# Patient Record
Sex: FEMALE | Race: WHITE | Employment: UNEMPLOYED | URBAN - METROPOLITAN AREA
[De-identification: names, ages, dates, MRNs, and addresses within clinical notes are randomized per-mention and may not be internally consistent; named-entity substitution may affect disease eponyms.]

---

## 2017-02-13 ENCOUNTER — APPOINTMENT (OUTPATIENT)
Dept: GENERAL RADIOLOGY | Age: 52
End: 2017-02-13
Attending: EMERGENCY MEDICINE
Payer: SELF-PAY

## 2017-02-13 ENCOUNTER — HOSPITAL ENCOUNTER (EMERGENCY)
Age: 52
Discharge: HOME | End: 2017-02-13
Attending: EMERGENCY MEDICINE | Admitting: EMERGENCY MEDICINE
Payer: SELF-PAY

## 2017-02-13 VITALS
TEMPERATURE: 97.6 F | WEIGHT: 270 LBS | DIASTOLIC BLOOD PRESSURE: 72 MMHG | BODY MASS INDEX: 42.38 KG/M2 | OXYGEN SATURATION: 92 % | SYSTOLIC BLOOD PRESSURE: 160 MMHG | HEIGHT: 67 IN | HEART RATE: 78 BPM | RESPIRATION RATE: 25 BRPM

## 2017-02-13 DIAGNOSIS — I50.33 CHF (CONGESTIVE HEART FAILURE), NYHA CLASS II, ACUTE ON CHRONIC, DIASTOLIC (HCC): Primary | ICD-10-CM

## 2017-02-13 LAB
ALBUMIN SERPL BCP-MCNC: 2.8 G/DL (ref 3.4–5)
ALBUMIN/GLOB SERPL: 0.7 {RATIO} (ref 0.8–1.7)
ALP SERPL-CCNC: 99 U/L (ref 45–117)
ALT SERPL-CCNC: 22 U/L (ref 13–56)
ANION GAP BLD CALC-SCNC: 6 MMOL/L (ref 3–18)
AST SERPL W P-5'-P-CCNC: 16 U/L (ref 15–37)
BASOPHILS # BLD AUTO: 0 K/UL (ref 0–0.06)
BASOPHILS # BLD: 0 % (ref 0–2)
BILIRUB SERPL-MCNC: 0.4 MG/DL (ref 0.2–1)
BNP SERPL-MCNC: 154 PG/ML (ref 0–900)
BUN SERPL-MCNC: 21 MG/DL (ref 7–18)
BUN/CREAT SERPL: 32 (ref 12–20)
CALCIUM SERPL-MCNC: 9 MG/DL (ref 8.5–10.1)
CHLORIDE SERPL-SCNC: 107 MMOL/L (ref 100–108)
CK MB CFR SERPL CALC: ABNORMAL % (ref 0–4)
CK MB SERPL-MCNC: <0.5 NG/ML (ref 0.5–3.6)
CK SERPL-CCNC: 38 U/L (ref 26–192)
CO2 SERPL-SCNC: 28 MMOL/L (ref 21–32)
CREAT SERPL-MCNC: 0.66 MG/DL (ref 0.6–1.3)
DIFFERENTIAL METHOD BLD: NORMAL
EOSINOPHIL # BLD: 0.2 K/UL (ref 0–0.4)
EOSINOPHIL NFR BLD: 2 % (ref 0–5)
ERYTHROCYTE [DISTWIDTH] IN BLOOD BY AUTOMATED COUNT: 14.4 % (ref 11.6–14.5)
GLOBULIN SER CALC-MCNC: 3.8 G/DL (ref 2–4)
GLUCOSE SERPL-MCNC: 263 MG/DL (ref 74–99)
HCT VFR BLD AUTO: 37.3 % (ref 35–45)
HGB BLD-MCNC: 12 G/DL (ref 12–16)
LYMPHOCYTES # BLD AUTO: 25 % (ref 21–52)
LYMPHOCYTES # BLD: 2 K/UL (ref 0.9–3.6)
MAGNESIUM SERPL-MCNC: 1.7 MG/DL (ref 1.8–2.4)
MCH RBC QN AUTO: 26.4 PG (ref 24–34)
MCHC RBC AUTO-ENTMCNC: 32.2 G/DL (ref 31–37)
MCV RBC AUTO: 82 FL (ref 74–97)
MONOCYTES # BLD: 0.5 K/UL (ref 0.05–1.2)
MONOCYTES NFR BLD AUTO: 6 % (ref 3–10)
NEUTS SEG # BLD: 5.3 K/UL (ref 1.8–8)
NEUTS SEG NFR BLD AUTO: 67 % (ref 40–73)
PLATELET # BLD AUTO: 213 K/UL (ref 135–420)
PMV BLD AUTO: 9.3 FL (ref 9.2–11.8)
POTASSIUM SERPL-SCNC: 4.3 MMOL/L (ref 3.5–5.5)
PROT SERPL-MCNC: 6.6 G/DL (ref 6.4–8.2)
RBC # BLD AUTO: 4.55 M/UL (ref 4.2–5.3)
SODIUM SERPL-SCNC: 141 MMOL/L (ref 136–145)
TROPONIN I SERPL-MCNC: <0.02 NG/ML (ref 0–0.06)
WBC # BLD AUTO: 7.9 K/UL (ref 4.6–13.2)

## 2017-02-13 PROCEDURE — 77030013140 HC MSK NEB VYRM -A

## 2017-02-13 PROCEDURE — 94640 AIRWAY INHALATION TREATMENT: CPT

## 2017-02-13 PROCEDURE — 82550 ASSAY OF CK (CPK): CPT | Performed by: EMERGENCY MEDICINE

## 2017-02-13 PROCEDURE — 74011000250 HC RX REV CODE- 250: Performed by: EMERGENCY MEDICINE

## 2017-02-13 PROCEDURE — 83735 ASSAY OF MAGNESIUM: CPT | Performed by: EMERGENCY MEDICINE

## 2017-02-13 PROCEDURE — 93005 ELECTROCARDIOGRAM TRACING: CPT

## 2017-02-13 PROCEDURE — 71020 XR CHEST PA LAT: CPT

## 2017-02-13 PROCEDURE — 99285 EMERGENCY DEPT VISIT HI MDM: CPT

## 2017-02-13 PROCEDURE — 83880 ASSAY OF NATRIURETIC PEPTIDE: CPT | Performed by: EMERGENCY MEDICINE

## 2017-02-13 PROCEDURE — 85025 COMPLETE CBC W/AUTO DIFF WBC: CPT | Performed by: EMERGENCY MEDICINE

## 2017-02-13 PROCEDURE — 74011250636 HC RX REV CODE- 250/636: Performed by: EMERGENCY MEDICINE

## 2017-02-13 PROCEDURE — 80053 COMPREHEN METABOLIC PANEL: CPT | Performed by: EMERGENCY MEDICINE

## 2017-02-13 PROCEDURE — 96374 THER/PROPH/DIAG INJ IV PUSH: CPT

## 2017-02-13 RX ORDER — FUROSEMIDE 40 MG/1
40 TABLET ORAL DAILY
Qty: 45 TAB | Refills: 1 | Status: ON HOLD | OUTPATIENT
Start: 2017-02-13 | End: 2020-04-10 | Stop reason: DRUGHIGH

## 2017-02-13 RX ORDER — FUROSEMIDE 10 MG/ML
80 INJECTION INTRAMUSCULAR; INTRAVENOUS
Status: COMPLETED | OUTPATIENT
Start: 2017-02-13 | End: 2017-02-13

## 2017-02-13 RX ORDER — SODIUM CHLORIDE 0.9 % (FLUSH) 0.9 %
5-10 SYRINGE (ML) INJECTION AS NEEDED
Status: DISCONTINUED | OUTPATIENT
Start: 2017-02-13 | End: 2017-02-13 | Stop reason: HOSPADM

## 2017-02-13 RX ORDER — IPRATROPIUM BROMIDE AND ALBUTEROL SULFATE 2.5; .5 MG/3ML; MG/3ML
3 SOLUTION RESPIRATORY (INHALATION)
Status: COMPLETED | OUTPATIENT
Start: 2017-02-13 | End: 2017-02-13

## 2017-02-13 RX ORDER — LISINOPRIL 10 MG/1
10 TABLET ORAL DAILY
Qty: 30 TAB | Refills: 0 | Status: SHIPPED | OUTPATIENT
Start: 2017-02-13 | End: 2017-02-23

## 2017-02-13 RX ORDER — SODIUM CHLORIDE 0.9 % (FLUSH) 0.9 %
5-10 SYRINGE (ML) INJECTION EVERY 8 HOURS
Status: DISCONTINUED | OUTPATIENT
Start: 2017-02-13 | End: 2017-02-13 | Stop reason: HOSPADM

## 2017-02-13 RX ADMIN — IPRATROPIUM BROMIDE AND ALBUTEROL SULFATE 3 ML: .5; 3 SOLUTION RESPIRATORY (INHALATION) at 08:20

## 2017-02-13 RX ADMIN — FUROSEMIDE 80 MG: 10 INJECTION, SOLUTION INTRAMUSCULAR; INTRAVENOUS at 09:26

## 2017-02-13 RX ADMIN — Medication 10 ML: at 09:28

## 2017-02-13 NOTE — DISCHARGE INSTRUCTIONS
Heart Failure: Care Instructions  Your Care Instructions    Heart failure occurs when your heart does not pump as much blood as the body needs. Failure does not mean that the heart has stopped pumping but rather that it is not pumping as well as it should. Over time, this causes fluid buildup in your lungs and other parts of your body. Fluid buildup can cause shortness of breath, fatigue, swollen ankles, and other problems. By taking medicines regularly, reducing sodium (salt) in your diet, checking your weight every day, and making lifestyle changes, you can feel better and live longer. Follow-up care is a key part of your treatment and safety. Be sure to make and go to all appointments, and call your doctor if you are having problems. It's also a good idea to know your test results and keep a list of the medicines you take. How can you care for yourself at home? Medicines  · Be safe with medicines. Take your medicines exactly as prescribed. Call your doctor if you think you are having a problem with your medicine. · Do not take any vitamins, over-the-counter medicine, or herbal products without talking to your doctor first. Brianna Cramerer not take ibuprofen (Advil or Motrin) and naproxen (Aleve) without talking to your doctor first. They could make your heart failure worse. · You may be taking some of the following medicine. ¨ Beta-blockers can slow heart rate, decrease blood pressure, and improve your condition. Taking a beta-blocker may lower your chance of needing to be hospitalized. ¨ Angiotensin-converting enzyme inhibitors (ACEIs) reduce the heart's workload, lower blood pressure, and reduce swelling. Taking an ACEI may lower your chance of needing to be hospitalized again. ¨ Angiotensin II receptor blockers (ARBs) work like ACEIs. Your doctor may prescribe them instead of ACEIs. ¨ Diuretics, also called water pills, reduce swelling.   ¨ Potassium supplements replace this important mineral, which is sometimes lost with diuretics. ¨ Aspirin and other blood thinners prevent blood clots, which can cause a stroke or heart attack. You will get more details on the specific medicines your doctor prescribes. Diet  · Your doctor may suggest that you limit sodium to 2,000 milligrams (mg) a day or less. That is less than 1 teaspoon of salt a day, including all the salt you eat in cooking or in packaged foods. People get most of their sodium from processed foods. Fast food and restaurant meals also tend to be very high in sodium. · Ask your doctor how much liquid you can drink each day. You may have to limit liquids. Weight  · Weigh yourself without clothing at the same time each day. Record your weight. Call your doctor if you gain more than 3 pounds in 2 to 3 days. A sudden weight gain may mean that your heart failure is getting worse. Activity level  · Start light exercise (if your doctor says it is okay). Even if you can only do a small amount, exercise will help you get stronger, have more energy, and manage your weight and your stress. Walking is an easy way to get exercise. Start out by walking a little more than you did before. Bit by bit, increase the amount you walk. · When you exercise, watch for signs that your heart is working too hard. You are pushing yourself too hard if you cannot talk while you are exercising. If you become short of breath or dizzy or have chest pain, stop, sit down, and rest.  · If you feel \"wiped out\" the day after you exercise, walk slower or for a shorter distance until you can work up to a better pace. · Get enough rest at night. Sleeping with 1 or 2 pillows under your upper body and head may help you breathe easier. Lifestyle changes  · Do not smoke. Smoking can make a heart condition worse. If you need help quitting, talk to your doctor about stop-smoking programs and medicines. These can increase your chances of quitting for good.  Quitting smoking may be the most important step you can take to protect your heart. · Limit alcohol to 2 drinks a day for men and 1 drink a day for women. Too much alcohol can cause health problems. · Avoid getting sick from colds and the flu. Get a pneumococcal vaccine shot. If you have had one before, ask your doctor whether you need another dose. Get a flu shot each year. If you must be around people with colds or the flu, wash your hands often. When should you call for help? Call 911 if you have symptoms of sudden heart failure such as:  · You have severe trouble breathing. · You cough up pink, foamy mucus. · You have a new irregular or rapid heartbeat. Call your doctor now or seek immediate medical care if:  · You have new or increased shortness of breath. · You are dizzy or lightheaded, or you feel like you may faint. · You have sudden weight gain, such as 3 pounds or more in 2 to 3 days. · You have increased swelling in your legs, ankles, or feet. · You are suddenly so tired or weak that you cannot do your usual activities. Watch closely for changes in your health, and be sure to contact your doctor if:  · You develop new symptoms. Where can you learn more? Go to http://tere-ann marie.info/. Enter L693 in the search box to learn more about \"Heart Failure: Care Instructions. \"  Current as of: January 27, 2016  Content Version: 11.1  © 7964-5450 [employer]. Care instructions adapted under license by [employer] (which disclaims liability or warranty for this information). If you have questions about a medical condition or this instruction, always ask your healthcare professional. [address] any warranty or liability for your use of this information. Learning About Heart Failure  What is heart failure? Heart failure means that your heart muscle does not pump as much blood as your body needs. Failure does not mean that your heart has stopped.  It means that your heart is not pumping as well as it should. Your body has an amazing ability to make up for heart failure. It may do such a good job that you don't know you have a disease. But at some point, your heart and body will no longer be able to keep up. Then fluid starts to build up in your lungs and other parts of your body. What can you expect when you have heart failure? Heart failure is a lifelong (chronic) disease. Treatment may be able to slow the disease and help you feel better. But heart failure tends to get worse over time. Despite this, there are many steps you can take to feel better and stay healthy longer. Early on, your symptoms may not be too bad. As heart failure gets worse, symptoms typically get worse, and you may need to limit your activities. Heart failure can also get worse suddenly. If this happens, you need emergency care. Then, after treatment, your symptoms may go back to being stable (which means they stay the same) for a long time. Heart failure can lead to other health problems, such as heart rhythm problems. Over time, your treatment options may change, especially as your symptoms get worse. As heart failure gets worse, palliative care can help improve the quality of your life. You can do advance care planning to decide what kind of care you want at the end of your life. What are the symptoms? Symptoms of heart failure start to happen when your heart can't pump enough blood to the rest of your body. In the early stages of heart failure, you may:  · Feel tired easily. · Be short of breath when you exert yourself. · Feel like your heart is pounding or racing (palpitations). · Feel weak or dizzy. As heart failure gets worse, fluid starts to build up in your lungs and other parts of your body. This may cause you to:  · Feel short of breath even at rest.  · Have swelling (edema), especially in your legs, ankles, and feet. · Gain weight.  This may happen over just a day or two, or more slowly. · Cough or wheeze, especially when you lie down. How is heart failure treated? · You'll probably take several medicines. · You might attend cardiac rehabilitation (rehab) to get education and support that help you make lifestyle changes and stay as healthy as possible. · You may get a heart device. A pacemaker helps your heart pump blood. An ICD can stop abnormal heart rhythms. How can you care for yourself? There are many steps you can take to feel better and stay healthy longer. These steps are an important part of treatment. They can help you stay active and enjoy life. · Take your medicine the right way. Avoid medicines that can make your symptoms worse. · Check your weight and symptoms every day. Know what to do if your symptoms get worse. · Limit sodium to help your heart pump blood better. · Be active. Exercise regularly, but don't exercise too hard. · Be heart-healthy. Eat healthy foods, stay at a healthy weight, limit alcohol, and don't smoke. · Stay as healthy as possible. Avoid colds and flu, get help for depression and anxiety, and manage stress. Follow-up care is a key part of your treatment and safety. Be sure to make and go to all appointments, and call your doctor if you are having problems. It's also a good idea to know your test results and keep a list of the medicines you take. Where can you learn more? Go to http://tere-ann marie.info/. Enter G134 in the search box to learn more about \"Learning About Heart Failure. \"  Current as of: January 27, 2016  Content Version: 11.1  © 0556-6910 [employer]. Care instructions adapted under license by [employer] (which disclaims liability or warranty for this information). If you have questions about a medical condition or this instruction, always ask your healthcare professional. [address] any warranty or liability for your use of this information.

## 2017-02-13 NOTE — ED NOTES
Care assumed for discharge only. Pt rates headache pain as 4/10. Pt given discharge instructions and verbalizes understanding. Pt discharged home via w/c to lobby to wait for her ride, no resp distress noted on discharge.

## 2017-02-13 NOTE — ED TRIAGE NOTES
Patient with complaints of shortness of breath that started yesterday. Patient also reports ankle swelling. Patient appear acutely short of breath in triage. Sepsis Screening completed    (  )Patient meets SIRS criteria. (x  )Patient does not meet SIRS criteria.       SIRS Criteria is achieved when two or more of the following are present   Temperature < 96.8°F (36°C) or > 100.9°F (38.3°C)   Heart Rate > 90 beats per minute   Respiratory Rate > 20 beats per minute   WBC count > 12,000 or <4,000 or > 10% bands

## 2017-02-13 NOTE — ED NOTES
Bedside report received from [place], [facility]. Patient introduced to oncoming RN and advised of plan of care. Patient is oriented to room. Call Deborra Bobby in reach and bed in low position. Patient resting supine in bed with continuous monitoring in place. Side rails up with call bell in reach. Patient advised of plan of care. Vitals reassessed. Patient states feeling much better.

## 2017-02-13 NOTE — Clinical Note
SPECIAL DISCHARGE INSTRUCTIONS AND COMMENTS: 
 
General comments: Thank you for allowing us to provide you with excellent care today. We hope we addressed all of your concerns and needs. We strive to provide excellent quality care in the Emergency Depart ment. If you feel that you have not received excellent quality care or timely care, please ask to speak to the nurse manager. Please choose us in the future for your continued health care needs. Follow-up comments: The exam and treatment you rece ived in the Emergency Department were for an urgent problem that may be new for you and / or one which may be related to a worsening of a chronic or ongoing medical problem that you already had prior to this visit. In any case, today's treatment is not i ntended to be considered as complete care in all cases and thus, it is important that you follow-up with a doctor, nurse practitioner, or physicians assistant to: (1) confirm your diagnosis, (2) re-evaluation of changes in your illness and treatment, an d (3) for ongoing care. In some cases we may have contacted your doctor or a specific specialist who may be involved in your future evaluation and care but in any case, take this sheet with you when you go to your follow-up visit or refer to the infor elvia on these sheets when you are calling to arrange an appointment - it may prove helpful in making the appointment. Prescribed Medications: Unless you have been directed by the provider to change your current medicines, you should continue to veronika e them as before. If you have been prescribed medicines, please take them as directed. In some cases, these new medicines are intended to replace a medicine that you are currently taking and if so, this will be noted below.  
 
 Our expectation is that y our condition will improve by following the doctor's recommendations, however, if in the event your condition worsens or does not improve as expected, please follow-up with your PCP or if unable to reach your usual health care provider, you should return  to the Emergency Department. We are available 24 hours a day. SPECIFIC INSTRUCTIONS PROVIDED BY YOUR DOCTOR, Montana Lamar MD:  
YOU SHOULD TAKE AN INCREASED AMOUNT OF LASIX (40 MG TWICE A DAY) FOR 3 DAYS AND THEN RESULME YOUR REGULAR DOSE.

## 2017-02-13 NOTE — ED PROVIDER NOTES
HPI Comments: 7:24 AM   Scarlet Gar is a 45 y.o. female with hx of CHF, DM, HTN, and asthma, presenting to the ED c/o cough productive of white, milky sputum and SOB x 2 days, worsening. Associated sxs include dyspnea on exertion, CP (across the shoulders, and through chest to the back), chest tightening, leg swelling (onset 2 weeks ago), chills, and nausea. Reports CP is similar to pain she experiences with bronchitis, and is worse with exertion. Reports she is supposed to take Lasix, but she has run out about a week ago. Reports not having a PCP, and has last seen an IM physician approximately 1-1.5 months ago. Pt also reports constipation. Denies fever, abd pain, vomiting, diarrhea, change in appetite, and any other sxs or complaints. The history is provided by the patient. Past Medical History:   Diagnosis Date    Asthma      Note: As child had asthma    Diabetes mellitus ([facility]      Type 2    Heart palpitations     Hypertension     Morbid obesity ([facility]     MRSA infection 8/2014       Past Surgical History:   Procedure Laterality Date    Hx cholecystectomy      Hx tubal ligation      Pr breast surgery procedure unlisted           Family History:   Problem Relation Age of Onset    Heart Attack Mother     Heart Attack Maternal Grandmother        Social History     Social History    Marital status: SINGLE     Spouse name: N/A    Number of children: N/A    Years of education: N/A     Occupational History    Not on file. Social History Main Topics    Smoking status: Never Smoker    Smokeless tobacco: Never Used    Alcohol use No    Drug use: No    Sexual activity: No     Other Topics Concern    Not on file     Social History Narrative         ALLERGIES: Bees [sting, bee]; Penicillins; and Humphrey    Review of Systems   Constitutional: Positive for chills. Negative for appetite change and fever. HENT: Negative for congestion, rhinorrhea and sore throat.     Eyes: Negative for pain, discharge and visual disturbance. Respiratory: Positive for cough, chest tightness and shortness of breath. Negative for wheezing. Cardiovascular: Positive for chest pain and leg swelling. Gastrointestinal: Positive for constipation and nausea. Negative for abdominal pain, diarrhea and vomiting. Endocrine: Negative for polydipsia and polyuria. Genitourinary: Negative for difficulty urinating, dysuria, frequency, hematuria, menstrual problem, pelvic pain, urgency, vaginal bleeding and vaginal discharge. Musculoskeletal: Negative for arthralgias, back pain and myalgias. Skin: Negative for color change. Neurological: Negative for weakness, numbness and headaches. Hematological: Does not bruise/bleed easily. Psychiatric/Behavioral: Negative for decreased concentration. All other systems reviewed and are negative. Vitals:    02/13/17 0930 02/13/17 1115 02/13/17 1130 02/13/17 1200   BP: 165/72 160/77 155/80 160/72   Pulse: 80 79 77 78   Resp: 26 (!) 33 19 25   Temp:       SpO2: 97% 96% 95% 92%   Weight:       Height:                Physical Exam   Nursing note and vitals reviewed. GEN:  Mild respiratory distress. Somewhat emotional. Alert and Oriented. SKIN:  Warm and dry, no rashes. No petechia. Good skin turgor. HEAD:  Normocephalic and Atraumatic;    ENT: Oral mucosa moist, pharynx clear;   NECK:  Supple. Trachea is Midline; No adenopathy. HEART:  Regular rate and rhythm No murmur. No rubs. LUNGS:  Mild respiratory distress. Prolonged expiratory phase. ABD:  Obese non-tender. No organomegaly. No hernias. BACK: No obvious bony spinal defects or changes; Non tender paraspinous muscles  : Normal inspection; no rash; NO CVAT  EXT:  Lower Extremities with bilateral 2+ pitting edema. No obvious soft tissue tenderness or sites of bony tenderness  NEURO: CN- intact;  No focal motor deficits; Cerebellar function- intact    MDM  Number of Diagnoses or Management Options  Diagnosis management comments: INITIAL CLINICAL IMPRESSION and PLANS:  The patient presents with the primary complaint(s) of: dyspnea. The presentation, to include historical aspects and clinical findings appear to be consistent with the DX of CHF. However, other possible DX's to consider as primary, associated with, or exacerbated by include:    1. PNA  2. COPD exacerbation  3. ACS    Considering the above, my initial management plan to evaluate and therapeutic interventions include: Obtain Lab Studies, Obtain Radiologic studies, and Initiate Medications and or IV Fluids, As well as those noted in the orders:    Vahid Anand MD         Amount and/or Complexity of Data Reviewed  Clinical lab tests: ordered and reviewed  Tests in the radiology section of CPT®: ordered and reviewed (CXR)  Tests in the medicine section of CPT®: ordered and reviewed (EKG)  Independent visualization of images, tracings, or specimens: yes (EKG, CXR)      ED Course       Procedures      ED CLINICAL SUMMARY - DISCHARGE     11:48 AM  CLINICAL COURSE while in the ED:      Intervention)s) while in ED:  ACTIONS / APPROACH: Based on the presenting ACUTE history of cough and SOB My initial focus was to Determine the cause and extent of the problem and Initiate Treatment as Appropriate . Details of actions taken are noted below. SPECIFICS REGARDING APPROACH:     1. DIAGNOSTIC RESULTS:     EKG interpretation: (Preliminary)  Rate 79 bpm. NSR. Low voltage QRS. Post Cardizem  EKG read by Nandini Rocha MD at 8:18 AM    XR CHEST PA LAT   Final Result   Impression:        1. The chest appears worse with increasing initial changes which could represent  interstitial edema, possibly from fluid overload.   2. Subsegmental atelectasis both lower lobes appears worse.     As read by the radiologist.                Labs Reviewed   METABOLIC PANEL, COMPREHENSIVE - Abnormal; Notable for the following:        Result Value    Glucose 263 (*)     BUN 21 (*) BUN/Creatinine ratio 32 (*)     Albumin 2.8 (*)     A-G Ratio 0.7 (*)     All other components within normal limits   MAGNESIUM - Abnormal; Notable for the following:     Magnesium 1.7 (*)     All other components within normal limits   CARDIAC PANEL,(CK, CKMB & TROPONIN) - Abnormal; Notable for the following:     CK - MB <0.5 (*)     All other components within normal limits   CBC WITH AUTOMATED DIFF   PRO-BNP           No results found for this or any previous visit (from the past 12 hour(s)). 2. MEDICATIONS GIVEN:   Medications   furosemide (LASIX) injection 80 mg (80 mg IntraVENous Given 2/13/17 0926)   albuterol-ipratropium (DUO-NEB) 2.5 MG-0.5 MG/3 ML (3 mL Nebulization Given 2/13/17 0820)        Response to Intervention(s):   IMPROVED after diuresis and is breathing better. Feels comfortable going home      Unanticipated Developments: NONE     ED COURSE - General Comment:  During the ED course I had re-evaluated the patient, answered their and /or their family's questions regarding my clinical impression, the patient's condition and plans for therapeutic interventions. The patient's ED course was uneventful and remained stable throughout. CLINICAL IMPRESSION AND DISCUSSION:   I reviewed our electronic medical record system for any past medical records that were available that may contribute to the patients current condition, the nursing notes and vital signs from today's visit. Based on the clinical presentation, findings and results of diagnostic studies, as well as developments while in the ED,  I suspect the following: For the presentation noted above    The most likely cause or diagnosis is: CHF  Induced by medication non compliance and is compensated at this time. DISCUSSION REGARDING DIAGNOSIS: The specifics regarding my clinical impression / diagnosis are as follows:      At this time there is no clinical evidence to support other pertinent diagnostic considerations such as: N/A    Finally, other diagnostic considerations during this visit are noted below in Clinical Impression. Specific Conversations:  NONE      DISPOSITION DECISION:     DISCHARGE: I feel that we have optimized outpatient assessment and management such that Lety Mcgarry is stable to be discharged and to continue with her care or complete any additional evaluation as appropriate at home or as an outpatient. Preparations will be made to discharge the patient. Present condition at the time of disposition: STABLE    DISCUSSION REGARDING THE DISPOSITION:         DISCHARGE NOTE:    Ezella Mortimer  results have been reviewed with her. She has been counseled regarding her diagnosis, treatment, and plan. She verbally conveys understanding and agreement of the signs, symptoms, diagnosis, treatment and prognosis and additionally agrees to follow up as discussed. She also agrees with the care-plan and conveys that all of her questions have been answered. I have also provided discharge instructions for her that include: educational information regarding their diagnosis and treatment, and list of reasons why they would want to return to the ED prior to their follow-up appointment, should her condition change. The patient and/or family has been provided with education for proper Emergency Department utilization. CLINICAL IMPRESSION  1. CHF (congestive heart failure), NYHA class II, acute on chronic, diastolic (HCC)        PLAN:  1. D/C home  2. Discharge Medication List as of 2/13/2017 11:52 AM      START taking these medications    Details   furosemide (LASIX) 40 mg tablet Take 1 Tab by mouth daily. , Normal, Disp-45 Tab, R-1      !! lisinopril (PRINIVIL) 10 mg tablet Take 1 Tab by mouth daily for 10 days. , Normal, Disp-30 Tab, R-0       !! - Potential duplicate medications found. Please discuss with provider.       CONTINUE these medications which have NOT CHANGED    Details   acetaminophen-codeine (TYLENOL-CODEINE #3) 300-30 mg per tablet Take 1 Tab by mouth every six (6) hours as needed for Pain. Max Daily Amount: 4 Tabs., Print, Disp-12 Tab, R-0      tiZANidine (ZANAFLEX) 4 mg tablet Take 4 mg by mouth every six (6) hours as needed., Historical Med      celecoxib (CELEBREX) 200 mg capsule Take 200 mg by mouth two (2) times a day., Historical Med      !! lisinopril (PRINIVIL, ZESTRIL) 10 mg tablet Take 1 Tab by mouth daily. , Print, Disp-30 Tab, R-0      insulin NPH/insulin regular (NOVOLIN 70/30) 100 unit/mL (70-30) injection 20 Units by SubCUTAneous route every twelve (12) hours. , Historical Med      insulin regular (NOVOLIN R) 100 unit/mL injection by SubCUTAneous route Before breakfast, lunch, and dinner., Historical Med      aspirin 81 mg chewable tablet Take 1 tablet by mouth daily. , Print, Disp-30 tablet, R-1       !! - Potential duplicate medications found. Please discuss with provider. 3.   Follow-up Information     Follow up With Details Comments Contact Info    [facility] Schedule an appointment as soon as possible for a visit in 2 days for Primary care follow up [id number] [facility], [address] [facility] [address]    [facility] Go to As needed, If symptoms worsen [address] [place] [address]  [phone]        Return if sxs worsen    ATTESTATIONS:  This note is prepared by Lupe Brooks, acting as Scribe for Immanuel Marcelino MD.    Immanuel Marcelino MD: The scribe's documentation has been prepared under my direction and personally reviewed by me in its entirety. I confirm that the note above accurately reflects all work, treatment, procedures, and medical decision making performed by me.

## 2017-02-13 NOTE — ED NOTES
Bedside report complete. Patient was introduced to oncoming Marilyn Williamson RN. Patient updated on plan of care. Safety check performed: Bed locked in low posiiton. Side rails up. Call bell and personal items within reach.

## 2017-02-13 NOTE — ED NOTES
RN in room for purpose of hourly rounding. Room checked for trash and safety hazards. Patient is. ..    [  ] seated at bedside. [  ] lying in bed with side rails up and call bell in reach. [ x ] lying in with call bell in reach and continuous monitoring in place. Pain reduction interventions. .. [ x ] not indicated at this time      include the following   [  ] administration of analgesic medications   [  ] lights turned down   [  ] patient offered a cool washcloth   [  ] heat applied   [  ] ice applied   [  ] patient repositioned    Restroom needs addressed. Patient is. ... [  x] Not in need restroom assistance at this time  [  ] Ambulatory as needed to the restroom  [  ] Assisted to bedside commode  [  ] Assisted with use of bed pan  [  ] Provided with a urinal    Position. .. [ x ] Patient does not require assistance with repositioning  [  ] Patient repositions with assistance of RN  [  ] Patient repositioned with assistance of RN and other ED staff.

## 2017-02-16 LAB
ATRIAL RATE: 79 BPM
CALCULATED P AXIS, ECG09: 61 DEGREES
CALCULATED R AXIS, ECG10: 60 DEGREES
CALCULATED T AXIS, ECG11: 44 DEGREES
DIAGNOSIS, 93000: NORMAL
P-R INTERVAL, ECG05: 130 MS
Q-T INTERVAL, ECG07: 378 MS
QRS DURATION, ECG06: 82 MS
QTC CALCULATION (BEZET), ECG08: 433 MS
VENTRICULAR RATE, ECG03: 79 BPM

## 2017-09-03 ENCOUNTER — APPOINTMENT (OUTPATIENT)
Dept: GENERAL RADIOLOGY | Age: 52
DRG: 918 | End: 2017-09-03
Attending: FAMILY MEDICINE
Payer: COMMERCIAL

## 2017-09-03 ENCOUNTER — HOSPITAL ENCOUNTER (INPATIENT)
Age: 52
LOS: 1 days | Discharge: HOME | DRG: 918 | End: 2017-09-04
Attending: FAMILY MEDICINE | Admitting: INTERNAL MEDICINE
Payer: COMMERCIAL

## 2017-09-03 DIAGNOSIS — T38.3X1A INSULIN OVERDOSE, ACCIDENTAL OR UNINTENTIONAL, INITIAL ENCOUNTER: ICD-10-CM

## 2017-09-03 DIAGNOSIS — E16.2 HYPOGLYCEMIA: Primary | ICD-10-CM

## 2017-09-03 LAB
ANION GAP SERPL CALC-SCNC: 7 MMOL/L (ref 3–18)
BASOPHILS # BLD: 0 K/UL (ref 0–0.06)
BASOPHILS NFR BLD: 1 % (ref 0–2)
BUN SERPL-MCNC: 28 MG/DL (ref 7–18)
BUN/CREAT SERPL: 30 (ref 12–20)
CALCIUM SERPL-MCNC: 9.3 MG/DL (ref 8.5–10.1)
CHLORIDE SERPL-SCNC: 108 MMOL/L (ref 100–108)
CO2 SERPL-SCNC: 28 MMOL/L (ref 21–32)
CREAT SERPL-MCNC: 0.94 MG/DL (ref 0.6–1.3)
DIFFERENTIAL METHOD BLD: ABNORMAL
EOSINOPHIL # BLD: 0.4 K/UL (ref 0–0.4)
EOSINOPHIL NFR BLD: 5 % (ref 0–5)
ERYTHROCYTE [DISTWIDTH] IN BLOOD BY AUTOMATED COUNT: 14.9 % (ref 11.6–14.5)
GLUCOSE BLD STRIP.AUTO-MCNC: 104 MG/DL (ref 70–110)
GLUCOSE BLD STRIP.AUTO-MCNC: 132 MG/DL (ref 70–110)
GLUCOSE BLD STRIP.AUTO-MCNC: 188 MG/DL (ref 70–110)
GLUCOSE BLD STRIP.AUTO-MCNC: 60 MG/DL (ref 70–110)
GLUCOSE BLD STRIP.AUTO-MCNC: 84 MG/DL (ref 70–110)
GLUCOSE SERPL-MCNC: 110 MG/DL (ref 74–99)
HCT VFR BLD AUTO: 38.8 % (ref 35–45)
HGB BLD-MCNC: 12.7 G/DL (ref 12–16)
LYMPHOCYTES # BLD: 2.4 K/UL (ref 0.9–3.6)
LYMPHOCYTES NFR BLD: 30 % (ref 21–52)
MCH RBC QN AUTO: 26 PG (ref 24–34)
MCHC RBC AUTO-ENTMCNC: 32.7 G/DL (ref 31–37)
MCV RBC AUTO: 79.3 FL (ref 74–97)
MONOCYTES # BLD: 0.6 K/UL (ref 0.05–1.2)
MONOCYTES NFR BLD: 7 % (ref 3–10)
NEUTS SEG # BLD: 4.6 K/UL (ref 1.8–8)
NEUTS SEG NFR BLD: 57 % (ref 40–73)
PLATELET # BLD AUTO: 203 K/UL (ref 135–420)
PMV BLD AUTO: 9.4 FL (ref 9.2–11.8)
POTASSIUM SERPL-SCNC: 4 MMOL/L (ref 3.5–5.5)
RBC # BLD AUTO: 4.89 M/UL (ref 4.2–5.3)
SODIUM SERPL-SCNC: 143 MMOL/L (ref 136–145)
WBC # BLD AUTO: 8 K/UL (ref 4.6–13.2)

## 2017-09-03 PROCEDURE — 80048 BASIC METABOLIC PNL TOTAL CA: CPT | Performed by: FAMILY MEDICINE

## 2017-09-03 PROCEDURE — 96361 HYDRATE IV INFUSION ADD-ON: CPT

## 2017-09-03 PROCEDURE — 99285 EMERGENCY DEPT VISIT HI MDM: CPT

## 2017-09-03 PROCEDURE — 96376 TX/PRO/DX INJ SAME DRUG ADON: CPT

## 2017-09-03 PROCEDURE — 82962 GLUCOSE BLOOD TEST: CPT

## 2017-09-03 PROCEDURE — 85025 COMPLETE CBC W/AUTO DIFF WBC: CPT | Performed by: FAMILY MEDICINE

## 2017-09-03 PROCEDURE — 74011000258 HC RX REV CODE- 258: Performed by: FAMILY MEDICINE

## 2017-09-03 PROCEDURE — 96374 THER/PROPH/DIAG INJ IV PUSH: CPT

## 2017-09-03 PROCEDURE — 71010 XR CHEST PORT: CPT

## 2017-09-03 PROCEDURE — 74011000250 HC RX REV CODE- 250: Performed by: FAMILY MEDICINE

## 2017-09-03 RX ORDER — DEXTROSE MONOHYDRATE AND SODIUM CHLORIDE 5; .9 G/100ML; G/100ML
125 INJECTION, SOLUTION INTRAVENOUS CONTINUOUS
Status: DISCONTINUED | OUTPATIENT
Start: 2017-09-03 | End: 2017-09-04

## 2017-09-03 RX ORDER — DEXTROSE 50 % IN WATER (D50W) INTRAVENOUS SYRINGE
25
Status: COMPLETED | OUTPATIENT
Start: 2017-09-03 | End: 2017-09-03

## 2017-09-03 RX ORDER — GABAPENTIN 300 MG/1
300 CAPSULE ORAL 3 TIMES DAILY
Status: ON HOLD | COMMUNITY
End: 2020-04-10

## 2017-09-03 RX ADMIN — DEXTROSE MONOHYDRATE AND SODIUM CHLORIDE 125 ML/HR: 5; .9 INJECTION, SOLUTION INTRAVENOUS at 21:25

## 2017-09-03 RX ADMIN — DEXTROSE MONOHYDRATE 25 G: 25 INJECTION, SOLUTION INTRAVENOUS at 23:32

## 2017-09-03 NOTE — IP AVS SNAPSHOT
Carl Elders 
 
 
 [address] [facility] [address] 
[phone] Patient: Chuck Del Rosario MRN: [id number] :1965 You are allergic to the following Allergen Reactions Bees (Sting, Bee) Swelling Penicillins Hives Strawberry Hives Recent Documentation Height Weight Breastfeeding? BMI OB Status Smoking Status 1.575 m 132 kg No 53.23 kg/m2 Postmenopausal Never Smoker Emergency Contacts Name Discharge Info Relation Home Work Mobile [facility] SPECIALTY [facility] DISCHARGE CAREGIVER [3] Daughter [21]   [phone] About your hospitalization You were admitted on:  2017 You last received care in the:  [place] [address] INTENSIVE CARE You were discharged on:  2017 Unit phone number:  [phone] Why you were hospitalized Your primary diagnosis was:  Hypoglycemia Providers Seen During Your Hospitalizations Provider Role Specialty Primary office phone Ezella Kanner, MD Attending Provider Emergency Medicine [phone] Mona Ortega MD Attending Provider Internal Medicine [phone] Your Primary Care Physician (PCP) Primary Care Physician Office Phone Office Fax [place], [place] [phone] Follow-up Information Follow up With Details Comments Contact Info Monica Uriostegui MD Schedule an appointment as soon as possible for a visit in 1 week  [place] [address] 
[phone] Current Discharge Medication List  
  
START taking these medications Dose & Instructions Dispensing Information Comments Morning Noon Evening Bedtime Blood-Glucose Meter monitoring kit Your last dose was: Your next dose is:    
   
   
 Test QAC and QHS Quantity:  1 Kit Refills:  0 CONTINUE these medications which have NOT CHANGED Dose & Instructions Dispensing Information Comments Morning Noon Evening Bedtime  
 aspirin 81 mg chewable tablet Your last dose was: Your next dose is:    
   
   
 Dose:  81 mg Take 1 tablet by mouth daily. Quantity:  30 tablet Refills:  1  
     
   
   
   
  
 furosemide 40 mg tablet Commonly known as:  LASIX Your last dose was: Your next dose is:    
   
   
 Dose:  40 mg Take 1 Tab by mouth daily. Quantity:  45 Tab Refills:  1  
     
   
   
   
  
 gabapentin 300 mg capsule Commonly known as:  NEURONTIN Your last dose was: Your next dose is:    
   
   
 Dose:  300 mg Take 300 mg by mouth two (2) times a day. Refills:  0  
     
   
   
   
  
 lisinopril 10 mg tablet Commonly known as:  Radha Polk Your last dose was: Your next dose is:    
   
   
 Dose:  10 mg Take 1 Tab by mouth daily. Quantity:  30 Tab Refills:  0 NovoLIN 70/30 100 unit/mL (70-30) injection Generic drug:  insulin NPH/insulin regular Your last dose was: Your next dose is:    
   
   
 Dose:  32 Units 32 Units by SubCUTAneous route every twelve (12) hours. Refills:  0 NovoLIN R 100 unit/mL injection Generic drug:  insulin regular Your last dose was: Your next dose is:    
   
   
 by SubCUTAneous route Daily (before lunch). Refills:  0  
     
   
   
   
  
 ZANAFLEX 4 mg tablet Generic drug:  tiZANidine Your last dose was: Your next dose is:    
   
   
 Dose:  4 mg Take 4 mg by mouth every six (6) hours as needed. Refills:  0 Where to Get Your Medications Information on where to get these meds will be given to you by the nurse or doctor. ! Ask your nurse or doctor about these medications Blood-Glucose Meter monitoring kit Discharge Instructions Hypoglycemia: Care Instructions Your Care Instructions Hypoglycemia means that your blood sugar is low and your body is not getting enough fuel. Some people get low blood sugar from not eating often enough. Some medicines to treat diabetes can cause low blood sugar. People who have had surgery on their stomachs or intestines may get hypoglycemia. Problems with the pancreas, kidneys, or liver also can cause low blood sugar. A snack or drink with sugar in it will raise your blood sugar and should ease your symptoms right away. Your doctor may recommend that you change or stop your medicines until you can get your blood sugar levels under control. In the long run, you may need to change your diet and eating habits so that you get enough fuel for your body throughout the day. Follow-up care is a key part of your treatment and safety. Be sure to make and go to all appointments, and call your doctor if you are having problems. It's also a good idea to know your test results and keep a list of the medicines you take. How can you care for yourself at home? · Learn to recognize the early signs of low blood sugar. Signs include: 
¨ Nausea. ¨ Hunger. ¨ Feeling nervous, irritable, or shaky. ¨ Cold, clammy, wet skin. ¨ Sweating (when you are not exercising). ¨ A fast heartbeat. ¨ Numbness or tingling of the fingertips or lips. · If you feel an episode of low blood sugar coming on, drink fruit juice or sugared (not diet) soda, or eat sugar in the form of candy, cubes, or tablets. 1[employer] are another [employer]. · Eat small, frequent meals so that you do not get too hungry between meals. · Balance extra exercise with eating more. · Keep a written record of your low blood sugar episodes, including when you last ate and what you ate, so that you can learn what causes your blood sugar to drop.  
· Make sure your family, friends, and coworkers know the symptoms of low blood sugar and know what to do to get your sugar level up. · Wear medical alert jewelry that lists your condition. You can buy this at most drugstores. When should you call for help? Call 911 anytime you think you may need emergency care. For example, call if: 
· You passed out (lost consciousness). · You are confused or cannot think clearly. · Your blood sugar is very high or very low. Watch closely for changes in your health, and be sure to contact your doctor if: 
· Your blood sugar stays outside the level your doctor set for you. · You have any problems. Where can you learn more? Go to http://tere-ann marie.info/. Enter H581 in the search box to learn more about \"Hypoglycemia: Care Instructions. \" Current as of: March 13, 2017 Content Version: 11.3 © 1420-4720 [employer]. Care instructions adapted under license by [employer] (which disclaims liability or warranty for this information). If you have questions about a medical condition or this instruction, always ask your healthcare professional. [address] any warranty or liability for your use of this information. Accidental Overdose of Medicine: Care Instructions Your Care Instructions Almost any medicine can cause harm if you take too much of it. You have had treatment to help your body get rid of an overdose of a medicine. This may have been an over-the-counter medicine. Or it might be one that a doctor prescribed. It may even have been a vitamin or a supplement. During treatment, the doctor may have given you fluids and medicine. You also may have had lab tests. Then the doctor made sure that you were well enough to go home. The doctor has checked you carefully, but problems can develop later. If you notice any problems or new symptoms, get medical treatment right away. Follow-up care is a key part of your treatment and safety.  Be sure to make and go to all appointments, and call your doctor if you are having problems. It's also a good idea to know your test results and keep a list of the medicines you take. How can you care for yourself at home? Home care · Drink plenty of fluids. If you have kidney, heart, or liver disease and have to limit fluids, talk with your doctor before you increase the amount of fluids you drink. · If you normally take medicines, ask your doctor when you can start taking them again. · Read the information that comes with any medicine. If you have questions, ask your doctor or pharmacist. 
Prevention · Be safe with medicines. Take your medicines exactly as prescribed or as the label directs. Call your doctor if you think you are having a problem with your medicine. · Keep your medicines in the containers they came in. Keep them with the original labels. · Find out what to do if you miss a dose of your medicine. When should you call for help? Call 911 anytime you think you may need emergency care. For example, call if: 
· You passed out (lost consciousness). · You have trouble breathing. · You are sleepy or hard to wake up. Call your doctor now or seek immediate medical care if: 
· You are vomiting. · You have a new or worse headache. · You are dizzy or lightheaded or feel like you may faint. Watch closely for changes in your health, and be sure to contact your doctor if: 
· You do not get better as expected. Where can you learn more? Go to http://tere-ann marie.info/. Enter C165 in the search box to learn more about \"Accidental Overdose of Medicine: Care Instructions. \" Current as of: March 24, 2017 Content Version: 11.3 © 8315-7927 [employer]. Care instructions adapted under license by [employer] (which disclaims liability or warranty for this information).  If you have questions about a medical condition or this instruction, always ask your healthcare professional. [address] any warranty or liability for your use of this information. Based on your report of your last A1C >14% in June 2017, this test reflects that your blood sugar averaged 360 mg/dl over the past 3 months. It is important to follow up with your provider on a routine basis to continue to evaluate your blood sugar and discuss any necessary changes in treatment. Discharge Orders None Introducing [facility]! Tatianna Mcdermott introduces Cinario patient portal. Now you can access parts of your medical record, email your doctor's office, and request medication refills online. 1. In your internet browser, go to [email or web address] 2. Click on the First Time User? Click Here link in the Sign In box. You will see the New Member Sign Up page. 3. Enter your Cinario Access Code exactly as it appears below. You will not need to use this code after youve completed the sign-up process. If you do not sign up before the expiration date, you must request a new code. · Cinario Access Code: [email or web address] Expires: 12/3/2017  6:10 PM 
 
4. Enter the last four digits of your Social Security Number (xxxx) and Date of Birth (mm/dd/yyyy) as indicated and click Submit. You will be taken to the next sign-up page. 5. Create a Cinario ID. This will be your Cinario login ID and cannot be changed, so think of one that is secure and easy to remember. 6. Create a Cinario password. You can change your password at any time. 7. Enter your Password Reset Question and Answer. This can be used at a later time if you forget your password. 8. Enter your e-mail address. You will receive e-mail notification when new information is available in [address] 9. Click Sign Up. You can now view and download portions of your medical record.  
10. Click the Download Summary menu link to download a portable copy of your medical information. If you have questions, please visit the Frequently Asked Questions section of the [employer] website. Remember, MyChart is NOT to be used for urgent needs. For medical emergencies, dial 911. Now available from your iPhone and Android! General Information Please provide this summary of care documentation to your next provider. Patient Signature:  ____________________________________________________________ Date:  ____________________________________________________________  
  
Harley Ala Provider Signature:  ____________________________________________________________ Date:  ____________________________________________________________

## 2017-09-03 NOTE — IP AVS SNAPSHOT
Emir Hall 
 
 
 [address] [facility] [address] 
[phone] Patient: Merrill Escobar MRN: [id number] :1965 Current Discharge Medication List  
  
START taking these medications Dose & Instructions Dispensing Information Comments Morning Noon Evening Bedtime Blood-Glucose Meter monitoring kit Your last dose was: Your next dose is:    
   
   
 Test QAC and QHS Quantity:  1 Kit Refills:  0 CONTINUE these medications which have NOT CHANGED Dose & Instructions Dispensing Information Comments Morning Noon Evening Bedtime  
 aspirin 81 mg chewable tablet Your last dose was: Your next dose is:    
   
   
 Dose:  81 mg Take 1 tablet by mouth daily. Quantity:  30 tablet Refills:  1  
     
   
   
   
  
 furosemide 40 mg tablet Commonly known as:  LASIX Your last dose was: Your next dose is:    
   
   
 Dose:  40 mg Take 1 Tab by mouth daily. Quantity:  45 Tab Refills:  1  
     
   
   
   
  
 gabapentin 300 mg capsule Commonly known as:  NEURONTIN Your last dose was: Your next dose is:    
   
   
 Dose:  300 mg Take 300 mg by mouth two (2) times a day. Refills:  0  
     
   
   
   
  
 lisinopril 10 mg tablet Commonly known as:  Velora Javier Your last dose was: Your next dose is:    
   
   
 Dose:  10 mg Take 1 Tab by mouth daily. Quantity:  30 Tab Refills:  0 NovoLIN 70/30 100 unit/mL (70-30) injection Generic drug:  insulin NPH/insulin regular Your last dose was: Your next dose is:    
   
   
 Dose:  32 Units 32 Units by SubCUTAneous route every twelve (12) hours. Refills:  0 NovoLIN R 100 unit/mL injection Generic drug:  insulin regular Your last dose was: Your next dose is: by SubCUTAneous route Daily (before lunch). Refills:  0  
     
   
   
   
  
 ZANAFLEX 4 mg tablet Generic drug:  tiZANidine Your last dose was: Your next dose is:    
   
   
 Dose:  4 mg Take 4 mg by mouth every six (6) hours as needed. Refills:  0 Where to Get Your Medications Information on where to get these meds will be given to you by the nurse or doctor. ! Ask your nurse or doctor about these medications Blood-Glucose Meter monitoring kit

## 2017-09-04 VITALS
WEIGHT: 291.01 LBS | HEART RATE: 62 BPM | SYSTOLIC BLOOD PRESSURE: 143 MMHG | HEIGHT: 62 IN | OXYGEN SATURATION: 93 % | RESPIRATION RATE: 24 BRPM | BODY MASS INDEX: 53.55 KG/M2 | TEMPERATURE: 98.1 F | DIASTOLIC BLOOD PRESSURE: 72 MMHG

## 2017-09-04 PROBLEM — E16.2 HYPOGLYCEMIA: Status: ACTIVE | Noted: 2017-09-04

## 2017-09-04 LAB
APPEARANCE UR: CLEAR
BACTERIA URNS QL MICRO: ABNORMAL /HPF
BILIRUB UR QL: NEGATIVE
COLOR UR: YELLOW
EPITH CASTS URNS QL MICRO: ABNORMAL /LPF (ref 0–5)
GLUCOSE BLD STRIP.AUTO-MCNC: 104 MG/DL (ref 70–110)
GLUCOSE BLD STRIP.AUTO-MCNC: 107 MG/DL (ref 70–110)
GLUCOSE BLD STRIP.AUTO-MCNC: 130 MG/DL (ref 70–110)
GLUCOSE BLD STRIP.AUTO-MCNC: 134 MG/DL (ref 70–110)
GLUCOSE BLD STRIP.AUTO-MCNC: 145 MG/DL (ref 70–110)
GLUCOSE BLD STRIP.AUTO-MCNC: 146 MG/DL (ref 70–110)
GLUCOSE BLD STRIP.AUTO-MCNC: 160 MG/DL (ref 70–110)
GLUCOSE BLD STRIP.AUTO-MCNC: 181 MG/DL (ref 70–110)
GLUCOSE BLD STRIP.AUTO-MCNC: 181 MG/DL (ref 70–110)
GLUCOSE BLD STRIP.AUTO-MCNC: 187 MG/DL (ref 70–110)
GLUCOSE BLD STRIP.AUTO-MCNC: 189 MG/DL (ref 70–110)
GLUCOSE BLD STRIP.AUTO-MCNC: 189 MG/DL (ref 70–110)
GLUCOSE BLD STRIP.AUTO-MCNC: 201 MG/DL (ref 70–110)
GLUCOSE BLD STRIP.AUTO-MCNC: 203 MG/DL (ref 70–110)
GLUCOSE BLD STRIP.AUTO-MCNC: 218 MG/DL (ref 70–110)
GLUCOSE BLD STRIP.AUTO-MCNC: 219 MG/DL (ref 70–110)
GLUCOSE BLD STRIP.AUTO-MCNC: 221 MG/DL (ref 70–110)
GLUCOSE BLD STRIP.AUTO-MCNC: 258 MG/DL (ref 70–110)
GLUCOSE BLD STRIP.AUTO-MCNC: 61 MG/DL (ref 70–110)
GLUCOSE BLD STRIP.AUTO-MCNC: 72 MG/DL (ref 70–110)
GLUCOSE BLD STRIP.AUTO-MCNC: 73 MG/DL (ref 70–110)
GLUCOSE BLD STRIP.AUTO-MCNC: 76 MG/DL (ref 70–110)
GLUCOSE BLD STRIP.AUTO-MCNC: 82 MG/DL (ref 70–110)
GLUCOSE BLD STRIP.AUTO-MCNC: 82 MG/DL (ref 70–110)
GLUCOSE BLD STRIP.AUTO-MCNC: 94 MG/DL (ref 70–110)
GLUCOSE UR STRIP.AUTO-MCNC: 500 MG/DL
HGB UR QL STRIP: ABNORMAL
KETONES UR QL STRIP.AUTO: NEGATIVE MG/DL
LEUKOCYTE ESTERASE UR QL STRIP.AUTO: NEGATIVE
MUCOUS THREADS URNS QL MICRO: ABNORMAL /LPF
NITRITE UR QL STRIP.AUTO: NEGATIVE
PH UR STRIP: 5 [PH] (ref 5–8)
PROT UR STRIP-MCNC: 300 MG/DL
RBC #/AREA URNS HPF: ABNORMAL /HPF (ref 0–5)
SP GR UR REFRACTOMETRY: 1.03 (ref 1–1.03)
UROBILINOGEN UR QL STRIP.AUTO: 1 EU/DL (ref 0.2–1)
WBC URNS QL MICRO: ABNORMAL /HPF (ref 0–5)
YEAST URNS QL MICRO: ABNORMAL

## 2017-09-04 PROCEDURE — 65610000006 HC RM INTENSIVE CARE

## 2017-09-04 PROCEDURE — 74011000258 HC RX REV CODE- 258: Performed by: INTERNAL MEDICINE

## 2017-09-04 PROCEDURE — 74011636637 HC RX REV CODE- 636/637: Performed by: HOSPITALIST

## 2017-09-04 PROCEDURE — 82962 GLUCOSE BLOOD TEST: CPT

## 2017-09-04 PROCEDURE — 81001 URINALYSIS AUTO W/SCOPE: CPT | Performed by: FAMILY MEDICINE

## 2017-09-04 PROCEDURE — 74011250637 HC RX REV CODE- 250/637

## 2017-09-04 PROCEDURE — 74011000250 HC RX REV CODE- 250: Performed by: FAMILY MEDICINE

## 2017-09-04 PROCEDURE — 74011250637 HC RX REV CODE- 250/637: Performed by: INTERNAL MEDICINE

## 2017-09-04 PROCEDURE — 74011250636 HC RX REV CODE- 250/636: Performed by: INTERNAL MEDICINE

## 2017-09-04 PROCEDURE — 74011000258 HC RX REV CODE- 258: Performed by: FAMILY MEDICINE

## 2017-09-04 RX ORDER — DEXTROSE 50 % IN WATER (D50W) INTRAVENOUS SYRINGE
25
Status: COMPLETED | OUTPATIENT
Start: 2017-09-04 | End: 2017-09-04

## 2017-09-04 RX ORDER — MAGNESIUM SULFATE 100 %
4 CRYSTALS MISCELLANEOUS AS NEEDED
Status: DISCONTINUED | OUTPATIENT
Start: 2017-09-04 | End: 2017-09-05 | Stop reason: HOSPADM

## 2017-09-04 RX ORDER — DEXTROSE 50 % IN WATER (D50W) INTRAVENOUS SYRINGE
Status: DISPENSED
Start: 2017-09-04 | End: 2017-09-04

## 2017-09-04 RX ORDER — GABAPENTIN 300 MG/1
300 CAPSULE ORAL 2 TIMES DAILY
Status: DISCONTINUED | OUTPATIENT
Start: 2017-09-04 | End: 2017-09-05 | Stop reason: HOSPADM

## 2017-09-04 RX ORDER — ACETAMINOPHEN AND CODEINE PHOSPHATE 300; 30 MG/1; MG/1
1 TABLET ORAL
Status: DISCONTINUED | OUTPATIENT
Start: 2017-09-04 | End: 2017-09-05 | Stop reason: HOSPADM

## 2017-09-04 RX ORDER — ACETAMINOPHEN 325 MG/1
650 TABLET ORAL
Status: DISCONTINUED | OUTPATIENT
Start: 2017-09-04 | End: 2017-09-05 | Stop reason: HOSPADM

## 2017-09-04 RX ORDER — CELECOXIB 100 MG/1
200 CAPSULE ORAL 2 TIMES DAILY
Status: DISCONTINUED | OUTPATIENT
Start: 2017-09-04 | End: 2017-09-05 | Stop reason: HOSPADM

## 2017-09-04 RX ORDER — DEXTROSE MONOHYDRATE AND SODIUM CHLORIDE 5; .45 G/100ML; G/100ML
125 INJECTION, SOLUTION INTRAVENOUS CONTINUOUS
Status: DISCONTINUED | OUTPATIENT
Start: 2017-09-04 | End: 2017-09-04

## 2017-09-04 RX ORDER — GUAIFENESIN 100 MG/5ML
81 LIQUID (ML) ORAL DAILY
Status: DISCONTINUED | OUTPATIENT
Start: 2017-09-04 | End: 2017-09-05 | Stop reason: HOSPADM

## 2017-09-04 RX ORDER — DEXTROSE MONOHYDRATE 100 MG/ML
70 INJECTION, SOLUTION INTRAVENOUS CONTINUOUS
Status: DISCONTINUED | OUTPATIENT
Start: 2017-09-04 | End: 2017-09-04

## 2017-09-04 RX ORDER — ACETAMINOPHEN 325 MG/1
975 TABLET ORAL
Status: COMPLETED | OUTPATIENT
Start: 2017-09-04 | End: 2017-09-04

## 2017-09-04 RX ORDER — INSULIN PUMP SYRINGE, 3 ML
EACH MISCELLANEOUS
Qty: 1 KIT | Refills: 0 | Status: SHIPPED | OUTPATIENT
Start: 2017-09-04 | End: 2017-12-28

## 2017-09-04 RX ORDER — INSULIN LISPRO 100 [IU]/ML
INJECTION, SOLUTION INTRAVENOUS; SUBCUTANEOUS
Status: DISCONTINUED | OUTPATIENT
Start: 2017-09-04 | End: 2017-09-05 | Stop reason: HOSPADM

## 2017-09-04 RX ORDER — DEXTROSE 50 % IN WATER (D50W) INTRAVENOUS SYRINGE
25-50 AS NEEDED
Status: DISCONTINUED | OUTPATIENT
Start: 2017-09-04 | End: 2017-09-05 | Stop reason: HOSPADM

## 2017-09-04 RX ORDER — INSULIN LISPRO 100 [IU]/ML
INJECTION, SOLUTION INTRAVENOUS; SUBCUTANEOUS
Status: DISCONTINUED | OUTPATIENT
Start: 2017-09-04 | End: 2017-09-04

## 2017-09-04 RX ORDER — ONDANSETRON 2 MG/ML
4 INJECTION INTRAMUSCULAR; INTRAVENOUS
Status: DISCONTINUED | OUTPATIENT
Start: 2017-09-04 | End: 2017-09-05 | Stop reason: HOSPADM

## 2017-09-04 RX ORDER — LISINOPRIL 5 MG/1
10 TABLET ORAL DAILY
Status: DISCONTINUED | OUTPATIENT
Start: 2017-09-04 | End: 2017-09-05 | Stop reason: HOSPADM

## 2017-09-04 RX ORDER — ENOXAPARIN SODIUM 100 MG/ML
40 INJECTION SUBCUTANEOUS EVERY 24 HOURS
Status: DISCONTINUED | OUTPATIENT
Start: 2017-09-04 | End: 2017-09-05 | Stop reason: HOSPADM

## 2017-09-04 RX ORDER — FUROSEMIDE 40 MG/1
40 TABLET ORAL DAILY
Status: DISCONTINUED | OUTPATIENT
Start: 2017-09-04 | End: 2017-09-05 | Stop reason: HOSPADM

## 2017-09-04 RX ORDER — TIZANIDINE 4 MG/1
4 TABLET ORAL
Status: DISCONTINUED | OUTPATIENT
Start: 2017-09-04 | End: 2017-09-05 | Stop reason: HOSPADM

## 2017-09-04 RX ORDER — ACETAMINOPHEN 325 MG/1
TABLET ORAL
Status: COMPLETED
Start: 2017-09-04 | End: 2017-09-04

## 2017-09-04 RX ADMIN — ASPIRIN 81 MG 81 MG: 81 TABLET ORAL at 10:13

## 2017-09-04 RX ADMIN — ACETAMINOPHEN 975 MG: 325 TABLET ORAL at 01:41

## 2017-09-04 RX ADMIN — FUROSEMIDE 40 MG: 40 TABLET ORAL at 10:13

## 2017-09-04 RX ADMIN — ENOXAPARIN SODIUM 40 MG: 40 INJECTION SUBCUTANEOUS at 10:14

## 2017-09-04 RX ADMIN — DEXTROSE MONOHYDRATE 125 ML/HR: 10 INJECTION, SOLUTION INTRAVENOUS at 05:00

## 2017-09-04 RX ADMIN — LISINOPRIL 10 MG: 5 TABLET ORAL at 10:13

## 2017-09-04 RX ADMIN — INSULIN LISPRO 2 UNITS: 100 INJECTION, SOLUTION INTRAVENOUS; SUBCUTANEOUS at 12:01

## 2017-09-04 RX ADMIN — DEXTROSE MONOHYDRATE AND SODIUM CHLORIDE 125 ML/HR: 5; .45 INJECTION, SOLUTION INTRAVENOUS at 02:08

## 2017-09-04 RX ADMIN — CELECOXIB 200 MG: 100 CAPSULE ORAL at 10:14

## 2017-09-04 RX ADMIN — DEXTROSE MONOHYDRATE 25 G: 25 INJECTION, SOLUTION INTRAVENOUS at 02:03

## 2017-09-04 RX ADMIN — INSULIN LISPRO 4 UNITS: 100 INJECTION, SOLUTION INTRAVENOUS; SUBCUTANEOUS at 16:43

## 2017-09-04 RX ADMIN — GABAPENTIN 300 MG: 300 CAPSULE ORAL at 10:13

## 2017-09-04 RX ADMIN — ACETAMINOPHEN 650 MG: 325 TABLET ORAL at 10:13

## 2017-09-04 RX ADMIN — ONDANSETRON 4 MG: 2 INJECTION INTRAMUSCULAR; INTRAVENOUS at 05:18

## 2017-09-04 NOTE — ED PROVIDER NOTES
[address]  EMERGENCY DEPARTMENT HISTORY AND PHYSICAL EXAM       Date: 9/3/2017   Patient Name: Kenji Huston   YOB: 1965  Medical Record Number: [id number]    HISTORY OF THE PRESENT ILLNESS    Chief Complaint   Patient presents with    Low Blood Sugar        History Provided By:  patient    Additional History:   9:19 PM    Kenji Huston is a 45 y.o. female with pertinent PMHx of DM, HTN, CHF and fibromyalgia presenting ambulatory to the ED c/o low blood sugar x just PTA in the ED. Pt states that she accidentally took 32 units of Novolin sliding scale instead of taking the 70/30 insulin, which she normally takes before bed. Pt notes associated symptoms of slurred speech, nausea and chills. Pt notes recent illness, including cough, chills and chest tightness. Pt specifically denies any change in leg swelling or vomiting. PCP: Akilah Larkin MD  Social Hx: - tobacco use, - alcohol use, - illicit drug use    There are no other complaints, changes, or physical findings at this time. PAST HISTORY    Past Medical History:   Past Medical History:   Diagnosis Date    Asthma     Note: As child had asthma    Diabetes mellitus ([address]     Type 2    Heart palpitations     Hypertension     Ill-defined condition     fibromyalgia     Morbid obesity ([address]     MRSA infection 8/2014        Past Surgical History:   Past Surgical History:   Procedure Laterality Date    BREAST SURGERY PROCEDURE UNLISTED      HX CHOLECYSTECTOMY      HX TUBAL LIGATION          Family History:   Family History   Problem Relation Age of Onset    Heart Attack Mother     Heart Attack Maternal Grandmother         Social History:   Social History   Substance Use Topics    Smoking status: Never Smoker    Smokeless tobacco: Never Used    Alcohol use No        Allergies:    Allergies   Allergen Reactions    Bees [Sting, Bee] Swelling    Penicillins Hives    Henderson Hives        Review of Systems Review of Systems   Constitutional: Positive for chills. Respiratory: Positive for cough and chest tightness. Gastrointestinal: Positive for nausea. Negative for vomiting. Endocrine:        + low blood sugar   Neurological: Positive for speech difficulty. All other systems reviewed and are negative. PHYSICAL EXAM  Vitals:    09/03/17 2107 09/04/17 0219   BP: 177/79 146/58   Pulse: 64 60   Resp: 18 16   Temp: 97.8 °F (36.6 °C) 97.9 °F (36.6 °C)   SpO2: 98% 97%   Weight: 132.5 kg (292 lb)    Height: 5' 2\" (1.575 m)        Physical Exam   Nursing note and vitals reviewed. Vital signs and nursing notes reviewed    CONSTITUTIONAL: Alert, Morbidly obese; middle aged; doesn't appear to feel well  HEAD:  Normocephalic, atraumatic  EYES: PERRL; EOM's intact. ENTM: Nose: no rhinorrhea; Throat: no erythema or exudate, mucous membranes moist  Neck:  No JVD, supple without lymphadenopathy  RESP: Rhonchi  CV: S1 and S2 WNL; No murmurs, gallops or rubs. GI: Normal bowel sounds, abdomen soft and non-tender. No masses or organomegaly. : No costo-vertebral angle tenderness. UPPER EXT:  Normal inspection  LOWER EXT: No calf tenderness. Distal pulses intact. 2+ edema to her lower extremities  NEURO: CN intact, reflexes 2/4 and sym, strength 5/5 and sym, sensation intact. SKIN: No rashes; clammy and pale. PSYCH:  Alert and oriented, normal affect.       DIAGNOSTIC RESULTS    Labs -      Recent Results (from the past 12 hour(s))   GLUCOSE, POC    Collection Time: 09/03/17  9:11 PM   Result Value Ref Range    Glucose (POC) 188 (H) 70 - 110 mg/dL   CBC WITH AUTOMATED DIFF    Collection Time: 09/03/17  9:45 PM   Result Value Ref Range    WBC 8.0 4.6 - 13.2 K/uL    RBC 4.89 4.20 - 5.30 M/uL    HGB 12.7 12.0 - 16.0 g/dL    HCT 38.8 35.0 - 45.0 %    MCV 79.3 74.0 - 97.0 FL    MCH 26.0 24.0 - 34.0 PG    MCHC 32.7 31.0 - 37.0 g/dL    RDW 14.9 (H) 11.6 - 14.5 %    PLATELET 544 215 - 559 K/uL    MPV 9.4 9.2 - 11.8 FL NEUTROPHILS 57 40 - 73 %    LYMPHOCYTES 30 21 - 52 %    MONOCYTES 7 3 - 10 %    EOSINOPHILS 5 0 - 5 %    BASOPHILS 1 0 - 2 %    ABS. NEUTROPHILS 4.6 1.8 - 8.0 K/UL    ABS. LYMPHOCYTES 2.4 0.9 - 3.6 K/UL    ABS. MONOCYTES 0.6 0.05 - 1.2 K/UL    ABS. EOSINOPHILS 0.4 0.0 - 0.4 K/UL    ABS.  BASOPHILS 0.0 0.0 - 0.06 K/UL    DF AUTOMATED     METABOLIC PANEL, BASIC    Collection Time: 09/03/17  9:45 PM   Result Value Ref Range    Sodium 143 136 - 145 mmol/L    Potassium 4.0 3.5 - 5.5 mmol/L    Chloride 108 100 - 108 mmol/L    CO2 28 21 - 32 mmol/L    Anion gap 7 3.0 - 18 mmol/L    Glucose 110 (H) 74 - 99 mg/dL    BUN 28 (H) 7.0 - 18 MG/DL    Creatinine 0.94 0.6 - 1.3 MG/DL    BUN/Creatinine ratio 30 (H) 12 - 20      GFR est AA >60 >60 ml/min/1.73m2    GFR est non-AA >60 >60 ml/min/1.73m2    Calcium 9.3 8.5 - 10.1 MG/DL   GLUCOSE, POC    Collection Time: 09/03/17  9:57 PM   Result Value Ref Range    Glucose (POC) 104 70 - 110 mg/dL   GLUCOSE, POC    Collection Time: 09/03/17 10:44 PM   Result Value Ref Range    Glucose (POC) 84 70 - 110 mg/dL   GLUCOSE, POC    Collection Time: 09/03/17 11:25 PM   Result Value Ref Range    Glucose (POC) 61 (L) 70 - 110 mg/dL   GLUCOSE, POC    Collection Time: 09/03/17 11:27 PM   Result Value Ref Range    Glucose (POC) 60 (L) 70 - 110 mg/dL   GLUCOSE, POC    Collection Time: 09/03/17 11:53 PM   Result Value Ref Range    Glucose (POC) 132 (H) 70 - 110 mg/dL   GLUCOSE, POC    Collection Time: 09/04/17 12:30 AM   Result Value Ref Range    Glucose (POC) 130 (H) 70 - 110 mg/dL   GLUCOSE, POC    Collection Time: 09/04/17  1:24 AM   Result Value Ref Range    Glucose (POC) 107 70 - 110 mg/dL   GLUCOSE, POC    Collection Time: 09/04/17  1:57 AM   Result Value Ref Range    Glucose (POC) 82 70 - 110 mg/dL       Radiologic Studies -  The following have been ordered and reviewed:  XR CHEST PORT    (Results Pending)     11:45 PM  RADIOLOGY FINDINGS  Chest X-ray shows no acute process  Pending review by Radiologist  Recorded by SUZANNA Blake, as dictated by Nyla Ramos MD       MEDICAL DECISION MAKING  I am the first provider for this patient. I reviewed the vital signs, available nursing notes, past medical history, past surgical history, family history and social history. Vital Signs-Reviewed the patient's vital signs. Patient Vitals for the past 12 hrs:   Temp Pulse Resp BP SpO2   09/04/17 0219 97.9 °F (36.6 °C) 60 16 146/58 97 %   09/03/17 2107 97.8 °F (36.6 °C) 64 18 177/79 98 %       Pulse Oximetry Analysis - Normal 98% on RA    Old Medical Records: Old medical records. Nursing notes. PROVIDERS NOTES: INITIAL CLINICAL IMPRESSION and PLANS:  The patient presents with the primary complaint(s) of: low blood sugar and cough. The presentation, to include historical aspects and clinical findings are consistent with the DX of hypoglycemia. However, other possible DX's to consider as primary, associated with, or exacerbated by include:    1. Accidental overdose of insulin  2. PNA  3.  URI    Considering the above, my initial management plan to evaluate and therapeutic interventions include the following and as noted in the orders:    1. Labs: CBC, CMP, UA, POC Glucose  2. Imaging: CXR    Procedures:   Procedures    ED Course:  9:19 PM  Initial assessment performed. The patients presenting problems have been discussed, and they are in agreement with the care plan formulated and outlined with them. I have encouraged them to ask questions as they arise throughout their visit. 11:33 PM - Glucose is 60 will give an amp of D-50. Will continue to monitor and give fluids. 2:06 AM - Pt's glucose level continues to trend down. She has already been given an amp of D-50 as well as a meal. Will talk to the hospitalist about admission.     CONSULT NOTE:   2:29 AM  Nyla Ramos MD spoke with Bonnie Perera MD,   Specialty: Hospitalist  Discussed pt's hx, disposition, and available diagnostic and imaging results. Reviewed care plans. Consultant will admit the pt to ICU. Written by SUZANNA Downs, as dictated by Rose Mary Lara MD.      Medications Given in the ED:  Medications   dextrose 5% and 0.9% NaCl infusion (0 mL/hr IntraVENous IV Completed 9/4/17 0208)   dextrose 5 % - 0.45% NaCl infusion (125 mL/hr IntraVENous New Bag 9/4/17 0208)   dextrose (D50W) 50% injection syrg (not administered)   dextrose (D50W) injection syrg 25 g (25 g IntraVENous Given 9/3/17 2332)   acetaminophen (TYLENOL) tablet 975 mg (975 mg Oral Given 9/4/17 0141)   dextrose (D50W) injection syrg 25 g (25 g IntraVENous Given 9/4/17 0203)     2:29 AM -  Patient is being admitted to the hospital by Clark Sparks MD. The results of their tests and reasons for their admission have been discussed with them and/or available family. They convey agreement and understanding for the need to be admitted and for their admission diagnosis. CONDITIONS ON ADMISSION:  Deep Vein Thrombosis is not present at the time of admission. Thrombosis is not present at the time of admission. Urinary Tract Infection is not present at the time of admission. Pneumonia is not present at the time of admission. MRSA is not present at the time of admission. Wound infection is not present at the time of admission. Pressure Ulcer is not present at the time of admission. DIAGNOSIS  Clinical Impression:   1. Hypoglycemia    2. Insulin overdose, accidental or unintentional, initial encounter         Discussion:  Pt is a middle aged female who accidentally took 32 units of her regular insulin, instead of taking 32 units of her 70/30 insulin. She has been on a D-5 infusion of the last 5 hours, two amps of D-50, and has had something to eat with continued trend down of her blood sugar. Will admit for continued monitoring and treatment. PLAN: ADMIT TO ICU  _______________________________   Attestation:   This note is prepared by Brena Or. Hazel Brittle, acting as Scribe for Karen Rainey MD; at 9:19 PM on 9/3/2017. Karen Rainey MD: The scribe's documentation has been prepared under my direction and personally reviewed by me in its entirety.  I confirm that the note above accurately reflects all work, treatment, procedures, and medical decision making performed by me.     _______________________________

## 2017-09-04 NOTE — PROGRESS NOTES
Physical Therapy Screening:  Services are not indicated at this time. An InBasket screening referral was triggered for physical therapy based on results obtained during the nursing admission assessment. The patients chart was reviewed and the patient is not appropriate for a skilled therapy evaluation at this time. Please consult physical therapy if any therapy needs arise. Thank you.     Mir Martinez PT, DPT

## 2017-09-04 NOTE — DIABETES MGMT
NUTRITION ASSESSMENT / GLYCEMIC CONTROL PLAN OF CARE     [facility]           45 y.o.              Patient Active Problem List   Diagnosis Code    HTN (hypertension) I10    Morbid obesity with BMI of 40.0-44.9, adult ([facility] E66.01, Z68.41    DM (diabetes mellitus) ([facility] E11.9    Asthma J45.909    Morbid obesity ([facility] L07.40    Diastolic CHF, acute ([facility] I50.31    Hypoglycemia E16.2   -DM2     INTERVENTIONS/PLAN:   - pt admitted with hypoglycemia r/t accidental overdose, states grand-daughter was playing in the fridge and mixed up where her insulins are kept (see in depth subjective below)  - known h/o of uncontrolled DM2, reports last A1C >14% in June with her PCP  - DM education provided, pt was somewhat resistant, said \"I'm here because I took too much insulin on accident & now everybody is acting like I'm off in left field\"  - hypoglycemia has resolved at this point, corrective coverage Humalog has been initiated, anticipate BG to increase greatly once she eats with no basal on board  - discussed possible addition of Metformin to her regimen, may help her greatly, recommend follow-up with her OP PCM  - encouraged OP DM Self Management Class (schedule given)  - recommend upon d/c   * provide script for glucometer   * consider adding Metformin 500 mg BID to OP regimen & have her follow-up with OP PCM    ASSESSMENT:   Nutritional Status: Grade III Morbid obesity (BMI 52%) r/t excessive energy intake and decreased energy expenditure, hypoglycemic upon admission r/t accidental insulin overdose, uncontrolled DM2, self-monitoring deficit, self-care deficit, very large gap between self-care habits and impact on health       Diabetes Management:     - TDD: n/a  - BG range:  mg/dl  - hypo: upon admission, admitting diagnosis, hypoglycemia r/t insulin overdose      Lab Results   Component Value Date/Time     (H) 09/03/2017 09:45 PM    GLUCPOC 203 (H) 09/04/2017 10:42 AM    GLUCPOC 201 (H) 09/04/2017 10:13 AM    GLUCPOC 189 (H) 09/04/2017 09:50 AM             Within target range (non-ICU: <140; ICU<180): [] Yes   [x]  No    Current Insulin regimen:   - Humalog Normal Insulin Sensitivity Corrective Coverage    Home medication/insulin regimen: (pt confirmed)  - NPH/Reg 70/30 insulin, 32 units BID with meals (reports never misses doses)  - Regular SSI with lunch (reports misses doses often)     HbA1c: equivalent  to ave BGlucose of mg/dl for 2-3 months prior to admission    * current A1C for [facility] still pending, per pt report was >14% in June 2017      Adequate glycemic control PTA:  [] Yes  [x] No       SUBJECTIVE/OBJECTIVE:   Information obtained from: pt, chart, RN; pt was able to confirm her home regimen (see above), states her grand-daughter was playing in the fridge yesterday and changed her insulin around, she normally keeps them on different sides of the door, she grabbed the wrong one yesterday evening inadvertently; overall pt was resistant to education, said \"I'm here because I took too much insulin on accident & now everybody is acting like I'm off in left field\", I encouraged her that we are just trying to take good care of her while she is with us, pt reports has been taking 70/30 and Regular r/t insurance issues with Lantus (too expensive), was not able to report how long she has been on current dose of 32 units BID, states she never misses a dose of the mixed insulin, however does miss the SS often, and does not always eat consistently, reports her current glucometer is broken r/t dropping it on the floor a few weeks ago, discussed risks of chronic high blood sugar, encouraged to see her PCP for insulin adjustments and possibly addition of Metformin back to her regimen (see ed note)        Medications: [x]                Reviewed        Labs:   Lab Results   Component Value Date/Time    Sodium 143 09/03/2017 09:45 PM    Potassium 4.0 09/03/2017 09:45 PM    Chloride 108 09/03/2017 09:45 PM    CO2 28 09/03/2017 09:45 PM    Anion gap 7 09/03/2017 09:45 PM    Glucose 110 09/03/2017 09:45 PM    BUN 28 09/03/2017 09:45 PM    Creatinine 0.94 09/03/2017 09:45 PM    Calcium 9.3 09/03/2017 09:45 PM    Magnesium 1.7 02/13/2017 08:45 AM    Phosphorus 1.8 08/13/2014 07:45 AM    Albumin 2.8 02/13/2017 08:45 AM       Anthropometrics: IBW : 57.6 kg (127 lb), % IBW (Calculated): 229.14 %, BMI (calculated): 53.2  Wt Readings from Last 1 Encounters:   09/04/17 132 kg (291 lb 0.1 oz)    Ht Readings from Last 1 Encounters:   09/04/17 5' 2\" (1.575 m)       Estimated Nutrition Needs: 1800 Kcals/day (14 kcal/kg of actual wt), Protein (g): 106 g (0.8 g/kg) Fluid (ml): 1800 ml (1 ml/kcal)  Based on:   [x]          Actual BW    []          IBW   []            Adjusted BW        Diet:   Active Orders   Diet    DIET DIABETIC CONSISTENT CARB Regular       Intake:   Patient Vitals for the past 100 hrs:   % Diet Eaten   09/04/17 0817 100 %         Nutrition Diagnoses:   1. Nutrition Diagnosis 1: altered nutrition-related lab values Related to: DM Nutrition Diagnosis 1 Evidenced By: h/o uncontrolled DM, pt reports of last A1C >14% in June 2017 and hypoglycemic event upon admission r/t accidental insulin overdose    2. Nutrition Diagnosis 2: Self-monitoring deficit Nutrition Diagnosis 2 Related to: DM and insulin administration Nutrition Diagnosis 2 Evidenced By: pt report of no SMBG at home for at least 2 weeks    3. Nutrition Diagnosis 3: Overweight/obesity Nutrition Diagnosis 3 Related to: excessive erergy intake Nutrition Diagnosis 3 Evidenced By: BMI of 53%    Nutrition Interventions:   Intervention :Food/Nutrient Delivery: Yes  Meals/Snacks: General/healthful diet (Consistent CHO)  Intervention: Nutrition Education: Yes  Initial/Brief Nutrition Education: Purpose of nutrition education (consistent intake with mixed insulins)  Intervention: Nutrition Counseling: Yes  Intervention: Coordination of Nutrition Care:  Yes  Recommended Diet/Supplements: Continue current diet    Goal:   - BG will be within target range of  mg/dl (non-ICU) or 140-180 mg/dl (ICU)  - pt will follow-up with OP PCM re: Dm regimen and adding oral to regimen (recommend Metformin) by 10/4  - pt will demonstrate ability to identify CHO foods and order from Consistent CHO menu  - pt will obtain a glucometer for SMBG at home by 9/8  - pt will prevent further wt gain by 10/4    Nutrition Monitoring and Evaluation      [x]     Monitor po intake on meal rounds  [x]     Continue inpatient monitoring and intervention  []     Other:      Nutrition Risk:  []   High     []  Moderate    [x]  Minimal/Uncompromised    BERT Burton, MPH, RD, CDE

## 2017-09-04 NOTE — ED TRIAGE NOTES
Pt states while preparing to go to bed tonight, she inadvertently injected the wrong insulin, 32 units of regular instead of her 70/30. Pt complaining of lightheadedness, dizziness, and a cool sensation to her entire body. Pt also complaining of a cough and nasal congestion x 1 day w/ chills.

## 2017-09-04 NOTE — PROGRESS NOTES
conducted an initial consultation and Spiritual Assessment for Anthony Alvarado, who is a 45 y.o.,female. Patients Primary Language is: [place]. According to the patients EMR Yarsani Affiliation is: Non Lutheran.     The reason the Patient came to the hospital is:   Patient Active Problem List    Diagnosis Date Noted    Hypoglycemia 92/59/8924    Diastolic CHF, acute ([facility] 05/27/2015    Morbid obesity ([facility]     Asthma     DM (diabetes mellitus) ([facility] 09/04/2014    HTN (hypertension) 08/16/2014    Morbid obesity with BMI of 40.0-44.9, adult ([facility] 08/16/2014      Very chatty patient.  sat with her and let her talk almost nonstop for nearly 30 minutes. Lots of family dynamics in her history and also lots of very pertinent goals she accomplished on her own, goals she can be very proud of. Three children she is very proud of, raising all three on her own as a single mother and a grand son with challenges that she watches five days a week. A very proud woman who finds her strength, purpose, and meaning in life from her family relationships. The  provided the following Interventions:  Initiated a relationship of care and support. Explored issues of trev, belief, spirituality and Anabaptist/ritual needs while hospitalized. Listened empathically. Provided chaplaincy education. Provided information about Spiritual Care Services. Offered assurance of prayer on patient's behalf. Chart reviewed. The following outcomes where achieved:  Patient shared limited information about both their medical narrative and spiritual journey/beliefs.  confirmed Patient's Yarsani Affiliation. Patient processed feeling about current hospitalization. Patient expressed gratitude for 's visit. Assessment:  Patient does not have any Anabaptist/cultural needs that will affect patients preferences in health care.       Plan:  Chaplains will continue to follow and will provide pastoral care on an as needed/requested basis.  recommends bedside caregivers page  on duty if patient shows signs of acute spiritual or emotional distress.     The [address]  [phone]

## 2017-09-04 NOTE — H&P
History & Physical    Patient: Evelyn Dalton MRN: [id number]  CSN: [id number]    YOB: 1965  Age: 45 y.o. Sex: female      DOA: 9/3/2017  Primary Care Provider:  Joellen Lee MD      Assessment/Plan     Patient Active Problem List   Diagnosis Code    DKA (diabetic ketoacidoses) ([facility] E13.10    MSSA (methicillin susceptible Staphylococcus aureus) septicemia ([facility] A41.01    HTN (hypertension) I10    Right ankle swelling M25.471    Morbid obesity with BMI of 40.0-44.9, adult ([facility] E66.01, Z68.41    Constipation K59.00    Dyspnea on exertion R06.09    DM (diabetes mellitus) ([facility] E11.9    Volume overload E87.70    Asthma J45.909    Morbid obesity ([facility] E66.01    CHF NYHA class II ([facility] N57.9    Diastolic CHF, acute ([facility] I50.31    Depression F32.9    Community acquired pneumonia J18.9    Hypoglycemia E16.2       Hospital problems:  1. Diabetes with hypoglycemia due insulin overdose  2. Hypertension  3. CHF    -Admit patient to ICU for blood sugar checks every 30 minutes. Continue D5 normal saline@ 125 ml/hr with D50 as needed to keep blood sugars in the normal range.  -Continue Neurontin 300 mg twice daily.  -Continue lisinopril.  -Continue Lasix 40 mg daily  -DVT prophylaxis: Lovenox  -Code status: full code        CC: Insulin overdose        HPI:     Evelyn Dalton is a 45 y.o. female who has a past medical history significant for morbid obesity, hypertension, asthma, fibromyalgia, CHF and diabetes presenting complaining of lightheadedness, dizziness and a cool sensation over entire body after inadvertently injecting 32 units of regular insulin instead of her usual four units mistaking it for her Novolin N 70/30. On arrival she was afebrile, pulse 64, blood pressure 177/79, respirations 18 with oxygen saturations of 98% on room air. Laboratory evaluation was significant for glucose 188 on arrival and ranging anywhere from 60 to 188.  In the ER she was started on a D5 infusion and given periodic injections of D50 as needed. Despite this intervention the downward trend in her blood sugars continue and subsequently hospital medicine was contacted for admission to the hospital and further management. Past Medical History:   Diagnosis Date    Asthma     Note: As child had asthma    Diabetes mellitus ([facility]     Type 2    Heart palpitations     Hypertension     Ill-defined condition     fibromyalgia     Morbid obesity ([facility]     MRSA infection 8/2014       Past Surgical History:   Procedure Laterality Date    BREAST SURGERY PROCEDURE UNLISTED      HX CHOLECYSTECTOMY      HX TUBAL LIGATION         Family History   Problem Relation Age of Onset    Heart Attack Mother     Heart Attack Maternal Grandmother        Social History     Social History    Marital status: SINGLE     Spouse name: N/A    Number of children: N/A    Years of education: N/A     Social History Main Topics    Smoking status: Never Smoker    Smokeless tobacco: Never Used    Alcohol use No    Drug use: No    Sexual activity: No     Other Topics Concern    None     Social History Narrative       Prior to Admission medications    Medication Sig Start Date End Date Taking? Authorizing Provider   gabapentin (NEURONTIN) 300 mg capsule Take 300 mg by mouth two (2) times a day. Yes Yoana Crocker MD   furosemide (LASIX) 40 mg tablet Take 1 Tab by mouth daily. 2/13/17  Yes Maureen Fowler MD   tiZANidine (ZANAFLEX) 4 mg tablet Take 4 mg by mouth every six (6) hours as needed. Yes Yoana Crocker MD   lisinopril (PRINIVIL, ZESTRIL) 10 mg tablet Take 1 Tab by mouth daily. 4/5/15  Yes Adriana Chaudhari MD   insulin NPH/insulin regular (NOVOLIN 70/30) 100 unit/mL (70-30) injection 20 Units by SubCUTAneous route every twelve (12) hours. Yes Historical Provider   insulin regular (NOVOLIN R) 100 unit/mL injection by SubCUTAneous route Before breakfast, lunch, and dinner.    Yes Historical Provider   aspirin 81 mg chewable tablet Take 1 tablet by mouth daily. 14  Yes Justina Daniel MD   acetaminophen-codeine (TYLENOL-CODEINE #3) 300-30 mg per tablet Take 1 Tab by mouth every six (6) hours as needed for Pain. Max Daily Amount: 4 Tabs. 16   SAMARA Silva   celecoxib (CELEBREX) 200 mg capsule Take 200 mg by mouth two (2) times a day. Historical Provider       Allergies   Allergen Reactions    Bees [Sting, Bee] Swelling    Penicillins Hives    [place] Hives       Review of Systems  Gen: No fever, chills, malaise, weight loss/gain. Heent: No headache, rhinorrhea, epistaxis, ear pain, hearing loss, sinus pain, neck pain/stiffness, sore throat. Heart: No chest pain, palpitations, HORAN, pnd, or orthopnea. Resp: No cough, hemoptysis, wheezing and shortness of breath. GI: No nausea, vomiting, diarrhea, constipation, melena or hematochezia. : No urinary obstruction, dysuria or hematuria. Derm: No rash, new skin lesion or pruritis. Musc/skeletal: no bone or joint complains. Vasc: No edema, cyanosis or claudication. Endo: No heat/cold intolerance, no polyuria,polydipsia or polyphagia. Neuro: No unilateral weakness, numbness, tingling. No seizures. Heme: No easy bruising or bleeding. Physical Exam:     Physical Exam:  Visit Vitals    /58 (BP 1 Location: Left arm, BP Patient Position: Sitting)    Pulse 60    Temp 97.9 °F (36.6 °C)    Resp 16    Ht 5' 2\" (1.575 m)    Wt 132.5 kg (292 lb)    SpO2 97%    BMI 53.41 kg/m2      O2 Device: Room air    Temp (24hrs), Av.9 °F (36.6 °C), Min:97.8 °F (36.6 °C), Max:97.9 °F (36.6 °C)             General:  Awake, cooperative, no distress. Head:  Normocephalic, without obvious abnormality, atraumatic. Eyes:  Conjunctivae/corneas clear, sclera anicteric, PERRL, EOMs intact. Nose: Nares normal. No drainage or sinus tenderness. Throat: Lips, mucosa, and tongue normal.    Neck: Supple, symmetrical, trachea midline, no adenopathy. Lungs:   Clear to auscultation bilaterally. Heart:  Regular rate and rhythm, S1, S2 normal, no murmur, click, rub or gallop. Abdomen: Soft, non-tender. Bowel sounds normal. No masses,  No organomegaly. Extremities: Extremities normal, atraumatic, no cyanosis or edema. Capillary refill normal.   Pulses: 2+ and symmetric all extremities. Skin: Skin color pink, turgor normal. No rashes or lesions   Neurologic: CNII-XII intact. No focal motor or sensory deficit. Labs Reviewed: All lab results for the last 24 hours reviewed. Recent Results (from the past 24 hour(s))   GLUCOSE, POC    Collection Time: 09/03/17  9:11 PM   Result Value Ref Range    Glucose (POC) 188 (H) 70 - 110 mg/dL   CBC WITH AUTOMATED DIFF    Collection Time: 09/03/17  9:45 PM   Result Value Ref Range    WBC 8.0 4.6 - 13.2 K/uL    RBC 4.89 4.20 - 5.30 M/uL    HGB 12.7 12.0 - 16.0 g/dL    HCT 38.8 35.0 - 45.0 %    MCV 79.3 74.0 - 97.0 FL    MCH 26.0 24.0 - 34.0 PG    MCHC 32.7 31.0 - 37.0 g/dL    RDW 14.9 (H) 11.6 - 14.5 %    PLATELET 337 737 - 927 K/uL    MPV 9.4 9.2 - 11.8 FL    NEUTROPHILS 57 40 - 73 %    LYMPHOCYTES 30 21 - 52 %    MONOCYTES 7 3 - 10 %    EOSINOPHILS 5 0 - 5 %    BASOPHILS 1 0 - 2 %    ABS. NEUTROPHILS 4.6 1.8 - 8.0 K/UL    ABS. LYMPHOCYTES 2.4 0.9 - 3.6 K/UL    ABS. MONOCYTES 0.6 0.05 - 1.2 K/UL    ABS. EOSINOPHILS 0.4 0.0 - 0.4 K/UL    ABS.  BASOPHILS 0.0 0.0 - 0.06 K/UL    DF AUTOMATED     METABOLIC PANEL, BASIC    Collection Time: 09/03/17  9:45 PM   Result Value Ref Range    Sodium 143 136 - 145 mmol/L    Potassium 4.0 3.5 - 5.5 mmol/L    Chloride 108 100 - 108 mmol/L    CO2 28 21 - 32 mmol/L    Anion gap 7 3.0 - 18 mmol/L    Glucose 110 (H) 74 - 99 mg/dL    BUN 28 (H) 7.0 - 18 MG/DL    Creatinine 0.94 0.6 - 1.3 MG/DL    BUN/Creatinine ratio 30 (H) 12 - 20      GFR est AA >60 >60 ml/min/1.73m2    GFR est non-AA >60 >60 ml/min/1.73m2    Calcium 9.3 8.5 - 10.1 MG/DL   GLUCOSE, POC    Collection Time: 09/03/17  9:57 PM   Result Value Ref Range    Glucose (POC) 104 70 - 110 mg/dL   GLUCOSE, POC    Collection Time: 09/03/17 10:44 PM   Result Value Ref Range    Glucose (POC) 84 70 - 110 mg/dL   GLUCOSE, POC    Collection Time: 09/03/17 11:25 PM   Result Value Ref Range    Glucose (POC) 61 (L) 70 - 110 mg/dL   GLUCOSE, POC    Collection Time: 09/03/17 11:27 PM   Result Value Ref Range    Glucose (POC) 60 (L) 70 - 110 mg/dL   GLUCOSE, POC    Collection Time: 09/03/17 11:53 PM   Result Value Ref Range    Glucose (POC) 132 (H) 70 - 110 mg/dL   GLUCOSE, POC    Collection Time: 09/04/17 12:30 AM   Result Value Ref Range    Glucose (POC) 130 (H) 70 - 110 mg/dL   GLUCOSE, POC    Collection Time: 09/04/17  1:24 AM   Result Value Ref Range    Glucose (POC) 107 70 - 110 mg/dL   GLUCOSE, POC    Collection Time: 09/04/17  1:57 AM   Result Value Ref Range    Glucose (POC) 82 70 - 110 mg/dL   GLUCOSE, POC    Collection Time: 09/04/17  2:41 AM   Result Value Ref Range    Glucose (POC) 146 (H) 70 - 110 mg/dL       Procedures/imaging: see electronic medical records for all procedures/Xrays and details which were not copied into this note but were reviewed prior to creation of Plan        CC: Clifford Fletcher MD

## 2017-09-04 NOTE — DIABETES MGMT
Diabetes Patient/Family Education Record    Factors That  May Influence Patients Ability  to Learn or  Comply With  Recommendations:    []   Language barrier    []   Cultural needs   [x]   Motivation    []   Cognitive limitation    []   Physical   [x]   Education    []   Physiological factors   []   Hearing/vision/speaking impairment   []   Protestant beliefs    []   Financial factors   []  Other:   []  No factors identified at this time.      Person Instructed:   [x]   Patient   []   Family   []  Other     Preference for Learning:   [x]   Verbal   [x]   Written   []  Demonstration     Level of Comprehension & Competence:    []  Good                                      [x] Fair                                     []  Poor                             [x]  Needs Reinforcement   []  Teachback completed    Education Component:   [x]  Medication management, was able to confirm home regimen, often misses her SS, reports never misses 70/30 dose, buys from [address] [place] [address]    [x]  Nutritional management including the role of carbohydrate intake   []  Exercise   [x]  Signs, symptoms, and treatment of hyperglycemia and hypoglycemia   [x] Treatment of hyperglycemia and hypoglycemia   [x]  Importance of blood glucose monitoring and how to obtain a blood glucose meter, pt needs new script, states she dropped her current one 2 weeks ago and it is broken    []  Instruction on use of blood glucose meter   [x]  Discuss the importance of HbA1C monitoring, current [facility] results still pending, pt reports was checked in June >14%   []  Sick day guidelines   []  Proper use and disposal of lancets, needles, syringes or insulin pens (if appropriate)   [x]  Potential long-term complications (retinopathy, kidney disease, neuropathy, heart disease, stroke, vascular disease, foot care)   [] Provide emergency contact number and contact number for more information    [x]  Goal:  Patient/family will demonstrate understanding of Diabetes Self Management Skills by: (date) __10/4/17__  Plan for post-discharge education or self-management support:    [x] Outpatient class schedule provided            [] Patient Declined    [] Scheduled for outpatient classes (date) _______       Jessy Thomson.  Evette Unger, MPH, RD, CDE

## 2017-09-04 NOTE — PROGRESS NOTES
0330 - Pt arrived in ICU, able to transfer herself independtly onto ICU bed.     0400 - Full assessment completed per flowsheet. Pt A&Ox4, denies CP or SOB, states \"I'm always in pain\" d/t hx of fibromyalgia. Lungs clear/diminished on RA. NAD.

## 2017-09-04 NOTE — DISCHARGE INSTRUCTIONS
Hypoglycemia: Care Instructions  Your Care Instructions  Hypoglycemia means that your blood sugar is low and your body is not getting enough fuel. Some people get low blood sugar from not eating often enough. Some medicines to treat diabetes can cause low blood sugar. People who have had surgery on their stomachs or intestines may get hypoglycemia. Problems with the pancreas, kidneys, or liver also can cause low blood sugar. A snack or drink with sugar in it will raise your blood sugar and should ease your symptoms right away. Your doctor may recommend that you change or stop your medicines until you can get your blood sugar levels under control. In the long run, you may need to change your diet and eating habits so that you get enough fuel for your body throughout the day. Follow-up care is a key part of your treatment and safety. Be sure to make and go to all appointments, and call your doctor if you are having problems. It's also a good idea to know your test results and keep a list of the medicines you take. How can you care for yourself at home? · Learn to recognize the early signs of low blood sugar. Signs include:  ¨ Nausea. ¨ Hunger. ¨ Feeling nervous, irritable, or shaky. ¨ Cold, clammy, wet skin. ¨ Sweating (when you are not exercising). ¨ A fast heartbeat. ¨ Numbness or tingling of the fingertips or lips. · If you feel an episode of low blood sugar coming on, drink fruit juice or sugared (not diet) soda, or eat sugar in the form of candy, cubes, or tablets. [employer] are another [employer]. · Eat small, frequent meals so that you do not get too hungry between meals. · Balance extra exercise with eating more. · Keep a written record of your low blood sugar episodes, including when you last ate and what you ate, so that you can learn what causes your blood sugar to drop.   · Make sure your family, friends, and coworkers know the symptoms of low blood sugar and know what to do to get your sugar level up. · Wear medical alert jewelry that lists your condition. You can buy this at most drugstores. When should you call for help? Call 911 anytime you think you may need emergency care. For example, call if:  · You passed out (lost consciousness). · You are confused or cannot think clearly. · Your blood sugar is very high or very low. Watch closely for changes in your health, and be sure to contact your doctor if:  · Your blood sugar stays outside the level your doctor set for you. · You have any problems. Where can you learn more? Go to http://tere-ann marie.info/. Enter T303 in the search box to learn more about \"Hypoglycemia: Care Instructions. \"  Current as of: March 13, 2017  Content Version: 11.3  © 6133-4584 [employer]. Care instructions adapted under license by [employer] (which disclaims liability or warranty for this information). If you have questions about a medical condition or this instruction, always ask your healthcare professional. [place] [address] any warranty or liability for your use of this information. Accidental Overdose of Medicine: Care Instructions  Your Care Instructions  Almost any medicine can cause harm if you take too much of it. You have had treatment to help your body get rid of an overdose of a medicine. This may have been an over-the-counter medicine. Or it might be one that a doctor prescribed. It may even have been a vitamin or a supplement. During treatment, the doctor may have given you fluids and medicine. You also may have had lab tests. Then the doctor made sure that you were well enough to go home. The doctor has checked you carefully, but problems can develop later. If you notice any problems or new symptoms, get medical treatment right away. Follow-up care is a key part of your treatment and safety.  Be sure to make and go to all appointments, and call your doctor if you are having problems. It's also a good idea to know your test results and keep a list of the medicines you take. How can you care for yourself at home? Home care  · Drink plenty of fluids. If you have kidney, heart, or liver disease and have to limit fluids, talk with your doctor before you increase the amount of fluids you drink. · If you normally take medicines, ask your doctor when you can start taking them again. · Read the information that comes with any medicine. If you have questions, ask your doctor or pharmacist.  Prevention  · Be safe with medicines. Take your medicines exactly as prescribed or as the label directs. Call your doctor if you think you are having a problem with your medicine. · Keep your medicines in the containers they came in. Keep them with the original labels. · Find out what to do if you miss a dose of your medicine. When should you call for help? Call 911 anytime you think you may need emergency care. For example, call if:  · You passed out (lost consciousness). · You have trouble breathing. · You are sleepy or hard to wake up. Call your doctor now or seek immediate medical care if:  · You are vomiting. · You have a new or worse headache. · You are dizzy or lightheaded or feel like you may faint. Watch closely for changes in your health, and be sure to contact your doctor if:  · You do not get better as expected. Where can you learn more? Go to http://tere-ann marie.info/. Enter C165 in the search box to learn more about \"Accidental Overdose of Medicine: Care Instructions. \"  Current as of: March 24, 2017  Content Version: 11.3  © 8343-8425 [employer]. Care instructions adapted under license by [employer] (which disclaims liability or warranty for this information).  If you have questions about a medical condition or this instruction, always ask your healthcare professional. [facility] disclaims any warranty or liability for your use of this information. Based on your report of your last A1C >14% in June 2017, this test reflects that your blood sugar averaged 360 mg/dl over the past 3 months. It is important to follow up with your provider on a routine basis to continue to evaluate your blood sugar and discuss any necessary changes in treatment.

## 2017-09-04 NOTE — ROUTINE PROCESS
TRANSFER - OUT REPORT:    Verbal and bedside report given to PEGGY Fermin RN on Grace Nelson  being transferred to ICU for routine progression of care       Report consisted of patients Situation, Background, Assessment and   Recommendations(SBAR). Information from the following report(s) SBAR, ED Summary and MAR was reviewed with the receiving nurse. Lines:   Peripheral IV 09/03/17 Left Hand (Active)   Site Assessment Clean, dry, & intact 9/3/2017 10:00 PM   Phlebitis Assessment 0 9/3/2017 10:00 PM   Infiltration Assessment 0 9/3/2017 10:00 PM   Dressing Status Clean, dry, & intact 9/3/2017 10:00 PM   Dressing Type Transparent 9/3/2017 10:00 PM   Hub Color/Line Status Pink;Patent; Flushed 9/3/2017 10:00 PM   Action Taken Blood drawn 9/3/2017 10:00 PM        Opportunity for questions and clarification was provided.       Patient transported with:   Registered Nurse  Tech

## 2017-09-14 NOTE — ADT AUTH CERT NOTES
Hello,       Please let me know if any additional info is needed for this case. At your earliest convenience, could someone please call me regarding the case status?      Thank you    Main [phone]

## 2017-09-20 NOTE — DISCHARGE SUMMARY
Discharge Summary    Patient: Arcelia Mitchell MRN: [id number]  CSN: [id number]    YOB: 1965  Age: 45 y.o.   Sex: female    DOA: 9/3/2017 LOS:  LOS: 1 day   Discharge Date: 9/4/2017     Primary Care Provider:  Shell Huggins MD    Admission Diagnoses: Hypoglycemia    Discharge Diagnoses:    Problem List as of 9/4/2017  Date Reviewed: 9/4/2017          Codes Class Noted - Resolved    * (Principal)Hypoglycemia ICD-10-CM: E16.2  ICD-9-CM: 251.2  9/4/2017 - Present        Diastolic CHF, acute ([place] [address].) ICD-10-CM: I50.31  ICD-9-CM: 428.31, 428.0  5/27/2015 - Present        Morbid obesity ([facility] ICD-10-CM: E66.01  ICD-9-CM: 278.01  Unknown - Present        Asthma ICD-10-CM: J45.909  ICD-9-CM: 493.90  Unknown - Present    Overview Signed 4/5/2015  1:56 PM by Olman Dias     Note: As child had asthma             DM (diabetes mellitus) ([facility] ICD-10-CM: E11.9  ICD-9-CM: 250.00  9/4/2014 - Present        HTN (hypertension) ICD-10-CM: I10  ICD-9-CM: 401.9  8/16/2014 - Present        Morbid obesity with BMI of 40.0-44.9, adult ([facility] ICD-10-CM: E66.01, Z68.41  ICD-9-CM: 278.01, V85.41  8/16/2014 - Present        RESOLVED: CHF NYHA class II ([facility] ICD-10-CM: I50.9  ICD-9-CM: 428.0  5/27/2015 - 9/4/2017        RESOLVED: Depression ICD-10-CM: F32.9  ICD-9-CM: 993  5/27/2015 - 9/4/2017        RESOLVED: Community acquired pneumonia ICD-10-CM: J18.9  ICD-9-CM: 928  5/27/2015 - 9/4/2017        RESOLVED: Dyspnea on exertion ICD-10-CM: R06.09  ICD-9-CM: 786.09  9/4/2014 - 9/4/2017        RESOLVED: Volume overload ICD-10-CM: E87.70  ICD-9-CM: 276.69  9/4/2014 - 9/4/2017        RESOLVED: Constipation ICD-10-CM: K59.00  ICD-9-CM: 564.00  8/17/2014 - 9/4/2017        RESOLVED: MSSA (methicillin susceptible Staphylococcus aureus) septicemia ([facility] ICD-10-CM: A41.01  ICD-9-CM: 038.11  8/16/2014 - 9/4/2017        RESOLVED: Right ankle swelling ICD-10-CM: M25.471  ICD-9-CM: 719.07  8/16/2014 - 9/4/2017        RESOLVED: DKA (diabetic ketoacidoses) [facility]) ICD-10-CM: E13.10  ICD-9-CM: 250.10  8/11/2014 - 9/4/2017              Discharge Medications:     Discharge Medication List as of 9/4/2017  6:51 PM      START taking these medications    Details   Blood-Glucose Meter monitoring kit Test QA and QHS, Print, Disp-1 Kit, R-0         CONTINUE these medications which have NOT CHANGED    Details   gabapentin (NEURONTIN) 300 mg capsule Take 300 mg by mouth two (2) times a day., Historical Med      furosemide (LASIX) 40 mg tablet Take 1 Tab by mouth daily. , Normal, Disp-45 Tab, R-1      tiZANidine (ZANAFLEX) 4 mg tablet Take 4 mg by mouth every six (6) hours as needed., Historical Med      lisinopril (PRINIVIL, ZESTRIL) 10 mg tablet Take 1 Tab by mouth daily. , Print, Disp-30 Tab, R-0      insulin NPH/insulin regular (NOVOLIN 70/30) 100 unit/mL (70-30) injection 32 Units by SubCUTAneous route every twelve (12) hours. , Historical Med      insulin regular (NOVOLIN R) 100 unit/mL injection by SubCUTAneous route Daily (before lunch). , Historical Med      aspirin 81 mg chewable tablet Take 1 tablet by mouth daily. , Print, Disp-30 tablet, R-1         STOP taking these medications       acetaminophen-codeine (TYLENOL-CODEINE #3) 300-30 mg per tablet Comments:   Reason for Stopping:         celecoxib (CELEBREX) 200 mg capsule Comments:   Reason for Stopping:               Discharge Condition: Stable    Procedures : none    Consults: None      PHYSICAL EXAM   Visit Vitals    /72    Pulse 62    Temp 98.1 °F (36.7 °C)    Resp 24    Ht 5' 2\" (1.575 m)    Wt 132 kg (291 lb 0.1 oz)    SpO2 93%    Breastfeeding No    BMI 53.23 kg/m2     General: Awake, cooperative, no acute distress    HEENT: NC, Atraumatic. PERRLA, EOMI. Anicteric sclerae. Lungs:  CTA Bilaterally. No Wheezing/Rhonchi/Rales. Heart:  Regular  rhythm,  No murmur, No Rubs, No Gallops  Abdomen: Soft, Non distended, Non tender.  +Bowel sounds,   Extremities: No c/c/e  Psych:   Not anxious or agitated. Neurologic:  No acute neurological deficits. Admission HPI : Phu Bell is a 45 y.o. female who has a past medical history significant for morbid obesity, hypertension, asthma, fibromyalgia, CHF and diabetes presenting complaining of lightheadedness, dizziness and a cool sensation over entire body after inadvertently injecting 32 units of regular insulin instead of her usual four units mistaking it for her Novolin N 70/30.     On arrival she was afebrile, pulse 64, blood pressure 177/79, respirations 18 with oxygen saturations of 98% on room air. Laboratory evaluation was significant for glucose 188 on arrival and ranging anywhere from 60 to 188. In the ER she was started on a D5 infusion and given periodic injections of D50 as needed. Despite this intervention the downward trend in her blood sugars continue and subsequently hospital medicine was contacted for admission to the hospital and further management. Hospital Course :     Hypoglycemia  Due to unintentional insulin overdose: The patient was admitted to the hospital for close monitoring and D50 as needed. Her blood sugars ultimately stabilized. She was given diabetic education and was subsequently discharged to home by Alicia Garcia in stable condition. Activity: Activity as tolerated    Diet: Diabetic Diet    Follow-up: with primary care < 7 days     Disposition: home    Minutes spent on discharge: 38       Labs: Results:       Chemistry No results for input(s): GLU, NA, K, CL, CO2, BUN, CREA, CA, AGAP, BUCR, TBIL, GPT, AP, TP, ALB, GLOB, AGRAT in the last 72 hours. CBC w/Diff No results for input(s): WBC, RBC, HGB, HCT, PLT, GRANS, LYMPH, EOS, HGBEXT, HCTEXT, PLTEXT in the last 72 hours. Cardiac Enzymes No results for input(s): CPK, CKND1, MARIA ISABEL in the last 72 hours. No lab exists for component: CKRMB, TROIP   Coagulation No results for input(s): PTP, INR, APTT in the last 72 hours.     No lab exists for component: INREXT    Lipid Panel Lab Results   Component Value Date/Time    Cholesterol, total 146 05/28/2015 02:58 AM    HDL Cholesterol 64 05/28/2015 02:58 AM    LDL, calculated 66 05/28/2015 02:58 AM    VLDL, calculated 16 05/28/2015 02:58 AM    Triglyceride 80 05/28/2015 02:58 AM    CHOL/HDL Ratio 2.3 05/28/2015 02:58 AM      BNP No results for input(s): BNPP in the last 72 hours. Liver Enzymes No results for input(s): TP, ALB, TBIL, AP, SGOT, GPT in the last 72 hours. No lab exists for component: DBIL   Thyroid Studies No results found for: T4, T3U, TSH, TSHEXT         Significant Diagnostic Studies: Xr Chest Port    Result Date: 9/4/2017  EXAM: AP radiograph of the chest INDICATION: Cough COMPARISON: February 13, 2017, December 23, 2016 _______________ FINDINGS: Normal heart size and mediastinal contour. No consolidation, pleural effusion, or pneumothorax. No acute osseous abnormalities. _______________     IMPRESSION: No acute pulmonary findings         No results found for this or any previous visit.         CC: Tristin Umaña MD

## 2017-12-28 ENCOUNTER — APPOINTMENT (OUTPATIENT)
Dept: GENERAL RADIOLOGY | Age: 52
End: 2017-12-28
Attending: PHYSICIAN ASSISTANT
Payer: COMMERCIAL

## 2017-12-28 ENCOUNTER — HOSPITAL ENCOUNTER (EMERGENCY)
Age: 52
Discharge: HOME | End: 2017-12-29
Attending: EMERGENCY MEDICINE | Admitting: EMERGENCY MEDICINE
Payer: COMMERCIAL

## 2017-12-28 VITALS
OXYGEN SATURATION: 99 % | BODY MASS INDEX: 49.32 KG/M2 | WEIGHT: 268 LBS | HEART RATE: 82 BPM | SYSTOLIC BLOOD PRESSURE: 181 MMHG | HEIGHT: 62 IN | RESPIRATION RATE: 16 BRPM | TEMPERATURE: 97.8 F | DIASTOLIC BLOOD PRESSURE: 77 MMHG

## 2017-12-28 DIAGNOSIS — N39.0 URINARY TRACT INFECTION WITH HEMATURIA, SITE UNSPECIFIED: ICD-10-CM

## 2017-12-28 DIAGNOSIS — R31.9 URINARY TRACT INFECTION WITH HEMATURIA, SITE UNSPECIFIED: ICD-10-CM

## 2017-12-28 DIAGNOSIS — S46.912A STRAIN OF LEFT UPPER ARM, INITIAL ENCOUNTER: Primary | ICD-10-CM

## 2017-12-28 DIAGNOSIS — R73.9 HYPERGLYCEMIA: ICD-10-CM

## 2017-12-28 LAB
ALBUMIN SERPL-MCNC: 3 G/DL (ref 3.4–5)
ALBUMIN/GLOB SERPL: 0.7 {RATIO} (ref 0.8–1.7)
ALP SERPL-CCNC: 100 U/L (ref 45–117)
ALT SERPL-CCNC: 11 U/L (ref 13–56)
ANION GAP SERPL CALC-SCNC: 6 MMOL/L (ref 3–18)
APPEARANCE UR: ABNORMAL
AST SERPL-CCNC: 5 U/L (ref 15–37)
BACTERIA URNS QL MICRO: ABNORMAL /HPF
BASOPHILS # BLD: 0 K/UL (ref 0–0.06)
BASOPHILS NFR BLD: 0 % (ref 0–2)
BILIRUB SERPL-MCNC: 0.2 MG/DL (ref 0.2–1)
BILIRUB UR QL: NEGATIVE
BUN SERPL-MCNC: 38 MG/DL (ref 7–18)
BUN/CREAT SERPL: 36 (ref 12–20)
CALCIUM SERPL-MCNC: 9.4 MG/DL (ref 8.5–10.1)
CHLORIDE SERPL-SCNC: 103 MMOL/L (ref 100–108)
CK MB CFR SERPL CALC: NORMAL % (ref 0–4)
CK MB SERPL-MCNC: <1 NG/ML (ref 5–25)
CK SERPL-CCNC: 75 U/L (ref 26–192)
CO2 SERPL-SCNC: 29 MMOL/L (ref 21–32)
COLOR UR: YELLOW
CREAT SERPL-MCNC: 1.05 MG/DL (ref 0.6–1.3)
DIFFERENTIAL METHOD BLD: NORMAL
EOSINOPHIL # BLD: 0.2 K/UL (ref 0–0.4)
EOSINOPHIL NFR BLD: 3 % (ref 0–5)
EPITH CASTS URNS QL MICRO: ABNORMAL /LPF (ref 0–5)
ERYTHROCYTE [DISTWIDTH] IN BLOOD BY AUTOMATED COUNT: 14.4 % (ref 11.6–14.5)
GLOBULIN SER CALC-MCNC: 4.3 G/DL (ref 2–4)
GLUCOSE SERPL-MCNC: 296 MG/DL (ref 74–99)
GLUCOSE UR STRIP.AUTO-MCNC: >1000 MG/DL
HCT VFR BLD AUTO: 39.6 % (ref 35–45)
HGB BLD-MCNC: 13.4 G/DL (ref 12–16)
HGB UR QL STRIP: ABNORMAL
KETONES UR QL STRIP.AUTO: NEGATIVE MG/DL
LEUKOCYTE ESTERASE UR QL STRIP.AUTO: ABNORMAL
LYMPHOCYTES # BLD: 3 K/UL (ref 0.9–3.6)
LYMPHOCYTES NFR BLD: 37 % (ref 21–52)
MCH RBC QN AUTO: 27 PG (ref 24–34)
MCHC RBC AUTO-ENTMCNC: 33.8 G/DL (ref 31–37)
MCV RBC AUTO: 79.8 FL (ref 74–97)
MONOCYTES # BLD: 0.4 K/UL (ref 0.05–1.2)
MONOCYTES NFR BLD: 5 % (ref 3–10)
NEUTS SEG # BLD: 4.4 K/UL (ref 1.8–8)
NEUTS SEG NFR BLD: 55 % (ref 40–73)
NITRITE UR QL STRIP.AUTO: NEGATIVE
PH UR STRIP: 5 [PH] (ref 5–8)
PLATELET # BLD AUTO: 223 K/UL (ref 135–420)
PMV BLD AUTO: 9.5 FL (ref 9.2–11.8)
POTASSIUM SERPL-SCNC: 4.4 MMOL/L (ref 3.5–5.5)
PROT SERPL-MCNC: 7.3 G/DL (ref 6.4–8.2)
PROT UR STRIP-MCNC: 300 MG/DL
RBC # BLD AUTO: 4.96 M/UL (ref 4.2–5.3)
RBC #/AREA URNS HPF: ABNORMAL /HPF (ref 0–5)
SODIUM SERPL-SCNC: 138 MMOL/L (ref 136–145)
SP GR UR REFRACTOMETRY: >1.03 (ref 1–1.03)
TROPONIN I SERPL-MCNC: <0.02 NG/ML (ref 0–0.06)
UROBILINOGEN UR QL STRIP.AUTO: 0.2 EU/DL (ref 0.2–1)
WBC # BLD AUTO: 8 K/UL (ref 4.6–13.2)
WBC URNS QL MICRO: ABNORMAL /HPF (ref 0–5)
YEAST URNS QL MICRO: ABNORMAL

## 2017-12-28 PROCEDURE — 85025 COMPLETE CBC W/AUTO DIFF WBC: CPT | Performed by: EMERGENCY MEDICINE

## 2017-12-28 PROCEDURE — 73030 X-RAY EXAM OF SHOULDER: CPT

## 2017-12-28 PROCEDURE — 93005 ELECTROCARDIOGRAM TRACING: CPT

## 2017-12-28 PROCEDURE — 71020 XR CHEST PA LAT: CPT

## 2017-12-28 PROCEDURE — 80053 COMPREHEN METABOLIC PANEL: CPT | Performed by: PHYSICIAN ASSISTANT

## 2017-12-28 PROCEDURE — 74011250637 HC RX REV CODE- 250/637: Performed by: PHYSICIAN ASSISTANT

## 2017-12-28 PROCEDURE — 99285 EMERGENCY DEPT VISIT HI MDM: CPT

## 2017-12-28 PROCEDURE — 82550 ASSAY OF CK (CPK): CPT | Performed by: PHYSICIAN ASSISTANT

## 2017-12-28 PROCEDURE — 81001 URINALYSIS AUTO W/SCOPE: CPT | Performed by: PHYSICIAN ASSISTANT

## 2017-12-28 RX ORDER — ACETAMINOPHEN 325 MG/1
650 TABLET ORAL
Status: COMPLETED | OUTPATIENT
Start: 2017-12-28 | End: 2017-12-28

## 2017-12-28 RX ADMIN — ACETAMINOPHEN 650 MG: 325 TABLET ORAL at 23:48

## 2017-12-28 NOTE — LETTER
[facility] [place] 
[facility] 
[address] [address] 
[phone] Work/School Note Date: 12/28/2017 To Whom It May concern: Colby Richard was seen and treated today in the emergency room by the following provider(s): 
Attending Provider: Yulia Harrell MD 
Physician Assistant: SAMARA Martinez. Colby Richard may return to work on 12/31/17. Sincerely, Van Brunson PA-C

## 2017-12-29 LAB
CK MB CFR SERPL CALC: NORMAL % (ref 0–4)
CK MB SERPL-MCNC: <1 NG/ML (ref 5–25)
CK SERPL-CCNC: 71 U/L (ref 26–192)
TROPONIN I SERPL-MCNC: <0.02 NG/ML (ref 0–0.06)

## 2017-12-29 PROCEDURE — 93005 ELECTROCARDIOGRAM TRACING: CPT

## 2017-12-29 PROCEDURE — 82550 ASSAY OF CK (CPK): CPT | Performed by: PHYSICIAN ASSISTANT

## 2017-12-29 PROCEDURE — 36415 COLL VENOUS BLD VENIPUNCTURE: CPT | Performed by: PHYSICIAN ASSISTANT

## 2017-12-29 RX ORDER — NITROFURANTOIN 25; 75 MG/1; MG/1
100 CAPSULE ORAL 2 TIMES DAILY
Qty: 6 CAP | Refills: 0 | Status: SHIPPED | OUTPATIENT
Start: 2017-12-29 | End: 2018-01-01

## 2017-12-29 RX ORDER — TRAMADOL HYDROCHLORIDE 50 MG/1
50 TABLET ORAL
Qty: 10 TAB | Refills: 0 | Status: SHIPPED | OUTPATIENT
Start: 2017-12-29 | End: 2019-01-17

## 2017-12-29 NOTE — ED PROVIDER NOTES
EMERGENCY DEPARTMENT HISTORY AND PHYSICAL EXAM    Date: 12/28/2017  Patient Name: Yelitza Moffett    History of Presenting Illness     Chief Complaint   Patient presents with    Arm Pain         History Provided By: Patient    Chief Complaint: arm pain  Duration: 2.5 Hours  Timing:  Acute  Location: left arm  Associated Symptoms: CP and SOB    Additional History (Context):   9:46 PM   Yelitza Moffett is a 47 y.o. female with PMHX of DM, CHF, fibromyalgia, neuropathy, left arm/hand pain (one year ago after fall) who presents to the emergency department C/O left arm pain onset 2.5 hours ago. Pt reports she slipped on her house steps while she was walking down and holding railing with left hand. Pt's kept holding the rail during the fall and states \"my arm pulled and is now causing a lot of pain. \"  Associated sxs include CP x several weeks and SOB x several days. Chest pain non exertional, suspects due to stress. Pt denies any other sxs or complaints. PCP: Jane Membreno MD    Current Outpatient Prescriptions   Medication Sig Dispense Refill    traMADol (ULTRAM) 50 mg tablet Take 1 Tab by mouth every six (6) hours as needed for Pain. Max Daily Amount: 200 mg. 10 Tab 0    nitrofurantoin, macrocrystal-monohydrate, (MACROBID) 100 mg capsule Take 1 Cap by mouth two (2) times a day for 3 days. 6 Cap 0    OTHER,NON-FORMULARY, Medication for irregular heart beat      gabapentin (NEURONTIN) 300 mg capsule Take 300 mg by mouth two (2) times a day.  furosemide (LASIX) 40 mg tablet Take 1 Tab by mouth daily. 45 Tab 1    tiZANidine (ZANAFLEX) 4 mg tablet Take 4 mg by mouth every six (6) hours as needed.  lisinopril (PRINIVIL, ZESTRIL) 10 mg tablet Take 1 Tab by mouth daily. 30 Tab 0    insulin NPH/insulin regular (NOVOLIN 70/30) 100 unit/mL (70-30) injection 32 Units by SubCUTAneous route every twelve (12) hours.  insulin regular (NOVOLIN R) 100 unit/mL injection by SubCUTAneous route Daily (before lunch).  aspirin 81 mg chewable tablet Take 1 tablet by mouth daily. 30 tablet 1       Past History     Past Medical History:  Past Medical History:   Diagnosis Date    Asthma     Note: As child had asthma    Diabetes mellitus ([facility]     Type 2    Heart palpitations     Hypertension     Ill-defined condition     fibromyalgia     Morbid obesity ([facility]     MRSA infection 8/2014       Past Surgical History:  Past Surgical History:   Procedure Laterality Date    BREAST SURGERY PROCEDURE UNLISTED      HX CHOLECYSTECTOMY      HX TUBAL LIGATION         Family History:  Family History   Problem Relation Age of Onset    Heart Attack Mother     Heart Attack Maternal Grandmother        Social History:  Social History   Substance Use Topics    Smoking status: Never Smoker    Smokeless tobacco: Never Used    Alcohol use No       Allergies: Allergies   Allergen Reactions    Bees [Sting, Bee] Swelling    Penicillins Hives    Edmond Hives         Review of Systems   Review of Systems   HENT: Positive for dental problem. Respiratory: Positive for shortness of breath. Cardiovascular: Positive for chest pain. Musculoskeletal: Positive for arthralgias and myalgias. All other systems reviewed and are negative. Physical Exam     Vitals:    12/28/17 2116   BP: 181/77   Pulse: 82   Resp: 16   Temp: 97.8 °F (36.6 °C)   SpO2: 99%   Weight: 121.6 kg (268 lb)   Height: 5' 2\" (1.575 m)     Physical Exam   Constitutional: She is oriented to person, place, and time. She appears well-developed and well-nourished. No distress. Obese, alert, sitting up on stretcher, NAD, non toxic    HENT:   Head: Normocephalic and atraumatic. Neck: Normal range of motion. Neck supple. C spine non tender, FROM    Cardiovascular: Normal rate, regular rhythm, normal heart sounds and intact distal pulses. No murmur heard. Pulmonary/Chest: Effort normal and breath sounds normal. No respiratory distress. She has no wheezes.  She has no rales. Abdominal: Soft. Bowel sounds are normal. She exhibits no distension. There is no tenderness. There is no rebound and no guarding. Musculoskeletal:   Left arm: entire arm TTP, no point tenderness, FROM of wrist, elbow, shoulder  Radial pulse 2+, no skin changes or bruising    Neurological: She is alert and oriented to person, place, and time. Skin: Skin is warm and dry. No rash noted. No erythema. No pallor. Psychiatric: She has a normal mood and affect. Judgment normal.   Nursing note and vitals reviewed. Diagnostic Study Results     Labs -     No results found for this or any previous visit (from the past 12 hour(s)). Radiologic Studies -   12:05 AM  RADIOLOGY FINDINGS  Left shoulder X-ray shows NAP  Pending review by Radiologist  Recorded by Rosemarie Gil ED Scribe, as dictated by Louise Mahajan PA-C     CT Results  (Last 48 hours)    None        CXR Results  (Last 48 hours)               12/28/17 2233  XR CHEST PA LAT Final result    Impression:  IMPRESSION:   --------------       No active cardiopulmonary disease. Narrative:  CLINICAL HISTORY:  Chest pain. Shortness of breath. COMPARISON EXAMINATIONS:  September 3, 2017.           ---  CHEST PA AND LATERAL  ---       There is some limitation related to body habitus and AP technique. The   cardiomediastinal silhouette is stable. The lungs are clear. There are no   significant pleural effusions. The bony structures and soft tissues are unremarkable.       --------------             Medications given in the ED-  Medications   acetaminophen (TYLENOL) tablet 650 mg (650 mg Oral Given 12/28/17 1020)         Medical Decision Making   I am the first provider for this patient. I reviewed the vital signs, available nursing notes, past medical history, past surgical history, family history and social history. Vital Signs-Reviewed the patient's vital signs.     Pulse Oximetry Analysis - 99% on RA     Cardiac Monitor:  Rate: 79 bpm  Rhythm: NSR    EKG interpretation: (Preliminary)  9:43 PM   Rate 79 bpm. NSR. No ST elevation  EKG read by Louise Mahajan PA-C at 22:18     EKG interpretation: (Secondary)  1:11 AM   Rate 80 bpm. NSR. No ST changes. EKG read by Louise Mahajan PA-C at 1:08 AM     Records Reviewed: Nursing Notes    Provider Notes (Medical Decision Making):   Strain, tendon injury, doubt fx, dislocation, chronic pain, radiculopathy, neuropathy, ACS, hyperglycemia, doubt DKA     Procedures:  Procedures    ED Course:   9:46 PM Initial assessment performed. The patients presenting problems have been discussed, and they are in agreement with the care plan formulated and outlined with them. I have encouraged them to ask questions as they arise throughout their visit. 12:10 AM Discussed patient's history, exam, and available diagnostics results with Dayo Brumfield MD, ED attending, who agree with plan. Agrees with repeat cardiac panel and EKG. If normal pt can be discharged and f/u as outpt with cardiology     Diagnosis and Disposition       DISCHARGE NOTE:  1:52 AM  Leigh Osborne  results have been reviewed with her. She has been counseled regarding her diagnosis, treatment, and plan. She verbally conveys understanding and agreement of the signs, symptoms, diagnosis, treatment and prognosis and additionally agrees to follow up as discussed. She also agrees with the care-plan and conveys that all of her questions have been answered. I have also provided discharge instructions for her that include: educational information regarding their diagnosis and treatment, and list of reasons why they would want to return to the ED prior to their follow-up appointment, should her condition change. She has been provided with education for proper emergency department utilization. CLINICAL IMPRESSION:    1. Strain of left upper arm, initial encounter    2.  Urinary tract infection with hematuria, site unspecified 3. Hyperglycemia        PLAN:  1. D/C Home  2. Discharge Medication List as of 12/29/2017  2:02 AM        3. Follow-up Information     Follow up With Details Comments Luis Alfredo Christy 85, MD Schedule an appointment as soon as possible for a visit in 2 days  [address]  [address] [facility]      Jane Gutierrez MD Schedule an appointment as soon as possible for a visit in 2 days  [address] [facility]      THE [facility] EMERGENCY DEPT  As needed, If symptoms worsen [address] [id number]  [phone]        _______________________________    Attestations: This note is prepared by Nely Vasques , acting as Scribe for April Thapa PA-C . April Thapa PA-C:  The scribe's documentation has been prepared under my direction and personally reviewed by me in its entirety.   I confirm that the note above accurately reflects all work, treatment, procedures, and medical decision making performed by me.  _______________________________

## 2017-12-29 NOTE — ED TRIAGE NOTES
Pt reports injury to left arm in 2016. Bent fourth finger back and finger locks us. Seen by ortho and had cortisone shots. States that it was helping but insurance stopped covering MD. Now sees a new MD. States that for a few months pain is worse and she cannot lay on left arm. States that tonight she slipped on steps and caught self with arm and pain worsened.

## 2017-12-29 NOTE — ED NOTES
Presented to ED to be evaluated for reported left arm pain with onset 2016. Patient also reports slipped this p.m with continued complaint at this time. Patient reports pain as documented. At time of assessment patient reports multiple complaints as she states, \"been going on for a while\". Patient is noted to be up to position of comfort at time of assessment with no s/s distress noted.

## 2017-12-29 NOTE — DISCHARGE INSTRUCTIONS
Learning About High Blood Sugar  What is high blood sugar? Your body turns the food you eat into glucose (sugar), which it uses for energy. But if your body isn't able to use the sugar right away, it can build up in your blood and lead to high blood sugar. When the amount of sugar in your blood stays too high for too much of the time, you may have diabetes. Diabetes is a disease that can cause serious health problems. The good news is that lifestyle changes may help you get your blood sugar back to normal and avoid or delay diabetes. What causes high blood sugar? Sugar (glucose) can build up in your blood if you:  · Are overweight. · Have a family history of diabetes. · Take certain medicines, such as steroids. What are the symptoms? Having high blood sugar may not cause any symptoms at all. Or it may make you feel very thirsty or very hungry. You may also urinate more often than usual, have blurry vision, or lose weight without trying. How is high blood sugar treated? You can take steps to lower your blood sugar level if you understand what makes it get higher. Your doctor may want you to learn how to test your blood sugar level at home. Then you can see how illness, stress, or different kinds of food or medicine raise or lower your blood sugar level. Other tests may be needed to see if you have diabetes. How can you prevent high blood sugar? · Watch your weight. If you're overweight, losing just a small amount of weight may help. Reducing fat around your waist is most important. · Limit the amount of calories, sweets, and unhealthy fat you eat. Ask your doctor if a dietitian can help you. A registered dietitian can help you create meal plans that fit your lifestyle. · Get at least 30 minutes of exercise on most days of the week. Exercise helps control your blood sugar. It also helps you maintain a healthy weight. Walking is a good choice.  You also may want to do other activities, such as running, swimming, cycling, or playing tennis or team sports. · If your doctor prescribed medicines, take them exactly as prescribed. Call your doctor if you think you are having a problem with your medicine. You will get more details on the specific medicines your doctor prescribes. Follow-up care is a key part of your treatment and safety. Be sure to make and go to all appointments, and call your doctor if you are having problems. It's also a good idea to know your test results and keep a list of the medicines you take. Where can you learn more? Go to http://tereCatalyzeann marie.info/. Enter O108 in the search box to learn more about \"Learning About High Blood Sugar. \"  Current as of: March 13, 2017  Content Version: 11.4  © 6502-5471 [employer]. Care instructions adapted under license by [employer] (which disclaims liability or warranty for this information). If you have questions about a medical condition or this instruction, always ask your healthcare professional. [place] [address] any warranty or liability for your use of this information. Urinary Tract Infection in Women: Care Instructions  Your Care Instructions    A urinary tract infection, or UTI, is a general term for an infection anywhere between the kidneys and the urethra (where urine comes out). Most UTIs are bladder infections. They often cause pain or burning when you urinate. UTIs are caused by bacteria and can be cured with antibiotics. Be sure to complete your treatment so that the infection goes away. Follow-up care is a key part of your treatment and safety. Be sure to make and go to all appointments, and call your doctor if you are having problems. It's also a good idea to know your test results and keep a list of the medicines you take. How can you care for yourself at home? · Take your antibiotics as directed. Do not stop taking them just because you feel better.  You need to take the full course of antibiotics. · Drink extra water and other fluids for the next day or two. This may help wash out the bacteria that are causing the infection. (If you have kidney, heart, or liver disease and have to limit fluids, talk with your doctor before you increase your fluid intake.)  · Avoid drinks that are carbonated or have caffeine. They can irritate the bladder. · Urinate often. Try to empty your bladder each time. · To relieve pain, take a hot bath or lay a heating pad set on low over your lower belly or genital area. Never go to sleep with a heating pad in place. To prevent UTIs  · Drink plenty of water each day. This helps you urinate often, which clears bacteria from your system. (If you have kidney, heart, or liver disease and have to limit fluids, talk with your doctor before you increase your fluid intake.)  · Urinate when you need to. · Urinate right after you have sex. · Change sanitary pads often. · Avoid douches, bubble baths, feminine hygiene sprays, and other feminine hygiene products that have deodorants. · After going to the bathroom, wipe from front to back. When should you call for help? Call your doctor now or seek immediate medical care if:  ? · Symptoms such as fever, chills, nausea, or vomiting get worse or appear for the first time. ? · You have new pain in your back just below your rib cage. This is called flank pain. ? · There is new blood or pus in your urine. ? · You have any problems with your antibiotic medicine. ? Watch closely for changes in your health, and be sure to contact your doctor if:  ? · You are not getting better after taking an antibiotic for 2 days. ? · Your symptoms go away but then come back. Where can you learn more? Go to http://tere-ann marie.info/. Enter U608 in the search box to learn more about \"Urinary Tract Infection in Women: Care Instructions. \"  Current as of:  May 12, 2017  Content Version: 11.4  © 5888-8313 [employer]. Care instructions adapted under license by [employer] (which disclaims liability or warranty for this information). If you have questions about a medical condition or this instruction, always ask your healthcare professional. [place] [address] any warranty or liability for your use of this information.

## 2017-12-29 NOTE — ED NOTES
Pt hourly rounding competed. Safety   Pt (x) resting on stretcher with side rails up and call bell in reach. () in chair    () in parents arms. Toileting   Pt offered ()Bedpan     ()Assistance to Restroom     ()Urinal  Ongoing Updates  Updated on plan of care and status of test results. Pain Management  Inquired as to comfort and offered comfort measures:    (x) warm blankets   (x) dimmed lights  Patient reports continued pain as documented. Patient is noted to be able to move extremity of complaints without difficulty noted.

## 2017-12-31 LAB
ATRIAL RATE: 79 BPM
ATRIAL RATE: 80 BPM
CALCULATED P AXIS, ECG09: 35 DEGREES
CALCULATED P AXIS, ECG09: 57 DEGREES
CALCULATED R AXIS, ECG10: 32 DEGREES
CALCULATED R AXIS, ECG10: 35 DEGREES
CALCULATED T AXIS, ECG11: 57 DEGREES
CALCULATED T AXIS, ECG11: 60 DEGREES
DIAGNOSIS, 93000: NORMAL
DIAGNOSIS, 93000: NORMAL
P-R INTERVAL, ECG05: 130 MS
P-R INTERVAL, ECG05: 134 MS
Q-T INTERVAL, ECG07: 376 MS
Q-T INTERVAL, ECG07: 398 MS
QRS DURATION, ECG06: 82 MS
QRS DURATION, ECG06: 84 MS
QTC CALCULATION (BEZET), ECG08: 431 MS
QTC CALCULATION (BEZET), ECG08: 459 MS
VENTRICULAR RATE, ECG03: 79 BPM
VENTRICULAR RATE, ECG03: 80 BPM

## 2019-01-17 ENCOUNTER — HOSPITAL ENCOUNTER (INPATIENT)
Age: 54
LOS: 7 days | Discharge: HOME | DRG: 139 | End: 2019-01-24
Attending: EMERGENCY MEDICINE | Admitting: INTERNAL MEDICINE
Payer: MEDICAID

## 2019-01-17 ENCOUNTER — APPOINTMENT (OUTPATIENT)
Dept: NON INVASIVE DIAGNOSTICS | Age: 54
DRG: 139 | End: 2019-01-17
Attending: INTERNAL MEDICINE
Payer: MEDICAID

## 2019-01-17 ENCOUNTER — APPOINTMENT (OUTPATIENT)
Dept: GENERAL RADIOLOGY | Age: 54
DRG: 139 | End: 2019-01-17
Attending: EMERGENCY MEDICINE
Payer: MEDICAID

## 2019-01-17 ENCOUNTER — APPOINTMENT (OUTPATIENT)
Dept: NUCLEAR MEDICINE | Age: 54
DRG: 139 | End: 2019-01-17
Attending: EMERGENCY MEDICINE
Payer: MEDICAID

## 2019-01-17 DIAGNOSIS — I50.33 DIASTOLIC CHF, ACUTE ON CHRONIC (HCC): ICD-10-CM

## 2019-01-17 DIAGNOSIS — J18.9 PNEUMONIA OF LEFT LOWER LOBE DUE TO INFECTIOUS ORGANISM: ICD-10-CM

## 2019-01-17 DIAGNOSIS — R06.03 RESPIRATORY DISTRESS: Primary | ICD-10-CM

## 2019-01-17 DIAGNOSIS — J45.21 MILD INTERMITTENT ASTHMA WITH EXACERBATION: ICD-10-CM

## 2019-01-17 PROBLEM — I50.30 DIASTOLIC CHF (HCC): Status: ACTIVE | Noted: 2019-01-17

## 2019-01-17 PROBLEM — J45.901 ASTHMA EXACERBATION: Status: ACTIVE | Noted: 2019-01-17

## 2019-01-17 LAB
ALBUMIN SERPL-MCNC: 2.9 G/DL (ref 3.4–5)
ALBUMIN/GLOB SERPL: 0.7 {RATIO} (ref 0.8–1.7)
ALP SERPL-CCNC: 117 U/L (ref 45–117)
ALT SERPL-CCNC: 22 U/L (ref 13–56)
ANION GAP SERPL CALC-SCNC: 7 MMOL/L (ref 3–18)
APPEARANCE UR: CLEAR
ARTERIAL PATENCY WRIST A: YES
AST SERPL-CCNC: 21 U/L (ref 15–37)
BACTERIA URNS QL MICRO: ABNORMAL /HPF
BASE DEFICIT BLD-SCNC: 1 MMOL/L
BASOPHILS # BLD: 0 K/UL (ref 0–0.1)
BASOPHILS NFR BLD: 0 % (ref 0–2)
BDY SITE: ABNORMAL
BILIRUB SERPL-MCNC: 0.3 MG/DL (ref 0.2–1)
BILIRUB UR QL: NEGATIVE
BNP SERPL-MCNC: 504 PG/ML (ref 0–900)
BODY TEMPERATURE: 98.6
BUN SERPL-MCNC: 55 MG/DL (ref 7–18)
BUN/CREAT SERPL: 52 (ref 12–20)
CALCIUM SERPL-MCNC: 8.8 MG/DL (ref 8.5–10.1)
CHLORIDE SERPL-SCNC: 111 MMOL/L (ref 100–108)
CK MB CFR SERPL CALC: NORMAL % (ref 0–4)
CK MB SERPL-MCNC: <1 NG/ML (ref 5–25)
CK SERPL-CCNC: 65 U/L (ref 26–192)
CK SERPL-CCNC: 74 U/L (ref 26–192)
CK SERPL-CCNC: 76 U/L (ref 26–192)
CO2 SERPL-SCNC: 28 MMOL/L (ref 21–32)
COLOR UR: YELLOW
CREAT SERPL-MCNC: 1.06 MG/DL (ref 0.6–1.3)
D DIMER PPP FEU-MCNC: 0.84 UG/ML(FEU)
DIFFERENTIAL METHOD BLD: ABNORMAL
ECHO AO ARCH DIAM: 2.71 CM
ECHO AO ASC DIAM: 3.02 CM
ECHO AO ROOT DIAM: 3.31 CM
ECHO AV AREA PEAK VELOCITY: 1.8 CM2
ECHO AV AREA VTI: 2.2 CM2
ECHO AV AREA/BSA PEAK VELOCITY: 0.7 CM2/M2
ECHO AV AREA/BSA VTI: 0.9 CM2/M2
ECHO AV MEAN GRADIENT: 7.1 MMHG
ECHO AV PEAK GRADIENT: 13.1 MMHG
ECHO AV PEAK VELOCITY: 180.63 CM/S
ECHO AV VTI: 33.82 CM
ECHO IVC PROX: 1.8 CM
ECHO LA MAJOR AXIS: 3.54 CM
ECHO LA VOL 2C: 49.18 ML (ref 22–52)
ECHO LA VOL 4C: 37.78 ML (ref 22–52)
ECHO LA VOL BP: 46.63 ML (ref 22–52)
ECHO LA VOL/BSA BIPLANE: 20.27 ML/M2 (ref 16–28)
ECHO LA VOLUME INDEX A2C: 21.38 ML/M2 (ref 16–28)
ECHO LA VOLUME INDEX A4C: 16.42 ML/M2 (ref 16–28)
ECHO LV E' LATERAL VELOCITY: 0.1 CM/S
ECHO LV E' SEPTAL VELOCITY: 0.1 CM/S
ECHO LV EDV A2C: 60.6 ML
ECHO LV EDV A4C: 86.7 ML
ECHO LV EDV BP: 72.9 ML (ref 56–104)
ECHO LV EDV INDEX A4C: 37.7 ML/M2
ECHO LV EDV INDEX BP: 31.7 ML/M2
ECHO LV EDV NDEX A2C: 26.3 ML/M2
ECHO LV EJECTION FRACTION A2C: 60 %
ECHO LV EJECTION FRACTION A4C: 78 %
ECHO LV EJECTION FRACTION BIPLANE: 69.7 % (ref 55–100)
ECHO LV ESV A2C: 24.4 ML
ECHO LV ESV A4C: 19.1 ML
ECHO LV ESV BP: 22.1 ML (ref 19–49)
ECHO LV ESV INDEX A2C: 10.6 ML/M2
ECHO LV ESV INDEX A4C: 8.3 ML/M2
ECHO LV ESV INDEX BP: 9.6 ML/M2
ECHO LV INTERNAL DIMENSION DIASTOLIC: 4.25 CM (ref 3.9–5.3)
ECHO LV INTERNAL DIMENSION SYSTOLIC: 3.13 CM
ECHO LV IVSD: 1.17 CM (ref 0.6–0.9)
ECHO LV MASS 2D: 206.3 G (ref 67–162)
ECHO LV MASS INDEX 2D: 89.7 G/M2 (ref 43–95)
ECHO LV POSTERIOR WALL DIASTOLIC: 1.18 CM (ref 0.6–0.9)
ECHO LVOT DIAM: 2.09 CM
ECHO LVOT PEAK GRADIENT: 3.6 MMHG
ECHO LVOT PEAK VELOCITY: 95.51 CM/S
ECHO LVOT VTI: 22.01 CM
ECHO MV A VELOCITY: 107.48 CM/S
ECHO MV AREA PHT: 4.7 CM2
ECHO MV E DECELERATION TIME (DT): 162.7 MS
ECHO MV E VELOCITY: 1.22 CM/S
ECHO MV E/A RATIO: 0.01
ECHO MV E/E' LATERAL: 12.2
ECHO MV E/E' RATIO (AVERAGED): 12.2
ECHO MV E/E' SEPTAL: 12.2
ECHO MV PRESSURE HALF TIME (PHT): 47.2 MS
ECHO PULMONARY ARTERY SYSTOLIC PRESSURE (PASP): 22 MMHG
ECHO PV MAX VELOCITY: 119.64 CM/S
ECHO PV PEAK GRADIENT: 5.7 MMHG
ECHO TRICUSPID ANNULAR PEAK SYSTOLIC VELOCITY: 1.9 CM/S
ECHO TV REGURGITANT MAX VELOCITY: 219.16 CM/S
ECHO TV REGURGITANT PEAK GRADIENT: 19.2 MMHG
EOSINOPHIL # BLD: 0.2 K/UL (ref 0–0.4)
EOSINOPHIL NFR BLD: 2 % (ref 0–5)
EPITH CASTS URNS QL MICRO: ABNORMAL /LPF (ref 0–5)
ERYTHROCYTE [DISTWIDTH] IN BLOOD BY AUTOMATED COUNT: 14.4 % (ref 11.6–14.5)
EST. AVERAGE GLUCOSE BLD GHB EST-MCNC: 263 MG/DL
GAS FLOW.O2 O2 DELIVERY SYS: ABNORMAL L/MIN
GAS FLOW.O2 SETTING OXYMISER: 6 L/M
GLOBULIN SER CALC-MCNC: 3.9 G/DL (ref 2–4)
GLUCOSE BLD STRIP.AUTO-MCNC: 375 MG/DL (ref 70–110)
GLUCOSE BLD STRIP.AUTO-MCNC: 378 MG/DL (ref 70–110)
GLUCOSE BLD STRIP.AUTO-MCNC: 428 MG/DL (ref 70–110)
GLUCOSE SERPL-MCNC: 73 MG/DL (ref 74–99)
GLUCOSE UR STRIP.AUTO-MCNC: NEGATIVE MG/DL
HBA1C MFR BLD: 10.8 % (ref 4.2–5.6)
HCO3 BLD-SCNC: 25.6 MMOL/L (ref 22–26)
HCT VFR BLD AUTO: 34.6 % (ref 35–45)
HGB BLD-MCNC: 10.7 G/DL (ref 12–16)
HGB UR QL STRIP: ABNORMAL
INR PPP: 0.9 (ref 0.8–1.2)
KETONES UR QL STRIP.AUTO: NEGATIVE MG/DL
LEUKOCYTE ESTERASE UR QL STRIP.AUTO: NEGATIVE
LYMPHOCYTES # BLD: 1.1 K/UL (ref 0.9–3.6)
LYMPHOCYTES NFR BLD: 11 % (ref 21–52)
MAGNESIUM SERPL-MCNC: 2 MG/DL (ref 1.6–2.6)
MCH RBC QN AUTO: 26.4 PG (ref 24–34)
MCHC RBC AUTO-ENTMCNC: 30.9 G/DL (ref 31–37)
MCV RBC AUTO: 85.2 FL (ref 74–97)
MONOCYTES # BLD: 0.5 K/UL (ref 0.05–1.2)
MONOCYTES NFR BLD: 5 % (ref 3–10)
NEUTS SEG # BLD: 8.8 K/UL (ref 1.8–8)
NEUTS SEG NFR BLD: 82 % (ref 40–73)
NITRITE UR QL STRIP.AUTO: NEGATIVE
PCO2 BLD: 50 MMHG (ref 35–45)
PH BLD: 7.32 [PH] (ref 7.35–7.45)
PH UR STRIP: 5 [PH] (ref 5–8)
PLATELET # BLD AUTO: 225 K/UL (ref 135–420)
PMV BLD AUTO: 9.5 FL (ref 9.2–11.8)
PO2 BLD: 73 MMHG (ref 80–100)
POTASSIUM SERPL-SCNC: 4.2 MMOL/L (ref 3.5–5.5)
PROT SERPL-MCNC: 6.8 G/DL (ref 6.4–8.2)
PROT UR STRIP-MCNC: 100 MG/DL
PROTHROMBIN TIME: 12.1 SEC (ref 11.5–15.2)
RBC # BLD AUTO: 4.06 M/UL (ref 4.2–5.3)
RBC #/AREA URNS HPF: ABNORMAL /HPF (ref 0–5)
SAO2 % BLD: 93 % (ref 92–97)
SERVICE CMNT-IMP: ABNORMAL
SODIUM SERPL-SCNC: 146 MMOL/L (ref 136–145)
SP GR UR REFRACTOMETRY: 1.01 (ref 1–1.03)
SPECIMEN TYPE: ABNORMAL
TOTAL RESP. RATE, ITRR: 30
TROPONIN I SERPL-MCNC: <0.02 NG/ML (ref 0–0.04)
UROBILINOGEN UR QL STRIP.AUTO: 0.2 EU/DL (ref 0.2–1)
WBC # BLD AUTO: 10.7 K/UL (ref 4.6–13.2)
WBC URNS QL MICRO: ABNORMAL /HPF (ref 0–5)

## 2019-01-17 PROCEDURE — 94760 N-INVAS EAR/PLS OXIMETRY 1: CPT

## 2019-01-17 PROCEDURE — 80053 COMPREHEN METABOLIC PANEL: CPT

## 2019-01-17 PROCEDURE — 77030029684 HC NEB SM VOL KT MONA -A

## 2019-01-17 PROCEDURE — 74011000250 HC RX REV CODE- 250: Performed by: HOSPITALIST

## 2019-01-17 PROCEDURE — 36600 WITHDRAWAL OF ARTERIAL BLOOD: CPT

## 2019-01-17 PROCEDURE — 36415 COLL VENOUS BLD VENIPUNCTURE: CPT

## 2019-01-17 PROCEDURE — 74011636637 HC RX REV CODE- 636/637: Performed by: INTERNAL MEDICINE

## 2019-01-17 PROCEDURE — A9567 TECHNETIUM TC-99M AEROSOL: HCPCS

## 2019-01-17 PROCEDURE — 83880 ASSAY OF NATRIURETIC PEPTIDE: CPT

## 2019-01-17 PROCEDURE — 82803 BLOOD GASES ANY COMBINATION: CPT

## 2019-01-17 PROCEDURE — 74011250637 HC RX REV CODE- 250/637: Performed by: HOSPITALIST

## 2019-01-17 PROCEDURE — 94640 AIRWAY INHALATION TREATMENT: CPT

## 2019-01-17 PROCEDURE — C8923 2D TTE W OR W/O FOL W/CON,CO: HCPCS

## 2019-01-17 PROCEDURE — 74011636637 HC RX REV CODE- 636/637: Performed by: HOSPITALIST

## 2019-01-17 PROCEDURE — 83036 HEMOGLOBIN GLYCOSYLATED A1C: CPT

## 2019-01-17 PROCEDURE — 74011000250 HC RX REV CODE- 250: Performed by: INTERNAL MEDICINE

## 2019-01-17 PROCEDURE — 93005 ELECTROCARDIOGRAM TRACING: CPT

## 2019-01-17 PROCEDURE — 83735 ASSAY OF MAGNESIUM: CPT

## 2019-01-17 PROCEDURE — 65660000000 HC RM CCU STEPDOWN

## 2019-01-17 PROCEDURE — 85379 FIBRIN DEGRADATION QUANT: CPT

## 2019-01-17 PROCEDURE — 74011250636 HC RX REV CODE- 250/636: Performed by: INTERNAL MEDICINE

## 2019-01-17 PROCEDURE — 87040 BLOOD CULTURE FOR BACTERIA: CPT

## 2019-01-17 PROCEDURE — 77010033678 HC OXYGEN DAILY

## 2019-01-17 PROCEDURE — 82962 GLUCOSE BLOOD TEST: CPT

## 2019-01-17 PROCEDURE — 74011000250 HC RX REV CODE- 250: Performed by: EMERGENCY MEDICINE

## 2019-01-17 PROCEDURE — 96374 THER/PROPH/DIAG INJ IV PUSH: CPT

## 2019-01-17 PROCEDURE — 99285 EMERGENCY DEPT VISIT HI MDM: CPT

## 2019-01-17 PROCEDURE — 81001 URINALYSIS AUTO W/SCOPE: CPT

## 2019-01-17 PROCEDURE — 82550 ASSAY OF CK (CPK): CPT

## 2019-01-17 PROCEDURE — 71045 X-RAY EXAM CHEST 1 VIEW: CPT

## 2019-01-17 PROCEDURE — 85610 PROTHROMBIN TIME: CPT

## 2019-01-17 PROCEDURE — 74011250636 HC RX REV CODE- 250/636: Performed by: EMERGENCY MEDICINE

## 2019-01-17 PROCEDURE — 74011250637 HC RX REV CODE- 250/637: Performed by: INTERNAL MEDICINE

## 2019-01-17 PROCEDURE — 96375 TX/PRO/DX INJ NEW DRUG ADDON: CPT

## 2019-01-17 PROCEDURE — 85025 COMPLETE CBC W/AUTO DIFF WBC: CPT

## 2019-01-17 RX ORDER — DULOXETIN HYDROCHLORIDE 30 MG/1
60 CAPSULE, DELAYED RELEASE ORAL DAILY
COMMUNITY
End: 2021-07-01 | Stop reason: SINTOL

## 2019-01-17 RX ORDER — LOSARTAN POTASSIUM 100 MG/1
100 TABLET ORAL DAILY
COMMUNITY
End: 2020-02-24

## 2019-01-17 RX ORDER — IPRATROPIUM BROMIDE AND ALBUTEROL SULFATE 2.5; .5 MG/3ML; MG/3ML
3 SOLUTION RESPIRATORY (INHALATION)
Status: DISCONTINUED | OUTPATIENT
Start: 2019-01-17 | End: 2019-01-24 | Stop reason: HOSPADM

## 2019-01-17 RX ORDER — GUAIFENESIN 100 MG/5ML
81 LIQUID (ML) ORAL DAILY
Status: DISCONTINUED | OUTPATIENT
Start: 2019-01-17 | End: 2019-01-24 | Stop reason: HOSPADM

## 2019-01-17 RX ORDER — FUROSEMIDE 10 MG/ML
40 INJECTION INTRAMUSCULAR; INTRAVENOUS EVERY 12 HOURS
Status: DISCONTINUED | OUTPATIENT
Start: 2019-01-17 | End: 2019-01-18

## 2019-01-17 RX ORDER — INSULIN LISPRO 100 [IU]/ML
20 INJECTION, SOLUTION INTRAVENOUS; SUBCUTANEOUS
Status: DISCONTINUED | OUTPATIENT
Start: 2019-01-18 | End: 2019-01-18

## 2019-01-17 RX ORDER — LOSARTAN POTASSIUM 50 MG/1
100 TABLET ORAL DAILY
Status: DISCONTINUED | OUTPATIENT
Start: 2019-01-17 | End: 2019-01-24 | Stop reason: HOSPADM

## 2019-01-17 RX ORDER — ATORVASTATIN CALCIUM 40 MG/1
40 TABLET, FILM COATED ORAL DAILY
COMMUNITY

## 2019-01-17 RX ORDER — INSULIN GLARGINE 100 [IU]/ML
50 INJECTION, SOLUTION SUBCUTANEOUS DAILY
Status: DISCONTINUED | OUTPATIENT
Start: 2019-01-17 | End: 2019-01-17

## 2019-01-17 RX ORDER — DEXTROSE 50 % IN WATER (D50W) INTRAVENOUS SYRINGE
25-50 AS NEEDED
Status: DISCONTINUED | OUTPATIENT
Start: 2019-01-17 | End: 2019-01-24 | Stop reason: HOSPADM

## 2019-01-17 RX ORDER — DULOXETIN HYDROCHLORIDE 30 MG/1
30 CAPSULE, DELAYED RELEASE ORAL DAILY
Status: DISCONTINUED | OUTPATIENT
Start: 2019-01-17 | End: 2019-01-24 | Stop reason: HOSPADM

## 2019-01-17 RX ORDER — INSULIN LISPRO 100 [IU]/ML
20 INJECTION, SOLUTION INTRAVENOUS; SUBCUTANEOUS ONCE
Status: COMPLETED | OUTPATIENT
Start: 2019-01-17 | End: 2019-01-17

## 2019-01-17 RX ORDER — INSULIN LISPRO 100 [IU]/ML
15 INJECTION, SOLUTION INTRAVENOUS; SUBCUTANEOUS
Status: DISCONTINUED | OUTPATIENT
Start: 2019-01-17 | End: 2019-01-17

## 2019-01-17 RX ORDER — INSULIN LISPRO 100 [IU]/ML
INJECTION, SOLUTION INTRAVENOUS; SUBCUTANEOUS
Status: DISCONTINUED | OUTPATIENT
Start: 2019-01-17 | End: 2019-01-23

## 2019-01-17 RX ORDER — GUAIFENESIN 600 MG/1
600 TABLET, EXTENDED RELEASE ORAL EVERY 12 HOURS
Status: DISCONTINUED | OUTPATIENT
Start: 2019-01-17 | End: 2019-01-24 | Stop reason: HOSPADM

## 2019-01-17 RX ORDER — FUROSEMIDE 10 MG/ML
40 INJECTION INTRAMUSCULAR; INTRAVENOUS EVERY 12 HOURS
Status: DISCONTINUED | OUTPATIENT
Start: 2019-01-17 | End: 2019-01-17

## 2019-01-17 RX ORDER — HEPARIN SODIUM 5000 [USP'U]/ML
5000 INJECTION, SOLUTION INTRAVENOUS; SUBCUTANEOUS EVERY 8 HOURS
Status: DISCONTINUED | OUTPATIENT
Start: 2019-01-17 | End: 2019-01-24 | Stop reason: HOSPADM

## 2019-01-17 RX ORDER — INSULIN GLARGINE 100 [IU]/ML
20 INJECTION, SOLUTION SUBCUTANEOUS
Status: DISCONTINUED | OUTPATIENT
Start: 2019-01-17 | End: 2019-01-19

## 2019-01-17 RX ORDER — METOPROLOL SUCCINATE 50 MG/1
50 TABLET, EXTENDED RELEASE ORAL DAILY
Status: DISCONTINUED | OUTPATIENT
Start: 2019-01-17 | End: 2019-01-24 | Stop reason: HOSPADM

## 2019-01-17 RX ORDER — HYDROCODONE POLISTIREX AND CHLORPHENIRAMINE POLISTIREX 10; 8 MG/5ML; MG/5ML
5 SUSPENSION, EXTENDED RELEASE ORAL
Status: DISCONTINUED | OUTPATIENT
Start: 2019-01-17 | End: 2019-01-24 | Stop reason: HOSPADM

## 2019-01-17 RX ORDER — GABAPENTIN 300 MG/1
300 CAPSULE ORAL 2 TIMES DAILY
Status: DISCONTINUED | OUTPATIENT
Start: 2019-01-17 | End: 2019-01-24 | Stop reason: HOSPADM

## 2019-01-17 RX ORDER — FUROSEMIDE 10 MG/ML
60 INJECTION INTRAMUSCULAR; INTRAVENOUS
Status: COMPLETED | OUTPATIENT
Start: 2019-01-17 | End: 2019-01-17

## 2019-01-17 RX ORDER — IPRATROPIUM BROMIDE AND ALBUTEROL SULFATE 2.5; .5 MG/3ML; MG/3ML
3 SOLUTION RESPIRATORY (INHALATION) ONCE
Status: COMPLETED | OUTPATIENT
Start: 2019-01-17 | End: 2019-01-17

## 2019-01-17 RX ORDER — SODIUM CHLORIDE 0.9 % (FLUSH) 0.9 %
5-40 SYRINGE (ML) INJECTION AS NEEDED
Status: DISCONTINUED | OUTPATIENT
Start: 2019-01-17 | End: 2019-01-24 | Stop reason: HOSPADM

## 2019-01-17 RX ORDER — MAGNESIUM SULFATE 100 %
4 CRYSTALS MISCELLANEOUS AS NEEDED
Status: DISCONTINUED | OUTPATIENT
Start: 2019-01-17 | End: 2019-01-24 | Stop reason: HOSPADM

## 2019-01-17 RX ORDER — METFORMIN HYDROCHLORIDE 1000 MG/1
1000 TABLET ORAL 2 TIMES DAILY WITH MEALS
COMMUNITY
End: 2020-02-24

## 2019-01-17 RX ORDER — BUDESONIDE 0.5 MG/2ML
500 INHALANT ORAL
Status: DISCONTINUED | OUTPATIENT
Start: 2019-01-17 | End: 2019-01-24 | Stop reason: HOSPADM

## 2019-01-17 RX ORDER — SODIUM CHLORIDE 0.9 % (FLUSH) 0.9 %
5-40 SYRINGE (ML) INJECTION EVERY 8 HOURS
Status: DISCONTINUED | OUTPATIENT
Start: 2019-01-17 | End: 2019-01-24 | Stop reason: HOSPADM

## 2019-01-17 RX ORDER — ATORVASTATIN CALCIUM 20 MG/1
40 TABLET, FILM COATED ORAL DAILY
Status: DISCONTINUED | OUTPATIENT
Start: 2019-01-17 | End: 2019-01-24 | Stop reason: HOSPADM

## 2019-01-17 RX ORDER — ARFORMOTEROL TARTRATE 15 UG/2ML
15 SOLUTION RESPIRATORY (INHALATION)
Status: DISCONTINUED | OUTPATIENT
Start: 2019-01-17 | End: 2019-01-24 | Stop reason: HOSPADM

## 2019-01-17 RX ORDER — INSULIN GLARGINE 100 [IU]/ML
60 INJECTION, SOLUTION SUBCUTANEOUS DAILY
Status: DISCONTINUED | OUTPATIENT
Start: 2019-01-18 | End: 2019-01-22

## 2019-01-17 RX ORDER — METOPROLOL SUCCINATE 50 MG/1
100 TABLET, EXTENDED RELEASE ORAL DAILY
COMMUNITY
End: 2020-02-24

## 2019-01-17 RX ADMIN — ARFORMOTEROL TARTRATE 15 MCG: 15 SOLUTION RESPIRATORY (INHALATION) at 20:08

## 2019-01-17 RX ADMIN — Medication 10 ML: at 22:25

## 2019-01-17 RX ADMIN — BUDESONIDE 500 MCG: 0.5 INHALANT RESPIRATORY (INHALATION) at 20:08

## 2019-01-17 RX ADMIN — BUDESONIDE 500 MCG: 0.5 INHALANT RESPIRATORY (INHALATION) at 12:56

## 2019-01-17 RX ADMIN — FUROSEMIDE 40 MG: 10 INJECTION, SOLUTION INTRAMUSCULAR; INTRAVENOUS at 16:45

## 2019-01-17 RX ADMIN — GUAIFENESIN 600 MG: 600 TABLET, EXTENDED RELEASE ORAL at 22:24

## 2019-01-17 RX ADMIN — METHYLPREDNISOLONE SODIUM SUCCINATE 40 MG: 40 INJECTION, POWDER, FOR SOLUTION INTRAMUSCULAR; INTRAVENOUS at 13:05

## 2019-01-17 RX ADMIN — ARFORMOTEROL TARTRATE 15 MCG: 15 SOLUTION RESPIRATORY (INHALATION) at 12:56

## 2019-01-17 RX ADMIN — Medication 10 ML: at 13:06

## 2019-01-17 RX ADMIN — IPRATROPIUM BROMIDE AND ALBUTEROL SULFATE 3 ML: .5; 3 SOLUTION RESPIRATORY (INHALATION) at 07:09

## 2019-01-17 RX ADMIN — IPRATROPIUM BROMIDE AND ALBUTEROL SULFATE 3 ML: .5; 3 SOLUTION RESPIRATORY (INHALATION) at 05:13

## 2019-01-17 RX ADMIN — HEPARIN SODIUM 5000 UNITS: 5000 INJECTION INTRAVENOUS; SUBCUTANEOUS at 22:24

## 2019-01-17 RX ADMIN — IPRATROPIUM BROMIDE AND ALBUTEROL SULFATE 3 ML: .5; 3 SOLUTION RESPIRATORY (INHALATION) at 20:08

## 2019-01-17 RX ADMIN — Medication 81 MG: at 11:42

## 2019-01-17 RX ADMIN — DULOXETINE HYDROCHLORIDE 30 MG: 30 CAPSULE, DELAYED RELEASE ORAL at 11:42

## 2019-01-17 RX ADMIN — ATORVASTATIN CALCIUM 40 MG: 20 TABLET, FILM COATED ORAL at 11:42

## 2019-01-17 RX ADMIN — INSULIN LISPRO 10 UNITS: 100 INJECTION, SOLUTION INTRAVENOUS; SUBCUTANEOUS at 22:25

## 2019-01-17 RX ADMIN — INSULIN LISPRO 15 UNITS: 100 INJECTION, SOLUTION INTRAVENOUS; SUBCUTANEOUS at 13:05

## 2019-01-17 RX ADMIN — FUROSEMIDE 60 MG: 10 INJECTION, SOLUTION INTRAMUSCULAR; INTRAVENOUS at 05:16

## 2019-01-17 RX ADMIN — METOPROLOL SUCCINATE 50 MG: 50 TABLET, EXTENDED RELEASE ORAL at 11:42

## 2019-01-17 RX ADMIN — SODIUM CHLORIDE 500 MG: 900 INJECTION, SOLUTION INTRAVENOUS at 06:42

## 2019-01-17 RX ADMIN — METHYLPREDNISOLONE SODIUM SUCCINATE 40 MG: 40 INJECTION, POWDER, FOR SOLUTION INTRAMUSCULAR; INTRAVENOUS at 22:24

## 2019-01-17 RX ADMIN — INSULIN GLARGINE 20 UNITS: 100 INJECTION, SOLUTION SUBCUTANEOUS at 20:00

## 2019-01-17 RX ADMIN — CEFTRIAXONE 1 G: 1 INJECTION, POWDER, FOR SOLUTION INTRAMUSCULAR; INTRAVENOUS at 06:23

## 2019-01-17 RX ADMIN — PERFLUTREN 1 ML: 6.52 INJECTION, SUSPENSION INTRAVENOUS at 11:06

## 2019-01-17 RX ADMIN — INSULIN GLARGINE 50 UNITS: 100 INJECTION, SOLUTION SUBCUTANEOUS at 12:08

## 2019-01-17 RX ADMIN — METHYLPREDNISOLONE SODIUM SUCCINATE 125 MG: 125 INJECTION, POWDER, FOR SOLUTION INTRAMUSCULAR; INTRAVENOUS at 05:16

## 2019-01-17 RX ADMIN — INSULIN LISPRO 20 UNITS: 100 INJECTION, SOLUTION INTRAVENOUS; SUBCUTANEOUS at 17:36

## 2019-01-17 RX ADMIN — IPRATROPIUM BROMIDE AND ALBUTEROL SULFATE 3 ML: .5; 3 SOLUTION RESPIRATORY (INHALATION) at 04:48

## 2019-01-17 RX ADMIN — IPRATROPIUM BROMIDE AND ALBUTEROL SULFATE 3 ML: .5; 3 SOLUTION RESPIRATORY (INHALATION) at 15:37

## 2019-01-17 RX ADMIN — LOSARTAN POTASSIUM 100 MG: 50 TABLET ORAL at 11:42

## 2019-01-17 RX ADMIN — INSULIN LISPRO 10 UNITS: 100 INJECTION, SOLUTION INTRAVENOUS; SUBCUTANEOUS at 16:45

## 2019-01-17 RX ADMIN — GUAIFENESIN 600 MG: 600 TABLET, EXTENDED RELEASE ORAL at 12:08

## 2019-01-17 RX ADMIN — IPRATROPIUM BROMIDE AND ALBUTEROL SULFATE 3 ML: .5; 3 SOLUTION RESPIRATORY (INHALATION) at 11:24

## 2019-01-17 RX ADMIN — GABAPENTIN 300 MG: 300 CAPSULE ORAL at 11:42

## 2019-01-17 RX ADMIN — GABAPENTIN 300 MG: 300 CAPSULE ORAL at 22:24

## 2019-01-17 RX ADMIN — HEPARIN SODIUM 5000 UNITS: 5000 INJECTION INTRAVENOUS; SUBCUTANEOUS at 11:43

## 2019-01-17 RX ADMIN — INSULIN LISPRO 10 UNITS: 100 INJECTION, SOLUTION INTRAVENOUS; SUBCUTANEOUS at 11:44

## 2019-01-17 NOTE — PROGRESS NOTES
DC Plan: TBD Chart reviewed. Pt admitted for respiratory distress. Provider, please consider ordering PT/OT eval to assist in identifying discharge planning needs. CM will follow for transition of care needs. [address] [facility] Met with pt and pts daughter at bedside. Pt lives alone, but daughter nearby. Home address confirmed per face sheet. States daughter drives her to appts. Pt states she has cane, RW. Pt noted to be wearing oxygen on assessment, but denies having home oxygen. Nursing, please conduct walk test within 24 hrs of discharge if pt will need home oxygen at discharge. Pts states her PCP is MD Sandra Cowan. Pt also sees MD Nima Ross. This CM spoke to MD Sarah Hatch regarding PT/OT orders and no orders at this time. CM will cont to follow. Reason for Admission:  Per H&P, pt \"is a 46 y.o.  female who has DM , Asthma, HTN,CHFobesity presents with cold like symptoms for 3-4 days including cough and congestion. Yesterday progressed to dyspnea with exertion And episodes of weakness In ER  Found to be hypoxic with sats in 70's Given 100 percent NRB . Steroids , duo neb, and lasix\" RRAT Score:     7 low Plan for utilizing home health:   [facility] SNF? Pending PT/OT eval and recommendations Likelihood of Readmission:  mod Transition of Care Plan:  TBD Care Management Interventions PCP Verified by CM: Yes Mode of Transport at Discharge: Other (see comment)(daughter) Transition of Care Consult (CM Consult): Discharge Planning Current Support Network: Lives Alone, Family Lives [place] Confirm Follow Up Transport: Family Plan discussed with Pt/Family/Caregiver:  Yes

## 2019-01-17 NOTE — DIABETES MGMT
Diabetes Patient/Family Education RecordFactors That  May Influence Patients Ability  to Learn or  Comply with Recommendations []   Language barrier    []   Cultural needs   [x]   Motivation  
 []   Cognitive limitation    []   Physical   []   Education  
 []   Physiological factors   []   Hearing/vision/speaking impairment   []   Alevism beliefs []   Financial factors   []  Other:   []  No factors identified at this time. Person Instructed: [x]   Patient   [x]   Family (daughter)   []  Other Preference for Learning: 
 [x]   Verbal   [x]   Written   []  Demonstration Level of Comprehension & Competence:   
[]  Good                                      [x] Fair                                     []  Poor                             []  Needs Reinforcement  
[x]  Teachback completed Education Component: pt followed by [facility] Endocrinology for the past year with adjustments to insulin regimen; provided information on FreeStyle Lori CGM & V-GO disposable insulin delivery system as alternatives to MDI [x]  Medication management, including confirmation of home regimen; mixed insulin tid  NPH 70/30 50 units NPH 70/30 40 units, & NPH 70/30 30 units []  Nutritional management -obtain usual meal pattern  
[]  Exercise  
[]  Signs, symptoms, and treatment of hyperglycemia and hypoglycemia [x] Prevention, recognition and treatment of hypoglycemia; pt denies hypos @ home  
[x]  Importance of blood glucose monitoring; SMBG 3-4x/day   
[]  Instruction on use of the blood glucose meter [x]  Discuss the importance of HbA1C monitoring; pt reports last A1c > 13% []  Sick day guidelines  
[]  Proper use and disposal of lancets, needles, syringes or insulin pens (if appropriate) []  Potential long-term complications (retinopathy, kidney disease, neuropathy, foot care) [x] Information about whom to contact in case of emergency or for more information [x]  Goal:  Patient/family will demonstrate understanding of Diabetes Self Management Skills by: 3/30 Plan for post-discharge education or self-management support: 
  [] Outpatient class schedule provided            [] Patient Declined 
  [] Scheduled for outpatient classes (date) _______ Verify: 
Does patient understand how diabetes medications work? yes____________________________ Does patient know what their most recent A1c is? yes ___________________________________ Does patient monitor glucose at home? yes___________________________________________ Does patient have a glucometer, testing supplies or difficulty obtaining diabetes medications? Pt does not have difficulty obtaining supplies_________________ Charleen Jimenez MS, RN, CDE Glycemic Control Team 
[phone] Pager [phone] (M-TH 8:00-4:30P) *After Hours pager [phone]

## 2019-01-17 NOTE — DIABETES MGMT
NUTRITIONAL ASSESSMENT GLYCEMIC CONTROL/ PLAN OF CARE Dianna Fox           46 y.o.           1/17/2019 1. Respiratory distress 2. Mild intermittent asthma with exacerbation 3. Diastolic CHF, acute on chronic (HCC) 4. Pneumonia of left lower lobe due to infectious organism [facility]) PMHx: Type 2 Diabetes, Asthma, Heart palpitations, HTN, fibromyalgia, morbid obesity, MRSA infection Brief update: Pt arrived to ED dyspnea w/ exertion and cold like symptoms per H&P. Pt found to have PNA. INTERVENTIONS/PLAN:  
-Recommend continuing Diabetic Consistent Carbohydrate Diet 
-Provide Diabetes-Self Mgt Education ASSESSMENT:  
Nutritional Status: 
Obese class III per BMI 50.3 kg/m2 (>40.0 kg/m2) Diabetes Management:  
Recent Glucose Results:  
Lab Results Component Value Date/Time GLU 73 (L) 01/17/2019 04:25 AM  
 GLUCPOC 378 (H) 01/17/2019 11:26 AM  
 
Within target range (non-ICU: <140; ICU<180): [] Yes   [x]  No 
 
Current Insulin regimen:   
Humalog, corrective, normal insulin sensitivity Home medication/insulin regimen:  
Novolin 70/30 50 units at breakfast, 40 units lunch, 30 unit dinner. HbA1c:(1/17/19) 10.8% equivalent to average blood glucose level 263 mg/dL. Adequate glycemic control PTA:  [] Yes  [x] No 
  
 
SUBJECTIVE/OBJECTIVE: Information obtained from: Pt confirmed PTA diabetes medications. Pt no longer taking novolin R before lunch. Pt takes Novolin 70/30 50 units at breakfast, 40 units at lunch, and 30 units at dinner. Pt consume carbohydrate heavy diet w/ some sources of protein. Limited non-starchy vegetable intake. Pt was told by her nephrologist that she should limit potassium intake d/t CKD Stage 3. Pt states that she consume very large amounts of 2% milk. Provided diet education for patient on Consistent Carbohydrate intake with a focus on the Healthy Plate model.  Topics covered include sugar-free beverage consumption, identifying food groups, pairing food groups,portion control, and healthy fats. Daughter also present at time of education. Diet:DIET DIABETIC CONSISTENT CARB Patient Vitals for the past 100 hrs: 
 % Diet Eaten 01/17/19 1305 100 % 01/17/19 0816 75 % Medications: [x]                Reviewed Most Recent POC Glucose:  
Recent Labs  
  01/17/19 
0425 GLU 73* Labs:  
Lab Results Component Value Date/Time Hemoglobin A1c 10.8 (H) 01/17/2019 04:25 AM  
 
Lab Results Component Value Date/Time Sodium 146 (H) 01/17/2019 04:25 AM  
 Potassium 4.2 01/17/2019 04:25 AM  
 Chloride 111 (H) 01/17/2019 04:25 AM  
 CO2 28 01/17/2019 04:25 AM  
 Anion gap 7 01/17/2019 04:25 AM  
 Glucose 73 (L) 01/17/2019 04:25 AM  
 BUN 55 (H) 01/17/2019 04:25 AM  
 Creatinine 1.06 01/17/2019 04:25 AM  
 Calcium 8.8 01/17/2019 04:25 AM  
 Magnesium 2.0 01/17/2019 04:25 AM  
 Phosphorus 1.8 (L) 08/13/2014 07:45 AM  
 Albumin 2.9 (L) 01/17/2019 04:25 AM  
 
 
Anthropometrics:  BMI (calculated): 50.3 kg/m2 Wt Readings from Last 1 Encounters:  
01/17/19 132.9 kg (293 lb) Ht Readings from Last 1 Encounters:  
01/17/19 5' 4\" (1.626 m) Estimated Nutrition Needs: 1716 kcal/day (26 kcal/kg), 55 g PRO/day ( 0.8 g PRO/kg), 1716 mL fluid/day (1 mL fluid/kcal) Based on:  [x]            Adjusted BW: 68.63 kg Nutrition Diagnoses: Altered nutrition-related lab value related to excess carbohydrate intake and low physical activity levels as evidenced by A1C 10.8%. Nutrition Interventions: 
-Recommend continuing Diabetic Consistent Carbohydrate diet Goal:  
Blood glucose will be within target range of  mg/dL by 01/20/19. Nutrition Monitoring and Evaluation []     Monitor po intake on meal rounds 
[x]     Continue inpatient monitoring and intervention 
[]     Other: Nutrition Risk:  []   High     []  Moderate    [x]  Minimal/Uncompromised Nicole Davila 
[facility] [phone]

## 2019-01-17 NOTE — PROGRESS NOTES
TRANSFER - IN REPORT: 
 
Verbal report received from [address] RN(name) on Grace Nelson  being received from ED(unit) for routine progression of care Report consisted of patients Situation, Background, Assessment and  
Recommendations(SBAR). Information from the following report(s) SBAR, Kardex, ED Summary, Intake/Output, MAR, Accordion, Med Rec Status and Alarm Parameters  was reviewed with the receiving nurse. Opportunity for questions and clarification was provided. Assessment completed upon patients arrival to unit and care assumed. 0900 Pt off unit for NM test.  
 
 
1130 Dr. Lucrecia Ramirez aware of patient's BS. 
 
[address] noticed an area on patient's abdomen that was dry and weeping small amounts of blood. Dr. Lucrecia Ramirez notified. Okay to put in a wound care consult. 36 Dr. Lucrecia Ramirez aware of BS. Dr. Lucrecia Ramirez increased her mealtime coverage and added an additional dose of lantus for at night. Shift Summary: Shift uneventful. No complaints of chest pain or shortness of breath. Call light is within reach.

## 2019-01-17 NOTE — ACP (ADVANCE CARE PLANNING)
AMD Reviewed    provided education on Advance Medical Directives and left a copy with the patient. Patient was encouraged to call the  if it is completed while here or if more clarification was needed.  completed visit with patient and offered Pastoral care. Chaplains will continue to follow and will provide pastoral care as needed or requested. Rev.  [address] [place] [address]  [phone]

## 2019-01-17 NOTE — ED TRIAGE NOTES
Presented to ED to be evaluated for reported cough, dizziness, chest pain with onset x 2 days. Patient reports pain as documented. Patient denies any known injury. Sepsis Screening completed (  )Patient meets SIRS criteria. ( x )Patient does not meet SIRS criteria. SIRS Criteria is achieved when two or more of the following are present ? Temperature < 96.8°F (36°C) or > 100.9°F (38.3°C) ? Heart Rate > 90 beats per minute ? Respiratory Rate > 20 breaths per minute ? WBC count > 12,000 or <4,000 or > 10% bands

## 2019-01-17 NOTE — DIABETES MGMT
NUTRITION / GLYCEMIC CONTROL PLAN OF CARE 
-known h/o poorly managed T2DM, ~ 30 years, HbA1c not within recommended range for age + comorbids on TID mixed insulin + oral home regimen 
-Morbid obesity, h/o excessive energy intake, excessive CHO intake, BG out of target r/t uncontrolled DM2 and exacerbated by current infection, insulin inconsistency, large gap in understanding between self-care & impact on health, decreased motivation 
-medication consistency and diet/lifestyle are biggest opportunities for improvement in DM control 
-recommend pt follow up with Endocrinologist to explore alternatives to MDI & SMBG Diabetes Management:  
 - recommend: see above note 
- education: (see GC RN & RD note) 
- goals: *BG will be in target range of  mg/dl (non-ICU) by 1/26 *PO intake will be 75% of meals offered by 1/26 *Pt will follow up with OP PCP for Diabetes Management by 3/30 
- TDD: 75 (50 Lantus + 15 mealtime + 10 - Humalog Normal Insulin Sensitivity Corrective Coverage) - BG range:   mg/dlL - Hypo: no 
- BG in target range (non-ICU: <140; ICU<180): [] Yes  [x] No 
- Steroids: Solumedrol 40 mg Q 8 hours - Intake:  
 Patient Vitals for the past 100 hrs: 
 % Diet Eaten 01/17/19 1305 100 % 01/17/19 0816 75 % Current Insulin regimen:  
Lantus 60 units daily + Lantus 20 units @ hs Humalog 15 units qac - Humalog Very Insulin Resistant Corrective Coverage Home medication/insulin regimen: 
NPH 70/30 50 units before breakfast + 40 units at lunch + 20 units at dinner Metformin 1000 mg BID Recent Glucose Results:  
Lab Results Component Value Date/Time GLU 73 (L) 01/17/2019 04:25 AM  
 GLUCPOC 428 (HH) 01/17/2019 04:25 PM  
 GLUCPOC 378 (H) 01/17/2019 11:26 AM  
 
 
Adequate glycemic control PTA:  [] Yes  [x] No 
 
HbA1c: equivalent  to ave BGlucose of 263  mg/dl for 2-3 months prior to admission Lab Results Component Value Date/Time  Hemoglobin A1c 10.8 (H) 01/17/2019 04:25 AM  
 Diet:  
Active Orders Diet DIET DIABETIC CONSISTENT CARB Regular Wing Gerard MS, RN, CDE Glycemic Control Team 
[phone] Pager [phone] (M-TH 8:00-4:30P) *After Hours pager [phone]

## 2019-01-17 NOTE — PROGRESS NOTES
Problem: Pressure Injury - Risk of 
Goal: *Prevention of pressure injury Document Elian Scale and appropriate interventions in the flowsheet. Outcome: Progressing Towards Goal 
Pressure Injury Interventions: Activity Interventions: Increase time out of bed, PT/OT evaluation Mobility Interventions: Assess need for specialty bed, HOB 30 degrees or less, PT/OT evaluation Nutrition Interventions: Document food/fluid/supplement intake, Discuss nutritional consult with provider Friction and Shear Interventions: Apply protective barrier, creams and emollients, HOB 30 degrees or less Problem: Falls - Risk of 
Goal: *Absence of Falls Document Cy [place] Fall Risk and appropriate interventions in the flowsheet. Outcome: Progressing Towards Goal 
Fall Risk Interventions: 
Mobility Interventions: Assess mobility with egress test, Bed/chair exit alarm, OT consult for ADLs Medication Interventions: Assess postural VS orthostatic hypotension, Evaluate medications/consider consulting pharmacy, Patient to call before getting OOB

## 2019-01-17 NOTE — ROUTINE PROCESS
TRANSFER - OUT REPORT: 
 
Verbal report given to [place] RN(name) on Wilner Artis  being transferred to [address] [place] [address](unit) for routine progression of care Report consisted of patients Situation, Background, Assessment and  
Recommendations(SBAR). Information from the following report(s) SBAR, ED Summary, Procedure Summary, MAR, Recent Results and Cardiac Rhythm normal sinus  was reviewed with the receiving nurse. Lines:  
Peripheral IV 01/17/19 Right Wrist (Active) Site Assessment Clean, dry, & intact 1/17/2019  5:01 AM  
Phlebitis Assessment 0 1/17/2019  5:01 AM  
Infiltration Assessment 0 1/17/2019  5:01 AM  
Dressing Status Clean, dry, & intact 1/17/2019  5:01 AM  
Dressing Type Transparent 1/17/2019  5:01 AM  
Hub Color/Line Status Pink;Flushed;Patent 1/17/2019  5:01 AM  
  
 
Opportunity for questions and clarification was provided. Patient transported with: 
 Monitor O2 @ 6 liters Registered Nurse

## 2019-01-17 NOTE — DIABETES MGMT
Diabetes Patient/Family Education RecordFactors That  May Influence Patients Ability  to Learn or  Comply with Recommendations []   Language barrier    []   Cultural needs   []   Motivation  
 []   Cognitive limitation    []   Physical   []   Education  
 []   Physiological factors   []   Hearing/vision/speaking impairment   []   Advent beliefs []   Financial factors   []  Other:   [x]  No factors identified at this time. Person Instructed: [x]   Patient   [x]   Family: Pt's daughter present and engaged. []  Other Preference for Learning: 
 [x]   Verbal   []   Written   []  Demonstration Level of Comprehension & Competence:   
[]  Good                                      [x] Fair                                     []  Poor                             [x]  Needs Reinforcement  
[x]  Teachback completed Education Component:  
[]  Medication management, including how to administer insulin (if appropriate) and potential medication interactions [x]  Nutritional management -obtain usual meal pattern:Pt consume carbohydrate heavy diet w/ some sources of protein. Limited non-starchy vegetable intake. Pt was told by her nephrologist that she should limit potassium intake d/t CKD Stage 3. Pt states that she consume very large amounts of 2% milk. Provided diet education for patient on Consistent Carbohydrate intake with a focus on the Healthy Plate model. Topics covered include sugar-free beverage consumption, identifying food groups, pairing food groups,portion control, and healthy fats. Daughter also present at time of education. []  Exercise  
[]  Signs, symptoms, and treatment of hyperglycemia and hypoglycemia  
[] Prevention, recognition and treatment of hyperglycemia and hypoglycemia  
[]  Importance of blood glucose monitoring and how to obtain a blood glucose meter   
[]  Instruction on use of the blood glucose meter []  Discuss the importance of HbA1C monitoring   
[]  Sick day guidelines  
[]  Proper use and disposal of lancets, needles, syringes or insulin pens (if appropriate) []  Potential long-term complications (retinopathy, kidney disease, neuropathy, foot care)  
[] Information about whom to contact in case of emergency or for more information   
[x]  Goal:  Patient/family will demonstrate understanding of Diabetes Self Management Skills by: 1/28/18 Plan for post-discharge education or self-management support: 
  [x] Outpatient class schedule provided            [] Patient Declined 
  [] Scheduled for outpatient classes (date) _______ Verify: 
Does patient understand how diabetes medications work? Yes Does patient know what their most recent A1c is? Yes Does patient monitor glucose at home? Yes Does patient have a glucometer, testing supplies or difficulty obtaining diabetes medications? Yes, has glucometer. Needs strips. Serge  
pgr [phone]

## 2019-01-17 NOTE — PROGRESS NOTES
Pt was seen and examed. Pulmicort, brovana, mucinex and  Tussionex. , continue other regimen. Use 70/30 , 50/40/30 at home. Start lantus 50, premeal 15, ssi, might need lantus twice a day.

## 2019-01-17 NOTE — H&P
History & Physical 
Patient: Chuck Del Rosario MRN: [id number]  CSN: [id number] YOB: 1965  Age: 46 y.o. Sex: female DOA: 1/17/2019 Chief Complaint:  
Chief Complaint Patient presents with  Respiratory Distress  Dizziness  Cough  Chest Pain HPI:  
 
Chuck Del Rosario is a 46 y.o.  female who has DM , Asthma, HTN,CHFobesity presents with cold like symptoms for 3-4 days including cough and congestion. Yesterday progressed to dyspnea with exertion And episodes of weakness In ER  Found to be hypoxic with sats in 70's Given 100 percent NRB . Steroids , duo neb, and lasix With some improvement asked to admit for Pneumonia a Past Medical History:  
Diagnosis Date  Asthma Note: As child had asthma  Diabetes mellitus ([facility] [address] Type 2  
 Heart palpitations  Hypertension  Ill-defined condition   
 fibromyalgia  Morbid obesity ([facility] [address]  MRSA infection 8/2014 Past Surgical History:  
Procedure Laterality Date  BREAST SURGERY PROCEDURE UNLISTED  HX CHOLECYSTECTOMY  HX TUBAL LIGATION Family History Problem Relation Age of Onset  Heart Attack Mother  Heart Attack Maternal Grandmother Social History Socioeconomic History  Marital status: SINGLE Spouse name: Not on file  Number of children: Not on file  Years of education: Not on file  Highest education level: Not on file Tobacco Use  Smoking status: Never Smoker  Smokeless tobacco: Never Used Substance and Sexual Activity  Alcohol use: No  
 Drug use: No  
 Sexual activity: No  
  Partners: Male Prior to Admission medications Medication Sig Start Date End Date Taking? Authorizing Provider  
metFORMIN (GLUCOPHAGE) 1,000 mg tablet Take 1,000 mg by mouth two (2) times daily (with meals). Yes Lizzette, MD Yoana  
metoprolol succinate (TOPROL-XL) 50 mg XL tablet Take 50 mg by mouth daily.    Yes Yoana Crocker MD  
 DULoxetine (CYMBALTA) 30 mg capsule Take 30 mg by mouth daily. Yes Yoana Crocker MD  
atorvastatin (LIPITOR) 40 mg tablet Take 40 mg by mouth daily. Yes Yoana Crocker MD  
losartan (COZAAR) 100 mg tablet Take 100 mg by mouth daily. Yes Yoana Crocker MD  
OTHER,NON-FORMULARY, Medication for irregular heart beat   Yes Yoana Crocker MD  
gabapentin (NEURONTIN) 300 mg capsule Take 300 mg by mouth two (2) times a day. Yes Yoana Crocker MD  
furosemide (LASIX) 40 mg tablet Take 1 Tab by mouth daily. 2/13/17  Yes Napoleusha Moses MD  
lisinopril (PRINIVIL, ZESTRIL) 10 mg tablet Take 1 Tab by mouth daily. 2/0/16  Yes Mónica Young MD  
insulin NPH/insulin regular (NOVOLIN 70/30) 100 unit/mL (70-30) injection 50 Units by SubCUTAneous route every twelve (12) hours. Yes Provider, Historical  
insulin regular (NOVOLIN R) 100 unit/mL injection by SubCUTAneous route Daily (before lunch). Yes Provider, Historical  
aspirin 81 mg chewable tablet Take 1 tablet by mouth daily. 8/20/14  Yes Eddie Carl MD  
 
 
Allergies Allergen Reactions  Bees [Sting, Bee] Swelling  Penicillins Hives  Strawberry Hives Review of Systems GENERAL: Patient alert, awake and oriented times 3, able to communicate full sentences and not in distress. HEENT: No change in vision, no earache, tinnitus, sore throat or sinus congestion. NECK: No pain or stiffness. PULMONARY: +shortness of breath, +cough + wheeze. Cardiovascular: no pnd or orthopnea, no CP GASTROINTESTINAL: No abdominal pain, nausea, vomiting or diarrhea, melena or bright red blood per rectum. GENITOURINARY: No urinary frequency, urgency, hesitancy or dysuria. MUSCULOSKELETAL: No joint or muscle pain, no back pain, no recent trauma. DERMATOLOGIC: No rash, no itching, no lesions. ENDOCRINE: No polyuria, polydipsia, no heat or cold intolerance. No recent change in weight. HEMATOLOGICAL: No anemia or easy bruising or bleeding. NEUROLOGIC: No headache, seizures, numbness, tingling or weakness. Physical Exam:  
 
Physical Exam: 
Visit Vitals /67 Pulse 94 Temp 96.2 °F (35.7 °C) Resp (!) 38 Ht 5' 4\" (1.626 m) Wt 133 kg (293 lb 3.4 oz) SpO2 95% BMI 50.33 kg/m² O2 Flow Rate (L/min): 6 l/min O2 Device: Nasal cannula Temp (24hrs), Av.2 °F (35.7 °C), Min:96.2 °F (35.7 °C), Max:96.2 °F (35.7 °C) No intake/output data recorded. No intake/output data recorded. General:  Alert, cooperative, no distress, appears stated age. Head: Normocephalic, without obvious abnormality, atraumatic. Eyes:  Conjunctivae/corneas clear. PERRL, EOMs intact. Nose: Nares normal. No drainage or sinus tenderness. Neck: Supple, symmetrical, trachea midline, no adenopathy, thyroid: no enlargement, no carotid bruit and no JVD. Lungs:   Clear to auscultation bilaterally. b diffuse exp wheeze Heart:  Rtachycardiarate and rhythm, S1, S2 normal.  
  Abdomen: Soft, non-tender. Bowel sounds normal.   
Extremities: Extremities normal, atraumatic, no cyanosis or edema. Pulses: 2+ and symmetric all extremities. Skin:  No rashes or lesions Neurologic: AAOx3, No focal motor or sensory deficit. Labs Reviewed: All lab results for the last 24 hours reviewed. and EKG Procedures/imaging: see electronic medical records for all procedures/Xrays and details which were not copied into this note but were reviewed prior to creation of Plan Assessment/Plan Active Problems: 
  Pneumonia (2019) Rocephin Royann Rear Asthma RT consult Respiratory distress (2019) RT consult V/q scan Asthma exacerbation (2019) Solumedrol  
duoneb Diastolic CHF ([facility] [address] () IV lasix Check echo Check cardiac enzymes Losartan for now Hold lisinopril DM Cardiac Arrhythmia Obesity DVT/GI Prophylaxis: Hep SQ 
 
 Discussed with patient at bedside about hospital admission and my plan care, who understood and agree with my plan care.  
 
Cordell Mclain MD 
1/17/2019 6:16 AM

## 2019-01-17 NOTE — ED PROVIDER NOTES
EMERGENCY DEPARTMENT HISTORY AND PHYSICAL EXAM 
 
Date: 1/17/2019 Patient Name: Arcelia Mitchell History of Presenting Illness Chief Complaint Patient presents with  Respiratory Distress  Dizziness  Cough  Chest Pain History Provided By: Patient Chief Complaint: SOB Duration: 1 day Timing:  Worsening Location: Lungs Associated Symptoms: cough, chest pain, and dizziness Additional History (Context):  
4:23 AM 
Arcelia Mitchell is a 46 y.o. female with PMHx of DM, HTN, diastolic CHF, and asthma in childhood who presents to the emergency department C/O progressively worsening SOB, onset yesterday. Associated sxs include cough, chest pain, and dizziness. Pt does not use O2 at home. Pt complains that she is \"retaining a lot of fluid. \" States she is compliant with all her daily medications including Lasix. Pt was diagnosed with CHF by Dr. Jay Jay Kessler but has not followed up with him in years. Does not endorse tobacco, EtOH, or illicit drug use. Pt denies any fever, chills, other sxs or complaints. PCP: Dru Mayes MD 
 
Current Facility-Administered Medications Medication Dose Route Frequency Provider Last Rate Last Dose  cefTRIAXone (ROCEPHIN) 1 g in sterile water (preservative free) 10 mL IV syringe  1 g IntraVENous NOW [facility], Ela Martinez MD      
 
Current Outpatient Medications Medication Sig Dispense Refill  metFORMIN (GLUCOPHAGE) 1,000 mg tablet Take 1,000 mg by mouth two (2) times daily (with meals).  metoprolol succinate (TOPROL-XL) 50 mg XL tablet Take 50 mg by mouth daily.  DULoxetine (CYMBALTA) 30 mg capsule Take 30 mg by mouth daily.  atorvastatin (LIPITOR) 40 mg tablet Take 40 mg by mouth daily.  losartan (COZAAR) 100 mg tablet Take 100 mg by mouth daily.  OTHER,NON-FORMULARY, Medication for irregular heart beat    
 gabapentin (NEURONTIN) 300 mg capsule Take 300 mg by mouth two (2) times a day.  furosemide (LASIX) 40 mg tablet Take 1 Tab by mouth daily. 45 Tab 1  
 lisinopril (PRINIVIL, ZESTRIL) 10 mg tablet Take 1 Tab by mouth daily. 30 Tab 0  
 insulin NPH/insulin regular (NOVOLIN 70/30) 100 unit/mL (70-30) injection 50 Units by SubCUTAneous route every twelve (12) hours.  insulin regular (NOVOLIN R) 100 unit/mL injection by SubCUTAneous route Daily (before lunch).  aspirin 81 mg chewable tablet Take 1 tablet by mouth daily. 30 tablet 1 Past History Past Medical History: 
Past Medical History:  
Diagnosis Date  Asthma Note: As child had asthma  Diabetes mellitus ([facility] [address] Type 2  
 Heart palpitations  Hypertension  Ill-defined condition   
 fibromyalgia  Morbid obesity ([facility] [address]  MRSA infection 8/2014 Past Surgical History: 
Past Surgical History:  
Procedure Laterality Date  BREAST SURGERY PROCEDURE UNLISTED  HX CHOLECYSTECTOMY  HX TUBAL LIGATION Family History: 
Family History Problem Relation Age of Onset  Heart Attack Mother  Heart Attack Maternal Grandmother Social History: 
Social History Tobacco Use  Smoking status: Never Smoker  Smokeless tobacco: Never Used Substance Use Topics  Alcohol use: No  
 Drug use: No  
 
 
Allergies: Allergies Allergen Reactions  Bees [Sting, Bee] Swelling  Penicillins Hives  Strawberry Hives Review of Systems Review of Systems Constitutional: Negative for chills and fever. Respiratory: Positive for cough and shortness of breath. Cardiovascular: Positive for chest pain. Negative for leg swelling. Neurological: Positive for dizziness. All other systems reviewed and are negative. Physical Exam  
 
Vitals:  
 01/17/19 0517 01/17/19 0530 01/17/19 0558 01/17/19 0600 BP:  130/61  144/67 Pulse:  93  94 Resp:  21  (!) 38 Temp:      
SpO2: 95% 97% 97% 95% Weight:      
Height:      
 
Physical Exam  
 Constitutional: She is oriented to person, place, and time. She appears well-developed and well-nourished. She appears distressed. Face mask in place. Morbidly obese female in moderate respiratory distress with pulse ox of 79% on room air HENT:  
Head: Normocephalic and atraumatic. Eyes: Pupils are equal, round, and reactive to light. Neck: Neck supple. Cardiovascular: Normal rate, regular rhythm, S1 normal, S2 normal and normal heart sounds. Pulmonary/Chest: Tachypnea noted. She is in respiratory distress (moderate). She has wheezes (diffuse expiratory). She has no rales. She exhibits no tenderness. Abdominal: Soft. She exhibits no distension and no mass. There is no tenderness. There is no guarding. Obese abdomen with very large pannus. Musculoskeletal: Normal range of motion. She exhibits no tenderness. Right lower leg: She exhibits edema (trace). Left lower leg: She exhibits edema (trace). Neurological: She is alert and oriented to person, place, and time. No cranial nerve deficit. A&O x 4. CN II-XII intact Skin: No rash noted. Psychiatric: She has a normal mood and affect. Her behavior is normal. Thought content normal.  
Nursing note and vitals reviewed. Diagnostic Study Results Labs - Recent Results (from the past 12 hour(s)) CBC WITH AUTOMATED DIFF Collection Time: 01/17/19  4:25 AM  
Result Value Ref Range WBC 10.7 4.6 - 13.2 K/uL  
 RBC 4.06 (L) 4.20 - 5.30 M/uL  
 HGB 10.7 (L) 12.0 - 16.0 g/dL HCT 34.6 (L) 35.0 - 45.0 % MCV 85.2 74.0 - 97.0 FL  
 MCH 26.4 24.0 - 34.0 PG  
 MCHC 30.9 (L) 31.0 - 37.0 g/dL  
 RDW 14.4 11.6 - 14.5 % PLATELET 722 476 - 047 K/uL MPV 9.5 9.2 - 11.8 FL  
 NEUTROPHILS 82 (H) 40 - 73 % LYMPHOCYTES 11 (L) 21 - 52 % MONOCYTES 5 3 - 10 % EOSINOPHILS 2 0 - 5 % BASOPHILS 0 0 - 2 %  
 ABS. NEUTROPHILS 8.8 (H) 1.8 - 8.0 K/UL  
 ABS. LYMPHOCYTES 1.1 0.9 - 3.6 K/UL  
 ABS. MONOCYTES 0.5 0.05 - 1.2 K/UL ABS. EOSINOPHILS 0.2 0.0 - 0.4 K/UL  
 ABS. BASOPHILS 0.0 0.0 - 0.1 K/UL  
 DF AUTOMATED METABOLIC PANEL, COMPREHENSIVE Collection Time: 01/17/19  4:25 AM  
Result Value Ref Range Sodium 146 (H) 136 - 145 mmol/L Potassium 4.2 3.5 - 5.5 mmol/L Chloride 111 (H) 100 - 108 mmol/L  
 CO2 28 21 - 32 mmol/L Anion gap 7 3.0 - 18 mmol/L Glucose 73 (L) 74 - 99 mg/dL BUN 55 (H) 7.0 - 18 MG/DL Creatinine 1.06 0.6 - 1.3 MG/DL  
 BUN/Creatinine ratio 52 (H) 12 - 20 GFR est AA >60 >60 ml/min/1.73m2 GFR est non-AA 54 (L) >60 ml/min/1.73m2 Calcium 8.8 8.5 - 10.1 MG/DL Bilirubin, total 0.3 0.2 - 1.0 MG/DL  
 ALT (SGPT) 22 13 - 56 U/L  
 AST (SGOT) 21 15 - 37 U/L Alk. phosphatase 117 45 - 117 U/L Protein, total 6.8 6.4 - 8.2 g/dL Albumin 2.9 (L) 3.4 - 5.0 g/dL Globulin 3.9 2.0 - 4.0 g/dL A-G Ratio 0.7 (L) 0.8 - 1.7 PROTHROMBIN TIME + INR Collection Time: 01/17/19  4:25 AM  
Result Value Ref Range Prothrombin time 12.1 11.5 - 15.2 sec INR 0.9 0.8 - 1.2 NT-PRO BNP Collection Time: 01/17/19  4:25 AM  
Result Value Ref Range NT pro- 0 - 900 PG/ML  
D DIMER Collection Time: 01/17/19  4:25 AM  
Result Value Ref Range D DIMER 0.84 (H) <0.46 ug/ml(FEU) MAGNESIUM Collection Time: 01/17/19  4:25 AM  
Result Value Ref Range Magnesium 2.0 1.6 - 2.6 mg/dL CARDIAC PANEL,(CK, CKMB & TROPONIN) Collection Time: 01/17/19  4:35 AM  
Result Value Ref Range CK 74 26 - 192 U/L  
 CK - MB <1.0 <3.6 ng/ml CK-MB Index  0.0 - 4.0 % CALCULATION NOT PERFORMED WHEN RESULT IS BELOW LINEAR LIMIT Troponin-I, QT <0.02 0.0 - 0.045 NG/ML  
POC G3 Collection Time: 01/17/19  5:09 AM  
Result Value Ref Range Device: NASAL CANNULA Flow rate (POC) 6.0 L/M  
 pH (POC) 7.318 (L) 7.35 - 7.45    
 pCO2 (POC) 50.0 (H) 35.0 - 45.0 MMHG  
 pO2 (POC) 73 (L) 80 - 100 MMHG  
 HCO3 (POC) 25.6 22 - 26 MMOL/L  
 sO2 (POC) 93 92 - 97 % Base deficit (POC) 1 mmol/L Allens test (POC) YES Total resp. rate 30 Site LEFT RADIAL Patient temp. 98.6 Specimen type (POC) ARTERIAL Performed by Jd Sigala Radiologic Studies -   
NM LUNG PERFUSION W VENT Final Result IMPRESSION:  
  
1. Low probability for pulmonary embolus, utilizing PIOPED II criteria. XR CHEST PORT Final Result Impression:  
-------------- Lingular and left lower lobe patchy consolidation suggests infiltrate/pneumonia. Medical Decision Making I am the first provider for this patient. I reviewed the vital signs, available nursing notes, past medical history, past surgical history, family history and social history. Vital Signs-Reviewed the patient's vital signs. Pulse Oximetry Analysis - 79% on room air. Placed on NRB with improvement to 98% Cardiac Monitor: 
Rate: 83 bpm 
Rhythm: NSR 
 
EKG interpretation: (Preliminary) 4:24 AM 
NSR at 88 bpm. Nons[address] ST abnormality. EKG read by Julia Segura MD  
 
Records Reviewed: Nursing Notes and Old Medical Records Provider Notes (Medical Decision Making):  
INITIAL CLINICAL IMPRESSION and PLANS: 
The patient presents with the primary complaint(s) of: cough and SOB. The presentation, to include historical aspects and clinical findings are consistent with the DX of cough and SOB. However, other possible DX's to consider as primary, associated with, or exacerbated by include: 
 
1. CHF 2. PNA 3.  MI 4. ACS 5.  PE 
6. DKA Recorded by Juan Bermudez ED Scribe, as dictated by Julia Segura MD. 
 
Procedures: 
Procedures MEDICATIONS GIVEN: 
Medications  
cefTRIAXone (ROCEPHIN) 1 g in sterile water (preservative free) 10 mL IV syringe (not administered) methylPREDNISolone (PF) (Solu-MEDROL) injection 125 mg (125 mg IntraVENous Given 1/17/19 0516)  
albuterol-ipratropium (DUO-NEB) 2.5 MG-0.5 MG/3 ML (3 mL Nebulization Given 1/17/19 0448) furosemide (LASIX) injection 60 mg (60 mg IntraVENous Given 1/17/19 0516)  
albuterol-ipratropium (DUO-NEB) 2.5 MG-0.5 MG/3 ML (3 mL Nebulization Given 1/17/19 0513) ED Course:  
4:23 AM  
Initial assessment performed. The patients presenting problems have been discussed, and they are in agreement with the care plan formulated and outlined with them. I have encouraged them to ask questions as they arise throughout their visit. 
 
6:06 AM Discussed patient's history, exam, and available diagnostics results with Sunshine Miranda MD, hospitalist, who agrees to admit to telemetry. Discussion: 
Pt arrived in respiratory distress with pulse ox 79% on room air. No fever. CXR with questionable left lower lobe infiltrated. She has H/O diastolic CHF. Her BNP is only 500. She has remote H/O asthma during childhood. Does not smoke and denies any H/O COPD but has had 2 days of cough, wheezing, and progressive SOB. PERC was negative and d-dimer was borderline elevated. Her respiratory distress was initially treated with NRB with improvement of pulse ox to 98%. She was also started on a duo-neb and given solu-medrol and also imperially treated with Lasix for possible CHF, was given 60 mg; she is on 40 mg at home. She was switched to NC after her nebs and her pulse ox remained steady at 95% and pt appeared more comfortable. Doubt pt is in failure. She most likely has asthma with exacerbation or early COPD. Also suspect she may have some sleep apnea. ABG showed very minimal retention. She was also imperical treated with Rocephin for questionable infiltrate in the left lower lobe. Unable to get CTA in ED. Will have pt get VQ scan at admission. Diagnosis and Disposition Critical Care Time:  
6:06 AM 
I have spent 60 minutes of critical care time involved in lab review, consultations with specialist, family decision-making, and documentation. During this entire length of time I was immediately available to the patient. Critical Care: The reason for providing this level of medical care for this critically ill patient was due a critical illness that impaired one or more vital organ systems such that there was a high probability of imminent or life threatening deterioration in the patients condition. This care involved high complexity decision making to assess, manipulate, and support vital system functions, to treat this degree vital organ system failure and to prevent further life threatening deterioration of the patients condition. Core Measures: 
For Hospitalized Patients: 
 
1. Hospitalization Decision Time: The decision to hospitalize the patient was made by Jax Hernandez MD at 6:06 AM on 1/17/2019 2. Aspirin: Aspirin was not given because the patient did not present with a stroke at the time of their Emergency Department evaluation 6:06 AM  Patient is being admitted to the hospital by Marco A Lancaster MD. The results of their tests and reasons for their admission have been discussed with them and/or available family. They convey agreement and understanding for the need to be admitted and for their admission diagnosis. CONDITIONS ON ADMISSION: 
Sepsis is not present at the time of admission. Deep Vein Thrombosis is not present at the time of admission. Thrombosis is not present at the time of admission. Urinary Tract Infection is not present at the time of admission. Pneumonia is present at the time of admission. MRSA is not present at the time of admission. Wound infection is not present at the time of admission. Pressure Ulcer is not present at the time of admission. CLINICAL IMPRESSION: 
 
1. Respiratory distress 2. Mild intermittent asthma with exacerbation 3. Diastolic CHF, acute on chronic (HCC) 4. Pneumonia of left lower lobe due to infectious organism [facility]) PLAN: 
1. Admit to telemetry _______________________________ Attestations: This note is prepared by Rama Lloyd, acting as Scribe for Adelaide Russell MD. Adelaide Russell MD:  The scribe's documentation has been prepared under my direction and personally reviewed by me in its entirety. I confirm that the note above accurately reflects all work, treatment, procedures, and medical decision making performed by me. 
 
_______________________________

## 2019-01-18 LAB
ALBUMIN SERPL-MCNC: 2.8 G/DL (ref 3.4–5)
ALBUMIN/GLOB SERPL: 0.7 {RATIO} (ref 0.8–1.7)
ALP SERPL-CCNC: 98 U/L (ref 45–117)
ALT SERPL-CCNC: 15 U/L (ref 13–56)
ANION GAP SERPL CALC-SCNC: 10 MMOL/L (ref 3–18)
AST SERPL-CCNC: 7 U/L (ref 15–37)
BASOPHILS # BLD: 0 K/UL (ref 0–0.1)
BASOPHILS NFR BLD: 0 % (ref 0–2)
BILIRUB SERPL-MCNC: 0.2 MG/DL (ref 0.2–1)
BUN SERPL-MCNC: 70 MG/DL (ref 7–18)
BUN/CREAT SERPL: 45 (ref 12–20)
CALCIUM SERPL-MCNC: 8.6 MG/DL (ref 8.5–10.1)
CHLORIDE SERPL-SCNC: 102 MMOL/L (ref 100–108)
CO2 SERPL-SCNC: 25 MMOL/L (ref 21–32)
CREAT SERPL-MCNC: 1.55 MG/DL (ref 0.6–1.3)
DIFFERENTIAL METHOD BLD: ABNORMAL
EOSINOPHIL # BLD: 0 K/UL (ref 0–0.4)
EOSINOPHIL NFR BLD: 0 % (ref 0–5)
ERYTHROCYTE [DISTWIDTH] IN BLOOD BY AUTOMATED COUNT: 14.4 % (ref 11.6–14.5)
GLOBULIN SER CALC-MCNC: 3.8 G/DL (ref 2–4)
GLUCOSE BLD STRIP.AUTO-MCNC: 134 MG/DL (ref 70–110)
GLUCOSE BLD STRIP.AUTO-MCNC: 277 MG/DL (ref 70–110)
GLUCOSE BLD STRIP.AUTO-MCNC: 364 MG/DL (ref 70–110)
GLUCOSE BLD STRIP.AUTO-MCNC: 370 MG/DL (ref 70–110)
GLUCOSE SERPL-MCNC: 346 MG/DL (ref 74–99)
HCT VFR BLD AUTO: 31.9 % (ref 35–45)
HGB BLD-MCNC: 10 G/DL (ref 12–16)
LYMPHOCYTES # BLD: 0.6 K/UL (ref 0.9–3.6)
LYMPHOCYTES NFR BLD: 4 % (ref 21–52)
MAGNESIUM SERPL-MCNC: 2.2 MG/DL (ref 1.6–2.6)
MCH RBC QN AUTO: 26.6 PG (ref 24–34)
MCHC RBC AUTO-ENTMCNC: 31.3 G/DL (ref 31–37)
MCV RBC AUTO: 84.8 FL (ref 74–97)
MONOCYTES # BLD: 0.8 K/UL (ref 0.05–1.2)
MONOCYTES NFR BLD: 5 % (ref 3–10)
NEUTS SEG # BLD: 14.5 K/UL (ref 1.8–8)
NEUTS SEG NFR BLD: 91 % (ref 40–73)
PLATELET # BLD AUTO: 224 K/UL (ref 135–420)
PMV BLD AUTO: 9.5 FL (ref 9.2–11.8)
POTASSIUM SERPL-SCNC: 4.8 MMOL/L (ref 3.5–5.5)
PROT SERPL-MCNC: 6.6 G/DL (ref 6.4–8.2)
RBC # BLD AUTO: 3.76 M/UL (ref 4.2–5.3)
SODIUM SERPL-SCNC: 137 MMOL/L (ref 136–145)
WBC # BLD AUTO: 16 K/UL (ref 4.6–13.2)

## 2019-01-18 PROCEDURE — 94640 AIRWAY INHALATION TREATMENT: CPT

## 2019-01-18 PROCEDURE — 83735 ASSAY OF MAGNESIUM: CPT

## 2019-01-18 PROCEDURE — 36415 COLL VENOUS BLD VENIPUNCTURE: CPT

## 2019-01-18 PROCEDURE — 74011000250 HC RX REV CODE- 250: Performed by: HOSPITALIST

## 2019-01-18 PROCEDURE — 82962 GLUCOSE BLOOD TEST: CPT

## 2019-01-18 PROCEDURE — 74011250636 HC RX REV CODE- 250/636: Performed by: HOSPITALIST

## 2019-01-18 PROCEDURE — 85025 COMPLETE CBC W/AUTO DIFF WBC: CPT

## 2019-01-18 PROCEDURE — 80053 COMPREHEN METABOLIC PANEL: CPT

## 2019-01-18 PROCEDURE — 74011250637 HC RX REV CODE- 250/637: Performed by: HOSPITALIST

## 2019-01-18 PROCEDURE — 74011000250 HC RX REV CODE- 250: Performed by: INTERNAL MEDICINE

## 2019-01-18 PROCEDURE — 74011250637 HC RX REV CODE- 250/637: Performed by: INTERNAL MEDICINE

## 2019-01-18 PROCEDURE — 74011636637 HC RX REV CODE- 636/637: Performed by: INTERNAL MEDICINE

## 2019-01-18 PROCEDURE — 74011636637 HC RX REV CODE- 636/637: Performed by: HOSPITALIST

## 2019-01-18 PROCEDURE — 94760 N-INVAS EAR/PLS OXIMETRY 1: CPT

## 2019-01-18 PROCEDURE — 74011250636 HC RX REV CODE- 250/636: Performed by: INTERNAL MEDICINE

## 2019-01-18 PROCEDURE — 65660000000 HC RM CCU STEPDOWN

## 2019-01-18 PROCEDURE — 77010033678 HC OXYGEN DAILY

## 2019-01-18 RX ORDER — FUROSEMIDE 10 MG/ML
20 INJECTION INTRAMUSCULAR; INTRAVENOUS EVERY 12 HOURS
Status: COMPLETED | OUTPATIENT
Start: 2019-01-18 | End: 2019-01-19

## 2019-01-18 RX ORDER — INSULIN LISPRO 100 [IU]/ML
25 INJECTION, SOLUTION INTRAVENOUS; SUBCUTANEOUS
Status: DISCONTINUED | OUTPATIENT
Start: 2019-01-18 | End: 2019-01-23

## 2019-01-18 RX ORDER — RANITIDINE 150 MG/1
150 TABLET, FILM COATED ORAL DAILY PRN
Status: DISCONTINUED | OUTPATIENT
Start: 2019-01-18 | End: 2019-01-24 | Stop reason: HOSPADM

## 2019-01-18 RX ADMIN — GABAPENTIN 300 MG: 300 CAPSULE ORAL at 09:01

## 2019-01-18 RX ADMIN — INSULIN GLARGINE 60 UNITS: 100 INJECTION, SOLUTION SUBCUTANEOUS at 09:02

## 2019-01-18 RX ADMIN — LOSARTAN POTASSIUM 100 MG: 50 TABLET ORAL at 09:02

## 2019-01-18 RX ADMIN — BUDESONIDE 500 MCG: 0.5 INHALANT RESPIRATORY (INHALATION) at 19:59

## 2019-01-18 RX ADMIN — HEPARIN SODIUM 5000 UNITS: 5000 INJECTION INTRAVENOUS; SUBCUTANEOUS at 06:33

## 2019-01-18 RX ADMIN — GABAPENTIN 300 MG: 300 CAPSULE ORAL at 20:05

## 2019-01-18 RX ADMIN — Medication 10 ML: at 06:35

## 2019-01-18 RX ADMIN — INSULIN LISPRO 10 UNITS: 100 INJECTION, SOLUTION INTRAVENOUS; SUBCUTANEOUS at 21:52

## 2019-01-18 RX ADMIN — INSULIN LISPRO 20 UNITS: 100 INJECTION, SOLUTION INTRAVENOUS; SUBCUTANEOUS at 12:18

## 2019-01-18 RX ADMIN — Medication 81 MG: at 09:01

## 2019-01-18 RX ADMIN — RANITIDINE 150 MG: 150 TABLET ORAL at 21:52

## 2019-01-18 RX ADMIN — INSULIN LISPRO 10 UNITS: 100 INJECTION, SOLUTION INTRAVENOUS; SUBCUTANEOUS at 06:47

## 2019-01-18 RX ADMIN — IPRATROPIUM BROMIDE AND ALBUTEROL SULFATE 3 ML: .5; 3 SOLUTION RESPIRATORY (INHALATION) at 23:34

## 2019-01-18 RX ADMIN — GUAIFENESIN 600 MG: 600 TABLET, EXTENDED RELEASE ORAL at 20:05

## 2019-01-18 RX ADMIN — IPRATROPIUM BROMIDE AND ALBUTEROL SULFATE 3 ML: .5; 3 SOLUTION RESPIRATORY (INHALATION) at 00:53

## 2019-01-18 RX ADMIN — INSULIN LISPRO 20 UNITS: 100 INJECTION, SOLUTION INTRAVENOUS; SUBCUTANEOUS at 09:05

## 2019-01-18 RX ADMIN — HEPARIN SODIUM 5000 UNITS: 5000 INJECTION INTRAVENOUS; SUBCUTANEOUS at 20:05

## 2019-01-18 RX ADMIN — METHYLPREDNISOLONE SODIUM SUCCINATE 40 MG: 40 INJECTION, POWDER, FOR SOLUTION INTRAMUSCULAR; INTRAVENOUS at 21:52

## 2019-01-18 RX ADMIN — ATORVASTATIN CALCIUM 40 MG: 20 TABLET, FILM COATED ORAL at 09:01

## 2019-01-18 RX ADMIN — Medication 10 ML: at 21:53

## 2019-01-18 RX ADMIN — IPRATROPIUM BROMIDE AND ALBUTEROL SULFATE 3 ML: .5; 3 SOLUTION RESPIRATORY (INHALATION) at 11:14

## 2019-01-18 RX ADMIN — DULOXETINE HYDROCHLORIDE 30 MG: 30 CAPSULE, DELAYED RELEASE ORAL at 09:01

## 2019-01-18 RX ADMIN — BUDESONIDE 500 MCG: 0.5 INHALANT RESPIRATORY (INHALATION) at 07:10

## 2019-01-18 RX ADMIN — IPRATROPIUM BROMIDE AND ALBUTEROL SULFATE 3 ML: .5; 3 SOLUTION RESPIRATORY (INHALATION) at 15:56

## 2019-01-18 RX ADMIN — INSULIN GLARGINE 20 UNITS: 100 INJECTION, SOLUTION SUBCUTANEOUS at 21:53

## 2019-01-18 RX ADMIN — METOPROLOL SUCCINATE 50 MG: 50 TABLET, EXTENDED RELEASE ORAL at 09:02

## 2019-01-18 RX ADMIN — FUROSEMIDE 40 MG: 10 INJECTION, SOLUTION INTRAMUSCULAR; INTRAVENOUS at 09:02

## 2019-01-18 RX ADMIN — METHYLPREDNISOLONE SODIUM SUCCINATE 40 MG: 40 INJECTION, POWDER, FOR SOLUTION INTRAMUSCULAR; INTRAVENOUS at 16:05

## 2019-01-18 RX ADMIN — IPRATROPIUM BROMIDE AND ALBUTEROL SULFATE 3 ML: .5; 3 SOLUTION RESPIRATORY (INHALATION) at 07:10

## 2019-01-18 RX ADMIN — IPRATROPIUM BROMIDE AND ALBUTEROL SULFATE 3 ML: .5; 3 SOLUTION RESPIRATORY (INHALATION) at 04:40

## 2019-01-18 RX ADMIN — GUAIFENESIN 600 MG: 600 TABLET, EXTENDED RELEASE ORAL at 09:01

## 2019-01-18 RX ADMIN — RANITIDINE 150 MG: 150 TABLET ORAL at 02:16

## 2019-01-18 RX ADMIN — ARFORMOTEROL TARTRATE 15 MCG: 15 SOLUTION RESPIRATORY (INHALATION) at 07:10

## 2019-01-18 RX ADMIN — METHYLPREDNISOLONE SODIUM SUCCINATE 40 MG: 40 INJECTION, POWDER, FOR SOLUTION INTRAMUSCULAR; INTRAVENOUS at 06:32

## 2019-01-18 RX ADMIN — HEPARIN SODIUM 5000 UNITS: 5000 INJECTION INTRAVENOUS; SUBCUTANEOUS at 12:18

## 2019-01-18 RX ADMIN — FUROSEMIDE 20 MG: 10 INJECTION, SOLUTION INTRAVENOUS at 20:05

## 2019-01-18 RX ADMIN — INSULIN LISPRO 6 UNITS: 100 INJECTION, SOLUTION INTRAVENOUS; SUBCUTANEOUS at 12:18

## 2019-01-18 RX ADMIN — IPRATROPIUM BROMIDE AND ALBUTEROL SULFATE 3 ML: .5; 3 SOLUTION RESPIRATORY (INHALATION) at 19:59

## 2019-01-18 RX ADMIN — ARFORMOTEROL TARTRATE 15 MCG: 15 SOLUTION RESPIRATORY (INHALATION) at 19:59

## 2019-01-18 RX ADMIN — CEFTRIAXONE 1 G: 1 INJECTION, POWDER, FOR SOLUTION INTRAMUSCULAR; INTRAVENOUS at 06:32

## 2019-01-18 RX ADMIN — SODIUM CHLORIDE 500 MG: 900 INJECTION, SOLUTION INTRAVENOUS at 06:32

## 2019-01-18 NOTE — PROGRESS NOTES
DC Plan: TBD Chart reviewed. Per MD Layla Tate, pt will likely dc in 2-3 days. Awaiting PT/OT eval and recommendations. Met with pt at bedside, no friend/family present. Discussed HH v rehab. Pt agrees to either [facility] or rehab, pending therapy. CM will cont to follow.

## 2019-01-18 NOTE — PROGRESS NOTES
Bedside shift change report given to [facility] (oncoming nurse) by Efraín Monroe RN (offgoing nurse). Report included the following information SBAR, Kardex, ED Summary, Intake/Output, MAR, Accordion, Recent Results and Alarm Parameters .

## 2019-01-18 NOTE — PROGRESS NOTES
1920 Pt received from offgoing nurse without any signs or symptoms of distress. Pt vitals are stable and within normal limits. Pt bed in low position with wheels locked and call bell within reach. Purposeful rounding completed. Pt resting quietly. No further needs voiced at this time. 2009 Assessment completed and documented in flow sheet. Pt denies any further needs at this time. Pt in NAD with bed in low position, wheels locked and call bell within reach. Purposeful rounding completed. Pt resting quietly. No further needs voiced at this time. 2224 Scheduled medications administered as ordered. Purposeful rounding completed. Pt resting quietly. No further needs voiced at this time. 0030 Reassessment completed with no changes noted. Bed locked, in lowest position, with call light within reach. Purposeful rounding completed. Pt resting quietly. No further needs voiced at this time. 0130 call to Dr Glendy Hubbard to request zantac for c/o heartburn as requested by pt. New orders received for zantac 150 mg PO PRN daily. Purposeful rounding completed. Pt resting quietly. No further needs voiced at this time. 0216 zantac administered as per PRN order for c/o heartburn. Purposeful rounding completed. Pt resting quietly. No further needs voiced at this time. 0400 Reassessment completed with no changes noted. Bed locked, in lowest position, with call light within reach. Purposeful rounding completed. Pt resting quietly. No further needs voiced at this time. 7469 Purposeful rounding completed. Pt resting quietly. No further needs voiced at this time. 0630 Scheduled medications administered as ordered. 7895 Bedside and Verbal shift change report given to [phone] (oncoming nurse) by PAMELA Witt RN (offgoing nurse). Report included the following information SBAR, Procedure Summary, Intake/Output, MAR and Recent Results.

## 2019-01-18 NOTE — PROGRESS NOTES
Problem: Pressure Injury - Risk of 
Goal: *Prevention of pressure injury Document Elian Scale and appropriate interventions in the flowsheet. Outcome: Progressing Towards Goal 
Pressure Injury Interventions: Activity Interventions: Increase time out of bed, Pressure redistribution bed/mattress(bed type), PT/OT evaluation Mobility Interventions: Pressure redistribution bed/mattress (bed type), PT/OT evaluation Nutrition Interventions: Document food/fluid/supplement intake Friction and Shear Interventions: Apply protective barrier, creams and emollients Problem: Falls - Risk of 
Goal: *Absence of Falls Document Hamp Rosaura Fall Risk and appropriate interventions in the flowsheet. Outcome: Progressing Towards Goal 
Fall Risk Interventions: 
Mobility Interventions: Communicate number of staff needed for ambulation/transfer, Patient to call before getting OOB Medication Interventions: Patient to call before getting OOB

## 2019-01-18 NOTE — CDMP QUERY
Please clarify if this patient is being treated/managed for: 
 
=> Acute hypoxic respiratory failure POA requiring O2 @6L 
=>Acute hypoxic and hypercapneic respiratory failure POA requiring O2 @ 6L 
=>Acute respiratory distress improved with O2 
=> Other Explanation of clinical findings 
=> Clinically Undetermined (no explanation for clinical findings) The medical record reflects the following clinical findings, treatment, and risk factors: 
 
Risk Factors:  
Clinical Indicators: Patient arrived to ER w/ respiratory distress. ER exam: She appears distressed. Face mask in place. Morbidly obese female in moderate respiratory distress with pulse ox of 79% on room air Tachypnea noted. She is in respiratory distress (moderate). She has wheezes (diffuse expiratory RR 21-38, Initial O2 sat 78% increased to 95% on 6L ABG: AB.318/50/73/25.6/93% on 6L 
H&P: In ER  Found to be hypoxic with sats in 70's Given 100 percent NRB . Steroids , duo neb, and lasix With some improvement . Impression: Pneumonia, Respiratory distress, asthma exacerbation, Diastolic CHF Treatment:NRB mask, O2 @ 6L, IV lasix, echo, Please clarify and document your clinical opinion in the progress notes and discharge summary including the definitive and/or presumptive diagnosis, (suspected or probable), related to the above clinical findings. Please include clinical findings supporting your diagnosis. REFERENCE:  
Hypoxemic respiratory failure is characterized by a PaO2 less than 60 mmHg (or 10 mmHg below COPD patients baseline) and a normal or low arterial PaCO2. Hypercapnic respiratory failure is characterized by a PaCO2 higher than 50 mmHg (or 10 mmHG above COPD patients baseline). Acute Respiratory Failure indicators include: - Respirations <12 or >25 - [employer] - Use of accessory muscles of respiration  
- Inability to speak in full sentences - Cyanosis  AND 
 
- Pulse ox <90% RA or <95% on O2  
 - pH <7.35 or >7.45  
- pO2 < 60 mm Hg (or 10mm below COPD patient's baseline) - pCO2 >50mm Hg (or 10mm above COPD patient's baseline) Thank you, John [place] [id number] [facility], [address]

## 2019-01-18 NOTE — PROGRESS NOTES
Problem: Pressure Injury - Risk of 
Goal: *Prevention of pressure injury Document Elian Scale and appropriate interventions in the flowsheet. Outcome: Progressing Towards Goal 
Pressure Injury Interventions: Activity Interventions: Increase time out of bed, Pressure redistribution bed/mattress(bed type), PT/OT evaluation Mobility Interventions: HOB 30 degrees or less, Pressure redistribution bed/mattress (bed type), PT/OT evaluation Nutrition Interventions: Document food/fluid/supplement intake Friction and Shear Interventions: HOB 30 degrees or less, Apply protective barrier, creams and emollients Problem: Falls - Risk of 
Goal: *Absence of Falls Document Martine Rocha Fall Risk and appropriate interventions in the flowsheet. Outcome: Progressing Towards Goal 
Fall Risk Interventions: 
Mobility Interventions: Communicate number of staff needed for ambulation/transfer Medication Interventions: Patient to call before getting OOB

## 2019-01-18 NOTE — WOUND CARE
Wound care in to see this patient per consult. Abdominal area has several small scratch marks created by this patient. Patient given proshield and instructed how to use, patient expressed knowledge on this and also stated that this happens all the time. Wound care remains available if needed further.

## 2019-01-18 NOTE — PROGRESS NOTES
Hospitalist Progress Note-critical care note Patient: Papito Manley MRN: [id number]  CSN: [id number] YOB: 1965  Age: 46 y.o. Sex: female DOA: 1/17/2019 LOS:  LOS: 1 day Chief complaint: chf/asthma-exacerbation, pna , dm  
 
Assessment/Plan Hospital Problems  Date Reviewed: 9/4/2017 Codes Class Noted POA Pneumonia ICD-10-CM: J18.9 ICD-9-CM: 620  1/17/2019 Unknown * (Principal) Respiratory distress ICD-10-CM: R06.03 
ICD-9-CM: 786.09  1/17/2019 Unknown Asthma exacerbation ICD-10-CM: K78.675 ICD-9-CM: 493.92  1/17/2019 Unknown Diastolic CHF ([facility] VWL-47-UM: I50.30 ICD-9-CM: 428.30, 428.0  1/17/2019 Unknown Asthma ICD-10-CM: M75.763 ICD-9-CM: 493.90  Unknown Yes Overview Signed 4/5/2015  6:49 PM by Marquise Bateman Note: As child had asthma DM (diabetes mellitus) ([facility] ICD-10-CM: E11.9 ICD-9-CM: 250.00  9/4/2014 Yes Morbid obesity with BMI of 40.0-44.9, adult [facility]) ICD-10-CM: E66.01, Z68.41 
ICD-9-CM: 278.01, V85.41  8/16/2014 Yes Pna Continue iv abx, sob better , continue wean off nc 2 Asthma exacerbation Wheezing better, will wean off steroid tomorrow Continue breathing tx Chf. Acute on chronic Echo done , ef>70 Diastolic On lasix will decrease lasix due to renal function up Continue bbb Dm type II Elevated Insulin resistant. Will increase premeal insulin Morbid obesity Daughter in law at bedside. Subjective: sob better Nurse: no acute issue Disposition :2-3 days Review of systems: 
 
General: No fevers or chills. Cardiovascular: No chest pain or pressure. No palpitations. Pulmonary:  shortness of breath better . Gastrointestinal: No nausea, vomiting. Vital signs/Intake and Output: 
Visit Vitals /59 (BP 1 Location: Right arm, BP Patient Position: At rest;Supine; Head of bed elevated (Comment degrees)) Pulse 83 Temp 98.6 °F (37 °C) Resp 18 Ht 5' 4\" (1.626 m) Wt 132.9 kg (293 lb) SpO2 98% Breastfeeding? No  
BMI 50.29 kg/m² Current Shift:  01/18 0701 - 01/18 1900 In: 240 [P.O.:240] Out: - Last three shifts:  01/16 1901 - 01/18 0700 In: 840 [P.O.:840] Out: 800 [Urine:800] Physical Exam: 
General: WD, WN. Alert, cooperative, no acute distress   
HEENT: NC, Atraumatic. PERRLA, anicteric sclerae. Lungs: Mild Wheezing and Rhonchi/Rales. Heart:  Regular  rhythm,  No murmur, No Rubs, No Gallops Abdomen: Soft, Non distended, Non tender.  +Bowel sounds, Extremities: No c/c/e Psych:   Not anxious or agitated. Neurologic:  No acute neurological deficit. Labs: Results:  
   
Chemistry Recent Labs  
  01/18/19 
0989 01/17/19 
0425 * 73*  146*  
K 4.8 4.2  111* CO2 25 28 BUN 70* 55* CREA 1.55* 1.06  
CA 8.6 8.8 AGAP 10 7 BUCR 45* 52* AP 98 117  
TP 6.6 6.8 ALB 2.8* 2.9*  
GLOB 3.8 3.9 AGRAT 0.7* 0.7* CBC w/Diff Recent Labs  
  01/18/19 
0716 01/17/19 
0425 WBC 16.0* 10.7 RBC 3.76* 4.06* HGB 10.0* 10.7* HCT 31.9* 34.6*  225 GRANS 91* 82* LYMPH 4* 11* EOS 0 2 Cardiac Enzymes Recent Labs  
  01/17/19 
2140 01/17/19 
1446 CPK 76 65 CKND1 CALCULATION NOT PERFORMED WHEN RESULT IS BELOW LINEAR LIMIT CALCULATION NOT PERFORMED WHEN RESULT IS BELOW LINEAR LIMIT Coagulation Recent Labs  
  01/17/19 
0425 PTP 12.1 INR 0.9 Lipid Panel Lab Results Component Value Date/Time Cholesterol, total 146 05/28/2015 02:58 AM  
 HDL Cholesterol 64 (H) 05/28/2015 02:58 AM  
 LDL, calculated 66 05/28/2015 02:58 AM  
 VLDL, calculated 16 05/28/2015 02:58 AM  
 Triglyceride 80 05/28/2015 02:58 AM  
 CHOL/HDL Ratio 2.3 05/28/2015 02:58 AM  
  
BNP No results for input(s): BNPP in the last 72 hours. Liver Enzymes Recent Labs  
  01/18/19 
0716 TP 6.6 ALB 2.8* AP 98 SGOT 7*  
  
 Thyroid Studies No results found for: T4, T3U, TSH, TSHEXT Procedures/imaging: see electronic medical records for all procedures/Xrays and details which were not copied into this note but were reviewed prior to creation of Plan Rehana Maloney MD

## 2019-01-19 LAB
ANION GAP SERPL CALC-SCNC: 7 MMOL/L (ref 3–18)
BUN SERPL-MCNC: 86 MG/DL (ref 7–18)
BUN/CREAT SERPL: 56 (ref 12–20)
CALCIUM SERPL-MCNC: 8.9 MG/DL (ref 8.5–10.1)
CHLORIDE SERPL-SCNC: 107 MMOL/L (ref 100–108)
CO2 SERPL-SCNC: 24 MMOL/L (ref 21–32)
CREAT SERPL-MCNC: 1.54 MG/DL (ref 0.6–1.3)
GLUCOSE BLD STRIP.AUTO-MCNC: 152 MG/DL (ref 70–110)
GLUCOSE BLD STRIP.AUTO-MCNC: 252 MG/DL (ref 70–110)
GLUCOSE BLD STRIP.AUTO-MCNC: 316 MG/DL (ref 70–110)
GLUCOSE BLD STRIP.AUTO-MCNC: 317 MG/DL (ref 70–110)
GLUCOSE SERPL-MCNC: 311 MG/DL (ref 74–99)
POTASSIUM SERPL-SCNC: 5.2 MMOL/L (ref 3.5–5.5)
SODIUM SERPL-SCNC: 138 MMOL/L (ref 136–145)

## 2019-01-19 PROCEDURE — 80048 BASIC METABOLIC PNL TOTAL CA: CPT

## 2019-01-19 PROCEDURE — 82962 GLUCOSE BLOOD TEST: CPT

## 2019-01-19 PROCEDURE — 93005 ELECTROCARDIOGRAM TRACING: CPT

## 2019-01-19 PROCEDURE — 94640 AIRWAY INHALATION TREATMENT: CPT

## 2019-01-19 PROCEDURE — 36415 COLL VENOUS BLD VENIPUNCTURE: CPT

## 2019-01-19 PROCEDURE — 94760 N-INVAS EAR/PLS OXIMETRY 1: CPT

## 2019-01-19 PROCEDURE — 74011250637 HC RX REV CODE- 250/637: Performed by: FAMILY MEDICINE

## 2019-01-19 PROCEDURE — 74011250636 HC RX REV CODE- 250/636: Performed by: HOSPITALIST

## 2019-01-19 PROCEDURE — 65660000000 HC RM CCU STEPDOWN

## 2019-01-19 PROCEDURE — 74011636637 HC RX REV CODE- 636/637: Performed by: FAMILY MEDICINE

## 2019-01-19 PROCEDURE — 74011250637 HC RX REV CODE- 250/637: Performed by: HOSPITALIST

## 2019-01-19 PROCEDURE — 74011250636 HC RX REV CODE- 250/636: Performed by: INTERNAL MEDICINE

## 2019-01-19 PROCEDURE — 77010033678 HC OXYGEN DAILY

## 2019-01-19 PROCEDURE — 74011250637 HC RX REV CODE- 250/637: Performed by: INTERNAL MEDICINE

## 2019-01-19 PROCEDURE — 97161 PT EVAL LOW COMPLEX 20 MIN: CPT

## 2019-01-19 PROCEDURE — 74011636637 HC RX REV CODE- 636/637: Performed by: INTERNAL MEDICINE

## 2019-01-19 PROCEDURE — 74011636637 HC RX REV CODE- 636/637: Performed by: HOSPITALIST

## 2019-01-19 PROCEDURE — 97116 GAIT TRAINING THERAPY: CPT

## 2019-01-19 PROCEDURE — 74011000250 HC RX REV CODE- 250: Performed by: INTERNAL MEDICINE

## 2019-01-19 PROCEDURE — 74011000250 HC RX REV CODE- 250: Performed by: HOSPITALIST

## 2019-01-19 RX ORDER — INSULIN GLARGINE 100 [IU]/ML
23 INJECTION, SOLUTION SUBCUTANEOUS
Status: DISCONTINUED | OUTPATIENT
Start: 2019-01-19 | End: 2019-01-23

## 2019-01-19 RX ORDER — VANCOMYCIN 2 GRAM/500 ML IN 0.9 % SODIUM CHLORIDE INTRAVENOUS
2000 EVERY 24 HOURS
Status: DISCONTINUED | OUTPATIENT
Start: 2019-01-20 | End: 2019-01-21 | Stop reason: DRUGHIGH

## 2019-01-19 RX ORDER — MONTELUKAST SODIUM 10 MG/1
10 TABLET ORAL
Status: DISCONTINUED | OUTPATIENT
Start: 2019-01-19 | End: 2019-01-24 | Stop reason: HOSPADM

## 2019-01-19 RX ORDER — ACETAMINOPHEN 325 MG/1
650 TABLET ORAL
Status: DISCONTINUED | OUTPATIENT
Start: 2019-01-19 | End: 2019-01-24 | Stop reason: HOSPADM

## 2019-01-19 RX ADMIN — DULOXETINE HYDROCHLORIDE 30 MG: 30 CAPSULE, DELAYED RELEASE ORAL at 08:26

## 2019-01-19 RX ADMIN — BUDESONIDE 500 MCG: 0.5 INHALANT RESPIRATORY (INHALATION) at 19:43

## 2019-01-19 RX ADMIN — METHYLPREDNISOLONE SODIUM SUCCINATE 40 MG: 40 INJECTION, POWDER, FOR SOLUTION INTRAMUSCULAR; INTRAVENOUS at 22:45

## 2019-01-19 RX ADMIN — INSULIN LISPRO 2 UNITS: 100 INJECTION, SOLUTION INTRAVENOUS; SUBCUTANEOUS at 16:35

## 2019-01-19 RX ADMIN — IPRATROPIUM BROMIDE AND ALBUTEROL SULFATE 3 ML: .5; 3 SOLUTION RESPIRATORY (INHALATION) at 15:37

## 2019-01-19 RX ADMIN — LOSARTAN POTASSIUM 100 MG: 50 TABLET ORAL at 08:26

## 2019-01-19 RX ADMIN — BUDESONIDE 500 MCG: 0.5 INHALANT RESPIRATORY (INHALATION) at 07:12

## 2019-01-19 RX ADMIN — HEPARIN SODIUM 5000 UNITS: 5000 INJECTION INTRAVENOUS; SUBCUTANEOUS at 04:25

## 2019-01-19 RX ADMIN — Medication 10 ML: at 14:00

## 2019-01-19 RX ADMIN — INSULIN LISPRO 9 UNITS: 100 INJECTION, SOLUTION INTRAVENOUS; SUBCUTANEOUS at 22:43

## 2019-01-19 RX ADMIN — Medication 81 MG: at 08:27

## 2019-01-19 RX ADMIN — INSULIN LISPRO 8 UNITS: 100 INJECTION, SOLUTION INTRAVENOUS; SUBCUTANEOUS at 06:39

## 2019-01-19 RX ADMIN — MONTELUKAST SODIUM 10 MG: 10 TABLET, FILM COATED ORAL at 22:44

## 2019-01-19 RX ADMIN — INSULIN LISPRO 25 UNITS: 100 INJECTION, SOLUTION INTRAVENOUS; SUBCUTANEOUS at 12:06

## 2019-01-19 RX ADMIN — METOPROLOL SUCCINATE 50 MG: 50 TABLET, EXTENDED RELEASE ORAL at 08:26

## 2019-01-19 RX ADMIN — ARFORMOTEROL TARTRATE 15 MCG: 15 SOLUTION RESPIRATORY (INHALATION) at 19:43

## 2019-01-19 RX ADMIN — CEFTRIAXONE 1 G: 1 INJECTION, POWDER, FOR SOLUTION INTRAMUSCULAR; INTRAVENOUS at 08:25

## 2019-01-19 RX ADMIN — GABAPENTIN 300 MG: 300 CAPSULE ORAL at 08:26

## 2019-01-19 RX ADMIN — ARFORMOTEROL TARTRATE 15 MCG: 15 SOLUTION RESPIRATORY (INHALATION) at 07:13

## 2019-01-19 RX ADMIN — INSULIN GLARGINE 23 UNITS: 100 INJECTION, SOLUTION SUBCUTANEOUS at 22:44

## 2019-01-19 RX ADMIN — IPRATROPIUM BROMIDE AND ALBUTEROL SULFATE 3 ML: .5; 3 SOLUTION RESPIRATORY (INHALATION) at 07:13

## 2019-01-19 RX ADMIN — INSULIN LISPRO 8 UNITS: 100 INJECTION, SOLUTION INTRAVENOUS; SUBCUTANEOUS at 12:06

## 2019-01-19 RX ADMIN — ATORVASTATIN CALCIUM 40 MG: 20 TABLET, FILM COATED ORAL at 08:27

## 2019-01-19 RX ADMIN — METHYLPREDNISOLONE SODIUM SUCCINATE 40 MG: 40 INJECTION, POWDER, FOR SOLUTION INTRAMUSCULAR; INTRAVENOUS at 06:39

## 2019-01-19 RX ADMIN — HEPARIN SODIUM 5000 UNITS: 5000 INJECTION INTRAVENOUS; SUBCUTANEOUS at 22:44

## 2019-01-19 RX ADMIN — GUAIFENESIN 600 MG: 600 TABLET, EXTENDED RELEASE ORAL at 22:44

## 2019-01-19 RX ADMIN — GUAIFENESIN 600 MG: 600 TABLET, EXTENDED RELEASE ORAL at 08:26

## 2019-01-19 RX ADMIN — METHYLPREDNISOLONE SODIUM SUCCINATE 40 MG: 40 INJECTION, POWDER, FOR SOLUTION INTRAMUSCULAR; INTRAVENOUS at 14:53

## 2019-01-19 RX ADMIN — INSULIN GLARGINE 60 UNITS: 100 INJECTION, SOLUTION SUBCUTANEOUS at 08:27

## 2019-01-19 RX ADMIN — Medication 10 ML: at 22:45

## 2019-01-19 RX ADMIN — HEPARIN SODIUM 5000 UNITS: 5000 INJECTION INTRAVENOUS; SUBCUTANEOUS at 12:06

## 2019-01-19 RX ADMIN — SODIUM CHLORIDE 500 MG: 900 INJECTION, SOLUTION INTRAVENOUS at 08:40

## 2019-01-19 RX ADMIN — Medication 10 ML: at 06:40

## 2019-01-19 RX ADMIN — INSULIN LISPRO 25 UNITS: 100 INJECTION, SOLUTION INTRAVENOUS; SUBCUTANEOUS at 08:27

## 2019-01-19 RX ADMIN — IPRATROPIUM BROMIDE AND ALBUTEROL SULFATE 3 ML: .5; 3 SOLUTION RESPIRATORY (INHALATION) at 19:44

## 2019-01-19 RX ADMIN — HYDROCODONE POLISTIREX AND CHLORPHENIRAMINE POLISTIREX 5 ML: 10; 8 SUSPENSION, EXTENDED RELEASE ORAL at 16:36

## 2019-01-19 RX ADMIN — ACETAMINOPHEN 650 MG: 325 TABLET ORAL at 16:35

## 2019-01-19 RX ADMIN — GABAPENTIN 300 MG: 300 CAPSULE ORAL at 22:44

## 2019-01-19 RX ADMIN — VANCOMYCIN HYDROCHLORIDE 2500 MG: 10 INJECTION, POWDER, LYOPHILIZED, FOR SOLUTION INTRAVENOUS at 04:00

## 2019-01-19 RX ADMIN — IPRATROPIUM BROMIDE AND ALBUTEROL SULFATE 3 ML: .5; 3 SOLUTION RESPIRATORY (INHALATION) at 11:04

## 2019-01-19 RX ADMIN — FUROSEMIDE 20 MG: 10 INJECTION, SOLUTION INTRAVENOUS at 08:27

## 2019-01-19 NOTE — PROGRESS NOTES
Bedside shift change report given to [address] [place] [address] (oncoming nurse) by Uma Fletcher RN (offgoing nurse). Report included the following information SBAR, Kardex, Intake/Output, MAR, Accordion, Med Rec Status and Alarm Parameters . 0825 Shift assessment complete. No complaints of chest pain or shortness of breath. Call light is within reach. 1605 Pt began having chest pain. Dr. Durga Thomson gave orders for a stat EKG. 1610 EKG shows NSR. Dr. Durga Thomson paged. 80 Dr. Durga Thomson gave telephone orders for tylenol. Shift Summary: Shift uneventful. No current complaints of chest pain or shortness of breath. Call light is within reach.

## 2019-01-19 NOTE — PROGRESS NOTES
1915 Pt received from offgoing nurse without any signs or symptoms of distress. Pt vitals are stable and within normal limits. Pt bed in low position with wheels locked and call bell within reach. Purposeful rounding completed. Pt resting quietly. No further needs voiced at this time. 2005 Scheduled medications administered as ordered. Purposeful rounding completed. Pt resting quietly. No further needs voiced at this time. 2038 Assessment completed and documented in flow sheet. Pt denies any further needs at this time. Pt in NAD with bed in low position, wheels locked and call bell within reach. Purposeful rounding completed. Pt resting quietly. No further needs voiced at this time. 2153 Scheduled medications administered as ordered. Purposeful rounding completed. Pt resting quietly. No further needs voiced at this time. 0020 Reassessment completed with no changes noted. Bed locked, in lowest position, with call light within reach. Purposeful rounding completed. Pt resting quietly. No further needs voiced at this time. 9217 received critical lab of aerobic bottle gram positive rods in clusters. 5911 page out to Dr Tom Lin to update on results. 0335 received return page. New orders received for vancomycin, pharmacy to dose. 0425 Scheduled medications administered as ordered. Purposeful rounding completed. Pt resting quietly. No further needs voiced at this time. 7905 Reassessment completed with no changes noted. Bed locked, in lowest position, with call light within reach. 0600 Shift summary: Patient spent uneventful shift. No issues noted. See flow sheet and MAR. 
 
0710 Bedside and Verbal shift change report given to Zenobia  RN (oncoming nurse) by Sharita Alas RN 
 (offgoing nurse). Report given with SBAR, Intake/Output, MAR and Recent Results.

## 2019-01-19 NOTE — PROGRESS NOTES
Problem: Pressure Injury - Risk of 
Goal: *Prevention of pressure injury Document Elian Scale and appropriate interventions in the flowsheet. Outcome: Progressing Towards Goal 
Pressure Injury Interventions: Activity Interventions: Assess need for specialty bed, Chair cushion, Increase time out of bed, PT/OT evaluation Mobility Interventions: Assess need for specialty bed, HOB 30 degrees or less, PT/OT evaluation Nutrition Interventions: Document food/fluid/supplement intake, Discuss nutritional consult with provider Friction and Shear Interventions: HOB 30 degrees or less Problem: Falls - Risk of 
Goal: *Absence of Falls Document Molly Fuentes Fall Risk and appropriate interventions in the flowsheet. Outcome: Progressing Towards Goal 
Fall Risk Interventions: 
Mobility Interventions: Assess mobility with egress test, Bed/chair exit alarm, OT consult for ADLs, Patient to call before getting OOB Medication Interventions: Assess postural VS orthostatic hypotension, Bed/chair exit alarm, Patient to call before getting OOB

## 2019-01-19 NOTE — PROGRESS NOTES
Problem: Mobility Impaired (Adult and Pediatric) Goal: *Acute Goals and Plan of Care (Insert Text) Physical Therapy Goals Initiated 1/19/2019 and to be accomplished within 3-5 day(s) 1. Patient will move from supine <> sit with S in prep for out of bed activity and change of position. 2.  Patient will perform sit<> stand with S with LRAD in prep for transfers/ambulation. 3.  Patient will transfer from bed <> chair with S with LRAD for time up in chair for completion of ADL activity. 4.  Patient will ambulate 150 feet with LRAD/S for improved functional mobility/safe discharge. . 
Outcome: Progressing Towards Goal 
physical Therapy EVALUATION Patient: Marcelina Orozco (46 y.o. female) Date: 1/19/2019 Primary Diagnosis: Respiratory distress Pneumonia Asthma exacerbation Diastolic CHF ([facility] Pneumonia Precautions:Fall ASSESSMENT : 
Based on the objective data described below, the patient presents with decrease independence w/ bed mobility, transfers, and gait. Pt seen in supine prior to session w/ secondary O2 at .5L and telemonitor donned. Pt reported no pain but reports discomfort in her chest secondary to frequent coughing. Pt reports PTA that she lives alone and is Mod I w/ RW. Pt has children for support. Pt reports having frequent falls at home secondary to B/L knee buckling. Pt's O2 levels were assessed prior to mobility in the low 90s but able to increase O2 levels w/ deep breathing (however causes to pt to cough). Pt able to ambulate 75 w/ RW/GB but pt demonstrates increase panting while ambulating but does not report feeling SOB. Pt's O2 levels were assessed once pt was transferred back to the room in the low 90s w/ supplemental O2 again but increased to the mid 90s w/ rest. Pt left sitting at the EOB after session, call bell and tray in reach, nurse notified after session. Patient will benefit from skilled intervention to address the above impairments. Patients rehabilitation potential is considered to be Fair Factors which may influence rehabilitation potential include:  
[]         None noted 
[]         Mental ability/status [x]         Medical condition 
[x]         Home/family situation and support systems 
[x]         Safety awareness 
[]         Pain tolerance/management 
[]         Other: PLAN : 
Recommendations and Planned Interventions: 
[x]           Bed Mobility Training             [x]    Neuromuscular Re-Education 
[x]           Transfer Training                   []    Orthotic/Prosthetic Training 
[x]           Gait Training                          []    Modalities [x]           Therapeutic Exercises          []    Edema Management/Control 
[x]           Therapeutic Activities            [x]    Patient and Family Training/Education 
[]           Other (comment): Frequency/Duration: Patient will be followed by physical therapy 1-2 times per day to address goals. Discharge Recommendations: Home Health vs SNF pending progress Further Equipment Recommendations for Discharge:N/A  
 
SUBJECTIVE:  
Patient stated I'm okay.  OBJECTIVE DATA SUMMARY:  
 
Past Medical History:  
Diagnosis Date  Asthma Note: As child had asthma  Diabetes mellitus ([facility] Type 2  
 Heart palpitations  Hypertension  Ill-defined condition   
 fibromyalgia  Morbid obesity ([facility]  MRSA infection 8/2014 Past Surgical History:  
Procedure Laterality Date  BREAST SURGERY PROCEDURE UNLISTED  HX CHOLECYSTECTOMY  HX TUBAL LIGATION Barriers to Learning/Limitations: yes;  physical 
Compensate with: Verbal Cues and Tactile Cues Prior Level of Function/Home Situation:  
Home Situation Home Environment: Apartment # Steps to Enter: 0 One/Two Story Residence: One story Living Alone: Yes Support Systems: Family member(s) Patient Expects to be Discharged to[de-identified] Private residence Current DME Used/Available at Home: Cane, straight, WalkerCritical Behavior: 
Neurologic State: Alert Orientation Level: Oriented X4 Cognition: Appropriate decision making; Appropriate for age attention/concentration; Appropriate safety awareness; Follows commands Psychosocial 
Purposeful Interaction: Yes Pt Identified Daily Priority: Clinical issues (comment) [employer] Process: Nurture loving kindness;Enable trev/hope; Teaching/learning; Attend basic human needs Caring Interventions: Therapeutic modalities; Reassure Reassure: Therapeutic listening; Informing; Sit with patient;Caring rounds Therapeutic Modalities: Humor; Intentional therapeutic touchSkin Condition/Temp: Dry;Warm;Rash(on lower abdomen due to dry skin) Skin Integrity: Intact; Tattoos (comment) Skin Integumentary Skin Color: Appropriate for ethnicity Skin Condition/Temp: Dry;Warm;Rash(on lower abdomen due to dry skin) Skin Integrity: Intact; Tattoos (comment) Turgor: Non-tenting Hair Growth: Present Varicosities: Absent Strength:   
Strength: Generally decreased, functional 
Tone & Sensation:  
Tone: Normal 
Sensation: Intact Range Of Motion: 
AROM: Generally decreased, functional 
Functional Mobility: 
Bed Mobility: 
Supine to Sit: Supervision;Stand-by assistance Scooting: Supervision Transfers: 
Sit to Stand: Contact guard assistance Stand to Sit: Contact guard assistance Balance:  
Sitting: Intact Standing: Intact; With supportAmbulation/Gait Training: 
Distance (ft): 75 Feet (ft) Assistive Device: Gait belt;Walker, rolling Ambulation - Level of Assistance: Contact guard assistance Gait Description (WDL): Exceptions to [facility] Gait Abnormalities: Decreased step clearance Speed/Arlette: Slow Step Length: Left shortened;Right shortened Pain: 
Pain Scale 1: Numeric (0 - 10) Pain Intensity 1: 0 Activity Tolerance:  
Fair Please refer to the flowsheet for vital signs taken during this treatment. After treatment: []         Patient left in no apparent distress sitting up in chair 
[x]         Patient left in no apparent distress in bed 
[x]         Call bell left within reach [x]         Nursing notified 
[]         Caregiver present 
[]         Bed alarm activated COMMUNICATION/EDUCATION:  
[x]         Fall prevention education was provided and the patient/caregiver indicated understanding. [x]         Patient/family have participated as able in goal setting and plan of care. [x]         Patient/family agree to work toward stated goals and plan of care. []         Patient understands intent and goals of therapy, but is neutral about his/her participation. []         Patient is unable to participate in goal setting and plan of care. Thank you for this referral. 
Blaire Tamez, PT Time Calculation: 23 mins

## 2019-01-19 NOTE — ROUTINE PROCESS
Bedside shift change report given to [address] [place] [address] (oncoming nurse) by Ayleen Mcgee RN (offgoing nurse). Report included the following information SBAR, Kardex, Intake/Output and MAR.

## 2019-01-19 NOTE — PROGRESS NOTES
Pharmacy Dosing Services: Vancomycin Consult for Vancomycin Dosing by Pharmacy by Dr. Josefina Jones Consult provided for this 46y.o. year old female , for indication of Bloodstream Infection. Day of Therapy 1 Ht Readings from Last 1 Encounters:  
01/17/19 162.6 cm (64\") Wt Readings from Last 1 Encounters:  
01/18/19 131.8 kg (290 lb 9.1 oz) Other Current Antibiotics Ceftriaxone + Azithromycin Significant Cultures Pending Serum Creatinine Lab Results Component Value Date/Time Creatinine 1.55 (H) 01/18/2019 07:16 AM  
 Creatinine, POC 0.3 (L) 08/10/2014 11:05 PM  
  
Creatinine Clearance Estimated Creatinine Clearance: 56 mL/min (A) (based on SCr of 1.55 mg/dL (H)). BUN Lab Results Component Value Date/Time BUN 70 (H) 01/18/2019 07:16 AM  
 BUN, POC 10 08/10/2014 11:05 PM  
  
WBC Lab Results Component Value Date/Time WBC 16.0 (H) 01/18/2019 07:16 AM  
  
H/H Lab Results Component Value Date/Time HGB 10.0 (L) 01/18/2019 07:16 AM  
  
Platelets Lab Results Component Value Date/Time PLATELET 334 31/83/8071 07:16 AM  
  
Temp 98.2 °F (36.8 °C) Start Vancomycin therapy, with loading dose of 2500 mg at 0400 1/19/19. Follow with maintenance dose of 2000 mg at 0400 1/20/19, every 24 hours. Dose calculated to approximate a therapeutic trough of 15-20 mcg/mL. Pharmacy to follow daily and will make changes to dose and/or frequency based on clinical status.  
 
Pharmacist [id number] [facility] [address] Johnny Rueda

## 2019-01-19 NOTE — PROGRESS NOTES
Hospitalist Progress Note Patient: Kike Jim MRN: [id number]  CSN: [id number] YOB: 1965  Age: 46 y.o. Sex: female DOA: 1/17/2019 LOS:  LOS: 2 days Assessment/Plan Principal Problem: 
  Respiratory distress (1/17/2019) Active Problems: Morbid obesity with BMI of 40.0-44.9, adult ([address] (8/16/2014) Type II or unspecified type diabetes mellitus with renal manifestations, uncontrolled(250.42) ([address] (9/4/2014) Asthma () Overview: Note: As child had asthma Morbid obesity ([facility] () Pneumonia (1/17/2019) Asthma exacerbation (1/17/2019) Diastolic CHF ([facility] (8/05/4934) BMI 45.0-49.9, adult ([facility] (1/19/2019) Respiratory failure: On NC O2. Still SOB and wheezing PNA: Continue iv abx , continue wean off nc 2 
  
Asthma exacerbation: Taper steroid,Continue breathing tx. Add singulair 
  
Chf. Acute on chronic Echo done , ef>70 Diastolic On lasix will decrease lasix due to renal function up Continue bbb  
  
Uncontrolled DM:  
Elevated Insulin resistant. Will increase lantus, continue premeal insulin  
  
Morbid obesity with BMI of 49: Increase risks of M&M 
  
Disposition :2-3 days CC:  PNA, Asthma attack, uncontrolled DM, obesity, HTN Subjective: Pt was seen and examined with the nurse in the morning round. Still coughing and SOB. C/O CP with coughing. No N/V. Review of systems General: No fevers or chills. Cardiovascular: No chest pain or pressure. No palpitations. Pulmonary: + cough/SOB Gastrointestinal: No nausea, vomiting. Objective:  
  
Visit Vitals /77 (BP 1 Location: Right arm, BP Patient Position: At rest) Pulse 83 Temp 97.6 °F (36.4 °C) Resp 18 Ht 5' 4\" (1.626 m) Wt 131.8 kg (290 lb 9.1 oz) SpO2 97% Breastfeeding? No  
BMI 49.88 kg/m² Physical Exam: 
 
Gen: NAD, obese Heent:  MMM, NC, AT. Cor: s1s2 RRR. No MRG. PMI mid 5th intercostal space. Resp:  Decreased BS with wheezing to base Abd:  NT ND.  BS positive. No rebound or guarding. No masses. Ext: No edema or cyanosis. Intake and Output: 
Current Shift:  01/19 0701 - 01/19 1900 In: 1 [P.O.:720; I.V.:250] Out: - Last three shifts:  01/17 1901 - 01/19 0700 In: 600 [P.O.:600] Out: 1200 [Urine:1200] Labs: Results:  
   
Chemistry Recent Labs  
  01/19/19 
0601 01/18/19 
1779 01/17/19 
0425 * 346* 73*  137 146*  
K 5.2 4.8 4.2  102 111* CO2 24 25 28 BUN 86* 70* 55* CREA 1.54* 1.55* 1.06  
CA 8.9 8.6 8.8 AGAP 7 10 7 BUCR 56* 45* 52* AP  --  98 117 TP  --  6.6 6.8 ALB  --  2.8* 2.9*  
GLOB  --  3.8 3.9 AGRAT  --  0.7* 0.7* CBC w/Diff Recent Labs  
  01/18/19 
0716 01/17/19 
0425 WBC 16.0* 10.7 RBC 3.76* 4.06* HGB 10.0* 10.7* HCT 31.9* 34.6*  225 GRANS 91* 82* LYMPH 4* 11* EOS 0 2 Cardiac Enzymes Recent Labs  
  01/17/19 
2140 01/17/19 
1446 CPK 76 65 CKND1 CALCULATION NOT PERFORMED WHEN RESULT IS BELOW LINEAR LIMIT CALCULATION NOT PERFORMED WHEN RESULT IS BELOW LINEAR LIMIT Coagulation Recent Labs  
  01/17/19 
0425 PTP 12.1 INR 0.9 Lipid Panel Lab Results Component Value Date/Time Cholesterol, total 146 05/28/2015 02:58 AM  
 HDL Cholesterol 64 (H) 05/28/2015 02:58 AM  
 LDL, calculated 66 05/28/2015 02:58 AM  
 VLDL, calculated 16 05/28/2015 02:58 AM  
 Triglyceride 80 05/28/2015 02:58 AM  
 CHOL/HDL Ratio 2.3 05/28/2015 02:58 AM  
  
BNP No results for input(s): BNPP in the last 72 hours. Liver Enzymes Recent Labs  
  01/18/19 
0716 TP 6.6 ALB 2.8* AP 98 SGOT 7* Thyroid Studies No results found for: T4, T3U, TSH, TSHEXT, TSHEXT Procedures/imaging: see electronic medical records for all procedures/Xrays and details which were not copied into this note but were reviewed prior to creation of Plan Medications Reviewed Stacie Mcneill MD

## 2019-01-20 LAB
ANION GAP SERPL CALC-SCNC: 7 MMOL/L (ref 3–18)
BACTERIA SPEC CULT: ABNORMAL
BASOPHILS # BLD: 0 K/UL (ref 0–0.1)
BASOPHILS NFR BLD: 0 % (ref 0–2)
BUN SERPL-MCNC: 84 MG/DL (ref 7–18)
BUN/CREAT SERPL: 65 (ref 12–20)
CALCIUM SERPL-MCNC: 9.5 MG/DL (ref 8.5–10.1)
CHLORIDE SERPL-SCNC: 108 MMOL/L (ref 100–108)
CO2 SERPL-SCNC: 26 MMOL/L (ref 21–32)
CREAT SERPL-MCNC: 1.29 MG/DL (ref 0.6–1.3)
DIFFERENTIAL METHOD BLD: ABNORMAL
EOSINOPHIL # BLD: 0 K/UL (ref 0–0.4)
EOSINOPHIL NFR BLD: 0 % (ref 0–5)
ERYTHROCYTE [DISTWIDTH] IN BLOOD BY AUTOMATED COUNT: 14.3 % (ref 11.6–14.5)
GLUCOSE BLD STRIP.AUTO-MCNC: 113 MG/DL (ref 70–110)
GLUCOSE BLD STRIP.AUTO-MCNC: 165 MG/DL (ref 70–110)
GLUCOSE BLD STRIP.AUTO-MCNC: 294 MG/DL (ref 70–110)
GLUCOSE BLD STRIP.AUTO-MCNC: 298 MG/DL (ref 70–110)
GLUCOSE SERPL-MCNC: 169 MG/DL (ref 74–99)
GRAM STN SPEC: ABNORMAL
GRAM STN SPEC: ABNORMAL
HCT VFR BLD AUTO: 34.2 % (ref 35–45)
HGB BLD-MCNC: 10.6 G/DL (ref 12–16)
LYMPHOCYTES # BLD: 0.9 K/UL (ref 0.9–3.6)
LYMPHOCYTES NFR BLD: 7 % (ref 21–52)
MCH RBC QN AUTO: 26.6 PG (ref 24–34)
MCHC RBC AUTO-ENTMCNC: 31 G/DL (ref 31–37)
MCV RBC AUTO: 85.7 FL (ref 74–97)
MONOCYTES # BLD: 0.6 K/UL (ref 0.05–1.2)
MONOCYTES NFR BLD: 5 % (ref 3–10)
NEUTS SEG # BLD: 10.7 K/UL (ref 1.8–8)
NEUTS SEG NFR BLD: 88 % (ref 40–73)
PLATELET # BLD AUTO: 253 K/UL (ref 135–420)
PMV BLD AUTO: 9.7 FL (ref 9.2–11.8)
POTASSIUM SERPL-SCNC: 4.9 MMOL/L (ref 3.5–5.5)
RBC # BLD AUTO: 3.99 M/UL (ref 4.2–5.3)
SERVICE CMNT-IMP: ABNORMAL
SODIUM SERPL-SCNC: 141 MMOL/L (ref 136–145)
WBC # BLD AUTO: 12.1 K/UL (ref 4.6–13.2)

## 2019-01-20 PROCEDURE — 65660000000 HC RM CCU STEPDOWN

## 2019-01-20 PROCEDURE — 74011000250 HC RX REV CODE- 250: Performed by: HOSPITALIST

## 2019-01-20 PROCEDURE — 74011250637 HC RX REV CODE- 250/637: Performed by: FAMILY MEDICINE

## 2019-01-20 PROCEDURE — 74011636637 HC RX REV CODE- 636/637: Performed by: HOSPITALIST

## 2019-01-20 PROCEDURE — 74011250636 HC RX REV CODE- 250/636: Performed by: FAMILY MEDICINE

## 2019-01-20 PROCEDURE — 94760 N-INVAS EAR/PLS OXIMETRY 1: CPT

## 2019-01-20 PROCEDURE — 36415 COLL VENOUS BLD VENIPUNCTURE: CPT

## 2019-01-20 PROCEDURE — 74011636637 HC RX REV CODE- 636/637: Performed by: FAMILY MEDICINE

## 2019-01-20 PROCEDURE — 97530 THERAPEUTIC ACTIVITIES: CPT

## 2019-01-20 PROCEDURE — 97116 GAIT TRAINING THERAPY: CPT

## 2019-01-20 PROCEDURE — 82962 GLUCOSE BLOOD TEST: CPT

## 2019-01-20 PROCEDURE — 94640 AIRWAY INHALATION TREATMENT: CPT

## 2019-01-20 PROCEDURE — 74011250637 HC RX REV CODE- 250/637: Performed by: INTERNAL MEDICINE

## 2019-01-20 PROCEDURE — 74011250637 HC RX REV CODE- 250/637: Performed by: HOSPITALIST

## 2019-01-20 PROCEDURE — 74011636637 HC RX REV CODE- 636/637: Performed by: INTERNAL MEDICINE

## 2019-01-20 PROCEDURE — 74011000250 HC RX REV CODE- 250: Performed by: INTERNAL MEDICINE

## 2019-01-20 PROCEDURE — 80048 BASIC METABOLIC PNL TOTAL CA: CPT

## 2019-01-20 PROCEDURE — 85025 COMPLETE CBC W/AUTO DIFF WBC: CPT

## 2019-01-20 PROCEDURE — 74011250636 HC RX REV CODE- 250/636: Performed by: INTERNAL MEDICINE

## 2019-01-20 RX ADMIN — INSULIN GLARGINE 60 UNITS: 100 INJECTION, SOLUTION SUBCUTANEOUS at 09:16

## 2019-01-20 RX ADMIN — Medication 10 ML: at 14:00

## 2019-01-20 RX ADMIN — IPRATROPIUM BROMIDE AND ALBUTEROL SULFATE 3 ML: .5; 3 SOLUTION RESPIRATORY (INHALATION) at 11:25

## 2019-01-20 RX ADMIN — HEPARIN SODIUM 5000 UNITS: 5000 INJECTION INTRAVENOUS; SUBCUTANEOUS at 06:33

## 2019-01-20 RX ADMIN — HYDROCODONE POLISTIREX AND CHLORPHENIRAMINE POLISTIREX 5 ML: 10; 8 SUSPENSION, EXTENDED RELEASE ORAL at 14:38

## 2019-01-20 RX ADMIN — ACETAMINOPHEN 650 MG: 325 TABLET ORAL at 20:57

## 2019-01-20 RX ADMIN — IPRATROPIUM BROMIDE AND ALBUTEROL SULFATE 3 ML: .5; 3 SOLUTION RESPIRATORY (INHALATION) at 04:46

## 2019-01-20 RX ADMIN — INSULIN LISPRO 25 UNITS: 100 INJECTION, SOLUTION INTRAVENOUS; SUBCUTANEOUS at 12:50

## 2019-01-20 RX ADMIN — Medication 81 MG: at 09:16

## 2019-01-20 RX ADMIN — IPRATROPIUM BROMIDE AND ALBUTEROL SULFATE 3 ML: .5; 3 SOLUTION RESPIRATORY (INHALATION) at 07:05

## 2019-01-20 RX ADMIN — ARFORMOTEROL TARTRATE 15 MCG: 15 SOLUTION RESPIRATORY (INHALATION) at 19:55

## 2019-01-20 RX ADMIN — METHYLPREDNISOLONE SODIUM SUCCINATE 40 MG: 40 INJECTION, POWDER, FOR SOLUTION INTRAMUSCULAR; INTRAVENOUS at 17:36

## 2019-01-20 RX ADMIN — BUDESONIDE 500 MCG: 0.5 INHALANT RESPIRATORY (INHALATION) at 07:04

## 2019-01-20 RX ADMIN — RANITIDINE 150 MG: 150 TABLET ORAL at 17:33

## 2019-01-20 RX ADMIN — INSULIN LISPRO 3 UNITS: 100 INJECTION, SOLUTION INTRAVENOUS; SUBCUTANEOUS at 06:32

## 2019-01-20 RX ADMIN — Medication 10 ML: at 22:13

## 2019-01-20 RX ADMIN — SODIUM CHLORIDE 500 MG: 900 INJECTION, SOLUTION INTRAVENOUS at 06:37

## 2019-01-20 RX ADMIN — HEPARIN SODIUM 5000 UNITS: 5000 INJECTION INTRAVENOUS; SUBCUTANEOUS at 12:04

## 2019-01-20 RX ADMIN — CEFTRIAXONE 1 G: 1 INJECTION, POWDER, FOR SOLUTION INTRAMUSCULAR; INTRAVENOUS at 06:31

## 2019-01-20 RX ADMIN — INSULIN LISPRO 9 UNITS: 100 INJECTION, SOLUTION INTRAVENOUS; SUBCUTANEOUS at 12:51

## 2019-01-20 RX ADMIN — METOPROLOL SUCCINATE 50 MG: 50 TABLET, EXTENDED RELEASE ORAL at 09:16

## 2019-01-20 RX ADMIN — MONTELUKAST SODIUM 10 MG: 10 TABLET, FILM COATED ORAL at 22:13

## 2019-01-20 RX ADMIN — INSULIN LISPRO 9 UNITS: 100 INJECTION, SOLUTION INTRAVENOUS; SUBCUTANEOUS at 17:36

## 2019-01-20 RX ADMIN — INSULIN GLARGINE 23 UNITS: 100 INJECTION, SOLUTION SUBCUTANEOUS at 22:22

## 2019-01-20 RX ADMIN — IPRATROPIUM BROMIDE AND ALBUTEROL SULFATE 3 ML: .5; 3 SOLUTION RESPIRATORY (INHALATION) at 19:55

## 2019-01-20 RX ADMIN — DULOXETINE HYDROCHLORIDE 30 MG: 30 CAPSULE, DELAYED RELEASE ORAL at 09:15

## 2019-01-20 RX ADMIN — BUDESONIDE 500 MCG: 0.5 INHALANT RESPIRATORY (INHALATION) at 19:55

## 2019-01-20 RX ADMIN — GABAPENTIN 300 MG: 300 CAPSULE ORAL at 20:54

## 2019-01-20 RX ADMIN — VANCOMYCIN HYDROCHLORIDE 2000 MG: 10 INJECTION, POWDER, LYOPHILIZED, FOR SOLUTION INTRAVENOUS at 04:18

## 2019-01-20 RX ADMIN — IPRATROPIUM BROMIDE AND ALBUTEROL SULFATE 3 ML: .5; 3 SOLUTION RESPIRATORY (INHALATION) at 00:14

## 2019-01-20 RX ADMIN — GABAPENTIN 300 MG: 300 CAPSULE ORAL at 09:15

## 2019-01-20 RX ADMIN — GUAIFENESIN 600 MG: 600 TABLET, EXTENDED RELEASE ORAL at 09:15

## 2019-01-20 RX ADMIN — GUAIFENESIN 600 MG: 600 TABLET, EXTENDED RELEASE ORAL at 20:54

## 2019-01-20 RX ADMIN — INSULIN LISPRO 25 UNITS: 100 INJECTION, SOLUTION INTRAVENOUS; SUBCUTANEOUS at 17:36

## 2019-01-20 RX ADMIN — ATORVASTATIN CALCIUM 40 MG: 20 TABLET, FILM COATED ORAL at 09:16

## 2019-01-20 RX ADMIN — IPRATROPIUM BROMIDE AND ALBUTEROL SULFATE 3 ML: .5; 3 SOLUTION RESPIRATORY (INHALATION) at 15:43

## 2019-01-20 RX ADMIN — HEPARIN SODIUM 5000 UNITS: 5000 INJECTION INTRAVENOUS; SUBCUTANEOUS at 20:54

## 2019-01-20 RX ADMIN — ARFORMOTEROL TARTRATE 15 MCG: 15 SOLUTION RESPIRATORY (INHALATION) at 07:05

## 2019-01-20 RX ADMIN — METHYLPREDNISOLONE SODIUM SUCCINATE 40 MG: 40 INJECTION, POWDER, FOR SOLUTION INTRAMUSCULAR; INTRAVENOUS at 06:31

## 2019-01-20 RX ADMIN — IPRATROPIUM BROMIDE AND ALBUTEROL SULFATE 3 ML: .5; 3 SOLUTION RESPIRATORY (INHALATION) at 23:26

## 2019-01-20 RX ADMIN — LOSARTAN POTASSIUM 100 MG: 50 TABLET ORAL at 09:15

## 2019-01-20 NOTE — PROGRESS NOTES
Bedside shift change report given to Sagar Lara RN (oncoming nurse) by Clifford Calixto RN (offgoing nurse). Report included the following information SBAR, Kardex, ED Summary, Intake/Output, MAR, Accordion, Recent Results and Alarm Parameters .

## 2019-01-20 NOTE — PROGRESS NOTES
Hospitalist Progress Note Patient: Marcelina Orozco MRN: [id number]  CSN: [id number] YOB: 1965  Age: 46 y.o. Sex: female DOA: 1/17/2019 LOS:  LOS: 3 days Assessment/Plan Principal Problem: 
  Respiratory distress (1/17/2019) Active Problems: Morbid obesity with BMI of 40.0-44.9, adult ([address] (8/16/2014) Type II or unspecified type diabetes mellitus with renal manifestations, uncontrolled(250.42) ([address] (9/4/2014) Asthma () Overview: Note: As child had asthma Morbid obesity ([facility] () Pneumonia (1/17/2019) Asthma exacerbation (1/17/2019) Diastolic CHF ([facility] (5/61/2123) BMI 45.0-49.9, adult ([facility] (1/19/2019) Acute Respiratory failure: On NC O2.  
  
PNA: Continue iv abx , continue wean off nc 2 
  
Asthma exacerbation: ImprovingTaper steroid,Continue breathing tx. Add singulair 
  
Chf. Acute on chronic Echo done , ef>70 Diastolic On lasix will decrease lasix due to renal function up Continue bbb  
  
Uncontrolled DM:  
Elevated Insulin resistant, lantus was increased yesterday, continue premeal insulin  
  
Morbid obesity with BMI of 49: Increase risks of M&M 
  
Disposition :possible tomorrow with HH  
  
  
CC:  PNA, Asthma attack, uncontrolled DM, obesity, HTN Subjective:  
  
Pt was seen and examined with the nurse in the morning round. Feeling better. Less coughing and SOB. Review of systems General: No fevers or chills. Cardiovascular: No chest pain or pressure. No palpitations. Pulmonary: +cough, SOB Gastrointestinal: No nausea, vomiting. Objective:  
  
Visit Vitals /69 (BP 1 Location: Left arm, BP Patient Position: At rest) Pulse 72 Temp 97.7 °F (36.5 °C) Resp 18 Ht 5' 4\" (1.626 m) Wt 132.4 kg (291 lb 14.2 oz) SpO2 95% Breastfeeding? No  
BMI 50.10 kg/m² Physical Exam: 
 
Gen: NAD, non-toxic. Heent:  MMM, NC, AT. Cor: s1s2 RRR. No MRG. PMI mid 5th intercostal space. Resp: CTA b/l. No w/r/r. Nml effort and diaphragmatic excursion. Abd:  NT ND.  BS positive. No rebound or guarding. No masses. Ext: No edema or cyanosis. Intake and Output: 
Current Shift:  No intake/output data recorded. Last three shifts:  01/18 1901 - 01/20 0700 In: 1210 [P.O.:960; I.V.:250] Out: 1200 [Urine:1200] Labs: Results:  
   
Chemistry Recent Labs  
  01/20/19 
0310 01/19/19 
0601 01/18/19 
2396 * 311* 346*  138 137  
K 4.9 5.2 4.8  
 107 102 CO2 26 24 25 BUN 84* 86* 70* CREA 1.29 1.54* 1.55* CA 9.5 8.9 8.6 AGAP 7 7 10 BUCR 65* 56* 45* AP  --   --  98  
TP  --   --  6.6 ALB  --   --  2.8*  
GLOB  --   --  3.8 AGRAT  --   --  0.7* CBC w/Diff Recent Labs  
  01/20/19 
0310 01/18/19 
0604 WBC 12.1 16.0*  
RBC 3.99* 3.76* HGB 10.6* 10.0* HCT 34.2* 31.9*  
 224 GRANS 88* 91* LYMPH 7* 4* EOS 0 0 Cardiac Enzymes Recent Labs  
  01/17/19 
2140 01/17/19 
1446 CPK 76 65 CKND1 CALCULATION NOT PERFORMED WHEN RESULT IS BELOW LINEAR LIMIT CALCULATION NOT PERFORMED WHEN RESULT IS BELOW LINEAR LIMIT Coagulation No results for input(s): PTP, INR, APTT in the last 72 hours. No lab exists for component: INREXT Lipid Panel Lab Results Component Value Date/Time Cholesterol, total 146 05/28/2015 02:58 AM  
 HDL Cholesterol 64 (H) 05/28/2015 02:58 AM  
 LDL, calculated 66 05/28/2015 02:58 AM  
 VLDL, calculated 16 05/28/2015 02:58 AM  
 Triglyceride 80 05/28/2015 02:58 AM  
 CHOL/HDL Ratio 2.3 05/28/2015 02:58 AM  
  
BNP No results for input(s): BNPP in the last 72 hours. Liver Enzymes Recent Labs  
  01/18/19 
0716 TP 6.6 ALB 2.8* AP 98 SGOT 7* Thyroid Studies No results found for: T4, T3U, TSH, TSHEXT Procedures/imaging: see electronic medical records for all procedures/Xrays and details which were not copied into this note but were reviewed prior to creation of Plan Medications Reviewed Rose Mary Bailey MD

## 2019-01-20 NOTE — PROGRESS NOTES
DC Plan:  Discharge home with [facility], once medically stable Chart reviewed as CM on call. PT is [facility]. Met with pt at bedside and she would prefer . Pt has [facility]. Pt chose any agency that works with her insurance. This CM called [facility] and spoke with Sameer who stated they do accept [facility]. Referral will be placed. Pt will have her daughter pick her up at discharge. Pt also states she previously applied for medicaid. Will place referral via  with [employer] for follow up. Anticipate dc tomorrow. Pt aware she will need to have family member her for dc after breakfast. No other dc concerns identified. CM will cont to follow.

## 2019-01-20 NOTE — PROGRESS NOTES
1545- Assumed care of patient at this time. Report received from Lilli Holland, RN. Patient in bed getting breathing tx from resp therapist. Call bell left within reach. 1600- Patient in bed this time. A/O x 4. Lungs clear, abdomen soft and non-distended. Bowel sounds present,  IV x2, both capped and able to be flushed. No signs of phlebitis or infiltration noted. Skin warm ,dry and intact. Patient denies pain or discomfort. Bed placed in lowest position, call bell within reach.   
 
1735- PRN Zantac given PRN per [facility]

## 2019-01-20 NOTE — PROGRESS NOTES
Bedside shift change report given to Najma Martin (oncoming nurse) by Tonio Rosado RN (offgoing nurse). Report included the following information SBAR, Kardex, ED Summary, MAR, Accordion, Recent Results and Alarm Parameters .

## 2019-01-20 NOTE — PROGRESS NOTES
Problem: Pressure Injury - Risk of 
Goal: *Prevention of pressure injury Document Elian Scale and appropriate interventions in the flowsheet. Outcome: Progressing Towards Goal 
Pressure Injury Interventions: Activity Interventions: Assess need for specialty bed, Increase time out of bed, Pressure redistribution bed/mattress(bed type), PT/OT evaluation Mobility Interventions: Assess need for specialty bed, HOB 30 degrees or less, PT/OT evaluation Nutrition Interventions: Document food/fluid/supplement intake, Discuss nutritional consult with provider Friction and Shear Interventions: Apply protective barrier, creams and emollients, HOB 30 degrees or less, Lift sheet Problem: Falls - Risk of 
Goal: *Absence of Falls Document Yocasta Scimetrika Fall Risk and appropriate interventions in the flowsheet. Outcome: Progressing Towards Goal 
Fall Risk Interventions: 
Mobility Interventions: Assess mobility with egress test, Bed/chair exit alarm Medication Interventions: Assess postural VS orthostatic hypotension, Bed/chair exit alarm, Patient to call before getting OOB History of Falls Interventions: Bed/chair exit alarm, Consult care management for discharge planning, Door open when patient unattended

## 2019-01-20 NOTE — ROUTINE PROCESS
Bedside and Verbal shift change report given to [address] [place] [address]  (oncoming nurse) by Royal Ahumada, RN  (offgoing nurse). Report given with SBAR, Kardex, Intake/Output and Recent Results.

## 2019-01-20 NOTE — PROGRESS NOTES
45 Pt was c/o pain to both IV sites. The 22 gauge to her right hand flushes well with no swelling when flushed but the patient states it hurts. The 20 gauge IV does not flush. Called the medic who had to stick her five times. A new #22 gauge IV is in her left hand. 5220-1294 Shift Summary: Pt rested on and off overnight with no complaints. No new clinical concerns noted.

## 2019-01-20 NOTE — PROGRESS NOTES
Problem: Mobility Impaired (Adult and Pediatric) Goal: *Acute Goals and Plan of Care (Insert Text) Physical Therapy Goals Initiated 1/19/2019 and to be accomplished within 3-5 day(s) 1. Patient will move from supine <> sit with S in prep for out of bed activity and change of position. 2.  Patient will perform sit<> stand with S with LRAD in prep for transfers/ambulation. 3.  Patient will transfer from bed <> chair with S with LRAD for time up in chair for completion of ADL activity. 4.  Patient will ambulate 150 feet with LRAD/S for improved functional mobility/safe discharge. .  
Outcome: Progressing Towards Goal 
physical Therapy TREATMENT Patient: Anthony Alvarado (46 y.o. female) Date: 1/20/2019 Diagnosis: Respiratory distress Pneumonia Asthma exacerbation Diastolic CHF ([facility] Pneumonia Respiratory distress Precautions: Fall Chart, physical therapy assessment, plan of care and goals were reviewed. ASSESSMENT: 
Pt supine in bed, willing to participate, IV intact attached to bed. Pt completed bed mobility with SBA, unable to further amb distance today d/t IV attachment. Pt tolerated standing for 15 min with OMKAR UE support on RW with slight increased SOB during and no LOB and SBA/S. Pt amb in room 20ft x2 without rest break and reports of increased fatigue after session. Sp02 and HR remained above 93% and below 88 bpm respectively. Pt returned to sitting in chair with appropriate eccentric lowering and OMKAR UE support. Pt states needing to amb 20ft to get to front door from car at home and having to neg one step to enter house. Pt states kids live close by and can assist if needed. Pt is indifferent about rehab vs HHPT. Would like to assess step neg to rec HHPT. Activity tolerance remains fair. Rehab vs HHPT Progression toward goals: 
[]      Improving appropriately and progressing toward goals [x]      Improving slowly and progressing toward goals []      Not making progress toward goals and plan of care will be adjusted PLAN: 
Patient continues to benefit from skilled intervention to address the above impairments. Continue treatment per established plan of care. Discharge Recommendations:  Rehab and Home Health Further Equipment Recommendations for Discharge:  TBD SUBJECTIVE:  
Patient stated If you name it, it hurts.  OBJECTIVE DATA SUMMARY:  
Critical Behavior: 
Neurologic State: Alert Orientation Level: Oriented X4 Cognition: Appropriate decision making, Appropriate for age attention/concentration, Appropriate safety awareness, Follows commands Functional Mobility Training: 
Bed Mobility: 
Supine to Sit: Stand-by assistance Sit to Supine: Stand-by assistance Scooting: Stand-by assistance Transfers: 
Sit to Stand: Stand-by assistance Stand to Sit: Stand-by assistance Balance: 
Sitting: With support Standing: With supportAmbulation/Gait Training: 
Distance (ft): 40 Feet (ft) Assistive Device: Walker, rolling Ambulation - Level of Assistance: Stand-by assistance Gait Abnormalities: Decreased step clearance(flexed trunk) Base of Support: Widened Speed/Arlette: Slow Step Length: Right shortened;Left shortened Stairs: 
Pain: 
Pain Scale 1: Numeric (0 - 10) Pain Intensity 1: 0 Activity Tolerance:  
Fair+ Please refer to the flowsheet for vital signs taken during this treatment. After treatment:  
[x] Patient left in no apparent distress sitting up in chair 
[] Patient left in no apparent distress in bed 
[x] Call bell left within reach [x] Nursing notified 
[] Caregiver present 
[] Bed alarm activated Chris Thacker Time Calculation: 33 mins

## 2019-01-20 NOTE — PROGRESS NOTES
Bedside shift change report given to [address] [place] [address] (oncoming nurse) by Lyric Preston RN (offgoing nurse). Report included the following information SBAR, Kardex, ED Summary, Intake/Output, MAR, Accordion, Recent Results and Alarm Parameters . 8241 Assessment complete. No complaints of chest pain or shortness of breath. Call light is within reach. Shift Summary: Shift uneventful. No complaints of chest pain or shortness of breath. Call light is within reach.

## 2019-01-21 ENCOUNTER — APPOINTMENT (OUTPATIENT)
Dept: VASCULAR SURGERY | Age: 54
DRG: 139 | End: 2019-01-21
Attending: HOSPITALIST
Payer: MEDICAID

## 2019-01-21 ENCOUNTER — HOME HEALTH ADMISSION (OUTPATIENT)
Dept: HOME HEALTH SERVICES | Facility: HOME HEALTH | Age: 54
End: 2019-01-21

## 2019-01-21 LAB
ANION GAP SERPL CALC-SCNC: 5 MMOL/L (ref 3–18)
BUN SERPL-MCNC: 65 MG/DL (ref 7–18)
BUN/CREAT SERPL: 61 (ref 12–20)
CALCIUM SERPL-MCNC: 8.8 MG/DL (ref 8.5–10.1)
CHLORIDE SERPL-SCNC: 113 MMOL/L (ref 100–108)
CO2 SERPL-SCNC: 26 MMOL/L (ref 21–32)
CREAT SERPL-MCNC: 1.07 MG/DL (ref 0.6–1.3)
GLUCOSE BLD STRIP.AUTO-MCNC: 151 MG/DL (ref 70–110)
GLUCOSE BLD STRIP.AUTO-MCNC: 171 MG/DL (ref 70–110)
GLUCOSE BLD STRIP.AUTO-MCNC: 172 MG/DL (ref 70–110)
GLUCOSE BLD STRIP.AUTO-MCNC: 270 MG/DL (ref 70–110)
GLUCOSE SERPL-MCNC: 164 MG/DL (ref 74–99)
POTASSIUM SERPL-SCNC: 4.8 MMOL/L (ref 3.5–5.5)
SODIUM SERPL-SCNC: 144 MMOL/L (ref 136–145)

## 2019-01-21 PROCEDURE — 74011250636 HC RX REV CODE- 250/636: Performed by: FAMILY MEDICINE

## 2019-01-21 PROCEDURE — 80048 BASIC METABOLIC PNL TOTAL CA: CPT

## 2019-01-21 PROCEDURE — 97116 GAIT TRAINING THERAPY: CPT

## 2019-01-21 PROCEDURE — 94760 N-INVAS EAR/PLS OXIMETRY 1: CPT

## 2019-01-21 PROCEDURE — 74011636637 HC RX REV CODE- 636/637: Performed by: INTERNAL MEDICINE

## 2019-01-21 PROCEDURE — 74011250636 HC RX REV CODE- 250/636: Performed by: INTERNAL MEDICINE

## 2019-01-21 PROCEDURE — 74011250637 HC RX REV CODE- 250/637: Performed by: FAMILY MEDICINE

## 2019-01-21 PROCEDURE — 74011000250 HC RX REV CODE- 250: Performed by: INTERNAL MEDICINE

## 2019-01-21 PROCEDURE — 36415 COLL VENOUS BLD VENIPUNCTURE: CPT

## 2019-01-21 PROCEDURE — 74011250637 HC RX REV CODE- 250/637: Performed by: INTERNAL MEDICINE

## 2019-01-21 PROCEDURE — 97530 THERAPEUTIC ACTIVITIES: CPT

## 2019-01-21 PROCEDURE — 94640 AIRWAY INHALATION TREATMENT: CPT

## 2019-01-21 PROCEDURE — 74011000250 HC RX REV CODE- 250: Performed by: HOSPITALIST

## 2019-01-21 PROCEDURE — 93970 EXTREMITY STUDY: CPT

## 2019-01-21 PROCEDURE — 74011636637 HC RX REV CODE- 636/637: Performed by: FAMILY MEDICINE

## 2019-01-21 PROCEDURE — 65660000000 HC RM CCU STEPDOWN

## 2019-01-21 PROCEDURE — 74011636637 HC RX REV CODE- 636/637: Performed by: HOSPITALIST

## 2019-01-21 PROCEDURE — 74011250637 HC RX REV CODE- 250/637: Performed by: HOSPITALIST

## 2019-01-21 PROCEDURE — 82962 GLUCOSE BLOOD TEST: CPT

## 2019-01-21 RX ORDER — FUROSEMIDE 20 MG/1
20 TABLET ORAL DAILY
Status: DISCONTINUED | OUTPATIENT
Start: 2019-01-21 | End: 2019-01-24 | Stop reason: HOSPADM

## 2019-01-21 RX ORDER — NYSTATIN 100000 [USP'U]/G
POWDER TOPICAL 2 TIMES DAILY
Status: DISCONTINUED | OUTPATIENT
Start: 2019-01-21 | End: 2019-01-24 | Stop reason: HOSPADM

## 2019-01-21 RX ORDER — VANCOMYCIN 1.75 GRAM/500 ML IN 0.9 % SODIUM CHLORIDE INTRAVENOUS
1750 EVERY 12 HOURS
Status: DISCONTINUED | OUTPATIENT
Start: 2019-01-21 | End: 2019-01-22 | Stop reason: DRUGHIGH

## 2019-01-21 RX ADMIN — ATORVASTATIN CALCIUM 40 MG: 20 TABLET, FILM COATED ORAL at 08:41

## 2019-01-21 RX ADMIN — Medication 81 MG: at 08:41

## 2019-01-21 RX ADMIN — MONTELUKAST SODIUM 10 MG: 10 TABLET, FILM COATED ORAL at 21:23

## 2019-01-21 RX ADMIN — IPRATROPIUM BROMIDE AND ALBUTEROL SULFATE 3 ML: .5; 3 SOLUTION RESPIRATORY (INHALATION) at 15:47

## 2019-01-21 RX ADMIN — ARFORMOTEROL TARTRATE 15 MCG: 15 SOLUTION RESPIRATORY (INHALATION) at 07:04

## 2019-01-21 RX ADMIN — HYDROCODONE POLISTIREX AND CHLORPHENIRAMINE POLISTIREX 5 ML: 10; 8 SUSPENSION, EXTENDED RELEASE ORAL at 04:15

## 2019-01-21 RX ADMIN — Medication 10 ML: at 06:05

## 2019-01-21 RX ADMIN — Medication 10 ML: at 14:00

## 2019-01-21 RX ADMIN — NYSTATIN: 100000 POWDER TOPICAL at 11:46

## 2019-01-21 RX ADMIN — DULOXETINE HYDROCHLORIDE 30 MG: 30 CAPSULE, DELAYED RELEASE ORAL at 08:41

## 2019-01-21 RX ADMIN — INSULIN LISPRO 3 UNITS: 100 INJECTION, SOLUTION INTRAVENOUS; SUBCUTANEOUS at 21:24

## 2019-01-21 RX ADMIN — HEPARIN SODIUM 5000 UNITS: 5000 INJECTION INTRAVENOUS; SUBCUTANEOUS at 21:23

## 2019-01-21 RX ADMIN — LOSARTAN POTASSIUM 100 MG: 50 TABLET ORAL at 08:41

## 2019-01-21 RX ADMIN — INSULIN LISPRO 25 UNITS: 100 INJECTION, SOLUTION INTRAVENOUS; SUBCUTANEOUS at 17:42

## 2019-01-21 RX ADMIN — IPRATROPIUM BROMIDE AND ALBUTEROL SULFATE 3 ML: .5; 3 SOLUTION RESPIRATORY (INHALATION) at 07:04

## 2019-01-21 RX ADMIN — GUAIFENESIN 600 MG: 600 TABLET, EXTENDED RELEASE ORAL at 08:41

## 2019-01-21 RX ADMIN — VANCOMYCIN HYDROCHLORIDE 2000 MG: 10 INJECTION, POWDER, LYOPHILIZED, FOR SOLUTION INTRAVENOUS at 03:51

## 2019-01-21 RX ADMIN — INSULIN LISPRO 25 UNITS: 100 INJECTION, SOLUTION INTRAVENOUS; SUBCUTANEOUS at 08:41

## 2019-01-21 RX ADMIN — GABAPENTIN 300 MG: 300 CAPSULE ORAL at 08:41

## 2019-01-21 RX ADMIN — BUDESONIDE 500 MCG: 0.5 INHALANT RESPIRATORY (INHALATION) at 19:31

## 2019-01-21 RX ADMIN — HEPARIN SODIUM 5000 UNITS: 5000 INJECTION INTRAVENOUS; SUBCUTANEOUS at 03:50

## 2019-01-21 RX ADMIN — METHYLPREDNISOLONE SODIUM SUCCINATE 40 MG: 40 INJECTION, POWDER, FOR SOLUTION INTRAMUSCULAR; INTRAVENOUS at 17:41

## 2019-01-21 RX ADMIN — HYDROCODONE POLISTIREX AND CHLORPHENIRAMINE POLISTIREX 5 ML: 10; 8 SUSPENSION, EXTENDED RELEASE ORAL at 17:41

## 2019-01-21 RX ADMIN — VANCOMYCIN HYDROCHLORIDE 1750 MG: 10 INJECTION, POWDER, LYOPHILIZED, FOR SOLUTION INTRAVENOUS at 17:52

## 2019-01-21 RX ADMIN — METOPROLOL SUCCINATE 50 MG: 50 TABLET, EXTENDED RELEASE ORAL at 08:41

## 2019-01-21 RX ADMIN — FUROSEMIDE 20 MG: 20 TABLET ORAL at 11:45

## 2019-01-21 RX ADMIN — INSULIN LISPRO 3 UNITS: 100 INJECTION, SOLUTION INTRAVENOUS; SUBCUTANEOUS at 17:42

## 2019-01-21 RX ADMIN — INSULIN LISPRO 9 UNITS: 100 INJECTION, SOLUTION INTRAVENOUS; SUBCUTANEOUS at 11:45

## 2019-01-21 RX ADMIN — INSULIN LISPRO 3 UNITS: 100 INJECTION, SOLUTION INTRAVENOUS; SUBCUTANEOUS at 06:49

## 2019-01-21 RX ADMIN — BUDESONIDE 500 MCG: 0.5 INHALANT RESPIRATORY (INHALATION) at 07:04

## 2019-01-21 RX ADMIN — IPRATROPIUM BROMIDE AND ALBUTEROL SULFATE 3 ML: .5; 3 SOLUTION RESPIRATORY (INHALATION) at 11:17

## 2019-01-21 RX ADMIN — GABAPENTIN 300 MG: 300 CAPSULE ORAL at 21:23

## 2019-01-21 RX ADMIN — IPRATROPIUM BROMIDE AND ALBUTEROL SULFATE 3 ML: .5; 3 SOLUTION RESPIRATORY (INHALATION) at 19:31

## 2019-01-21 RX ADMIN — GUAIFENESIN 600 MG: 600 TABLET, EXTENDED RELEASE ORAL at 21:23

## 2019-01-21 RX ADMIN — NYSTATIN: 100000 POWDER TOPICAL at 21:25

## 2019-01-21 RX ADMIN — IPRATROPIUM BROMIDE AND ALBUTEROL SULFATE 3 ML: .5; 3 SOLUTION RESPIRATORY (INHALATION) at 23:13

## 2019-01-21 RX ADMIN — HEPARIN SODIUM 5000 UNITS: 5000 INJECTION INTRAVENOUS; SUBCUTANEOUS at 11:45

## 2019-01-21 RX ADMIN — INSULIN GLARGINE 23 UNITS: 100 INJECTION, SOLUTION SUBCUTANEOUS at 21:24

## 2019-01-21 RX ADMIN — CEFTRIAXONE 1 G: 1 INJECTION, POWDER, FOR SOLUTION INTRAMUSCULAR; INTRAVENOUS at 06:05

## 2019-01-21 RX ADMIN — ARFORMOTEROL TARTRATE 15 MCG: 15 SOLUTION RESPIRATORY (INHALATION) at 19:31

## 2019-01-21 RX ADMIN — INSULIN LISPRO 25 UNITS: 100 INJECTION, SOLUTION INTRAVENOUS; SUBCUTANEOUS at 11:45

## 2019-01-21 RX ADMIN — SODIUM CHLORIDE 500 MG: 900 INJECTION, SOLUTION INTRAVENOUS at 06:48

## 2019-01-21 RX ADMIN — METHYLPREDNISOLONE SODIUM SUCCINATE 40 MG: 40 INJECTION, POWDER, FOR SOLUTION INTRAMUSCULAR; INTRAVENOUS at 06:05

## 2019-01-21 RX ADMIN — RANITIDINE 150 MG: 150 TABLET ORAL at 17:41

## 2019-01-21 RX ADMIN — INSULIN GLARGINE 60 UNITS: 100 INJECTION, SOLUTION SUBCUTANEOUS at 08:41

## 2019-01-21 RX ADMIN — Medication 10 ML: at 21:24

## 2019-01-21 NOTE — PROGRESS NOTES
Hospitalist Progress Note-critical care note Patient: Nancy Marx MRN: [id number]  CSN: [id number] YOB: 1965  Age: 46 y.o. Sex: female DOA: 1/17/2019 LOS:  LOS: 4 days Chief complaint: chf/asthma-exacerbation, pna , dm  
 
Assessment/Plan Hospital Problems  Date Reviewed: 9/4/2017 Codes Class Noted POA BMI 45.0-49.9, adult [facility]) ICD-10-CM: N42.88 
ICD-9-CM: V85.42  1/19/2019 Unknown Pneumonia ICD-10-CM: J18.9 ICD-9-CM: 562  1/17/2019 Unknown * (Principal) Respiratory distress ICD-10-CM: R06.03 
ICD-9-CM: 786.09  1/17/2019 Unknown Asthma exacerbation ICD-10-CM: H57.152 ICD-9-CM: 493.92  1/17/2019 Unknown Diastolic CHF ([facility] [id number]: I50.30 ICD-9-CM: 428.30, 428.0  1/17/2019 Unknown Morbid obesity ([facility] ICD-10-CM: E66.01 
ICD-9-CM: 278.01  Unknown Yes Asthma ICD-10-CM: R90.007 ICD-9-CM: 493.90  Unknown Yes Overview Signed 4/5/2015  2:94 PM by Hillary Geller Note: As child had asthma Type II or unspecified type diabetes mellitus with renal manifestations, uncontrolled(250.42) ([facility]) ICD-10-CM: E11.29, E11.65 ICD-9-CM: 250.42  9/4/2014 Yes Morbid obesity with BMI of 40.0-44.9, [facility]) ICD-10-CM: E66.01, Z68.41 
ICD-9-CM: 278.01, V85.41  8/16/2014 Yes Pna Still coughs a lot,  
Continue iv abx, sob better Wean off nc O2 Asthma exacerbation Still winded Will continue current iv steroid-wean off slowly Continue breathing tx Chf. Acute on chronic Echo done , ef>70 Diastolic Restarted lasix Continue bbb Leg swelling  
pvl Dm type II Better controlled Insulin resistant. Will increase premeal insulin Morbid obesity Daughter was  at bedside. Subjective: still winded and leg swelling Nurse: coughs a lot  
 
bcx:AEROBIC BOTTLE DIPHTHEROIDS like contaminate Disposition :1-2 days Review of systems: 
 
General: No fevers or chills. Cardiovascular: No chest pain or pressure. No palpitations. Pulmonary:  shortness of breath on exertion Gastrointestinal: No nausea, vomiting. Vital signs/Intake and Output: 
Visit Vitals BP (!) 156/91 (BP 1 Location: Left arm, BP Patient Position: Sitting) Pulse 61 Temp 97.5 °F (36.4 °C) Resp 18 Ht 5' 4\" (1.626 m) Wt 132.4 kg (291 lb 14.9 oz) SpO2 99% Breastfeeding? No  
BMI 50.11 kg/m² Current Shift:  No intake/output data recorded. Last three shifts:  01/19 1901 - 01/21 0700 In: 1920 [P.O.:1160; I.V.:760] Out: - Physical Exam: 
General: WD, WN. Alert, cooperative, no acute distress   
HEENT: NC, Atraumatic. PERRLA, anicteric sclerae. Lungs: Mild Wheezing and Rhonchi/Rales. Heart:  Regular  rhythm,  No murmur, No Rubs, No Gallops Abdomen: Soft, Non distended, Non tender.  +Bowel sounds, Extremities: No c/c/e Psych:   Not anxious or agitated. Neurologic:  No acute neurological deficit. Labs: Results:  
   
Chemistry Recent Labs  
  01/21/19 
0414 01/20/19 
0310 01/19/19 
4983 * 169* 311*  141 138  
K 4.8 4.9 5.2 * 108 107 CO2 26 26 24 BUN 65* 84* 86* CREA 1.07 1.29 1.54* CA 8.8 9.5 8.9 AGAP 5 7 7 BUCR 61* 65* 56* CBC w/Diff Recent Labs  
  01/20/19 
0310 WBC 12.1 RBC 3.99* HGB 10.6* HCT 34.2*  
 GRANS 88* LYMPH 7*  
EOS 0 Cardiac Enzymes No results for input(s): CPK, CKND1, MARIA ISABEL in the last 72 hours. No lab exists for component: Mirian [place] Coagulation No results for input(s): PTP, INR, APTT in the last 72 hours. No lab exists for component: INREXT, INREXT Lipid Panel Lab Results Component Value Date/Time  Cholesterol, total 146 05/28/2015 02:58 AM  
 HDL Cholesterol 64 (H) 05/28/2015 02:58 AM  
 LDL, calculated 66 05/28/2015 02:58 AM  
 VLDL, calculated 16 05/28/2015 02:58 AM  
 Triglyceride 80 05/28/2015 02:58 AM  
 CHOL/HDL Ratio 2.3 05/28/2015 02:58 AM  
  
 BNP No results for input(s): BNPP in the last 72 hours. Liver Enzymes No results for input(s): TP, ALB, TBIL, AP, SGOT, GPT in the last 72 hours. No lab exists for component: DBIL Thyroid Studies No results found for: T4, T3U, TSH, TSHEXT, TSHEXT Procedures/imaging: see electronic medical records for all procedures/Xrays and details which were not copied into this note but were reviewed prior to creation of Plan Janette Cleveland MD

## 2019-01-21 NOTE — PROGRESS NOTES
1945  Pt in bed, talking to visitor. Denies c/o, concerns at this time. 2057  Pt sitting in bed, watching TV. Reports headache, Tylenol given. Pt has many bottles of diet Dr Emmie Levine at the bedside. Pt states she has had many visitors during the day and she feels tired from her many conversations. Lungs with exp wheezes and coarseness, on RA. 
 
2155  States she feels better. FSBS 113, given Lantus as ordered, also snack of crackers and peanut butter. Pt is very sleepy and is laying in bed, trying to go to sleep. 0010  Sitting at bedside. States she woke up and felt slightly sweaty, ate the crackers and peanut butter from earlier, and now feels good. Lungs sound much better, reports she had a mod amt sputum. No needs voiced. 0210  Quietly in bed with eyes closed. NAD, resp easy and unlabored. 0420  Pt awakens easily. Denies pain. Denies need to void. Jose leg edema +3. Coughing fit with small amt sputum production, Tussionex given. Up to restroom with walker, pt is very weak. Back to bed, shortness of breath noted with exertion. 6168  Resting quietly and awakens easily. No c/o voiced. Lab in to draw blood. 0720  Sitting at bedside, breathing treatment in progress. 0730  {[facility] BEDSIDE_[facility]BAL_Bedside shift change report given to Raven Roldan RN (oncoming nurse) by YENNY Grace RN (offgoing nurse). Report included the following information SBAR, Kardex, Intake/Output, MAR, Cardiac Rhythm SR and Alarm Parameters .

## 2019-01-21 NOTE — ROUTINE PROCESS
Bedside and Verbal shift change report given to Monty Smith RN by Vielka Issa RN.  Report included the following information SBAR, Kardex, OR Summary, Intake/Output and MAR

## 2019-01-21 NOTE — PROGRESS NOTES
Transtion of care: anticipated d/c in 48 hours Met with patient at bedsid. e she states she did previously have [employer] but they told her to qualify for medicaid. Stats she did take all the paper work as requested to  and they sent her a letter that her paper work was incomplete. She is ow in hospital. CM did ask [employer] to assist patient with FAA. she informed cm that [employer] did come by and she filled outthe application of r medicaid. 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 States she  lives alone but has 3 adult children who live in the area. Patient states she has a PCP  Has a RW and cane. She denies other needs. Cm will continue to follow. Care Management Interventions PCP Verified by CM: Yes Mode of Transport at Discharge: Other (see comment)(daughter) Transition of Care Consult (CM Consult): Discharge Planning Current Support Network: Lives Alone, Family Lives [place] Confirm Follow Up Transport: Family Plan discussed with Pt/Family/Caregiver:  Yes

## 2019-01-21 NOTE — PROGRESS NOTES
PATIENT WAS PLACED ON 2L NC BY RN EARLIER THIS MORNING AFTER SHE BECAME ACUTELY SOB DURING COUGHING EPISODE.  SPO2 98% ON 2L NC.  NOW DECREASED TO 1L.

## 2019-01-21 NOTE — PROGRESS NOTES
Occupational Therapy Screening: 
Services are indicated. Evaluate and Treat Order is requested. [facility] for [facility] therapy based on results obtained during the nursing admission assessment. The patients chart was reviewed and the patient is appropriate for a skilled therapy evaluation. Patient admitted w/ respiratory distress and multiple co-morbidities. Pt for d/c this date. PLEASE order Home Health OT. Thank you.  
Afia Rm, OTR/L

## 2019-01-21 NOTE — PROGRESS NOTES
Problem: Mobility Impaired (Adult and Pediatric) Goal: *Acute Goals and Plan of Care (Insert Text) Physical Therapy Goals Initiated 1/19/2019 and to be accomplished within 3-5 day(s) 1. Patient will move from supine <> sit with S in prep for out of bed activity and change of position. 2.  Patient will perform sit<> stand with S with LRAD in prep for transfers/ambulation. 3.  Patient will transfer from bed <> chair with S with LRAD for time up in chair for completion of ADL activity. 4.  Patient will ambulate 150 feet with LRAD/S for improved functional mobility/safe discharge. .  
Outcome: Progressing Towards Goal 
physical Therapy TREATMENT Patient: Mary Ventura (46 y.o. female) Date: 1/21/2019 Diagnosis: Respiratory distress Pneumonia Asthma exacerbation Diastolic CHF ([facility] [address] Pneumonia Respiratory distress Precautions: Fall Chart, physical therapy assessment, plan of care and goals were reviewed. ASSESSMENT: 
Pt has met goals for bed mobility and transfers. Demonstrates gt with RW/S 110ft with supplemental O2 at 3L nc as when found in room by PT. Pt observed by Dr. Cyn Ferrera during GT and PT was advised to decrease O2 to 2Lnc by MD.  Note O2 sat 100% pre gt and % post gt. Pt educated in safety during turn nego. Returned to room and left up in chair with LE's elevated in short back chair, all needs left in reach and Resp. Therapist present to give breathing treatment. Notified Resp Therapist of supplemental O2 level changes as noted above. Will continue per POC. Recommend HHPT. Progression toward goals: 
[x]      Improving appropriately and progressing toward goals 
[]      Improving slowly and progressing toward goals 
[]      Not making progress toward goals and plan of care will be adjusted PLAN: 
Patient continues to benefit from skilled intervention to address the above impairments. Continue treatment per established plan of care. Discharge Recommendations:  Home Health Further Equipment Recommendations for Discharge:  N/A  
 
SUBJECTIVE:  
Patient stated Adriana Locke had a terrible coughing spell earlier.  OBJECTIVE DATA SUMMARY:  
Critical Behavior: 
Neurologic State: Alert, Appropriate for age Orientation Level: Appropriate for age, Oriented X4 Cognition: Appropriate decision making, Appropriate for age attention/concentration, Appropriate safety awareness, Follows commands Functional Mobility Training: 
Bed Mobility: 
Supine to Sit: Modified independent Transfers: 
Sit to Stand: Supervision Stand to Sit: Supervision Bed to Chair: Supervision Balance: 
Sitting: Intact Standing: Intact; With supportAmbulation/Gait Training: 
Distance (ft): 110 Feet (ft) Assistive Device: Gait belt Ambulation - Level of Assistance: Supervision Gait Abnormalities: Decreased step clearance Base of Support: Widened Speed/Arlette: Slow Step Length: Right shortened;Left shortened Interventions: Safety awareness training Pain: 
Pain Scale 1: Numeric (0 - 10) Pain Intensity 1: 0 Activity Tolerance:  
Fair Please refer to the flowsheet for vital signs taken during this treatment. After treatment:  
[x] Patient left in no apparent distress sitting up in chair with LE's elevated getting breathing treatment 
[] Patient left in no apparent distress in bed 
[x] Call bell left within reach 
[] Nursing notified 
[] Caregiver present 
[] Bed alarm activated Adrián Joseph PT Time Calculation: 28 mins

## 2019-01-21 NOTE — PROGRESS NOTES
Problem: Pressure Injury - Risk of 
Goal: *Prevention of pressure injury Document Elian Scale and appropriate interventions in the flowsheet. Outcome: Progressing Towards Goal 
Pressure Injury Interventions: Activity Interventions: Assess need for specialty bed, Pressure redistribution bed/mattress(bed type) Mobility Interventions: Assess need for specialty bed, Pressure redistribution bed/mattress (bed type) Nutrition Interventions: Document food/fluid/supplement intake, Discuss nutritional consult with provider Friction and Shear Interventions: Apply protective barrier, creams and emollients Problem: Falls - Risk of 
Goal: *Absence of Falls Document Jesenia Thurman Fall Risk and appropriate interventions in the flowsheet. Outcome: Progressing Towards Goal 
Fall Risk Interventions: 
Mobility Interventions: Assess mobility with egress test 
 
  
 
Medication Interventions: Evaluate medications/consider consulting pharmacy, Patient to call before getting OOB History of Falls Interventions: Door open when patient unattended

## 2019-01-21 NOTE — PROGRESS NOTES
Pharmacy Dosing Services: Vancomycin Consult for Vancomycin Dosing by Pharmacy by Dr. Eliseo Matos Consult provided for this 46y.o. year old female , for indication of Bloodstream Infection. Day of Therapy 03 Ht Readings from Last 1 Encounters:  
01/17/19 162.6 cm (64\") Wt Readings from Last 1 Encounters:  
01/21/19 132.4 kg (291 lb 14.9 oz) Previous Regimen Vancomycin 2 gm IV q24 Last Level NA Other Current Antibiotics Rocephin 1 gm IV q24, Azithromycin 500 mg IV q24 Significant Cultures Pending Serum Creatinine Lab Results Component Value Date/Time Creatinine 1.07 01/21/2019 04:14 AM  
 Creatinine, POC 0.3 (L) 08/10/2014 11:05 PM  
  
Creatinine Clearance Estimated Creatinine Clearance: 81.4 mL/min (based on SCr of 1.07 mg/dL). BUN Lab Results Component Value Date/Time BUN 65 (H) 01/21/2019 04:14 AM  
 BUN, POC 10 08/10/2014 11:05 PM  
  
WBC Lab Results Component Value Date/Time WBC 12.1 01/20/2019 03:10 AM  
  
H/H Lab Results Component Value Date/Time HGB 10.6 (L) 01/20/2019 03:10 AM  
  
Platelets Lab Results Component Value Date/Time PLATELET 575 90/99/3017 03:10 AM  
  
Temp 98.2 °F (36.8 °C) Due to Renal improvement, will adjust dose to Vancomycin 1750 mg IV q12, beginning @ 19:00 1/21/19 Dose calculated to approximate a therapeutic trough of 15-20 mcg/mL. Pharmacy to follow daily and will make changes to dose and/or frequency based on clinical status. Tiffany Mohr, Pharm. D. Clinical Pharmacist 
[phone]

## 2019-01-22 LAB
ANION GAP SERPL CALC-SCNC: 5 MMOL/L (ref 3–18)
BUN SERPL-MCNC: 59 MG/DL (ref 7–18)
BUN/CREAT SERPL: 48 (ref 12–20)
CALCIUM SERPL-MCNC: 9 MG/DL (ref 8.5–10.1)
CHLORIDE SERPL-SCNC: 111 MMOL/L (ref 100–108)
CO2 SERPL-SCNC: 27 MMOL/L (ref 21–32)
CREAT SERPL-MCNC: 1.23 MG/DL (ref 0.6–1.3)
DATE LAST DOSE: ABNORMAL
GLUCOSE BLD STRIP.AUTO-MCNC: 107 MG/DL (ref 70–110)
GLUCOSE BLD STRIP.AUTO-MCNC: 141 MG/DL (ref 70–110)
GLUCOSE BLD STRIP.AUTO-MCNC: 195 MG/DL (ref 70–110)
GLUCOSE BLD STRIP.AUTO-MCNC: 211 MG/DL (ref 70–110)
GLUCOSE BLD STRIP.AUTO-MCNC: 215 MG/DL (ref 70–110)
GLUCOSE BLD STRIP.AUTO-MCNC: 38 MG/DL (ref 70–110)
GLUCOSE BLD STRIP.AUTO-MCNC: 42 MG/DL (ref 70–110)
GLUCOSE SERPL-MCNC: 288 MG/DL (ref 74–99)
POTASSIUM SERPL-SCNC: 5 MMOL/L (ref 3.5–5.5)
REPORTED DOSE,DOSE: ABNORMAL UNITS
REPORTED DOSE/TIME,TMG: 700
SODIUM SERPL-SCNC: 143 MMOL/L (ref 136–145)
VANCOMYCIN TROUGH SERPL-MCNC: 32.4 UG/ML (ref 10–20)

## 2019-01-22 PROCEDURE — 94760 N-INVAS EAR/PLS OXIMETRY 1: CPT

## 2019-01-22 PROCEDURE — 82962 GLUCOSE BLOOD TEST: CPT

## 2019-01-22 PROCEDURE — 74011000250 HC RX REV CODE- 250: Performed by: HOSPITALIST

## 2019-01-22 PROCEDURE — 36415 COLL VENOUS BLD VENIPUNCTURE: CPT

## 2019-01-22 PROCEDURE — 77010033678 HC OXYGEN DAILY

## 2019-01-22 PROCEDURE — 80048 BASIC METABOLIC PNL TOTAL CA: CPT

## 2019-01-22 PROCEDURE — 97116 GAIT TRAINING THERAPY: CPT

## 2019-01-22 PROCEDURE — 80202 ASSAY OF VANCOMYCIN: CPT

## 2019-01-22 PROCEDURE — 74011250637 HC RX REV CODE- 250/637: Performed by: INTERNAL MEDICINE

## 2019-01-22 PROCEDURE — 74011250636 HC RX REV CODE- 250/636: Performed by: INTERNAL MEDICINE

## 2019-01-22 PROCEDURE — 77030027138 HC INCENT SPIROMETER -A

## 2019-01-22 PROCEDURE — 65660000000 HC RM CCU STEPDOWN

## 2019-01-22 PROCEDURE — 74011636637 HC RX REV CODE- 636/637: Performed by: HOSPITALIST

## 2019-01-22 PROCEDURE — 97530 THERAPEUTIC ACTIVITIES: CPT

## 2019-01-22 PROCEDURE — 74011250637 HC RX REV CODE- 250/637: Performed by: HOSPITALIST

## 2019-01-22 PROCEDURE — 74011000250 HC RX REV CODE- 250: Performed by: INTERNAL MEDICINE

## 2019-01-22 PROCEDURE — 74011250636 HC RX REV CODE- 250/636: Performed by: FAMILY MEDICINE

## 2019-01-22 PROCEDURE — 74011250637 HC RX REV CODE- 250/637: Performed by: FAMILY MEDICINE

## 2019-01-22 PROCEDURE — 74011636637 HC RX REV CODE- 636/637: Performed by: INTERNAL MEDICINE

## 2019-01-22 PROCEDURE — 94640 AIRWAY INHALATION TREATMENT: CPT

## 2019-01-22 RX ORDER — INSULIN GLARGINE 100 [IU]/ML
40 INJECTION, SOLUTION SUBCUTANEOUS DAILY
Status: DISCONTINUED | OUTPATIENT
Start: 2019-01-23 | End: 2019-01-24 | Stop reason: HOSPADM

## 2019-01-22 RX ADMIN — METHYLPREDNISOLONE SODIUM SUCCINATE 40 MG: 40 INJECTION, POWDER, FOR SOLUTION INTRAMUSCULAR; INTRAVENOUS at 05:09

## 2019-01-22 RX ADMIN — INSULIN LISPRO 25 UNITS: 100 INJECTION, SOLUTION INTRAVENOUS; SUBCUTANEOUS at 09:15

## 2019-01-22 RX ADMIN — GUAIFENESIN 600 MG: 600 TABLET, EXTENDED RELEASE ORAL at 21:37

## 2019-01-22 RX ADMIN — METOPROLOL SUCCINATE 50 MG: 50 TABLET, EXTENDED RELEASE ORAL at 09:17

## 2019-01-22 RX ADMIN — Medication 10 ML: at 21:39

## 2019-01-22 RX ADMIN — IPRATROPIUM BROMIDE AND ALBUTEROL SULFATE 3 ML: .5; 3 SOLUTION RESPIRATORY (INHALATION) at 16:48

## 2019-01-22 RX ADMIN — CEFTRIAXONE 1 G: 1 INJECTION, POWDER, FOR SOLUTION INTRAMUSCULAR; INTRAVENOUS at 05:09

## 2019-01-22 RX ADMIN — LOSARTAN POTASSIUM 100 MG: 50 TABLET ORAL at 09:17

## 2019-01-22 RX ADMIN — INSULIN LISPRO 3 UNITS: 100 INJECTION, SOLUTION INTRAVENOUS; SUBCUTANEOUS at 12:27

## 2019-01-22 RX ADMIN — IPRATROPIUM BROMIDE AND ALBUTEROL SULFATE 3 ML: .5; 3 SOLUTION RESPIRATORY (INHALATION) at 23:18

## 2019-01-22 RX ADMIN — FUROSEMIDE 20 MG: 20 TABLET ORAL at 09:17

## 2019-01-22 RX ADMIN — HEPARIN SODIUM 5000 UNITS: 5000 INJECTION INTRAVENOUS; SUBCUTANEOUS at 21:37

## 2019-01-22 RX ADMIN — HEPARIN SODIUM 5000 UNITS: 5000 INJECTION INTRAVENOUS; SUBCUTANEOUS at 12:26

## 2019-01-22 RX ADMIN — ARFORMOTEROL TARTRATE 15 MCG: 15 SOLUTION RESPIRATORY (INHALATION) at 07:49

## 2019-01-22 RX ADMIN — GABAPENTIN 300 MG: 300 CAPSULE ORAL at 21:37

## 2019-01-22 RX ADMIN — HYDROCODONE POLISTIREX AND CHLORPHENIRAMINE POLISTIREX 5 ML: 10; 8 SUSPENSION, EXTENDED RELEASE ORAL at 17:54

## 2019-01-22 RX ADMIN — HEPARIN SODIUM 5000 UNITS: 5000 INJECTION INTRAVENOUS; SUBCUTANEOUS at 05:09

## 2019-01-22 RX ADMIN — BUDESONIDE 500 MCG: 0.5 INHALANT RESPIRATORY (INHALATION) at 07:49

## 2019-01-22 RX ADMIN — INSULIN GLARGINE 60 UNITS: 100 INJECTION, SOLUTION SUBCUTANEOUS at 09:15

## 2019-01-22 RX ADMIN — IPRATROPIUM BROMIDE AND ALBUTEROL SULFATE 3 ML: .5; 3 SOLUTION RESPIRATORY (INHALATION) at 11:29

## 2019-01-22 RX ADMIN — MONTELUKAST SODIUM 10 MG: 10 TABLET, FILM COATED ORAL at 21:37

## 2019-01-22 RX ADMIN — NYSTATIN: 100000 POWDER TOPICAL at 21:39

## 2019-01-22 RX ADMIN — DULOXETINE HYDROCHLORIDE 30 MG: 30 CAPSULE, DELAYED RELEASE ORAL at 09:17

## 2019-01-22 RX ADMIN — IPRATROPIUM BROMIDE AND ALBUTEROL SULFATE 3 ML: .5; 3 SOLUTION RESPIRATORY (INHALATION) at 07:49

## 2019-01-22 RX ADMIN — ATORVASTATIN CALCIUM 40 MG: 20 TABLET, FILM COATED ORAL at 09:16

## 2019-01-22 RX ADMIN — INSULIN LISPRO 6 UNITS: 100 INJECTION, SOLUTION INTRAVENOUS; SUBCUTANEOUS at 06:38

## 2019-01-22 RX ADMIN — DEXTROSE MONOHYDRATE 25 G: 500 INJECTION PARENTERAL at 21:32

## 2019-01-22 RX ADMIN — METHYLPREDNISOLONE SODIUM SUCCINATE 40 MG: 40 INJECTION, POWDER, FOR SOLUTION INTRAMUSCULAR; INTRAVENOUS at 17:59

## 2019-01-22 RX ADMIN — ARFORMOTEROL TARTRATE 15 MCG: 15 SOLUTION RESPIRATORY (INHALATION) at 23:17

## 2019-01-22 RX ADMIN — INSULIN LISPRO 25 UNITS: 100 INJECTION, SOLUTION INTRAVENOUS; SUBCUTANEOUS at 14:06

## 2019-01-22 RX ADMIN — BUDESONIDE 500 MCG: 0.5 INHALANT RESPIRATORY (INHALATION) at 23:18

## 2019-01-22 RX ADMIN — NYSTATIN: 100000 POWDER TOPICAL at 09:17

## 2019-01-22 RX ADMIN — GUAIFENESIN 600 MG: 600 TABLET, EXTENDED RELEASE ORAL at 09:16

## 2019-01-22 RX ADMIN — VANCOMYCIN HYDROCHLORIDE 1750 MG: 10 INJECTION, POWDER, LYOPHILIZED, FOR SOLUTION INTRAVENOUS at 06:38

## 2019-01-22 RX ADMIN — GABAPENTIN 300 MG: 300 CAPSULE ORAL at 09:17

## 2019-01-22 RX ADMIN — SODIUM CHLORIDE 500 MG: 900 INJECTION, SOLUTION INTRAVENOUS at 06:39

## 2019-01-22 RX ADMIN — Medication 10 ML: at 05:09

## 2019-01-22 RX ADMIN — Medication 81 MG: at 09:17

## 2019-01-22 RX ADMIN — Medication 10 ML: at 14:00

## 2019-01-22 RX ADMIN — INSULIN LISPRO 25 UNITS: 100 INJECTION, SOLUTION INTRAVENOUS; SUBCUTANEOUS at 17:55

## 2019-01-22 NOTE — CONSULTS
Pulmonary and Sleep Medicine     Pulmonary Consult Note    Patient: Ava Tafoya               Sex: female          DOA: 1/17/2019       YOB: 1965      Age:  46 y.o.        LOS:  LOS: 5 days        Reason for Consult: HORAN    IMPRESSION:   · Principal Problem:  ·   Respiratory distress (1/17/2019)  ·   · Active Problems:  ·   Morbid obesity with BMI of 40.0-44.9, adult ([facility] [address] (8/16/2014)  ·   ·   Type II or unspecified type diabetes mellitus with renal manifestations, uncontrolled(250.42) ([facility] [address] (9/4/2014)  ·   ·   Asthma ()  ·     Overview: Note: As child had asthma  ·   ·   Morbid obesity ([facility] [address] ()  ·   ·   Pneumonia (1/17/2019)  ·   ·   Asthma exacerbation (1/17/2019)  ·   ·   Diastolic CHF ([facility] (7/40/4752)  ·   ·   BMI 45.0-49.9, adult ([facility] (1/19/2019)  ·   · Acute hypercapnic failure   RECOMMENDATIONS:   Patient is on room air; may use supplemental O2 via NC as needed for goal SPO2> 90%  B. Pertussis IG, UR legionella and streptococcus Ag; respiratory vital panel, Procalcitonin   XR chest in AM; any CT chest based on clinical course  Patient would benefit from PFT and sleep study out patient  Continue bronchodilators, pulmonary hygiene care  Steroids taper per clinical response  Screen for any O2 need prior to DC home  Antibiotic choice: Rocephin, Azithromycin, Vancomycin. No MRSA, may stop Vancomycin if CXR without any worsening. Cultures will be followed. Aspiration prevention bundle, head of the bed at 30' all times  Await cardiology input  Check pro-BNP, TSH  Diuresis per cardiology  Glycemic control  Stress ulcer prophylaxis  DVT prophylaxis  Will defer respective systems problem management to primary and other consultant and follow patient with primary and other team  Further recommendations will be based on the patient's response to recommended treatment and results of the investigation ordered.      HPI:   Ms. Ava Tafoya is a 46 y.o. female who is being seen for HORAN, cough at the request of Dr. Brianna Brewer. Patient provided information. Patient reports long standing symptoms of HORAN for several years that progressed and lately in past few months associated with cough that didn't improve with stopping lisinopril. She was unable to see pulmonologist secondary to being depending on transport arrangements. She has been admitted for CHF exacerbation and pneumonia. Didn't improve with management so far. Denies any prior hx of asthma. No prior work up for АНДРЕЙ. She has GERD, chronic sinusitis. Previously followed with cardiologist Dr. Delma Campos but unable to follow 2' to insurance limitations. Has chronic leg swelling. So far VQ scan, PVL of legs been negative.      Review of Systems  General ROS: negative for - fever, chills, weight loss, fatigue and malaise  Psychological ROS: negative for - anxiety or depression  Ophthalmic ROS: negative for - eye pain, loss of vision or photophobia  ENT ROS: negative for - epistaxis, headaches, sinus pain or vocal changes  Allergy and Immunology ROS: negative for - hives or postnasal drip  Hematological and Lymphatic ROS: negative for - bleeding problems, blood clots, jaundice, pallor or swollen lymph nodes  Endocrine ROS: negative for - skin changes, temperature intolerance or unexpected weight changes  Cardiovascular ROS: negative for - chest pain, murmur, orthopnea, palpitations or paroxysmal nocturnal dyspnea  Gastrointestinal ROS: no abdominal pain, change in bowel habits, or black or bloody stools  Genito-Urinary ROS: no dysuria, trouble voiding, or hematuria  Musculoskeletal ROS: negative for - muscle pain or muscular weakness  Neurological ROS: no TIA or stroke symptoms  Dermatological ROS: negative for - pruritus, rash or skin lesion changes     Past Medical History  Past Medical History:   Diagnosis Date    Asthma     Note: As child had asthma    Diabetes mellitus ([facility] [address]     Type 2    Heart palpitations     Hypertension     Ill-defined condition     fibromyalgia  Morbid obesity ([facility] [address]     MRSA infection 8/2014       Past Surgical History  Past Surgical History:   Procedure Laterality Date    BREAST SURGERY PROCEDURE UNLISTED      HX CHOLECYSTECTOMY      HX TUBAL LIGATION         Family History  Family History   Problem Relation Age of Onset    Heart Attack Mother     Heart Attack Maternal Grandmother        Social History  Social History     Tobacco Use    Smoking status: Never Smoker    Smokeless tobacco: Never Used   Substance Use Topics    Alcohol use: No    Drug use: No        Medications  Current Facility-Administered Medications   Medication Dose Route Frequency    Vancomycin trough due 1-22-19 @ 1830 ( 30 minutes prior to 1900 dose )   1 Each Other ONCE    nystatin (MYCOSTATIN) 100,000 unit/gram powder   Topical BID    furosemide (LASIX) tablet 20 mg  20 mg Oral DAILY    vancomycin (VANCOCIN) 1750 mg in  ml infusion  1,750 mg IntraVENous Q12H    methylPREDNISolone (PF) (SOLU-MEDROL) injection 40 mg  40 mg IntraVENous Q12H    Vancomycin- Pharmacy to dose  1 Each Other Rx Dosing/Monitoring    acetaminophen (TYLENOL) tablet 650 mg  650 mg Oral Q4H PRN    montelukast (SINGULAIR) tablet 10 mg  10 mg Oral QHS    insulin glargine (LANTUS) injection 23 Units  23 Units SubCUTAneous QHS    raNITIdine (ZANTAC) tablet 150 mg  150 mg Oral DAILY PRN    insulin lispro (HUMALOG) injection 25 Units  25 Units SubCUTAneous TIDAC    sodium chloride (NS) flush 5-40 mL  5-40 mL IntraVENous Q8H    sodium chloride (NS) flush 5-40 mL  5-40 mL IntraVENous PRN    azithromycin (ZITHROMAX) 500 mg in 0.9% sodium chloride 250 mL IVPB  500 mg IntraVENous Q24H    heparin (porcine) injection 5,000 Units  5,000 Units SubCUTAneous Q8H    insulin lispro (HUMALOG) injection   SubCUTAneous AC&HS    glucose chewable tablet 16 g  4 Tab Oral PRN    glucagon (GLUCAGEN) injection 1 mg  1 mg IntraMUSCular PRN    dextrose (D50W) injection syrg 12.5-25 g  25-50 mL IntraVENous PRN    aspirin chewable tablet 81 mg  81 mg Oral DAILY    atorvastatin (LIPITOR) tablet 40 mg  40 mg Oral DAILY    DULoxetine (CYMBALTA) capsule 30 mg  30 mg Oral DAILY    gabapentin (NEURONTIN) capsule 300 mg  300 mg Oral BID    metoprolol succinate (TOPROL-XL) XL tablet 50 mg  50 mg Oral DAILY    losartan (COZAAR) tablet 100 mg  100 mg Oral DAILY    albuterol-ipratropium (DUO-NEB) 2.5 MG-0.5 MG/3 ML  3 mL Nebulization Q4H RT    cefTRIAXone (ROCEPHIN) 1 g in sterile water (preservative free) 10 mL IV syringe  1 g IntraVENous Q24H    influenza vaccine 2018-19 (6 mos+)(PF) (FLUARIX QUAD/FLULAVAL QUAD) injection 0.5 mL  0.5 mL IntraMUSCular PRIOR TO DISCHARGE    guaiFENesin ER (MUCINEX) tablet 600 mg  600 mg Oral Q12H    budesonide (PULMICORT) 500 mcg/2 ml nebulizer suspension  500 mcg Nebulization BID RT    arformoterol (BROVANA) neb solution 15 mcg  15 mcg Nebulization BID RT    chlorpheniramine-HYDROcodone (TUSSIONEX) oral suspension 5 mL  5 mL Oral Q12H PRN    insulin glargine (LANTUS) injection 60 Units  60 Units SubCUTAneous DAILY     Prior to Admission medications    Medication Sig Start Date End Date Taking? Authorizing Provider   metFORMIN (GLUCOPHAGE) 1,000 mg tablet Take 1,000 mg by mouth two (2) times daily (with meals). Yes Lizzette, MD Yoana   metoprolol succinate (TOPROL-XL) 50 mg XL tablet Take 50 mg by mouth daily. Yes Lizzette, MD Yoana   DULoxetine (CYMBALTA) 30 mg capsule Take 30 mg by mouth daily. Yes Lizzette, MD Yoana   atorvastatin (LIPITOR) 40 mg tablet Take 40 mg by mouth daily. Yes Lizzette, MD Yoana   losartan (COZAAR) 100 mg tablet Take 100 mg by mouth daily. Yes Lizzette, MD Yoana   OTHER,NON-FORMULARY, Medication for irregular heart beat   Yes Lizzette, MD Yoana   gabapentin (NEURONTIN) 300 mg capsule Take 300 mg by mouth two (2) times a day. Yes Yoana Crocker MD   furosemide (LASIX) 40 mg tablet Take 1 Tab by mouth daily.  2/13/17  Yes Claudia Wilburn MD   lisinopril (PRINIVIL, ZESTRIL) 10 mg tablet Take 1 Tab by mouth daily. 3/3/98  Yes Brigido Smith MD   insulin NPH/insulin regular (NOVOLIN 70/30) 100 unit/mL (70-30) injection 50 Units by SubCUTAneous route every twelve (12) hours. Yes Provider, Historical   insulin regular (NOVOLIN R) 100 unit/mL injection by SubCUTAneous route Daily (before lunch). Yes Provider, Historical   aspirin 81 mg chewable tablet Take 1 tablet by mouth daily. 14  Yes Renate Briscoe MD       Allergy  Allergies   Allergen Reactions    Bees [Sting, Bee] Swelling    Penicillins Hives    Proctor Hives       Physical Exam:   Vital Signs:    Blood pressure 157/68, pulse 66, temperature 97.7 °F (36.5 °C), resp. rate 19, height 5' 4\" (1.626 m), weight 135.3 kg (298 lb 3.2 oz), SpO2 98 %, not currently breastfeeding. Body mass index is 51.19 kg/m². O2 Device: Room air      Temp (24hrs), Av.8 °F (36.6 °C), Min:97.6 °F (36.4 °C), Max:98.2 °F (36.8 °C)       Intake/Output:   Last shift:      No intake/output data recorded. Last 3 shifts:  1901 -  0700  In: 1560 [P.O.:800;  I.V.:760]  Out: 0     Intake/Output Summary (Last 24 hours) at 2019 1150  Last data filed at 2019 1935  Gross per 24 hour   Intake 0 ml   Output 0 ml   Net 0 ml      General: in no respiratory distress and acyanotic, alert and oriented times 3, appears stated age, morbidly obese, on room air  HEENT: PERRLA, EOMI, fundi benign, throat normal without erythema or exudate  Neck: No abnormally enlarged lymph nodes or thyroid, supple  Chest: normal  Lungs: moderate air entry, rhonchi scattered bases bilaterally, normal percussion bilaterally, no tenderness/ rash  Heart: Regular rate and rhythm, S1S2 present or without murmur or extra heart sounds  Abdomen: morbid obesity, bowel sounds normoactive, tympanic, abdomen is soft without significant tenderness, masses, organomegaly or guarding  Extremity: 3+, pitting and bl LE edema; no cyanosis, clubbing  Skin: Skin color, texture, turgor normal. No rashes or lesions    Labs Reviewed:  Recent Results (from the past 24 hour(s))   GLUCOSE, POC    Collection Time: 01/21/19  4:35 PM   Result Value Ref Range    Glucose (POC) 172 (H) 70 - 110 mg/dL   GLUCOSE, POC    Collection Time: 01/21/19  9:05 PM   Result Value Ref Range    Glucose (POC) 171 (H) 70 - 791 mg/dL   METABOLIC PANEL, BASIC    Collection Time: 01/22/19  4:10 AM   Result Value Ref Range    Sodium 143 136 - 145 mmol/L    Potassium 5.0 3.5 - 5.5 mmol/L    Chloride 111 (H) 100 - 108 mmol/L    CO2 27 21 - 32 mmol/L    Anion gap 5 3.0 - 18 mmol/L    Glucose 288 (H) 74 - 99 mg/dL    BUN 59 (H) 7.0 - 18 MG/DL    Creatinine 1.23 0.6 - 1.3 MG/DL    BUN/Creatinine ratio 48 (H) 12 - 20      GFR est AA 55 (L) >60 ml/min/1.73m2    GFR est non-AA 46 (L) >60 ml/min/1.73m2    Calcium 9.0 8.5 - 10.1 MG/DL   GLUCOSE, POC    Collection Time: 01/22/19  6:12 AM   Result Value Ref Range    Glucose (POC) 215 (H) 70 - 110 mg/dL   GLUCOSE, POC    Collection Time: 01/22/19  9:14 AM   Result Value Ref Range    Glucose (POC) 211 (H) 70 - 110 mg/dL           No results for input(s): FIO2I, IFO2, HCO3I, IHCO3, HCOPOC, PCO2I, PCOPOC, IPHI, PHI, PHPOC, PO2I, PO2POC in the last 72 hours.     No lab exists for component: IPOC2    All Micro Results     Procedure Component Value Units Date/Time    CULTURE, BLOOD [[id number]] Collected:  01/17/19 0600    Order Status:  Completed Specimen:  Whole Blood Updated:  01/22/19 0702     Special Requests: NO SPECIAL REQUESTS        Culture result: NO GROWTH 5 DAYS       CULTURE, BLOOD [[id number]]  (Abnormal) Collected:  01/17/19 0615    Order Status:  Completed Specimen:  Whole Blood Updated:  01/20/19 1022     Special Requests: NO SPECIAL REQUESTS        GRAM STAIN       GRAM POSITIVE RODS IN CLUSTERS AEROBIC BOTTLE                  SMEAR CALLED TO AND CORRECTLY REPEATED BY: Ayla Herr RN 3N TO JRW AT 1887 01/19/19           Culture result: AEROBIC BOTTLE DIPHTHEROIDS                  1/17/19 ECHO   · Left ventricular hyperdynamic systolic function. Estimated left ventricular ejection fraction is >70%. Left ventricular mild concentric hypertrophy. Age-appropriate left ventricular diastolic function E/E' is 12. .  · Trace mitral valve regurgitation. · Right ventricle not well visualized. · There is no evidence of pulmonary hypertension      Imaging:  [x]I have personally reviewed the patients chest radiographs images and report with the patient  Results from [place] encounter on 01/17/19   XR CHEST PORT    Narrative ---------------------------------------------------------------------------  <<<<<<<<<           [facility]           >>>>>>>>>   ---------------------------------------------------------------------------    CLINICAL HISTORY:  Shortness of breath. COMPARISON EXAMINATIONS:  December 28, 2017.      ---  FRONTAL AND LATERAL VIEWS OF THE CHEST   ---    There is patchy lingular and left lower lobe. Change. There are no significant  pleural effusions. The mediastinum is unremarkable in appearance. No significant thoracic degenerative disc disease. No acute osseous  abnormalities. --------------    Impression Impression:  --------------    Lingular and left lower lobe patchy consolidation suggests infiltrate/pneumonia. Results from [place] encounter on 12/23/16   CT SPINE CERV WO CONT    Narrative CT CERVICAL SPINE    History: Fall down for cement stairs striking back of head, patient complains of  head and shoulder pain with lightheadedness    Comparison: 05/09/2008    Technique:  A helically acquired noncontrast CT scan of the cervical spine from  the base of the skull through T1 was performed. Axial and coronal reformations  were also provided for improved anatomic evaluation.   One or more dose reduction techniques were used on this CT: automated exposure  control, adjustment of the mAs and/or kVp according to patient's size, and  iterative reconstruction techniques. The specific techniques utilized on this CT  exam have been documented in the patient's electronic medical record.  ______________________  FINDINGS:    The prevertebral soft tissues are within normal limits. The vertebral bodies maintain normal height. Diffuse cervical straightening. No visualized anterior or posterior dislocation. The facet joints are  preserved. The neural foramina are patent. Visualized portions of intracranial contents are unremarkable.  __________________    Impression IMPRESSION:    1. No fracture or dislocation. Cervical straightening which may be secondary  to muscle spasm and/or positioning. Please note that this CT was performed supine. It is highly sensitive for  fractures and dislocations but does not evaluate for ligamentous injury  including significant instability. If the patient's symptoms persist or if  otherwise clinically indicated, in erect plain film evaluation of the cervical  spine is suggested. [x]See my orders for details    My assessment, plan of care, findings, medications, side effects etc were discussed with:  [x]nursing []PT/OT    []respiratory therapy [x]Dr. Magalie Smith [x]Patient     [x]Total care time exclusive of procedures 60 minutes with complex decision making performed and > 50% time spent in face to face consultation.     Cozette Dance, MD

## 2019-01-22 NOTE — PROGRESS NOTES
1900: Received report from Montez Wallace. White board updated. Pt is alert and oriented x4. Family at bedside. Pt currently does not report any pain and in NAD. Pt's questions and concerns addressed at this time. Will continue to monitor. 0000: Reassessment completed, no changes compared to initial assessment, pt does not report any pain or discomfort at this time. Bed in lowest position, and call bell within reach. Will continue to monitor. 0400: Pt sleeping comfortably, no changes in pt condition, will continue to round hourly on pt. 0730: Bedside and Verbal shift change report given to Elis LOUISE (oncoming nurse) by Ryan Arriola RN 
 (offgoing nurse).  Report included the following information SBAR, Kardex, Intake/Output, MAR, Recent Results and Cardiac Rhythm SR.

## 2019-01-22 NOTE — PROGRESS NOTES
Transition of care: anticipate home when medically cleared Met with patient and dr. Deonna Hernandez at bedside. Patient does not have insurance but has medicaid pending. Patient may need last resort and anticipate this. .patient is to have card and pulm consult. Patient states her daughter will pick her up when ready for d/c patient is off oxygen. Care Management Interventions PCP Verified by CM: Yes Mode of Transport at Discharge: Other (see comment)(daughter) Transition of Care Consult (CM Consult): Discharge Planning Current Support Network: Lives Alone, Family Lives [place] Confirm Follow Up Transport: Family Plan discussed with Pt/Family/Caregiver:  Yes

## 2019-01-22 NOTE — PROGRESS NOTES
Problem: Pressure Injury - Risk of 
Goal: *Prevention of pressure injury Document Elian Scale and appropriate interventions in the flowsheet. Outcome: Progressing Towards Goal 
Pressure Injury Interventions: 
  
 
Moisture Interventions: Absorbent underpads, Apply protective barrier, creams and emollients, Offer toileting Q_hr Activity Interventions: Increase time out of bed, Pressure redistribution bed/mattress(bed type) Mobility Interventions: HOB 30 degrees or less, Pressure redistribution bed/mattress (bed type) Nutrition Interventions: Document food/fluid/supplement intake, Offer support with meals,snacks and hydration Friction and Shear Interventions: Apply protective barrier, creams and emollients, HOB 30 degrees or less, Lift sheet, Minimize layers

## 2019-01-22 NOTE — PROGRESS NOTES
Problem: Falls - Risk of 
Goal: *Absence of Falls Document Carin Dinero Fall Risk and appropriate interventions in the flowsheet. Outcome: Progressing Towards Goal 
Fall Risk Interventions: 
Mobility Interventions: Assess mobility with egress test, Bed/chair exit alarm, Communicate number of staff needed for ambulation/transfer, Utilize walker, cane, or other assistive device Medication Interventions: Bed/chair exit alarm, Patient to call before getting OOB, Teach patient to arise slowly Elimination Interventions: Bed/chair exit alarm, Call light in reach, Patient to call for help with toileting needs, Toilet paper/wipes in reach, Toileting schedule/hourly rounds History of Falls Interventions: Bed/chair exit alarm, Investigate reason for fall, Room close to nurse's station

## 2019-01-22 NOTE — PROGRESS NOTES
Problem: Mobility Impaired (Adult and Pediatric) Goal: *Acute Goals and Plan of Care (Insert Text) Physical Therapy Goals Initiated 1/19/2019 and to be accomplished within 3-5 day(s) 1. Patient will move from supine <> sit with S in prep for out of bed activity and change of position. 2.  Patient will perform sit<> stand with S with LRAD in prep for transfers/ambulation. 3.  Patient will transfer from bed <> chair with S with LRAD for time up in chair for completion of ADL activity. 4.  Patient will ambulate 150 feet with LRAD/S for improved functional mobility/safe discharge. .  
Outcome: Progressing Towards Goal 
physical Therapy TREATMENT Patient: Kateryna Stringer (46 y.o. female) Date: 1/22/2019 Diagnosis: Respiratory distress Pneumonia Asthma exacerbation Diastolic CHF ([facility] Pneumonia Respiratory distress Precautions: Fall Chart, physical therapy assessment, plan of care and goals were reviewed. ASSESSMENT: 
Pt presents with many C/o feeling that she is going to be discharged without treatment of issues and not being educated on Dx. Pt is fairly mobile and only has Shortness of breath, after 70. No worsening with continuation. SPO2 range of 94-97% on RA. Progression toward goals: 
[]      Improving appropriately and progressing toward goals [x]      Improving slowly and progressing toward goals 
[]      Not making progress toward goals and plan of care will be adjusted PLAN: 
Patient continues to benefit from skilled intervention to address the above impairments. Continue treatment per established plan of care. Discharge Recommendations: To Be Determined Further Equipment Recommendations for Discharge:  rolling walker SUBJECTIVE:  
Patient stated Matthew Romano will tell me what's wrong with me. I don't .  OBJECTIVE DATA SUMMARY:  
Critical Behavior: 
Neurologic State: Alert Orientation Level: Oriented X4 Cognition: Appropriate decision making, Appropriate for age attention/concentration, Appropriate safety awareness, Follows commands Functional Mobility Training: 
Transfers: 
Sit to Stand: Modified independent Stand to Sit: Modified independent Stand Pivot Transfers: Modified independent Balance: 
Sitting: Intact Standing: Intact; With supportAmbulation/Gait Training: 
Distance (ft): 150 Feet (ft) Assistive Device: Gait belt Ambulation - Level of Assistance: Supervision Gait Abnormalities: Decreased step clearance Base of Support: Widened Speed/Arlette: Slow Step Length: Right shortened;Left shortened Interventions: Safety awareness training Pain: 
Pain Scale 1: Numeric (0 - 10) Pain Intensity 1: 0 Pain out: 0 Activity Tolerance:  
Good Please refer to the flowsheet for vital signs taken during this treatment. After treatment:  
[] Patient left in no apparent distress sitting up in chair 
[x] Patient left in no apparent distress in bed 
[x] Call bell left within reach [x] Nursing notified 
[] Caregiver present 
[] Bed alarm activated Nette Graham PTA Time Calculation: 32 mins

## 2019-01-22 NOTE — CONSULTS
Cardiolology  Inpatient Consult      Patient: Raquel Emanuel               Sex: female          DOA: 1/17/2019       YOB: 1965      Age:  46 y.o.        LOS:  LOS: 5 days      Raquel Emanuel is a 46 y.o. female admitted for Respiratory distress  Pneumonia  Asthma exacerbation  Diastolic CHF ([facility] [address]  Pneumonia     Recommendations:  · Continue supportive measures  · Follow  · Diuretics as tolerated    Impression:  · SOB and weakness probably multifactorial  · No evidence of CHF with normal systolic LV function and age related diastolic findings on Echo.  BNP is in normal or non-diagnostic range for her age  · Obesity  · Hypertension  · Low serum albumin  · Mildly elevated creatinine on admission now in normal range  · Other problems as enumerated below    Patient Active Problem List    Diagnosis Date Noted    BMI 45.0-49.9, [facility] 01/19/2019    Pneumonia 01/17/2019    Respiratory distress 01/17/2019    Asthma exacerbation 77/75/4262    Diastolic CHF ([facility] 53/14/6271    Hypoglycemia 15/32/4613    Diastolic CHF, acute ([facility] 05/27/2015    Morbid obesity ([facility] [facility]     Asthma     Type II or unspecified type diabetes mellitus with renal manifestations, uncontrolled(250.42) ([facility] [address] 09/04/2014    HTN (hypertension) 08/16/2014    Morbid obesity with BMI of 40.0-44.9, adult ([facility] 08/16/2014      Jonna Sigala MD  Past Medical History:   Diagnosis Date    Asthma     Note: As child had asthma    Diabetes mellitus ([facility] [address]     Type 2    Heart palpitations     Hypertension     Ill-defined condition     fibromyalgia     Morbid obesity ([facility] [facility]     MRSA infection 8/2014      Past Surgical History:   Procedure Laterality Date    BREAST SURGERY PROCEDURE UNLISTED      HX CHOLECYSTECTOMY      HX TUBAL LIGATION       Allergies   Allergen Reactions    Bees [Sting, Bee] Swelling    Penicillins Hives    [place] Hives      Family History   Problem Relation Age of Onset    Heart Attack Mother     Heart Attack Maternal Grandmother       Current Facility-Administered Medications   Medication Dose Route Frequency    Vancomycin trough due 1-22-19 @ 1830 ( 30 minutes prior to 1900 dose )   1 Each Other ONCE    nystatin (MYCOSTATIN) 100,000 unit/gram powder   Topical BID    furosemide (LASIX) tablet 20 mg  20 mg Oral DAILY    vancomycin (VANCOCIN) 1750 mg in  ml infusion  1,750 mg IntraVENous Q12H    methylPREDNISolone (PF) (SOLU-MEDROL) injection 40 mg  40 mg IntraVENous Q12H    Vancomycin- Pharmacy to dose  1 Each Other Rx Dosing/Monitoring    acetaminophen (TYLENOL) tablet 650 mg  650 mg Oral Q4H PRN    montelukast (SINGULAIR) tablet 10 mg  10 mg Oral QHS    insulin glargine (LANTUS) injection 23 Units  23 Units SubCUTAneous QHS    raNITIdine (ZANTAC) tablet 150 mg  150 mg Oral DAILY PRN    insulin lispro (HUMALOG) injection 25 Units  25 Units SubCUTAneous TIDAC    sodium chloride (NS) flush 5-40 mL  5-40 mL IntraVENous Q8H    sodium chloride (NS) flush 5-40 mL  5-40 mL IntraVENous PRN    azithromycin (ZITHROMAX) 500 mg in 0.9% sodium chloride 250 mL IVPB  500 mg IntraVENous Q24H    heparin (porcine) injection 5,000 Units  5,000 Units SubCUTAneous Q8H    insulin lispro (HUMALOG) injection   SubCUTAneous AC&HS    glucose chewable tablet 16 g  4 Tab Oral PRN    glucagon (GLUCAGEN) injection 1 mg  1 mg IntraMUSCular PRN    dextrose (D50W) injection syrg 12.5-25 g  25-50 mL IntraVENous PRN    aspirin chewable tablet 81 mg  81 mg Oral DAILY    atorvastatin (LIPITOR) tablet 40 mg  40 mg Oral DAILY    DULoxetine (CYMBALTA) capsule 30 mg  30 mg Oral DAILY    gabapentin (NEURONTIN) capsule 300 mg  300 mg Oral BID    metoprolol succinate (TOPROL-XL) XL tablet 50 mg  50 mg Oral DAILY    losartan (COZAAR) tablet 100 mg  100 mg Oral DAILY    albuterol-ipratropium (DUO-NEB) 2.5 MG-0.5 MG/3 ML  3 mL Nebulization Q4H RT    cefTRIAXone (ROCEPHIN) 1 g in sterile water (preservative free) 10 mL IV syringe  1 g IntraVENous Q24H    influenza vaccine 2018-19 (6 mos+)(PF) (FLUARIX QUAD/FLULAVAL QUAD) injection 0.5 mL  0.5 mL IntraMUSCular PRIOR TO DISCHARGE    guaiFENesin ER (MUCINEX) tablet 600 mg  600 mg Oral Q12H    budesonide (PULMICORT) 500 mcg/2 ml nebulizer suspension  500 mcg Nebulization BID RT    arformoterol (BROVANA) neb solution 15 mcg  15 mcg Nebulization BID RT    chlorpheniramine-HYDROcodone (TUSSIONEX) oral suspension 5 mL  5 mL Oral Q12H PRN    insulin glargine (LANTUS) injection 60 Units  60 Units SubCUTAneous DAILY         Review of Symptoms:  Review of Systems  GENERAL: Patient alert, awake and oriented times 3, able to communicate full sentences and not in distress. HEENT: No change in vision, no earache, tinnitus, sore throat or sinus congestion. NECK: No pain or stiffness. PULMONARY: +shortness of breath, +cough + wheeze. Cardiovascular: no pnd or orthopnea, no CP  GASTROINTESTINAL: No abdominal pain, nausea, vomiting or diarrhea, melena or bright red blood per rectum. GENITOURINARY: No urinary frequency, urgency, hesitancy or dysuria. MUSCULOSKELETAL: No joint or muscle pain, no back pain, no recent trauma. DERMATOLOGIC: No rash, no itching, no lesions. ENDOCRINE: No polyuria, polydipsia, no heat or cold intolerance. No recent change in weight. HEMATOLOGICAL: No anemia or easy bruising or bleeding. NEUROLOGIC: No headache, seizures, numbness, tingling or weakness. Subjective: Bina Anderson is a 46 y.o. female with PMHx of DM, HTN, diastolic CHF, and asthma in childhood who presents to the emergency department C/O progressively worsening SOB, onset yesterday. Associated sxs include cough, chest pain, and dizziness. Pt does not use O2 at home. Pt complains that she is \"retaining a lot of fluid. \" States she is compliant with all her daily medications including Lasix. Pt was diagnosed with CHF by Dr. Shaneka Esqueda but has not followed up with him in years.  Does not endorse tobacco, EtOH, or illicit drug use. Pt denies any fever, chills, other sxs or complaints. She was somewhat upset today and mildly verbally abusive today. She seems to be confused about her diagnoses and wants to know what is wrong with her. She became very defensive when I suggested that she should loose weight. I let her ventilate a great deal and hopefully left the room on an amicable basis. I don't think heart failure is a major issue with respect to her symptoms. Will see again tomorrow. .  Cardiac risk factors: extant. Physical Exam    Visit Vitals  /73 (BP 1 Location: Right arm, BP Patient Position: At rest;Sitting)   Pulse 72   Temp 98.4 °F (36.9 °C)   Resp 19   Ht 5' 4\" (1.626 m)   Wt 135.3 kg (298 lb 3.2 oz)   SpO2 96%   Breastfeeding? No   BMI 51.19 kg/m²       General Appearance:  Well developed, obese individual in no acute distress. Ears/Nose/Mouth/Throat:   Hearing grossly normal.         Neck: Supple. Chest:   Lungs relatively clear with some crackles and mild wheezes on right. Cardiovascular:  Regular rate and rhythm, S1, S2 normal, no murmur. Abdomen:   Soft, non-tender, bowel sounds are active. Extremities: +/- edema bilaterally. Skin: Warm and dry.      Cardiographics    Telemetry: normal sinus rhythm  ECG: No acute change  Echocardiogram: reviewed    Recent radiology, intake/output and wt reviewed    Labs:   Recent Results (from the past 48 hour(s))   GLUCOSE, POC    Collection Time: 01/20/19  4:29 PM   Result Value Ref Range    Glucose (POC) 298 (H) 70 - 110 mg/dL   GLUCOSE, POC    Collection Time: 01/20/19 10:16 PM   Result Value Ref Range    Glucose (POC) 113 (H) 70 - 801 mg/dL   METABOLIC PANEL, BASIC    Collection Time: 01/21/19  4:14 AM   Result Value Ref Range    Sodium 144 136 - 145 mmol/L    Potassium 4.8 3.5 - 5.5 mmol/L    Chloride 113 (H) 100 - 108 mmol/L    CO2 26 21 - 32 mmol/L    Anion gap 5 3.0 - 18 mmol/L    Glucose 164 (H) 74 - 99 mg/dL BUN 65 (H) 7.0 - 18 MG/DL    Creatinine 1.07 0.6 - 1.3 MG/DL    BUN/Creatinine ratio 61 (H) 12 - 20      GFR est AA >60 >60 ml/min/1.73m2    GFR est non-AA 53 (L) >60 ml/min/1.73m2    Calcium 8.8 8.5 - 10.1 MG/DL   GLUCOSE, POC    Collection Time: 01/21/19  6:21 AM   Result Value Ref Range    Glucose (POC) 151 (H) 70 - 110 mg/dL   GLUCOSE, POC    Collection Time: 01/21/19 11:27 AM   Result Value Ref Range    Glucose (POC) 270 (H) 70 - 110 mg/dL   GLUCOSE, POC    Collection Time: 01/21/19  4:35 PM   Result Value Ref Range    Glucose (POC) 172 (H) 70 - 110 mg/dL   GLUCOSE, POC    Collection Time: 01/21/19  9:05 PM   Result Value Ref Range    Glucose (POC) 171 (H) 70 - 193 mg/dL   METABOLIC PANEL, BASIC    Collection Time: 01/22/19  4:10 AM   Result Value Ref Range    Sodium 143 136 - 145 mmol/L    Potassium 5.0 3.5 - 5.5 mmol/L    Chloride 111 (H) 100 - 108 mmol/L    CO2 27 21 - 32 mmol/L    Anion gap 5 3.0 - 18 mmol/L    Glucose 288 (H) 74 - 99 mg/dL    BUN 59 (H) 7.0 - 18 MG/DL    Creatinine 1.23 0.6 - 1.3 MG/DL    BUN/Creatinine ratio 48 (H) 12 - 20      GFR est AA 55 (L) >60 ml/min/1.73m2    GFR est non-AA 46 (L) >60 ml/min/1.73m2    Calcium 9.0 8.5 - 10.1 MG/DL   GLUCOSE, POC    Collection Time: 01/22/19  6:12 AM   Result Value Ref Range    Glucose (POC) 215 (H) 70 - 110 mg/dL   GLUCOSE, POC    Collection Time: 01/22/19  9:14 AM   Result Value Ref Range    Glucose (POC) 211 (H) 70 - 110 mg/dL   GLUCOSE, POC    Collection Time: 01/22/19 12:02 PM   Result Value Ref Range    Glucose (POC) 195 (H) 70 - 110 mg/dL           Christopher Vela MD

## 2019-01-22 NOTE — PROGRESS NOTES
Hospitalist Progress Note-critical care note Patient: Keena Gutierrez MRN: [id number]  CSN: [id number] YOB: 1965  Age: 46 y.o. Sex: female DOA: 1/17/2019 LOS:  LOS: 5 days Chief complaint: chf/asthma-exacerbation, pna , dm  
 
Assessment/Plan Hospital Problems  Date Reviewed: 9/4/2017 Codes Class Noted POA BMI 45.0-49.9, adult [facility]) ICD-10-CM: K26.83 
ICD-9-CM: V85.42  1/19/2019 Unknown Pneumonia ICD-10-CM: J18.9 ICD-9-CM: 450  1/17/2019 Unknown * (Principal) Respiratory distress ICD-10-CM: R06.03 
ICD-9-CM: 786.09  1/17/2019 Unknown Asthma exacerbation ICD-10-CM: P71.920 ICD-9-CM: 493.92  1/17/2019 Unknown Diastolic CHF ([facility] SVF-98-CE: I50.30 ICD-9-CM: 428.30, 428.0  1/17/2019 Unknown Morbid obesity ([facility] ICD-10-CM: E66.01 
ICD-9-CM: 278.01  Unknown Yes Asthma ICD-10-CM: V58.407 ICD-9-CM: 493.90  Unknown Yes Overview Signed 4/5/2015  3:96 PM by Markus Louise Note: As child had asthma Type II or unspecified type diabetes mellitus with renal manifestations, uncontrolled(250.42) ([facility]) ICD-10-CM: E11.29, E11.65 ICD-9-CM: 250.42  9/4/2014 Yes Morbid obesity with BMI of 40.0-44.9, [facility]) ICD-10-CM: E66.01, Z68.41 
ICD-9-CM: 278.01, V85.41  8/16/2014 Yes Pna Continue improving, but still cough -explained to pt need several weeks for total recovery Continue iv abx, 
V/q scan negative for PE Asthma exacerbation Will continue current iv steroid-wean off slowly Continue breathing tx Chf. Acute on chronic Echo done , ef>70 Diastolic Restarted lasix Continue bbb Leg swelling  
pvl negative, improving per lasix Dm type II Better controlled Insulin resistant. Continue current insulin regimen Morbid obesity Subjective: I can not go to the bathroom without sob, I know how to breathing in and out, do not tell me to loose wt. I am sick, you asked me how I feel? I told you I am sick. My pcp never told me I have asthma, something is wrong with me. I was able to carry my grandchild and  100 Lbs. Nurse: no acute issue, having some concerns 
 
bcx:AEROBIC BOTTLE DIPHTHEROIDS like contaminate Disposition : 1-2days Case discussed with Dr. Liliana Her and Dr. Cam Mcleod to see anything else we can help her. Appreciated both  on board. She was yelling at the provider during the visit, no difficulty of breathing during the yelling. She might need psychological support. Review of systems: 
 
General: No fevers or chills. Cardiovascular: No chest pain or pressure. No palpitations. Pulmonary:  shortness of breath on exertion , coughs Gastrointestinal: No nausea, vomiting. Vital signs/Intake and Output: 
Visit Vitals /73 (BP 1 Location: Right arm, BP Patient Position: At rest;Sitting) Pulse 72 Temp 98.4 °F (36.9 °C) Resp 19 Ht 5' 4\" (1.626 m) Wt 135.3 kg (298 lb 3.2 oz) SpO2 96% Breastfeeding? No  
BMI 51.19 kg/m² Current Shift:  No intake/output data recorded. Last three shifts:  01/20 1901 - 01/22 0700 In: 1560 [P.O.:800; I.V.:760] Out: 0 Physical Exam: 
General: WD, WN. Alert, cooperative, no acute distress   
HEENT: NC, Atraumatic. PERRLA, anicteric sclerae. Lungs: No Wheezing and Rhonchi/Rales. Heart:  Regular  rhythm,  No murmur, No Rubs, No Gallops Abdomen: Soft, Non distended, Non tender.  +Bowel sounds, Extremities: No c/c/e Psych:   Not anxious, + agitated. Neurologic:  No acute neurological deficit. Labs: Results:  
   
Chemistry Recent Labs  
  01/22/19 
0410 01/21/19 
5959 01/20/19 
0310 * 164* 169*  144 141  
K 5.0 4.8 4.9 * 113* 108 CO2 27 26 26 BUN 59* 65* 84* CREA 1.23 1.07 1.29  
CA 9.0 8.8 9.5 AGAP 5 5 7 BUCR 48* 61* 65* CBC w/Diff Recent Labs  
  01/20/19 
5589 WBC 12.1 RBC 3.99* HGB 10.6* HCT 34.2*  
 GRANS 88* LYMPH 7*  
EOS 0 Cardiac Enzymes No results for input(s): CPK, CKND1, MARIA ISABEL in the last 72 hours. No lab exists for component: Micheline Story Coagulation No results for input(s): PTP, INR, APTT in the last 72 hours. No lab exists for component: INREXT, INREXT Lipid Panel Lab Results Component Value Date/Time Cholesterol, total 146 05/28/2015 02:58 AM  
 HDL Cholesterol 64 (H) 05/28/2015 02:58 AM  
 LDL, calculated 66 05/28/2015 02:58 AM  
 VLDL, calculated 16 05/28/2015 02:58 AM  
 Triglyceride 80 05/28/2015 02:58 AM  
 CHOL/HDL Ratio 2.3 05/28/2015 02:58 AM  
  
BNP No results for input(s): BNPP in the last 72 hours. Liver Enzymes No results for input(s): TP, ALB, TBIL, AP, SGOT, GPT in the last 72 hours. No lab exists for component: DBIL Thyroid Studies No results found for: T4, T3U, TSH, TSHEXT, TSHEXT Procedures/imaging: see electronic medical records for all procedures/Xrays and details which were not copied into this note but were reviewed prior to creation of Plan Arnold Linton MD

## 2019-01-23 ENCOUNTER — APPOINTMENT (OUTPATIENT)
Dept: CT IMAGING | Age: 54
DRG: 139 | End: 2019-01-23
Attending: INTERNAL MEDICINE
Payer: MEDICAID

## 2019-01-23 ENCOUNTER — APPOINTMENT (OUTPATIENT)
Dept: GENERAL RADIOLOGY | Age: 54
DRG: 139 | End: 2019-01-23
Attending: INTERNAL MEDICINE
Payer: MEDICAID

## 2019-01-23 LAB
ANION GAP SERPL CALC-SCNC: 5 MMOL/L (ref 3–18)
BACTERIA SPEC CULT: NORMAL
BNP SERPL-MCNC: 740 PG/ML (ref 0–900)
BUN SERPL-MCNC: 56 MG/DL (ref 7–18)
BUN/CREAT SERPL: 64 (ref 12–20)
CALCIUM SERPL-MCNC: 8.6 MG/DL (ref 8.5–10.1)
CHLORIDE SERPL-SCNC: 111 MMOL/L (ref 100–108)
CO2 SERPL-SCNC: 27 MMOL/L (ref 21–32)
CREAT SERPL-MCNC: 0.88 MG/DL (ref 0.6–1.3)
GLUCOSE BLD STRIP.AUTO-MCNC: 155 MG/DL (ref 70–110)
GLUCOSE BLD STRIP.AUTO-MCNC: 308 MG/DL (ref 70–110)
GLUCOSE BLD STRIP.AUTO-MCNC: 353 MG/DL (ref 70–110)
GLUCOSE BLD STRIP.AUTO-MCNC: 374 MG/DL (ref 70–110)
GLUCOSE BLD STRIP.AUTO-MCNC: 76 MG/DL (ref 70–110)
GLUCOSE SERPL-MCNC: 96 MG/DL (ref 74–99)
L PNEUMO AG UR QL IA: NEGATIVE
POTASSIUM SERPL-SCNC: 4.8 MMOL/L (ref 3.5–5.5)
S PNEUM AG UR QL: NEGATIVE
SERVICE CMNT-IMP: NORMAL
SODIUM SERPL-SCNC: 143 MMOL/L (ref 136–145)
TSH SERPL DL<=0.05 MIU/L-ACNC: 0.56 UIU/ML (ref 0.36–3.74)
VANCOMYCIN SERPL-MCNC: 18.9 UG/ML (ref 5–40)

## 2019-01-23 PROCEDURE — 84134 ASSAY OF PREALBUMIN: CPT

## 2019-01-23 PROCEDURE — 74011636637 HC RX REV CODE- 636/637: Performed by: HOSPITALIST

## 2019-01-23 PROCEDURE — 71275 CT ANGIOGRAPHY CHEST: CPT

## 2019-01-23 PROCEDURE — 71045 X-RAY EXAM CHEST 1 VIEW: CPT

## 2019-01-23 PROCEDURE — 84145 PROCALCITONIN (PCT): CPT

## 2019-01-23 PROCEDURE — 87633 RESP VIRUS 12-25 TARGETS: CPT

## 2019-01-23 PROCEDURE — 74011250637 HC RX REV CODE- 250/637: Performed by: INTERNAL MEDICINE

## 2019-01-23 PROCEDURE — 74011636320 HC RX REV CODE- 636/320: Performed by: INTERNAL MEDICINE

## 2019-01-23 PROCEDURE — 74011000250 HC RX REV CODE- 250: Performed by: INTERNAL MEDICINE

## 2019-01-23 PROCEDURE — 87449 NOS EACH ORGANISM AG IA: CPT

## 2019-01-23 PROCEDURE — 74011250637 HC RX REV CODE- 250/637: Performed by: FAMILY MEDICINE

## 2019-01-23 PROCEDURE — 65660000000 HC RM CCU STEPDOWN

## 2019-01-23 PROCEDURE — 94640 AIRWAY INHALATION TREATMENT: CPT

## 2019-01-23 PROCEDURE — 74011000250 HC RX REV CODE- 250: Performed by: HOSPITALIST

## 2019-01-23 PROCEDURE — 83880 ASSAY OF NATRIURETIC PEPTIDE: CPT

## 2019-01-23 PROCEDURE — 80048 BASIC METABOLIC PNL TOTAL CA: CPT

## 2019-01-23 PROCEDURE — 74011250636 HC RX REV CODE- 250/636: Performed by: HOSPITALIST

## 2019-01-23 PROCEDURE — 84443 ASSAY THYROID STIM HORMONE: CPT

## 2019-01-23 PROCEDURE — 74011250636 HC RX REV CODE- 250/636: Performed by: FAMILY MEDICINE

## 2019-01-23 PROCEDURE — 74011250636 HC RX REV CODE- 250/636: Performed by: INTERNAL MEDICINE

## 2019-01-23 PROCEDURE — 36415 COLL VENOUS BLD VENIPUNCTURE: CPT

## 2019-01-23 PROCEDURE — 74011250637 HC RX REV CODE- 250/637: Performed by: HOSPITALIST

## 2019-01-23 PROCEDURE — 80202 ASSAY OF VANCOMYCIN: CPT

## 2019-01-23 PROCEDURE — 87450 LEGIONELLA PNEUMOPHILA AG, URINE: CPT

## 2019-01-23 PROCEDURE — 82962 GLUCOSE BLOOD TEST: CPT

## 2019-01-23 PROCEDURE — 94760 N-INVAS EAR/PLS OXIMETRY 1: CPT

## 2019-01-23 RX ORDER — VANCOMYCIN/0.9 % SOD CHLORIDE 1.5G/250ML
1500 PLASTIC BAG, INJECTION (ML) INTRAVENOUS EVERY 24 HOURS
Status: DISCONTINUED | OUTPATIENT
Start: 2019-01-23 | End: 2019-01-23

## 2019-01-23 RX ORDER — MAGNESIUM SULFATE 100 %
4 CRYSTALS MISCELLANEOUS AS NEEDED
Status: DISCONTINUED | OUTPATIENT
Start: 2019-01-23 | End: 2019-01-24 | Stop reason: HOSPADM

## 2019-01-23 RX ORDER — INSULIN LISPRO 100 [IU]/ML
10 INJECTION, SOLUTION INTRAVENOUS; SUBCUTANEOUS
Status: DISCONTINUED | OUTPATIENT
Start: 2019-01-23 | End: 2019-01-23

## 2019-01-23 RX ORDER — INSULIN GLARGINE 100 [IU]/ML
20 INJECTION, SOLUTION SUBCUTANEOUS
Status: DISCONTINUED | OUTPATIENT
Start: 2019-01-23 | End: 2019-01-24 | Stop reason: HOSPADM

## 2019-01-23 RX ORDER — INSULIN LISPRO 100 [IU]/ML
INJECTION, SOLUTION INTRAVENOUS; SUBCUTANEOUS
Status: DISCONTINUED | OUTPATIENT
Start: 2019-01-23 | End: 2019-01-24 | Stop reason: HOSPADM

## 2019-01-23 RX ORDER — DEXTROSE 50 % IN WATER (D50W) INTRAVENOUS SYRINGE
25-50 AS NEEDED
Status: DISCONTINUED | OUTPATIENT
Start: 2019-01-23 | End: 2019-01-24 | Stop reason: HOSPADM

## 2019-01-23 RX ORDER — INSULIN LISPRO 100 [IU]/ML
13 INJECTION, SOLUTION INTRAVENOUS; SUBCUTANEOUS
Status: DISCONTINUED | OUTPATIENT
Start: 2019-01-24 | End: 2019-01-24 | Stop reason: HOSPADM

## 2019-01-23 RX ORDER — INSULIN LISPRO 100 [IU]/ML
INJECTION, SOLUTION INTRAVENOUS; SUBCUTANEOUS
Status: DISCONTINUED | OUTPATIENT
Start: 2019-01-23 | End: 2019-01-23

## 2019-01-23 RX ADMIN — CEFTRIAXONE 1 G: 1 INJECTION, POWDER, FOR SOLUTION INTRAMUSCULAR; INTRAVENOUS at 06:08

## 2019-01-23 RX ADMIN — Medication 10 ML: at 14:16

## 2019-01-23 RX ADMIN — NYSTATIN: 100000 POWDER TOPICAL at 21:50

## 2019-01-23 RX ADMIN — ARFORMOTEROL TARTRATE 15 MCG: 15 SOLUTION RESPIRATORY (INHALATION) at 07:38

## 2019-01-23 RX ADMIN — ATORVASTATIN CALCIUM 40 MG: 20 TABLET, FILM COATED ORAL at 09:11

## 2019-01-23 RX ADMIN — INSULIN GLARGINE 40 UNITS: 100 INJECTION, SOLUTION SUBCUTANEOUS at 09:24

## 2019-01-23 RX ADMIN — METHYLPREDNISOLONE SODIUM SUCCINATE 40 MG: 40 INJECTION, POWDER, FOR SOLUTION INTRAMUSCULAR; INTRAVENOUS at 06:09

## 2019-01-23 RX ADMIN — MONTELUKAST SODIUM 10 MG: 10 TABLET, FILM COATED ORAL at 21:48

## 2019-01-23 RX ADMIN — IPRATROPIUM BROMIDE AND ALBUTEROL SULFATE 3 ML: .5; 3 SOLUTION RESPIRATORY (INHALATION) at 13:19

## 2019-01-23 RX ADMIN — SODIUM CHLORIDE 250 MG: 900 INJECTION, SOLUTION INTRAVENOUS at 14:06

## 2019-01-23 RX ADMIN — ARFORMOTEROL TARTRATE 15 MCG: 15 SOLUTION RESPIRATORY (INHALATION) at 19:54

## 2019-01-23 RX ADMIN — Medication 81 MG: at 09:12

## 2019-01-23 RX ADMIN — DULOXETINE HYDROCHLORIDE 30 MG: 30 CAPSULE, DELAYED RELEASE ORAL at 09:11

## 2019-01-23 RX ADMIN — IPRATROPIUM BROMIDE AND ALBUTEROL SULFATE 3 ML: .5; 3 SOLUTION RESPIRATORY (INHALATION) at 19:54

## 2019-01-23 RX ADMIN — BUDESONIDE 500 MCG: 0.5 INHALANT RESPIRATORY (INHALATION) at 07:38

## 2019-01-23 RX ADMIN — BUDESONIDE 500 MCG: 0.5 INHALANT RESPIRATORY (INHALATION) at 19:54

## 2019-01-23 RX ADMIN — RANITIDINE 150 MG: 150 TABLET ORAL at 11:59

## 2019-01-23 RX ADMIN — GABAPENTIN 300 MG: 300 CAPSULE ORAL at 09:12

## 2019-01-23 RX ADMIN — FUROSEMIDE 20 MG: 20 TABLET ORAL at 09:12

## 2019-01-23 RX ADMIN — IPRATROPIUM BROMIDE AND ALBUTEROL SULFATE 3 ML: .5; 3 SOLUTION RESPIRATORY (INHALATION) at 07:38

## 2019-01-23 RX ADMIN — Medication 10 ML: at 06:10

## 2019-01-23 RX ADMIN — GABAPENTIN 300 MG: 300 CAPSULE ORAL at 21:48

## 2019-01-23 RX ADMIN — INSULIN LISPRO 10 UNITS: 100 INJECTION, SOLUTION INTRAVENOUS; SUBCUTANEOUS at 14:15

## 2019-01-23 RX ADMIN — IPRATROPIUM BROMIDE AND ALBUTEROL SULFATE 3 ML: .5; 3 SOLUTION RESPIRATORY (INHALATION) at 23:52

## 2019-01-23 RX ADMIN — Medication 10 ML: at 21:51

## 2019-01-23 RX ADMIN — GUAIFENESIN 600 MG: 600 TABLET, EXTENDED RELEASE ORAL at 21:48

## 2019-01-23 RX ADMIN — GUAIFENESIN 600 MG: 600 TABLET, EXTENDED RELEASE ORAL at 09:12

## 2019-01-23 RX ADMIN — METHYLPREDNISOLONE SODIUM SUCCINATE 40 MG: 40 INJECTION, POWDER, FOR SOLUTION INTRAMUSCULAR; INTRAVENOUS at 17:47

## 2019-01-23 RX ADMIN — INSULIN LISPRO 10 UNITS: 100 INJECTION, SOLUTION INTRAVENOUS; SUBCUTANEOUS at 21:47

## 2019-01-23 RX ADMIN — HEPARIN SODIUM 5000 UNITS: 5000 INJECTION INTRAVENOUS; SUBCUTANEOUS at 06:09

## 2019-01-23 RX ADMIN — IPRATROPIUM BROMIDE AND ALBUTEROL SULFATE 3 ML: .5; 3 SOLUTION RESPIRATORY (INHALATION) at 16:18

## 2019-01-23 RX ADMIN — IOPAMIDOL 100 ML: 755 INJECTION, SOLUTION INTRAVENOUS at 11:27

## 2019-01-23 RX ADMIN — LOSARTAN POTASSIUM 100 MG: 50 TABLET ORAL at 09:12

## 2019-01-23 RX ADMIN — ACETAMINOPHEN 650 MG: 325 TABLET ORAL at 06:07

## 2019-01-23 RX ADMIN — INSULIN LISPRO 10 UNITS: 100 INJECTION, SOLUTION INTRAVENOUS; SUBCUTANEOUS at 17:46

## 2019-01-23 RX ADMIN — NYSTATIN: 100000 POWDER TOPICAL at 09:13

## 2019-01-23 RX ADMIN — HEPARIN SODIUM 5000 UNITS: 5000 INJECTION INTRAVENOUS; SUBCUTANEOUS at 21:48

## 2019-01-23 RX ADMIN — IPRATROPIUM BROMIDE AND ALBUTEROL SULFATE 3 ML: .5; 3 SOLUTION RESPIRATORY (INHALATION) at 04:46

## 2019-01-23 RX ADMIN — INSULIN LISPRO 8 UNITS: 100 INJECTION, SOLUTION INTRAVENOUS; SUBCUTANEOUS at 12:14

## 2019-01-23 RX ADMIN — METOPROLOL SUCCINATE 50 MG: 50 TABLET, EXTENDED RELEASE ORAL at 09:12

## 2019-01-23 RX ADMIN — INSULIN GLARGINE 20 UNITS: 100 INJECTION, SOLUTION SUBCUTANEOUS at 21:48

## 2019-01-23 RX ADMIN — HEPARIN SODIUM 5000 UNITS: 5000 INJECTION INTRAVENOUS; SUBCUTANEOUS at 11:59

## 2019-01-23 NOTE — PROGRESS NOTES
Pharmacy Dosing Services: Vancomycin Consult for Vancomycin Dosing by Pharmacy by Dr. Jazlyn Martin Consult provided for this 46y.o. year old female , for indication of Bloodstream Infection. Day of Therapy 4 Vancomycin Trough= 32.4 mcg/ml at 1925 1/22/19 Goal Trough= 15-20 mcg/ml Will HOLD Vancomycin Subsequent dosing to be determined based on Vancomycin levels and renal function Serum Creatinine Lab Results Component Value Date/Time Creatinine 1.23 01/22/2019 04:10 AM  
 Creatinine, POC 0.3 (L) 08/10/2014 11:05 PM  
  
Creatinine Clearance Estimated Creatinine Clearance: 71.7 mL/min (based on SCr of 1.23 mg/dL). BUN Lab Results Component Value Date/Time BUN 59 (H) 01/22/2019 04:10 AM  
 BUN, POC 10 08/10/2014 11:05 PM  
  
WBC Lab Results Component Value Date/Time WBC 12.1 01/20/2019 03:10 AM  
  
Temp 97.8 °F (36.6 °C)

## 2019-01-23 NOTE — PROGRESS NOTES
0705:Received verbal bedside report from off going nurse MOUSTAPHA Weinstein R.N. Patient care received. Patient alert and oriented x 4. Patient resting in bed denies pain. Patient stable. Call light with in reach bed in lowest position. 1134: Patient off floor for CT. 
 
1154:Patient back on floor.

## 2019-01-23 NOTE — DIABETES MGMT
GLYCEMIC CONTROL PROGRESS NOTE: 
 
-known h/o poorly managed T2DM, ~ 30 years, HbA1c not within recommended range for age + comorbids on TID mixed insulin + oral home regimen 
-BG out of target range non-ICU: < 140 mg/dL, trending down, pt requiring less corrective coverage 
-TDD = 144 (60 Lantus + 75 mealtime + 9 units - Humalog Very Insulin Resistant Corrective Coverage) Noted: 
-pt with severe hypoglycemic episode on bedtime POCT 38 mg/dL, hypoglycemia protocol followed, recommend the following *d/c Lantus at bedtime (AM Lantus dose decreased from 60 to 40 units) *- Humalog Normal Insulin Sensitivity Corrective Coverage (orders entered per protocol) *decrease mealtime insulin dose to Humalog 15 units qac Recent Glucose Results:  
Lab Results Component Value Date/Time GLU 96 01/23/2019 01:33 AM  
 GLUCPOC 76 01/23/2019 06:35 AM  
 GLUCPOC 107 01/22/2019 09:43 PM  
 GLUCPOC 38 (LL) 01/22/2019 09:27 PM  
 
Parveen Espinoza MS, RN, CDE Glycemic Control Team 
[phone] Pager [phone] (M-TH 8:00-4:30P) *After Hours pager [phone]

## 2019-01-23 NOTE — PROGRESS NOTES
Pharmacy Dosing Services: Vancomycin SCr = 0.88 CrCl ~ 100.3 ml/min CBC ordered for am  
Afebrile Vancomycin random level = 18.9 @ 10:57 this am  
Will resume Vancomycin at 1500 mg IV q24h Pharmacy to continue to follow and adjust dose as necessary Matty Escalante [facility] [phone]

## 2019-01-23 NOTE — PROGRESS NOTES
Hospitalist Progress Note-critical care note Patient: Chuck Del Rosario MRN: [id number]  CSN: [id number] YOB: 1965  Age: 46 y.o. Sex: female DOA: 1/17/2019 LOS:  LOS: 6 days Chief complaint: chf/asthma-exacerbation, pna , dm  
 
Assessment/Plan Hospital Problems  Date Reviewed: 9/4/2017 Codes Class Noted POA BMI 45.0-49.9, adult [facility]) ICD-10-CM: N95.88 
ICD-9-CM: V85.42  1/19/2019 Unknown Pneumonia ICD-10-CM: J18.9 ICD-9-CM: 889  1/17/2019 Unknown * (Principal) Respiratory distress ICD-10-CM: R06.03 
ICD-9-CM: 786.09  1/17/2019 Unknown Asthma exacerbation ICD-10-CM: Y42.855 ICD-9-CM: 493.92  1/17/2019 Unknown Diastolic CHF ([facility] DBQ-18-EH: I50.30 ICD-9-CM: 428.30, 428.0  1/17/2019 Unknown Morbid obesity ([facility] ICD-10-CM: E66.01 
ICD-9-CM: 278.01  Unknown Yes Asthma ICD-10-CM: X99.450 ICD-9-CM: 493.90  Unknown Yes Overview Signed 4/5/2015  7:30 PM by Katy Leyva Note: As child had asthma Type II or unspecified type diabetes mellitus with renal manifestations, uncontrolled(250.42) ([facility]) ICD-10-CM: E11.29, E11.65 ICD-9-CM: 250.42  9/4/2014 Yes Morbid obesity with BMI of 40.0-44.9, [facility]) ICD-10-CM: E66.01, Z68.41 
ICD-9-CM: 278.01, V85.41  8/16/2014 Yes Pna Continue improving, but still cough and sob on exertion Continue iv abx, 
V/q scan negative for PE Will have cta today per pulm Will d.c vanc if cta ok Asthma exacerbation Will continue current iv steroid-weaning Continue breathing tx Chf. Acute on chronic Echo done , ef>70 Diastolic  
started lasix Continue bbb Leg swelling  
pvl negative, improving per lasix Dm type II Hypoglycemia-resolved , decrease lantus and  premeal insulin Insulin resistant. Continue current insulin regimen Morbid obesity Subjective: want to make sure everything is ok , feel better Nurse: glucose was low am  
 
 
Review of systems: 
 
General: No fevers or chills. Cardiovascular: No chest pain or pressure. No palpitations. Pulmonary:  shortness of breath on exertion , coughs Gastrointestinal: No nausea, vomiting. Vital signs/Intake and Output: 
Visit Vitals /89 (BP 1 Location: Left arm, BP Patient Position: Sitting) Pulse 79 Temp 98.1 °F (36.7 °C) Resp 18 Ht 5' 4\" (1.626 m) Wt 135.2 kg (298 lb) SpO2 100% Breastfeeding? No  
BMI 51.15 kg/m² Current Shift:  01/23 0701 - 01/23 1900 In: 240 [P.O.:240] Out: - Last three shifts:  01/21 1901 - 01/23 0700 In: 1680 [P.O.:1680] Out: 0 Physical Exam: 
General: WD, WN. Alert, cooperative, no acute distress   
HEENT: NC, Atraumatic. PERRLA, anicteric sclerae. Lungs: No Wheezing and Rhonchi/Rales. Heart:  Regular  rhythm,  No murmur, No Rubs, No Gallops Abdomen: Soft, Non distended, Non tender.  +Bowel sounds, Extremities: No c/c/e Psych:   Not anxious, no agitation Neurologic:  No acute neurological deficit. Labs: Results:  
   
Chemistry Recent Labs  
  01/23/19 
0133 01/22/19 
0410 01/21/19 
6109 GLU 96 288* 164*  143 144  
K 4.8 5.0 4.8  
* 111* 113* CO2 27 27 26 BUN 56* 59* 65* CREA 0.88 1.23 1.07  
CA 8.6 9.0 8.8 AGAP 5 5 5 BUCR 64* 48* 61* CBC w/Diff No results for input(s): WBC, RBC, HGB, HCT, PLT, GRANS, LYMPH, EOS, HGBEXT, HCTEXT, PLTEXT, HGBEXT, HCTEXT, PLTEXT in the last 72 hours. Cardiac Enzymes No results for input(s): CPK, CKND1, MARIA ISABEL in the last 72 hours. No lab exists for component: Татьяна Brooks Coagulation No results for input(s): PTP, INR, APTT in the last 72 hours. No lab exists for component: INREXT, INREXT Lipid Panel Lab Results Component Value Date/Time  Cholesterol, total 146 05/28/2015 02:58 AM  
 HDL Cholesterol 64 (H) 05/28/2015 02:58 AM  
 LDL, calculated 66 05/28/2015 02:58 AM  
 VLDL, calculated 16 05/28/2015 02:58 AM  
 Triglyceride 80 05/28/2015 02:58 AM  
 CHOL/HDL Ratio 2.3 05/28/2015 02:58 AM  
  
BNP No results for input(s): BNPP in the last 72 hours. Liver Enzymes No results for input(s): TP, ALB, TBIL, AP, SGOT, GPT in the last 72 hours. No lab exists for component: DBIL Thyroid Studies No results found for: T4, T3U, TSH, TSHEXT, TSHEXT Procedures/imaging: see electronic medical records for all procedures/Xrays and details which were not copied into this note but were reviewed prior to creation of Plan Tru Bahena MD

## 2019-01-23 NOTE — PROGRESS NOTES
0730 Assumed care of patient from [address] [place] [address]. 200 Pt became very angry and upset with Dr Robert Sahni at the bedside when discussing dx and lifestyle changes.  Pt more calm and apologized about her behavior with Dr Robert Sahni. 1800 Pt had a uneventful shift. Chest xray in the morning.

## 2019-01-23 NOTE — PROGRESS NOTES
Problem: Falls - Risk of 
Goal: *Absence of Falls Document Janna Bautista Fall Risk and appropriate interventions in the flowsheet. Outcome: Progressing Towards Goal 
Fall Risk Interventions: 
Mobility Interventions: Assess mobility with egress test, Bed/chair exit alarm, Communicate number of staff needed for ambulation/transfer, Patient to call before getting OOB, Utilize walker, cane, or other assistive device Medication Interventions: Assess postural VS orthostatic hypotension, Bed/chair exit alarm, Patient to call before getting OOB, Teach patient to arise slowly Elimination Interventions: Bed/chair exit alarm, Call light in reach, Patient to call for help with toileting needs, Toileting schedule/hourly rounds History of Falls Interventions: Bed/chair exit alarm, Door open when patient unattended, Evaluate medications/consider consulting pharmacy, Room close to nurse's station

## 2019-01-23 NOTE — PROGRESS NOTES
Pulmonary and Sleep Medicine Pulmonary Follow Up Note Patient: Raul Caldwell               Sex: female          DOA: 1/17/2019 YOB: 1965      Age:  46 y.o.        LOS:  LOS: 6 days IMPRESSION:  
Principal Problem: 
  Respiratory distress (1/17/2019) Active Problems: Morbid obesity with BMI of 40.0-44.9, adult ([facility] (8/16/2014) Type II or unspecified type diabetes mellitus with renal manifestations, uncontrolled(250.42) ([facility] (9/4/2014) Asthma () Overview: Note: As child had asthma Morbid obesity ([facility] () Pneumonia (1/17/2019) Asthma exacerbation (1/17/2019) Diastolic CHF ([facility] (1/01/6455) BMI 45.0-49.9, adult ([facility] (1/19/2019) · Acute hypercapnic failure RECOMMENDATIONS:  
Patient is on room air; may use supplemental O2 via NC as needed for goal SPO2> 90% Await B. Pertussis IG, respiratory vital panel, Procalcitonin; await UR legionella and streptococcus Ag CTA chest ordered. Discussed with patient. She prefers CTA chest over CT chest understanding risk of contrast and radiation Patient would benefit from PFT and sleep study out patient Continue bronchodilators, pulmonary hygiene care Steroids taper per clinical response Screen for any O2 need prior to DC home Antibiotic choice: Rocephin, Azithromycin, Vancomycin. No MRSA, may stop Vancomycin per clinical course and CT chest. Cultures will be followed. Aspiration prevention bundle, head of the bed at 30' all times Cardiology following Normal pro-BNP; await TSH Diuresis per cardiology, hospitalist 
Glycemic control Stress ulcer prophylaxis DVT prophylaxis Will defer respective systems problem management to primary and other consultant and follow patient with primary and other team 
Further recommendations will be based on the patient's response to recommended treatment and results of the investigation ordered. 01/23/19 Patient reports unchanged HORAN, no worsening The patient denies cough, chest pain, wheezing or hemoptysis. Leg swelling same. No palpitations, syncope, orthopnea, or PND. No fever. HPI:  
Ms. Bina Anderson is a 46 y.o. female who is being seen for HORAN, cough at the request of Dr. Lorin Riggins. Patient provided information. Patient reports long standing symptoms of HORAN for several years that progressed and lately in past few months associated with cough that didn't improve with stopping lisinopril. She was unable to see pulmonologist secondary to being depending on transport arrangements. She has been admitted for CHF exacerbation and pneumonia. Didn't improve with management so far. Denies any prior hx of asthma. No prior work up for АНДРЕЙ. She has GERD, chronic sinusitis. Previously followed with cardiologist Dr. Shaneka Esqueda but unable to follow 2' to insurance limitations. Has chronic leg swelling. So far VQ scan, PVL of legs been negative. Review of Systems General ROS: negative for - fever, chills, weight loss, fatigue and malaise Cardiovascular ROS: negative for - chest pain, murmur, orthopnea, palpitations or PND Gastrointestinal ROS: no abdominal pain, change in bowel habits, or black or bloody stools Dermatological ROS: negative for - pruritus, rash or skin lesion changes Past Medical History Past Medical History:  
Diagnosis Date  Asthma Note: As child had asthma  Diabetes mellitus ([facility] Type 2  
 Heart palpitations  Hypertension  Ill-defined condition   
 fibromyalgia  Morbid obesity ([facility]  MRSA infection 8/2014 Past Surgical History Past Surgical History:  
Procedure Laterality Date  BREAST SURGERY PROCEDURE UNLISTED  HX CHOLECYSTECTOMY  HX TUBAL LIGATION Family History Family History Problem Relation Age of Onset  Heart Attack Mother  Heart Attack Maternal Grandmother Social History Social History Tobacco Use  
  Smoking status: Never Smoker  Smokeless tobacco: Never Used Substance Use Topics  Alcohol use: No  
 Drug use: No  
  
 
Medications Current Facility-Administered Medications Medication Dose Route Frequency  insulin lispro (HUMALOG) injection 10 Units  10 Units SubCUTAneous TIDAC  insulin glargine (LANTUS) injection 20 Units  20 Units SubCUTAneous QHS  insulin glargine (LANTUS) injection 40 Units  40 Units SubCUTAneous DAILY  nystatin (MYCOSTATIN) 100,000 unit/gram powder   Topical BID  furosemide (LASIX) tablet 20 mg  20 mg Oral DAILY  methylPREDNISolone (PF) (SOLU-MEDROL) injection 40 mg  40 mg IntraVENous Q12H  Vancomycin- Pharmacy to dose  1 Each Other Rx Dosing/Monitoring  acetaminophen (TYLENOL) tablet 650 mg  650 mg Oral Q4H PRN  
 montelukast (SINGULAIR) tablet 10 mg  10 mg Oral QHS  raNITIdine (ZANTAC) tablet 150 mg  150 mg Oral DAILY PRN  
 sodium chloride (NS) flush 5-40 mL  5-40 mL IntraVENous Q8H  
 sodium chloride (NS) flush 5-40 mL  5-40 mL IntraVENous PRN  
 heparin (porcine) injection 5,000 Units  5,000 Units SubCUTAneous Q8H  
 insulin lispro (HUMALOG) injection   SubCUTAneous AC&HS  
 glucose chewable tablet 16 g  4 Tab Oral PRN  
 glucagon (GLUCAGEN) injection 1 mg  1 mg IntraMUSCular PRN  
 dextrose (D50W) injection syrg 12.5-25 g  25-50 mL IntraVENous PRN  
 aspirin chewable tablet 81 mg  81 mg Oral DAILY  atorvastatin (LIPITOR) tablet 40 mg  40 mg Oral DAILY  DULoxetine (CYMBALTA) capsule 30 mg  30 mg Oral DAILY  gabapentin (NEURONTIN) capsule 300 mg  300 mg Oral BID  metoprolol succinate (TOPROL-XL) XL tablet 50 mg  50 mg Oral DAILY  losartan (COZAAR) tablet 100 mg  100 mg Oral DAILY  albuterol-ipratropium (DUO-NEB) 2.5 MG-0.5 MG/3 ML  3 mL Nebulization Q4H RT  
 cefTRIAXone (ROCEPHIN) 1 g in sterile water (preservative free) 10 mL IV syringe  1 g IntraVENous Q24H  influenza vaccine 2018-19 (6 mos+)(PF) (FLUARIX QUAD/FLULAVAL QUAD) injection 0.5 mL  0.5 mL IntraMUSCular PRIOR TO DISCHARGE  guaiFENesin ER (MUCINEX) tablet 600 mg  600 mg Oral Q12H  
 budesonide (PULMICORT) 500 mcg/2 ml nebulizer suspension  500 mcg Nebulization BID RT  
 arformoterol (BROVANA) neb solution 15 mcg  15 mcg Nebulization BID RT  
 chlorpheniramine-HYDROcodone (TUSSIONEX) oral suspension 5 mL  5 mL Oral Q12H PRN Prior to Admission medications Medication Sig Start Date End Date Taking? Authorizing Provider  
metFORMIN (GLUCOPHAGE) 1,000 mg tablet Take 1,000 mg by mouth two (2) times daily (with meals). Yes Other, MD Yoana  
metoprolol succinate (TOPROL-XL) 50 mg XL tablet Take 50 mg by mouth daily. Yes Other, MD Yoana  
DULoxetine (CYMBALTA) 30 mg capsule Take 30 mg by mouth daily. Yes Lizzette, MD Yoana  
atorvastatin (LIPITOR) 40 mg tablet Take 40 mg by mouth daily. Yes Other, MD Yoana  
losartan (COZAAR) 100 mg tablet Take 100 mg by mouth daily. Yes Lizzette, MD Yoana  
OTHER,NON-FORMULARY, Medication for irregular heart beat   Yes Lizzette, MD Yoana  
gabapentin (NEURONTIN) 300 mg capsule Take 300 mg by mouth two (2) times a day. Yes Other, MD Yoana  
furosemide (LASIX) 40 mg tablet Take 1 Tab by mouth daily. 2/13/17  Yes Lyssa Vargas MD  
lisinopril (PRINIVIL, ZESTRIL) 10 mg tablet Take 1 Tab by mouth daily. 8/4/17  Yes Alexandra May MD  
insulin NPH/insulin regular (NOVOLIN 70/30) 100 unit/mL (70-30) injection 50 Units by SubCUTAneous route every twelve (12) hours. Yes Provider, Historical  
insulin regular (NOVOLIN R) 100 unit/mL injection by SubCUTAneous route Daily (before lunch). Yes Provider, Historical  
aspirin 81 mg chewable tablet Take 1 tablet by mouth daily. 8/20/14  Yes Alverto David MD  
 
 
Allergy Allergies Allergen Reactions  Bees [Sting, Bee] Swelling  Penicillins Hives  [place] Hives Physical Exam:  
Vital Signs: Blood pressure 178/79, pulse 71, temperature 97.5 °F (36.4 °C), resp. rate 20, height 5' 4\" (1.626 m), weight 135.2 kg (298 lb), SpO2 100 %, not currently breastfeeding. Body mass index is 51.15 kg/m². O2 Device: Room air Temp (24hrs), Av.2 °F (36.8 °C), Min:97.5 °F (36.4 °C), Max:98.9 °F (37.2 °C) Intake/Output:  
Last shift:       07 -  190 In: 240 [P.O.:240] Out: - Last 3 shifts:  190 -  0700 In: 1680 [P.O.:1680] Out: 0 Intake/Output Summary (Last 24 hours) at 2019 1040 Last data filed at 2019 2987 Gross per 24 hour Intake 1680 ml Output 0 ml Net 1680 ml General: AAO x 3, appears stated age, morbidly obese, on room air HEENT: PERRLA, EOMI, fundi benign, throat normal without erythema or exudate Neck: No abnormally enlarged lymph nodes or thyroid, supple Chest: normal 
Lungs: moderate air entry, few rhonchi scattered bases bilaterally, normal percussion bilaterally, no tenderness/ rash Heart: Regular rate and rhythm, S1S2 present or without murmur or extra heart sounds Abdomen: morbid obesity, bowel sounds normoactive, tympanic, soft without significant tenderness, masses, organomegaly or guarding Extremity: 2-3+, pitting and bl LE edema; no cyanosis, clubbing Skin: Skin color, texture, turgor normal. No rashes or lesions Labs Reviewed: 
Recent Results (from the past 24 hour(s)) GLUCOSE, POC Collection Time: 19 12:02 PM  
Result Value Ref Range Glucose (POC) 195 (H) 70 - 110 mg/dL GLUCOSE, POC Collection Time: 19  4:18 PM  
Result Value Ref Range Glucose (POC) 141 (H) 70 - 110 mg/dL Federico Manning Collection Time: 19  7:25 PM  
Result Value Ref Range Vancomycin,trough 32.4 (HH) 10.0 - 20.0 ug/mL Reported dose date: [id number] Reported dose time: 0700 Reported dose: 1750 MG UNITS  
GLUCOSE, POC Collection Time: 19  9:14 PM  
Result Value Ref Range Glucose (POC) 42 (LL) 70 - 110 mg/dL GLUCOSE, POC Collection Time: 01/22/19  9:27 PM  
Result Value Ref Range Glucose (POC) 38 (LL) 70 - 110 mg/dL GLUCOSE, POC Collection Time: 01/22/19  9:43 PM  
Result Value Ref Range Glucose (POC) 107 70 - 110 mg/dL METABOLIC PANEL, BASIC Collection Time: 01/23/19  1:33 AM  
Result Value Ref Range Sodium 143 136 - 145 mmol/L Potassium 4.8 3.5 - 5.5 mmol/L Chloride 111 (H) 100 - 108 mmol/L  
 CO2 27 21 - 32 mmol/L Anion gap 5 3.0 - 18 mmol/L Glucose 96 74 - 99 mg/dL BUN 56 (H) 7.0 - 18 MG/DL Creatinine 0.88 0.6 - 1.3 MG/DL  
 BUN/Creatinine ratio 64 (H) 12 - 20 GFR est AA >60 >60 ml/min/1.73m2 GFR est non-AA >60 >60 ml/min/1.73m2 Calcium 8.6 8.5 - 10.1 MG/DL  
NT-PRO BNP Collection Time: 01/23/19  1:33 AM  
Result Value Ref Range NT pro- 0 - 900 PG/ML  
VANCOMYCIN, RANDOM Collection Time: 01/23/19  1:33 AM  
Result Value Ref Range Vancomycin, random 25.3 5.0 - 40.0 UG/ML  
GLUCOSE, POC Collection Time: 01/23/19  6:35 AM  
Result Value Ref Range Glucose (POC) 76 70 - 110 mg/dL GLUCOSE, POC Collection Time: 01/23/19  9:10 AM  
Result Value Ref Range Glucose (POC) 155 (H) 70 - 110 mg/dL No results for input(s): FIO2I, IFO2, HCO3I, IHCO3, HCOPOC, PCO2I, PCOPOC, IPHI, PHI, PHPOC, PO2I, PO2POC in the last 72 hours. No lab exists for component: IPOC2 All Micro Results Procedure Component Value Units Date/Time CULTURE, BLOOD [[id number]] Collected:  01/17/19 0600 Order Status:  Completed Specimen:  Whole Blood Updated:  01/23/19 0102 Special Requests: NO SPECIAL REQUESTS Culture result: NO GROWTH 6 DAYS     
 LEGIONELLA PNEUMOPHILA AG, URINE [[id number]] Order Status:  Sent Specimen:  Urine [place], URINE [[id number]] Order Status:  Sent Specimen:  Urine RESPIRATORY VIRAL PANEL, PCR [[id number]] Order Status:  Sent Specimen:  Sputum CULTURE, BLOOD [[id number]]  (Abnormal) Collected:  01/17/19 0615 Order Status:  Completed Specimen:  Whole Blood Updated:  01/20/19 1022 Special Requests: NO SPECIAL REQUESTS     
  GRAM STAIN    
  GRAM POSITIVE RODS IN CLUSTERS AEROBIC BOTTLE  
     
      
  SMEAR CALLED TO AND CORRECTLY REPEATED BY: Jazmín Cristina RN 3N TO JRW AT 1098 01/19/19 Culture result:    
  AEROBIC BOTTLE DIPHTHEROIDS  
     
  
 
 
 
1/17/19 ECHO · Left ventricular hyperdynamic systolic function. Estimated left ventricular ejection fraction is >70%. Left ventricular mild concentric hypertrophy. Age-appropriate left ventricular diastolic function E/E' is 12. [place] · Trace mitral valve regurgitation. · Right ventricle not well visualized. · There is no evidence of pulmonary hypertension Imaging: 
[x]I have personally reviewed the patients chest radiographs images and report with the patient Results from Hospital Encounter encounter on 01/17/19 XR CHEST PORT Narrative EXAM:Chest X-Ray History: Shortness of breath Technique:  Portable Frontal View Comparison: 01/17/2019, 12/28/2017 
 
_______________ FINDINGS: 
 
Persistent cardiomegaly with hilar congestion mild cephalization. Interval improved near-total resolution of the left hilar to basilar patchy 
alveolar consolidation. Bilateral persisting diffuse prominent bronchovascular 
markings. No pneumothorax, effusion or developing alveolar opacity. No 
pneumothorax. Stable intact osseous structures. _______________ Impression IMPRESSION: 
 
1. Cardiomegaly with findings of congestion mild cephalization and bilateral 
mild to moderate diffuse bronchovascular markings, potentially unresolved edema. Otherwise, near total resolution of previous identified left hemithoracic 
pneumonia /infiltrate. Results from [facility] Encounter encounter on 12/23/16 CT SPINE CERV WO CONT  Narrative CT CERVICAL SPINE 
 
 History: Fall down for cement stairs striking back of head, patient complains of 
head and shoulder pain with lightheadedness Comparison: 05/09/2008 Technique:  A helically acquired noncontrast CT scan of the cervical spine from 
the base of the skull through T1 was performed. Axial and coronal reformations 
were also provided for improved anatomic evaluation. One or more dose reduction techniques were used on this CT: automated exposure 
control, adjustment of the mAs and/or kVp according to patient's size, and 
iterative reconstruction techniques. The specific techniques utilized on this CT 
exam have been documented in the patient's electronic medical record. 
______________________ FINDINGS: 
 
The prevertebral soft tissues are within normal limits. The vertebral bodies maintain normal height. Diffuse cervical straightening. No visualized anterior or posterior dislocation. The facet joints are 
preserved. The neural foramina are patent. Visualized portions of intracranial contents are unremarkable. 
__________________ Impression IMPRESSION: 
 
1. No fracture or dislocation. Cervical straightening which may be secondary 
to muscle spasm and/or positioning. Please note that this CT was performed supine. It is highly sensitive for 
fractures and dislocations but does not evaluate for ligamentous injury 
including significant instability. If the patient's symptoms persist or if 
otherwise clinically indicated, in erect plain film evaluation of the cervical 
spine is suggested. [x]See my orders for details My assessment, plan of care, findings, medications, side effects etc were discussed with: 
[x]nursing []PT/OT   
[]respiratory therapy []Dr. Luan Phelan [x]Patient [x]High complexity decision making performed and > 50% time spent in face to face consultation.  
 
Dianelys Feliciano MD

## 2019-01-23 NOTE — ROUTINE PROCESS
Bedside and Verbal shift change report given to MOUSTAPHA Hall (oncoming nurse) by Ashley (offgoing nurse). Report included the following information SBAR, Kardex, Intake/Output, MAR, Recent Results and Cardiac Rhythm NSR.

## 2019-01-23 NOTE — PROGRESS NOTES
1925: [facility] Patient is alert and oriented to person, place, time and situation. Respiratory status is stable on room air. Vital signs are stable. MEWS score is a one. Patient lalo any pain, discomfort, nausea vomiting dizziness or anxiety. White board and fall card is updated. Bed is locked and in lowest position. Call bell, water and personal belongings are within reach. Patient has no questions, comments or concerns after bedside shift report. 2115: Patient's blood glucose is 42, will recheck results. 2116: Patient's blood glucose is still 42. 
 
2118: Patient's blood glucose is being treated according to the [facility] hypoglycemic protocol. 2127: Patient's blood glucose is 38 on recheck. 2130: 12.5 grams of IV D50 was given. 2143: Patient's blood glucose was 107. She appears to be resting quietly in bed with no signs of distress noted. 2208: This nurse spoke with Brittanie Huffman about the patient's hypoglycemic event. She ordered this nurse to hold the 2200 Lantus. She also ordered this nurse to change the patient's daytime dose of Lantus from 60 units to 40 units. 0700: Patient had an uneventful shift. Respiratory status, vital signs and MEWS score remained stable. Patient was resting quietly with no signs of distress noted. Bed locked and in lowest position. Call bell water and personal belongings were within reach. Patient had no questions, comments or concerns after bedside shift report.  Bedside report given to Kay Ruvalcaba R.N.

## 2019-01-23 NOTE — PROGRESS NOTES
Problem: Mobility Impaired (Adult and Pediatric) Goal: *Acute Goals and Plan of Care (Insert Text) Physical Therapy Goals Initiated 1/19/2019 and to be accomplished within 3-5 day(s) 1. Patient will move from supine <> sit with S in prep for out of bed activity and change of position. 2.  Patient will perform sit<> stand with S with LRAD in prep for transfers/ambulation. 3.  Patient will transfer from bed <> chair with S with LRAD for time up in chair for completion of ADL activity. 4.  Patient will ambulate 150 feet with LRAD/S for improved functional mobility/safe discharge. .  
Outcome: Not Progressing Towards Goal 
physical Therapy TREATMENT Patient: Raquel Emanuel (46 y.o. female) Date: 1/23/2019 Diagnosis: Respiratory distress Pneumonia Asthma exacerbation Diastolic CHF ([facility] [address] Pneumonia Respiratory distress Precautions: Fall Chart, physical therapy assessment, plan of care and goals were reviewed. ASSESSMENT: 
Pt is in a very pleasnt mood and able to demonstrate improving activity tolerance. Pt maintains 92-98% SPO2 on room air and only requires 1 rest break to complete amb distance. Pt note interest in continuing to work with PT and practice 1-3 stair steps for community mobility. Will F/u with evaluating PT, about goals and advance activity as tolerated. Progression toward goals: 
[]      Improving appropriately and progressing toward goals [x]      Improving slowly and progressing toward goals 
[]      Not making progress toward goals and plan of care will be adjusted PLAN: 
Patient continues to benefit from skilled intervention to address the above impairments. Continue treatment per established plan of care. Discharge Recommendations:  Home Health Further Equipment Recommendations for Discharge:  rolling walker SUBJECTIVE:  
Patient stated I feel better today.  OBJECTIVE DATA SUMMARY:  
Critical Behavior: 
Neurologic State: Alert Orientation Level: Oriented X4 Cognition: Appropriate decision making, Appropriate for age attention/concentration, Appropriate safety awareness, Follows commands Functional Mobility Training: 
Bed Mobility: 
Rolling: Supervision Supine to Sit: Supervision Sit to Supine: Supervision Scooting: Supervision Transfers: 
Sit to Stand: Modified independent Stand to Sit: Modified independent Stand Pivot Transfers: Modified independent Balance: 
Sitting: Intact Standing: Intact; With supportAmbulation/Gait Training: 
Distance (ft): 300 Feet (ft) Assistive Device: Gait belt;Walker Ambulation - Level of Assistance: Supervision Gait Abnormalities: Decreased step clearance Base of Support: Widened Speed/Arlette: Slow Step Length: Right shortened;Left shortened Pain: 
Pain Scale 1: Numeric (0 - 10) Pain Intensity 1: 0 Pain out: 0 Activity Tolerance:  
Fair- 
Please refer to the flowsheet for vital signs taken during this treatment. After treatment:  
[x] Patient left in no apparent distress sitting EOB [] Patient left in no apparent distress in bed 
[x] Call bell left within reach [x] Nursing notified 
[] Caregiver present 
[] Bed alarm activated Roni Mcdonald PTA Time Calculation: 32 mins

## 2019-01-23 NOTE — DIABETES MGMT
GLYCEMIC CONTROL PROGRESS NOTE: 
 
-known h/o poorly managed T2DM, 
-two POCT > 300 mg/dL 
-recommend notification of physician per protocol  
-RN on floor notified Recent Glucose Results:  
Lab Results Component Value Date/Time GLU 96 01/23/2019 01:33 AM  
 GLUCPOC 353 (H) 01/23/2019 04:42 PM  
 GLUCPOC 308 (H) 01/23/2019 11:18 AM  
 GLUCPOC 155 (H) 01/23/2019 09:10 AM  
Donnella Ahumada MS, RN, CDE Glycemic Control Team 
[phone] Pager [phone] (M-TH 8:00-4:30P) *After Hours pager [phone]

## 2019-01-23 NOTE — PROGRESS NOTES
Cardiology Progress Note 1/23/2019 4:34 PM 
 
Admit Date: 1/17/2019 Admit Diagnosis: Respiratory distress Pneumonia Asthma exacerbation Diastolic CHF ([address] Pneumonia Subjective: Bina Anderson denies chest pain. Visit Vitals /63 (BP 1 Location: Right arm, BP Patient Position: Sitting) Pulse 86 Temp 97.9 °F (36.6 °C) Resp 16 Ht 5' 4\" (1.626 m) Wt 135.2 kg (298 lb) SpO2 98% Breastfeeding? No  
BMI 51.15 kg/m² Current Facility-Administered Medications Medication Dose Route Frequency  insulin lispro (HUMALOG) injection 10 Units  10 Units SubCUTAneous TIDAC  insulin glargine (LANTUS) injection 20 Units  20 Units SubCUTAneous QHS  azithromycin (ZITHROMAX) 250 mg in 0.9% sodium chloride 250 mL IVPB  250 mg IntraVENous Q24H  
 glucose chewable tablet 16 g  4 Tab Oral PRN  
 glucagon (GLUCAGEN) injection 1 mg  1 mg IntraMUSCular PRN  
 dextrose (D50W) injection syrg 12.5-25 g  25-50 mL IntraVENous PRN  
 insulin lispro (HUMALOG) injection   SubCUTAneous AC&HS  insulin glargine (LANTUS) injection 40 Units  40 Units SubCUTAneous DAILY  nystatin (MYCOSTATIN) 100,000 unit/gram powder   Topical BID  furosemide (LASIX) tablet 20 mg  20 mg Oral DAILY  methylPREDNISolone (PF) (SOLU-MEDROL) injection 40 mg  40 mg IntraVENous Q12H  
 acetaminophen (TYLENOL) tablet 650 mg  650 mg Oral Q4H PRN  
 montelukast (SINGULAIR) tablet 10 mg  10 mg Oral QHS  raNITIdine (ZANTAC) tablet 150 mg  150 mg Oral DAILY PRN  
 sodium chloride (NS) flush 5-40 mL  5-40 mL IntraVENous Q8H  
 sodium chloride (NS) flush 5-40 mL  5-40 mL IntraVENous PRN  
 heparin (porcine) injection 5,000 Units  5,000 Units SubCUTAneous Q8H  
 glucose chewable tablet 16 g  4 Tab Oral PRN  
 glucagon (GLUCAGEN) injection 1 mg  1 mg IntraMUSCular PRN  
 dextrose (D50W) injection syrg 12.5-25 g  25-50 mL IntraVENous PRN  
 aspirin chewable tablet 81 mg  81 mg Oral DAILY  atorvastatin (LIPITOR) tablet 40 mg  40 mg Oral DAILY  DULoxetine (CYMBALTA) capsule 30 mg  30 mg Oral DAILY  gabapentin (NEURONTIN) capsule 300 mg  300 mg Oral BID  metoprolol succinate (TOPROL-XL) XL tablet 50 mg  50 mg Oral DAILY  losartan (COZAAR) tablet 100 mg  100 mg Oral DAILY  albuterol-ipratropium (DUO-NEB) 2.5 MG-0.5 MG/3 ML  3 mL Nebulization Q4H RT  
 cefTRIAXone (ROCEPHIN) 1 g in sterile water (preservative free) 10 mL IV syringe  1 g IntraVENous Q24H  
 influenza vaccine 2018-19 (6 mos+)(PF) (FLUARIX QUAD/FLULAVAL QUAD) injection 0.5 mL  0.5 mL IntraMUSCular PRIOR TO DISCHARGE  guaiFENesin ER (MUCINEX) tablet 600 mg  600 mg Oral Q12H  
 budesonide (PULMICORT) 500 mcg/2 ml nebulizer suspension  500 mcg Nebulization BID RT  
 arformoterol (BROVANA) neb solution 15 mcg  15 mcg Nebulization BID RT  
 chlorpheniramine-HYDROcodone (TUSSIONEX) oral suspension 5 mL  5 mL Oral Q12H PRN Objective:  
  
Physical Exam: 
Visit Vitals /63 (BP 1 Location: Right arm, BP Patient Position: Sitting) Pulse 86 Temp 97.9 °F (36.6 °C) Resp 16 Ht 5' 4\" (1.626 m) Wt 135.2 kg (298 lb) SpO2 98% Breastfeeding? No  
BMI 51.15 kg/m² General Appearance:  Well developed, well nourished,alert and oriented x 3, and individual in no acute distress. Ears/Nose/Mouth/Throat:   Hearing grossly normal. 
  
    Neck: Supple. Chest:   Lungs clear to auscultation bilaterally. Cardiovascular:  Regular rate and rhythm, S1, S2 normal, no murmur. Abdomen:   Soft, non-tender, bowel sounds are active. Extremities: No edema bilaterally. Skin: Warm and dry. Data Review:  
Labs:   
Recent Results (from the past 24 hour(s)) [facility] Collection Time: 01/22/19  7:25 PM  
Result Value Ref Range Vancomycin,trough 32.4 (HH) 10.0 - 20.0 ug/mL Reported dose date: 60134884 Reported dose time: 0700  Reported dose: 1750 MG UNITS  
GLUCOSE, POC  
 Collection Time: 01/22/19  9:14 PM  
Result Value Ref Range Glucose (POC) 42 (LL) 70 - 110 mg/dL GLUCOSE, POC Collection Time: 01/22/19  9:27 PM  
Result Value Ref Range Glucose (POC) 38 (LL) 70 - 110 mg/dL GLUCOSE, POC Collection Time: 01/22/19  9:43 PM  
Result Value Ref Range Glucose (POC) 107 70 - 110 mg/dL METABOLIC PANEL, BASIC Collection Time: 01/23/19  1:33 AM  
Result Value Ref Range Sodium 143 136 - 145 mmol/L Potassium 4.8 3.5 - 5.5 mmol/L Chloride 111 (H) 100 - 108 mmol/L  
 CO2 27 21 - 32 mmol/L Anion gap 5 3.0 - 18 mmol/L Glucose 96 74 - 99 mg/dL BUN 56 (H) 7.0 - 18 MG/DL Creatinine 0.88 0.6 - 1.3 MG/DL  
 BUN/Creatinine ratio 64 (H) 12 - 20 GFR est AA >60 >60 ml/min/1.73m2 GFR est non-AA >60 >60 ml/min/1.73m2 Calcium 8.6 8.5 - 10.1 MG/DL  
TSH 3RD GENERATION Collection Time: 01/23/19  1:33 AM  
Result Value Ref Range TSH 0.56 0.36 - 3.74 uIU/mL NT-PRO BNP Collection Time: 01/23/19  1:33 AM  
Result Value Ref Range NT pro- 0 - 900 PG/ML  
GLUCOSE, POC Collection Time: 01/23/19  6:35 AM  
Result Value Ref Range Glucose (POC) 76 70 - 110 mg/dL GLUCOSE, POC Collection Time: 01/23/19  9:10 AM  
Result Value Ref Range Glucose (POC) 155 (H) 70 - 110 mg/dL  [place] Collection Time: 01/23/19 10:57 AM  
Result Value Ref Range Vancomycin, random 18.9 5.0 - 40.0 UG/ML  
GLUCOSE, POC Collection Time: 01/23/19 11:18 AM  
Result Value Ref Range Glucose (POC) 308 (H) 70 - 110 mg/dL Telemetry: normal sinus rhythm Assessment:  
 
Principal Problem: 
  Respiratory distress (1/17/2019) Active Problems: Morbid obesity with BMI of 40.0-44.9, adult ([facility] (8/16/2014) Type II or unspecified type diabetes mellitus with renal manifestations, uncontrolled(250.42) ([facility] (9/4/2014) Asthma () Overview: Note: As child had asthma Morbid obesity ([facility] () Pneumonia (1/17/2019) Asthma exacerbation (1/17/2019) Diastolic CHF ([facility] [address] (1/68/8972) BMI 45.0-49.9, adult ([facility] (1/19/2019) Plan:  
 
Subjectively improved. Her albumin is low. Will check prealbumin. Still has edema. Objective evidence of heart failure is absent. Continue to follow.  
 
Cullen Arguelles MD

## 2019-01-24 VITALS
TEMPERATURE: 98.3 F | WEIGHT: 293 LBS | HEIGHT: 64 IN | DIASTOLIC BLOOD PRESSURE: 69 MMHG | HEART RATE: 77 BPM | SYSTOLIC BLOOD PRESSURE: 153 MMHG | OXYGEN SATURATION: 96 % | RESPIRATION RATE: 16 BRPM | BODY MASS INDEX: 50.02 KG/M2

## 2019-01-24 LAB
ANION GAP SERPL CALC-SCNC: 6 MMOL/L (ref 3–18)
BASOPHILS # BLD: 0 K/UL (ref 0–0.1)
BASOPHILS NFR BLD: 0 % (ref 0–2)
BUN SERPL-MCNC: 54 MG/DL (ref 7–18)
BUN/CREAT SERPL: 49 (ref 12–20)
CALCIUM SERPL-MCNC: 8.9 MG/DL (ref 8.5–10.1)
CHLORIDE SERPL-SCNC: 108 MMOL/L (ref 100–108)
CO2 SERPL-SCNC: 27 MMOL/L (ref 21–32)
CREAT SERPL-MCNC: 1.11 MG/DL (ref 0.6–1.3)
DIFFERENTIAL METHOD BLD: ABNORMAL
EOSINOPHIL # BLD: 0 K/UL (ref 0–0.4)
EOSINOPHIL NFR BLD: 0 % (ref 0–5)
ERYTHROCYTE [DISTWIDTH] IN BLOOD BY AUTOMATED COUNT: 14.3 % (ref 11.6–14.5)
GLUCOSE BLD STRIP.AUTO-MCNC: 257 MG/DL (ref 70–110)
GLUCOSE BLD STRIP.AUTO-MCNC: 335 MG/DL (ref 70–110)
GLUCOSE SERPL-MCNC: 336 MG/DL (ref 74–99)
HCT VFR BLD AUTO: 33 % (ref 35–45)
HGB BLD-MCNC: 10.3 G/DL (ref 12–16)
LYMPHOCYTES # BLD: 2.1 K/UL (ref 0.9–3.6)
LYMPHOCYTES NFR BLD: 16 % (ref 21–52)
MCH RBC QN AUTO: 26.5 PG (ref 24–34)
MCHC RBC AUTO-ENTMCNC: 31.2 G/DL (ref 31–37)
MCV RBC AUTO: 84.8 FL (ref 74–97)
MONOCYTES # BLD: 1.1 K/UL (ref 0.05–1.2)
MONOCYTES NFR BLD: 9 % (ref 3–10)
NEUTS SEG # BLD: 9.6 K/UL (ref 1.8–8)
NEUTS SEG NFR BLD: 75 % (ref 40–73)
PLATELET # BLD AUTO: 263 K/UL (ref 135–420)
PMV BLD AUTO: 9.2 FL (ref 9.2–11.8)
POTASSIUM SERPL-SCNC: 4.6 MMOL/L (ref 3.5–5.5)
PREALB SERPL-MCNC: 32.4 MG/DL (ref 20–40)
PROCALCITONIN SERPL-MCNC: 0.26 NG/ML (ref 0–0.08)
RBC # BLD AUTO: 3.89 M/UL (ref 4.2–5.3)
SODIUM SERPL-SCNC: 141 MMOL/L (ref 136–145)
WBC # BLD AUTO: 12.8 K/UL (ref 4.6–13.2)

## 2019-01-24 PROCEDURE — 74011250637 HC RX REV CODE- 250/637: Performed by: INTERNAL MEDICINE

## 2019-01-24 PROCEDURE — 90686 IIV4 VACC NO PRSV 0.5 ML IM: CPT | Performed by: INTERNAL MEDICINE

## 2019-01-24 PROCEDURE — 36415 COLL VENOUS BLD VENIPUNCTURE: CPT

## 2019-01-24 PROCEDURE — 74011250636 HC RX REV CODE- 250/636: Performed by: HOSPITALIST

## 2019-01-24 PROCEDURE — 85025 COMPLETE CBC W/AUTO DIFF WBC: CPT

## 2019-01-24 PROCEDURE — 74011000250 HC RX REV CODE- 250: Performed by: HOSPITALIST

## 2019-01-24 PROCEDURE — 94640 AIRWAY INHALATION TREATMENT: CPT

## 2019-01-24 PROCEDURE — 74011636637 HC RX REV CODE- 636/637: Performed by: HOSPITALIST

## 2019-01-24 PROCEDURE — 94760 N-INVAS EAR/PLS OXIMETRY 1: CPT

## 2019-01-24 PROCEDURE — 74011250637 HC RX REV CODE- 250/637: Performed by: HOSPITALIST

## 2019-01-24 PROCEDURE — 74011000250 HC RX REV CODE- 250: Performed by: INTERNAL MEDICINE

## 2019-01-24 PROCEDURE — 90471 IMMUNIZATION ADMIN: CPT

## 2019-01-24 PROCEDURE — 74011250636 HC RX REV CODE- 250/636: Performed by: INTERNAL MEDICINE

## 2019-01-24 PROCEDURE — 86615 BORDETELLA ANTIBODY: CPT

## 2019-01-24 PROCEDURE — 82962 GLUCOSE BLOOD TEST: CPT

## 2019-01-24 PROCEDURE — 80048 BASIC METABOLIC PNL TOTAL CA: CPT

## 2019-01-24 PROCEDURE — 74011250636 HC RX REV CODE- 250/636: Performed by: FAMILY MEDICINE

## 2019-01-24 RX ORDER — ALBUTEROL SULFATE 90 UG/1
1 AEROSOL, METERED RESPIRATORY (INHALATION)
Status: DISCONTINUED | OUTPATIENT
Start: 2019-01-24 | End: 2019-01-24 | Stop reason: HOSPADM

## 2019-01-24 RX ORDER — PREDNISONE 20 MG/1
40 TABLET ORAL
Status: DISCONTINUED | OUTPATIENT
Start: 2019-01-24 | End: 2019-01-24 | Stop reason: HOSPADM

## 2019-01-24 RX ORDER — AZITHROMYCIN 250 MG/1
TABLET, FILM COATED ORAL
Qty: 6 TAB | Refills: 0 | Status: SHIPPED | OUTPATIENT
Start: 2019-01-24 | End: 2019-01-29

## 2019-01-24 RX ORDER — PREDNISONE 5 MG/1
TABLET ORAL
Qty: 21 TAB | Refills: 0 | Status: ON HOLD | OUTPATIENT
Start: 2019-01-24 | End: 2019-12-16 | Stop reason: SDUPTHER

## 2019-01-24 RX ORDER — GUAIFENESIN 600 MG/1
600 TABLET, EXTENDED RELEASE ORAL EVERY 12 HOURS
Qty: 10 TAB | Refills: 0 | Status: ON HOLD | OUTPATIENT
Start: 2019-01-24 | End: 2019-12-16 | Stop reason: SDUPTHER

## 2019-01-24 RX ADMIN — INSULIN LISPRO 13 UNITS: 100 INJECTION, SOLUTION INTRAVENOUS; SUBCUTANEOUS at 08:52

## 2019-01-24 RX ADMIN — GABAPENTIN 300 MG: 300 CAPSULE ORAL at 08:53

## 2019-01-24 RX ADMIN — FUROSEMIDE 20 MG: 20 TABLET ORAL at 08:52

## 2019-01-24 RX ADMIN — IPRATROPIUM BROMIDE AND ALBUTEROL SULFATE 3 ML: .5; 3 SOLUTION RESPIRATORY (INHALATION) at 11:43

## 2019-01-24 RX ADMIN — METOPROLOL SUCCINATE 50 MG: 50 TABLET, EXTENDED RELEASE ORAL at 08:53

## 2019-01-24 RX ADMIN — LOSARTAN POTASSIUM 100 MG: 50 TABLET ORAL at 08:53

## 2019-01-24 RX ADMIN — IPRATROPIUM BROMIDE AND ALBUTEROL SULFATE 3 ML: .5; 3 SOLUTION RESPIRATORY (INHALATION) at 07:38

## 2019-01-24 RX ADMIN — INSULIN GLARGINE 40 UNITS: 100 INJECTION, SOLUTION SUBCUTANEOUS at 08:51

## 2019-01-24 RX ADMIN — Medication 10 ML: at 06:31

## 2019-01-24 RX ADMIN — INSULIN LISPRO 10 UNITS: 100 INJECTION, SOLUTION INTRAVENOUS; SUBCUTANEOUS at 06:31

## 2019-01-24 RX ADMIN — BUDESONIDE 500 MCG: 0.5 INHALANT RESPIRATORY (INHALATION) at 07:38

## 2019-01-24 RX ADMIN — INSULIN LISPRO 6 UNITS: 100 INJECTION, SOLUTION INTRAVENOUS; SUBCUTANEOUS at 11:57

## 2019-01-24 RX ADMIN — Medication 81 MG: at 08:53

## 2019-01-24 RX ADMIN — NYSTATIN: 100000 POWDER TOPICAL at 08:58

## 2019-01-24 RX ADMIN — DULOXETINE HYDROCHLORIDE 30 MG: 30 CAPSULE, DELAYED RELEASE ORAL at 08:53

## 2019-01-24 RX ADMIN — GUAIFENESIN 600 MG: 600 TABLET, EXTENDED RELEASE ORAL at 08:53

## 2019-01-24 RX ADMIN — CEFTRIAXONE 1 G: 1 INJECTION, POWDER, FOR SOLUTION INTRAMUSCULAR; INTRAVENOUS at 06:30

## 2019-01-24 RX ADMIN — ARFORMOTEROL TARTRATE 15 MCG: 15 SOLUTION RESPIRATORY (INHALATION) at 07:38

## 2019-01-24 RX ADMIN — ATORVASTATIN CALCIUM 40 MG: 20 TABLET, FILM COATED ORAL at 08:53

## 2019-01-24 RX ADMIN — INFLUENZA VIRUS VACCINE 0.5 ML: 15; 15; 15; 15 SUSPENSION INTRAMUSCULAR at 12:00

## 2019-01-24 RX ADMIN — INSULIN LISPRO 13 UNITS: 100 INJECTION, SOLUTION INTRAVENOUS; SUBCUTANEOUS at 11:57

## 2019-01-24 RX ADMIN — IPRATROPIUM BROMIDE AND ALBUTEROL SULFATE 3 ML: .5; 3 SOLUTION RESPIRATORY (INHALATION) at 04:41

## 2019-01-24 RX ADMIN — HEPARIN SODIUM 5000 UNITS: 5000 INJECTION INTRAVENOUS; SUBCUTANEOUS at 06:30

## 2019-01-24 RX ADMIN — METHYLPREDNISOLONE SODIUM SUCCINATE 40 MG: 40 INJECTION, POWDER, FOR SOLUTION INTRAMUSCULAR; INTRAVENOUS at 06:31

## 2019-01-24 RX ADMIN — PREDNISONE 40 MG: 20 TABLET ORAL at 08:57

## 2019-01-24 NOTE — PROGRESS NOTES
INITIAL NUTRITION ASSESSMENT  
RECOMMENDATIONS/PLAN:  
Contine to encourage PO intake >75% at most meals Monitor labs/lytes, PO intake, BM, skin integrity, fluid status, nutritional status REASON FOR ASSESSMENT:  
 
[x] LOS 
 
NUTRITION ASSESSMENT:  
Client History: 46 yrs old Female admitted with PNA, dyspnea, asthma exacerbation, chf, leg swelling, dm type II, morbid obesity PMHx: DM , Asthma, HTN,CHF, obesity Cultural/Congregation Food Preferences: None Identified FOOD/NUTRITION HISTORY Diet History: Pt reported a poor appetite over the last few months due to swelling likely in part related to CHF. She reported that since some of the fluid is off, her appetite is doing much better since her admission. We discussed smaller more frequent meals may be best after discharge. She discussed that her hospital stay was a major wakeup call for her, and that she knows she needs to take better care of herself. Food Allergies:   [x] Yes ([address]) Pertinent PTA Medications:  Metformin, toprol, lipitor, cozaar, lasix, lisinopril, insulin NUTRITION INTAKE Diet Order:  Consistent Carb Average PO Intake:      
Patient Vitals for the past 100 hrs: 
 % Diet Eaten 01/23/19 2031 100 % 01/23/19 1700 100 % 01/23/19 1300 100 % 01/23/19 0929 100 % 01/22/19 1925 100 % 01/22/19 1800 100 % 01/22/19 1230 100 % 01/22/19 0800 100 % 01/21/19 1935 0 % 01/21/19 0841 100 % 01/20/19 1841 100 % 01/20/19 1336 40 % 01/20/19 0901 25 % Pertinent Medications:  [x] Reviewed; prednisone, mycostatin, toprol, cozaar,  Heparin, lasix, cymbalta, lipitor Electrolyte Replacement Protocol: []K  []Mg  []PO4 Insulin:  [x] SSI  [x] Pre-meal   []  Basal   [] Drip  [] None Pt expected to meet estimated nutrient needs through next review:          [x]  Yes     [] No;  ANTHROPOMETRICS Height: 5' 4\" (162.6 cm)       Weight: 136.7 kg (301 lb 5.9 oz) BMI: 51.7 kg/m^2  -  morbidly obese (Greater than or = to 40% BMI) Weight change: No weight loss noted per chart hx; 272 lb-2015 Comparison to Reference Standards: IBW: 120 lbs      %IBW: 251%      AdjBW: 75.1 kg NUTRITION-FOCUSED PHYSICAL ASSESSMENT Skin: No PU    
GI: +BM: 1/23/19 BIOCHEMICAL DATA & MEDICAL TESTS Pertinent Labs:  [x] Reviewed; Glucose: 336, BUN: 54, GFR: 51 NUTRITION PRESCRIPTION Calories: 8890-7718 kcal/day based on 11-14 kcal/kg Protein: 109 g/day based on 2 g/kg IBW Fluid: 4043-9350 ml/day based on 1 kcal/ml NUTRITION DIAGNOSES:  
1. At risk for inadequate oral intake related to PNA and swelling as evidenced by pt reports of poor appetite NUTRITION INTERVENTIONS:  
INTERVENTIONS:        GOALS: 
1. Other: Contine to encourage PO intake >75% at most meals 1. Continue to encourage PO intake >75% at most meals by next review 5 days LEARNING NEEDS (Diet, Supplementation, Food/Nutrient-Drug Interaction):  
[x] None Identified 
[] Inpatient education provided/documented   
[] Identified and patient:  [] Declined     [] Was not appropriate/indicated NUTRITION MONITORING /EVALUATION:  
Follow PO intake Monitor wt Monitor renal labs, electrolytes, fluid status Monitor for additional supplement needs 
 
[] Participated in Interdisciplinary Rounds 
[x] [address] [place] [address] Reviewed/Documented DISCHARGE NUTRITION RECOMMENDATIONS ADDRESSED:  
  [x] To be determined closer to discharge NUTRITION RISK:     [x]  At risk                     []  Not currently at risk Will follow-up per policy. Diogo Alcala, Dietetic Intern [phone]

## 2019-01-24 NOTE — CDMP QUERY
Due to conflicting documentation, please clarify if this patient is being treated/managed for: 
 
=>Chronic Diastolic CHF, no acute exacerbation =>Acute on Chronic Diastolic CHF as evidenced by (please provide clinical support) =>Other Explanation of clinical findings =>Unable to Determine (no explanation of clinical findings) The medical record reflects the following: 
 
Risk: h/o Diastolic CHF, morbid obesity (BMI 51) Clinical Indicators: Patient presented w/ worsening SOB and leg edema. The H&P indicates a diagnosis of Diastolic CHF as well as PNA and asthma exacerbation and the patient was started on IV lasix. 1/17/19 BNP= 504,  albumin =2.9, CXR = PNA, VQ scan= low prob PE Echo:Left ventricular hyperdynamic systolic function. Estimated left ventricular ejection fraction is >70%. Left ventricular mild concentric hypertrophy. Age-appropriate left ventricular diastolic function 1/18- 1/23/19 Hospitalist Progress notes:  Chf. Acute on chronic  Echo done , FO>04 Diastolic 1/21/19 LE duplex Neg for DVT 
1/22/19 Cardiology consult: No evidence of CHF with normal systolic LV function and age related diastolic findings on Echo. BNP is in normal or non-diagnostic range for her age 1/23/19 Repeat  
1/23 Cardiology note: Her albumin is low. Will check prealbumin. Still has edema. Objective evidence of heart failure is absent. Treatment:  IV lasix changed to PO, IV zithromax and rocephin, duonebs, Please clarify and document your clinical opinion in the progress notes and discharge summary including the definitive and/or presumptive diagnosis, (suspected or probable), related to the above clinical findings. Please include clinical findings supporting your diagnosis. If you DECLINE this query or would like to communicate with [facility], please utilize the \"[employer] message box\" at the TOP of the Progress Note on the right. Thank you, Isaias Hardy [facility], [address]

## 2019-01-24 NOTE — PROGRESS NOTES
Problem: Pressure Injury - Risk of 
Goal: *Prevention of pressure injury Document Elian Scale and appropriate interventions in the flowsheet. Outcome: Progressing Towards Goal 
Pressure Injury Interventions: 
  
 
Moisture Interventions: Absorbent underpads Activity Interventions: Pressure redistribution bed/mattress(bed type) Mobility Interventions: Pressure redistribution bed/mattress (bed type) Nutrition Interventions: Document food/fluid/supplement intake Friction and Shear Interventions: Apply protective barrier, creams and emollients Problem: Falls - Risk of 
Goal: *Absence of Falls Document Temitope End Fall Risk and appropriate interventions in the flowsheet. Outcome: Progressing Towards Goal 
Fall Risk Interventions: 
Mobility Interventions: Communicate number of staff needed for ambulation/transfer, Patient to call before getting OOB, PT Consult for mobility concerns, PT Consult for assist device competence, Strengthening exercises (ROM-active/passive), Utilize walker, cane, or other assistive device Medication Interventions: Teach patient to arise slowly Elimination Interventions: Call light in reach History of Falls Interventions: Room close to nurse's station

## 2019-01-24 NOTE — PROGRESS NOTES
Problem: Falls - Risk of 
Goal: *Absence of Falls Document Paz Tejeda Fall Risk and appropriate interventions in the flowsheet. Outcome: Progressing Towards Goal 
Fall Risk Interventions: 
Mobility Interventions: Communicate number of staff needed for ambulation/transfer, Patient to call before getting OOB, PT Consult for mobility concerns, PT Consult for assist device competence, Strengthening exercises (ROM-active/passive), Utilize walker, cane, or other assistive device Medication Interventions: Patient to call before getting OOB, Teach patient to arise slowly Elimination Interventions: Call light in reach History of Falls Interventions: Room close to nurse's station

## 2019-01-24 NOTE — PROGRESS NOTES
Transition of care: anticipate home today Met with patient at bedside and med with IDR team. patient states she is ready to go but does not have the resources to pay for the medications. States she hopes her medicaid quickly comes. Cm has verified that the [facility] does not have insurance listed for this patient as of last date checked. Cm has got authorization for the last resort to pay for her medications. Patient is aware. States she is aware to  Pick her medications up on the way down with d/c. States her sister will drive her home. Patient denies other needs patient declines [facility] Patient prefers to schedule her follow-up appointments.  Dr. Anne Hu office has told pt that they will see her within 2-3 days after discharge.  Pt prefers to ask this physician for a referral to a pulmonologist. [place] [facility] will send    
 Next steps: Follow up  
  
medical records to Dr. Anne Hu office. Next steps: Follow up Care Management Interventions PCP Verified by CM: Yes Mode of Transport at Discharge: Other (see comment)(daughter) Transition of Care Consult (CM Consult): Discharge Planning(declines [facility]) Current Support Network: Lives Alone, Family Lives [place] Confirm Follow Up Transport: Family Plan discussed with Pt/Family/Caregiver: Yes Freedom of Choice Offered: Yes Discharge Location Discharge Placement: Home with family assistance

## 2019-01-24 NOTE — PROGRESS NOTES
Pulmonary and Sleep Medicine Pulmonary Follow Up Note Patient: Kenji Huston               Sex: female          DOA: 1/17/2019 YOB: 1965      Age:  46 y.o.        LOS:  LOS: 7 days IMPRESSION:  
Principal Problem: 
  Respiratory distress (1/17/2019) Active Problems: Morbid obesity with BMI of 40.0-44.9, adult ([facility] [address] (8/16/2014) Type II or unspecified type diabetes mellitus with renal manifestations, uncontrolled(250.42) ([facility] [address] (9/4/2014) Asthma () Overview: Note: As child had asthma Morbid obesity ([facility] [address] () Pneumonia (1/17/2019) Asthma exacerbation (1/17/2019) Diastolic CHF ([facility] [address] (8/34/7532) BMI 45.0-49.9, adult ([facility] [address] (1/19/2019) · Acute hypercapnic failure · Bl lymphedema RECOMMENDATIONS:  
Patient is on room air; may use supplemental O2 via NC as needed for goal SPO2> 90% No PE or major pneumonia, CHF on chest CTA; possible small airways disease pattern Await B. Pertussis IG, respiratory vital panel, Procalcitonin; negative UR legionella and streptococcus Ag Patient would benefit from PFT and sleep study out patient; prefers to arrange with [facility] given PCP with [facility] and insurance limitations Continue bronchodilators, pulmonary hygiene care Steroids taper per clinical response Able to perform PT without any O2 desaturation Antibiotic choice: stop Rocephin, off of Vancomycin. May switch to PO azithromycin to finish 5 days of course at [place] home. Aspiration prevention bundle, head of the bed at 30' all times Cardiology following Normal pro-BNP; TSH Diuresis per cardiology, hospitalist 
Glycemic control Stress ulcer prophylaxis DVT prophylaxis Will defer respective systems problem management to primary and other consultant and follow patient with primary and other team 
Further recommendations will be based on the patient's response to recommended treatment and results of the investigation ordered. 01/24/19 Patient reports improved HORAN and leg swelling The patient denies cough, chest pain, wheezing or hemoptysis. No palpitations, syncope, orthopnea, or PND. No fever. Walked with PT without any hypoxia or difficulty yesterday HPI:  
Ms. Nancy Marx is a 46 y.o. female who is being seen for HORAN, cough at the request of Dr. Matthew Zayas. Patient provided information. Patient reports long standing symptoms of HORAN for several years that progressed and lately in past few months associated with cough that didn't improve with stopping lisinopril. She was unable to see pulmonologist secondary to being depending on transport arrangements. She has been admitted for CHF exacerbation and pneumonia. Didn't improve with management so far. Denies any prior hx of asthma. No prior work up for АНДРЕЙ. She has GERD, chronic sinusitis. Previously followed with cardiologist Dr. Sadia Bonilla but unable to follow 2' to insurance limitations. Has chronic leg swelling. So far VQ scan, PVL of legs been negative. Review of Systems General ROS: negative for - fever, chills, weight loss, fatigue and malaise Cardiovascular ROS: negative for - chest pain, murmur, orthopnea, palpitations or PND Gastrointestinal ROS: no abdominal pain, change in bowel habits, or black or bloody stools Dermatological ROS: negative for - pruritus, rash or skin lesion changes Past Medical History Past Medical History:  
Diagnosis Date  Asthma Note: As child had asthma  Diabetes mellitus ([facility] [address] Type 2  
 Heart palpitations  Hypertension  Ill-defined condition   
 fibromyalgia  Morbid obesity ([facility] [address]  MRSA infection 8/2014 Past Surgical History Past Surgical History:  
Procedure Laterality Date  BREAST SURGERY PROCEDURE UNLISTED  HX CHOLECYSTECTOMY  HX TUBAL LIGATION Family History Family History Problem Relation Age of Onset  Heart Attack Mother  Heart Attack Maternal Grandmother Social History Social History Tobacco Use  Smoking status: Never Smoker  Smokeless tobacco: Never Used Substance Use Topics  Alcohol use: No  
 Drug use: No  
  
 
Medications Current Facility-Administered Medications Medication Dose Route Frequency  predniSONE (DELTASONE) tablet 40 mg  40 mg Oral DAILY WITH BREAKFAST  insulin glargine (LANTUS) injection 20 Units  20 Units SubCUTAneous QHS  azithromycin (ZITHROMAX) 250 mg in 0.9% sodium chloride 250 mL IVPB  250 mg IntraVENous Q24H  
 glucose chewable tablet 16 g  4 Tab Oral PRN  
 glucagon (GLUCAGEN) injection 1 mg  1 mg IntraMUSCular PRN  
 dextrose (D50W) injection syrg 12.5-25 g  25-50 mL IntraVENous PRN  
 insulin lispro (HUMALOG) injection   SubCUTAneous AC&HS  insulin lispro (HUMALOG) injection 13 Units  13 Units SubCUTAneous TIDAC  insulin glargine (LANTUS) injection 40 Units  40 Units SubCUTAneous DAILY  nystatin (MYCOSTATIN) 100,000 unit/gram powder   Topical BID  furosemide (LASIX) tablet 20 mg  20 mg Oral DAILY  acetaminophen (TYLENOL) tablet 650 mg  650 mg Oral Q4H PRN  
 montelukast (SINGULAIR) tablet 10 mg  10 mg Oral QHS  raNITIdine (ZANTAC) tablet 150 mg  150 mg Oral DAILY PRN  
 sodium chloride (NS) flush 5-40 mL  5-40 mL IntraVENous Q8H  
 sodium chloride (NS) flush 5-40 mL  5-40 mL IntraVENous PRN  
 heparin (porcine) injection 5,000 Units  5,000 Units SubCUTAneous Q8H  
 glucose chewable tablet 16 g  4 Tab Oral PRN  
 glucagon (GLUCAGEN) injection 1 mg  1 mg IntraMUSCular PRN  
 dextrose (D50W) injection syrg 12.5-25 g  25-50 mL IntraVENous PRN  
 aspirin chewable tablet 81 mg  81 mg Oral DAILY  atorvastatin (LIPITOR) tablet 40 mg  40 mg Oral DAILY  DULoxetine (CYMBALTA) capsule 30 mg  30 mg Oral DAILY  gabapentin (NEURONTIN) capsule 300 mg  300 mg Oral BID  metoprolol succinate (TOPROL-XL) XL tablet 50 mg  50 mg Oral DAILY  losartan (COZAAR) tablet 100 mg  100 mg Oral DAILY  albuterol-ipratropium (DUO-NEB) 2.5 MG-0.5 MG/3 ML  3 mL Nebulization Q4H RT  
 cefTRIAXone (ROCEPHIN) 1 g in sterile water (preservative free) 10 mL IV syringe  1 g IntraVENous Q24H  
 influenza vaccine 2018-19 (6 mos+)(PF) (FLUARIX QUAD/FLULAVAL QUAD) injection 0.5 mL  0.5 mL IntraMUSCular PRIOR TO DISCHARGE  guaiFENesin ER (MUCINEX) tablet 600 mg  600 mg Oral Q12H  
 budesonide (PULMICORT) 500 mcg/2 ml nebulizer suspension  500 mcg Nebulization BID RT  
 arformoterol (BROVANA) neb solution 15 mcg  15 mcg Nebulization BID RT  
 chlorpheniramine-HYDROcodone (TUSSIONEX) oral suspension 5 mL  5 mL Oral Q12H PRN Prior to Admission medications Medication Sig Start Date End Date Taking? Authorizing Provider  
metFORMIN (GLUCOPHAGE) 1,000 mg tablet Take 1,000 mg by mouth two (2) times daily (with meals). Yes Lizzette, MD Yoana  
metoprolol succinate (TOPROL-XL) 50 mg XL tablet Take 50 mg by mouth daily. Yes Yoana Crocker MD  
DULoxetine (CYMBALTA) 30 mg capsule Take 30 mg by mouth daily. Yes Yoana Crocker MD  
atorvastatin (LIPITOR) 40 mg tablet Take 40 mg by mouth daily. Yes Yoana Crocker MD  
losartan (COZAAR) 100 mg tablet Take 100 mg by mouth daily. Yes Yoana Crocker MD  
OTHER,NON-FORMULARY, Medication for irregular heart beat   Yes Yoana Crocker MD  
gabapentin (NEURONTIN) 300 mg capsule Take 300 mg by mouth two (2) times a day. Yes Yoana Crocker MD  
furosemide (LASIX) 40 mg tablet Take 1 Tab by mouth daily. 2/13/17  Yes Laney Bran MD  
lisinopril (PRINIVIL, ZESTRIL) 10 mg tablet Take 1 Tab by mouth daily. 9/1/22  Yes Chico Lynch MD  
insulin NPH/insulin regular (NOVOLIN 70/30) 100 unit/mL (70-30) injection 50 Units by SubCUTAneous route every twelve (12) hours. Yes Provider, Historical  
insulin regular (NOVOLIN R) 100 unit/mL injection by SubCUTAneous route Daily (before lunch).    Yes Provider, Historical  
 aspirin 81 mg chewable tablet Take 1 tablet by mouth daily. 14  Yes Maribell Richard MD  
 
 
Allergy Allergies Allergen Reactions  Bees [Sting, Bee] Swelling  Penicillins Hives  Darwin Hives Physical Exam:  
Vital Signs:   
Blood pressure 147/61, pulse 72, temperature 97.5 °F (36.4 °C), resp. rate 16, height 5' 4\" (1.626 m), weight 136.7 kg (301 lb 5.9 oz), SpO2 94 %, not currently breastfeeding. Body mass index is 51.73 kg/m². O2 Device: Room air Temp (24hrs), Av.8 °F (36.6 °C), Min:97.5 °F (36.4 °C), Max:98.1 °F (36.7 °C) Intake/Output:  
Last shift:      No intake/output data recorded. Last 3 shifts:  1901 -  0700 In: 1614 [P.O.:1450] Out: 300 [Urine:300] Intake/Output Summary (Last 24 hours) at 2019 1101 Last data filed at 2019 Gross per 24 hour Intake 730 ml Output 300 ml Net 430 ml General: AAO x 3, appears stated age, morbidly obese, on room air HEENT: PERRLA, EOMI, fundi benign, throat normal without erythema or exudate Neck: No abnormally enlarged lymph nodes or thyroid, supple Chest: normal 
Lungs: moderate air entry, no wheezing or rhonchi bilaterally, normal percussion bilaterally, no tenderness/ rash Heart: Regular rate and rhythm, S1S2 present or without murmur or extra heart sounds Abdomen: morbid obesity, bowel sounds normoactive, tympanic, soft without significant tenderness, masses, organomegaly or guarding Extremity: 1-2+, pitting and bl LE edema; no cyanosis, clubbing Skin: Skin color, texture, turgor normal. No rashes or lesions Labs Reviewed: 
Recent Results (from the past 24 hour(s)) GLUCOSE, POC Collection Time: 19 11:18 AM  
Result Value Ref Range Glucose (POC) 308 (H) 70 - 110 mg/dL LEGIONELLA PNEUMOPHILA AG, URINE Collection Time: 19 12:05 PM  
Result Value Ref Range  Legionella Ag, urine NEGATIVE  NEG    
STREP PNEUMO AG, URINE  
 Collection Time: 01/23/19 12:05 PM  
Result Value Ref Range Strep pneumo Ag, urine NEGATIVE  NEG    
GLUCOSE, POC Collection Time: 01/23/19  4:42 PM  
Result Value Ref Range Glucose (POC) 353 (H) 70 - 110 mg/dL GLUCOSE, POC Collection Time: 01/23/19  9:39 PM  
Result Value Ref Range Glucose (POC) 374 (H) 70 - 110 mg/dL METABOLIC PANEL, BASIC Collection Time: 01/24/19  5:33 AM  
Result Value Ref Range Sodium 141 136 - 145 mmol/L Potassium 4.6 3.5 - 5.5 mmol/L Chloride 108 100 - 108 mmol/L  
 CO2 27 21 - 32 mmol/L Anion gap 6 3.0 - 18 mmol/L Glucose 336 (H) 74 - 99 mg/dL BUN 54 (H) 7.0 - 18 MG/DL Creatinine 1.11 0.6 - 1.3 MG/DL  
 BUN/Creatinine ratio 49 (H) 12 - 20 GFR est AA >60 >60 ml/min/1.73m2 GFR est non-AA 51 (L) >60 ml/min/1.73m2 Calcium 8.9 8.5 - 10.1 MG/DL  
CBC WITH AUTOMATED DIFF Collection Time: 01/24/19  5:33 AM  
Result Value Ref Range WBC 12.8 4.6 - 13.2 K/uL  
 RBC 3.89 (L) 4.20 - 5.30 M/uL  
 HGB 10.3 (L) 12.0 - 16.0 g/dL HCT 33.0 (L) 35.0 - 45.0 % MCV 84.8 74.0 - 97.0 FL  
 MCH 26.5 24.0 - 34.0 PG  
 MCHC 31.2 31.0 - 37.0 g/dL  
 RDW 14.3 11.6 - 14.5 % PLATELET 482 896 - 747 K/uL MPV 9.2 9.2 - 11.8 FL  
 NEUTROPHILS 75 (H) 40 - 73 % LYMPHOCYTES 16 (L) 21 - 52 % MONOCYTES 9 3 - 10 % EOSINOPHILS 0 0 - 5 % BASOPHILS 0 0 - 2 %  
 ABS. NEUTROPHILS 9.6 (H) 1.8 - 8.0 K/UL  
 ABS. LYMPHOCYTES 2.1 0.9 - 3.6 K/UL  
 ABS. MONOCYTES 1.1 0.05 - 1.2 K/UL  
 ABS. EOSINOPHILS 0.0 0.0 - 0.4 K/UL  
 ABS. BASOPHILS 0.0 0.0 - 0.1 K/UL  
 DF AUTOMATED    
GLUCOSE, POC Collection Time: 01/24/19  6:08 AM  
Result Value Ref Range Glucose (POC) 335 (H) 70 - 110 mg/dL No results for input(s): FIO2I, IFO2, HCO3I, IHCO3, HCOPOC, PCO2I, PCOPOC, IPHI, PHI, PHPOC, PO2I, PO2POC in the last 72 hours. No lab exists for component: IPOC2 All Micro Results Procedure Component Value Units Date/Time LEGIONELLA PNEUMOPHILA AG, URINE [[id number]] Collected:  01/23/19 1205 Order Status:  Completed Specimen:  Urine, random Updated:  01/23/19 2007 Legionella Ag, urine NEGATIVE Brandon Ortiz, URINE [[id number]] Collected:  01/23/19 1205 Order Status:  Completed Specimen:  Urine, random Updated:  01/23/19 2006 Strep pneumo Ag, urine NEGATIVE      
 RESPIRATORY VIRAL PANEL, PCR [[id number]] Collected:  01/23/19 1227 Order Status:  Completed Specimen:  Sputum Updated:  01/23/19 1322 CULTURE, BLOOD [[id number]] Collected:  01/17/19 0600 Order Status:  Completed Specimen:  Whole Blood Updated:  01/23/19 0102 Special Requests: NO SPECIAL REQUESTS Culture result: NO GROWTH 6 DAYS     
 RESPIRATORY VIRAL PANEL, PCR [[id number]] Order Status:  Canceled Specimen:  Sputum CULTURE, BLOOD [[id number]]  (Abnormal) Collected:  01/17/19 0615 Order Status:  Completed Specimen:  Whole Blood Updated:  01/20/19 1022 Special Requests: NO SPECIAL REQUESTS     
  GRAM STAIN    
  GRAM POSITIVE RODS IN CLUSTERS AEROBIC BOTTLE  
     
      
  SMEAR CALLED TO AND CORRECTLY REPEATED BY: Caesar Carlin RN 3N TO JRW AT 7761 01/19/19 Culture result:    
  AEROBIC BOTTLE DIPHTHEROIDS  
     
  
 
 
 
1/17/19 ECHO · Left ventricular hyperdynamic systolic function. Estimated left ventricular ejection fraction is >70%. Left ventricular mild concentric hypertrophy. Age-appropriate left ventricular diastolic function E/E' is 12. Sarthak Saunas · Trace mitral valve regurgitation. · Right ventricle not well visualized. · There is no evidence of pulmonary hypertension Imaging: 
[x]I have personally reviewed the patients chest radiographs images and report with the patient Results from Hospital Encounter encounter on 01/17/19 XR CHEST PORT Narrative EXAM:Chest X-Ray History: Shortness of breath Technique:  Portable Frontal View Comparison: 01/17/2019, 12/28/2017 
 
_______________ FINDINGS: 
 
Persistent cardiomegaly with hilar congestion mild cephalization. Interval improved near-total resolution of the left hilar to basilar patchy 
alveolar consolidation. Bilateral persisting diffuse prominent bronchovascular 
markings. No pneumothorax, effusion or developing alveolar opacity. No 
pneumothorax. Stable intact osseous structures. _______________ Impression IMPRESSION: 
 
1. Cardiomegaly with findings of congestion mild cephalization and bilateral 
mild to moderate diffuse bronchovascular markings, potentially unresolved edema. Otherwise, near total resolution of previous identified left hemithoracic 
pneumonia /infiltrate. Results from Hospital Encounter encounter on 01/17/19 CTA CHEST W OR W WO CONT Narrative EXAM: CTA Chest 
 
INDICATION: 56-year-old patient with long-standing symptoms of dyspnea on 
exertion, recent progression in severity with cough. COMPARISON: Nuclear medicine ventilation-perfusion scan 1/17/2019; prior CT scan 
of the abdomen and pelvis 11/20/2014. Additional correlation made with more 
distant prior CT examination 5/9/2008. TECHNIQUE: Axial CT imaging from the thoracic inlet through the diaphragm with 
intravenous contrast utilizing CTA study for pulmonary artery evaluation. Coronal and sagittal MIP reformations were generated at a separate workstation. One or more dose reduction techniques were used on this CT: automated exposure 
control, adjustment of the mAs and/or kVp according to patient size, and 
iterative reconstruction techniques. The specific techniques used on this CT 
exam have been documented in the patient's electronic medical record.  Digital 
Imaging and Communications in Medicine (DICOM) format image data are available 
to nonaffiliated external healthcare facilities or entities on a secure, media 
free, reciprocally searchable basis with patient authorization for at least a 
 12-month period after this study. _______________ FINDINGS: 
 
EXAM QUALITY: Overall exam quality is adequate. Pulmonary arterial enhancement 
is adequate. The breath hold is satisfactory. PULMONARY ARTERIES: There is no evidence of pulmonary embolism. The main 
pulmonary arterial size is within normal limits. MEDIASTINUM: Included portions of the thyroid gland demonstrate a tiny 
subcentimeter lower pole left thyroid nodule which requires no further imaging 
follow-up. Normal cardiac size. No pericardial effusion. Thoracic aorta is 
normal in course and caliber. LYMPH NODES: No supraclavicular, axillary, or mediastinal lymph node 
enlargement. No enlarged hilar lymph nodes. AIRWAY: Central airways are patent. LUNGS: Overall, lungs are well aerated. No discrete pneumonic consolidation. Small area of atelectasis in the periphery of the lateral lingular segment. Images including nodule or mass. Minor mosaic attenuation present at the lung 
bases. PLEURA: No pneumothorax or pleural effusion. UPPER ABDOMEN: Prior cholecystectomy. No acute abnormality. John Dials OTHER: No acute or aggressive osseous abnormalities identified. Mild mid 
thoracic spondylosis is present. 
 
_______________ Impression IMPRESSION: 
 
1. No evidence of pulmonary embolism. 2.  Small region of atelectasis involving the peripheral portion of the lateral 
lingular segment without pneumonic consolidation. 3. Relatively minor and subtle mosaic attenuation of the lung bases, potentially 
reflecting sequela of small airways disease. 4. Prior cholecystectomy. [x]See my orders for details My assessment, plan of care, findings, medications, side effects etc were discussed with: 
[x]nursing []PT/OT   
[]respiratory therapy [x]Dr. Mel Doe  
[]family [x]Patient [x]High complexity decision making performed and > 50% time spent in face to face consultation.  
 
Candy Aguirre MD

## 2019-01-24 NOTE — PROGRESS NOTES
Cardiology Progress Note 1/24/2019 4:57 PM 
 
Admit Date: 1/17/2019 Admit Diagnosis: Respiratory distress Pneumonia Asthma exacerbation Diastolic CHF ([address] Pneumonia Subjective: Keena Gutierrez denies chest pain. Visit Vitals /69 Pulse 77 Temp 98.3 °F (36.8 °C) Resp 16 Ht 5' 4\" (1.626 m) Wt 136.7 kg (301 lb 5.9 oz) SpO2 96% Breastfeeding? No  
BMI 51.73 kg/m² Current Facility-Administered Medications Medication Dose Route Frequency  predniSONE (DELTASONE) tablet 40 mg  40 mg Oral DAILY WITH BREAKFAST  albuterol (PROVENTIL HFA, VENTOLIN HFA, PROAIR HFA) inhaler 1 Puff  1 Puff Inhalation Q4H PRN  
 insulin glargine (LANTUS) injection 20 Units  20 Units SubCUTAneous QHS  azithromycin (ZITHROMAX) 250 mg in 0.9% sodium chloride 250 mL IVPB  250 mg IntraVENous Q24H  
 glucose chewable tablet 16 g  4 Tab Oral PRN  
 glucagon (GLUCAGEN) injection 1 mg  1 mg IntraMUSCular PRN  
 dextrose (D50W) injection syrg 12.5-25 g  25-50 mL IntraVENous PRN  
 insulin lispro (HUMALOG) injection   SubCUTAneous AC&HS  insulin lispro (HUMALOG) injection 13 Units  13 Units SubCUTAneous TIDAC  insulin glargine (LANTUS) injection 40 Units  40 Units SubCUTAneous DAILY  nystatin (MYCOSTATIN) 100,000 unit/gram powder   Topical BID  furosemide (LASIX) tablet 20 mg  20 mg Oral DAILY  acetaminophen (TYLENOL) tablet 650 mg  650 mg Oral Q4H PRN  
 montelukast (SINGULAIR) tablet 10 mg  10 mg Oral QHS  raNITIdine (ZANTAC) tablet 150 mg  150 mg Oral DAILY PRN  
 sodium chloride (NS) flush 5-40 mL  5-40 mL IntraVENous Q8H  
 sodium chloride (NS) flush 5-40 mL  5-40 mL IntraVENous PRN  
 heparin (porcine) injection 5,000 Units  5,000 Units SubCUTAneous Q8H  
 glucose chewable tablet 16 g  4 Tab Oral PRN  
 glucagon (GLUCAGEN) injection 1 mg  1 mg IntraMUSCular PRN  
 dextrose (D50W) injection syrg 12.5-25 g  25-50 mL IntraVENous PRN  
  aspirin chewable tablet 81 mg  81 mg Oral DAILY  atorvastatin (LIPITOR) tablet 40 mg  40 mg Oral DAILY  DULoxetine (CYMBALTA) capsule 30 mg  30 mg Oral DAILY  gabapentin (NEURONTIN) capsule 300 mg  300 mg Oral BID  metoprolol succinate (TOPROL-XL) XL tablet 50 mg  50 mg Oral DAILY  losartan (COZAAR) tablet 100 mg  100 mg Oral DAILY  albuterol-ipratropium (DUO-NEB) 2.5 MG-0.5 MG/3 ML  3 mL Nebulization Q4H RT  
 guaiFENesin ER (MUCINEX) tablet 600 mg  600 mg Oral Q12H  
 budesonide (PULMICORT) 500 mcg/2 ml nebulizer suspension  500 mcg Nebulization BID RT  
 arformoterol (BROVANA) neb solution 15 mcg  15 mcg Nebulization BID RT  
 chlorpheniramine-HYDROcodone (TUSSIONEX) oral suspension 5 mL  5 mL Oral Q12H PRN Current Outpatient Medications Medication Sig  
 azithromycin (ZITHROMAX) 250 mg tablet Take two tabs for day one, then one tab daily till complete  predniSONE (STERAPRED) 5 mg dose pack See administration instruction per 5mg dose pack  albuterol sulfate 90 mcg/actuation aepb Take 1-2 Puffs by inhalation every four (4) hours as needed.  guaiFENesin ER (MUCINEX) 600 mg ER tablet Take 1 Tab by mouth every twelve (12) hours.  metFORMIN (GLUCOPHAGE) 1,000 mg tablet Take 1,000 mg by mouth two (2) times daily (with meals).  metoprolol succinate (TOPROL-XL) 50 mg XL tablet Take 50 mg by mouth daily.  DULoxetine (CYMBALTA) 30 mg capsule Take 30 mg by mouth daily.  atorvastatin (LIPITOR) 40 mg tablet Take 40 mg by mouth daily.  losartan (COZAAR) 100 mg tablet Take 100 mg by mouth daily.  OTHER,NON-FORMULARY, Medication for irregular heart beat  
 gabapentin (NEURONTIN) 300 mg capsule Take 300 mg by mouth two (2) times a day.  furosemide (LASIX) 40 mg tablet Take 1 Tab by mouth daily.  insulin NPH/insulin regular (NOVOLIN 70/30) 100 unit/mL (70-30) injection 50 Units by SubCUTAneous route every twelve (12) hours.  insulin regular (NOVOLIN R) 100 unit/mL injection by SubCUTAneous route Daily (before lunch).  aspirin 81 mg chewable tablet Take 1 tablet by mouth daily. Objective:  
  
Physical Exam: 
Visit Vitals /69 Pulse 77 Temp 98.3 °F (36.8 °C) Resp 16 Ht 5' 4\" (1.626 m) Wt 136.7 kg (301 lb 5.9 oz) SpO2 96% Breastfeeding? No  
BMI 51.73 kg/m² General Appearance:  Well developed, well nourished,alert and oriented x 3, and individual in no acute distress. Ears/Nose/Mouth/Throat:   Hearing grossly normal. 
  
    Neck: Supple. Chest:   Lungs clear to auscultation bilaterally. Cardiovascular:  Regular rate and rhythm, S1, S2 normal, no murmur. Abdomen:   Soft, non-tender, bowel sounds are active. Extremities: No edema bilaterally. Skin: Warm and dry. Data Review:  
Labs:   
Recent Results (from the past 24 hour(s)) GLUCOSE, POC Collection Time: 01/23/19  9:39 PM  
Result Value Ref Range Glucose (POC) 374 (H) 70 - 110 mg/dL METABOLIC PANEL, BASIC Collection Time: 01/24/19  5:33 AM  
Result Value Ref Range Sodium 141 136 - 145 mmol/L Potassium 4.6 3.5 - 5.5 mmol/L Chloride 108 100 - 108 mmol/L  
 CO2 27 21 - 32 mmol/L Anion gap 6 3.0 - 18 mmol/L Glucose 336 (H) 74 - 99 mg/dL BUN 54 (H) 7.0 - 18 MG/DL Creatinine 1.11 0.6 - 1.3 MG/DL  
 BUN/Creatinine ratio 49 (H) 12 - 20 GFR est AA >60 >60 ml/min/1.73m2 GFR est non-AA 51 (L) >60 ml/min/1.73m2 Calcium 8.9 8.5 - 10.1 MG/DL  
CBC WITH AUTOMATED DIFF Collection Time: 01/24/19  5:33 AM  
Result Value Ref Range WBC 12.8 4.6 - 13.2 K/uL  
 RBC 3.89 (L) 4.20 - 5.30 M/uL  
 HGB 10.3 (L) 12.0 - 16.0 g/dL HCT 33.0 (L) 35.0 - 45.0 % MCV 84.8 74.0 - 97.0 FL  
 MCH 26.5 24.0 - 34.0 PG  
 MCHC 31.2 31.0 - 37.0 g/dL  
 RDW 14.3 11.6 - 14.5 % PLATELET 852 781 - 105 K/uL MPV 9.2 9.2 - 11.8 FL  
 NEUTROPHILS 75 (H) 40 - 73 % LYMPHOCYTES 16 (L) 21 - 52 % MONOCYTES 9 3 - 10 % EOSINOPHILS 0 0 - 5 % BASOPHILS 0 0 - 2 %  
 ABS. NEUTROPHILS 9.6 (H) 1.8 - 8.0 K/UL  
 ABS. LYMPHOCYTES 2.1 0.9 - 3.6 K/UL  
 ABS. MONOCYTES 1.1 0.05 - 1.2 K/UL  
 ABS. EOSINOPHILS 0.0 0.0 - 0.4 K/UL  
 ABS. BASOPHILS 0.0 0.0 - 0.1 K/UL  
 DF AUTOMATED    
GLUCOSE, POC Collection Time: 01/24/19  6:08 AM  
Result Value Ref Range Glucose (POC) 335 (H) 70 - 110 mg/dL GLUCOSE, POC Collection Time: 01/24/19 11:16 AM  
Result Value Ref Range Glucose (POC) 257 (H) 70 - 110 mg/dL Telemetry: normal sinus rhythm Assessment:  
 
Principal Problem: 
  Respiratory distress (1/17/2019) Active Problems: Morbid obesity with BMI of 40.0-44.9, adult ([address] (8/16/2014) Type II or unspecified type diabetes mellitus with renal manifestations, uncontrolled(250.42) ([address] (9/4/2014) Asthma () Overview: Note: As child had asthma Morbid obesity ([address] () Pneumonia (1/17/2019) Asthma exacerbation (1/17/2019) Diastolic CHF ([address] (1/07/2421) BMI 45.0-49.9, adult ([address] (1/19/2019) Plan:  
 
Patient was seen earlier today. She was subjectively improved and plans for discharge today were extant.   
 
Cl Chung MD

## 2019-01-24 NOTE — PROGRESS NOTES
1920 Pt received from offgoing nurse without any signs or symptoms of distress. Pt vitals are stable and within normal limits. Pt bed in low position with wheels locked and call bell within reach. 1957 Assessment completed and documented in flow sheet. Pt denies any further needs at this time. Pt in NAD with bed in low position, wheels locked and call bell within reach. Purposeful rounding completed. Pt resting quietly. No further needs voiced at this time. 2148 Scheduled medications administered as ordered. Purposeful rounding completed. Pt resting quietly. No further needs voiced at this time. 0011 Reassessment completed with no changes noted. Bed locked, in lowest position, with call light within reach. Purposeful rounding completed. Pt resting quietly. No further needs voiced at this time. 0215 Purposeful rounding completed. Pt resting quietly. No further needs voiced at this time. 0410 Reassessment completed with no changes noted. Bed locked, in lowest position, with call light within reach. Purposeful rounding completed. Pt resting quietly. No further needs voiced at this time. 0630 Scheduled medications administered as ordered. Purposeful rounding completed. Pt resting quietly. No further needs voiced at this time. 9209 Shift summary: Patient spent uneventful shift. No issues noted. See flow sheet and MAR. 
0710 Bedside and Verbal shift change report given to LUIS Ramirez RN (oncoming nurse) by Phuc Longoria RN (offgoing nurse). Report included the following information SBAR, Intake/Output, MAR and Recent Results.

## 2019-01-24 NOTE — ROUTINE PROCESS
Bedside and Verbal shift change report given to PAMELA Esquivel R.N. (oncoming nurse) by DONAVAN Earl R.N. (offgoing nurse). Report included the following information SBAR, Kardex, Intake/Output, MAR, Accordion, Recent Results and Med Rec Status.

## 2019-01-24 NOTE — DISCHARGE SUMMARY
Discharge Summary    Patient: Papito Manley MRN: [id number]  CSN: [id number]    YOB: 1965  Age: 46 y.o. Sex: female    DOA: 1/17/2019 LOS:  LOS: 7 days   Discharge Date:      Primary Care Provider:  Yisel Ye MD    Admission Diagnoses: Respiratory distress  Pneumonia  Asthma exacerbation  Diastolic CHF [facility])  Pneumonia    Discharge Diagnoses:    Hospital Problems  Date Reviewed: 9/4/2017          Codes Class Noted POA    BMI 45.0-49.9, adult [facility]) ICD-10-CM: I46.68  ICD-9-CM: V85.42  1/19/2019 Unknown        Pneumonia ICD-10-CM: J18.9  ICD-9-CM: 730  1/17/2019 Unknown        * (Principal) Respiratory distress ICD-10-CM: R06.03  ICD-9-CM: 786.09  1/17/2019 Unknown        Asthma exacerbation ICD-10-CM: J45.901  ICD-9-CM: 493.92  1/17/2019 Unknown        Diastolic CHF ([facility] HXD-15-TR: I50.30  ICD-9-CM: 428.30, 428.0  1/17/2019 Unknown        Morbid obesity ([facility] ICD-10-CM: E66.01  ICD-9-CM: 278.01  Unknown Yes        Asthma ICD-10-CM: J45.909  ICD-9-CM: 493.90  Unknown Yes    Overview Signed 4/5/2015  2:55 PM by Micheline DE LA CRUZ     Note: As child had asthma             Type II or unspecified type diabetes mellitus with renal manifestations, uncontrolled(250.42) ([facility] ICD-10-CM: E11.29, E11.65  ICD-9-CM: 250.42  9/4/2014 Yes        Morbid obesity with BMI of 40.0-44.9, adult [facility]) ICD-10-CM: E66.01, Z68.41  ICD-9-CM: 278.01, V85.41  8/16/2014 Yes              Discharge Condition: Stable    Discharge Medications:     Current Discharge Medication List      START taking these medications    Details   azithromycin (ZITHROMAX) 250 mg tablet Take two tabs for day one, then one tab daily till complete  Qty: 6 Tab, Refills: 0      predniSONE (STERAPRED) 5 mg dose pack See administration instruction per 5mg dose pack  Qty: 21 Tab, Refills: 0      albuterol sulfate 90 mcg/actuation aepb Take 1-2 Puffs by inhalation every four (4) hours as needed.   Qty: 1 Inhaler, Refills: 0      guaiFENesin ER (MUCINEX) 600 mg ER tablet Take 1 Tab by mouth every twelve (12) hours. Qty: 10 Tab, Refills: 0         CONTINUE these medications which have NOT CHANGED    Details   metFORMIN (GLUCOPHAGE) 1,000 mg tablet Take 1,000 mg by mouth two (2) times daily (with meals). metoprolol succinate (TOPROL-XL) 50 mg XL tablet Take 50 mg by mouth daily. DULoxetine (CYMBALTA) 30 mg capsule Take 30 mg by mouth daily. atorvastatin (LIPITOR) 40 mg tablet Take 40 mg by mouth daily. losartan (COZAAR) 100 mg tablet Take 100 mg by mouth daily. OTHER,NON-FORMULARY, Medication for irregular heart beat      gabapentin (NEURONTIN) 300 mg capsule Take 300 mg by mouth two (2) times a day. furosemide (LASIX) 40 mg tablet Take 1 Tab by mouth daily. Qty: 45 Tab, Refills: 1      insulin NPH/insulin regular (NOVOLIN 70/30) 100 unit/mL (70-30) injection 50 Units by SubCUTAneous route every twelve (12) hours. insulin regular (NOVOLIN R) 100 unit/mL injection by SubCUTAneous route Daily (before lunch). aspirin 81 mg chewable tablet Take 1 tablet by mouth daily. Qty: 30 tablet, Refills: 1         STOP taking these medications       lisinopril (PRINIVIL, ZESTRIL) 10 mg tablet Comments:   Reason for Stopping:               Procedures : none     Consults: Cardiology and Pulmonary/Critical Care      PHYSICAL EXAM   Visit Vitals  /69   Pulse 77   Temp 98.3 °F (36.8 °C)   Resp 16   Ht 5' 4\" (1.626 m)   Wt 136.7 kg (301 lb 5.9 oz)   SpO2 96%   Breastfeeding? No   BMI 51.73 kg/m²     General: Awake, cooperative, no acute distress    HEENT: NC, Atraumatic. PERRLA, EOMI. Anicteric sclerae. Lungs:  CTA Bilaterally. No Wheezing/Rhonchi/Rales. Heart:  Regular  rhythm,  No murmur, No Rubs, No Gallops  Abdomen: Soft, Non distended, Non tender. +Bowel sounds,   Extremities: No c/c/e  Psych:   Not anxious or agitated. Neurologic:  No acute neurological deficits.                                      Admission HPI :     Bereket [place] is a 46 y.o.  female who has DM , Asthma, HTN,CHFobesity presents with cold like symptoms for 3-4 days including cough and congestion. Yesterday progressed to dyspnea with exertion   And episodes of weakness   In ER  Found to be hypoxic with sats in 70's  Given 100 percent NRB . Steroids , duo neb, and lasix  With some improvement asked to admit for Pneumonia       Hospital Course :   Since she was admitted, iv lasix, and iv abx were administrated for her pna and chf exacerbation, steroid was added for the asthma exacerbation with breathing tx. With the treatment, she is off nc O2 and doing well. Cardiology and  pulm on board. PE was r/o per v/q scan and cta. She needs pft and sleep study as out -pt. On discharge day. She was cleared per PT and pulm/cardiology. Her sob was back to her baseline. She agrees to be d/c home and f/u with pcm and pulm at [facility]. Insulin was used to control her DM. She needs lifestyle modification and close f/u with physician. Activity: Activity as tolerated    Diet: Cardiac Diet and Diabetic Diet    Follow-up: pcm and pulm at [facility]     Disposition: home     Minutes spent on discharge: 45 min       Labs: Results:       Chemistry Recent Labs     01/24/19  0533 01/23/19  0133 01/22/19  0410   * 96 288*    143 143   K 4.6 4.8 5.0    111* 111*   CO2 27 27 27   BUN 54* 56* 59*   CREA 1.11 0.88 1.23   CA 8.9 8.6 9.0   AGAP 6 5 5   BUCR 49* 64* 48*      CBC w/Diff Recent Labs     01/24/19  0533   WBC 12.8   RBC 3.89*   HGB 10.3*   HCT 33.0*      GRANS 75*   LYMPH 16*   EOS 0      Cardiac Enzymes No results for input(s): CPK, CKND1, MARIA ISABEL in the last 72 hours. No lab exists for component: CKRMB, TROIP   Coagulation No results for input(s): PTP, INR, APTT in the last 72 hours.     No lab exists for component: INREXT, INREXT    Lipid Panel Lab Results   Component Value Date/Time    Cholesterol, total 146 05/28/2015 02:58 AM    HDL Cholesterol 64 (H) 05/28/2015 02:58 AM    LDL, calculated 66 05/28/2015 02:58 AM    VLDL, calculated 16 05/28/2015 02:58 AM    Triglyceride 80 05/28/2015 02:58 AM    CHOL/HDL Ratio 2.3 05/28/2015 02:58 AM      BNP No results for input(s): BNPP in the last 72 hours. Liver Enzymes No results for input(s): TP, ALB, TBIL, AP, SGOT, GPT in the last 72 hours. No lab exists for component: DBIL   Thyroid Studies Lab Results   Component Value Date/Time    TSH 0.56 01/23/2019 01:33 AM            Significant Diagnostic Studies: Cta Chest W Or W Wo Cont    Result Date: 1/23/2019  EXAM: CTA Chest INDICATION: 60-year-old patient with long-standing symptoms of dyspnea on exertion, recent progression in severity with cough. COMPARISON: Nuclear medicine ventilation-perfusion scan 1/17/2019; prior CT scan of the abdomen and pelvis 11/20/2014. Additional correlation made with more distant prior CT examination 5/9/2008. TECHNIQUE: Axial CT imaging from the thoracic inlet through the diaphragm with intravenous contrast utilizing CTA study for pulmonary artery evaluation. Coronal and sagittal MIP reformations were generated at a separate workstation. One or more dose reduction techniques were used on this CT: automated exposure control, adjustment of the mAs and/or kVp according to patient size, and iterative reconstruction techniques. The specific techniques used on this CT exam have been documented in the patient's electronic medical record. Digital Imaging and Communications in Medicine (DICOM) format image data are available to nonaffiliated external healthcare facilities or entities on a secure, media free, reciprocally searchable basis with patient authorization for at least a 12-month period after this study. _______________ FINDINGS: EXAM QUALITY: Overall exam quality is adequate. Pulmonary arterial enhancement is adequate. The breath hold is satisfactory. PULMONARY ARTERIES: There is no evidence of pulmonary embolism.  The main pulmonary arterial size is within normal limits. MEDIASTINUM: Included portions of the thyroid gland demonstrate a tiny subcentimeter lower pole left thyroid nodule which requires no further imaging follow-up. Normal cardiac size. No pericardial effusion. Thoracic aorta is normal in course and caliber. LYMPH NODES: No supraclavicular, axillary, or mediastinal lymph node enlargement. No enlarged hilar lymph nodes. AIRWAY: Central airways are patent. LUNGS: Overall, lungs are well aerated. No discrete pneumonic consolidation. Small area of atelectasis in the periphery of the lateral lingular segment. Images including nodule or mass. Minor mosaic attenuation present at the lung bases. PLEURA: No pneumothorax or pleural effusion. UPPER ABDOMEN: Prior cholecystectomy. No acute abnormality. . OTHER: No acute or aggressive osseous abnormalities identified. Mild mid thoracic spondylosis is present. _______________     IMPRESSION: 1. No evidence of pulmonary embolism. 2.  Small region of atelectasis involving the peripheral portion of the lateral lingular segment without pneumonic consolidation. 3. Relatively minor and subtle mosaic attenuation of the lung bases, potentially reflecting sequela of small airways disease. 4. Prior cholecystectomy. Xr Chest Port    Result Date: 1/23/2019  EXAM:Chest X-Ray  History: Shortness of breath Technique:  Portable Frontal View Comparison: 01/17/2019, 12/28/2017 _______________ FINDINGS: Persistent cardiomegaly with hilar congestion mild cephalization. Interval improved near-total resolution of the left hilar to basilar patchy alveolar consolidation. Bilateral persisting diffuse prominent bronchovascular markings. No pneumothorax, effusion or developing alveolar opacity. No pneumothorax. Stable intact osseous structures. _______________     IMPRESSION: 1.  Cardiomegaly with findings of congestion mild cephalization and bilateral mild to moderate diffuse bronchovascular markings, potentially unresolved edema. Otherwise, near total resolution of previous identified left hemithoracic pneumonia /infiltrate. Xr Chest Port    Result Date: 1/17/2019  --------------------------------------------------------------------------- <<<<<<<<<           [facility]  [facility]           >>>>>>>>> --------------------------------------------------------------------------- CLINICAL HISTORY:  Shortness of breath. COMPARISON EXAMINATIONS:  December 28, 2017. ---  FRONTAL AND LATERAL VIEWS OF THE CHEST   --- There is patchy lingular and left lower lobe. Change. There are no significant pleural effusions. The mediastinum is unremarkable in appearance. No significant thoracic degenerative disc disease. No acute osseous abnormalities. Impression: -------------- Lingular and left lower lobe patchy consolidation suggests infiltrate/pneumonia. Nm Lung Perfusion W Vent    Result Date: 1/17/2019  EXAM: VQ SCAN  INDICATION: Admitting history of shortness of breath COMPARISON: CXR: 01/17/2019 INJECTION SITE:  Right hand TECHNIQUE:  After aerosolization of 0.8 mCi 99m Tc-DTPA, ventilatory images of the lungs were obtained in multiple projections. After intravenous administration of 7.2 mCi 99m Tc-MAA, perfusion images of the lungs were obtained in multiple projections. - Images are correlated with chest radiographic study which demonstrate lingular and left lower lobe patchy consolidative infiltrate/pneumonia. FINDINGS: Ventilation: Retained activity in the hypopharynx and trachea with diminished counts in the left lower lobe and lingular segment. Perfusion: Small photopenic defect anterior segment left upper lobe. Small photopenic defect in the perihilar aspect anterior superior segment left lower lobe. These areas demonstrate much larger distribution airspace disease. No large or discrete moderate mismatched photopenic defect. IMPRESSION: 1.  Low probability for pulmonary embolus, utilizing PIOPED II criteria.              [facility]     CC: Kaylin Junior MD

## 2019-01-24 NOTE — DISCHARGE INSTRUCTIONS
Patient Education        Pneumonia: Care Instructions  Your Care Instructions    Pneumonia is an infection of the lungs. Most cases are caused by infections from bacteria or viruses. Pneumonia may be mild or very severe. If it is caused by bacteria, you will be treated with antibiotics. It may take a few weeks to a few months to recover fully from pneumonia, depending on how sick you were and whether your overall health is good. Follow-up care is a key part of your treatment and safety. Be sure to make and go to all appointments, and call your doctor if you are having problems. It's also a good idea to know your test results and keep a list of the medicines you take. How can you care for yourself at home? · Take your antibiotics exactly as directed. Do not stop taking the medicine just because you are feeling better. You need to take the full course of antibiotics. · Take your medicines exactly as prescribed. Call your doctor if you think you are having a problem with your medicine. · Get plenty of rest and sleep. You may feel weak and tired for a while, but your energy level will improve with time. · To prevent dehydration, drink plenty of fluids, enough so that your urine is light yellow or clear like water. Choose water and other caffeine-free clear liquids until you feel better. If you have kidney, heart, or liver disease and have to limit fluids, talk with your doctor before you increase the amount of fluids you drink. · Take care of your cough so you can rest. A cough that brings up mucus from your lungs is common with pneumonia. It is one way your body gets rid of the infection. But if coughing keeps you from resting or causes severe fatigue and chest-wall pain, talk to your doctor. He or she may suggest that you take a medicine to reduce the cough. · Use a vaporizer or humidifier to add moisture to your bedroom. Follow the directions for cleaning the machine.   · Do not smoke or allow others to smoke around you. Smoke will make your cough last longer. If you need help quitting, talk to your doctor about stop-smoking programs and medicines. These can increase your chances of quitting for good. · Take an over-the-counter pain medicine, such as acetaminophen (Tylenol), ibuprofen (Advil, Motrin), or naproxen (Aleve). Read and follow all instructions on the label. · Do not take two or more pain medicines at the same time unless the doctor told you to. Many pain medicines have acetaminophen, which is Tylenol. Too much acetaminophen (Tylenol) can be harmful. · If you were given a spirometer to measure how well your lungs are working, use it as instructed. This can help your doctor tell how your recovery is going. · To prevent pneumonia in the future, talk to your doctor about getting a flu vaccine (once a year) and a pneumococcal vaccine (one time only for most people). When should you call for help? Call 911 anytime you think you may need emergency care. For example, call if:    · You have severe trouble breathing.    Call your doctor now or seek immediate medical care if:    · You cough up dark brown or bloody mucus (sputum).     · You have new or worse trouble breathing.     · You are dizzy or lightheaded, or you feel like you may faint.    Watch closely for changes in your health, and be sure to contact your doctor if:    · You have a new or higher fever.     · You are coughing more deeply or more often.     · You are not getting better after 2 days (48 hours).     · You do not get better as expected. Where can you learn more? Go to http://tere-ann marie.info/. Enter [id number] in the search box to learn more about \"Pneumonia: Care Instructions. \"  Current as of: September 5, 2018  Content Version: 11.9  © 8567-3995 [employer]. Care instructions adapted under license by [employer] (which disclaims liability or warranty for this information).  If you have questions about a medical condition or this instruction, always ask your healthcare professional. [address] any warranty or liability for your use of this information. Learning About Asthma Triggers  What are triggers? When you have asthma, certain things can make your symptoms worse. These are called triggers. They include:  · Cigarette smoke or air pollution. · Things you are allergic to, such as:  ¨ Pollen, mold, or dust mites. ¨ Pet hair, skin, or saliva. · Illnesses, like colds, flu, or pneumonia. · Exercise. · Dry, cold air. How do triggers affect asthma? Triggers can make it harder for your lungs to work as they should and can lead to sudden difficulty breathing and other symptoms. When you are around a trigger, an asthma attack is more likely. If your symptoms are severe, you may need emergency treatment or have to go to the hospital for treatment. If you know what your triggers are and can avoid them, you may be able to prevent asthma attacks, reduce how often you have them, and make them less severe. What can you do to avoid triggers? The first thing is to know your triggers. When you are having symptoms, note the things around you that might be causing them. Then look for patterns in what may be triggering your symptoms. Record your triggers on a piece of paper or in an asthma diary. When you have your list of possible triggers, work with your doctor to find ways to avoid them. You also can check how well your lungs are working by measuring your peak expiratory flow (PEF) throughout the day. Your PEF may drop when you are near things that trigger symptoms. Here are some ways to avoid a few common triggers. · Do not smoke or allow others to smoke around you. If you need help quitting, talk to your doctor about stop-smoking programs and medicines. These can increase your chances of quitting for good.   · If there is a lot of pollution, pollen, or dust outside, stay at home and keep your windows closed. Use an air conditioner or air filter in your home. Check your local weather report or newspaper for air quality and pollen reports. · Get a flu shot every year. Talk to your doctor about getting a pneumococcal shot. Wash your hands often to prevent infections. · Avoid exercising outdoors in cold weather. If you are outdoors in cold weather, wear a scarf around your face and breathe through your nose. How can you manage an asthma attack? · If you have an asthma action plan, follow the plan. In general:  ¨ Use your quick-relief inhaler as directed by your doctor. If your symptoms do not get better after you use your medicine, have someone take you to the emergency room. Call an ambulance if needed. ¨ If your doctor has given you other inhaled medicines or steroid pills, take them as directed. Where can you learn more? Go to [employer]  Enter M564 in the search box to learn more about \"Learning About Asthma Triggers. \"   © 5781-9826 Healthwise, Incorporated. Care instructions adapted under license by [employer] (which disclaims liability or warranty for this information). This care instruction is for use with your licensed healthcare professional. If you have questions about a medical condition or this instruction, always ask your healthcare professional. [place] [address] any warranty or liability for your use of this information. Content Version: 98.9.938438; Last Revised: February 23, 2012    Patient Education        Advance Care Planning for Heart Failure: Care Instructions  Your Care Instructions    If you have heart failure, taking care of yourself will help you feel better and live longer. But the disease often gets worse over time. So it may be a good idea to plan for the future now while you are active and able to communicate your wishes. If you do this kind of planning, it does not mean that you are giving up.  It's simply the best way to make sure that you get the care and treatment that you want. It can also make things much easier for your loved ones. What can you do to plan for the end of life? · Talk openly and honestly with your family and doctor. This is the best way to understand the decisions you will need to make as your health changes. Know that you can always change your mind. · Ask your doctor about common life-support treatments. These include tube feedings, breathing machines, and fluids given through a vein (IV). It may be easier to decide if you want them after you are sure you understand what they are. · Think about preparing written papers that state your wishes. These papers are called advance directives. If you do this, there will not be any confusion about your wishes. · If you are near the end of your life and you have a heart device such as a pacemaker, talk to your doctor about turning it off. Include this in your advance directive. · Ask a friend or family member to make decisions for you when you no longer can. Talk to this person about the kinds of treatments you want or don't want. Make sure this person understands your wishes. · You may decide to try life-supporting treatments for a limited time. This can help your doctor see if they will help. You may also decide that you want your doctor to do only certain things to keep you alive. It's important to be very clear about what you do and don't want. · Ask your doctor for an estimate of how long you may live. Your doctor may not always know. But he or she can tell you what usually happens with heart failure. Which papers should you prepare? Advance directives are legal papers that tell doctors how to care for you at the end of your life. They may include a living will and a durable power of . You don't need a  to write these papers. If you prepare these papers, be sure to give a copy to your doctor.  It's also important to give a copy to a family member or close friend. · Think about a do-not-resuscitate order, or DNR. This order asks that no extra treatments be used to save your life if your heart stops. These treatments include electrical shock to restart your heart. They also include a machine to breathe for you and medicines to save your life. If you decide to have a DNR order, ask your doctor to write it. Then put it in your home where everyone can see it. · Think about a living will. It explains your wishes in case you are in a coma or cannot communicate. Living higgins tell doctors to use or not use treatments to keep you alive. You must have one or two witnesses or a notary in the room when you sign this form. · Think about naming a person to make decisions about your care if you are not able to. This is called a durable power of . Most people ask a close friend or family member. · Ask your doctor or your state health department about advance directives. They may have forms for each of these types of papers. · All of these papers are simple to change. Tell your doctor what you want to change. Ask him or her to make a note in your file. Then give your family updated copies. Where can you learn more? Go to http://tere-ann marie.info/. Enter L419 in the search box to learn more about \"Advance Care Planning for Heart Failure: Care Instructions. \"  Current as of: July 22, 2018  Content Version: 11.9  © 1133-2448 [employer]. Care instructions adapted under license by [employer] (which disclaims liability or warranty for this information). If you have questions about a medical condition or this instruction, always ask your healthcare professional. [place] [address] any warranty or liability for your use of this information.

## 2019-01-25 ENCOUNTER — HOME CARE VISIT (OUTPATIENT)
Dept: HOME HEALTH SERVICES | Facility: HOME HEALTH | Age: 54
End: 2019-01-25

## 2019-01-25 LAB
B PERT IGG SER-ACNC: 2.01 INDEX (ref 0–0.94)
B PERT IGM SER QL IA: 1.5 INDEX (ref 0–0.9)
FLUAV RNA SPEC QL NAA+PROBE: NEGATIVE
FLUBV RNA SPEC QL NAA+PROBE: NEGATIVE
HADV DNA SPEC QL NAA+PROBE: NEGATIVE
HMPV RNA SPEC QL NAA+PROBE: NEGATIVE
HPIV1 RNA SPEC QL NAA+PROBE: NEGATIVE
HPIV2 RNA SPEC QL NAA+PROBE: NEGATIVE
HPIV3 RNA SPEC QL NAA+PROBE: NEGATIVE
RHINOVIRUS RNA SPEC QL NAA+PROBE: POSITIVE
RSV A RNA SPEC QL NAA+PROBE: NEGATIVE
RSV B RNA SPEC QL NAA+PROBE: NEGATIVE
SPECIMEN SOURCE: ABNORMAL

## 2019-01-27 ENCOUNTER — HOME CARE VISIT (OUTPATIENT)
Dept: HOME HEALTH SERVICES | Facility: HOME HEALTH | Age: 54
End: 2019-01-27

## 2019-03-10 LAB
ATRIAL RATE: 88 BPM
CALCULATED P AXIS, ECG09: 61 DEGREES
CALCULATED R AXIS, ECG10: 66 DEGREES
CALCULATED T AXIS, ECG11: 51 DEGREES
DIAGNOSIS, 93000: NORMAL
P-R INTERVAL, ECG05: 156 MS
Q-T INTERVAL, ECG07: 386 MS
QRS DURATION, ECG06: 82 MS
QTC CALCULATION (BEZET), ECG08: 467 MS
VENTRICULAR RATE, ECG03: 88 BPM

## 2019-03-15 LAB
ATRIAL RATE: 69 BPM
CALCULATED P AXIS, ECG09: 65 DEGREES
CALCULATED R AXIS, ECG10: 58 DEGREES
CALCULATED T AXIS, ECG11: 63 DEGREES
DIAGNOSIS, 93000: NORMAL
P-R INTERVAL, ECG05: 124 MS
Q-T INTERVAL, ECG07: 382 MS
QRS DURATION, ECG06: 78 MS
QTC CALCULATION (BEZET), ECG08: 409 MS
VENTRICULAR RATE, ECG03: 69 BPM

## 2019-10-05 ENCOUNTER — HOSPITAL ENCOUNTER (EMERGENCY)
Age: 54
Discharge: HOME | End: 2019-10-05
Attending: EMERGENCY MEDICINE
Payer: COMMERCIAL

## 2019-10-05 ENCOUNTER — APPOINTMENT (OUTPATIENT)
Dept: GENERAL RADIOLOGY | Age: 54
End: 2019-10-05
Attending: PHYSICIAN ASSISTANT
Payer: COMMERCIAL

## 2019-10-05 VITALS
DIASTOLIC BLOOD PRESSURE: 71 MMHG | OXYGEN SATURATION: 98 % | TEMPERATURE: 98.1 F | HEART RATE: 80 BPM | BODY MASS INDEX: 52.81 KG/M2 | HEIGHT: 62 IN | RESPIRATION RATE: 18 BRPM | SYSTOLIC BLOOD PRESSURE: 184 MMHG | WEIGHT: 287 LBS

## 2019-10-05 DIAGNOSIS — S90.31XA CONTUSION OF RIGHT FOOT, INITIAL ENCOUNTER: Primary | ICD-10-CM

## 2019-10-05 PROCEDURE — 99283 EMERGENCY DEPT VISIT LOW MDM: CPT

## 2019-10-05 PROCEDURE — 77030036687 HC SHOE PSTOP S2SG -A

## 2019-10-05 PROCEDURE — 73630 X-RAY EXAM OF FOOT: CPT

## 2019-10-05 RX ORDER — HYDRALAZINE HYDROCHLORIDE 25 MG/1
25 TABLET, FILM COATED ORAL 3 TIMES DAILY
COMMUNITY
End: 2020-02-24

## 2019-10-05 RX ORDER — AMLODIPINE BESYLATE 5 MG/1
5 TABLET ORAL DAILY
COMMUNITY
End: 2020-02-24

## 2019-10-05 NOTE — ED TRIAGE NOTES
Pt c/o rt toe pain, 1st 3 toes, pt states up to BR around 5am, slipped and toes bent backward, c/o pain, bruising and swelling

## 2019-10-05 NOTE — ED PROVIDER NOTES
EMERGENCY DEPARTMENT HISTORY AND PHYSICAL EXAM    Date: 10/5/2019  Patient Name: Naida Rosales    History of Presenting Illness     Chief Complaint   Patient presents with    Toe Pain         History Provided By: Patient    7:37 PM  Naida Rosales is a 53 y.o. female  who presents to the emergency department C/O right first second and third toe pain status post slipping and twisting her foot in the bathroom earlier today. Pt denies numbness or weakness, and any other sxs or complaints. PCP: Leopoldo Kitchen, MD    Current Outpatient Medications   Medication Sig Dispense Refill    amLODIPine (NORVASC) 5 mg tablet Take 5 mg by mouth daily.  hydrALAZINE (APRESOLINE) 25 mg tablet Take 25 mg by mouth three (3) times daily.  predniSONE (STERAPRED) 5 mg dose pack See administration instruction per 5mg dose pack 21 Tab 0    albuterol sulfate 90 mcg/actuation aepb Take 1-2 Puffs by inhalation every four (4) hours as needed. 1 Inhaler 0    guaiFENesin ER (MUCINEX) 600 mg ER tablet Take 1 Tab by mouth every twelve (12) hours. 10 Tab 0    metFORMIN (GLUCOPHAGE) 1,000 mg tablet Take 1,000 mg by mouth two (2) times daily (with meals).  metoprolol succinate (TOPROL-XL) 50 mg XL tablet Take 100 mg by mouth daily.  DULoxetine (CYMBALTA) 30 mg capsule Take 60 mg by mouth daily.  atorvastatin (LIPITOR) 40 mg tablet Take 40 mg by mouth daily.  losartan (COZAAR) 100 mg tablet Take 100 mg by mouth daily.  OTHER,NON-FORMULARY, Medication for irregular heart beat      gabapentin (NEURONTIN) 300 mg capsule Take 300 mg by mouth three (3) times daily.  furosemide (LASIX) 40 mg tablet Take 1 Tab by mouth daily. (Patient taking differently: Take 40 mg by mouth two (2) times a day.) 45 Tab 1    insulin NPH/insulin regular (NOVOLIN 70/30) 100 unit/mL (70-30) injection 50 Units by SubCUTAneous route every twelve (12) hours.  aspirin 81 mg chewable tablet Take 1 tablet by mouth daily.  30 tablet 1 Past History     Past Medical History:  Past Medical History:   Diagnosis Date    Asthma     Note: As child had asthma    Diabetes mellitus ([facility]     Type 2    Heart palpitations     Hypertension     Ill-defined condition     fibromyalgia     Morbid obesity ([facility]     MRSA infection 8/2014       Past Surgical History:  Past Surgical History:   Procedure Laterality Date    BREAST SURGERY PROCEDURE UNLISTED      cyst removed    HX CATARACT REMOVAL      HX CHOLECYSTECTOMY      HX TUBAL LIGATION         Family History:  Family History   Problem Relation Age of Onset    Heart Attack Mother     Heart Attack Maternal Grandmother        Social History:  Social History     Tobacco Use    Smoking status: Never Smoker    Smokeless tobacco: Never Used   Substance Use Topics    Alcohol use: No    Drug use: No       Allergies: Allergies   Allergen Reactions    Bees [Sting, Bee] Swelling    Penicillins Hives    [place] Hives         Review of Systems   Review of Systems   Musculoskeletal: Positive for arthralgias. Skin: Negative. Neurological: Negative for weakness and numbness. All other systems reviewed and are negative. Physical Exam     Vitals:    10/05/19 1839   BP: 184/71   Pulse: 85   Resp: 18   Temp: 98.1 °F (36.7 °C)   SpO2: 99%   Weight: 130.2 kg (287 lb)   Height: 5' 2\" (1.575 m)     Physical Exam   Constitutional: She is oriented to person, place, and time. She appears well-developed and well-nourished. No distress. Musculoskeletal:        Feet:    Neurological: She is alert and oriented to person, place, and time. Skin: Skin is warm and dry. She is not diaphoretic. Psychiatric: She has a normal mood and affect. Her behavior is normal.   Nursing note and vitals reviewed. Diagnostic Study Results     Labs -   No results found for this or any previous visit (from the past 12 hour(s)).     Radiologic Studies -   XR FOOT RT MIN 3 V   Final Result   IMPRESSION: No acute fracture or subluxation seen. Soft tissue swelling of the foot. CT Results  (Last 48 hours)    None        CXR Results  (Last 48 hours)    None          Medications given in the ED-  Medications - No data to display      Medical Decision Making   I am the first provider for this patient. I reviewed the vital signs, available nursing notes, past medical history, past surgical history, family history and social history. Vital Signs-Reviewed the patient's vital signs. Records Reviewed: Nursing Notes      Procedures:  Procedures    ED Course:  7:37 PM   Initial assessment performed. The patients presenting problems have been discussed, and they are in agreement with the care plan formulated and outlined with them. I have encouraged them to ask questions as they arise throughout their visit. Diagnosis and Disposition       DISCHARGE NOTE:    Serenity Becerra  results have been reviewed with her. She has been counseled regarding her diagnosis, treatment, and plan. She verbally conveys understanding and agreement of the signs, symptoms, diagnosis, treatment and prognosis and additionally agrees to follow up as discussed. She also agrees with the care-plan and conveys that all of her questions have been answered. I have also provided discharge instructions for her that include: educational information regarding their diagnosis and treatment, and list of reasons why they would want to return to the ED prior to their follow-up appointment, should her condition change. She has been provided with education for proper emergency department utilization. CLINICAL IMPRESSION:    1. Contusion of right foot, initial encounter        PLAN:  1. D/C Home  2. Current Discharge Medication List        3.    Follow-up Information     Follow up With Specialties Details Why Contact Info    Leopoldo Kitchen, MD Internal Medicine Schedule an appointment as soon as possible for a visit in 1 week  [address] [address] [place] [address] [address]      [facility] EMERGENCY DEPT Emergency Medicine  As needed, If symptoms worsen [address] [address]  [phone]        _______________________________      Please note that this dictation was completed with [employer], the computer voice recognition software. Quite often unanticipated grammatical, syntax, homophones, and other interpretive errors are inadvertently transcribed by the computer software. Please disregard these errors. Please excuse any errors that have escaped final proofreading.

## 2019-10-06 NOTE — ED NOTES
Upon entering pts room, pt is speaking on phone in an elevated tone and angry manner stating that she is upset about the manner in which ed tech placed her post-op shoe and is upset that she has had to wait so long.  This nurse observed placement of shoe and noted that shoe was placed gently and appropriately, however a larger show is placed by this nurse at pt request. Provider notified and is at bedside speaking with pt

## 2019-10-06 NOTE — DISCHARGE INSTRUCTIONS
Patient Education        Contusion: Care Instructions  Your Care Instructions    Contusion is the medical term for a bruise. It is the result of a direct blow or an impact, such as a fall. Contusions are common sports injuries. Most people think of a bruise as a black-and-blue spot. This happens when small blood vessels get torn and leak blood under the skin. But bones, muscles, and organs can also get bruised. This may damage deep tissues but not cause a bruise you can see. The doctor will do a physical exam to find the location of your contusion. You may also have tests to make sure you do not have a more serious injury, such as a broken bone or nerve damage. These may include X-rays or other imaging tests like a CT scan or MRI. Deep-tissue contusions may cause pain and swelling. But if there is no serious damage, they will often get better in a few weeks with home treatment. The doctor has checked you carefully, but problems can develop later. If you notice any problems or new symptoms, get medical treatment right away. Follow-up care is a key part of your treatment and safety. Be sure to make and go to all appointments, and call your doctor if you are having problems. It's also a good idea to know your test results and keep a list of the medicines you take. How can you care for yourself at home? · Put ice or a cold pack on the sore area for 10 to 20 minutes at a time to stop swelling. Put a thin cloth between the ice pack and your skin. · Be safe with medicines. Read and follow all instructions on the label. ? If the doctor gave you a prescription medicine for pain, take it as prescribed. ? If you are not taking a prescription pain medicine, ask your doctor if you can take an over-the-counter medicine. · If you can, prop up the sore area on pillows as much as possible for the next few days. Try to keep the sore area above the level of your heart. When should you call for help?   Call your doctor now or seek immediate medical care if:    · Your pain gets worse.     · You have new or worse swelling.     · You have tingling, weakness, or numbness in the area near the contusion.     · The area near the contusion is cold or pale.    Watch closely for changes in your health, and be sure to contact your doctor if:    · You do not get better as expected. Where can you learn more? Go to http://tere-nan marie.info/. Enter R591 in the search box to learn more about \"Contusion: Care Instructions. \"  Current as of: June 26, 2019  Content Version: 12.2  © 4367-0952 [employer]. Care instructions adapted under license by [employer] (which disclaims liability or warranty for this information). If you have questions about a medical condition or this instruction, always ask your healthcare professional. [address] any warranty or liability for your use of this information.

## 2019-10-06 NOTE — ED NOTES
Discharge instructions reviewed with pt, pt verbalizes understanding. Pt states she would like to apologize to this RN for becoming upset and states she was just frustrated at her wait time during this ed visit.  Pt is wheeled to waiting room and is calmly awaiting her son to arrive, no further complaints or requests

## 2019-12-10 ENCOUNTER — HOSPITAL ENCOUNTER (INPATIENT)
Age: 54
LOS: 5 days | Discharge: HOME HEALTH | DRG: 141 | End: 2019-12-16
Attending: EMERGENCY MEDICINE | Admitting: FAMILY MEDICINE
Payer: COMMERCIAL

## 2019-12-10 ENCOUNTER — APPOINTMENT (OUTPATIENT)
Dept: GENERAL RADIOLOGY | Age: 54
DRG: 141 | End: 2019-12-10
Attending: EMERGENCY MEDICINE
Payer: COMMERCIAL

## 2019-12-10 DIAGNOSIS — D64.9 ANEMIA, UNSPECIFIED TYPE: ICD-10-CM

## 2019-12-10 DIAGNOSIS — R05.9 COUGH: ICD-10-CM

## 2019-12-10 DIAGNOSIS — N39.0 URINARY TRACT INFECTION WITHOUT HEMATURIA, SITE UNSPECIFIED: ICD-10-CM

## 2019-12-10 DIAGNOSIS — G47.30 SLEEP APNEA, UNSPECIFIED TYPE: ICD-10-CM

## 2019-12-10 DIAGNOSIS — J45.901 ASTHMA WITH ACUTE EXACERBATION, UNSPECIFIED ASTHMA SEVERITY, UNSPECIFIED WHETHER PERSISTENT: Primary | ICD-10-CM

## 2019-12-10 LAB
FLUAV AG NPH QL IA: NEGATIVE
FLUBV AG NOSE QL IA: NEGATIVE
LACTATE BLD-SCNC: 0.81 MMOL/L (ref 0.4–2)

## 2019-12-10 PROCEDURE — 96365 THER/PROPH/DIAG IV INF INIT: CPT

## 2019-12-10 PROCEDURE — 82550 ASSAY OF CK (CPK): CPT

## 2019-12-10 PROCEDURE — 96375 TX/PRO/DX INJ NEW DRUG ADDON: CPT

## 2019-12-10 PROCEDURE — 87804 INFLUENZA ASSAY W/OPTIC: CPT

## 2019-12-10 PROCEDURE — 85025 COMPLETE CBC W/AUTO DIFF WBC: CPT

## 2019-12-10 PROCEDURE — 87040 BLOOD CULTURE FOR BACTERIA: CPT

## 2019-12-10 PROCEDURE — 83880 ASSAY OF NATRIURETIC PEPTIDE: CPT

## 2019-12-10 PROCEDURE — 80053 COMPREHEN METABOLIC PANEL: CPT

## 2019-12-10 PROCEDURE — 71045 X-RAY EXAM CHEST 1 VIEW: CPT

## 2019-12-10 PROCEDURE — 99285 EMERGENCY DEPT VISIT HI MDM: CPT

## 2019-12-10 PROCEDURE — 94640 AIRWAY INHALATION TREATMENT: CPT

## 2019-12-10 PROCEDURE — 93005 ELECTROCARDIOGRAM TRACING: CPT

## 2019-12-10 PROCEDURE — 83605 ASSAY OF LACTIC ACID: CPT

## 2019-12-10 PROCEDURE — 83036 HEMOGLOBIN GLYCOSYLATED A1C: CPT

## 2019-12-10 RX ORDER — IPRATROPIUM BROMIDE AND ALBUTEROL SULFATE 2.5; .5 MG/3ML; MG/3ML
3 SOLUTION RESPIRATORY (INHALATION)
Status: COMPLETED | OUTPATIENT
Start: 2019-12-10 | End: 2019-12-11

## 2019-12-10 RX ORDER — MAGNESIUM SULFATE 1 G/100ML
1 INJECTION INTRAVENOUS
Status: COMPLETED | OUTPATIENT
Start: 2019-12-10 | End: 2019-12-11

## 2019-12-10 RX ORDER — BENZONATATE 100 MG/1
100 CAPSULE ORAL ONCE
Status: COMPLETED | OUTPATIENT
Start: 2019-12-10 | End: 2019-12-11

## 2019-12-11 ENCOUNTER — APPOINTMENT (OUTPATIENT)
Dept: NON INVASIVE DIAGNOSTICS | Age: 54
DRG: 141 | End: 2019-12-11
Attending: FAMILY MEDICINE
Payer: COMMERCIAL

## 2019-12-11 PROBLEM — G47.33 OSA (OBSTRUCTIVE SLEEP APNEA): Status: ACTIVE | Noted: 2019-12-11

## 2019-12-11 PROBLEM — N39.0 UTI (URINARY TRACT INFECTION): Status: ACTIVE | Noted: 2019-12-11

## 2019-12-11 LAB
ADMINISTERED INITIALS, ADMINIT: NORMAL
ALBUMIN SERPL-MCNC: 2.4 G/DL (ref 3.4–5)
ALBUMIN/GLOB SERPL: 0.7 {RATIO} (ref 0.8–1.7)
ALP SERPL-CCNC: 109 U/L (ref 45–117)
ALT SERPL-CCNC: 16 U/L (ref 13–56)
ANION GAP SERPL CALC-SCNC: 12 MMOL/L (ref 3–18)
ANION GAP SERPL CALC-SCNC: 9 MMOL/L (ref 3–18)
ANION GAP SERPL CALC-SCNC: 9 MMOL/L (ref 3–18)
APPEARANCE UR: ABNORMAL
AST SERPL-CCNC: 14 U/L (ref 10–38)
ATRIAL RATE: 95 BPM
AV VELOCITY RATIO: 0.8
AV VTI RATIO: 0.9
BACTERIA URNS QL MICRO: ABNORMAL /HPF
BASOPHILS # BLD: 0 K/UL (ref 0–0.1)
BASOPHILS NFR BLD: 1 % (ref 0–2)
BILIRUB SERPL-MCNC: 0.3 MG/DL (ref 0.2–1)
BILIRUB UR QL: NEGATIVE
BNP SERPL-MCNC: 1719 PG/ML (ref 0–900)
BUN SERPL-MCNC: 38 MG/DL (ref 7–18)
BUN SERPL-MCNC: 46 MG/DL (ref 7–18)
BUN SERPL-MCNC: 50 MG/DL (ref 7–18)
BUN/CREAT SERPL: 22 (ref 12–20)
BUN/CREAT SERPL: 23 (ref 12–20)
BUN/CREAT SERPL: 25 (ref 12–20)
CALCIUM SERPL-MCNC: 8.4 MG/DL (ref 8.5–10.1)
CALCIUM SERPL-MCNC: 8.5 MG/DL (ref 8.5–10.1)
CALCIUM SERPL-MCNC: 9 MG/DL (ref 8.5–10.1)
CALCULATED P AXIS, ECG09: 71 DEGREES
CALCULATED R AXIS, ECG10: 64 DEGREES
CALCULATED T AXIS, ECG11: 65 DEGREES
CHLORIDE SERPL-SCNC: 105 MMOL/L (ref 100–111)
CHLORIDE SERPL-SCNC: 105 MMOL/L (ref 100–111)
CHLORIDE SERPL-SCNC: 110 MMOL/L (ref 100–111)
CK MB CFR SERPL CALC: NORMAL % (ref 0–4)
CK MB SERPL-MCNC: <1 NG/ML (ref 5–25)
CK SERPL-CCNC: 44 U/L (ref 26–192)
CO2 SERPL-SCNC: 17 MMOL/L (ref 21–32)
CO2 SERPL-SCNC: 21 MMOL/L (ref 21–32)
CO2 SERPL-SCNC: 24 MMOL/L (ref 21–32)
COLOR UR: YELLOW
CREAT SERPL-MCNC: 1.66 MG/DL (ref 0.6–1.3)
CREAT SERPL-MCNC: 2 MG/DL (ref 0.6–1.3)
CREAT SERPL-MCNC: 2.05 MG/DL (ref 0.6–1.3)
D50 ADMINISTERED, D50ADM: 0 ML
D50 ORDER, D50ORD: 0 ML
DIAGNOSIS, 93000: NORMAL
DIFFERENTIAL METHOD BLD: ABNORMAL
ECHO AO ASC DIAM: 3.42 CM
ECHO AO ROOT DIAM: 3.43 CM
ECHO AV AREA PEAK VELOCITY: 2.8 CM2
ECHO AV AREA VTI: 3.1 CM2
ECHO AV AREA/BSA PEAK VELOCITY: 1.1 CM2/M2
ECHO AV AREA/BSA VTI: 1.2 CM2/M2
ECHO AV MEAN GRADIENT: 3.9 MMHG
ECHO AV MEAN VELOCITY: 0.89 M/S
ECHO AV PEAK GRADIENT: 8.8 MMHG
ECHO AV PEAK VELOCITY: 147.93 CM/S
ECHO AV VTI: 22.3 CM
ECHO IVC PROX: 2.35 CM
ECHO LA MAJOR AXIS: 4.11 CM
ECHO LA VOL 4C: 39.9 ML (ref 22–52)
ECHO LA VOLUME INDEX A4C: 17.35 ML/M2 (ref 16–28)
ECHO LV E' LATERAL VELOCITY: 7 CM/S
ECHO LV E' SEPTAL VELOCITY: 9 CM/S
ECHO LV EDV A2C: 45.1 ML
ECHO LV EDV A4C: 68.2 ML
ECHO LV EDV BP: 55.2 ML (ref 56–104)
ECHO LV EDV INDEX A4C: 29.7 ML/M2
ECHO LV EDV INDEX BP: 24 ML/M2
ECHO LV EDV NDEX A2C: 19.6 ML/M2
ECHO LV EDV TEICHHOLZ: 0.79 ML
ECHO LV EJECTION FRACTION A2C: 71 %
ECHO LV EJECTION FRACTION A4C: 79 %
ECHO LV EJECTION FRACTION BIPLANE: 74.2 % (ref 55–100)
ECHO LV ESV A2C: 13.1 ML
ECHO LV ESV A4C: 14.3 ML
ECHO LV ESV BP: 14.3 ML (ref 19–49)
ECHO LV ESV INDEX A2C: 5.7 ML/M2
ECHO LV ESV INDEX A4C: 6.2 ML/M2
ECHO LV ESV INDEX BP: 6.2 ML/M2
ECHO LV ESV TEICHHOLZ: 0.19 ML
ECHO LV INTERNAL DIMENSION DIASTOLIC: 5.11 CM (ref 3.9–5.3)
ECHO LV INTERNAL DIMENSION SYSTOLIC: 2.82 CM
ECHO LV IVSD: 1.49 CM (ref 0.6–0.9)
ECHO LV MASS 2D: 331.7 G (ref 67–162)
ECHO LV MASS INDEX 2D: 144.2 G/M2 (ref 43–95)
ECHO LV POSTERIOR WALL DIASTOLIC: 1.15 CM (ref 0.6–0.9)
ECHO LVOT DIAM: 2.12 CM
ECHO LVOT PEAK GRADIENT: 5.5 MMHG
ECHO LVOT PEAK VELOCITY: 117.63 CM/S
ECHO LVOT VTI: 19.45 CM
ECHO MV A VELOCITY: 86.41 CM/S
ECHO MV AREA PHT: 5.2 CM2
ECHO MV E DECELERATION TIME (DT): 146.7 MS
ECHO MV E VELOCITY: 106.78 CM/S
ECHO MV E/A RATIO: 1.24
ECHO MV E/E' LATERAL: 15.25
ECHO MV E/E' RATIO (AVERAGED): 13.56
ECHO MV E/E' SEPTAL: 11.86
ECHO MV PRESSURE HALF TIME (PHT): 42.5 MS
ECHO TRICUSPID ANNULAR PEAK SYSTOLIC VELOCITY: 2.1 CM/S
ECHO TV REGURGITANT MAX VELOCITY: 143.06 CM/S
ECHO TV REGURGITANT PEAK GRADIENT: 8.2 MMHG
EOSINOPHIL # BLD: 0.3 K/UL (ref 0–0.4)
EOSINOPHIL NFR BLD: 5 % (ref 0–5)
EPITH CASTS URNS QL MICRO: ABNORMAL /LPF (ref 0–5)
ERYTHROCYTE [DISTWIDTH] IN BLOOD BY AUTOMATED COUNT: 15.5 % (ref 11.6–14.5)
EST. AVERAGE GLUCOSE BLD GHB EST-MCNC: 249 MG/DL
GLOBULIN SER CALC-MCNC: 3.6 G/DL (ref 2–4)
GLUCOSE BLD STRIP.AUTO-MCNC: 237 MG/DL (ref 70–110)
GLUCOSE BLD STRIP.AUTO-MCNC: 290 MG/DL (ref 70–110)
GLUCOSE BLD STRIP.AUTO-MCNC: 398 MG/DL (ref 70–110)
GLUCOSE BLD STRIP.AUTO-MCNC: 464 MG/DL (ref 70–110)
GLUCOSE BLD STRIP.AUTO-MCNC: 487 MG/DL (ref 70–110)
GLUCOSE BLD STRIP.AUTO-MCNC: 509 MG/DL (ref 70–110)
GLUCOSE BLD STRIP.AUTO-MCNC: 516 MG/DL (ref 70–110)
GLUCOSE BLD STRIP.AUTO-MCNC: 525 MG/DL (ref 70–110)
GLUCOSE BLD STRIP.AUTO-MCNC: 527 MG/DL (ref 70–110)
GLUCOSE BLD STRIP.AUTO-MCNC: 549 MG/DL (ref 70–110)
GLUCOSE BLD STRIP.AUTO-MCNC: 549 MG/DL (ref 70–110)
GLUCOSE BLD STRIP.AUTO-MCNC: 553 MG/DL (ref 70–110)
GLUCOSE SERPL-MCNC: 284 MG/DL (ref 74–99)
GLUCOSE SERPL-MCNC: 504 MG/DL (ref 74–99)
GLUCOSE SERPL-MCNC: 567 MG/DL (ref 74–99)
GLUCOSE UR STRIP.AUTO-MCNC: >1000 MG/DL
GLUCOSE, GLC: 237 MG/DL
GLUCOSE, GLC: 298 MG/DL
GLUCOSE, GLC: 398 MG/DL
GLUCOSE, GLC: 487 MG/DL
GLUCOSE, GLC: 509 MG/DL
GLUCOSE, GLC: 553 MG/DL
HBA1C MFR BLD: 10.3 % (ref 4.2–5.6)
HBA1C MFR BLD: 10.4 % (ref 4.2–5.6)
HCT VFR BLD AUTO: 29.4 % (ref 35–45)
HGB BLD-MCNC: 9 G/DL (ref 12–16)
HGB UR QL STRIP: ABNORMAL
HIGH TARGET, HITG: 180 MG/DL
INSULIN ADMINSTERED, INSADM: 10.1 UNITS/HOUR
INSULIN ADMINSTERED, INSADM: 13.5 UNITS/HOUR
INSULIN ADMINSTERED, INSADM: 17.1 UNITS/HOUR
INSULIN ADMINSTERED, INSADM: 4.8 UNITS/HOUR
INSULIN ADMINSTERED, INSADM: 5.3 UNITS/HOUR
INSULIN ADMINSTERED, INSADM: 9.9 UNITS/HOUR
INSULIN ORDER, INSORD: 10.1 UNITS/HOUR
INSULIN ORDER, INSORD: 13.5 UNITS/HOUR
INSULIN ORDER, INSORD: 17.1 UNITS/HOUR
INSULIN ORDER, INSORD: 4.8 UNITS/HOUR
INSULIN ORDER, INSORD: 5.3 UNITS/HOUR
INSULIN ORDER, INSORD: 9.9 UNITS/HOUR
KETONES UR QL STRIP.AUTO: 15 MG/DL
LEUKOCYTE ESTERASE UR QL STRIP.AUTO: NEGATIVE
LOW TARGET, LOT: 140 MG/DL
LVFS 2D: 44.84 %
LVOT MG: 3.67 MMHG
LVOT MV: 0.91 CM/S
LVSV (MOD BI): 16.55 ML
LVSV (MOD SINGLE 4C): 21.78 ML
LVSV (MOD SINGLE): 12.89 ML
LVSV (TEICH): 38.04 ML
LYMPHOCYTES # BLD: 2 K/UL (ref 0.9–3.6)
LYMPHOCYTES NFR BLD: 32 % (ref 21–52)
MAGNESIUM SERPL-MCNC: 1.9 MG/DL (ref 1.6–2.6)
MAGNESIUM SERPL-MCNC: 2.2 MG/DL (ref 1.6–2.6)
MCH RBC QN AUTO: 25 PG (ref 24–34)
MCHC RBC AUTO-ENTMCNC: 30.6 G/DL (ref 31–37)
MCV RBC AUTO: 81.7 FL (ref 74–97)
MINUTES UNTIL NEXT BG, NBG: 60 MIN
MONOCYTES # BLD: 0.6 K/UL (ref 0.05–1.2)
MONOCYTES NFR BLD: 10 % (ref 3–10)
MULTIPLIER, MUL: 0.02
MULTIPLIER, MUL: 0.02
MULTIPLIER, MUL: 0.03
MULTIPLIER, MUL: 0.04
MV DEC SLOPE: 7.28
NEUTS SEG # BLD: 3.3 K/UL (ref 1.8–8)
NEUTS SEG NFR BLD: 52 % (ref 40–73)
NITRITE UR QL STRIP.AUTO: NEGATIVE
ORDER INITIALS, ORDINIT: NORMAL
P-R INTERVAL, ECG05: 130 MS
PH UR STRIP: 5 [PH] (ref 5–8)
PHOSPHATE SERPL-MCNC: 3.9 MG/DL (ref 2.5–4.9)
PLATELET # BLD AUTO: 249 K/UL (ref 135–420)
PMV BLD AUTO: 9 FL (ref 9.2–11.8)
POTASSIUM SERPL-SCNC: 4.2 MMOL/L (ref 3.5–5.5)
POTASSIUM SERPL-SCNC: 4.2 MMOL/L (ref 3.5–5.5)
POTASSIUM SERPL-SCNC: 4.5 MMOL/L (ref 3.5–5.5)
PROT SERPL-MCNC: 6 G/DL (ref 6.4–8.2)
PROT UR STRIP-MCNC: >1000 MG/DL
Q-T INTERVAL, ECG07: 352 MS
QRS DURATION, ECG06: 78 MS
QTC CALCULATION (BEZET), ECG08: 442 MS
RBC # BLD AUTO: 3.6 M/UL (ref 4.2–5.3)
RBC #/AREA URNS HPF: ABNORMAL /HPF (ref 0–5)
SODIUM SERPL-SCNC: 134 MMOL/L (ref 136–145)
SODIUM SERPL-SCNC: 135 MMOL/L (ref 136–145)
SODIUM SERPL-SCNC: 143 MMOL/L (ref 136–145)
SP GR UR REFRACTOMETRY: 1.02 (ref 1–1.03)
TROPONIN I SERPL-MCNC: <0.02 NG/ML (ref 0–0.04)
UROBILINOGEN UR QL STRIP.AUTO: 0.2 EU/DL (ref 0.2–1)
VENTRICULAR RATE, ECG03: 95 BPM
WBC # BLD AUTO: 6.3 K/UL (ref 4.6–13.2)
WBC URNS QL MICRO: ABNORMAL /HPF (ref 0–5)

## 2019-12-11 PROCEDURE — 74011000250 HC RX REV CODE- 250: Performed by: HOSPITALIST

## 2019-12-11 PROCEDURE — 87077 CULTURE AEROBIC IDENTIFY: CPT

## 2019-12-11 PROCEDURE — 77030013140 HC MSK NEB VYRM -A

## 2019-12-11 PROCEDURE — 74011000258 HC RX REV CODE- 258: Performed by: HOSPITALIST

## 2019-12-11 PROCEDURE — 87186 SC STD MICRODIL/AGAR DIL: CPT

## 2019-12-11 PROCEDURE — C9113 INJ PANTOPRAZOLE SODIUM, VIA: HCPCS | Performed by: FAMILY MEDICINE

## 2019-12-11 PROCEDURE — 77010033678 HC OXYGEN DAILY

## 2019-12-11 PROCEDURE — 83036 HEMOGLOBIN GLYCOSYLATED A1C: CPT

## 2019-12-11 PROCEDURE — 74011250636 HC RX REV CODE- 250/636: Performed by: FAMILY MEDICINE

## 2019-12-11 PROCEDURE — 94640 AIRWAY INHALATION TREATMENT: CPT

## 2019-12-11 PROCEDURE — 74011636637 HC RX REV CODE- 636/637: Performed by: HOSPITALIST

## 2019-12-11 PROCEDURE — 84100 ASSAY OF PHOSPHORUS: CPT

## 2019-12-11 PROCEDURE — 65660000000 HC RM CCU STEPDOWN

## 2019-12-11 PROCEDURE — 83735 ASSAY OF MAGNESIUM: CPT

## 2019-12-11 PROCEDURE — 74011000250 HC RX REV CODE- 250: Performed by: EMERGENCY MEDICINE

## 2019-12-11 PROCEDURE — 74011250637 HC RX REV CODE- 250/637: Performed by: EMERGENCY MEDICINE

## 2019-12-11 PROCEDURE — C8929 TTE W OR WO FOL WCON,DOPPLER: HCPCS

## 2019-12-11 PROCEDURE — 80048 BASIC METABOLIC PNL TOTAL CA: CPT

## 2019-12-11 PROCEDURE — 82962 GLUCOSE BLOOD TEST: CPT

## 2019-12-11 PROCEDURE — 74011250637 HC RX REV CODE- 250/637: Performed by: FAMILY MEDICINE

## 2019-12-11 PROCEDURE — 87086 URINE CULTURE/COLONY COUNT: CPT

## 2019-12-11 PROCEDURE — 74011250636 HC RX REV CODE- 250/636: Performed by: HOSPITALIST

## 2019-12-11 PROCEDURE — 74011250636 HC RX REV CODE- 250/636: Performed by: EMERGENCY MEDICINE

## 2019-12-11 PROCEDURE — 94760 N-INVAS EAR/PLS OXIMETRY 1: CPT

## 2019-12-11 PROCEDURE — 74011000250 HC RX REV CODE- 250

## 2019-12-11 PROCEDURE — 36415 COLL VENOUS BLD VENIPUNCTURE: CPT

## 2019-12-11 PROCEDURE — 74011000250 HC RX REV CODE- 250: Performed by: FAMILY MEDICINE

## 2019-12-11 PROCEDURE — 74011636637 HC RX REV CODE- 636/637: Performed by: FAMILY MEDICINE

## 2019-12-11 PROCEDURE — 81001 URINALYSIS AUTO W/SCOPE: CPT

## 2019-12-11 RX ORDER — GUAIFENESIN 100 MG/5ML
81 LIQUID (ML) ORAL DAILY
Status: DISCONTINUED | OUTPATIENT
Start: 2019-12-11 | End: 2019-12-16 | Stop reason: HOSPADM

## 2019-12-11 RX ORDER — ACETAMINOPHEN AND CODEINE PHOSPHATE 300; 30 MG/1; MG/1
1 TABLET ORAL
Status: DISCONTINUED | OUTPATIENT
Start: 2019-12-11 | End: 2019-12-16 | Stop reason: HOSPADM

## 2019-12-11 RX ORDER — METOPROLOL SUCCINATE 100 MG/1
100 TABLET, EXTENDED RELEASE ORAL DAILY
Status: DISCONTINUED | OUTPATIENT
Start: 2019-12-11 | End: 2019-12-16 | Stop reason: HOSPADM

## 2019-12-11 RX ORDER — SODIUM CHLORIDE 0.9 % (FLUSH) 0.9 %
5-40 SYRINGE (ML) INJECTION AS NEEDED
Status: DISCONTINUED | OUTPATIENT
Start: 2019-12-11 | End: 2019-12-16 | Stop reason: HOSPADM

## 2019-12-11 RX ORDER — MAGNESIUM SULFATE 100 %
4 CRYSTALS MISCELLANEOUS AS NEEDED
Status: DISCONTINUED | OUTPATIENT
Start: 2019-12-11 | End: 2019-12-11 | Stop reason: SDUPTHER

## 2019-12-11 RX ORDER — DULOXETIN HYDROCHLORIDE 60 MG/1
60 CAPSULE, DELAYED RELEASE ORAL DAILY
Status: DISCONTINUED | OUTPATIENT
Start: 2019-12-11 | End: 2019-12-16 | Stop reason: HOSPADM

## 2019-12-11 RX ORDER — FUROSEMIDE 10 MG/ML
40 INJECTION INTRAMUSCULAR; INTRAVENOUS
Status: COMPLETED | OUTPATIENT
Start: 2019-12-11 | End: 2019-12-11

## 2019-12-11 RX ORDER — ARFORMOTEROL TARTRATE 15 UG/2ML
15 SOLUTION RESPIRATORY (INHALATION)
Status: DISCONTINUED | OUTPATIENT
Start: 2019-12-11 | End: 2019-12-16 | Stop reason: HOSPADM

## 2019-12-11 RX ORDER — LOSARTAN POTASSIUM 50 MG/1
100 TABLET ORAL DAILY
Status: DISCONTINUED | OUTPATIENT
Start: 2019-12-11 | End: 2019-12-11

## 2019-12-11 RX ORDER — HYDRALAZINE HYDROCHLORIDE 25 MG/1
25 TABLET, FILM COATED ORAL 3 TIMES DAILY
Status: DISCONTINUED | OUTPATIENT
Start: 2019-12-11 | End: 2019-12-11

## 2019-12-11 RX ORDER — ATORVASTATIN CALCIUM 20 MG/1
40 TABLET, FILM COATED ORAL DAILY
Status: DISCONTINUED | OUTPATIENT
Start: 2019-12-11 | End: 2019-12-16 | Stop reason: HOSPADM

## 2019-12-11 RX ORDER — ALBUTEROL SULFATE 2.5 MG/.5ML
5 SOLUTION RESPIRATORY (INHALATION)
Status: COMPLETED | OUTPATIENT
Start: 2019-12-11 | End: 2019-12-11

## 2019-12-11 RX ORDER — HYDRALAZINE HYDROCHLORIDE 25 MG/1
25 TABLET, FILM COATED ORAL 3 TIMES DAILY
Status: DISCONTINUED | OUTPATIENT
Start: 2019-12-11 | End: 2019-12-16 | Stop reason: HOSPADM

## 2019-12-11 RX ORDER — IPRATROPIUM BROMIDE AND ALBUTEROL SULFATE 2.5; .5 MG/3ML; MG/3ML
3 SOLUTION RESPIRATORY (INHALATION)
Status: DISCONTINUED | OUTPATIENT
Start: 2019-12-11 | End: 2019-12-13

## 2019-12-11 RX ORDER — DEXTROSE MONOHYDRATE 100 MG/ML
125-250 INJECTION, SOLUTION INTRAVENOUS AS NEEDED
Status: DISCONTINUED | OUTPATIENT
Start: 2019-12-11 | End: 2019-12-11 | Stop reason: SDUPTHER

## 2019-12-11 RX ORDER — AMLODIPINE BESYLATE 5 MG/1
5 TABLET ORAL DAILY
Status: DISCONTINUED | OUTPATIENT
Start: 2019-12-11 | End: 2019-12-16 | Stop reason: HOSPADM

## 2019-12-11 RX ORDER — LOSARTAN POTASSIUM 50 MG/1
100 TABLET ORAL DAILY
Status: DISCONTINUED | OUTPATIENT
Start: 2019-12-11 | End: 2019-12-16 | Stop reason: HOSPADM

## 2019-12-11 RX ORDER — DEXTROSE MONOHYDRATE 100 MG/ML
125-250 INJECTION, SOLUTION INTRAVENOUS AS NEEDED
Status: DISCONTINUED | OUTPATIENT
Start: 2019-12-11 | End: 2019-12-16 | Stop reason: HOSPADM

## 2019-12-11 RX ORDER — FUROSEMIDE 10 MG/ML
40 INJECTION INTRAMUSCULAR; INTRAVENOUS 2 TIMES DAILY
Status: DISCONTINUED | OUTPATIENT
Start: 2019-12-11 | End: 2019-12-12

## 2019-12-11 RX ORDER — MAGNESIUM SULFATE 100 %
16 CRYSTALS MISCELLANEOUS AS NEEDED
Status: DISCONTINUED | OUTPATIENT
Start: 2019-12-11 | End: 2019-12-16 | Stop reason: HOSPADM

## 2019-12-11 RX ORDER — SODIUM CHLORIDE 0.9 % (FLUSH) 0.9 %
5-40 SYRINGE (ML) INJECTION EVERY 8 HOURS
Status: DISCONTINUED | OUTPATIENT
Start: 2019-12-11 | End: 2019-12-16 | Stop reason: HOSPADM

## 2019-12-11 RX ORDER — AMLODIPINE BESYLATE 5 MG/1
5 TABLET ORAL DAILY
Status: DISCONTINUED | OUTPATIENT
Start: 2019-12-11 | End: 2019-12-11

## 2019-12-11 RX ORDER — HEPARIN SODIUM 5000 [USP'U]/ML
5000 INJECTION, SOLUTION INTRAVENOUS; SUBCUTANEOUS EVERY 8 HOURS
Status: DISCONTINUED | OUTPATIENT
Start: 2019-12-11 | End: 2019-12-14

## 2019-12-11 RX ORDER — CODEINE PHOSPHATE AND GUAIFENESIN 10; 100 MG/5ML; MG/5ML
5 SOLUTION ORAL
Status: DISCONTINUED | OUTPATIENT
Start: 2019-12-11 | End: 2019-12-11

## 2019-12-11 RX ORDER — IPRATROPIUM BROMIDE AND ALBUTEROL SULFATE 2.5; .5 MG/3ML; MG/3ML
SOLUTION RESPIRATORY (INHALATION)
Status: COMPLETED
Start: 2019-12-11 | End: 2019-12-11

## 2019-12-11 RX ORDER — GABAPENTIN 300 MG/1
300 CAPSULE ORAL 3 TIMES DAILY
Status: DISCONTINUED | OUTPATIENT
Start: 2019-12-11 | End: 2019-12-16 | Stop reason: HOSPADM

## 2019-12-11 RX ORDER — FUROSEMIDE 40 MG/1
40 TABLET ORAL 2 TIMES DAILY
Status: DISCONTINUED | OUTPATIENT
Start: 2019-12-11 | End: 2019-12-11

## 2019-12-11 RX ORDER — INSULIN GLARGINE 100 [IU]/ML
50 INJECTION, SOLUTION SUBCUTANEOUS 2 TIMES DAILY
Status: DISCONTINUED | OUTPATIENT
Start: 2019-12-11 | End: 2019-12-11

## 2019-12-11 RX ORDER — INSULIN LISPRO 100 [IU]/ML
INJECTION, SOLUTION INTRAVENOUS; SUBCUTANEOUS
Status: DISCONTINUED | OUTPATIENT
Start: 2019-12-11 | End: 2019-12-11

## 2019-12-11 RX ORDER — BUDESONIDE 0.5 MG/2ML
500 INHALANT ORAL
Status: DISCONTINUED | OUTPATIENT
Start: 2019-12-11 | End: 2019-12-16 | Stop reason: HOSPADM

## 2019-12-11 RX ORDER — GUAIFENESIN 600 MG/1
600 TABLET, EXTENDED RELEASE ORAL EVERY 12 HOURS
Status: DISCONTINUED | OUTPATIENT
Start: 2019-12-11 | End: 2019-12-16 | Stop reason: HOSPADM

## 2019-12-11 RX ADMIN — SODIUM CHLORIDE 13.5 UNITS/HR: 9 INJECTION, SOLUTION INTRAVENOUS at 18:49

## 2019-12-11 RX ADMIN — METHYLPREDNISOLONE SODIUM SUCCINATE 60 MG: 125 INJECTION, POWDER, FOR SOLUTION INTRAMUSCULAR; INTRAVENOUS at 13:03

## 2019-12-11 RX ADMIN — FUROSEMIDE 40 MG: 10 INJECTION, SOLUTION INTRAMUSCULAR; INTRAVENOUS at 01:42

## 2019-12-11 RX ADMIN — Medication 10 ML: at 21:11

## 2019-12-11 RX ADMIN — BUDESONIDE 500 MCG: 0.5 INHALANT RESPIRATORY (INHALATION) at 15:20

## 2019-12-11 RX ADMIN — ARFORMOTEROL TARTRATE 15 MCG: 15 SOLUTION RESPIRATORY (INHALATION) at 20:28

## 2019-12-11 RX ADMIN — HEPARIN SODIUM 5000 UNITS: 5000 INJECTION INTRAVENOUS; SUBCUTANEOUS at 06:19

## 2019-12-11 RX ADMIN — IPRATROPIUM BROMIDE AND ALBUTEROL SULFATE 3 ML: .5; 3 SOLUTION RESPIRATORY (INHALATION) at 00:04

## 2019-12-11 RX ADMIN — GUAIFENESIN 600 MG: 600 TABLET, EXTENDED RELEASE ORAL at 04:45

## 2019-12-11 RX ADMIN — MAGNESIUM SULFATE HEPTAHYDRATE 1 G: 1 INJECTION, SOLUTION INTRAVENOUS at 00:05

## 2019-12-11 RX ADMIN — LOSARTAN POTASSIUM 100 MG: 50 TABLET, FILM COATED ORAL at 04:44

## 2019-12-11 RX ADMIN — HYDRALAZINE HYDROCHLORIDE 25 MG: 25 TABLET, FILM COATED ORAL at 17:33

## 2019-12-11 RX ADMIN — PERFLUTREN 1 ML: 6.52 INJECTION, SUSPENSION INTRAVENOUS at 11:01

## 2019-12-11 RX ADMIN — AMLODIPINE BESYLATE 5 MG: 5 TABLET ORAL at 04:44

## 2019-12-11 RX ADMIN — SODIUM CHLORIDE 40 MG: 9 INJECTION INTRAMUSCULAR; INTRAVENOUS; SUBCUTANEOUS at 10:28

## 2019-12-11 RX ADMIN — IPRATROPIUM BROMIDE AND ALBUTEROL SULFATE 3 ML: .5; 3 SOLUTION RESPIRATORY (INHALATION) at 07:35

## 2019-12-11 RX ADMIN — FUROSEMIDE 40 MG: 10 INJECTION, SOLUTION INTRAMUSCULAR; INTRAVENOUS at 21:10

## 2019-12-11 RX ADMIN — HYDRALAZINE HYDROCHLORIDE 25 MG: 25 TABLET, FILM COATED ORAL at 21:11

## 2019-12-11 RX ADMIN — ATORVASTATIN CALCIUM 40 MG: 20 TABLET, FILM COATED ORAL at 10:27

## 2019-12-11 RX ADMIN — IPRATROPIUM BROMIDE AND ALBUTEROL SULFATE 3 ML: .5; 3 SOLUTION RESPIRATORY (INHALATION) at 23:41

## 2019-12-11 RX ADMIN — GUAIFENESIN 600 MG: 600 TABLET, EXTENDED RELEASE ORAL at 21:10

## 2019-12-11 RX ADMIN — BUDESONIDE 500 MCG: 0.5 INHALANT RESPIRATORY (INHALATION) at 20:28

## 2019-12-11 RX ADMIN — METHYLPREDNISOLONE SODIUM SUCCINATE 60 MG: 125 INJECTION, POWDER, FOR SOLUTION INTRAMUSCULAR; INTRAVENOUS at 17:33

## 2019-12-11 RX ADMIN — GABAPENTIN 300 MG: 300 CAPSULE ORAL at 10:27

## 2019-12-11 RX ADMIN — HEPARIN SODIUM 5000 UNITS: 5000 INJECTION INTRAVENOUS; SUBCUTANEOUS at 13:03

## 2019-12-11 RX ADMIN — INSULIN GLARGINE 50 UNITS: 100 INJECTION, SOLUTION SUBCUTANEOUS at 10:27

## 2019-12-11 RX ADMIN — ARFORMOTEROL TARTRATE 15 MCG: 15 SOLUTION RESPIRATORY (INHALATION) at 15:20

## 2019-12-11 RX ADMIN — IPRATROPIUM BROMIDE AND ALBUTEROL SULFATE 3 ML: .5; 3 SOLUTION RESPIRATORY (INHALATION) at 15:20

## 2019-12-11 RX ADMIN — CEFTRIAXONE 1 G: 1 INJECTION, POWDER, FOR SOLUTION INTRAMUSCULAR; INTRAVENOUS at 03:25

## 2019-12-11 RX ADMIN — METHYLPREDNISOLONE SODIUM SUCCINATE 60 MG: 125 INJECTION, POWDER, FOR SOLUTION INTRAMUSCULAR; INTRAVENOUS at 23:22

## 2019-12-11 RX ADMIN — DULOXETINE HYDROCHLORIDE 60 MG: 60 CAPSULE, DELAYED RELEASE ORAL at 10:28

## 2019-12-11 RX ADMIN — HUMAN INSULIN 10 UNITS: 100 INJECTION, SOLUTION SUBCUTANEOUS at 06:55

## 2019-12-11 RX ADMIN — METHYLPREDNISOLONE SODIUM SUCCINATE 125 MG: 125 INJECTION, POWDER, FOR SOLUTION INTRAMUSCULAR; INTRAVENOUS at 06:19

## 2019-12-11 RX ADMIN — Medication 10 ML: at 14:00

## 2019-12-11 RX ADMIN — FUROSEMIDE 40 MG: 10 INJECTION, SOLUTION INTRAMUSCULAR; INTRAVENOUS at 10:28

## 2019-12-11 RX ADMIN — INSULIN LISPRO 10 UNITS: 100 INJECTION, SOLUTION INTRAVENOUS; SUBCUTANEOUS at 06:54

## 2019-12-11 RX ADMIN — ALBUTEROL SULFATE 5 MG: 2.5 SOLUTION RESPIRATORY (INHALATION) at 01:39

## 2019-12-11 RX ADMIN — SODIUM CHLORIDE 1000 ML: 900 INJECTION, SOLUTION INTRAVENOUS at 17:32

## 2019-12-11 RX ADMIN — METHYLPREDNISOLONE SODIUM SUCCINATE 125 MG: 125 INJECTION, POWDER, FOR SOLUTION INTRAMUSCULAR; INTRAVENOUS at 00:05

## 2019-12-11 RX ADMIN — ASPIRIN 81 MG 81 MG: 81 TABLET ORAL at 10:27

## 2019-12-11 RX ADMIN — SODIUM CHLORIDE 9.9 UNITS/HR: 9 INJECTION, SOLUTION INTRAVENOUS at 17:32

## 2019-12-11 RX ADMIN — HYDRALAZINE HYDROCHLORIDE 25 MG: 25 TABLET, FILM COATED ORAL at 10:27

## 2019-12-11 RX ADMIN — IPRATROPIUM BROMIDE AND ALBUTEROL SULFATE 3 ML: .5; 3 SOLUTION RESPIRATORY (INHALATION) at 00:38

## 2019-12-11 RX ADMIN — Medication 10 ML: at 06:20

## 2019-12-11 RX ADMIN — HEPARIN SODIUM 5000 UNITS: 5000 INJECTION INTRAVENOUS; SUBCUTANEOUS at 21:10

## 2019-12-11 RX ADMIN — IPRATROPIUM BROMIDE AND ALBUTEROL SULFATE 3 ML: .5; 3 SOLUTION RESPIRATORY (INHALATION) at 00:35

## 2019-12-11 RX ADMIN — INSULIN LISPRO 10 UNITS: 100 INJECTION, SOLUTION INTRAVENOUS; SUBCUTANEOUS at 13:03

## 2019-12-11 RX ADMIN — HYDRALAZINE HYDROCHLORIDE 25 MG: 25 TABLET, FILM COATED ORAL at 04:45

## 2019-12-11 RX ADMIN — GABAPENTIN 300 MG: 300 CAPSULE ORAL at 17:33

## 2019-12-11 RX ADMIN — BENZONATATE 100 MG: 100 CAPSULE ORAL at 00:04

## 2019-12-11 RX ADMIN — IPRATROPIUM BROMIDE AND ALBUTEROL SULFATE 3 ML: .5; 3 SOLUTION RESPIRATORY (INHALATION) at 20:28

## 2019-12-11 RX ADMIN — AZITHROMYCIN MONOHYDRATE 500 MG: 500 INJECTION, POWDER, LYOPHILIZED, FOR SOLUTION INTRAVENOUS at 04:45

## 2019-12-11 RX ADMIN — METOPROLOL SUCCINATE 100 MG: 100 TABLET, EXTENDED RELEASE ORAL at 10:28

## 2019-12-11 RX ADMIN — IPRATROPIUM BROMIDE AND ALBUTEROL SULFATE 3 ML: 2.5; .5 SOLUTION RESPIRATORY (INHALATION) at 00:38

## 2019-12-11 RX ADMIN — IPRATROPIUM BROMIDE AND ALBUTEROL SULFATE 3 ML: .5; 3 SOLUTION RESPIRATORY (INHALATION) at 11:19

## 2019-12-11 RX ADMIN — GABAPENTIN 300 MG: 300 CAPSULE ORAL at 21:10

## 2019-12-11 RX ADMIN — ACETAMINOPHEN AND CODEINE PHOSPHATE 1 TABLET: 300; 30 TABLET ORAL at 04:44

## 2019-12-11 NOTE — ROUTINE PROCESS
TRANSFER - OUT REPORT: 
 
Verbal report given to Viry RN(name) on Shena Bella  being transferred to Tele(unit) for routine progression of care Report consisted of patients Situation, Background, Assessment and  
Recommendations(SBAR). Information from the following report(s) ED Summary, MAR and Recent Results was reviewed with the receiving nurse. Lines:  
Peripheral IV 12/10/19 Right Antecubital (Active) Site Assessment Clean, dry, & intact 12/10/2019 11:45 PM  
Phlebitis Assessment 0 12/10/2019 11:45 PM  
Infiltration Assessment 0 12/10/2019 11:45 PM  
Dressing Status Clean, dry, & intact 12/10/2019 11:45 PM  
Dressing Type Transparent 12/10/2019 11:45 PM  
Hub Color/Line Status Pink;Patent; Flushed 12/10/2019 11:45 PM  
Alcohol Cap Used Yes 12/10/2019 11:45 PM  
  
 
Opportunity for questions and clarification was provided. Patient transported with: 
 Monitor and Tech

## 2019-12-11 NOTE — ED NOTES
Went in to road test pt but at rest she is tachypneic with respirations 28-32 and wheezing with accessory muscle use. Pt desaturates when asleep while sitting straight up, just got diagnosed that she needs a CPAP machine but doesn't have 1 yet. Discussed with ERMD and pt without road test she appears she needs admission at this time. Skin warm and dry.

## 2019-12-11 NOTE — PROGRESS NOTES
12/11/2019 PT note: consult received and chart reviewed. Evaluation attempted. Pt nurse Roslyn Luu present and pt beginning dinner meal.  Will f/u at later tomorrow for PT evaluation. Thank you.    Shellie Olson, PT

## 2019-12-11 NOTE — PROGRESS NOTES
0730: Bedside and Verbal shift change report given to Genoveva Whitaker RN (oncoming nurse) by Nick Goldberg RN (offgoing nurse). Report included the following information Kardex and Cardiac Rhythm NSR.     1245: MD Merlinda Dalton made aware of pt elevated blood sugars, MD makes this nurse aware matter will be addressed, pt recieves 10 of SSI as ordered see ABDELRAHMAN Dunn RN    Pt repeat back importance of diabetes management and diet control    1500: Rx notified no insulin gtt in pyxis, Rx makes this nurse aware after labs result matter will be addressed  Genoveva Whitaker RN    [phone]: pt refuses physical therapy  Genoveva Whitaker RN    1900: Bedside and Verbal shift change report given to Yrn (oncoming nurse) by Genoveva Whitaker RN (offgoing nurse). Report included the following information Kardex and Cardiac Rhythm NSR.

## 2019-12-11 NOTE — ED TRIAGE NOTES
Pt arrives ambulatory to ED with c\o SOB, wheezing, cough x 2 weeks, pt sts sob worsened this evening with exertion

## 2019-12-11 NOTE — PROGRESS NOTES
Reason for Admission:   Cinthia Can is a 53 y.o. female with PMHX of asthma, diabetes, hypertension, morbid obesity who presents to the emergency department C/O shortness of breath and cough. Patient has been having intractable coughing since yesterday that is associated with wheezing. No fever or chest pain. She does not smoke. She is not on home o2. Patient reports CPAP machine is ordered but has not arrived at her house yet.                      RRAT Score:     18             Do you (patient/family) have any concerns for transition/discharge? Plan for utilizing home health:   tbd    Current Advanced Directive/Advance Care Plan:  Not on file please consider placing a consult to palliative or pastoral care to address acp            Transition of Care Plan:   Met wit patient and Dr. Robby Willard at bedside. Patient lives alone. She has 2 daughters and a son who live nearby. Reports they help her when she needs assistance. She reports she had asleep study don at Dr. Jocelin Montano [facility] [place] [facility] and they had applied for her to get a cpap and a nebulizer and was waiting on her insurance authorization  Telephone call [phone] to [facility] sleep study by cms they informed cms that is their fault they had dropped the ball and she will ask the nurse to call patient as to when it will be processed. She informed cms she usually uses [facility]. Reports she will get on it and have the nurse follow up. They are made aware paitent is in the hospital.  Patient currently wearing oxygen and does not wear at home  RN please wean from oxygen or place qualifying walk test on chart. patient has medicaid for insurance. She has Dr. Leyla Jin for pcp. Reports she is pretty IADL's    Cm christie continue to follow patient admitted for respiratory distress.  PT and ot recommendations would be appreciated

## 2019-12-11 NOTE — ED NOTES
Patient reports feeling better after duo alvaro tx, but continues to have some wheezing. RT will administer next treatment.

## 2019-12-11 NOTE — H&P
History & Physical    Patient: Dariusz Gates MRN: [id number]  CSN: [id number]    YOB: 1965  Age: 53 y.o. Sex: female      DOA: 12/10/2019  Primary Care Provider:  Marsha Alex MD      Assessment/Plan     Patient Active Problem List   Diagnosis Code    HTN (hypertension) I10    Morbid obesity with BMI of 40.0-44.9, adult ([facility] [address] E66.01, Z68.41    Type II or unspecified type diabetes mellitus with renal manifestations, uncontrolled(250.42) ([address] E11.29, E11.65    Morbid obesity ([address] R59.29    Diastolic CHF, acute ([address] I50.31    Hypoglycemia E16.2    Pneumonia J18.9    Respiratory distress R06.03    Asthma exacerbation J45. 112    Diastolic CHF (HCC) Z36.72    BMI 45.0-49.9, adult (HCC) Z68.42    АНДРЕЙ (obstructive sleep apnea) G47.33    UTI (urinary tract infection) N39.0     54 y/o female with hx of АНДРЕЙ, morbid obesity, asthma, diastolic CHF, and HTN who is admitted for asthma exacerbation and also found to have a UTI and mild CHF exacerbation.     Asthma exacerbation  -solumedrol 125 mg IV q8 hrs  -duonebs q4 hrs prn SOB  -tylenol w/ codeine and mucinex for symptomatic relief    UTI  -rocephin 1 g q24 hrs will cover UTI and any respiratory pathogens    Diastolic CHF  -telemetry  -lasix 40 mg IV BID to help reduce leg swelling  -echo ordered to reassess EF  -SSI and home insulin regimen, f/u A1C  -f/u pro-BNP    АНДРЕЙ  -CPAP    Prophylaxis: SCDs, protonix IV, heparin SQ    Estimated length of stay : 2-3 nights    Jill Levine MD  Nocturnist  ------------------------------------------------------------------------------------------------------------------------------------------------------------------------------------------  CC: SOB       HPI:     Dariusz Gates is a 53 y.o. female who has a hx of АНДРЕЙ (CPAP not at home yet), poorly controlled DMT2, morbid obesity, diastolic CHF, and HTN who presents with 3 week history of illness with worsening respiratory distress and cough over the last 2 days. She has been sleeping in a chair and also can't walk to the bathroom without becoming SOB. She has also noticed some leg swelling over the past few days. No fevers, not on home O2. She has had some nausea but no vomiting today and denies frequent UTIs. Her daughter lives with her and is at bedside. No history of intubations for her asthma. Past Medical History:   Diagnosis Date    Asthma     Note: As child had asthma    Diabetes mellitus ([facility] [address]     Type 2    Heart palpitations     Hypertension     Ill-defined condition     fibromyalgia     Morbid obesity ([facility] [address]     MRSA infection 8/2014       Past Surgical History:   Procedure Laterality Date    BREAST SURGERY PROCEDURE UNLISTED      cyst removed    HX CATARACT REMOVAL      HX CHOLECYSTECTOMY      HX TUBAL LIGATION         Family History   Problem Relation Age of Onset    Heart Attack Mother     Heart Attack Maternal Grandmother        Social History     Socioeconomic History    Marital status: SINGLE     Spouse name: Not on file    Number of children: Not on file    Years of education: Not on file    Highest education level: Not on file   Tobacco Use    Smoking status: Never Smoker    Smokeless tobacco: Never Used   Substance and Sexual Activity    Alcohol use: No    Drug use: No    Sexual activity: Never     Partners: Male       Prior to Admission medications    Medication Sig Start Date End Date Taking? Authorizing Provider   amLODIPine (NORVASC) 5 mg tablet Take 5 mg by mouth daily. Yoana Crocker MD   hydrALAZINE (APRESOLINE) 25 mg tablet Take 25 mg by mouth three (3) times daily. Yoana Crocker MD   predniSONE (STERAPRED) 5 mg dose pack See administration instruction per 5mg dose pack 1/24/19   Regino Mcarthur MD   albuterol sulfate 90 mcg/actuation aepb Take 1-2 Puffs by inhalation every four (4) hours as needed. 1/24/19   Regino Mcarthur MD   guaiFENesin ER (MUCINEX) 600 mg ER tablet Take 1 Tab by mouth every twelve (12) hours. 1/24/19   Linda Moore MD   metFORMIN (GLUCOPHAGE) 1,000 mg tablet Take 1,000 mg by mouth two (2) times daily (with meals). Yoana Crocker MD   metoprolol succinate (TOPROL-XL) 50 mg XL tablet Take 100 mg by mouth daily. Yoana Crocker MD   DULoxetine (CYMBALTA) 30 mg capsule Take 60 mg by mouth daily. Yoana Crocker MD   atorvastatin (LIPITOR) 40 mg tablet Take 40 mg by mouth daily. Yoana Crocker MD   losartan (COZAAR) 100 mg tablet Take 100 mg by mouth daily. Yoana Crocker MD   OTHER,NON-FORMULARY, Medication for irregular heart beat    Yoana Crocker MD   gabapentin (NEURONTIN) 300 mg capsule Take 300 mg by mouth three (3) times daily. Yoana Crocker MD   furosemide (LASIX) 40 mg tablet Take 1 Tab by mouth daily. Patient taking differently: Take 40 mg by mouth two (2) times a day. 2/13/17   Shital Negro MD   insulin NPH/insulin regular (NOVOLIN 70/30) 100 unit/mL (70-30) injection 50 Units by SubCUTAneous route every twelve (12) hours. Provider, Historical   aspirin 81 mg chewable tablet Take 1 tablet by mouth daily. 8/20/14   Leighton Claire MD       Allergies   Allergen Reactions    Bees [Sting, Bee] Swelling    Penicillins Hives    [place] Hives       Review of Systems  Gen: No fever, chills, malaise, weight loss/gain. Heent: No headache, rhinorrhea, epistaxis, ear pain, hearing loss, sinus pain, neck pain/stiffness, sore throat. Heart: No chest pain, palpitations, HORAN, +pnd and orthopnea. Resp: +cough, wheezing and shortness of breath. GI: +nausea, no vomiting, diarrhea, constipation, melena or hematochezia. : No urinary obstruction, dysuria or hematuria. Derm: No rash, new skin lesion or pruritis. Musc/skeletal: no bone or joint complaints. Vasc: No edema, cyanosis or claudication. Endo: No heat/cold intolerance, no polyuria,polydipsia or polyphagia. Neuro: No unilateral weakness, numbness, tingling. No seizures. Heme: No easy bruising or bleeding.           Physical Exam:     Physical Exam:  Visit Vitals  /69 (BP 1 Location: Left arm, BP Patient Position: Sitting)   Pulse 99   Temp 97.9 °F (36.6 °C)   Resp 28   Ht 5' 2\" (1.575 m)   Wt 140.2 kg (309 lb)   SpO2 96%   BMI 56.52 kg/m²      O2 Device: Room air    Temp (24hrs), Av.2 °F (36.8 °C), Min:97.8 °F (36.6 °C), Max:98.8 °F (37.1 °C)    12/10 1901 -  0700  In: 100 [I.V.:100]  Out: -    No intake/output data recorded. General:  Awake, cooperative, tachypneic but can speak in full sentences, sitting upright in bed. Head:  Normocephalic, without obvious abnormality, atraumatic. Eyes:  Conjunctivae/corneas clear, sclera anicteric. Neck: Supple, symmetrical, trachea midline, no adenopathy. Lungs:   Isolated wheezes present b/l, no rales/rhonchi. Heart:  Regular rate and rhythm, S1, S2 normal, no murmur, click, rub or gallop. Abdomen: Soft, non-tender. Bowel sounds normal. No masses,  No organomegaly. Extremities: Extremities normal, atraumatic, no cyanosis. 2+ pitting edema in b/l LE. Capillary refill normal.   Pulses: 2+ and symmetric all extremities. Skin: Skin color pink, turgor normal. No rashes or lesions   Neurologic: No focal motor or sensory deficit. Labs Reviewed: All lab results for the last 24 hours reviewed.   Recent Results (from the past 24 hour(s))   EKG, 12 LEAD, INITIAL    Collection Time: 12/10/19 10:55 PM   Result Value Ref Range    Ventricular Rate 95 BPM    Atrial Rate 95 BPM    P-R Interval 130 ms    QRS Duration 78 ms    Q-T Interval 352 ms    QTC Calculation (Bezet) 442 ms    Calculated P Axis 71 degrees    Calculated R Axis 64 degrees    Calculated T Axis 65 degrees    Diagnosis       Normal sinus rhythm  Low voltage QRS  Nonspecific ST abnormality  Abnormal ECG     INFLUENZA A & B AG (RAPID TEST)    Collection Time: 12/10/19 11:05 PM   Result Value Ref Range    Influenza A Antigen NEGATIVE  NEG      Influenza B Antigen NEGATIVE  NEG     CBC WITH AUTOMATED DIFF Collection Time: 12/10/19 11:40 PM   Result Value Ref Range    WBC 6.3 4.6 - 13.2 K/uL    RBC 3.60 (L) 4.20 - 5.30 M/uL    HGB 9.0 (L) 12.0 - 16.0 g/dL    HCT 29.4 (L) 35.0 - 45.0 %    MCV 81.7 74.0 - 97.0 FL    MCH 25.0 24.0 - 34.0 PG    MCHC 30.6 (L) 31.0 - 37.0 g/dL    RDW 15.5 (H) 11.6 - 14.5 %    PLATELET 391 484 - 360 K/uL    MPV 9.0 (L) 9.2 - 11.8 FL    NEUTROPHILS 52 40 - 73 %    LYMPHOCYTES 32 21 - 52 %    MONOCYTES 10 3 - 10 %    EOSINOPHILS 5 0 - 5 %    BASOPHILS 1 0 - 2 %    ABS. NEUTROPHILS 3.3 1.8 - 8.0 K/UL    ABS. LYMPHOCYTES 2.0 0.9 - 3.6 K/UL    ABS. MONOCYTES 0.6 0.05 - 1.2 K/UL    ABS. EOSINOPHILS 0.3 0.0 - 0.4 K/UL    ABS. BASOPHILS 0.0 0.0 - 0.1 K/UL    DF AUTOMATED     METABOLIC PANEL, COMPREHENSIVE    Collection Time: 12/10/19 11:40 PM   Result Value Ref Range    Sodium 143 136 - 145 mmol/L    Potassium 4.2 3.5 - 5.5 mmol/L    Chloride 110 100 - 111 mmol/L    CO2 24 21 - 32 mmol/L    Anion gap 9 3.0 - 18 mmol/L    Glucose 284 (H) 74 - 99 mg/dL    BUN 38 (H) 7.0 - 18 MG/DL    Creatinine 1.66 (H) 0.6 - 1.3 MG/DL    BUN/Creatinine ratio 23 (H) 12 - 20      GFR est AA 39 (L) >60 ml/min/1.73m2    GFR est non-AA 32 (L) >60 ml/min/1.73m2    Calcium 8.5 8.5 - 10.1 MG/DL    Bilirubin, total 0.3 0.2 - 1.0 MG/DL    ALT (SGPT) 16 13 - 56 U/L    AST (SGOT) 14 10 - 38 U/L    Alk.  phosphatase 109 45 - 117 U/L    Protein, total 6.0 (L) 6.4 - 8.2 g/dL    Albumin 2.4 (L) 3.4 - 5.0 g/dL    Globulin 3.6 2.0 - 4.0 g/dL    A-G Ratio 0.7 (L) 0.8 - 1.7     CARDIAC PANEL,(CK, CKMB & TROPONIN)    Collection Time: 12/10/19 11:40 PM   Result Value Ref Range    CK 44 26 - 192 U/L    CK - MB <1.0 <3.6 ng/ml    CK-MB Index  0.0 - 4.0 %     CALCULATION NOT PERFORMED WHEN RESULT IS BELOW LINEAR LIMIT    Troponin-I, QT <0.02 0.0 - 0.045 NG/ML   POC LACTIC ACID    Collection Time: 12/10/19 11:52 PM   Result Value Ref Range    Lactic Acid (POC) 0.81 0.40 - 2.00 mmol/L   URINALYSIS W/ RFLX MICROSCOPIC    Collection Time: 12/11/19  2:30 AM   Result Value Ref Range    Color YELLOW      Appearance CLOUDY      Specific gravity 1.021 1.005 - 1.030      pH (UA) 5.0 5.0 - 8.0      Protein >1,000 (A) NEG mg/dL    Glucose >1,000 (A) NEG mg/dL    Ketone 15 (A) NEG mg/dL    Bilirubin NEGATIVE  NEG      Blood SMALL (A) NEG      Urobilinogen 0.2 0.2 - 1.0 EU/dL    Nitrites NEGATIVE  NEG      Leukocyte Esterase NEGATIVE  NEG     URINE MICROSCOPIC ONLY    Collection Time: 12/11/19  2:30 AM   Result Value Ref Range    WBC 40 to 50 0 - 5 /hpf    RBC 4 to 6 0 - 5 /hpf    Epithelial cells 2 to 3 0 - 5 /lpf    Bacteria 3+ (A) NEG /hpf       Results   XR CHEST PORT (Accession [id number]) (Order [id number])   Allergies      High: Bees [Sting, Bee]; Penicillins   Medium: Strawberry   Exam Information     Status Exam Begun  Exam Ended    Final [99] 12/10/2019 22:56 12/10/2019 23:07   Result Information     Status: Final result (Exam End: 12/10/2019 23:07) Provider Status: Open   Study Result     ---------------------------------------------------------------------------  <<<<<<<<<           [facility]           >>>>>>>>>   ---------------------------------------------------------------------------     CLINICAL HISTORY:  Cough.     COMPARISON EXAMINATIONS:  January 23, 2019.        ---  SINGLE FRONTAL VIEW OF THE CHEST  ---     AP portable technique as well as patient body habitus limits evaluation. There  appears to be bronchial thickening. There is no focal consolidation or evidence  for significant pleural effusion. The cardiomediastinal silhouette is stable. No  significant osseous abnormalities are identified.          --------------  IMPRESSION  Impression:  --------------     Somewhat limited study due to AP portable technique and patient body habitus.     There appears to be bronchial thickening which may be seen with bronchitis.     No focal consolidation or pleural effusion.

## 2019-12-11 NOTE — CDMP QUERY
Pt admitted withAsthma exacerbation. Pt noted to have Diastolic CHF, acute in the H&P, also noted to have a PMH of diastolic CHF. If possible, please further clarify and document in progress notes and d/c summary if you are evaluating and /or treating any of the following: ? Acute on Chronic Diastolic CHF ? Acute Diastolic CHF only ? Chronic Diastolic CHF 
? Other, please specify ? Clinically unable to determine The medical record reflects the following: 
 
  Risk Factors: PMH diastolic CHF Clinical Indicators: Dyspnea o2 sats 92%, 95% RA, RR 30, 37, 28 Treatment: Receiving IV lasix Thank you, Lisandro Mclain, RN, BSN, [address] [place] [address] 
[phone]

## 2019-12-11 NOTE — ROUTINE PROCESS
Assume care of patient from [address] (ED). Patient received in bed awake. Patient A&Ox4, denies pain and discomfort. No distress noted. Daughter present at bedside Frequently use items within reach. Bed locked in low position. Call bell within reach and patient verbalized understanding of use for assistance and needs. 2861- BG checked at 525, rechecked immediately after at 464. Called and informed Dr. Rafia Oropeza, at bedside patient is awake and alert. Asymptomatic. MD stated to give one time dose of 10 units of Regular Insulin and give sliding scale Lispro according to BG as well. Also stated to recheck patient's BG at 0900 instead of scheduled time at 1130, and that a long acting will be ordered to be given. RBV. Will communicate to on coming shift. 1445- Bedside and Verbal shift change report given to Edgar Walker (oncoming nurse) by Karla Tellez RN (offgoing nurse). Report included the following information SBAR, Kardex, Intake/Output, MAR and Recent Results.

## 2019-12-11 NOTE — PROGRESS NOTES
Problem: Diabetes Self-Management  Goal: *Disease process and treatment process  Description  Define diabetes and identify own type of diabetes; list 3 options for treating diabetes. Outcome: Progressing Towards Goal  Goal: *Incorporating nutritional management into lifestyle  Description  Describe effect of type, amount and timing of food on blood glucose; list 3 methods for planning meals. Outcome: Progressing Towards Goal  Goal: *Incorporating physical activity into lifestyle  Description  State effect of exercise on blood glucose levels. Outcome: Progressing Towards Goal  Goal: *Developing strategies to promote health/change behavior  Description  Define the ABC's of diabetes; identify appropriate screenings, schedule and personal plan for screenings. Outcome: Progressing Towards Goal  Goal: *Using medications safely  Description  State effect of diabetes medications on diabetes; name diabetes medication taking, action and side effects. Outcome: Progressing Towards Goal  Goal: *Monitoring blood glucose, interpreting and using results  Description  Identify recommended blood glucose targets  and personal targets. Outcome: Progressing Towards Goal  Goal: *Prevention, detection, treatment of acute complications  Description  List symptoms of hyper- and hypoglycemia; describe how to treat low blood sugar and actions for lowering  high blood glucose level. Outcome: Progressing Towards Goal  Goal: *Prevention, detection and treatment of chronic complications  Description  Define the natural course of diabetes and describe the relationship of blood glucose levels to long term complications of diabetes.   Outcome: Progressing Towards Goal  Goal: *Developing strategies to address psychosocial issues  Description  Describe feelings about living with diabetes; identify support needed and support network  Outcome: Progressing Towards Goal  Goal: *Insulin pump training  Outcome: Progressing Towards Goal  Goal: *Sick day guidelines  Outcome: Progressing Towards Goal  Goal: *Patient Specific Goal (EDIT GOAL, INSERT TEXT)  Outcome: Progressing Towards Goal     Problem: Patient Education: Go to Patient Education Activity  Goal: Patient/Family Education  Outcome: Progressing Towards Goal     Problem: Falls - Risk of  Goal: *Absence of Falls  Description  Document Radha Stade Fall Risk and appropriate interventions in the flowsheet. Outcome: Progressing Towards Goal  Note: Fall Risk Interventions:            Medication Interventions: Bed/chair exit alarm, Patient to call before getting OOB, Teach patient to arise slowly                   Problem: Patient Education: Go to Patient Education Activity  Goal: Patient/Family Education  Outcome: Progressing Towards Goal     Problem: Pain  Goal: *Control of Pain  Outcome: Progressing Towards Goal  Goal: *PALLIATIVE CARE:  Alleviation of Pain  Outcome: Progressing Towards Goal     Problem: Patient Education: Go to Patient Education Activity  Goal: Patient/Family Education  Outcome: Progressing Towards Goal     Problem: Pressure Injury - Risk of  Goal: *Prevention of pressure injury  Description  Document Elian Scale and appropriate interventions in the flowsheet. Outcome: Progressing Towards Goal  Note: Pressure Injury Interventions:             Activity Interventions: Increase time out of bed, Pressure redistribution bed/mattress(bed type), PT/OT evaluation                               Problem: Patient Education: Go to Patient Education Activity  Goal: Patient/Family Education  Outcome: Progressing Towards Goal     Problem: Gas Exchange - Impaired  Goal: *Absence of hypoxia  Outcome: Progressing Towards Goal     Problem: Patient Education: Go to Patient Education Activity  Goal: Patient/Family Education  Outcome: Progressing Towards Goal

## 2019-12-11 NOTE — ED PROVIDER NOTES
EMERGENCY DEPARTMENT HISTORY AND PHYSICAL EXAM    Date: 12/10/2019  Patient Name: Matilde Wilson    History of Presenting Illness     Chief Complaint   Patient presents with    Wheezing    Cough         History Provided By: Patient    [id number]  Matilde Wilson is a 53 y.o. female with PMHX of asthma, diabetes, hypertension, morbid obesity who presents to the emergency department C/O shortness of breath and cough. Patient has been having intractable coughing since yesterday that is associated with wheezing. No fever or chest pain. She does not smoke. She is not on home o2. Patient reports CPAP machine is ordered but has not arrived at her house yet.     PCP: Susan Antoine MD    Current Facility-Administered Medications   Medication Dose Route Frequency Provider Last Rate Last Dose    sodium chloride (NS) flush 5-40 mL  5-40 mL IntraVENous Q8H Lucina Vaughan MD        sodium chloride (NS) flush 5-40 mL  5-40 mL IntraVENous PRN Lucina Vaughan MD        azithromycin (ZITHROMAX) 500 mg in 0.9% sodium chloride 250 mL (VIAL-MATE)  500 mg IntraVENous Q24H Lucina Vaughan MD        albuterol-ipratropium (DUO-NEB) 2.5 MG-0.5 MG/3 ML  3 mL Nebulization Q4H RT Lucina Vaughan MD        methylPREDNISolone (PF) (Solu-MEDROL) injection 125 mg  125 mg IntraVENous Q8H Lucina Vaughan MD        insulin lispro (HUMALOG) injection   SubCUTAneous AC&HS Lucina Vaughan MD        glucose chewable tablet 16 g  16 g Oral PRN Lucina Vaughan MD        glucagon (GLUCAGEN) injection 1 mg  1 mg IntraMUSCular PRN Lucina Vaughan MD        dextrose 10% infusion 125-250 mL  125-250 mL IntraVENous PRN Lucina Vaughan MD        aspirin chewable tablet 81 mg  81 mg Oral DAILY Lucina Vaughan MD        atorvastatin (LIPITOR) tablet 40 mg  40 mg Oral DAILY Lucina Vaughan MD        DULoxetine (CYMBALTA) capsule 60 mg  60 mg Oral DAILY Lucina Vaughan MD        furosemide (LASIX) tablet 40 mg  40 mg Oral BID Leeroy Cleaning MD        gabapentin (NEURONTIN) capsule 300 mg  300 mg Oral TID Leeroy Cleaning MD        . PHARMACY TO SUBSTITUTE PER PROTOCOL (Reordered from: insulin NPH/insulin regular (NOVOLIN 70/30) 100 unit/mL (70-30) injection)    Per Protocol Leeroy Cleaning MD        metoprolol succinate (TOPROL-XL) XL tablet 100 mg  100 mg Oral DAILY Leeroy Cleaning MD        amLODIPine (NORVASC) tablet 5 mg  5 mg Oral DAILY Leeroy Cleaning MD        hydrALAZINE (APRESOLINE) tablet 25 mg  25 mg Oral TID Leeroy Cleaning MD        losartan (COZAAR) tablet 100 mg  100 mg Oral DAILY Leeroy Cleaning MD        cefTRIAXone (ROCEPHIN) 1 g in sterile water (preservative free) 10 mL IV syringe  1 g IntraVENous NOW Matt Palma MD         Current Outpatient Medications   Medication Sig Dispense Refill    amLODIPine (NORVASC) 5 mg tablet Take 5 mg by mouth daily.  hydrALAZINE (APRESOLINE) 25 mg tablet Take 25 mg by mouth three (3) times daily.  predniSONE (STERAPRED) 5 mg dose pack See administration instruction per 5mg dose pack 21 Tab 0    albuterol sulfate 90 mcg/actuation aepb Take 1-2 Puffs by inhalation every four (4) hours as needed. 1 Inhaler 0    guaiFENesin ER (MUCINEX) 600 mg ER tablet Take 1 Tab by mouth every twelve (12) hours. 10 Tab 0    metFORMIN (GLUCOPHAGE) 1,000 mg tablet Take 1,000 mg by mouth two (2) times daily (with meals).  metoprolol succinate (TOPROL-XL) 50 mg XL tablet Take 100 mg by mouth daily.  DULoxetine (CYMBALTA) 30 mg capsule Take 60 mg by mouth daily.  atorvastatin (LIPITOR) 40 mg tablet Take 40 mg by mouth daily.  losartan (COZAAR) 100 mg tablet Take 100 mg by mouth daily.  OTHER,NON-FORMULARY, Medication for irregular heart beat      gabapentin (NEURONTIN) 300 mg capsule Take 300 mg by mouth three (3) times daily.       furosemide (LASIX) 40 mg tablet Take 1 Tab by mouth daily. (Patient taking differently: Take 40 mg by mouth two (2) times a day.) 45 Tab 1    insulin NPH/insulin regular (NOVOLIN 70/30) 100 unit/mL (70-30) injection 50 Units by SubCUTAneous route every twelve (12) hours.  aspirin 81 mg chewable tablet Take 1 tablet by mouth daily. 30 tablet 1       Past History     Past Medical History:  Past Medical History:   Diagnosis Date    Asthma     Note: As child had asthma    Diabetes mellitus ([facility] [address]     Type 2    Heart palpitations     Hypertension     Ill-defined condition     fibromyalgia     Morbid obesity ([facility] [address]     MRSA infection 8/2014       Past Surgical History:  Past Surgical History:   Procedure Laterality Date    BREAST SURGERY PROCEDURE UNLISTED      cyst removed    HX CATARACT REMOVAL      HX CHOLECYSTECTOMY      HX TUBAL LIGATION         Family History:  Family History   Problem Relation Age of Onset    Heart Attack Mother     Heart Attack Maternal Grandmother        Social History:  Social History     Tobacco Use    Smoking status: Never Smoker    Smokeless tobacco: Never Used   Substance Use Topics    Alcohol use: No    Drug use: No       Allergies: Allergies   Allergen Reactions    Bees [Sting, Bee] Swelling    Penicillins Hives    Burlison Hives         Review of Systems   Review of Systems   Constitutional: Negative for chills and fever. Respiratory: Positive for cough, chest tightness and wheezing. Cardiovascular: Positive for leg swelling (chronic). Negative for chest pain. Gastrointestinal: Negative for abdominal pain, diarrhea, nausea and vomiting. All other systems reviewed and are negative.         Physical Exam     Vitals:    12/11/19 0100 12/11/19 0130 12/11/19 0215 12/11/19 0252   BP: 170/66 173/77  153/63   Pulse: 99 (!) 103 (!) 107 (!) 104   Resp: 28 (!) 37 30 21   Temp:    98.8 °F (37.1 °C)   SpO2: 97% 95% 96% 95%   Weight:       Height:         Physical Exam    Nursing notes and vital signs reviewed    Constitutional: Non toxic appearing, coughing repeatedly  Head: Normocephalic, Atraumatic  Eyes: Pupils are equal, round, and reactive to light, EOMI  Neck: Supple  Cardiovascular: Regular rate and rhythm  Chest: Tachypnea secondary to coughing,  Lungs: Expiratory wheezes  Abdomen: Soft, non tender, non distended,  Back: No evidence of trauma or deformity  Extremities: No evidence of trauma or deformity, +2 edema LE edema  Skin: Warm and dry, normal cap refill  Neuro: Alert and appropriate, CN intact, normal speech      Diagnostic Study Results     Labs -     Recent Results (from the past 12 hour(s))   EKG, 12 LEAD, INITIAL    Collection Time: 12/10/19 10:55 PM   Result Value Ref Range    Ventricular Rate 95 BPM    Atrial Rate 95 BPM    P-R Interval 130 ms    QRS Duration 78 ms    Q-T Interval 352 ms    QTC Calculation (Bezet) 442 ms    Calculated P Axis 71 degrees    Calculated R Axis 64 degrees    Calculated T Axis 65 degrees    Diagnosis       Normal sinus rhythm  Low voltage QRS  Nonspecific ST abnormality  Abnormal ECG     INFLUENZA A & B AG (RAPID TEST)    Collection Time: 12/10/19 11:05 PM   Result Value Ref Range    Influenza A Antigen NEGATIVE  NEG      Influenza B Antigen NEGATIVE  NEG     CBC WITH AUTOMATED DIFF    Collection Time: 12/10/19 11:40 PM   Result Value Ref Range    WBC 6.3 4.6 - 13.2 K/uL    RBC 3.60 (L) 4.20 - 5.30 M/uL    HGB 9.0 (L) 12.0 - 16.0 g/dL    HCT 29.4 (L) 35.0 - 45.0 %    MCV 81.7 74.0 - 97.0 FL    MCH 25.0 24.0 - 34.0 PG    MCHC 30.6 (L) 31.0 - 37.0 g/dL    RDW 15.5 (H) 11.6 - 14.5 %    PLATELET 232 592 - 292 K/uL    MPV 9.0 (L) 9.2 - 11.8 FL    NEUTROPHILS 52 40 - 73 %    LYMPHOCYTES 32 21 - 52 %    MONOCYTES 10 3 - 10 %    EOSINOPHILS 5 0 - 5 %    BASOPHILS 1 0 - 2 %    ABS. NEUTROPHILS 3.3 1.8 - 8.0 K/UL    ABS. LYMPHOCYTES 2.0 0.9 - 3.6 K/UL    ABS. MONOCYTES 0.6 0.05 - 1.2 K/UL    ABS. EOSINOPHILS 0.3 0.0 - 0.4 K/UL    ABS.  BASOPHILS 0.0 0.0 - 0.1 K/UL    DF AUTOMATED     METABOLIC PANEL, COMPREHENSIVE    Collection Time: 12/10/19 11:40 PM   Result Value Ref Range    Sodium 143 136 - 145 mmol/L    Potassium 4.2 3.5 - 5.5 mmol/L    Chloride 110 100 - 111 mmol/L    CO2 24 21 - 32 mmol/L    Anion gap 9 3.0 - 18 mmol/L    Glucose 284 (H) 74 - 99 mg/dL    BUN 38 (H) 7.0 - 18 MG/DL    Creatinine 1.66 (H) 0.6 - 1.3 MG/DL    BUN/Creatinine ratio 23 (H) 12 - 20      GFR est AA 39 (L) >60 ml/min/1.73m2    GFR est non-AA 32 (L) >60 ml/min/1.73m2    Calcium 8.5 8.5 - 10.1 MG/DL    Bilirubin, total 0.3 0.2 - 1.0 MG/DL    ALT (SGPT) 16 13 - 56 U/L    AST (SGOT) 14 10 - 38 U/L    Alk.  phosphatase 109 45 - 117 U/L    Protein, total 6.0 (L) 6.4 - 8.2 g/dL    Albumin 2.4 (L) 3.4 - 5.0 g/dL    Globulin 3.6 2.0 - 4.0 g/dL    A-G Ratio 0.7 (L) 0.8 - 1.7     CARDIAC PANEL,(CK, CKMB & TROPONIN)    Collection Time: 12/10/19 11:40 PM   Result Value Ref Range    CK 44 26 - 192 U/L    CK - MB <1.0 <3.6 ng/ml    CK-MB Index  0.0 - 4.0 %     CALCULATION NOT PERFORMED WHEN RESULT IS BELOW LINEAR LIMIT    Troponin-I, QT <0.02 0.0 - 0.045 NG/ML   POC LACTIC ACID    Collection Time: 12/10/19 11:52 PM   Result Value Ref Range    Lactic Acid (POC) 0.81 0.40 - 2.00 mmol/L   URINALYSIS W/ RFLX MICROSCOPIC    Collection Time: 12/11/19  2:30 AM   Result Value Ref Range    Color YELLOW      Appearance CLOUDY      Specific gravity 1.021 1.005 - 1.030      pH (UA) 5.0 5.0 - 8.0      Protein >1,000 (A) NEG mg/dL    Glucose >1,000 (A) NEG mg/dL    Ketone 15 (A) NEG mg/dL    Bilirubin NEGATIVE  NEG      Blood SMALL (A) NEG      Urobilinogen 0.2 0.2 - 1.0 EU/dL    Nitrites NEGATIVE  NEG      Leukocyte Esterase NEGATIVE  NEG     URINE MICROSCOPIC ONLY    Collection Time: 12/11/19  2:30 AM   Result Value Ref Range    WBC 40 to 50 0 - 5 /hpf    RBC 4 to 6 0 - 5 /hpf    Epithelial cells 2 to 3 0 - 5 /lpf    Bacteria 3+ (A) NEG /hpf       Radiologic Studies -   XR CHEST PORT   Final Result   Impression:   -------------- Somewhat limited study due to AP portable technique and patient body habitus. There appears to be bronchial thickening which may be seen with bronchitis. No focal consolidation or pleural effusion. CT Results  (Last 48 hours)    None        CXR Results  (Last 48 hours)               12/10/19 2307  XR CHEST PORT Final result    Impression:  Impression:   --------------       Somewhat limited study due to AP portable technique and patient body habitus. There appears to be bronchial thickening which may be seen with bronchitis. No focal consolidation or pleural effusion. Narrative:  ---------------------------------------------------------------------------   <<<<<<<<<           [facility]           >>>>>>>>>    ---------------------------------------------------------------------------       CLINICAL HISTORY:  Cough. COMPARISON EXAMINATIONS:  January 23, 2019.           ---  SINGLE FRONTAL VIEW OF THE CHEST  ---       AP portable technique as well as patient body habitus limits evaluation. There   appears to be bronchial thickening. There is no focal consolidation or evidence   for significant pleural effusion. The cardiomediastinal silhouette is stable.  No   significant osseous abnormalities are identified.             --------------             Medications given in the ED-  Medications   sodium chloride (NS) flush 5-40 mL (has no administration in time range)   sodium chloride (NS) flush 5-40 mL (has no administration in time range)   azithromycin (ZITHROMAX) 500 mg in 0.9% sodium chloride 250 mL (VIAL-MATE) (has no administration in time range)   albuterol-ipratropium (DUO-NEB) 2.5 MG-0.5 MG/3 ML (has no administration in time range)   methylPREDNISolone (PF) (Solu-MEDROL) injection 125 mg (has no administration in time range)   insulin lispro (HUMALOG) injection (has no administration in time range)   glucose chewable tablet 16 g (has no administration in time range)   glucagon (GLUCAGEN) injection 1 mg (has no administration in time range)   dextrose 10% infusion 125-250 mL (has no administration in time range)   aspirin chewable tablet 81 mg (has no administration in time range)   atorvastatin (LIPITOR) tablet 40 mg (has no administration in time range)   DULoxetine (CYMBALTA) capsule 60 mg (has no administration in time range)   furosemide (LASIX) tablet 40 mg (has no administration in time range)   gabapentin (NEURONTIN) capsule 300 mg (has no administration in time range)   . PHARMACY TO SUBSTITUTE PER PROTOCOL (Reordered from: insulin NPH/insulin regular (NOVOLIN 70/30) 100 unit/mL (70-30) injection) (has no administration in time range)   metoprolol succinate (TOPROL-XL) XL tablet 100 mg (has no administration in time range)   amLODIPine (NORVASC) tablet 5 mg (has no administration in time range)   hydrALAZINE (APRESOLINE) tablet 25 mg (has no administration in time range)   losartan (COZAAR) tablet 100 mg (has no administration in time range)   cefTRIAXone (ROCEPHIN) 1 g in sterile water (preservative free) 10 mL IV syringe (has no administration in time range)   albuterol-ipratropium (DUO-NEB) 2.5 MG-0.5 MG/3 ML (3 mL Nebulization Given 12/11/19 0004)   albuterol-ipratropium (DUO-NEB) 2.5 MG-0.5 MG/3 ML (3 mL Nebulization Given 12/11/19 0038)   albuterol-ipratropium (DUO-NEB) 2.5 MG-0.5 MG/3 ML (3 mL Nebulization Given 12/11/19 0035)   methylPREDNISolone (PF) (Solu-MEDROL) injection 125 mg (125 mg IntraVENous Given 12/11/19 0005)   benzonatate (TESSALON) capsule 100 mg (100 mg Oral Given 12/11/19 0004)   magnesium sulfate 1 g/100 ml IVPB (premix or compounded) (0 g IntraVENous IV Completed 12/11/19 0055)   albuterol CONCENTRATE 2.5mg/0.5 mL neb soln (5 mg Nebulization Given 12/11/19 0139)   furosemide (LASIX) injection 40 mg (40 mg IntraVENous Given 12/11/19 0142)         Medical Decision Making   I am the first provider for this patient. I reviewed the vital signs, available nursing notes, past medical history, past surgical history, family history and social history. Vital Signs-Reviewed the patient's vital signs. Pulse Oximetry Analysis - 95% on RA     Cardiac Monitor:  Rate: 99 bpm  Rhythm: NSR    EKG interpretation: (Preliminary)  EKG read by Dr. Jada Barahona at 2330   Normal sinus rhythm, rate of 95, non-specific ST abnormality, nonspecific EKG    Records Reviewed: Nursing Notes, Old Medical Records and Previous electrocardiograms    Provider Notes (Medical Decision Making): Naida Rosales is a 53 y.o. female of asthma is presenting with intractable coughing and wheezing. Likely asthma exacerbation well check chest x-ray. Patient ordered duo nebs, Solu-Medrol, magnesium    Procedures:  Procedures    ED Course:   2:17 AM  Patient given duo nebs, Solu-Medrol, magnesium, and albuterol with improvement of coughing. She does however still remain tachypneic and was unable to do ambulatory pulse ox challenge secondary to shortness of breath. I have also given patient Lasix as she has an element of diastolic CHF. As the patient is still having increased work of breathing I will discuss with hospitalist for admission for asthma exacerbation. CONSULT NOTE:   2:30 AM   I discussed care with Dr Heri Blum It was a standard discussion, including history of patients chief complaint, available diagnostic results, and treatment course. Discussed case. Will admit for dyspnea/asthma exacerbation. 3:03 AM  Urine resulted and demonstrates UTI - will cover with ceftriaxone. Culture ordered. Diagnosis and Disposition     Critical Care Time:     Core Measures:  For Hospitalized Patients:    1. Hospitalization Decision Time:  The decision to hospitalize the patient was made by Dr Maria G Mckinney on 12/10/2019    2.  Aspirin: Aspirin was not given because the patient did not present with a stroke at the time of their Emergency Department evaluation    3:00 AM  Patient is being admitted to the hospital by Dr. Elie Hatch. The results of their tests and reasons for their admission have been discussed with them and/or available family. They convey agreement and understanding for the need to be admitted and for their admission diagnosis. CONDITIONS ON ADMISSION:  Sepsis is not present at the time of admission. Deep Vein Thrombosis is not present at the time of admission. Thrombosis is not present at the time of admission. Urinary Tract Infection is present at the time of admission. Pneumonia is not present at the time of admission. MRSA is not present at the time of admission. Wound infection is not present at the time of admission. Pressure Ulcer is not present at the time of admission. CLINICAL IMPRESSION:    1. Asthma with acute exacerbation, unspecified asthma severity, unspecified whether persistent    2. Sleep apnea, unspecified type    3. Cough    4. Urinary tract infection without hematuria, site unspecified    5. Anemia, unspecified type      _______________________________      Please note that this dictation was completed with [employer], the computer voice recognition software. Quite often unanticipated grammatical, syntax, homophones, and other interpretive errors are inadvertently transcribed by the computer software. Please disregard these errors. Please excuse any errors that have escaped final proofreading.

## 2019-12-12 LAB
ADMINISTERED INITIALS, ADMINIT: NORMAL
ALBUMIN SERPL-MCNC: 2.7 G/DL (ref 3.4–5)
ALBUMIN/GLOB SERPL: 0.7 {RATIO} (ref 0.8–1.7)
ALP SERPL-CCNC: 105 U/L (ref 45–117)
ALT SERPL-CCNC: 23 U/L (ref 13–56)
ANION GAP SERPL CALC-SCNC: 10 MMOL/L (ref 3–18)
ANION GAP SERPL CALC-SCNC: 10 MMOL/L (ref 3–18)
ANION GAP SERPL CALC-SCNC: 8 MMOL/L (ref 3–18)
AST SERPL-CCNC: 20 U/L (ref 10–38)
BILIRUB SERPL-MCNC: 0.1 MG/DL (ref 0.2–1)
BUN SERPL-MCNC: 52 MG/DL (ref 7–18)
BUN SERPL-MCNC: 56 MG/DL (ref 7–18)
BUN SERPL-MCNC: 60 MG/DL (ref 7–18)
BUN/CREAT SERPL: 25 (ref 12–20)
BUN/CREAT SERPL: 26 (ref 12–20)
BUN/CREAT SERPL: 27 (ref 12–20)
CALCIUM SERPL-MCNC: 8.7 MG/DL (ref 8.5–10.1)
CALCIUM SERPL-MCNC: 8.8 MG/DL (ref 8.5–10.1)
CALCIUM SERPL-MCNC: 8.9 MG/DL (ref 8.5–10.1)
CHLORIDE SERPL-SCNC: 107 MMOL/L (ref 100–111)
CHLORIDE SERPL-SCNC: 108 MMOL/L (ref 100–111)
CHLORIDE SERPL-SCNC: 109 MMOL/L (ref 100–111)
CO2 SERPL-SCNC: 19 MMOL/L (ref 21–32)
CO2 SERPL-SCNC: 20 MMOL/L (ref 21–32)
CO2 SERPL-SCNC: 21 MMOL/L (ref 21–32)
CREAT SERPL-MCNC: 1.98 MG/DL (ref 0.6–1.3)
CREAT SERPL-MCNC: 2.24 MG/DL (ref 0.6–1.3)
CREAT SERPL-MCNC: 2.24 MG/DL (ref 0.6–1.3)
D50 ADMINISTERED, D50ADM: 0 ML
D50 ADMINISTERED, D50ADM: NORMAL ML
D50 ORDER, D50ORD: 0 ML
D50 ORDER, D50ORD: NORMAL ML
ERYTHROCYTE [DISTWIDTH] IN BLOOD BY AUTOMATED COUNT: 15.5 % (ref 11.6–14.5)
GLOBULIN SER CALC-MCNC: 3.9 G/DL (ref 2–4)
GLUCOSE BLD STRIP.AUTO-MCNC: 139 MG/DL (ref 70–110)
GLUCOSE BLD STRIP.AUTO-MCNC: 149 MG/DL (ref 70–110)
GLUCOSE BLD STRIP.AUTO-MCNC: 152 MG/DL (ref 70–110)
GLUCOSE BLD STRIP.AUTO-MCNC: 156 MG/DL (ref 70–110)
GLUCOSE BLD STRIP.AUTO-MCNC: 161 MG/DL (ref 70–110)
GLUCOSE BLD STRIP.AUTO-MCNC: 171 MG/DL (ref 70–110)
GLUCOSE BLD STRIP.AUTO-MCNC: 192 MG/DL (ref 70–110)
GLUCOSE BLD STRIP.AUTO-MCNC: 201 MG/DL (ref 70–110)
GLUCOSE BLD STRIP.AUTO-MCNC: 212 MG/DL (ref 70–110)
GLUCOSE BLD STRIP.AUTO-MCNC: 216 MG/DL (ref 70–110)
GLUCOSE BLD STRIP.AUTO-MCNC: 242 MG/DL (ref 70–110)
GLUCOSE BLD STRIP.AUTO-MCNC: 275 MG/DL (ref 70–110)
GLUCOSE BLD STRIP.AUTO-MCNC: 280 MG/DL (ref 70–110)
GLUCOSE BLD STRIP.AUTO-MCNC: 297 MG/DL (ref 70–110)
GLUCOSE BLD STRIP.AUTO-MCNC: 384 MG/DL (ref 70–110)
GLUCOSE SERPL-MCNC: 138 MG/DL (ref 74–99)
GLUCOSE SERPL-MCNC: 207 MG/DL (ref 74–99)
GLUCOSE SERPL-MCNC: 234 MG/DL (ref 74–99)
GLUCOSE, GLC: 139 MG/DL
GLUCOSE, GLC: 149 MG/DL
GLUCOSE, GLC: 152 MG/DL
GLUCOSE, GLC: 156 MG/DL
GLUCOSE, GLC: 161 MG/DL
GLUCOSE, GLC: 171 MG/DL
GLUCOSE, GLC: 192 MG/DL
GLUCOSE, GLC: 201 MG/DL
GLUCOSE, GLC: 212 MG/DL
GLUCOSE, GLC: 216 MG/DL
GLUCOSE, GLC: 275 MG/DL
GLUCOSE, GLC: NORMAL MG/DL
HCT VFR BLD AUTO: 31 % (ref 35–45)
HGB BLD-MCNC: 9.7 G/DL (ref 12–16)
HIGH TARGET, HITG: 180 MG/DL
HIGH TARGET, HITG: NORMAL MG/DL
INSULIN ADMINSTERED, INSADM: 14.6 UNITS/HOUR
INSULIN ADMINSTERED, INSADM: 3.8 UNITS/HOUR
INSULIN ADMINSTERED, INSADM: 4.3 UNITS/HOUR
INSULIN ADMINSTERED, INSADM: 5.5 UNITS/HOUR
INSULIN ADMINSTERED, INSADM: 5.8 UNITS/HOUR
INSULIN ADMINSTERED, INSADM: 5.9 UNITS/HOUR
INSULIN ADMINSTERED, INSADM: 6.2 UNITS/HOUR
INSULIN ADMINSTERED, INSADM: 6.7 UNITS/HOUR
INSULIN ADMINSTERED, INSADM: 7.6 UNITS/HOUR
INSULIN ADMINSTERED, INSADM: 7.9 UNITS/HOUR
INSULIN ADMINSTERED, INSADM: 8.2 UNITS/HOUR
INSULIN ADMINSTERED, INSADM: NORMAL UNITS/HOUR
INSULIN ORDER, INSORD: 14.6 UNITS/HOUR
INSULIN ORDER, INSORD: 3.8 UNITS/HOUR
INSULIN ORDER, INSORD: 4.3 UNITS/HOUR
INSULIN ORDER, INSORD: 5.5 UNITS/HOUR
INSULIN ORDER, INSORD: 5.8 UNITS/HOUR
INSULIN ORDER, INSORD: 5.9 UNITS/HOUR
INSULIN ORDER, INSORD: 6.2 UNITS/HOUR
INSULIN ORDER, INSORD: 6.7 UNITS/HOUR
INSULIN ORDER, INSORD: 7.6 UNITS/HOUR
INSULIN ORDER, INSORD: 7.9 UNITS/HOUR
INSULIN ORDER, INSORD: 8.2 UNITS/HOUR
INSULIN ORDER, INSORD: NORMAL UNITS/HOUR
LOW TARGET, LOT: 140 MG/DL
LOW TARGET, LOT: NORMAL MG/DL
MAGNESIUM SERPL-MCNC: 2.1 MG/DL (ref 1.6–2.6)
MAGNESIUM SERPL-MCNC: 2.2 MG/DL (ref 1.6–2.6)
MAGNESIUM SERPL-MCNC: 2.4 MG/DL (ref 1.6–2.6)
MCH RBC QN AUTO: 25.3 PG (ref 24–34)
MCHC RBC AUTO-ENTMCNC: 31.3 G/DL (ref 31–37)
MCV RBC AUTO: 80.7 FL (ref 74–97)
MINUTES UNTIL NEXT BG, NBG: 60 MIN
MINUTES UNTIL NEXT BG, NBG: NORMAL MIN
MULTIPLIER, MUL: 0.04
MULTIPLIER, MUL: 0.05
MULTIPLIER, MUL: 0.06
MULTIPLIER, MUL: 0.07
MULTIPLIER, MUL: NORMAL
ORDER INITIALS, ORDINIT: NORMAL
PLATELET # BLD AUTO: 264 K/UL (ref 135–420)
PMV BLD AUTO: 9.2 FL (ref 9.2–11.8)
POTASSIUM SERPL-SCNC: 4.1 MMOL/L (ref 3.5–5.5)
POTASSIUM SERPL-SCNC: 4.5 MMOL/L (ref 3.5–5.5)
POTASSIUM SERPL-SCNC: 4.8 MMOL/L (ref 3.5–5.5)
PROT SERPL-MCNC: 6.6 G/DL (ref 6.4–8.2)
RBC # BLD AUTO: 3.84 M/UL (ref 4.2–5.3)
SODIUM SERPL-SCNC: 136 MMOL/L (ref 136–145)
SODIUM SERPL-SCNC: 137 MMOL/L (ref 136–145)
SODIUM SERPL-SCNC: 139 MMOL/L (ref 136–145)
WBC # BLD AUTO: 15.5 K/UL (ref 4.6–13.2)

## 2019-12-12 PROCEDURE — 74011000250 HC RX REV CODE- 250: Performed by: FAMILY MEDICINE

## 2019-12-12 PROCEDURE — 5A09457 ASSISTANCE WITH RESPIRATORY VENTILATION, 24-96 CONSECUTIVE HOURS, CONTINUOUS POSITIVE AIRWAY PRESSURE: ICD-10-PCS | Performed by: FAMILY MEDICINE

## 2019-12-12 PROCEDURE — 36415 COLL VENOUS BLD VENIPUNCTURE: CPT

## 2019-12-12 PROCEDURE — 74011000250 HC RX REV CODE- 250: Performed by: HOSPITALIST

## 2019-12-12 PROCEDURE — 83735 ASSAY OF MAGNESIUM: CPT

## 2019-12-12 PROCEDURE — 80053 COMPREHEN METABOLIC PANEL: CPT

## 2019-12-12 PROCEDURE — 74011636637 HC RX REV CODE- 636/637: Performed by: FAMILY MEDICINE

## 2019-12-12 PROCEDURE — 74011250636 HC RX REV CODE- 250/636: Performed by: HOSPITALIST

## 2019-12-12 PROCEDURE — 74011636637 HC RX REV CODE- 636/637: Performed by: HOSPITALIST

## 2019-12-12 PROCEDURE — 97162 PT EVAL MOD COMPLEX 30 MIN: CPT

## 2019-12-12 PROCEDURE — 80048 BASIC METABOLIC PNL TOTAL CA: CPT

## 2019-12-12 PROCEDURE — 74011000258 HC RX REV CODE- 258: Performed by: HOSPITALIST

## 2019-12-12 PROCEDURE — 97166 OT EVAL MOD COMPLEX 45 MIN: CPT

## 2019-12-12 PROCEDURE — 94760 N-INVAS EAR/PLS OXIMETRY 1: CPT

## 2019-12-12 PROCEDURE — 94660 CPAP INITIATION&MGMT: CPT

## 2019-12-12 PROCEDURE — 82962 GLUCOSE BLOOD TEST: CPT

## 2019-12-12 PROCEDURE — 94640 AIRWAY INHALATION TREATMENT: CPT

## 2019-12-12 PROCEDURE — 65660000000 HC RM CCU STEPDOWN

## 2019-12-12 PROCEDURE — 97116 GAIT TRAINING THERAPY: CPT

## 2019-12-12 PROCEDURE — 74011250636 HC RX REV CODE- 250/636: Performed by: FAMILY MEDICINE

## 2019-12-12 PROCEDURE — C9113 INJ PANTOPRAZOLE SODIUM, VIA: HCPCS | Performed by: FAMILY MEDICINE

## 2019-12-12 PROCEDURE — 74011250637 HC RX REV CODE- 250/637: Performed by: FAMILY MEDICINE

## 2019-12-12 PROCEDURE — 77030035694 HC MSK BIPAP FLL FAC PERFMAX PHIL -B

## 2019-12-12 PROCEDURE — 85027 COMPLETE CBC AUTOMATED: CPT

## 2019-12-12 PROCEDURE — 97535 SELF CARE MNGMENT TRAINING: CPT

## 2019-12-12 RX ORDER — INSULIN GLARGINE 100 [IU]/ML
60 INJECTION, SOLUTION SUBCUTANEOUS DAILY
Status: DISCONTINUED | OUTPATIENT
Start: 2019-12-12 | End: 2019-12-16 | Stop reason: HOSPADM

## 2019-12-12 RX ORDER — INSULIN LISPRO 100 [IU]/ML
10 INJECTION, SOLUTION INTRAVENOUS; SUBCUTANEOUS
Status: DISCONTINUED | OUTPATIENT
Start: 2019-12-12 | End: 2019-12-13

## 2019-12-12 RX ORDER — FUROSEMIDE 10 MG/ML
40 INJECTION INTRAMUSCULAR; INTRAVENOUS DAILY
Status: DISCONTINUED | OUTPATIENT
Start: 2019-12-13 | End: 2019-12-16 | Stop reason: HOSPADM

## 2019-12-12 RX ORDER — INSULIN LISPRO 100 [IU]/ML
INJECTION, SOLUTION INTRAVENOUS; SUBCUTANEOUS
Status: DISCONTINUED | OUTPATIENT
Start: 2019-12-12 | End: 2019-12-16 | Stop reason: HOSPADM

## 2019-12-12 RX ADMIN — DULOXETINE HYDROCHLORIDE 60 MG: 60 CAPSULE, DELAYED RELEASE ORAL at 08:33

## 2019-12-12 RX ADMIN — HYDRALAZINE HYDROCHLORIDE 25 MG: 25 TABLET, FILM COATED ORAL at 08:33

## 2019-12-12 RX ADMIN — METHYLPREDNISOLONE SODIUM SUCCINATE 60 MG: 125 INJECTION, POWDER, FOR SOLUTION INTRAMUSCULAR; INTRAVENOUS at 21:37

## 2019-12-12 RX ADMIN — IPRATROPIUM BROMIDE AND ALBUTEROL SULFATE 3 ML: .5; 3 SOLUTION RESPIRATORY (INHALATION) at 11:52

## 2019-12-12 RX ADMIN — IPRATROPIUM BROMIDE AND ALBUTEROL SULFATE 3 ML: .5; 3 SOLUTION RESPIRATORY (INHALATION) at 19:05

## 2019-12-12 RX ADMIN — GABAPENTIN 300 MG: 300 CAPSULE ORAL at 08:33

## 2019-12-12 RX ADMIN — SODIUM CHLORIDE 5.9 UNITS/HR: 9 INJECTION, SOLUTION INTRAVENOUS at 12:09

## 2019-12-12 RX ADMIN — METHYLPREDNISOLONE SODIUM SUCCINATE 60 MG: 125 INJECTION, POWDER, FOR SOLUTION INTRAMUSCULAR; INTRAVENOUS at 07:02

## 2019-12-12 RX ADMIN — BUDESONIDE 500 MCG: 0.5 INHALANT RESPIRATORY (INHALATION) at 19:05

## 2019-12-12 RX ADMIN — AMLODIPINE BESYLATE 5 MG: 5 TABLET ORAL at 08:33

## 2019-12-12 RX ADMIN — ATORVASTATIN CALCIUM 40 MG: 20 TABLET, FILM COATED ORAL at 08:33

## 2019-12-12 RX ADMIN — INSULIN LISPRO 6 UNITS: 100 INJECTION, SOLUTION INTRAVENOUS; SUBCUTANEOUS at 16:31

## 2019-12-12 RX ADMIN — ARFORMOTEROL TARTRATE 15 MCG: 15 SOLUTION RESPIRATORY (INHALATION) at 19:05

## 2019-12-12 RX ADMIN — SODIUM CHLORIDE 7.9 UNITS/HR: 9 INJECTION, SOLUTION INTRAVENOUS at 02:47

## 2019-12-12 RX ADMIN — INSULIN LISPRO 10 UNITS: 100 INJECTION, SOLUTION INTRAVENOUS; SUBCUTANEOUS at 18:03

## 2019-12-12 RX ADMIN — Medication 5 ML: at 22:00

## 2019-12-12 RX ADMIN — METOPROLOL SUCCINATE 100 MG: 100 TABLET, EXTENDED RELEASE ORAL at 08:33

## 2019-12-12 RX ADMIN — GUAIFENESIN 600 MG: 600 TABLET, EXTENDED RELEASE ORAL at 21:41

## 2019-12-12 RX ADMIN — HEPARIN SODIUM 5000 UNITS: 5000 INJECTION INTRAVENOUS; SUBCUTANEOUS at 04:35

## 2019-12-12 RX ADMIN — SODIUM CHLORIDE 40 MG: 9 INJECTION INTRAMUSCULAR; INTRAVENOUS; SUBCUTANEOUS at 08:33

## 2019-12-12 RX ADMIN — HEPARIN SODIUM 5000 UNITS: 5000 INJECTION INTRAVENOUS; SUBCUTANEOUS at 13:24

## 2019-12-12 RX ADMIN — Medication 10 ML: at 07:03

## 2019-12-12 RX ADMIN — SODIUM CHLORIDE 8.2 UNITS/HR: 9 INJECTION, SOLUTION INTRAVENOUS at 09:45

## 2019-12-12 RX ADMIN — LOSARTAN POTASSIUM 100 MG: 50 TABLET, FILM COATED ORAL at 08:33

## 2019-12-12 RX ADMIN — HYDRALAZINE HYDROCHLORIDE 25 MG: 25 TABLET, FILM COATED ORAL at 16:24

## 2019-12-12 RX ADMIN — METHYLPREDNISOLONE SODIUM SUCCINATE 60 MG: 125 INJECTION, POWDER, FOR SOLUTION INTRAMUSCULAR; INTRAVENOUS at 11:58

## 2019-12-12 RX ADMIN — BUDESONIDE 500 MCG: 0.5 INHALANT RESPIRATORY (INHALATION) at 07:02

## 2019-12-12 RX ADMIN — HEPARIN SODIUM 5000 UNITS: 5000 INJECTION INTRAVENOUS; SUBCUTANEOUS at 21:36

## 2019-12-12 RX ADMIN — GABAPENTIN 300 MG: 300 CAPSULE ORAL at 21:41

## 2019-12-12 RX ADMIN — IPRATROPIUM BROMIDE AND ALBUTEROL SULFATE 3 ML: .5; 3 SOLUTION RESPIRATORY (INHALATION) at 15:50

## 2019-12-12 RX ADMIN — GABAPENTIN 300 MG: 300 CAPSULE ORAL at 16:24

## 2019-12-12 RX ADMIN — GUAIFENESIN 600 MG: 600 TABLET, EXTENDED RELEASE ORAL at 08:33

## 2019-12-12 RX ADMIN — FUROSEMIDE 40 MG: 10 INJECTION, SOLUTION INTRAMUSCULAR; INTRAVENOUS at 08:33

## 2019-12-12 RX ADMIN — INSULIN LISPRO 15 UNITS: 100 INJECTION, SOLUTION INTRAVENOUS; SUBCUTANEOUS at 21:34

## 2019-12-12 RX ADMIN — INSULIN LISPRO 10 UNITS: 100 INJECTION, SOLUTION INTRAVENOUS; SUBCUTANEOUS at 13:24

## 2019-12-12 RX ADMIN — CEFTRIAXONE 1 G: 1 INJECTION, POWDER, FOR SOLUTION INTRAMUSCULAR; INTRAVENOUS at 04:35

## 2019-12-12 RX ADMIN — HYDRALAZINE HYDROCHLORIDE 25 MG: 25 TABLET, FILM COATED ORAL at 21:41

## 2019-12-12 RX ADMIN — IPRATROPIUM BROMIDE AND ALBUTEROL SULFATE 3 ML: .5; 3 SOLUTION RESPIRATORY (INHALATION) at 07:02

## 2019-12-12 RX ADMIN — INSULIN GLARGINE 60 UNITS: 100 INJECTION, SOLUTION SUBCUTANEOUS at 13:23

## 2019-12-12 RX ADMIN — ARFORMOTEROL TARTRATE 15 MCG: 15 SOLUTION RESPIRATORY (INHALATION) at 07:02

## 2019-12-12 RX ADMIN — ASPIRIN 81 MG 81 MG: 81 TABLET ORAL at 08:33

## 2019-12-12 NOTE — PROGRESS NOTES
Problem: Self Care Deficits Care Plan (Adult)  Goal: *Acute Goals and Plan of Care (Insert Text)  Description  Occupational Therapy Goals  Initiated 12/12/2019 within 7 day(s). 1.  Patient will perform grooming with supervision/set-up standing at sink for five minutes or more. 2.  Patient will perform upper body dressing with minimal assistance/contact guard assist.  3.  Patient will perform lower body dressing with minimal assistance/contact guard assist using AEs PRN. 4.  Patient will perform toilet transfers with supervision/set-up. 5.  Patient will perform all aspects of toileting with supervision/set-up. 6.  Patient will participate in upper extremity therapeutic exercise/activities with independence for 10 minutes. 7.  Patient will utilize energy conservation techniques during functional activities with verbal, visual and tactile cues. Outcome: Progressing Towards Goal     OCCUPATIONAL THERAPY EVALUATION    Patient: Dariusz Gates (53 y.o. female)  Date: 12/12/2019  Primary Diagnosis: Asthma [J45.909]        Precautions:   Fall  PLOF: min-mod A for BADLs/IADLs    ASSESSMENT :  Based on the objective data described below, the patient presents with decreased strength, endurance and balance for carryover of ADLs and transfers following above mentioned medical diagnosis. Pt presented long sitting in bed at the beginning of session and agreed to participate with therapy. Pt tearful while providing information about PLOF and how she feels like a burden to her family because she has not been able to care for herself since the past two years. Pt requires verbal redirection to participate with therapy during this session. Pt participated with bed mobility, STS transfers, toilet transfers, toileting and grooming with CGA. Pt gets SOB with minimal exertion and requires extended rest breaks to continue with any activity. Pt was left seated at EOB at the end of session in NAD, pain 0/10.     Patient will benefit from skilled intervention to address the above impairments. Patient's rehabilitation potential is considered to be Good  Factors which may influence rehabilitation potential include:   []             None noted  []             Mental ability/status  [x]             Medical condition  [x]             Home/family situation and support systems  []             Safety awareness  []             Pain tolerance/management  []             Other:      PLAN :  Recommendations and Planned Interventions:   [x]               Self Care Training                  [x]      Therapeutic Activities  [x]               Functional Mobility Training   []      Cognitive Retraining  [x]               Therapeutic Exercises           [x]      Endurance Activities  [x]               Balance Training                    [x]      Neuromuscular Re-Education  []               Visual/Perceptual Training     [x]      Home Safety Training  [x]               Patient Education                   [x]      Family Training/Education  []               Other (comment):    Frequency/Duration: Patient will be followed by occupational therapy 1-2 times per day/4-7 days per week to address goals. Discharge Recommendations: Home Health, [facility], and Outpatient Pulmonary Rehab  Further Equipment Recommendations for Discharge: N/A     SUBJECTIVE:   Patient stated  I feel like I am a burden to my kids.     OBJECTIVE DATA SUMMARY:     Past Medical History:   Diagnosis Date    Asthma     Note: As child had asthma    Diabetes mellitus ([facility] [address]     Type 2    Heart palpitations     Hypertension     Ill-defined condition     fibromyalgia     Morbid obesity ([facility] [address]     MRSA infection 8/2014     Past Surgical History:   Procedure Laterality Date    BREAST SURGERY PROCEDURE UNLISTED      cyst removed    HX CATARACT REMOVAL      HX CHOLECYSTECTOMY      HX TUBAL LIGATION       Barriers to Learning/Limitations: None  Compensate with: visual, verbal, tactile, kinesthetic cues/model    Home Situation:   Home Situation  Home Environment: Private residence  # Steps to Enter: 0  One/Two Story Residence: One story  Living Alone: Yes  Support Systems: Child(vero), Family member(s)  Patient Expects to be Discharged to[de-identified] Private residence  Current DME Used/Available at Home: Walker, rolling, Raised toilet seat  Tub or Shower Type: Shower  []  Right hand dominant   []  Left hand dominant    Cognitive/Behavioral Status:  Neurologic State: Alert  Orientation Level: Oriented X4  Cognition: Appropriate for age attention/concentration; Follows commands  Safety/Judgement: Fall prevention    Skin:  intact  Edema: none    Vision/Perceptual:    Tracking: Able to track stimulus in all quadrants w/o difficulty    Coordination: BUE  Coordination: Generally decreased, functional  Fine Motor Skills-Upper: Left Intact; Right Intact    Gross Motor Skills-Upper: Left Intact; Right Intact  Balance:  Sitting: Intact  Standing: Impaired; With support  Standing - Static: Good  Standing - Dynamic : Fair  Strength: BUE  Strength: Generally decreased, functional  Tone & Sensation: BUE  Sensation: Intact  Range of Motion: BUE  AROM: Generally decreased, functional  Functional Mobility and Transfers for ADLs:  Bed Mobility:  Rolling: Supervision  Supine to Sit: Stand-by assistance  Scooting: Stand-by assistance  Transfers:  Sit to Stand: Contact guard assistance  Stand to Sit: Stand-by assistance   Toilet Transfer : Contact guard assistance;Stand-by assistance  ADL Assessment:   Feeding: Independent    Oral Facial Hygiene/Grooming: Supervision    Upper Body Dressing:  Moderate assistance    Lower Body Dressing: Maximum assistance    Toileting: Contact guard assistance;Stand by assistance  ADL Intervention:  Grooming  Grooming Assistance: Stand-by assistance  Position Performed: Standing  Washing Hands: Stand-by assistance    Lower Body Dressing Assistance  Dressing Assistance: Maximum assistance  Socks: Maximum assistance  Leg Crossed Method Used: No  Position Performed: Seated edge of bed  Cues: Philip Boggs  Toileting Assistance: Contact guard assistance;Stand-by assistance  Bladder Hygiene: Contact guard assistance;Stand-by assistance  Clothing Management: Contact guard assistance    Cognitive Retraining  Safety/Judgement: Fall prevention    Therapeutic Exercise:  Pain:  Pain level pre-treatment: 0/10   Pain level post-treatment: 0/10   Pain Intervention(s): Medication (see MAR); Rest, Ice, Repositioning   Response to intervention: Nurse notified, See doc flow    Activity Tolerance:   Fair   Please refer to the flowsheet for vital signs taken during this treatment. After treatment:   [] Patient left in no apparent distress sitting up in chair  [x] Patient left in no apparent distress in bed  [x] Call bell left within reach  [] Nursing notified  [] Caregiver present  [] Bed alarm activated    COMMUNICATION/EDUCATION:   [x] Role of Occupational Therapy in the acute care setting  [x] Home safety education was provided and the patient/caregiver indicated understanding. [x] Patient/family have participated as able in goal setting and plan of care. [x] Patient/family agree to work toward stated goals and plan of care. [] Patient understands intent and goals of therapy, but is neutral about his/her participation. [] Patient is unable to participate in goal setting and plan of care. Thank you for this referral.  Richard Schultz OTR/L  Time Calculation: 30 mins    Eval Complexity: History: MEDIUM Complexity : Expanded review of history including physical, cognitive and psychosocial  history ; Examination: MEDIUM Complexity : 3-5 performance deficits relating to physical, cognitive , or psychosocial skils that result in activity limitations and / or participation restrictions; Decision Making:MEDIUM Complexity : Patient may present with comorbidities that affect occupational performnce.  Miniml to moderate modification of tasks or assistance (eg, physical or verbal ) with assesment(s) is necessary to enable patient to complete evaluation

## 2019-12-12 NOTE — PROGRESS NOTES
1910-Bedside verbal report received from Chata Ortiz RN. Sbar, mar, labs, kardex and patient status reviewed. Insulin drip verified. 1940-Critical BG of 504 reported from Lab. Pt already on Insulin Drip, but BG has remained in the 500's. Notified Dr. Hugo Steele. Orders received to make patient clear liquids only and DC consistent carb diet. 1945-Assessment completed. Informed Pt of change to NPO to have tighter control over BG levels. Attempted to place SCD's at this time and patient refused. Pt became very upset of NPO status and stated she had only been eating, \"grapes, cottage cheese, and a banana tonight\". Allowed patient to discuss thoughts and feelings and attempted to redirect/educate. Pt states she would like to speak to  MD. Pageruthie Steele to come to bedside. 2035-Zaid Mendoza at bedside to address patient's concerns. Pt verbalizes understanding of NPO status and agrees to plan of care at this time. 0000-Assessment completed. Pt placed on Cpap per RT, well tolerated. 0400-Assessment completed. No changes. 0742-Bedside verbal report given to Eliud Redd RN. Sbar, mar, labs, kardex and patient status reviewed. Insulin drip verified. Relinquished care of patient.

## 2019-12-12 NOTE — PROGRESS NOTES
6546  Bedside and Verbal shift change report given by Skylar Pandey (off going nurse) to Zehra Hernández RN (on coming). Report included the following information SBAR, Kardex, OR Summary, Intake/Output and MAR.     9009  Bedside and Verbal shift change report given to BERT Lamas RN (on coming nurse) by Zehra Hernández RN (off going). Report included the following information SBAR, Kardex, OR Summary, Intake/Output and MAR.

## 2019-12-12 NOTE — PROGRESS NOTES
Transition of care: anticipate home when medically cleared  Met with patient and Dr. Jason Townsend at bedside   Patient had informed cm her nebulizer and cpap waas already ordered by [facility]. CMS has called [facility] yesterday [phone] . They informed cm they had not been working on the DME order. Informed patient of this an states she will call them patient provided with phone number as requested. patient reports she lives alone but has grown children nearby states he has Dr Khloe Womack and [facility]. And [facility] for pcp. She has medicaid for insurance. Cm will continue to follow  She denied other needs for DMe and has a ride home  Care Management Interventions  PCP Verified by CM: Yes  Transition of Care Consult (CM Consult): Discharge Planning  Current Support Network: Lives Alone, Family Lives Nearby  Confirm Follow Up Transport: Family  Plan discussed with Pt/Family/Caregiver: Yes  Freedom of Choice Offered:  Yes

## 2019-12-12 NOTE — PROGRESS NOTES
Hospitalist Progress Note-critical care note     Patient: Marlin Barakat MRN: [id number]  CSN: [id number]    YOB: 1965  Age: 53 y.o. Sex: female    DOA: 12/10/2019 LOS:  LOS: 1 day            Chief complaint: Uncontrolled diabetes, UTI, АНДРЕЙ, asthma exacerbation, respiratory distress    Assessment/Plan         Hospital Problems  Date Reviewed: 12/11/2019          Codes Class Noted POA    АНДРЕЙ (obstructive sleep apnea) ICD-10-CM: G47.33  ICD-9-CM: 327.23  12/11/2019 Yes        UTI (urinary tract infection) ICD-10-CM: N39.0  ICD-9-CM: 599.0  12/11/2019 Yes        * (Principal) Respiratory distress ICD-10-CM: R06.03  ICD-9-CM: 786.09  1/17/2019 Yes        Asthma exacerbation ICD-10-CM: J45.901  ICD-9-CM: 493.92  1/17/2019 Yes        Diastolic CHF ([facility] LJL-58-MINGO: I50.30  ICD-9-CM: 428.30, 428.0  1/17/2019 Yes        Type II or unspecified type diabetes mellitus with renal manifestations, uncontrolled(250.42) ([facility] ICD-10-CM: E11.29, E11.65  ICD-9-CM: 250.42  9/4/2014 Yes        HTN (hypertension) ICD-10-CM: I10  ICD-9-CM: 401.9  8/16/2014 Yes        Morbid obesity with BMI of 40.0-44.9, adult ([facility] ICD-10-CM: E66.01, Z68.41  ICD-9-CM: 278.01, V85.41  8/16/2014 Yes            Respiratory distress  Resolved, off oxygen. Asthma exacerbation  Improving, continue IV steroid, wean steroid. Continue breathing treatment    UTI  Continue Rocephin, urine culture gram-negative seth. We will follow-up with sensitivity. Diabetes type 2 uncontrolled with complication with renal manifestation  Insulin drip was started yesterday, blood sugar much better controlled. Will switch to Lantus. Pre-meal insulin. Sliding scale. Need education, put back diabetes diet. Wean steroid    Diastolic CHF, stable no CHF exacerbation noted. We will decrease Lasix, due to elevated renal function.     CKD 3-4  Creatinine mildly increased, will decrease Lasix home dose    Hypertension  Controlled per current medication. Subjective: Shortness breath better, still cough wheezing is better    RN: Not happy with the diet change    Disposition :tbd,   Review of systems:    General: No fevers or chills. Cardiovascular: No chest pain or pressure. No palpitations. Pulmonary: No shortness of breath. Cough  Gastrointestinal: No nausea, vomiting. Vital signs/Intake and Output:  Visit Vitals  /67 (BP 1 Location: Left arm, BP Patient Position: At rest;Supine)   Pulse 66   Temp 97.5 °F (36.4 °C)   Resp 18   Ht 5' 2\" (1.575 m)   Wt 147.7 kg (325 lb 9.6 oz)   SpO2 97%   BMI 59.55 kg/m²     Current Shift:  12/12 0701 - 12/12 1900  In: -   Out: 550 [Urine:550]  Last three shifts:  12/10 1901 - 12/12 0700  In: 740 [P.O.:380; I.V.:360]  Out: 2110 [Urine:2110]    Physical Exam:  General: WD, WN. Alert, cooperative, no acute distress    HEENT: NC, Atraumatic. PERRLA, anicteric sclerae. Lungs: Mild wheezing, no Rhonchi/Rales. Heart:  Regular  rhythm,  No murmur, No Rubs, No Gallops  Abdomen: Soft, Non distended, Non tender.  +Bowel sounds,   Extremities: No c/c/e  Psych:   Not anxious or agitated. Neurologic:  No acute neurological deficit. Labs: Results:       Chemistry Recent Labs     12/12/19  0550 12/12/19  0002 12/11/19  1846  12/10/19  2340   * 234* 504*   < > 284*    139 135*   < > 143   K 4.8 4.5 4.2   < > 4.2    109 105   < > 110   CO2 21 20* 21   < > 24   BUN 56* 52* 50*   < > 38*   CREA 2.24* 1.98* 2.00*   < > 1.66*   CA 8.9 8.8 8.4*   < > 8.5   AGAP 8 10 9   < > 9   BUCR 25* 26* 25*   < > 23*     --   --   --  109   TP 6.6  --   --   --  6.0*   ALB 2.7*  --   --   --  2.4*   GLOB 3.9  --   --   --  3.6   AGRAT 0.7*  --   --   --  0.7*    < > = values in this interval not displayed.       CBC w/Diff Recent Labs     12/12/19  0550 12/10/19  2340   WBC 15.5* 6.3   RBC 3.84* 3.60*   HGB 9.7* 9.0*   HCT 31.0* 29.4*    249   GRANS  --  52   LYMPH  --  32   EOS  --  5 Cardiac Enzymes Recent Labs     12/10/19  2340   CPK 44   CKND1 CALCULATION NOT PERFORMED WHEN RESULT IS BELOW LINEAR LIMIT      Coagulation No results for input(s): PTP, INR, APTT, INREXT in the last 72 hours. Lipid Panel Lab Results   Component Value Date/Time    Cholesterol, total 146 05/28/2015 02:58 AM    HDL Cholesterol 64 (H) 05/28/2015 02:58 AM    LDL, calculated 66 05/28/2015 02:58 AM    VLDL, calculated 16 05/28/2015 02:58 AM    Triglyceride 80 05/28/2015 02:58 AM    CHOL/HDL Ratio 2.3 05/28/2015 02:58 AM      BNP No results for input(s): BNPP in the last 72 hours.    Liver Enzymes Recent Labs     12/12/19  0550   TP 6.6   ALB 2.7*      SGOT 20      Thyroid Studies Lab Results   Component Value Date/Time    TSH 0.56 01/23/2019 01:33 AM        Procedures/imaging: see electronic medical records for all procedures/Xrays and details which were not copied into this note but were reviewed prior to creation of Portia Ferreira MD

## 2019-12-12 NOTE — PROGRESS NOTES
Problem: Mobility Impaired (Adult and Pediatric)  Goal: *Acute Goals and Plan of Care (Insert Text)  Description  Physical Therapy Goals   Initiated 12/12/2019 and to be accomplished within 2-4 day(s)  1. Patient will move from supine <> sit with mod I in prep for out of bed activity and change of position. 2.  Patient will perform sit<> stand with S/mod I/RW in prep for transfers/ambulation. 3.  Patient will transfer from bed <> chair with S/mod I with RW for time up in chair for completion of ADL activity. 4.  Patient will ambulate 130 feet with S/mod I with RW for improved functional mobility/safe discharge. Outcome: Progressing Towards Goal    PHYSICAL THERAPY EVALUATION    Patient: Shena Bella (53 y.o. female)  Date: 12/12/2019  Primary Diagnosis: Asthma [J45.909]  Precautions:Fall    ASSESSMENT :  Based on the objective data described below, the patient is a 54 yo F seen on telemetry unit and presents with overall limited activity tolerance longstanding due to h/o COPD, and with decreased independence in overall functional mobility with regard to bed mobility/transfers, and gait. Pt transitions supine >sit with supervision, transfers to stand with CGA/vc for proper hand position and participated in GT/RW/CGA 110ft. Arlette decreased; pt educated in pacing self, requiring 2 standing rest breaks. O2 sat 98%- 99% pre/post gt. Pt left seated EOB with all needs in reach, and in NAD. Recommend HHPT for follow up physical therapy upon discharge to reach maximal level of independence/safety with functional mobility. Pt Education: Role of physical therapy in acute care setting, fall prevention and safety/technique during functional mobility tasks      Patient will benefit from skilled intervention to address the above impairments.   Patients rehabilitation potential is considered to be Fair  Factors which may influence rehabilitation potential include:   []         None noted  []         Mental ability/status  [x]         Medical condition  []         Home/family situation and support systems  []         Safety awareness  []         Pain tolerance/management  []         Other:      PLAN :  Recommendations and Planned Interventions:  [x]           Bed Mobility Training             []    Neuromuscular Re-Education  [x]           Transfer Training                   []    Orthotic/Prosthetic Training  [x]           Gait Training                          []    Modalities  [x]           Therapeutic Exercises          []    Edema Management/Control  [x]           Therapeutic Activities            [x]    Patient and Family Training/Education  []           Other (comment):    Frequency/Duration: Patient will be followed by physical therapy 1-2 times per day to address goals. Discharge Recommendations: Home Health   Further Equipment Recommendations for Discharge: N/A     SUBJECTIVE:   Patient stated I walk to the bathroom here.     OBJECTIVE DATA SUMMARY:     Past Medical History:   Diagnosis Date    Asthma     Note: As child had asthma    Diabetes mellitus ([facility] [address]     Type 2    Heart palpitations     Hypertension     Ill-defined condition     fibromyalgia     Morbid obesity ([facility] [address]     MRSA infection 8/2014     Past Surgical History:   Procedure Laterality Date    BREAST SURGERY PROCEDURE UNLISTED      cyst removed    HX CATARACT REMOVAL      HX CHOLECYSTECTOMY      HX TUBAL LIGATION       Barriers to Learning/Limitations: yes;  physical  Compensate with: Verbal Cues  Prior Level of Function/Home Situation: mod I with use of RW  Home Situation  Home Environment: Apartment  # Steps to Enter: 0  One/Two Story Residence: One story  Living Alone: Yes  Support Systems: Child(vero), Family member(s)  Patient Expects to be Discharged to[de-identified] Apartment  Current DME Used/Available at Home: Walker, rolling  Tub or Shower Type: Tub/Shower combination  Critical Behavior:  Neurologic State: Alert; Appropriate for age  Orientation Level: Oriented X4  Cognition: Appropriate for age attention/concentration; Follows commands  Safety/Judgement: Fall prevention  Psychosocial  Patient Behaviors: Calm; Cooperative  Purposeful Interaction: Yes  Pt Identified Daily Priority: Clinical issues (comment)  [employer] Process: Establish trust;Teaching/learning; Attend basic human needs;Create healing environment  Caring Interventions: Reassure  Reassure: Informing;Caring rounds  Therapeutic Modalities: Deep breathing;Humor  Strength:    Strength: Generally decreased, functional  Tone & Sensation:   Sensation: Intact  Range Of Motion:  AROM: Generally decreased, functional  PROM: Generally decreased, functional(shoulders)  Functional Mobility:  Bed Mobility:  Rolling: Supervision  Supine to Sit: Supervision(HOB elevated)  Scooting: Modified independent  Transfers:  Sit to Stand: Contact guard assistance(vc for hand placement)  Stand to Sit: Stand-by assistance(vc for hand placement)  Balance:   Sitting: Intact  Standing: Impaired; With support  Standing - Static: Good  Standing - Dynamic : Fair  Ambulation/Gait Training:  Distance (ft): 110 Feet (ft)  Assistive Device: Gait belt;Walker, rolling  Ambulation - Level of Assistance: Contact guard assistance  Gait Abnormalities: Decreased step clearance  Base of Support: Widened  Speed/Arlette: Pace decreased (<100 feet/min)  Step Length: Right shortened;Left shortened  Interventions: Safety awareness training;Verbal cues(pacing self)  Pain:  Pain Scale 1: Numeric (0 - 10)  Pain Intensity 1: 0  Activity Tolerance:   Fair /fair-  Please refer to the flowsheet for vital signs taken during this treatment.   After treatment:   [x]         Patient left in no apparent distress sitting up EOB  []         Patient left in no apparent distress in bed  [x]         Call bell left within reach  []         Nursing notified  []         Caregiver present  []         Bed alarm activated    COMMUNICATION/EDUCATION:   [x] Fall prevention education was provided and the patient/caregiver indicated understanding. [x]         Patient/family have participated as able in goal setting and plan of care. [x]         Patient/family agree to work toward stated goals and plan of care. []         Patient understands intent and goals of therapy, but is neutral about his/her participation. []         Patient is unable to participate in goal setting and plan of care.     Eval Complexity: History: HIGH Complexity :3+ comorbidities / personal factors will impact the outcome/ POC Exam:MEDIUM Complexity : 3 Standardized tests and measures addressing body structure, function, activity limitation and / or participation in recreation  Presentation: MEDIUM Complexity : Evolving with changing characteristics  Clinical Decision Making:Medium Complexity amb with RW/CGA as noted above with O2 sat high 90's on RA Overall Complexity:MEDIUM    Thank you for this referral.  Sanchez Mosquera, PT   Time Calculation: 24 mins

## 2019-12-13 LAB
ALBUMIN SERPL-MCNC: 2.8 G/DL (ref 3.4–5)
ALBUMIN/GLOB SERPL: 0.7 {RATIO} (ref 0.8–1.7)
ALP SERPL-CCNC: 108 U/L (ref 45–117)
ALT SERPL-CCNC: 30 U/L (ref 13–56)
ANION GAP SERPL CALC-SCNC: 10 MMOL/L (ref 3–18)
AST SERPL-CCNC: 19 U/L (ref 10–38)
BILIRUB SERPL-MCNC: 0.2 MG/DL (ref 0.2–1)
BUN SERPL-MCNC: 74 MG/DL (ref 7–18)
BUN/CREAT SERPL: 32 (ref 12–20)
CALCIUM SERPL-MCNC: 8.8 MG/DL (ref 8.5–10.1)
CHLORIDE SERPL-SCNC: 106 MMOL/L (ref 100–111)
CO2 SERPL-SCNC: 20 MMOL/L (ref 21–32)
CREAT SERPL-MCNC: 2.29 MG/DL (ref 0.6–1.3)
ERYTHROCYTE [DISTWIDTH] IN BLOOD BY AUTOMATED COUNT: 15.8 % (ref 11.6–14.5)
GLOBULIN SER CALC-MCNC: 4.1 G/DL (ref 2–4)
GLUCOSE BLD STRIP.AUTO-MCNC: 149 MG/DL (ref 70–110)
GLUCOSE BLD STRIP.AUTO-MCNC: 314 MG/DL (ref 70–110)
GLUCOSE BLD STRIP.AUTO-MCNC: 337 MG/DL (ref 70–110)
GLUCOSE BLD STRIP.AUTO-MCNC: 384 MG/DL (ref 70–110)
GLUCOSE BLD STRIP.AUTO-MCNC: 436 MG/DL (ref 70–110)
GLUCOSE SERPL-MCNC: 280 MG/DL (ref 74–99)
HCT VFR BLD AUTO: 32.8 % (ref 35–45)
HGB BLD-MCNC: 10.2 G/DL (ref 12–16)
MCH RBC QN AUTO: 25.4 PG (ref 24–34)
MCHC RBC AUTO-ENTMCNC: 31.1 G/DL (ref 31–37)
MCV RBC AUTO: 81.6 FL (ref 74–97)
PLATELET # BLD AUTO: 285 K/UL (ref 135–420)
PMV BLD AUTO: 9.6 FL (ref 9.2–11.8)
POTASSIUM SERPL-SCNC: 5.2 MMOL/L (ref 3.5–5.5)
PROT SERPL-MCNC: 6.9 G/DL (ref 6.4–8.2)
RBC # BLD AUTO: 4.02 M/UL (ref 4.2–5.3)
SODIUM SERPL-SCNC: 136 MMOL/L (ref 136–145)
WBC # BLD AUTO: 16.9 K/UL (ref 4.6–13.2)

## 2019-12-13 PROCEDURE — 74011250636 HC RX REV CODE- 250/636: Performed by: HOSPITALIST

## 2019-12-13 PROCEDURE — 80053 COMPREHEN METABOLIC PANEL: CPT

## 2019-12-13 PROCEDURE — 74011250636 HC RX REV CODE- 250/636: Performed by: FAMILY MEDICINE

## 2019-12-13 PROCEDURE — 94760 N-INVAS EAR/PLS OXIMETRY 1: CPT

## 2019-12-13 PROCEDURE — 97116 GAIT TRAINING THERAPY: CPT

## 2019-12-13 PROCEDURE — 93005 ELECTROCARDIOGRAM TRACING: CPT

## 2019-12-13 PROCEDURE — 77030018846 HC SOL IRR STRL H20 ICUM -A

## 2019-12-13 PROCEDURE — 74011636637 HC RX REV CODE- 636/637: Performed by: FAMILY MEDICINE

## 2019-12-13 PROCEDURE — 94640 AIRWAY INHALATION TREATMENT: CPT

## 2019-12-13 PROCEDURE — 65660000000 HC RM CCU STEPDOWN

## 2019-12-13 PROCEDURE — 74011250637 HC RX REV CODE- 250/637: Performed by: FAMILY MEDICINE

## 2019-12-13 PROCEDURE — 82962 GLUCOSE BLOOD TEST: CPT

## 2019-12-13 PROCEDURE — 74011000250 HC RX REV CODE- 250: Performed by: FAMILY MEDICINE

## 2019-12-13 PROCEDURE — 94660 CPAP INITIATION&MGMT: CPT

## 2019-12-13 PROCEDURE — C9113 INJ PANTOPRAZOLE SODIUM, VIA: HCPCS | Performed by: FAMILY MEDICINE

## 2019-12-13 PROCEDURE — 85027 COMPLETE CBC AUTOMATED: CPT

## 2019-12-13 PROCEDURE — 74011636637 HC RX REV CODE- 636/637: Performed by: HOSPITALIST

## 2019-12-13 PROCEDURE — 97530 THERAPEUTIC ACTIVITIES: CPT

## 2019-12-13 PROCEDURE — 36415 COLL VENOUS BLD VENIPUNCTURE: CPT

## 2019-12-13 PROCEDURE — 74011000250 HC RX REV CODE- 250: Performed by: HOSPITALIST

## 2019-12-13 RX ORDER — FUROSEMIDE 10 MG/ML
40 INJECTION INTRAMUSCULAR; INTRAVENOUS ONCE
Status: COMPLETED | OUTPATIENT
Start: 2019-12-13 | End: 2019-12-13

## 2019-12-13 RX ORDER — IPRATROPIUM BROMIDE AND ALBUTEROL SULFATE 2.5; .5 MG/3ML; MG/3ML
3 SOLUTION RESPIRATORY (INHALATION)
Status: DISCONTINUED | OUTPATIENT
Start: 2019-12-13 | End: 2019-12-13

## 2019-12-13 RX ORDER — IPRATROPIUM BROMIDE AND ALBUTEROL SULFATE 2.5; .5 MG/3ML; MG/3ML
3 SOLUTION RESPIRATORY (INHALATION)
Status: DISCONTINUED | OUTPATIENT
Start: 2019-12-13 | End: 2019-12-16 | Stop reason: HOSPADM

## 2019-12-13 RX ORDER — INSULIN LISPRO 100 [IU]/ML
15 INJECTION, SOLUTION INTRAVENOUS; SUBCUTANEOUS
Status: DISCONTINUED | OUTPATIENT
Start: 2019-12-13 | End: 2019-12-16 | Stop reason: HOSPADM

## 2019-12-13 RX ORDER — NITROGLYCERIN 0.4 MG/1
0.4 TABLET SUBLINGUAL AS NEEDED
Status: DISCONTINUED | OUTPATIENT
Start: 2019-12-13 | End: 2019-12-16 | Stop reason: HOSPADM

## 2019-12-13 RX ADMIN — IPRATROPIUM BROMIDE AND ALBUTEROL SULFATE 3 ML: .5; 3 SOLUTION RESPIRATORY (INHALATION) at 07:01

## 2019-12-13 RX ADMIN — LOSARTAN POTASSIUM 100 MG: 50 TABLET, FILM COATED ORAL at 09:00

## 2019-12-13 RX ADMIN — INSULIN LISPRO 12 UNITS: 100 INJECTION, SOLUTION INTRAVENOUS; SUBCUTANEOUS at 06:50

## 2019-12-13 RX ADMIN — INSULIN LISPRO 10 UNITS: 100 INJECTION, SOLUTION INTRAVENOUS; SUBCUTANEOUS at 08:58

## 2019-12-13 RX ADMIN — ARFORMOTEROL TARTRATE 15 MCG: 15 SOLUTION RESPIRATORY (INHALATION) at 07:01

## 2019-12-13 RX ADMIN — BUDESONIDE 500 MCG: 0.5 INHALANT RESPIRATORY (INHALATION) at 07:01

## 2019-12-13 RX ADMIN — HYDRALAZINE HYDROCHLORIDE 25 MG: 25 TABLET, FILM COATED ORAL at 15:24

## 2019-12-13 RX ADMIN — Medication 10 ML: at 05:39

## 2019-12-13 RX ADMIN — GABAPENTIN 300 MG: 300 CAPSULE ORAL at 22:24

## 2019-12-13 RX ADMIN — BUDESONIDE 500 MCG: 0.5 INHALANT RESPIRATORY (INHALATION) at 19:29

## 2019-12-13 RX ADMIN — ACETAMINOPHEN AND CODEINE PHOSPHATE 1 TABLET: 300; 30 TABLET ORAL at 22:31

## 2019-12-13 RX ADMIN — METHYLPREDNISOLONE SODIUM SUCCINATE 60 MG: 125 INJECTION, POWDER, FOR SOLUTION INTRAMUSCULAR; INTRAVENOUS at 05:33

## 2019-12-13 RX ADMIN — CEFTRIAXONE 1 G: 1 INJECTION, POWDER, FOR SOLUTION INTRAMUSCULAR; INTRAVENOUS at 04:19

## 2019-12-13 RX ADMIN — FUROSEMIDE 40 MG: 10 INJECTION, SOLUTION INTRAMUSCULAR; INTRAVENOUS at 15:23

## 2019-12-13 RX ADMIN — HEPARIN SODIUM 5000 UNITS: 5000 INJECTION INTRAVENOUS; SUBCUTANEOUS at 12:09

## 2019-12-13 RX ADMIN — FUROSEMIDE 40 MG: 10 INJECTION, SOLUTION INTRAMUSCULAR; INTRAVENOUS at 08:58

## 2019-12-13 RX ADMIN — INSULIN LISPRO 15 UNITS: 100 INJECTION, SOLUTION INTRAVENOUS; SUBCUTANEOUS at 12:08

## 2019-12-13 RX ADMIN — ASPIRIN 81 MG 81 MG: 81 TABLET ORAL at 08:59

## 2019-12-13 RX ADMIN — HYDRALAZINE HYDROCHLORIDE 25 MG: 25 TABLET, FILM COATED ORAL at 22:30

## 2019-12-13 RX ADMIN — ARFORMOTEROL TARTRATE 15 MCG: 15 SOLUTION RESPIRATORY (INHALATION) at 19:29

## 2019-12-13 RX ADMIN — HEPARIN SODIUM 5000 UNITS: 5000 INJECTION INTRAVENOUS; SUBCUTANEOUS at 04:49

## 2019-12-13 RX ADMIN — IPRATROPIUM BROMIDE AND ALBUTEROL SULFATE 3 ML: .5; 3 SOLUTION RESPIRATORY (INHALATION) at 11:08

## 2019-12-13 RX ADMIN — SODIUM CHLORIDE 40 MG: 9 INJECTION INTRAMUSCULAR; INTRAVENOUS; SUBCUTANEOUS at 08:59

## 2019-12-13 RX ADMIN — INSULIN LISPRO 15 UNITS: 100 INJECTION, SOLUTION INTRAVENOUS; SUBCUTANEOUS at 17:37

## 2019-12-13 RX ADMIN — DULOXETINE HYDROCHLORIDE 60 MG: 60 CAPSULE, DELAYED RELEASE ORAL at 09:00

## 2019-12-13 RX ADMIN — GUAIFENESIN 600 MG: 600 TABLET, EXTENDED RELEASE ORAL at 22:24

## 2019-12-13 RX ADMIN — METOPROLOL SUCCINATE 100 MG: 100 TABLET, EXTENDED RELEASE ORAL at 09:00

## 2019-12-13 RX ADMIN — HEPARIN SODIUM 5000 UNITS: 5000 INJECTION INTRAVENOUS; SUBCUTANEOUS at 22:25

## 2019-12-13 RX ADMIN — INSULIN LISPRO 12 UNITS: 100 INJECTION, SOLUTION INTRAVENOUS; SUBCUTANEOUS at 17:37

## 2019-12-13 RX ADMIN — HYDRALAZINE HYDROCHLORIDE 25 MG: 25 TABLET, FILM COATED ORAL at 09:00

## 2019-12-13 RX ADMIN — INSULIN GLARGINE 60 UNITS: 100 INJECTION, SOLUTION SUBCUTANEOUS at 08:59

## 2019-12-13 RX ADMIN — AMLODIPINE BESYLATE 5 MG: 5 TABLET ORAL at 09:00

## 2019-12-13 RX ADMIN — GABAPENTIN 300 MG: 300 CAPSULE ORAL at 15:24

## 2019-12-13 RX ADMIN — METHYLPREDNISOLONE SODIUM SUCCINATE 60 MG: 125 INJECTION, POWDER, FOR SOLUTION INTRAMUSCULAR; INTRAVENOUS at 22:25

## 2019-12-13 RX ADMIN — GABAPENTIN 300 MG: 300 CAPSULE ORAL at 08:59

## 2019-12-13 RX ADMIN — Medication 10 ML: at 15:24

## 2019-12-13 RX ADMIN — ATORVASTATIN CALCIUM 40 MG: 20 TABLET, FILM COATED ORAL at 08:59

## 2019-12-13 RX ADMIN — LIDOCAINE HYDROCHLORIDE 40 ML: 20 SOLUTION ORAL; TOPICAL at 22:31

## 2019-12-13 RX ADMIN — Medication 10 ML: at 22:33

## 2019-12-13 RX ADMIN — INSULIN LISPRO 10 UNITS: 100 INJECTION, SOLUTION INTRAVENOUS; SUBCUTANEOUS at 12:08

## 2019-12-13 RX ADMIN — GUAIFENESIN 600 MG: 600 TABLET, EXTENDED RELEASE ORAL at 09:00

## 2019-12-13 NOTE — ROUTINE PROCESS
Bedside and Verbal shift change report given to Mary Lou Castro RN (oncoming nurse) by Kiki Sandoval RN (offgoing nurse). Report included the following information SBAR and Kardex.

## 2019-12-13 NOTE — PROGRESS NOTES
Pt placed on hospital-issued resmed cpap in auto-titrating mode for HS at this time. Fio2 21%. No distress noted. Will continue to monitor.

## 2019-12-13 NOTE — PROGRESS NOTES
2358-Resting quietly in bed with CPAP in place. Stable. Assessment complete. No complaints voiced. Personal items within reach and call light in place. 0152-Resting in bed, eyes closed, respirations 20, CPAP in place. Call light within reach. 0251-Resting quietly, eyes closed, respirations 18-20.    0414-Awake, watching television. 0459-Patient unsteady ambulating to bathroom, accompanied patient during this activity. Voiding. 0630-Entered room, found patient stumbling with mobility, made it to chair by window and sat down in the chair. Shift Summary:  PT/OT working with patient with mobility. Requires stand along assistance with mobility. Stable. Resting quietly in bed at shift change.

## 2019-12-13 NOTE — PROGRESS NOTES
Transition of care: home when medically cleared with Sidneyu John  Met with patient and Dr Corey Up at bedside. Patient reports she plans to return home when d/c. Pt recommendatons [facility] patient concurs and [address] per [facility]. Cm was informed by Ramakrishna Nunn they fo not accept patients insurance informed patient of this and she has now chose [facility]. cmw will send referral  Transition of Care Plan:     The Plan for Transition of Care is related to the following treatment goals: home with Iam Lopez    The Patient  was provided with a choice of provider and agrees  with the discharge plan. Yes [x] No []    A Freedom of choice list was provided with basic dialogue that supports the patient's individualized plan of care/goals and shares the quality data associated with the providers. Yes [x] No []   Patients daughter at bedside. Patient has medicaid for insutance. She has pcp. Patient wants cap and is working with Dr. Dona Richmond and Dr. Hailey Lawrence at [facility] sleep clinic  Patient asked for nebulizer. Cm had Dr. Ramakrishna Nunn sign rx for nebulizer and will send to [address] by cms. Patient not d/c today. Patient currently wearing oxygen. Efrain Tripp, RN made aware to wean by Dr. Ramakrishna Nunn.       [facility] has received all the inforation they need for the nebulizer. If patient d/c on sat or Sunday. The cm needs to call [phone] and say that is a hospital d/c  And they will discuss where they will deliver the nebulizer  Per Paul Kennett will continue to follow  . Care Management Interventions  PCP Verified by CM:  Yes  Transition of Care Consult (CM Consult): [address]: Yes  Physical Therapy Consult: Yes  Occupational Therapy Consult: Yes  Current Support Network: Lives Alone, Family Lives Nearby  Confirm Follow Up Transport: Family  Plan discussed with Pt/Family/Caregiver: Yes  Freedom of Choice Offered: Yes  Discharge Location  Discharge Placement: Home with home health

## 2019-12-13 NOTE — PROGRESS NOTES
Problem: Diabetes Self-Management  Goal: *Disease process and treatment process  Description  Define diabetes and identify own type of diabetes; list 3 options for treating diabetes. Outcome: Progressing Towards Goal  Note:   Needs reinforcement of education. Goal: *Incorporating nutritional management into lifestyle  Description  Describe effect of type, amount and timing of food on blood glucose; list 3 methods for planning meals. Outcome: Progressing Towards Goal  Note:   Pt explained what she ate, and was trying to decipher why her blood glucose was still high. Goal: *Using medications safely  Description  State effect of diabetes medications on diabetes; name diabetes medication taking, action and side effects. Outcome: Progressing Towards Goal  Note:   Pt verbalized rotating sites for injections     Problem: Falls - Risk of  Goal: *Absence of Falls  Description  Document Yanira Dear Fall Risk and appropriate interventions in the flowsheet. Outcome: Progressing Towards Goal  Note:   Fall Risk Interventions:  No falls during shift, pt verbalized feeling uneasy, but is willing to work with PT/OT  Mobility Interventions: Communicate number of staff needed for ambulation/transfer         Medication Interventions: Teach patient to arise slowly    Elimination Interventions: Call light in reach    History of Falls Interventions: Investigate reason for fall         Problem: Pain  Goal: *Control of Pain  Outcome: Progressing Towards Goal  Note:   No reports of pain currently during shift.

## 2019-12-13 NOTE — PROGRESS NOTES
Problem: Mobility Impaired (Adult and Pediatric)  Goal: *Acute Goals and Plan of Care (Insert Text)  Description  Physical Therapy Goals   Initiated 12/12/2019 and to be accomplished within 2-4 day(s)  1. Patient will move from supine <> sit with mod I in prep for out of bed activity and change of position. 2.  Patient will perform sit<> stand with S/mod I/RW in prep for transfers/ambulation. 3.  Patient will transfer from bed <> chair with S/mod I with RW for time up in chair for completion of ADL activity. 4.  Patient will ambulate 130 feet with S/mod I with RW for improved functional mobility/safe discharge. Outcome: Progressing Towards Goal     PHYSICAL THERAPY TREATMENT    Patient: Katherine Bunch (53 y.o. female)  Date: 12/13/2019  Diagnosis: Asthma [J45.909]   Respiratory distress       Precautions: Fall   Chart, physical therapy assessment, plan of care and goals were reviewed. ASSESSMENT:  Pt report increase of Bilateral neuropathy. Pt is able to increase howard amb, but requires cuing to maintain UE support on RW, to reduce R LOB (Removes hands to wipe nose and attempt hand gestures). Two right sided LOB episodes at start of amb, but improved with cuing. R foot drop/slap present throughout. Instructed to elevate LE's for improvement of B LE swelling. Pt maintains 93-97% on RA. Homehealth may be appropriate at D/c. Progression toward goals:  []      Improving appropriately and progressing toward goals  [x]      Improving slowly and progressing toward goals  []      Not making progress toward goals and plan of care will be adjusted     PLAN:  Patient continues to benefit from skilled intervention to address the above impairments. Continue treatment per established plan of care. Discharge Recommendations:  Home Health  Further Equipment Recommendations for Discharge:  rolling walker     SUBJECTIVE:   Patient stated I know that voice.     OBJECTIVE DATA SUMMARY:   Critical Behavior:  Neurologic State: Alert  Orientation Level: Oriented X4  Cognition: Appropriate decision making, Appropriate for age attention/concentration, Appropriate safety awareness, Follows commands  Safety/Judgement: Fall prevention  Functional Mobility Training:  Bed Mobility:  Sit to Supine: Supervision  Scooting: Supervision  Transfers:  Sit to Stand: Contact guard assistance  Stand to Sit: Contact guard assistance  Balance:  Sitting: Intact  Standing: Impaired; With support  Standing - Static: Good  Standing - Dynamic : Fair  Ambulation/Gait Training:  Distance (ft): 120 Feet (ft)  Assistive Device: Gait belt;Walker, rolling  Ambulation - Level of Assistance: Contact guard assistance  Gait Abnormalities: Foot drop;Decreased step clearance(R foot slap )  Base of Support: Widened  Speed/Arlette: Slow  Interventions: Safety awareness training;Verbal cues  Pain:  Pain Scale 1: Numeric (0 - 10)  Pain Intensity 1: 0  Pain out: 0  Activity Tolerance:   Fair+  Please refer to the flowsheet for vital signs taken during this treatment.   After treatment:   [] Patient left in no apparent distress sitting up in chair  [x] Patient left in no apparent distress in bed  [x] Call bell left within reach  [x] Nursing notified  [] Caregiver present  [] Bed alarm activated      Sherron Stokes PTA   Time Calculation: 29 mins

## 2019-12-13 NOTE — PROGRESS NOTES
Hospitalist Progress Note-critical care note     Patient: Darya Walker MRN: [id number]  CSN: [id number]    YOB: 1965  Age: 53 y.o. Sex: female    DOA: 12/10/2019 LOS:  LOS: 2 days            Chief complaint: Uncontrolled diabetes, UTI, АНДРЕЙ, asthma exacerbation, respiratory distress    Assessment/Plan         Hospital Problems  Date Reviewed: 12/11/2019          Codes Class Noted POA    АНДРЕЙ (obstructive sleep apnea) ICD-10-CM: G47.33  ICD-9-CM: 327.23  12/11/2019 Yes        UTI (urinary tract infection) ICD-10-CM: N39.0  ICD-9-CM: 599.0  12/11/2019 Yes        * (Principal) Respiratory distress ICD-10-CM: R06.03  ICD-9-CM: 786.09  1/17/2019 Yes        Asthma exacerbation ICD-10-CM: J45.901  ICD-9-CM: 493.92  1/17/2019 Yes        Diastolic CHF ([facility] CQF-84-KX: I50.30  ICD-9-CM: 428.30, 428.0  1/17/2019 Yes        Type II or unspecified type diabetes mellitus with renal manifestations, uncontrolled(250.42) ([facility] ICD-10-CM: E11.29, E11.65  ICD-9-CM: 250.42  9/4/2014 Yes        HTN (hypertension) ICD-10-CM: I10  ICD-9-CM: 401.9  8/16/2014 Yes        Morbid obesity with BMI of 40.0-44.9, adult ([facility] ICD-10-CM: E66.01, Z68.41  ICD-9-CM: 278.01, V85.41  8/16/2014 Yes            Respiratory distress  Resolved, off oxygen. Asthma exacerbation, chronic, intermittent asthma   Improving, continue wean steroid. Continue breathing treatment- will change duo prn     UTI  Continue Rocephin, urine culture gram-negative seth. We will follow-up with sensitivity. Diabetes type 2 uncontrolled with complication with renal manifestation   Lantus. Pre-meal insulin-will increase to 15 units. Sliding scale. weaning off steroid   Need education, put back diabetes diet. Wean steroid    Diastolic CHF, stable no CHF exacerbation noted. Lasix, due to elevated renal function.   Will give one time iv lasix due  To leg swelling, will put back to 2 times daily if renal stable or better     CKD 3-4  Creatinine mildly increased, see above     Hypertension  Controlled per current medication. Subjective: still cough, leg swelling ,     RN: coughs -put on nc O2  No nc O2 needed if not desat   Will prescribe nebulizer machine for chronic asthma     Disposition :1-2 days   Review of systems:    General: No fevers or chills. Cardiovascular: No chest pain or pressure. No palpitations. Pulmonary: No shortness of breath. Cough  Gastrointestinal: No nausea, vomiting. Vital signs/Intake and Output:  Visit Vitals  /76   Pulse 69   Temp 97.5 °F (36.4 °C)   Resp 18   Ht 5' 2\" (1.575 m)   Wt 148.6 kg (327 lb 11.2 oz)   SpO2 98%   BMI 59.94 kg/m²     Current Shift:  12/13 0701 - 12/13 1900  In: -   Out: 900 [EGYLS:242]  Last three shifts:  12/11 1901 - 12/13 0700  In: 029 [P.O.:480; I.V.:158]  Out: 2000 [Urine:2000]    Physical Exam:  General: WD, WN. Alert, cooperative, no acute distress    HEENT: NC, Atraumatic. PERRLA, anicteric sclerae. Lungs: No wheezing, no Rhonchi/Rales. Heart:  Regular  rhythm,  No murmur, No Rubs, No Gallops  Abdomen: Soft, Non distended, Non tender.  +Bowel sounds,   Extremities: No c/c/e  Psych:   Not anxious or agitated. Neurologic:  No acute neurological deficit. Labs: Results:       Chemistry Recent Labs     12/13/19  0358 12/12/19  1115 12/12/19  0550  12/10/19  2340   * 207* 138*   < > 284*    136 137   < > 143   K 5.2 4.1 4.8   < > 4.2    107 108   < > 110   CO2 20* 19* 21   < > 24   BUN 74* 60* 56*   < > 38*   CREA 2.29* 2.24* 2.24*   < > 1.66*   CA 8.8 8.7 8.9   < > 8.5   AGAP 10 10 8   < > 9   BUCR 32* 27* 25*   < > 23*     --  105  --  109   TP 6.9  --  6.6  --  6.0*   ALB 2.8*  --  2.7*  --  2.4*   GLOB 4.1*  --  3.9  --  3.6   AGRAT 0.7*  --  0.7*  --  0.7*    < > = values in this interval not displayed.       CBC w/Diff Recent Labs     12/13/19  0358 12/12/19  0550 12/10/19  2340   WBC 16.9* 15.5* 6.3   RBC 4.02* 3.84* 3.60*   HGB 10.2* 9.7* 9.0* HCT 32.8* 31.0* 29.4*    264 249   GRANS  --   --  52   LYMPH  --   --  32   EOS  --   --  5      Cardiac Enzymes Recent Labs     12/10/19  2340   CPK 44   CKND1 CALCULATION NOT PERFORMED WHEN RESULT IS BELOW LINEAR LIMIT      Coagulation No results for input(s): PTP, INR, APTT, INREXT, INREXT in the last 72 hours. Lipid Panel Lab Results   Component Value Date/Time    Cholesterol, total 146 05/28/2015 02:58 AM    HDL Cholesterol 64 (H) 05/28/2015 02:58 AM    LDL, calculated 66 05/28/2015 02:58 AM    VLDL, calculated 16 05/28/2015 02:58 AM    Triglyceride 80 05/28/2015 02:58 AM    CHOL/HDL Ratio 2.3 05/28/2015 02:58 AM      BNP No results for input(s): BNPP in the last 72 hours.    Liver Enzymes Recent Labs     12/13/19  0358   TP 6.9   ALB 2.8*      SGOT 19      Thyroid Studies Lab Results   Component Value Date/Time    TSH 0.56 01/23/2019 01:33 AM        Procedures/imaging: see electronic medical records for all procedures/Xrays and details which were not copied into this note but were reviewed prior to creation of Ingrid Marshall MD

## 2019-12-13 NOTE — PROGRESS NOTES
0715 Bedside and Verbal shift change report given to BERT Fontaine RN (oncoming nurse) by Perry Hastings. Faisal Bhakta RN (offgoing nurse). Report included the following information SBAR, Kardex, Intake/Output, and MAR. Pt in bed, call light in reach. 7941 Pt placed on 2LNC, pt had coughing fit, turning red, placed oxygen to help. 56 Dr. iWsam Easley and Annie Reed RN stated to wean pt off. Pt weaned to 1L    1108 Pt weaned to 0.5 L by respiratory    1212 Pt weaned off O2. Pt nervous for much of the day, spoke with  today, helped ease pt.    1930 Bedside and Verbal shift change report given to Annie Reed RN (oncoming nurse) by [place]. Ranjith Fontaine RN (offgoing nurse). Report included the following information SBAR, Kardex, Intake/Output, and MAR. Pt in bed, call light in reach.

## 2019-12-13 NOTE — ROUTINE PROCESS
Bedside and Verbal shift change report given to Toño Mendoza RN (oncoming nurse) by Saul Lesches, RN (offgoing nurse). Report included the following information SBAR and Kardex.

## 2019-12-13 NOTE — PROGRESS NOTES
Problem: Mobility Impaired (Adult and Pediatric)  Goal: *Acute Goals and Plan of Care (Insert Text)  Description  Physical Therapy Goals   Initiated 12/12/2019 and to be accomplished within 2-4 day(s)  1. Patient will move from supine <> sit with mod I in prep for out of bed activity and change of position. 2.  Patient will perform sit<> stand with S/mod I/RW in prep for transfers/ambulation. 3.  Patient will transfer from bed <> chair with S/mod I with RW for time up in chair for completion of ADL activity. 4.  Patient will ambulate 130 feet with S/mod I with RW for improved functional mobility/safe discharge. 12/13/2019  PT treatment not completed due to:  [] Off Unit for testing/procedure  [] Pain  [] Eating  [] Patient too lethargic  [] Nausea/vomiting  [] Dialysis treatment in progress   [x] Other: pt c/o \"not doing too good this morning\". Legs feeing weak  'like 400 weights\" and reports near fall in room this am due to feeling weak and dizzy. Will f/u at later time today for PT treatment. Thank you.    Leydi Ortiz, PT

## 2019-12-14 LAB
ALBUMIN SERPL-MCNC: 2.7 G/DL (ref 3.4–5)
ALBUMIN/GLOB SERPL: 0.8 {RATIO} (ref 0.8–1.7)
ALP SERPL-CCNC: 93 U/L (ref 45–117)
ALT SERPL-CCNC: 24 U/L (ref 13–56)
ANION GAP SERPL CALC-SCNC: 8 MMOL/L (ref 3–18)
APTT PPP: 28.5 SEC (ref 23–36.4)
APTT PPP: 62.9 SEC (ref 23–36.4)
APTT PPP: 83.3 SEC (ref 23–36.4)
APTT PPP: >180 SEC (ref 23–36.4)
AST SERPL-CCNC: 11 U/L (ref 10–38)
ATRIAL RATE: 64 BPM
BILIRUB SERPL-MCNC: 0.2 MG/DL (ref 0.2–1)
BUN SERPL-MCNC: 100 MG/DL (ref 7–18)
BUN/CREAT SERPL: 43 (ref 12–20)
CALCIUM SERPL-MCNC: 8.6 MG/DL (ref 8.5–10.1)
CALCULATED P AXIS, ECG09: 66 DEGREES
CALCULATED R AXIS, ECG10: 53 DEGREES
CALCULATED T AXIS, ECG11: 56 DEGREES
CHLORIDE SERPL-SCNC: 110 MMOL/L (ref 100–111)
CK MB CFR SERPL CALC: 1.3 % (ref 0–4)
CK MB CFR SERPL CALC: 1.3 % (ref 0–4)
CK MB CFR SERPL CALC: ABNORMAL % (ref 0–4)
CK MB SERPL-MCNC: 1 NG/ML (ref 5–25)
CK MB SERPL-MCNC: 1.1 NG/ML (ref 5–25)
CK MB SERPL-MCNC: <1 NG/ML (ref 5–25)
CK SERPL-CCNC: 72 U/L (ref 26–192)
CK SERPL-CCNC: 78 U/L (ref 26–192)
CK SERPL-CCNC: 85 U/L (ref 26–192)
CO2 SERPL-SCNC: 22 MMOL/L (ref 21–32)
CREAT SERPL-MCNC: 2.35 MG/DL (ref 0.6–1.3)
DIAGNOSIS, 93000: NORMAL
ERYTHROCYTE [DISTWIDTH] IN BLOOD BY AUTOMATED COUNT: 15.8 % (ref 11.6–14.5)
GLOBULIN SER CALC-MCNC: 3.4 G/DL (ref 2–4)
GLUCOSE BLD STRIP.AUTO-MCNC: 130 MG/DL (ref 70–110)
GLUCOSE BLD STRIP.AUTO-MCNC: 172 MG/DL (ref 70–110)
GLUCOSE BLD STRIP.AUTO-MCNC: 190 MG/DL (ref 70–110)
GLUCOSE BLD STRIP.AUTO-MCNC: 223 MG/DL (ref 70–110)
GLUCOSE SERPL-MCNC: 124 MG/DL (ref 74–99)
HCT VFR BLD AUTO: 31 % (ref 35–45)
HGB BLD-MCNC: 9.6 G/DL (ref 12–16)
MCH RBC QN AUTO: 25.3 PG (ref 24–34)
MCHC RBC AUTO-ENTMCNC: 31 G/DL (ref 31–37)
MCV RBC AUTO: 81.8 FL (ref 74–97)
P-R INTERVAL, ECG05: 116 MS
PLATELET # BLD AUTO: 289 K/UL (ref 135–420)
PMV BLD AUTO: 9.9 FL (ref 9.2–11.8)
POTASSIUM SERPL-SCNC: 5.1 MMOL/L (ref 3.5–5.5)
PROT SERPL-MCNC: 6.1 G/DL (ref 6.4–8.2)
Q-T INTERVAL, ECG07: 400 MS
QRS DURATION, ECG06: 82 MS
QTC CALCULATION (BEZET), ECG08: 412 MS
RBC # BLD AUTO: 3.79 M/UL (ref 4.2–5.3)
SODIUM SERPL-SCNC: 140 MMOL/L (ref 136–145)
TROPONIN I SERPL-MCNC: 0.15 NG/ML (ref 0–0.04)
TROPONIN I SERPL-MCNC: 0.19 NG/ML (ref 0–0.04)
TROPONIN I SERPL-MCNC: 0.2 NG/ML (ref 0–0.04)
VENTRICULAR RATE, ECG03: 64 BPM
WBC # BLD AUTO: 15.2 K/UL (ref 4.6–13.2)

## 2019-12-14 PROCEDURE — 74011250637 HC RX REV CODE- 250/637: Performed by: FAMILY MEDICINE

## 2019-12-14 PROCEDURE — 94760 N-INVAS EAR/PLS OXIMETRY 1: CPT

## 2019-12-14 PROCEDURE — 80053 COMPREHEN METABOLIC PANEL: CPT

## 2019-12-14 PROCEDURE — 74011636637 HC RX REV CODE- 636/637: Performed by: HOSPITALIST

## 2019-12-14 PROCEDURE — 85730 THROMBOPLASTIN TIME PARTIAL: CPT

## 2019-12-14 PROCEDURE — 74011000250 HC RX REV CODE- 250: Performed by: HOSPITALIST

## 2019-12-14 PROCEDURE — 85027 COMPLETE CBC AUTOMATED: CPT

## 2019-12-14 PROCEDURE — 82550 ASSAY OF CK (CPK): CPT

## 2019-12-14 PROCEDURE — 65660000000 HC RM CCU STEPDOWN

## 2019-12-14 PROCEDURE — 74011250636 HC RX REV CODE- 250/636: Performed by: FAMILY MEDICINE

## 2019-12-14 PROCEDURE — 94640 AIRWAY INHALATION TREATMENT: CPT

## 2019-12-14 PROCEDURE — 97530 THERAPEUTIC ACTIVITIES: CPT

## 2019-12-14 PROCEDURE — C9113 INJ PANTOPRAZOLE SODIUM, VIA: HCPCS | Performed by: FAMILY MEDICINE

## 2019-12-14 PROCEDURE — 82962 GLUCOSE BLOOD TEST: CPT

## 2019-12-14 PROCEDURE — 94660 CPAP INITIATION&MGMT: CPT

## 2019-12-14 PROCEDURE — 74011250636 HC RX REV CODE- 250/636: Performed by: HOSPITALIST

## 2019-12-14 PROCEDURE — 74011636637 HC RX REV CODE- 636/637: Performed by: FAMILY MEDICINE

## 2019-12-14 PROCEDURE — 36415 COLL VENOUS BLD VENIPUNCTURE: CPT

## 2019-12-14 PROCEDURE — 74011000250 HC RX REV CODE- 250: Performed by: FAMILY MEDICINE

## 2019-12-14 PROCEDURE — 97116 GAIT TRAINING THERAPY: CPT

## 2019-12-14 RX ORDER — HEPARIN SODIUM 1000 [USP'U]/ML
3000 INJECTION, SOLUTION INTRAVENOUS; SUBCUTANEOUS ONCE
Status: COMPLETED | OUTPATIENT
Start: 2019-12-14 | End: 2019-12-14

## 2019-12-14 RX ORDER — HEPARIN SODIUM 10000 [USP'U]/100ML
6.7-25 INJECTION, SOLUTION INTRAVENOUS
Status: DISCONTINUED | OUTPATIENT
Start: 2019-12-14 | End: 2019-12-16 | Stop reason: HOSPADM

## 2019-12-14 RX ORDER — HEPARIN SODIUM 1000 [USP'U]/ML
4000 INJECTION, SOLUTION INTRAVENOUS; SUBCUTANEOUS ONCE
Status: COMPLETED | OUTPATIENT
Start: 2019-12-14 | End: 2019-12-14

## 2019-12-14 RX ORDER — ASPIRIN 325 MG
325 TABLET ORAL ONCE
Status: COMPLETED | OUTPATIENT
Start: 2019-12-14 | End: 2019-12-14

## 2019-12-14 RX ADMIN — HYDRALAZINE HYDROCHLORIDE 25 MG: 25 TABLET, FILM COATED ORAL at 09:57

## 2019-12-14 RX ADMIN — DULOXETINE HYDROCHLORIDE 60 MG: 60 CAPSULE, DELAYED RELEASE ORAL at 09:57

## 2019-12-14 RX ADMIN — BUDESONIDE 500 MCG: 0.5 INHALANT RESPIRATORY (INHALATION) at 07:12

## 2019-12-14 RX ADMIN — CEFTRIAXONE 1 G: 1 INJECTION, POWDER, FOR SOLUTION INTRAMUSCULAR; INTRAVENOUS at 02:33

## 2019-12-14 RX ADMIN — FUROSEMIDE 40 MG: 10 INJECTION, SOLUTION INTRAMUSCULAR; INTRAVENOUS at 09:56

## 2019-12-14 RX ADMIN — HYDRALAZINE HYDROCHLORIDE 25 MG: 25 TABLET, FILM COATED ORAL at 17:22

## 2019-12-14 RX ADMIN — Medication 10 ML: at 22:02

## 2019-12-14 RX ADMIN — HEPARIN SODIUM 4000 UNITS: 1000 INJECTION INTRAVENOUS; SUBCUTANEOUS at 02:49

## 2019-12-14 RX ADMIN — METOPROLOL SUCCINATE 100 MG: 100 TABLET, EXTENDED RELEASE ORAL at 09:58

## 2019-12-14 RX ADMIN — ARFORMOTEROL TARTRATE 15 MCG: 15 SOLUTION RESPIRATORY (INHALATION) at 07:12

## 2019-12-14 RX ADMIN — HEPARIN SODIUM 8.7 UNITS/KG/HR: 10000 INJECTION, SOLUTION INTRAVENOUS at 11:59

## 2019-12-14 RX ADMIN — GABAPENTIN 300 MG: 300 CAPSULE ORAL at 22:00

## 2019-12-14 RX ADMIN — ASPIRIN 325 MG ORAL TABLET 325 MG: 325 PILL ORAL at 02:10

## 2019-12-14 RX ADMIN — INSULIN LISPRO 15 UNITS: 100 INJECTION, SOLUTION INTRAVENOUS; SUBCUTANEOUS at 17:21

## 2019-12-14 RX ADMIN — GABAPENTIN 300 MG: 300 CAPSULE ORAL at 09:57

## 2019-12-14 RX ADMIN — GUAIFENESIN 600 MG: 600 TABLET, EXTENDED RELEASE ORAL at 22:00

## 2019-12-14 RX ADMIN — INSULIN LISPRO 3 UNITS: 100 INJECTION, SOLUTION INTRAVENOUS; SUBCUTANEOUS at 17:21

## 2019-12-14 RX ADMIN — AMLODIPINE BESYLATE 5 MG: 5 TABLET ORAL at 09:58

## 2019-12-14 RX ADMIN — INSULIN LISPRO 3 UNITS: 100 INJECTION, SOLUTION INTRAVENOUS; SUBCUTANEOUS at 07:04

## 2019-12-14 RX ADMIN — ATORVASTATIN CALCIUM 40 MG: 20 TABLET, FILM COATED ORAL at 09:57

## 2019-12-14 RX ADMIN — METHYLPREDNISOLONE SODIUM SUCCINATE 60 MG: 125 INJECTION, POWDER, FOR SOLUTION INTRAMUSCULAR; INTRAVENOUS at 09:56

## 2019-12-14 RX ADMIN — INSULIN LISPRO 15 UNITS: 100 INJECTION, SOLUTION INTRAVENOUS; SUBCUTANEOUS at 08:12

## 2019-12-14 RX ADMIN — INSULIN GLARGINE 60 UNITS: 100 INJECTION, SOLUTION SUBCUTANEOUS at 09:55

## 2019-12-14 RX ADMIN — ARFORMOTEROL TARTRATE 15 MCG: 15 SOLUTION RESPIRATORY (INHALATION) at 20:15

## 2019-12-14 RX ADMIN — HEPARIN SODIUM 2.7 UNITS/KG/HR: 10000 INJECTION, SOLUTION INTRAVENOUS at 19:44

## 2019-12-14 RX ADMIN — ASPIRIN 81 MG 81 MG: 81 TABLET ORAL at 09:57

## 2019-12-14 RX ADMIN — Medication 10 ML: at 17:22

## 2019-12-14 RX ADMIN — LOSARTAN POTASSIUM 100 MG: 50 TABLET, FILM COATED ORAL at 09:57

## 2019-12-14 RX ADMIN — Medication 10 ML: at 07:03

## 2019-12-14 RX ADMIN — BUDESONIDE 500 MCG: 0.5 INHALANT RESPIRATORY (INHALATION) at 20:15

## 2019-12-14 RX ADMIN — GABAPENTIN 300 MG: 300 CAPSULE ORAL at 17:22

## 2019-12-14 RX ADMIN — METHYLPREDNISOLONE SODIUM SUCCINATE 60 MG: 125 INJECTION, POWDER, FOR SOLUTION INTRAMUSCULAR; INTRAVENOUS at 22:00

## 2019-12-14 RX ADMIN — SODIUM CHLORIDE 40 MG: 9 INJECTION INTRAMUSCULAR; INTRAVENOUS; SUBCUTANEOUS at 09:56

## 2019-12-14 RX ADMIN — INSULIN LISPRO 6 UNITS: 100 INJECTION, SOLUTION INTRAVENOUS; SUBCUTANEOUS at 22:01

## 2019-12-14 RX ADMIN — HYDRALAZINE HYDROCHLORIDE 25 MG: 25 TABLET, FILM COATED ORAL at 22:00

## 2019-12-14 RX ADMIN — GUAIFENESIN 600 MG: 600 TABLET, EXTENDED RELEASE ORAL at 09:57

## 2019-12-14 RX ADMIN — HEPARIN SODIUM 3000 UNITS: 1000 INJECTION INTRAVENOUS; SUBCUTANEOUS at 11:58

## 2019-12-14 RX ADMIN — HEPARIN SODIUM 6.7 UNITS/KG/HR: 10000 INJECTION, SOLUTION INTRAVENOUS at 02:50

## 2019-12-14 RX ADMIN — INSULIN LISPRO 15 UNITS: 100 INJECTION, SOLUTION INTRAVENOUS; SUBCUTANEOUS at 12:36

## 2019-12-14 NOTE — CONSULTS
Cardiolology  Inpatient Consult      Patient:  Freddie Gastelum               Sex: female          DOA: 12/10/2019       YOB: 1965      Age:  53 y.o.        LOS:  LOS: 3 days      Freddie Gastelum is a 53 y.o. female admitted for Asthma [J45.909]     Recommendations:  · Agree with current regimen  · Serial markers  · Nuclear stress test  · Follow    Impression:  · CKD  · Mildly elevated troponin with flat curve so far  · Obesity  · АНДРЕЙ  · Asthma with exacerbation  · Other problems as enumerated below    Patient Active Problem List    Diagnosis Date Noted    АНДРЕЙ (obstructive sleep apnea) 12/11/2019    UTI (urinary tract infection) 12/11/2019    BMI 45.0-49.9, adult ([address] 01/19/2019    Pneumonia 01/17/2019    Respiratory distress 01/17/2019    Asthma exacerbation 46/15/5350    Diastolic CHF ([address] 84/54/1931    Hypoglycemia 92/30/4820    Diastolic CHF, acute ([address] 05/27/2015    Morbid obesity ([address]     Type II or unspecified type diabetes mellitus with renal manifestations, uncontrolled(250.42) ([address] 09/04/2014    HTN (hypertension) 08/16/2014    Morbid obesity with BMI of 40.0-44.9, adult ([address] 08/16/2014      Guanaco Denson MD  Past Medical History:   Diagnosis Date    Asthma     Note: As child had asthma    Diabetes mellitus ([address]     Type 2    Heart palpitations     Hypertension     Ill-defined condition     fibromyalgia     Morbid obesity ([address]     MRSA infection 8/2014      Past Surgical History:   Procedure Laterality Date    BREAST SURGERY PROCEDURE UNLISTED      cyst removed    HX CATARACT REMOVAL      HX CHOLECYSTECTOMY      HX TUBAL LIGATION       Allergies   Allergen Reactions    Bees [Sting, Bee] Swelling    Penicillins Hives    Coila Hives      Family History   Problem Relation Age of Onset    Heart Attack Mother     Heart Attack Maternal Grandmother       Current Facility-Administered Medications   Medication Dose Route Frequency    heparin 25,000 units in D5W 250 ml infusion  6.7-25 Units/kg/hr IntraVENous TITRATE    albuterol-ipratropium (DUO-NEB) 2.5 MG-0.5 MG/3 ML  3 mL Nebulization Q4H PRN    methylPREDNISolone (PF) (Solu-MEDROL) injection 60 mg  60 mg IntraVENous Q12H    insulin lispro (HUMALOG) injection 15 Units  15 Units SubCUTAneous TIDAC    nitroglycerin (NITROSTAT) tablet 0.4 mg  0.4 mg SubLINGual PRN    insulin glargine (LANTUS) injection 60 Units  60 Units SubCUTAneous DAILY    insulin lispro (HUMALOG) injection   SubCUTAneous AC&HS    furosemide (LASIX) injection 40 mg  40 mg IntraVENous DAILY    sodium chloride (NS) flush 5-40 mL  5-40 mL IntraVENous Q8H    sodium chloride (NS) flush 5-40 mL  5-40 mL IntraVENous PRN    glucose chewable tablet 16 g  16 g Oral PRN    glucagon (GLUCAGEN) injection 1 mg  1 mg IntraMUSCular PRN    dextrose 10% infusion 125-250 mL  125-250 mL IntraVENous PRN    aspirin chewable tablet 81 mg  81 mg Oral DAILY    atorvastatin (LIPITOR) tablet 40 mg  40 mg Oral DAILY    DULoxetine (CYMBALTA) capsule 60 mg  60 mg Oral DAILY    gabapentin (NEURONTIN) capsule 300 mg  300 mg Oral TID    metoprolol succinate (TOPROL-XL) XL tablet 100 mg  100 mg Oral DAILY    amLODIPine (NORVASC) tablet 5 mg  5 mg Oral DAILY    hydrALAZINE (APRESOLINE) tablet 25 mg  25 mg Oral TID    losartan (COZAAR) tablet 100 mg  100 mg Oral DAILY    acetaminophen-codeine (TYLENOL #3) per tablet 1 Tab  1 Tab Oral Q6H PRN    guaiFENesin ER (MUCINEX) tablet 600 mg  600 mg Oral Q12H    cefTRIAXone (ROCEPHIN) 1 g in sterile water (preservative free) 10 mL IV syringe  1 g IntraVENous Q24H    pantoprazole (PROTONIX) 40 mg in 0.9% sodium chloride 10 mL injection  40 mg IntraVENous DAILY    perflutren lipid microspheres (DEFINITY) in NS bolus IV  1 mL IntraVENous PRN    budesonide (PULMICORT) 500 mcg/2 ml nebulizer suspension  500 mcg Nebulization BID RT    arformoterol (BROVANA) neb solution 15 mcg  15 mcg Nebulization BID RT         Review of Symptoms:    Review of Systems  Gen: No fever, chills, malaise, weight loss/gain. Heent: No headache, rhinorrhea, epistaxis, ear pain, hearing loss, sinus pain, neck pain/stiffness, sore throat. Heart: No chest pain, palpitations, HORAN, +pnd and orthopnea. Resp: +cough, wheezing and shortness of breath. GI: +nausea, no vomiting, diarrhea, constipation, melena or hematochezia. : No urinary obstruction, dysuria or hematuria. Derm: No rash, new skin lesion or pruritis. Musc/skeletal: no bone or joint complaints. Vasc: No edema, cyanosis or claudication. Endo: No heat/cold intolerance, no polyuria,polydipsia or polyphagia. Neuro: No unilateral weakness, numbness, tingling. No seizures. Heme: No easy bruising or bleeding. Subjective: Cinthia Can is a 53 y.o. female who has a hx of АНДРЕЙ (CPAP not at home yet), poorly controlled DMT2, morbid obesity, diastolic CHF, and HTN who presents with 3 week history of illness with worsening respiratory distress and cough over the last 2 days. She has been sleeping in a chair and also can't walk to the bathroom without becoming SOB. She has also noticed some leg swelling over the past few days. No fevers, not on home O2. She has had some nausea but no vomiting today and denies frequent UTIs. Her daughter lives with her and is at bedside. No history of intubations for her asthma. She has not had chest pain. .  Cardiac risk factors: extant. Physical Exam    Visit Vitals  /81   Pulse 66   Temp 98.1 °F (36.7 °C)   Resp 18   Ht 5' 2\" (1.575 m)   Wt 148 kg (326 lb 4.5 oz)   SpO2 97%   BMI 59.68 kg/m²       General Appearance:  Well developed, well nourished,alert and oriented x 3, and individual in no acute distress. Ears/Nose/Mouth/Throat:   Hearing grossly normal.         Neck: Supple. Chest:   Lungs clear to auscultation bilaterally. Cardiovascular:  Regular rate and rhythm, S1, S2 normal, no murmur.    Abdomen:   Soft, non-tender, bowel sounds are active. Extremities: No edema bilaterally. Skin: Warm and dry. Cardiographics    Telemetry: normal sinus rhythm  ECG: No acute change  Echocardiogram: in chart    Recent radiology, intake/output and wt reviewed    Labs:   Recent Results (from the past 48 hour(s))   GLUCOSE, POC    Collection Time: 12/12/19  3:46 PM   Result Value Ref Range    Glucose (POC) 242 (H) 70 - 110 mg/dL   GLUCOSE, POC    Collection Time: 12/12/19  4:28 PM   Result Value Ref Range    Glucose (POC) 297 (H) 70 - 110 mg/dL   GLUCOSE, POC    Collection Time: 12/12/19  6:02 PM   Result Value Ref Range    Glucose (POC) 280 (H) 70 - 110 mg/dL   GLUCOSE, POC    Collection Time: 12/12/19  9:20 PM   Result Value Ref Range    Glucose (POC) 384 (H) 70 - 336 mg/dL   METABOLIC PANEL, COMPREHENSIVE    Collection Time: 12/13/19  3:58 AM   Result Value Ref Range    Sodium 136 136 - 145 mmol/L    Potassium 5.2 3.5 - 5.5 mmol/L    Chloride 106 100 - 111 mmol/L    CO2 20 (L) 21 - 32 mmol/L    Anion gap 10 3.0 - 18 mmol/L    Glucose 280 (H) 74 - 99 mg/dL    BUN 74 (H) 7.0 - 18 MG/DL    Creatinine 2.29 (H) 0.6 - 1.3 MG/DL    BUN/Creatinine ratio 32 (H) 12 - 20      GFR est AA 27 (L) >60 ml/min/1.73m2    GFR est non-AA 22 (L) >60 ml/min/1.73m2    Calcium 8.8 8.5 - 10.1 MG/DL    Bilirubin, total 0.2 0.2 - 1.0 MG/DL    ALT (SGPT) 30 13 - 56 U/L    AST (SGOT) 19 10 - 38 U/L    Alk.  phosphatase 108 45 - 117 U/L    Protein, total 6.9 6.4 - 8.2 g/dL    Albumin 2.8 (L) 3.4 - 5.0 g/dL    Globulin 4.1 (H) 2.0 - 4.0 g/dL    A-G Ratio 0.7 (L) 0.8 - 1.7     CBC W/O DIFF    Collection Time: 12/13/19  3:58 AM   Result Value Ref Range    WBC 16.9 (H) 4.6 - 13.2 K/uL    RBC 4.02 (L) 4.20 - 5.30 M/uL    HGB 10.2 (L) 12.0 - 16.0 g/dL    HCT 32.8 (L) 35.0 - 45.0 %    MCV 81.6 74.0 - 97.0 FL    MCH 25.4 24.0 - 34.0 PG    MCHC 31.1 31.0 - 37.0 g/dL    RDW 15.8 (H) 11.6 - 14.5 %    PLATELET 665 657 - 188 K/uL    MPV 9.6 9.2 - 11.8 FL   GLUCOSE, POC    Collection Time: 12/13/19  6:13 AM   Result Value Ref Range    Glucose (POC) 314 (H) 70 - 110 mg/dL   GLUCOSE, POC    Collection Time: 12/13/19 11:40 AM   Result Value Ref Range    Glucose (POC) 436 (HH) 70 - 110 mg/dL   GLUCOSE, POC    Collection Time: 12/13/19 11:42 AM   Result Value Ref Range    Glucose (POC) 384 (H) 70 - 110 mg/dL   GLUCOSE, POC    Collection Time: 12/13/19  4:03 PM   Result Value Ref Range    Glucose (POC) 337 (H) 70 - 110 mg/dL   GLUCOSE, POC    Collection Time: 12/13/19  9:34 PM   Result Value Ref Range    Glucose (POC) 149 (H) 70 - 110 mg/dL   EKG, 12 LEAD, SUBSEQUENT    Collection Time: 12/13/19 10:13 PM   Result Value Ref Range    Ventricular Rate 64 BPM    Atrial Rate 64 BPM    P-R Interval 116 ms    QRS Duration 82 ms    Q-T Interval 400 ms    QTC Calculation (Bezet) 412 ms    Calculated P Axis 66 degrees    Calculated R Axis 53 degrees    Calculated T Axis 56 degrees    Diagnosis       Normal sinus rhythm  Normal ECG  When compared with ECG of 10-DEC-2019 22:55,  Vent. rate has decreased BY  31 BPM  Confirmed by Maicol Cobian MD, -- ([id number]) on 12/14/2019 10:43:50 AM     CARDIAC PANEL,(CK, CKMB & TROPONIN)    Collection Time: 12/14/19  1:00 AM   Result Value Ref Range    CK 85 26 - 192 U/L    CK - MB 1.1 <3.6 ng/ml    CK-MB Index 1.3 0.0 - 4.0 %    Troponin-I, QT 0.19 (H) 0.0 - 3.566 NG/ML   METABOLIC PANEL, COMPREHENSIVE    Collection Time: 12/14/19  1:00 AM   Result Value Ref Range    Sodium 140 136 - 145 mmol/L    Potassium 5.1 3.5 - 5.5 mmol/L    Chloride 110 100 - 111 mmol/L    CO2 22 21 - 32 mmol/L    Anion gap 8 3.0 - 18 mmol/L    Glucose 124 (H) 74 - 99 mg/dL     (H) 7.0 - 18 MG/DL    Creatinine 2.35 (H) 0.6 - 1.3 MG/DL    BUN/Creatinine ratio 43 (H) 12 - 20      GFR est AA 26 (L) >60 ml/min/1.73m2    GFR est non-AA 21 (L) >60 ml/min/1.73m2    Calcium 8.6 8.5 - 10.1 MG/DL    Bilirubin, total 0.2 0.2 - 1.0 MG/DL    ALT (SGPT) 24 13 - 56 U/L    AST (SGOT) 11 10 - 38 U/L    Alk. phosphatase 93 45 - 117 U/L    Protein, total 6.1 (L) 6.4 - 8.2 g/dL    Albumin 2.7 (L) 3.4 - 5.0 g/dL    Globulin 3.4 2.0 - 4.0 g/dL    A-G Ratio 0.8 0.8 - 1.7     CBC W/O DIFF    Collection Time: 12/14/19  1:00 AM   Result Value Ref Range    WBC 15.2 (H) 4.6 - 13.2 K/uL    RBC 3.79 (L) 4.20 - 5.30 M/uL    HGB 9.6 (L) 12.0 - 16.0 g/dL    HCT 31.0 (L) 35.0 - 45.0 %    MCV 81.8 74.0 - 97.0 FL    MCH 25.3 24.0 - 34.0 PG    MCHC 31.0 31.0 - 37.0 g/dL    RDW 15.8 (H) 11.6 - 14.5 %    PLATELET 682 491 - 524 K/uL    MPV 9.9 9.2 - 11.8 FL   PTT    Collection Time: 12/14/19  2:32 AM   Result Value Ref Range    aPTT 28.5 23.0 - 36.4 SEC   GLUCOSE, POC    Collection Time: 12/14/19  6:18 AM   Result Value Ref Range    Glucose (POC) 172 (H) 70 - 110 mg/dL   CARDIAC PANEL,(CK, CKMB & TROPONIN)    Collection Time: 12/14/19  7:35 AM   Result Value Ref Range    CK 72 26 - 192 U/L    CK - MB <1.0 <3.6 ng/ml    CK-MB Index  0.0 - 4.0 %     CALCULATION NOT PERFORMED WHEN RESULT IS BELOW LINEAR LIMIT    Troponin-I, QT 0.20 (H) 0.0 - 0.045 NG/ML   PTT    Collection Time: 12/14/19  9:05 AM   Result Value Ref Range    aPTT 62.9 (H) 23.0 - 36.4 SEC   GLUCOSE, POC    Collection Time: 12/14/19 12:03 PM   Result Value Ref Range    Glucose (POC) 130 (H) 70 - 110 mg/dL           Riya Thacker MD

## 2019-12-14 NOTE — PROGRESS NOTES
Pt placed on hospital-issued resmed in auto-titrating cpap mode for HS at this time. NO distress noted. Will continue to monitor.

## 2019-12-14 NOTE — PROGRESS NOTES
Problem: Diabetes Self-Management  Goal: *Disease process and treatment process  Description  Define diabetes and identify own type of diabetes; list 3 options for treating diabetes. Outcome: Progressing Towards Goal  Note:   Needing less corrective insulin. Goal: *Monitoring blood glucose, interpreting and using results  Description  Identify recommended blood glucose targets  and personal targets. Outcome: Progressing Towards Goal     Problem: Pain  Goal: *Control of Pain  Outcome: Progressing Towards Goal     Problem: Falls - Risk of  Goal: *Absence of Falls  Description  Document [facility] Fall Risk and appropriate interventions in the flowsheet. Note:   No falls during shift.   Fall Risk Interventions:  Mobility Interventions: Communicate number of staff needed for ambulation/transfer         Medication Interventions: Patient to call before getting OOB    Elimination Interventions: Call light in reach    History of Falls Interventions: Door open when patient unattended, Investigate reason for fall

## 2019-12-14 NOTE — PROGRESS NOTES
Problem: Mobility Impaired (Adult and Pediatric)  Goal: *Acute Goals and Plan of Care (Insert Text)  Description  Physical Therapy Goals   Initiated 12/12/2019 and to be accomplished within 2-4 day(s)  1. Patient will move from supine <> sit with mod I in prep for out of bed activity and change of position. 2.  Patient will perform sit<> stand with S/mod I/RW in prep for transfers/ambulation. 3.  Patient will transfer from bed <> chair with S/mod I with RW for time up in chair for completion of ADL activity. 4.  Patient will ambulate 130 feet with S/mod I with RW for improved functional mobility/safe discharge. Outcome: Progressing Towards Goal     PHYSICAL THERAPY TREATMENT    Patient: Christie Cintron (53 y.o. female)  Date: 12/14/2019  Diagnosis: Asthma [J45.909]   Respiratory distress       Precautions: Fall   Chart, physical therapy assessment, plan of care and goals were reviewed. ASSESSMENT:  Pt continues to perform well from a PT aspect, but endurance is still limited. 4 standing rest break to complete 200' of amb. Gradual increase of R foot slap, but started with fair ankle control. No significant change in HR, rhythm or SPO2 (94-97% on RA). Will continue to work toward best function before D/c. Progression toward goals:  []      Improving appropriately and progressing toward goals  [x]      Improving slowly and progressing toward goals  []      Not making progress toward goals and plan of care will be adjusted     PLAN:  Patient continues to benefit from skilled intervention to address the above impairments. Continue treatment per established plan of care. Discharge Recommendations:  Home Health  Further Equipment Recommendations for Discharge:  rolling walker     SUBJECTIVE:   Patient stated I want to go home, but they keep finding more thing wrong with me.     OBJECTIVE DATA SUMMARY:   Critical Behavior:  Neurologic State: Alert  Orientation Level: Oriented X4  Cognition: Follows commands  Safety/Judgement: Fall prevention  Functional Mobility Training:  Bed Mobility:  Rolling: Modified independent  Supine to Sit: Modified independent  Scooting: Supervision  Transfers:  Sit to Stand: Supervision  Stand to Sit: Supervision  Balance:  Sitting: Intact  Standing: Impaired; With support  Standing - Static: Good  Standing - Dynamic : Good  Ambulation/Gait Training:  Distance (ft): 200 Feet (ft)  Assistive Device: Gait belt;Walker, rolling  Ambulation - Level of Assistance: Stand-by assistance  Gait Abnormalities: Foot drop;Decreased step clearance  Base of Support: Widened  Speed/Arlette: Slow  Interventions: Safety awareness training;Verbal cues  Pain:  Pain Scale 1: Numeric (0 - 10)  Pain Intensity 1: 2  Pain Location 1: Chest  Pain Orientation 1: Anterior  Pain Description 1: Aching  Activity Tolerance:   Good  Please refer to the flowsheet for vital signs taken during this treatment.   After treatment:   [] Patient left in no apparent distress sitting up in chair  [x] Patient left in no apparent distress in bed  [x] Call bell left within reach  [x] Nursing notified  [] Caregiver present  [] Bed alarm activated      Gertrude Ríos PTA   Time Calculation: 29 mins

## 2019-12-14 NOTE — PROGRESS NOTES
[id number]. Assumed care of patient. Patient in bed, alert and oriented x 4. Denies complaints, speaks in full sentences. White board updated, bed wheels locked, call bell in reach. 2215. Pt c/o sharp, stabbing chest pain, \"going through the chest and radiating to right arm\". Started when patient was sitting in bed. Denies worsening SOB, palpitations, dizziness, lightheadedness. States she has similar chest pains \"all the time\". ECG done. Pt. States she has spinal problems. 2230. Pt states \"chest pain is getting better\". 2310. Patient is talking and laughing, states chest pain is better. No acute distress noted. 2 nurses tried to draw blood, unsuccessfully. 0100. Patient resting quietly in bed with eyes closed. Respirations regular, no acute distress noted. CPap on. Call bell within reach. 0210. Patient denies chest pain or chest discomfort, upset that she had to be awoken to take medications. No acute distress noted. [id number]. Patient resting quietly in bed with eyes closed. Respirations regular, no acute distress noted. Cpap on. Call bell within reach. 0700. Bedside and Verbal shift change report given to Teri Moralez RN (oncoming nurse) by Daysi Sherman RN (offgoing nurse). Report included the following information SBAR, Intake/Output, MAR and Recent Results.

## 2019-12-14 NOTE — PROGRESS NOTES
Problem: Diabetes Self-Management  Goal: *Disease process and treatment process  Description  Define diabetes and identify own type of diabetes; list 3 options for treating diabetes. Outcome: Progressing Towards Goal  Goal: *Incorporating nutritional management into lifestyle  Description  Describe effect of type, amount and timing of food on blood glucose; list 3 methods for planning meals. Outcome: Progressing Towards Goal  Goal: *Incorporating physical activity into lifestyle  Description  State effect of exercise on blood glucose levels. Outcome: Progressing Towards Goal  Goal: *Developing strategies to promote health/change behavior  Description  Define the ABC's of diabetes; identify appropriate screenings, schedule and personal plan for screenings. Outcome: Progressing Towards Goal  Goal: *Using medications safely  Description  State effect of diabetes medications on diabetes; name diabetes medication taking, action and side effects. Outcome: Progressing Towards Goal  Goal: *Monitoring blood glucose, interpreting and using results  Description  Identify recommended blood glucose targets  and personal targets. Outcome: Progressing Towards Goal  Goal: *Prevention, detection, treatment of acute complications  Description  List symptoms of hyper- and hypoglycemia; describe how to treat low blood sugar and actions for lowering  high blood glucose level. Outcome: Progressing Towards Goal  Goal: *Prevention, detection and treatment of chronic complications  Description  Define the natural course of diabetes and describe the relationship of blood glucose levels to long term complications of diabetes.   Outcome: Progressing Towards Goal  Goal: *Developing strategies to address psychosocial issues  Description  Describe feelings about living with diabetes; identify support needed and support network  Outcome: Progressing Towards Goal  Goal: *Insulin pump training  Outcome: Progressing Towards Goal  Goal: *Sick day guidelines  Outcome: Progressing Towards Goal  Goal: *Patient Specific Goal (EDIT GOAL, INSERT TEXT)  Outcome: Progressing Towards Goal     Problem: Patient Education: Go to Patient Education Activity  Goal: Patient/Family Education  Outcome: Progressing Towards Goal     Problem: Falls - Risk of  Goal: *Absence of Falls  Description  Document Kellie Pike Fall Risk and appropriate interventions in the flowsheet. Outcome: Progressing Towards Goal  Note: Fall Risk Interventions:  Mobility Interventions: Communicate number of staff needed for ambulation/transfer, Patient to call before getting OOB, Utilize walker, cane, or other assistive device         Medication Interventions: Patient to call before getting OOB, Teach patient to arise slowly    Elimination Interventions: Call light in reach, Patient to call for help with toileting needs    History of Falls Interventions: Investigate reason for fall         Problem: Patient Education: Go to Patient Education Activity  Goal: Patient/Family Education  Outcome: Progressing Towards Goal     Problem: Pain  Goal: *Control of Pain  Outcome: Progressing Towards Goal  Goal: *PALLIATIVE CARE:  Alleviation of Pain  Outcome: Progressing Towards Goal     Problem: Patient Education: Go to Patient Education Activity  Goal: Patient/Family Education  Outcome: Progressing Towards Goal     Problem: Pressure Injury - Risk of  Goal: *Prevention of pressure injury  Description  Document Elian Scale and appropriate interventions in the flowsheet.   Outcome: Progressing Towards Goal  Note: Pressure Injury Interventions:  Sensory Interventions: Minimize linen layers, Pressure redistribution bed/mattress (bed type)         Activity Interventions: Pressure redistribution bed/mattress(bed type)    Mobility Interventions: Pressure redistribution bed/mattress (bed type)    Nutrition Interventions: Document food/fluid/supplement intake    Friction and Shear Interventions: HOB 30 degrees or less Problem: Patient Education: Go to Patient Education Activity  Goal: Patient/Family Education  Outcome: Progressing Towards Goal     Problem: Gas Exchange - Impaired  Goal: *Absence of hypoxia  Outcome: Progressing Towards Goal     Problem: Patient Education: Go to Patient Education Activity  Goal: Patient/Family Education  Outcome: Progressing Towards Goal     Problem: Patient Education: Go to Patient Education Activity  Goal: Patient/Family Education  Outcome: Progressing Towards Goal     Problem: Patient Education: Go to Patient Education Activity  Goal: Patient/Family Education  Outcome: Progressing Towards Goal

## 2019-12-14 NOTE — PROGRESS NOTES
Heparin Monitoring  AMI / ACS    Patient Name: Oscar Garibay   YOB: 1965   Medical Record Number: [id number]   Date of Admission: 12/10/2019    Progress Note: 12/14/2019 11:41 AM     Assessment This Admission:   Principal Problem:    Respiratory distress (1/17/2019)    Active Problems:    HTN (hypertension) (8/16/2014)      Morbid obesity with BMI of 40.0-44.9, adult ([facility] [address] (8/16/2014)      Type II or unspecified type diabetes mellitus with renal manifestations, uncontrolled(250.42) ([facility] [address] (9/4/2014)      Asthma exacerbation (2/27/1201)      Diastolic CHF ([facility] [address] (3/09/0159)      АНДРЕЙ (obstructive sleep apnea) (12/11/2019)      UTI (urinary tract infection) (12/11/2019)        Labs:   Latest aPTT:  Lab Results   Component Value Date/Time    aPTT 62.9 (H) 12/14/2019 09:05 AM      72-hr Hx Hematology:  Recent Labs     12/14/19  0100 12/13/19  0358 12/12/19  0550   HGB 9.6* 10.2* 9.7*   HCT 31.0* 32.8* 31.0*    285 264       Pharmacist hereby verifies that the following action has been completed by nursing  according to the AMI/ACS Heparin Protocol:     PTT  < 64: RN gave 3,000 units heparin bolus dose and increased infusion rate by 2 units/kg/hr. Repeat PTT in 6 hours. --------------------------------------  Notes:  called RN to remind of need for bolus and rate adjustment per PTT result from ~ 2 hours ago.        Bria Alanis [facility]  Clinical Pharmacist  [phone]

## 2019-12-14 NOTE — PROGRESS NOTES
0715 Bedside and Verbal shift change report given to BERT Box RN (oncoming nurse) by Logan Boss (offgoing nurse). Report included the following information SBAR, Kardex, Intake/Output, and MAR. Patient in bed, call light in reach. Heparin drip verified. Pt refused medication despite having 2/10 chest pain, she received education and continued to refuse. 1910 Bedside and Verbal shift change report given to Logan Boss RN (oncoming nurse) by Rupesh Benietz. Tracy Box RN (offgoing nurse). Report included the following information SBAR, Kardex, Intake/Output, and MAR. Pt in bathroom. Heparin drip restarted and changed per protocol to 2.7.

## 2019-12-14 NOTE — PROGRESS NOTES
4078 page out to Dr Edwin Colin to update on pt status. 26 Dr Edwin Colin updated on pt status. New orders received for cardiac enzymes q 6 hours x 3, Gl Cocktail, and Nitro SL PRN for chest pain.

## 2019-12-14 NOTE — PROGRESS NOTES
Troponin is elevated, suspect NSTEMI. Will give  mg and start heparin gtt w/ bolus. Trending cardiac enzymes and will consult cardiology.

## 2019-12-15 LAB
APTT PPP: 38.5 SEC (ref 23–36.4)
APTT PPP: 66.4 SEC (ref 23–36.4)
APTT PPP: 70.1 SEC (ref 23–36.4)
BACTERIA SPEC CULT: ABNORMAL
GLUCOSE BLD STRIP.AUTO-MCNC: 229 MG/DL (ref 70–110)
GLUCOSE BLD STRIP.AUTO-MCNC: 253 MG/DL (ref 70–110)
GLUCOSE BLD STRIP.AUTO-MCNC: 265 MG/DL (ref 70–110)
GLUCOSE BLD STRIP.AUTO-MCNC: 364 MG/DL (ref 70–110)
SERVICE CMNT-IMP: ABNORMAL

## 2019-12-15 PROCEDURE — 74011000250 HC RX REV CODE- 250: Performed by: HOSPITALIST

## 2019-12-15 PROCEDURE — 36415 COLL VENOUS BLD VENIPUNCTURE: CPT

## 2019-12-15 PROCEDURE — 94640 AIRWAY INHALATION TREATMENT: CPT

## 2019-12-15 PROCEDURE — 94660 CPAP INITIATION&MGMT: CPT

## 2019-12-15 PROCEDURE — 97116 GAIT TRAINING THERAPY: CPT

## 2019-12-15 PROCEDURE — 85730 THROMBOPLASTIN TIME PARTIAL: CPT

## 2019-12-15 PROCEDURE — 82962 GLUCOSE BLOOD TEST: CPT

## 2019-12-15 PROCEDURE — 74011250636 HC RX REV CODE- 250/636: Performed by: FAMILY MEDICINE

## 2019-12-15 PROCEDURE — 74011250636 HC RX REV CODE- 250/636: Performed by: HOSPITALIST

## 2019-12-15 PROCEDURE — C9113 INJ PANTOPRAZOLE SODIUM, VIA: HCPCS | Performed by: FAMILY MEDICINE

## 2019-12-15 PROCEDURE — 74011636637 HC RX REV CODE- 636/637: Performed by: HOSPITALIST

## 2019-12-15 PROCEDURE — 97530 THERAPEUTIC ACTIVITIES: CPT

## 2019-12-15 PROCEDURE — 74011000250 HC RX REV CODE- 250: Performed by: FAMILY MEDICINE

## 2019-12-15 PROCEDURE — 74011636637 HC RX REV CODE- 636/637: Performed by: FAMILY MEDICINE

## 2019-12-15 PROCEDURE — 74011250637 HC RX REV CODE- 250/637: Performed by: FAMILY MEDICINE

## 2019-12-15 PROCEDURE — 65660000000 HC RM CCU STEPDOWN

## 2019-12-15 RX ORDER — HEPARIN SODIUM 1000 [USP'U]/ML
3000 INJECTION, SOLUTION INTRAVENOUS; SUBCUTANEOUS ONCE
Status: COMPLETED | OUTPATIENT
Start: 2019-12-15 | End: 2019-12-15

## 2019-12-15 RX ADMIN — INSULIN LISPRO 6 UNITS: 100 INJECTION, SOLUTION INTRAVENOUS; SUBCUTANEOUS at 11:55

## 2019-12-15 RX ADMIN — METOPROLOL SUCCINATE 100 MG: 100 TABLET, EXTENDED RELEASE ORAL at 08:29

## 2019-12-15 RX ADMIN — METHYLPREDNISOLONE SODIUM SUCCINATE 60 MG: 125 INJECTION, POWDER, FOR SOLUTION INTRAMUSCULAR; INTRAVENOUS at 08:27

## 2019-12-15 RX ADMIN — BUDESONIDE 500 MCG: 0.5 INHALANT RESPIRATORY (INHALATION) at 07:11

## 2019-12-15 RX ADMIN — GABAPENTIN 300 MG: 300 CAPSULE ORAL at 08:29

## 2019-12-15 RX ADMIN — SODIUM CHLORIDE 40 MG: 9 INJECTION INTRAMUSCULAR; INTRAVENOUS; SUBCUTANEOUS at 08:27

## 2019-12-15 RX ADMIN — HEPARIN SODIUM 2.7 UNITS/KG/HR: 10000 INJECTION, SOLUTION INTRAVENOUS at 03:21

## 2019-12-15 RX ADMIN — ASPIRIN 81 MG 81 MG: 81 TABLET ORAL at 08:29

## 2019-12-15 RX ADMIN — GABAPENTIN 300 MG: 300 CAPSULE ORAL at 17:02

## 2019-12-15 RX ADMIN — AMLODIPINE BESYLATE 5 MG: 5 TABLET ORAL at 08:29

## 2019-12-15 RX ADMIN — Medication 10 ML: at 14:19

## 2019-12-15 RX ADMIN — INSULIN LISPRO 15 UNITS: 100 INJECTION, SOLUTION INTRAVENOUS; SUBCUTANEOUS at 11:55

## 2019-12-15 RX ADMIN — Medication 10 ML: at 21:26

## 2019-12-15 RX ADMIN — METHYLPREDNISOLONE SODIUM SUCCINATE 60 MG: 125 INJECTION, POWDER, FOR SOLUTION INTRAMUSCULAR; INTRAVENOUS at 21:24

## 2019-12-15 RX ADMIN — INSULIN LISPRO 15 UNITS: 100 INJECTION, SOLUTION INTRAVENOUS; SUBCUTANEOUS at 21:25

## 2019-12-15 RX ADMIN — HYDRALAZINE HYDROCHLORIDE 25 MG: 25 TABLET, FILM COATED ORAL at 17:02

## 2019-12-15 RX ADMIN — ARFORMOTEROL TARTRATE 15 MCG: 15 SOLUTION RESPIRATORY (INHALATION) at 20:15

## 2019-12-15 RX ADMIN — DULOXETINE HYDROCHLORIDE 60 MG: 60 CAPSULE, DELAYED RELEASE ORAL at 08:29

## 2019-12-15 RX ADMIN — GUAIFENESIN 600 MG: 600 TABLET, EXTENDED RELEASE ORAL at 08:29

## 2019-12-15 RX ADMIN — FUROSEMIDE 40 MG: 10 INJECTION, SOLUTION INTRAMUSCULAR; INTRAVENOUS at 08:28

## 2019-12-15 RX ADMIN — INSULIN LISPRO 9 UNITS: 100 INJECTION, SOLUTION INTRAVENOUS; SUBCUTANEOUS at 07:05

## 2019-12-15 RX ADMIN — ARFORMOTEROL TARTRATE 15 MCG: 15 SOLUTION RESPIRATORY (INHALATION) at 07:11

## 2019-12-15 RX ADMIN — INSULIN GLARGINE 60 UNITS: 100 INJECTION, SOLUTION SUBCUTANEOUS at 08:26

## 2019-12-15 RX ADMIN — BUDESONIDE 500 MCG: 0.5 INHALANT RESPIRATORY (INHALATION) at 20:15

## 2019-12-15 RX ADMIN — HYDRALAZINE HYDROCHLORIDE 25 MG: 25 TABLET, FILM COATED ORAL at 08:29

## 2019-12-15 RX ADMIN — INSULIN LISPRO 15 UNITS: 100 INJECTION, SOLUTION INTRAVENOUS; SUBCUTANEOUS at 08:26

## 2019-12-15 RX ADMIN — GABAPENTIN 300 MG: 300 CAPSULE ORAL at 21:24

## 2019-12-15 RX ADMIN — HEPARIN SODIUM 3000 UNITS: 1000 INJECTION INTRAVENOUS; SUBCUTANEOUS at 06:38

## 2019-12-15 RX ADMIN — ATORVASTATIN CALCIUM 40 MG: 20 TABLET, FILM COATED ORAL at 08:29

## 2019-12-15 RX ADMIN — INSULIN LISPRO 15 UNITS: 100 INJECTION, SOLUTION INTRAVENOUS; SUBCUTANEOUS at 17:03

## 2019-12-15 RX ADMIN — HYDRALAZINE HYDROCHLORIDE 25 MG: 25 TABLET, FILM COATED ORAL at 21:24

## 2019-12-15 RX ADMIN — CEFTRIAXONE 1 G: 1 INJECTION, POWDER, FOR SOLUTION INTRAMUSCULAR; INTRAVENOUS at 03:25

## 2019-12-15 RX ADMIN — LOSARTAN POTASSIUM 100 MG: 50 TABLET, FILM COATED ORAL at 08:30

## 2019-12-15 RX ADMIN — Medication 10 ML: at 07:05

## 2019-12-15 RX ADMIN — INSULIN LISPRO 9 UNITS: 100 INJECTION, SOLUTION INTRAVENOUS; SUBCUTANEOUS at 17:03

## 2019-12-15 RX ADMIN — GUAIFENESIN 600 MG: 600 TABLET, EXTENDED RELEASE ORAL at 21:24

## 2019-12-15 NOTE — PROGRESS NOTES
Cardiology Progress Note      12/15/2019 6:41 PM    Admit Date: 12/10/2019    Admit Diagnosis: Asthma [J45.909]      Subjective: Madina Tierney denies chest pain.     Visit Vitals  /70 (BP 1 Location: Left arm, BP Patient Position: At rest;Sitting)   Pulse 66   Temp 98.1 °F (36.7 °C)   Resp 18   Ht 5' 2\" (1.575 m)   Wt 149.5 kg (329 lb 9.6 oz)   SpO2 95%   BMI 60.28 kg/m²     Current Facility-Administered Medications   Medication Dose Route Frequency    heparin 25,000 units in D5W 250 ml infusion  6.7-25 Units/kg/hr IntraVENous TITRATE    albuterol-ipratropium (DUO-NEB) 2.5 MG-0.5 MG/3 ML  3 mL Nebulization Q4H PRN    methylPREDNISolone (PF) (Solu-MEDROL) injection 60 mg  60 mg IntraVENous Q12H    insulin lispro (HUMALOG) injection 15 Units  15 Units SubCUTAneous TIDAC    nitroglycerin (NITROSTAT) tablet 0.4 mg  0.4 mg SubLINGual PRN    insulin glargine (LANTUS) injection 60 Units  60 Units SubCUTAneous DAILY    insulin lispro (HUMALOG) injection   SubCUTAneous AC&HS    furosemide (LASIX) injection 40 mg  40 mg IntraVENous DAILY    sodium chloride (NS) flush 5-40 mL  5-40 mL IntraVENous Q8H    sodium chloride (NS) flush 5-40 mL  5-40 mL IntraVENous PRN    glucose chewable tablet 16 g  16 g Oral PRN    glucagon (GLUCAGEN) injection 1 mg  1 mg IntraMUSCular PRN    dextrose 10% infusion 125-250 mL  125-250 mL IntraVENous PRN    aspirin chewable tablet 81 mg  81 mg Oral DAILY    atorvastatin (LIPITOR) tablet 40 mg  40 mg Oral DAILY    DULoxetine (CYMBALTA) capsule 60 mg  60 mg Oral DAILY    gabapentin (NEURONTIN) capsule 300 mg  300 mg Oral TID    metoprolol succinate (TOPROL-XL) XL tablet 100 mg  100 mg Oral DAILY    amLODIPine (NORVASC) tablet 5 mg  5 mg Oral DAILY    hydrALAZINE (APRESOLINE) tablet 25 mg  25 mg Oral TID    losartan (COZAAR) tablet 100 mg  100 mg Oral DAILY    acetaminophen-codeine (TYLENOL #3) per tablet 1 Tab  1 Tab Oral Q6H PRN    guaiFENesin ER (MUCINEX) tablet 600 mg 600 mg Oral Q12H    pantoprazole (PROTONIX) 40 mg in 0.9% sodium chloride 10 mL injection  40 mg IntraVENous DAILY    perflutren lipid microspheres (DEFINITY) in NS bolus IV  1 mL IntraVENous PRN    budesonide (PULMICORT) 500 mcg/2 ml nebulizer suspension  500 mcg Nebulization BID RT    arformoterol (BROVANA) neb solution 15 mcg  15 mcg Nebulization BID RT         Objective:      Physical Exam:  Visit Vitals  /70 (BP 1 Location: Left arm, BP Patient Position: At rest;Sitting)   Pulse 66   Temp 98.1 °F (36.7 °C)   Resp 18   Ht 5' 2\" (1.575 m)   Wt 149.5 kg (329 lb 9.6 oz)   SpO2 95%   BMI 60.28 kg/m²     General Appearance:  Well developed, well nourished,alert and oriented x 3, and individual in no acute distress. Ears/Nose/Mouth/Throat:   Hearing grossly normal.         Neck: Supple. Chest:   Lungs clear to auscultation bilaterally. Cardiovascular:  Regular rate and rhythm, S1, S2 normal, no murmur. Abdomen:   Soft, non-tender, bowel sounds are active. Extremities: No edema bilaterally. Skin: Warm and dry.                Data Review:   Labs:    Recent Results (from the past 24 hour(s))   GLUCOSE, POC    Collection Time: 12/14/19  9:35 PM   Result Value Ref Range    Glucose (POC) 223 (H) 70 - 110 mg/dL   PTT    Collection Time: 12/14/19 11:04 PM   Result Value Ref Range    aPTT 83.3 (H) 23.0 - 36.4 SEC   PTT    Collection Time: 12/15/19  5:05 AM   Result Value Ref Range    aPTT 38.5 (H) 23.0 - 36.4 SEC   GLUCOSE, POC    Collection Time: 12/15/19  6:46 AM   Result Value Ref Range    Glucose (POC) 253 (H) 70 - 110 mg/dL   GLUCOSE, POC    Collection Time: 12/15/19 11:42 AM   Result Value Ref Range    Glucose (POC) 229 (H) 70 - 110 mg/dL   PTT    Collection Time: 12/15/19  1:16 PM   Result Value Ref Range    aPTT 70.1 (H) 23.0 - 36.4 SEC   GLUCOSE, POC    Collection Time: 12/15/19  4:30 PM   Result Value Ref Range    Glucose (POC) 265 (H) 70 - 110 mg/dL       Telemetry: normal sinus rhythm      Assessment:     Principal Problem:    Respiratory distress (1/17/2019)    Active Problems:    HTN (hypertension) (8/16/2014)      Type II or unspecified type diabetes mellitus with renal manifestations, uncontrolled(250.42) ([facility] [address] (9/4/2014)      Asthma exacerbation (9/68/7602)      Diastolic CHF ([facility] (6/93/0213)      АНДРЕЙ (obstructive sleep apnea) (12/11/2019)      UTI (urinary tract infection) (12/11/2019)        Plan:     Improved. For Nuclear stress test tomorrow.      Princess Mims MD

## 2019-12-15 NOTE — PROGRESS NOTES
Hospitalist Progress Note    Patient: Christie Cintron MRN: [id number]  CSN: [id number]    YOB: 1965  Age: 53 y.o. Sex: female    DOA: 12/10/2019 LOS:  LOS: 4 days                Assessment/Plan     Patient Active Problem List   Diagnosis Code    HTN (hypertension) I10    Type II or unspecified type diabetes mellitus with renal manifestations, uncontrolled(250.42) ([facility] [address] E11.29, E11.65    Morbid obesity ([facility] [address] E66.01    Respiratory distress R06.03    Asthma exacerbation J45. 459    Diastolic CHF (HCC) R40.29    АНДРЕЙ (obstructive sleep apnea) G47.33    UTI (urinary tract infection) N39.0            54 yo female admitted for respiratory distress. Feeling better    Asthma exacerbation -   chronic, intermittent asthma   Improving, wean steroid. Continue breathing treatment as needed     UTI -  Continue Rocephin, urine culture growing E-coli     Diabetes type 2 uncontrolled with complication with renal manifestation -   Lantus. Pre-meal insulin-will Sliding scale. weaning off steroid   diabetic diet.       Chronic Diastolic CHF -   stable no CHF exacerbation noted. cautious with diuresis  Echo with EF of 11-47%, grade 1 diastolic dysfunction. CE 0.19 =>0.20 =>0.15  Cardiology recommend stress test, ordered for tomorrow.       CKD 3-4 -  Monitor renal function in the setting of diuresis    АНДРЕЙ -   CPAP at night.     Hypertension -  Controlled    Disposition : 1-2 days    Review of systems  General: No fevers or chills. Cardiovascular: No chest pain or pressure. No palpitations. Pulmonary: see above. Gastrointestinal: No nausea, vomiting. Physical Exam:  General: Awake, cooperative, no acute distress    HEENT: NC, Atraumatic. PERRLA, anicteric sclerae. Lungs: CTA Bilaterally. No Wheezing/Rhonchi/Rales.   Heart:  S1 S2,  No murmur, No Rubs, No Gallops  Abdomen: Soft, Non distended, Non tender.  +Bowel sounds,   Extremities: Edema LE bilaterally  Psych:   Not anxious or agitated. Neurologic:  No acute neurological deficit. Vital signs/Intake and Output:  Visit Vitals  /75 (BP 1 Location: Left arm, BP Patient Position: At rest;Supine)   Pulse 67   Temp 97.6 °F (36.4 °C)   Resp 18   Ht 5' 2\" (1.575 m)   Wt 149.5 kg (329 lb 9.6 oz)   SpO2 98%   BMI 60.28 kg/m²     Current Shift:  No intake/output data recorded. Last three shifts:  12/13 1901 - 12/15 0700  In: 740 [P.O.:740]  Out: 1050 [Urine:1050]            Labs: Results:       Chemistry Recent Labs     12/14/19  0100 12/13/19  0358   * 280*    136   K 5.1 5.2    106   CO2 22 20*   * 74*   CREA 2.35* 2.29*   CA 8.6 8.8   AGAP 8 10   BUCR 43* 32*   AP 93 108   TP 6.1* 6.9   ALB 2.7* 2.8*   GLOB 3.4 4.1*   AGRAT 0.8 0.7*      CBC w/Diff Recent Labs     12/14/19  0100 12/13/19  0358   WBC 15.2* 16.9*   RBC 3.79* 4.02*   HGB 9.6* 10.2*   HCT 31.0* 32.8*    285      Cardiac Enzymes Recent Labs     12/14/19  1330 12/14/19  0735   CPK 78 72   CKND1 1.3 CALCULATION NOT PERFORMED WHEN RESULT IS BELOW LINEAR LIMIT      Coagulation Recent Labs     12/15/19  1316 12/15/19  0505   APTT 70.1* 38.5*       Lipid Panel Lab Results   Component Value Date/Time    Cholesterol, total 146 05/28/2015 02:58 AM    HDL Cholesterol 64 (H) 05/28/2015 02:58 AM    LDL, calculated 66 05/28/2015 02:58 AM    VLDL, calculated 16 05/28/2015 02:58 AM    Triglyceride 80 05/28/2015 02:58 AM    CHOL/HDL Ratio 2.3 05/28/2015 02:58 AM      BNP No results for input(s): BNPP in the last 72 hours.    Liver Enzymes Recent Labs     12/14/19  0100   TP 6.1*   ALB 2.7*   AP 93   SGOT 11      Thyroid Studies Lab Results   Component Value Date/Time    TSH 0.56 01/23/2019 01:33 AM        Procedures/imaging: see electronic medical records for all procedures/Xrays and details which were not copied into this note but were reviewed prior to creation of Plan

## 2019-12-15 NOTE — PROGRESS NOTES
[id number]. Assumed care of patient. Pt sitting in chair, denies chest pain or chest discomfort. No acute distress noted   White board updated, bed wheels locked, call bell in reach. Shift summary: patient had an uneventful shift, no acute events, VS stable, see MAR and flowsheet. Patient slept through the night with Cpap on.     0700. Bedside and Verbal shift change report given to Lashon Jack RN (oncoming nurse) by Alise Jimenez RN (offgoing nurse). Report included the following information SBAR, Intake/Output, MAR and Recent Results.

## 2019-12-15 NOTE — PROGRESS NOTES
Problem: Diabetes Self-Management  Goal: *Disease process and treatment process  Description  Define diabetes and identify own type of diabetes; list 3 options for treating diabetes. Outcome: Progressing Towards Goal  Goal: *Incorporating nutritional management into lifestyle  Description  Describe effect of type, amount and timing of food on blood glucose; list 3 methods for planning meals. Outcome: Progressing Towards Goal  Goal: *Incorporating physical activity into lifestyle  Description  State effect of exercise on blood glucose levels. Outcome: Progressing Towards Goal  Goal: *Developing strategies to promote health/change behavior  Description  Define the ABC's of diabetes; identify appropriate screenings, schedule and personal plan for screenings. Outcome: Progressing Towards Goal  Goal: *Using medications safely  Description  State effect of diabetes medications on diabetes; name diabetes medication taking, action and side effects. Outcome: Progressing Towards Goal  Goal: *Monitoring blood glucose, interpreting and using results  Description  Identify recommended blood glucose targets  and personal targets. Outcome: Progressing Towards Goal  Goal: *Prevention, detection, treatment of acute complications  Description  List symptoms of hyper- and hypoglycemia; describe how to treat low blood sugar and actions for lowering  high blood glucose level. Outcome: Progressing Towards Goal  Goal: *Prevention, detection and treatment of chronic complications  Description  Define the natural course of diabetes and describe the relationship of blood glucose levels to long term complications of diabetes.   Outcome: Progressing Towards Goal  Goal: *Developing strategies to address psychosocial issues  Description  Describe feelings about living with diabetes; identify support needed and support network  Outcome: Progressing Towards Goal  Goal: *Insulin pump training  Outcome: Progressing Towards Goal  Goal: *Sick day guidelines  Outcome: Progressing Towards Goal  Goal: *Patient Specific Goal (EDIT GOAL, INSERT TEXT)  Outcome: Progressing Towards Goal     Problem: Patient Education: Go to Patient Education Activity  Goal: Patient/Family Education  Outcome: Progressing Towards Goal     Problem: Falls - Risk of  Goal: *Absence of Falls  Description  Document Nelson Krueger Fall Risk and appropriate interventions in the flowsheet. Outcome: Progressing Towards Goal  Note: Fall Risk Interventions:  Mobility Interventions: Communicate number of staff needed for ambulation/transfer, Patient to call before getting OOB, Utilize walker, cane, or other assistive device         Medication Interventions: Patient to call before getting OOB, Teach patient to arise slowly    Elimination Interventions: Call light in reach    History of Falls Interventions: Door open when patient unattended, Investigate reason for fall         Problem: Patient Education: Go to Patient Education Activity  Goal: Patient/Family Education  Outcome: Progressing Towards Goal     Problem: Pain  Goal: *Control of Pain  Outcome: Progressing Towards Goal  Goal: *PALLIATIVE CARE:  Alleviation of Pain  Outcome: Progressing Towards Goal     Problem: Patient Education: Go to Patient Education Activity  Goal: Patient/Family Education  Outcome: Progressing Towards Goal     Problem: Pressure Injury - Risk of  Goal: *Prevention of pressure injury  Description  Document Elian Scale and appropriate interventions in the flowsheet.   Outcome: Progressing Towards Goal  Note: Pressure Injury Interventions:  Sensory Interventions: Minimize linen layers, Keep linens dry and wrinkle-free, Pressure redistribution bed/mattress (bed type)         Activity Interventions: Pressure redistribution bed/mattress(bed type)    Mobility Interventions: Pressure redistribution bed/mattress (bed type)    Nutrition Interventions: Document food/fluid/supplement intake    Friction and Shear Interventions: HOB 30 degrees or less                Problem: Patient Education: Go to Patient Education Activity  Goal: Patient/Family Education  Outcome: Progressing Towards Goal     Problem: Gas Exchange - Impaired  Goal: *Absence of hypoxia  Outcome: Progressing Towards Goal     Problem: Patient Education: Go to Patient Education Activity  Goal: Patient/Family Education  Outcome: Progressing Towards Goal     Problem: Patient Education: Go to Patient Education Activity  Goal: Patient/Family Education  Outcome: Progressing Towards Goal     Problem: Patient Education: Go to Patient Education Activity  Goal: Patient/Family Education  Outcome: Progressing Towards Goal

## 2019-12-15 NOTE — PROGRESS NOTES
Problem: Diabetes Self-Management  Goal: *Disease process and treatment process  Description  Define diabetes and identify own type of diabetes; list 3 options for treating diabetes. 12/15/2019 1851 by Nguyen File  Outcome: Progressing Towards Goal  12/15/2019 1559 by Nguyen File  Outcome: Progressing Towards Goal  Goal: *Incorporating nutritional management into lifestyle  Description  Describe effect of type, amount and timing of food on blood glucose; list 3 methods for planning meals. Outcome: Progressing Towards Goal  Goal: *Developing strategies to promote health/change behavior  Description  Define the ABC's of diabetes; identify appropriate screenings, schedule and personal plan for screenings. Outcome: Progressing Towards Goal  Goal: *Monitoring blood glucose, interpreting and using results  Description  Identify recommended blood glucose targets  and personal targets. 12/15/2019 1851 by Nguyen File  Outcome: Progressing Towards Goal  12/15/2019 1559 by Nguyen File  Outcome: Progressing Towards Goal     Problem: Falls - Risk of  Goal: *Absence of Falls  Description  Document Shruti Ruiz Fall Risk and appropriate interventions in the flowsheet. 12/15/2019 1851 by Nguyen File  Outcome: Progressing Towards Goal  Note:   No falls during shift. Fall Risk Interventions:  Mobility Interventions: Communicate number of staff needed for ambulation/transfer         Medication Interventions: Teach patient to arise slowly    Elimination Interventions: Call light in reach    History of Falls Interventions: Investigate reason for fall      12/15/2019 1559 by Nguyen File  Outcome: Progressing Towards Goal  Note:   Pt calls before getting OOB.    Fall Risk Interventions:  Mobility Interventions: Communicate number of staff needed for ambulation/transfer         Medication Interventions: Teach patient to arise slowly    Elimination Interventions: Call light in reach    History of Falls Interventions: Investigate reason for fall         Problem: Pain  Goal: *Control of Pain  12/15/2019 1851 by Dawit Simmons  Outcome: Progressing Towards Goal  Note:   No reports of pain. 12/15/2019 1559 by Dawit Simmons  Outcome: Progressing Towards Goal  Note:   No reports of pain during shift.

## 2019-12-15 NOTE — PROGRESS NOTES
Problem: Diabetes Self-Management  Goal: *Disease process and treatment process  Description  Define diabetes and identify own type of diabetes; list 3 options for treating diabetes. Outcome: Progressing Towards Goal  Goal: *Monitoring blood glucose, interpreting and using results  Description  Identify recommended blood glucose targets  and personal targets. Outcome: Progressing Towards Goal     Problem: Falls - Risk of  Goal: *Absence of Falls  Description  Document Dion Sommers Fall Risk and appropriate interventions in the flowsheet. Outcome: Progressing Towards Goal  Note:   Pt calls before getting OOB. Fall Risk Interventions:  Mobility Interventions: Communicate number of staff needed for ambulation/transfer         Medication Interventions: Teach patient to arise slowly    Elimination Interventions: Call light in reach    History of Falls Interventions: Investigate reason for fall         Problem: Pain  Goal: *Control of Pain  Outcome: Progressing Towards Goal  Note:   No reports of pain during shift.

## 2019-12-15 NOTE — PROGRESS NOTES
0730 Bedside and Verbal shift change report given to BERT Graham RN (oncoming nurse) by Eileen Wilburn (offgoing nurse). Report included the following information SBAR, Kardex, Intake/Output, and MAR. Patient in bed, call light in reach. Heparin drip verified. 1419 Heparin infusion rate changed to 5.7.     1900 Bedside and Verbal shift change report given to Hans Cherry RN (oncoming nurse) by Seda Rodríguez. Varinder Graham RN (offgoing nurse). Report included the following information SBAR, Kardex, Intake/Output and MAR. Pt in bed, call light in reach. Heparin verified.

## 2019-12-15 NOTE — PROGRESS NOTES
Hospitalist Progress Note    Patient: Vinnie Gu MRN: [id number]  CSN: [id number]    YOB: 1965  Age: 53 y.o. Sex: female    DOA: 12/10/2019 LOS:  LOS: 3 days                Assessment/Plan     Patient Active Problem List   Diagnosis Code    HTN (hypertension) I10    Morbid obesity with BMI of 40.0-44.9, adult ([address] E66.01, Z68.41    Type II or unspecified type diabetes mellitus with renal manifestations, uncontrolled(250.42) ([address] E11.29, E11.65    Morbid obesity ([address] G97.64    Diastolic CHF, acute ([address] I50.31    Hypoglycemia E16.2    Pneumonia J18.9    Respiratory distress R06.03    Asthma exacerbation J45. 813    Diastolic CHF (HCC) Y71.38    BMI 45.0-49.9, adult (HCC) Z68.42    АНДРЕЙ (obstructive sleep apnea) G47.33    UTI (urinary tract infection) N39.0            54 yo female admitted for respiratory distress. Feeling better    Asthma exacerbation -   chronic, intermittent asthma   Improving, continue wean steroid. Continue breathing treatment as needed     UTI -  Continue Rocephin, urine culture growing E-coli     Diabetes type 2 uncontrolled with complication with renal manifestation -   Lantus. Pre-meal insulin-will Sliding scale. weaning off steroid   Need education, put back diabetes diet. Wean steroid     Diastolic CHF -   stable no CHF exacerbation noted. cautious with diuresis  Echo with EF of 96-44%, grade 1 diastolic dysfunction. CE 0.19 =>0.20 =>0.15  Cardiology recommend stress test.       CKD 3-4 -  Monitor renal function in the setting of diuresis     Hypertension -  Controlled    Disposition : 1-2 days    Review of systems  General: No fevers or chills. Cardiovascular: No chest pain or pressure. No palpitations. Pulmonary: see above. Gastrointestinal: No nausea, vomiting. Physical Exam:  General: Awake, cooperative, no acute distress    HEENT: NC, Atraumatic. PERRLA, anicteric sclerae. Lungs: CTA Bilaterally.  No Wheezing/Rhonchi/Rales. Heart:  S1 S2,  No murmur, No Rubs, No Gallops  Abdomen: Soft, Non distended, Non tender.  +Bowel sounds,   Extremities: Edema LE bilaterally  Psych:   Not anxious or agitated. Neurologic:  No acute neurological deficit. Vital signs/Intake and Output:  Visit Vitals  /74   Pulse 65   Temp 97.8 °F (36.6 °C)   Resp 18   Ht 5' 2\" (1.575 m)   Wt 148 kg (326 lb 4.5 oz)   SpO2 98%   BMI 59.68 kg/m²     Current Shift:  No intake/output data recorded. Last three shifts:  12/13 0701 - 12/14 1900  In: 970 [P.O.:970]  Out: 2750 [Urine:2750]            Labs: Results:       Chemistry Recent Labs     12/14/19  0100 12/13/19  0358 12/12/19  1115 12/12/19  0550   * 280* 207* 138*    136 136 137   K 5.1 5.2 4.1 4.8    106 107 108   CO2 22 20* 19* 21   * 74* 60* 56*   CREA 2.35* 2.29* 2.24* 2.24*   CA 8.6 8.8 8.7 8.9   AGAP 8 10 10 8   BUCR 43* 32* 27* 25*   AP 93 108  --  105   TP 6.1* 6.9  --  6.6   ALB 2.7* 2.8*  --  2.7*   GLOB 3.4 4.1*  --  3.9   AGRAT 0.8 0.7*  --  0.7*      CBC w/Diff Recent Labs     12/14/19  0100 12/13/19  0358 12/12/19  0550   WBC 15.2* 16.9* 15.5*   RBC 3.79* 4.02* 3.84*   HGB 9.6* 10.2* 9.7*   HCT 31.0* 32.8* 31.0*    285 264      Cardiac Enzymes Recent Labs     12/14/19  1330 12/14/19  0735   CPK 78 72   CKND1 1.3 CALCULATION NOT PERFORMED WHEN RESULT IS BELOW LINEAR LIMIT      Coagulation Recent Labs     12/14/19  1719 12/14/19  0905   APTT >180.0* 62.9*       Lipid Panel Lab Results   Component Value Date/Time    Cholesterol, total 146 05/28/2015 02:58 AM    HDL Cholesterol 64 (H) 05/28/2015 02:58 AM    LDL, calculated 66 05/28/2015 02:58 AM    VLDL, calculated 16 05/28/2015 02:58 AM    Triglyceride 80 05/28/2015 02:58 AM    CHOL/HDL Ratio 2.3 05/28/2015 02:58 AM      BNP No results for input(s): BNPP in the last 72 hours.    Liver Enzymes Recent Labs     12/14/19  0100   TP 6.1*   ALB 2.7*   AP 93   SGOT 11      Thyroid Studies Lab Results   Component Value Date/Time    TSH 0.56 01/23/2019 01:33 AM        Procedures/imaging: see electronic medical records for all procedures/Xrays and details which were not copied into this note but were reviewed prior to creation of Plan

## 2019-12-15 NOTE — PROGRESS NOTES
Problem: Mobility Impaired (Adult and Pediatric)  Goal: *Acute Goals and Plan of Care (Insert Text)  Description  Physical Therapy Goals   Initiated 12/12/2019 and to be accomplished within 2-4 day(s)  1. Patient will move from supine <> sit with mod I in prep for out of bed activity and change of position. 2.  Patient will perform sit<> stand with S/mod I/RW in prep for transfers/ambulation. 3.  Patient will transfer from bed <> chair with S/mod I with RW for time up in chair for completion of ADL activity. 4.  Patient will ambulate 130 feet with S/mod I with RW for improved functional mobility/safe discharge. Outcome: Progressing Towards Goal   PHYSICAL THERAPY TREATMENT    Patient: Brittany Fu (53 y.o. female)  Date: 12/15/2019  Diagnosis: Asthma [J45.909]   Respiratory distress    Precautions: Fall   Chart, physical therapy assessment, plan of care and goals were reviewed. ASSESSMENT:  Pt motivated to participate. Pt maintaining amb distance 200' with RW SBA. No LOB. X2 standing rest due to fatigue. VCs for deep breathing. Audible wheezing noted on exertion. Cont POC. Progression toward goals:  []      Improving appropriately and progressing toward goals  [x]      Improving slowly and progressing toward goals  []      Not making progress toward goals and plan of care will be adjusted     PLAN:  Patient continues to benefit from skilled intervention to address the above impairments. Continue treatment per established plan of care. Discharge Recommendations:  Home Health  Further Equipment Recommendations for Discharge:  Bariatric      SUBJECTIVE:   Patient stated  I want a walker like this.   Bariatric     OBJECTIVE DATA SUMMARY:   Critical Behavior:  Neurologic State: Alert  Orientation Level: Oriented X4  Cognition: Appropriate decision making, Appropriate for age attention/concentration, Appropriate safety awareness, Follows commands  Safety/Judgement: Fall prevention  Functional Mobility Training:  Bed Mobility:  Rolling: Modified independent  Supine to Sit: Modified independent  Scooting: Supervision    Transfers:  Sit to Stand: Supervision  Stand to Sit: Supervision    Balance:  Sitting: Intact  Standing: Impaired; With support  Standing - Static: Good  Standing - Dynamic : Good  Ambulation/Gait Training:  Distance (ft): 200 Feet (ft)  Assistive Device: Walker, rolling;Gait belt  Ambulation - Level of Assistance: Stand-by assistance  Gait Abnormalities: Foot drop;Decreased step clearance  Base of Support: Widened  Speed/Arlette: Slow  Interventions: Safety awareness training      Pain:  Pain Scale 1: Numeric (0 - 10)  Pain Intensity 1: 0    Activity Tolerance:   Fair       After treatment:   [x] Patient left in no apparent distress sitting up in chair  [] Patient left in no apparent distress in bed  [x] Call bell left within reach  [] Nursing notified  [] Caregiver present  [] Bed alarm activated      Domenica Goode PTA   Time Calculation: 28 mins

## 2019-12-16 ENCOUNTER — APPOINTMENT (OUTPATIENT)
Dept: NON INVASIVE DIAGNOSTICS | Age: 54
DRG: 141 | End: 2019-12-16
Attending: HOSPITALIST
Payer: COMMERCIAL

## 2019-12-16 ENCOUNTER — APPOINTMENT (OUTPATIENT)
Dept: NUCLEAR MEDICINE | Age: 54
DRG: 141 | End: 2019-12-16
Attending: HOSPITALIST
Payer: COMMERCIAL

## 2019-12-16 VITALS
HEIGHT: 62 IN | BODY MASS INDEX: 53.92 KG/M2 | RESPIRATION RATE: 20 BRPM | DIASTOLIC BLOOD PRESSURE: 72 MMHG | TEMPERATURE: 97.2 F | OXYGEN SATURATION: 97 % | HEART RATE: 59 BPM | WEIGHT: 293 LBS | SYSTOLIC BLOOD PRESSURE: 162 MMHG

## 2019-12-16 LAB
APTT PPP: 83.4 SEC (ref 23–36.4)
GLUCOSE BLD STRIP.AUTO-MCNC: 197 MG/DL (ref 70–110)
GLUCOSE BLD STRIP.AUTO-MCNC: 257 MG/DL (ref 70–110)

## 2019-12-16 PROCEDURE — 82962 GLUCOSE BLOOD TEST: CPT

## 2019-12-16 PROCEDURE — 74011250636 HC RX REV CODE- 250/636: Performed by: HOSPITALIST

## 2019-12-16 PROCEDURE — C9113 INJ PANTOPRAZOLE SODIUM, VIA: HCPCS | Performed by: FAMILY MEDICINE

## 2019-12-16 PROCEDURE — 97116 GAIT TRAINING THERAPY: CPT

## 2019-12-16 PROCEDURE — 90471 IMMUNIZATION ADMIN: CPT

## 2019-12-16 PROCEDURE — 74011250637 HC RX REV CODE- 250/637: Performed by: FAMILY MEDICINE

## 2019-12-16 PROCEDURE — 74011250636 HC RX REV CODE- 250/636: Performed by: FAMILY MEDICINE

## 2019-12-16 PROCEDURE — 90686 IIV4 VACC NO PRSV 0.5 ML IM: CPT | Performed by: HOSPITALIST

## 2019-12-16 PROCEDURE — 74011000250 HC RX REV CODE- 250: Performed by: HOSPITALIST

## 2019-12-16 PROCEDURE — 85730 THROMBOPLASTIN TIME PARTIAL: CPT

## 2019-12-16 PROCEDURE — 74011636637 HC RX REV CODE- 636/637: Performed by: FAMILY MEDICINE

## 2019-12-16 PROCEDURE — 97530 THERAPEUTIC ACTIVITIES: CPT

## 2019-12-16 PROCEDURE — 93017 CV STRESS TEST TRACING ONLY: CPT

## 2019-12-16 PROCEDURE — A9500 TC99M SESTAMIBI: HCPCS

## 2019-12-16 PROCEDURE — 94760 N-INVAS EAR/PLS OXIMETRY 1: CPT

## 2019-12-16 PROCEDURE — 94640 AIRWAY INHALATION TREATMENT: CPT

## 2019-12-16 PROCEDURE — 36415 COLL VENOUS BLD VENIPUNCTURE: CPT

## 2019-12-16 PROCEDURE — 74011636637 HC RX REV CODE- 636/637: Performed by: HOSPITALIST

## 2019-12-16 RX ORDER — IPRATROPIUM BROMIDE AND ALBUTEROL SULFATE 2.5; .5 MG/3ML; MG/3ML
3 SOLUTION RESPIRATORY (INHALATION)
Qty: 30 NEBULE | Refills: 0 | Status: ON HOLD | OUTPATIENT
Start: 2019-12-16 | End: 2020-04-10

## 2019-12-16 RX ORDER — PREDNISONE 5 MG/1
TABLET ORAL
Qty: 21 TAB | Refills: 0 | Status: SHIPPED | OUTPATIENT
Start: 2019-12-16 | End: 2020-02-24

## 2019-12-16 RX ORDER — PREDNISONE 20 MG/1
40 TABLET ORAL
Status: DISCONTINUED | OUTPATIENT
Start: 2019-12-17 | End: 2019-12-16 | Stop reason: HOSPADM

## 2019-12-16 RX ORDER — GUAIFENESIN 600 MG/1
600 TABLET, EXTENDED RELEASE ORAL EVERY 12 HOURS
Qty: 10 TAB | Refills: 0 | Status: SHIPPED | OUTPATIENT
Start: 2019-12-16 | End: 2020-03-07

## 2019-12-16 RX ADMIN — INSULIN GLARGINE 60 UNITS: 100 INJECTION, SOLUTION SUBCUTANEOUS at 11:00

## 2019-12-16 RX ADMIN — HEPARIN SODIUM 6.7 UNITS/KG/HR: 10000 INJECTION, SOLUTION INTRAVENOUS at 12:13

## 2019-12-16 RX ADMIN — FUROSEMIDE 40 MG: 10 INJECTION, SOLUTION INTRAMUSCULAR; INTRAVENOUS at 11:00

## 2019-12-16 RX ADMIN — INSULIN LISPRO 15 UNITS: 100 INJECTION, SOLUTION INTRAVENOUS; SUBCUTANEOUS at 11:03

## 2019-12-16 RX ADMIN — REGADENOSON 0.4 MG: 0.08 INJECTION, SOLUTION INTRAVENOUS at 10:03

## 2019-12-16 RX ADMIN — ARFORMOTEROL TARTRATE 15 MCG: 15 SOLUTION RESPIRATORY (INHALATION) at 07:09

## 2019-12-16 RX ADMIN — INSULIN LISPRO 9 UNITS: 100 INJECTION, SOLUTION INTRAVENOUS; SUBCUTANEOUS at 06:11

## 2019-12-16 RX ADMIN — HYDRALAZINE HYDROCHLORIDE 25 MG: 25 TABLET, FILM COATED ORAL at 16:38

## 2019-12-16 RX ADMIN — METOPROLOL SUCCINATE 100 MG: 100 TABLET, EXTENDED RELEASE ORAL at 11:01

## 2019-12-16 RX ADMIN — METHYLPREDNISOLONE SODIUM SUCCINATE 60 MG: 125 INJECTION, POWDER, FOR SOLUTION INTRAMUSCULAR; INTRAVENOUS at 11:02

## 2019-12-16 RX ADMIN — GABAPENTIN 300 MG: 300 CAPSULE ORAL at 11:00

## 2019-12-16 RX ADMIN — LOSARTAN POTASSIUM 100 MG: 50 TABLET, FILM COATED ORAL at 11:01

## 2019-12-16 RX ADMIN — HYDRALAZINE HYDROCHLORIDE 25 MG: 25 TABLET, FILM COATED ORAL at 11:01

## 2019-12-16 RX ADMIN — INSULIN LISPRO 3 UNITS: 100 INJECTION, SOLUTION INTRAVENOUS; SUBCUTANEOUS at 12:52

## 2019-12-16 RX ADMIN — INFLUENZA VIRUS VACCINE 0.5 ML: 15; 15; 15; 15 SUSPENSION INTRAMUSCULAR at 16:38

## 2019-12-16 RX ADMIN — BUDESONIDE 500 MCG: 0.5 INHALANT RESPIRATORY (INHALATION) at 07:09

## 2019-12-16 RX ADMIN — AMLODIPINE BESYLATE 5 MG: 5 TABLET ORAL at 11:01

## 2019-12-16 RX ADMIN — GUAIFENESIN 600 MG: 600 TABLET, EXTENDED RELEASE ORAL at 11:01

## 2019-12-16 RX ADMIN — DULOXETINE HYDROCHLORIDE 60 MG: 60 CAPSULE, DELAYED RELEASE ORAL at 11:01

## 2019-12-16 RX ADMIN — ASPIRIN 81 MG 81 MG: 81 TABLET ORAL at 11:01

## 2019-12-16 RX ADMIN — INSULIN LISPRO 15 UNITS: 100 INJECTION, SOLUTION INTRAVENOUS; SUBCUTANEOUS at 12:52

## 2019-12-16 RX ADMIN — GABAPENTIN 300 MG: 300 CAPSULE ORAL at 16:38

## 2019-12-16 RX ADMIN — Medication 20 ML: at 14:00

## 2019-12-16 RX ADMIN — ATORVASTATIN CALCIUM 40 MG: 20 TABLET, FILM COATED ORAL at 11:01

## 2019-12-16 RX ADMIN — SODIUM CHLORIDE 40 MG: 9 INJECTION INTRAMUSCULAR; INTRAVENOUS; SUBCUTANEOUS at 10:58

## 2019-12-16 NOTE — DISCHARGE INSTRUCTIONS
Patient Education     Learning About Asthma Triggers  What are triggers? When you have asthma, certain things can make your symptoms worse. These are called triggers. They include:  · Cigarette smoke or air pollution. · Things you are allergic to, such as:  ¨ Pollen, mold, or dust mites. ¨ Pet hair, skin, or saliva. · Illnesses, like colds, flu, or pneumonia. · Exercise. · Dry, cold air. How do triggers affect asthma? Triggers can make it harder for your lungs to work as they should and can lead to sudden difficulty breathing and other symptoms. When you are around a trigger, an asthma attack is more likely. If your symptoms are severe, you may need emergency treatment or have to go to the hospital for treatment. If you know what your triggers are and can avoid them, you may be able to prevent asthma attacks, reduce how often you have them, and make them less severe. What can you do to avoid triggers? The first thing is to know your triggers. When you are having symptoms, note the things around you that might be causing them. Then look for patterns in what may be triggering your symptoms. Record your triggers on a piece of paper or in an asthma diary. When you have your list of possible triggers, work with your doctor to find ways to avoid them. You also can check how well your lungs are working by measuring your peak expiratory flow (PEF) throughout the day. Your PEF may drop when you are near things that trigger symptoms. Here are some ways to avoid a few common triggers. · Do not smoke or allow others to smoke around you. If you need help quitting, talk to your doctor about stop-smoking programs and medicines. These can increase your chances of quitting for good. · If there is a lot of pollution, pollen, or dust outside, stay at home and keep your windows closed. Use an air conditioner or air filter in your home.  Check your local weather report or newspaper for air quality and pollen reports. · Get a flu shot every year. Talk to your doctor about getting a pneumococcal shot. Wash your hands often to prevent infections. · Avoid exercising outdoors in cold weather. If you are outdoors in cold weather, wear a scarf around your face and breathe through your nose. How can you manage an asthma attack? · If you have an asthma action plan, follow the plan. In general:  ¨ Use your quick-relief inhaler as directed by your doctor. If your symptoms do not get better after you use your medicine, have someone take you to the emergency room. Call an ambulance if needed. ¨ If your doctor has given you other inhaled medicines or steroid pills, take them as directed. Where can you learn more? Go to Luqit.be  Enter M564 in the search box to learn more about \"Learning About Asthma Triggers. \"   © 3513-3525 [employer]. Care instructions adapted under license by Kathrine Chairez (which disclaims liability or warranty for this information). This care instruction is for use with your licensed healthcare professional. If you have questions about a medical condition or this instruction, always ask your healthcare professional. [place] [address] any warranty or liability for your use of this information. Content Version: 54.0.213997; Last Revised: February 23, 2012                 Patient Education        Using a Metered-Dose Inhaler: Care Instructions  Your Care Instructions    A metered-dose inhaler lets you breathe medicine into your lungs quickly. Inhaled medicine works faster than the same medicine in a pill. An inhaler allows you to take less medicine than you would need if you took it as a pill. \"Metered-dose\" means that the inhaler gives a measured amount of medicine each time you use it. A metered-dose inhaler gives medicine in the form of a liquid mist.  Your doctor may want you to use a spacer with your inhaler.  A spacer is a chamber that you attach to the inhaler. The chamber holds the medicine before you inhale it. That way, you can inhale the medicine in as many breaths as you need. Doctors recommend using a spacer with most metered-dose inhalers, especially those with corticosteroid medicines. Follow-up care is a key part of your treatment and safety. Be sure to make and go to all appointments, and call your doctor if you are having problems. It's also a good idea to know your test results and keep a list of the medicines you take. How can you care for yourself at home? To get started using your inhaler  · Talk with your health care provider to be sure you are using your inhaler the right way. It might help if you practice using it in front of a mirror. Use the inhaler exactly as prescribed. · Check that you have the correct medicine. If you use more than one inhaler, put a label on each one. This will let you know which one to use at the right time. · Keep track of how much medicine is in the inhaler. Check the label to see how many doses are in the container. If you know how many puffs you can take, you can replace the inhaler before you run out. Ask your health care provider how you can keep track of how much medicine is left. · Use a spacer if you have problems pressing the inhaler and breathing in at the same time. You also may need a spacer if you are using corticosteroid medicines. · If you are using a corticosteroid inhaler, gargle and rinse out your mouth with water after use. Do not swallow the water. Swallowing the water will increase the chance that the medicine will get into your bloodstream. This may make it more likely that you will have side effects. To use a spacer with an inhaler  1. Shake the inhaler. Remove the inhaler cap, and place the mouthpiece of the inhaler into the spacer. Check the inhaler instructions to see if you need to prime your inhaler before you use it.  If it needs priming, follow the instructions on how to prime your inhaler. 2. Remove the cap from the spacer. 3. Hold the inhaler upright with the mouthpiece at the bottom. 4. Tilt your head back a little, and breathe out slowly and completely. 5. Place the spacer's mouthpiece in your mouth. 6. Press down on the inhaler to spray one puff of medicine into the spacer, and then start breathing in slowly. Wait to inhale until after you have pressed down on the inhaler. Some spacers have a whistle. If you hear it, you should breathe in more slowly. 7. Hold your breath for 10 seconds. This will let the medicine settle in your lungs. 8. If you need to take a second dose, wait 30 to 60 seconds to allow the inhaler valve to refill. To use an inhaler without a spacer  1. Shake the inhaler as directed. Remove the cap. Check the instructions to see if you need to prime your inhaler before you use it. If it needs priming, follow the instructions on how to prime your inhaler. 2. Hold the inhaler upright with the mouthpiece at the bottom. 3. Tilt your head back a little, and breathe out slowly and completely. 4. Position the inhaler in one of two ways:  ? You can place the inhaler in your mouth. This is easier for most people. And it lowers the risk that any of the medicine will get into your eyes. ? Or you can place the inhaler 1 to 2 inches in front of your open mouth, without closing your lips over it. Try to open your mouth as wide as you can. Placing the inhaler in front of your open mouth may be better for getting the medicine into your lungs. But some people may find this too hard to do. 5. Start taking slow, even breaths through your mouth. Press down on the inhaler once, then inhale fully. 6. Hold your breath for 10 seconds. This will let the medicine settle in your lungs. 7. If you need to take a second dose, wait 30 to 60 seconds to allow the inhaler valve to refill. Where can you learn more? Go to http://tere-ann marie.info/.   Enter N312 in the search box to learn more about \"Using a Metered-Dose Inhaler: Care Instructions. \"  Current as of: June 9, 2019  Content Version: 12.2  © 0770-2639 [employer]. Care instructions adapted under license by [employer] (which disclaims liability or warranty for this information). If you have questions about a medical condition or this instruction, always ask your healthcare professional. [address] any warranty or liability for your use of this information. Patient Education        Learning About Meal Planning for Diabetes  Why plan your meals? Meal planning can be a key part of managing diabetes. Planning meals and snacks with the right balance of carbohydrate, protein, and fat can help you keep your blood sugar at the target level you set with your doctor. You don't have to eat special foods. You can eat what your family eats, including sweets once in a while. But you do have to pay attention to how often you eat and how much you eat of certain foods. You may want to work with a dietitian or a certified diabetes educator. He or she can give you tips and meal ideas and can answer your questions about meal planning. This health professional can also help you reach a healthy weight if that is one of your goals. What plan is right for you? Your dietitian or diabetes educator may suggest that you start with the plate format or carbohydrate counting. The plate format  The plate format is a simple way to help you manage how you eat. You plan meals by learning how much space each food should take on a plate. Using the plate format helps you spread carbohydrate throughout the day. It can make it easier to keep your blood sugar level within your target range. It also helps you see if you're eating healthy portion sizes. To use the plate format, you put non-starchy vegetables on half your plate. Add meat or meat substitutes on one-quarter of the plate.  Put a grain or starchy vegetable (such as brown rice or a potato) on the final quarter of the plate. You can add a small piece of fruit and some low-fat or fat-free milk or yogurt, depending on your carbohydrate goal for each meal.  Here are some tips for using the plate format:  · Make sure that you are not using an oversized plate. A 9-inch plate is best. Many restaurants use larger plates. · Get used to using the plate format at home. Then you can use it when you eat out. · Write down your questions about using the plate format. Talk to your doctor, a dietitian, or a diabetes educator about your concerns. Carbohydrate counting  With carbohydrate counting, you plan meals based on the amount of carbohydrate in each food. Carbohydrate raises blood sugar higher and more quickly than any other nutrient. It is found in desserts, breads and cereals, and fruit. It's also found in starchy vegetables such as potatoes and corn, grains such as rice and pasta, and milk and yogurt. Spreading carbohydrate throughout the day helps keep your blood sugar levels within your target range. Your daily amount depends on several things, including your weight, how active you are, which diabetes medicines you take, and what your goals are for your blood sugar levels. A registered dietitian or diabetes educator can help you plan how much carbohydrate to include in each meal and snack. A guideline for your daily amount of carbohydrate is:  · 45 to 60 grams at each meal. That's about the same as 3 to 4 carbohydrate servings. · 15 to 20 grams at each snack. That's about the same as 1 carbohydrate serving. The Nutrition Facts label on packaged foods tells you how much carbohydrate is in a serving of the food. First, look at the serving size on the food label. Is that the amount you eat in a serving? All of the nutrition information on a food label is based on that serving size.  So if you eat more or less than that, you'll need to adjust the other numbers. Total carbohydrate is the next thing you need to look for on the label. If you count carbohydrate servings, one serving of carbohydrate is 15 grams. For foods that don't come with labels, such as fresh fruits and vegetables, you'll need a guide that lists carbohydrate in these foods. Ask your doctor, dietitian, or diabetes educator about books or other nutrition guides you can use. If you take insulin, you need to know how many grams of carbohydrate are in a meal. This lets you know how much rapid-acting insulin to take before you eat. If you use an insulin pump, you get a constant rate of insulin during the day. So the pump must be programmed at meals to give you extra insulin to cover the rise in blood sugar after meals. When you know how much carbohydrate you will eat, you can take the right amount of insulin. Or, if you always use the same amount of insulin, you need to make sure that you eat the same amount of carbohydrate at meals. If you need more help to understand carbohydrate counting and food labels, ask your doctor, dietitian, or diabetes educator. How do you get started with meal planning? Here are some tips to get started:  · Plan your meals a week at a time. Don't forget to include snacks too. · Use cookbooks or online recipes to plan several main meals. Plan some quick meals for busy nights. You also can double some recipes that freeze well. Then you can save half for other busy nights when you don't have time to cook. · Make sure you have the ingredients you need for your recipes. If you're running low on basic items, put these items on your shopping list too. · List foods that you use to make breakfasts, lunches, and snacks. List plenty of fruits and vegetables. · Post this list on the refrigerator. Add to it as you think of more things you need. · Take the list to the store to do your weekly shopping. Follow-up care is a key part of your treatment and safety.  Be sure to make and go to all appointments, and call your doctor if you are having problems. It's also a good idea to know your test results and keep a list of the medicines you take. Where can you learn more? Go to http://tere-ann marie.info/. Rachna Arrington in the search box to learn more about \"Learning About Meal Planning for Diabetes. \"  Current as of: April 16, 2019  Content Version: 12.2  © 4416-1237 [employer]. Care instructions adapted under license by [employer] (which disclaims liability or warranty for this information). If you have questions about a medical condition or this instruction, always ask your healthcare professional. [place] [address] any warranty or liability for your use of this information. Patient Education        Limiting Sodium With Heart Failure: Care Instructions  Your Care Instructions    Sodium causes your body to hold on to extra water. This may cause your heart failure symptoms to get worse. Limiting sodium may help you feel better and lower your risk of having to go to the hospital.  People get most of their sodium from processed foods. Fast food and restaurant meals also tend to be very high in sodium. Your doctor may suggest that you limit sodium to 2,000 milligrams (mg) a day or less. That is less than 1 teaspoon of salt a day, including all the salt you eat in cooked or packaged foods. Follow-up care is a key part of your treatment and safety. Be sure to make and go to all appointments, and call your doctor if you are having problems. It's also a good idea to know your test results and keep a list of the medicines you take. How can you care for yourself at home? Read food labels  · Read food labels on cans and food packages. The labels tell you how much sodium is in each serving. Make sure that you look at the serving size.  If you eat more than the serving size, you have eaten more sodium than is listed for one serving. · Food labels also tell you the Percent Daily Value for sodium. Choose products with low Percent Daily Values for sodium. · Be aware that sodium can come in forms other than salt, including monosodium glutamate (MSG), sodium citrate, and sodium bicarbonate (baking soda). MSG is often added to Asian food. You can sometimes ask for food without MSG or salt. Buy low-sodium foods  · Buy foods that are labeled \"unsalted\" (no salt added), \"sodium-free\" (less than 5 mg of sodium per serving), or \"low-sodium\" (less than 140 mg of sodium per serving). A food labeled \"light sodium\" has less than half of the full-sodium version of that food. Foods labeled \"reduced-sodium\" may still have too much sodium. · Buy fresh vegetables or plain, frozen vegetables. Buy low-sodium versions of canned vegetables, soups, and other canned goods. Prepare low-sodium meals  · Use less salt each day when cooking. Reducing salt in this way will help you adjust to the taste. Do not add salt after cooking. Take the salt shaker off the table. · Flavor your food with garlic, lemon juice, onion, vinegar, herbs, and spices instead of salt. Do not use soy sauce, steak sauce, onion salt, garlic salt, mustard, or ketchup on your food. · Make your own salad dressings, sauces, and ketchup without adding salt. · Use less salt (or none) when recipes call for it. You can often use half the salt a recipe calls for without losing flavor. Other dishes like rice, pasta, and grains do not need added salt. · Rinse canned vegetables. This removes some--but not all--of the salt. · Avoid water that has a naturally high sodium content or that has been treated with water softeners, which add sodium. Call your local water company to find out the sodium content of your water supply. If you buy bottled water, read the label and choose a sodium-free brand.   Avoid high-sodium foods, such as:  · Smoked, cured, salted, and canned meat, fish, and poultry. · Ham, bridges, hot dogs, and luncheon meats. · Regular, hard, and processed cheese and regular peanut butter. · Crackers with salted tops. · Frozen prepared meals. · Canned and dried soups, broths, and bouillon, unless labeled sodium-free or low-sodium. · Canned vegetables, unless labeled sodium-free or low-sodium. · Salted snack foods such as chips and pretzels. · Western Diane fries, pizza, tacos, and other fast foods. · Pickles, olives, ketchup, and other condiments, especially soy sauce, unless labeled sodium-free or low-sodium. If you cannot cook for yourself  · Have family members or friends help you, or have someone cook low-sodium meals. · Check with your local senior nutrition program to find out where meals are served and whether they offer a low-sodium option. You can often find these programs through your local health department or hospital.  · Have meals delivered to your home. Most [facility] have a [employer]. These programs provide one hot meal a day for older adults, delivered to their homes. Ask whether these meals are low-sodium. Let them know that you are on a low-sodium diet. Where can you learn more? Go to http://tere-ann marie.info/. Enter A166 in the search box to learn more about \"Limiting Sodium With Heart Failure: Care Instructions. \"  Current as of: April 9, 2019  Content Version: 12.2  © 6214-5048 [employer]. Care instructions adapted under license by [employer] (which disclaims liability or warranty for this information). If you have questions about a medical condition or this instruction, always ask your healthcare professional. [place] [address] any warranty or liability for your use of this information. Patient Education        Learning About Restricting Fluids When You Have Heart Failure  Why is it important to restrict fluids when you have heart failure?     With heart failure, having too much fluid in your body can make it harder for your weakened heart to pump. This can make symptoms--like swelling and shortness of breath--worse. But not everyone with heart failure has to limit fluids. If your doctor tells you to limit fluids, it may help you feel better. How can you care for yourself at home? · Find a way of tracking the fluids you take in that works for you. Here are two methods you can try:  ? Write down how much you drink throughout the day. ? Keep a container filled with the amount of liquid allowed for the day. As you drink liquids during the day, such as a 6-ounce cup of coffee, pour that same amount out of the container. When the container is empty, you've had your liquid for the day. · Count any foods that will melt (such as ice cream, gelatin, or flavored ice treats) or liquid foods (such as soup) as part of your fluids for the day. Also count the liquid in canned fruits and vegetables as part of your daily intake, or drain them well before serving. · Space your liquids throughout the day. Then you won't be tempted to drink more than the amount your doctor recommends. · To relieve thirst without taking in extra water, try chewing gum, sucking on hard candy (sugarless if you have diabetes), or rinsing your mouth with water and spitting it out. Where can you learn more? Go to http://tere-ann marie.info/. Enter F350 in the search box to learn more about \"Learning About Restricting Fluids When You Have Heart Failure. \"  Current as of: April 9, 2019  Content Version: 12.2  © 7726-1854 [employer]. Care instructions adapted under license by [employer] (which disclaims liability or warranty for this information). If you have questions about a medical condition or this instruction, always ask your healthcare professional. [place] [address] any warranty or liability for your use of this information.          Patient Education Shortness of Breath: Care Instructions  Your Care Instructions  Shortness of breath has many causes. Sometimes conditions such as anxiety can lead to shortness of breath. Some people get mild shortness of breath when they exercise. Trouble breathing also can be a symptom of a serious problem, such as asthma, lung disease, emphysema, heart problems, and pneumonia. If your shortness of breath continues, you may need tests and treatment. Watch for any changes in your breathing and other symptoms. Follow-up care is a key part of your treatment and safety. Be sure to make and go to all appointments, and call your doctor if you are having problems. It's also a good idea to know your test results and keep a list of the medicines you take. How can you care for yourself at home? · Do not smoke or allow others to smoke around you. If you need help quitting, talk to your doctor about stop-smoking programs and medicines. These can increase your chances of quitting for good. · Get plenty of rest and sleep. · Take your medicines exactly as prescribed. Call your doctor if you think you are having a problem with your medicine. · Find healthy ways to deal with stress. ? Exercise daily. ? Get plenty of sleep. ? Eat regularly and well. When should you call for help? Call 911 anytime you think you may need emergency care. For example, call if:    · You have severe shortness of breath.     · You have symptoms of a heart attack. These may include:  ? Chest pain or pressure, or a strange feeling in the chest.  ? Sweating. ? Shortness of breath. ? Nausea or vomiting. ? Pain, pressure, or a strange feeling in the back, neck, jaw, or upper belly or in one or both shoulders or arms. ? Lightheadedness or sudden weakness. ? A fast or irregular heartbeat. After you call 911, the  may tell you to chew 1 adult-strength or 2 to 4 low-dose aspirin. Wait for an ambulance.  Do not try to drive yourself.    Call your doctor now or seek immediate medical care if:    · Your shortness of breath gets worse or you start to wheeze. Wheezing is a high-pitched sound when you breathe.     · You wake up at night out of breath or have to prop your head up on several pillows to breathe.     · You are short of breath after only light activity or while at rest.    Watch closely for changes in your health, and be sure to contact your doctor if:    · You do not get better over the next 1 to 2 days. Where can you learn more? Go to http://tere-ann marie.info/. Enter S780 in the search box to learn more about \"Shortness of Breath: Care Instructions. \"  Current as of: June 9, 2019  Content Version: 12.2  © 2339-5121 [employer]. Care instructions adapted under license by [employer] (which disclaims liability or warranty for this information). If you have questions about a medical condition or this instruction, always ask your healthcare professional. [place] [address] any warranty or liability for your use of this information. Patient armband removed and shredded  MyChart Activation    Thank you for requesting access to [facility]. Please follow the instructions below to securely access and download your online medical record. [facility] allows you to send messages to your doctor, view your test results, renew your prescriptions, schedule appointments, and more. How Do I Sign Up? 1. In your internet browser, go to [email or web address]  2. Click on the First Time User? Click Here link in the Sign In box. You will be redirect to the New Member Sign Up page. 3. Enter your [facility] Access Code exactly as it appears below. You will not need to use this code after youve completed the sign-up process. If you do not sign up before the expiration date, you must request a new code.     [facility] Access Code: [email or web address]  Expires: 1/24/2020 10:45 PM (This is the date your [facility] access code will )    4. Enter the last four digits of your Social Security Number (xxxx) and Date of Birth (mm/dd/yyyy) as indicated and click Submit. You will be taken to the next sign-up page. 5. Create a [facility] ID. This will be your [facility] login ID and cannot be changed, so think of one that is secure and easy to remember. 6. Create a [facility] password. You can change your password at any time. 7. Enter your Password Reset Question and Answer. This can be used at a later time if you forget your password. 8. Enter your e-mail address. You will receive e-mail notification when new information is available in [address] 9. Click Sign Up. You can now view and download portions of your medical record. 10. Click the Download Summary menu link to download a portable copy of your medical information. Additional Information    If you have questions, please visit the Frequently Asked Questions section of the [facility] website at [email or web address] Remember, [facility] is NOT to be used for urgent needs. For medical emergencies, dial 911. Dual AVS reviewed with Cathlyn Fothergill, RN. All medications reviewed individually with patient. Opportunities for questions and concerns provided. Patient discharged via (mode of transport ie. Car, ambulance or air transport) car  Patient's arm band appropriately discarded. DISCHARGE SUMMARY from Nurse    PATIENT INSTRUCTIONS:    After general anesthesia or intravenous sedation, for 24 hours or while taking prescription Narcotics:  · Limit your activities  · Do not drive and operate hazardous machinery  · Do not make important personal or business decisions  · Do  not drink alcoholic beverages  · If you have not urinated within 8 hours after discharge, please contact your surgeon on call.     Report the following to your surgeon:  · Excessive pain, swelling, redness or odor of or around the surgical area  · Temperature over 100.5  · Nausea and vomiting lasting longer than 4 hours or if unable to take medications  · Any signs of decreased circulation or nerve impairment to extremity: change in color, persistent  numbness, tingling, coldness or increase pain  · Any questions    What to do at Home:  Recommended activity: Activity as tolerated    *  Please give a list of your current medications to your Primary Care Provider. *  Please update this list whenever your medications are discontinued, doses are      changed, or new medications (including over-the-counter products) are added. *  Please carry medication information at all times in case of emergency situations. These are general instructions for a healthy lifestyle:    No smoking/ No tobacco products/ Avoid exposure to second hand smoke  Surgeon General's Warning:  Quitting smoking now greatly reduces serious risk to your health. Obesity, smoking, and sedentary lifestyle greatly increases your risk for illness    A healthy diet, regular physical exercise & weight monitoring are important for maintaining a healthy lifestyle    You may be retaining fluid if you have a history of heart failure or if you experience any of the following symptoms:  Weight gain of 3 pounds or more overnight or 5 pounds in a week, increased swelling in our hands or feet or shortness of breath while lying flat in bed. Please call your doctor as soon as you notice any of these symptoms; do not wait until your next office visit. The discharge information has been reviewed with the patient. The patient verbalized understanding. Discharge medications reviewed with the patient and appropriate educational materials and side effects teaching were provided.   ___________________________________________________________________________________________________________________________________

## 2019-12-16 NOTE — PROGRESS NOTES
Cardiology Progress Note      12/16/2019 10:27 AM    Admit Date: 12/10/2019    Admit Diagnosis: Asthma [J45.909]      Subjective: Oscar Garibay denies chest pain.     Visit Vitals  /82 (BP 1 Location: Left arm, BP Patient Position: At rest)   Pulse (!) 58   Temp 97.6 °F (36.4 °C)   Resp 20   Ht 5' 2\" (1.575 m)   Wt 149.5 kg (329 lb 9.6 oz)   SpO2 97%   BMI 60.28 kg/m²     Current Facility-Administered Medications   Medication Dose Route Frequency    heparin 25,000 units in D5W 250 ml infusion  6.7-25 Units/kg/hr IntraVENous TITRATE    albuterol-ipratropium (DUO-NEB) 2.5 MG-0.5 MG/3 ML  3 mL Nebulization Q4H PRN    methylPREDNISolone (PF) (Solu-MEDROL) injection 60 mg  60 mg IntraVENous Q12H    insulin lispro (HUMALOG) injection 15 Units  15 Units SubCUTAneous TIDAC    nitroglycerin (NITROSTAT) tablet 0.4 mg  0.4 mg SubLINGual PRN    insulin glargine (LANTUS) injection 60 Units  60 Units SubCUTAneous DAILY    insulin lispro (HUMALOG) injection   SubCUTAneous AC&HS    furosemide (LASIX) injection 40 mg  40 mg IntraVENous DAILY    sodium chloride (NS) flush 5-40 mL  5-40 mL IntraVENous Q8H    sodium chloride (NS) flush 5-40 mL  5-40 mL IntraVENous PRN    glucose chewable tablet 16 g  16 g Oral PRN    glucagon (GLUCAGEN) injection 1 mg  1 mg IntraMUSCular PRN    dextrose 10% infusion 125-250 mL  125-250 mL IntraVENous PRN    aspirin chewable tablet 81 mg  81 mg Oral DAILY    atorvastatin (LIPITOR) tablet 40 mg  40 mg Oral DAILY    DULoxetine (CYMBALTA) capsule 60 mg  60 mg Oral DAILY    gabapentin (NEURONTIN) capsule 300 mg  300 mg Oral TID    metoprolol succinate (TOPROL-XL) XL tablet 100 mg  100 mg Oral DAILY    amLODIPine (NORVASC) tablet 5 mg  5 mg Oral DAILY    hydrALAZINE (APRESOLINE) tablet 25 mg  25 mg Oral TID    losartan (COZAAR) tablet 100 mg  100 mg Oral DAILY    acetaminophen-codeine (TYLENOL #3) per tablet 1 Tab  1 Tab Oral Q6H PRN    guaiFENesin ER (MUCINEX) tablet 600 mg 600 mg Oral Q12H    pantoprazole (PROTONIX) 40 mg in 0.9% sodium chloride 10 mL injection  40 mg IntraVENous DAILY    perflutren lipid microspheres (DEFINITY) in NS bolus IV  1 mL IntraVENous PRN    budesonide (PULMICORT) 500 mcg/2 ml nebulizer suspension  500 mcg Nebulization BID RT    arformoterol (BROVANA) neb solution 15 mcg  15 mcg Nebulization BID RT         Objective:      Physical Exam:  Visit Vitals  /82 (BP 1 Location: Left arm, BP Patient Position: At rest)   Pulse (!) 58   Temp 97.6 °F (36.4 °C)   Resp 20   Ht 5' 2\" (1.575 m)   Wt 149.5 kg (329 lb 9.6 oz)   SpO2 97%   BMI 60.28 kg/m²     General Appearance:  Well developed, well nourished,alert and oriented x 3, and individual in no acute distress. Ears/Nose/Mouth/Throat:   Hearing grossly normal.         Neck: Supple. Chest:   Lungs clear to auscultation bilaterally. Cardiovascular:  Regular rate and rhythm, S1, S2 normal, no murmur. Abdomen:   Soft, non-tender, bowel sounds are active. Extremities: No edema bilaterally. Skin: Warm and dry.                Data Review:   Labs:    Recent Results (from the past 24 hour(s))   GLUCOSE, POC    Collection Time: 12/15/19 11:42 AM   Result Value Ref Range    Glucose (POC) 229 (H) 70 - 110 mg/dL   PTT    Collection Time: 12/15/19  1:16 PM   Result Value Ref Range    aPTT 70.1 (H) 23.0 - 36.4 SEC   GLUCOSE, POC    Collection Time: 12/15/19  4:30 PM   Result Value Ref Range    Glucose (POC) 265 (H) 70 - 110 mg/dL   PTT    Collection Time: 12/15/19  8:18 PM   Result Value Ref Range    aPTT 66.4 (H) 23.0 - 36.4 SEC   GLUCOSE, POC    Collection Time: 12/15/19  9:23 PM   Result Value Ref Range    Glucose (POC) 364 (H) 70 - 110 mg/dL   PTT    Collection Time: 12/16/19  3:45 AM   Result Value Ref Range    aPTT 83.4 (H) 23.0 - 36.4 SEC   GLUCOSE, POC    Collection Time: 12/16/19  6:07 AM   Result Value Ref Range    Glucose (POC) 257 (H) 70 - 110 mg/dL   NUCLEAR CARDIAC STRESS TEST    Collection Time: 12/16/19  8:48 AM   Result Value Ref Range    Target  bpm       Telemetry: normal sinus rhythm      Assessment:     Principal Problem:    Respiratory distress (1/17/2019)    Active Problems:    HTN (hypertension) (8/16/2014)      Type II or unspecified type diabetes mellitus with renal manifestations, uncontrolled(250.42) ([facility] [address] (9/4/2014)      Asthma exacerbation (3/16/6933)      Diastolic CHF ([facility] [address] (0/09/7188)      АНДРЕЙ (obstructive sleep apnea) (12/11/2019)      UTI (urinary tract infection) (12/11/2019)        Plan:     Getting nuclear stress test.  No change overnight.       Cindy Alexandra MD

## 2019-12-16 NOTE — PROGRESS NOTES
2250- Pt resting comfortably in bed, awake and alert. No needs expressed at this time. Bed in lowest position. Call bell within reach. 0044- Pt resting comfortably in bed. Chest rise, fall and respirations observed. Bed in lowest position. Call bell within reach. 0216- Pt resting comfortably in bed. Chest rise, fall and respirations observed. Bed in lowest position. Call bell within reach. 8327- VS obtained. Pt awoken to voice. No needs expressed at this time. Bed in lowest position. Call bell within reach.      Visit Vitals  /60 (BP 1 Location: Right arm, BP Patient Position: At rest)   Pulse (!) 59   Temp 97.6 °F (36.4 °C)   Resp 20           SpO2 98%

## 2019-12-16 NOTE — ROUTINE PROCESS
Bedside and Verbal shift change report given to Cordell Stephens RN (oncoming nurse) by Cecilio Bates RN (offgoing nurse). Report included the following information SBAR and Kardex.

## 2019-12-16 NOTE — PROGRESS NOTES
Pt placed on hospital-issued resmed cpap in auto-titrating mode for HS at this time. No distress noted. Fio2 at 21%. Will continue to monitor.

## 2019-12-16 NOTE — PROGRESS NOTES
0730: Bedside and Verbal shift change report given to Cait Bishop RN (oncoming nurse) by Corinne Reynolds RN (offgoing nurse). Report included the following information Kardex and Cardiac Rhythm NSR.    0750: pt vitals obtained, escorted to the nuclear medicine dept  Cait Bishop RN     1010: Bedside and Verbal shift change report given to Cait Bishop RN (oncoming nurse) by Lake Foster RN (offgoing nurse).  Report included the following information Kardex and Cardiac Rhythm NSR.     1000: glucose education RN makes this nurse aware, pt insulin algorithm changes to be noted per MD concurrence, glucose dept concurrence with MD Wisam Easley not accepted due to pt NPO prior to test post test pt will eat and receive steroids, continue pt current insulin regimen  YENNY Strong RN    1050: pt returns from [facility] [address]    [id number]: MD Wisam Easley and case management at the bedside and explains to pt nebulizer ready for  at discharge, discharge pending per stress test, pt acknowledges and agrees to tx  Cait Bishop RN

## 2019-12-16 NOTE — DISCHARGE SUMMARY
Discharge Summary    Patient: Cherise Mcfadden MRN: [id number]  CSN: [id number]    YOB: 1965  Age: 53 y.o. Sex: female    DOA: 12/10/2019 LOS:  LOS: 5 days   Discharge Date:      Primary Care Provider:  Carol Carbone MD    Admission Diagnoses: Asthma [J45.909]    Discharge Diagnoses:    Hospital Problems  Date Reviewed: 12/11/2019          Codes Class Noted POA    АНДРЕЙ (obstructive sleep apnea) ICD-10-CM: G47.33  ICD-9-CM: 327.23  12/11/2019 Yes        UTI (urinary tract infection) ICD-10-CM: N39.0  ICD-9-CM: 599.0  12/11/2019 Yes        * (Principal) Respiratory distress ICD-10-CM: R06.03  ICD-9-CM: 786.09  1/17/2019 Yes        Asthma exacerbation ICD-10-CM: J45.901  ICD-9-CM: 493.92  1/17/2019 Yes        Diastolic CHF ([facility] RLG-09-QZ: I50.30  ICD-9-CM: 428.30, 428.0  1/17/2019 Yes        Type II or unspecified type diabetes mellitus with renal manifestations, uncontrolled(250.42) ([facility] ICD-10-CM: E11.29, E11.65  ICD-9-CM: 250.42  9/4/2014 Yes        HTN (hypertension) ICD-10-CM: I10  ICD-9-CM: 401.9  8/16/2014 Yes              Discharge Condition: stable     Discharge Medications:     Current Discharge Medication List      START taking these medications    Details   albuterol-ipratropium (DUO-NEB) 2.5 mg-0.5 mg/3 ml nebu 3 mL by Nebulization route every four (4) hours as needed for Wheezing. Qty: 30 Nebule, Refills: 0         CONTINUE these medications which have CHANGED    Details   predniSONE (STERAPRED) 5 mg dose pack See administration instruction per 5mg dose pack  Qty: 21 Tab, Refills: 0      guaiFENesin ER (MUCINEX) 600 mg ER tablet Take 1 Tab by mouth every twelve (12) hours. Qty: 10 Tab, Refills: 0         CONTINUE these medications which have NOT CHANGED    Details   amLODIPine (NORVASC) 5 mg tablet Take 5 mg by mouth daily. hydrALAZINE (APRESOLINE) 25 mg tablet Take 25 mg by mouth three (3) times daily.       albuterol sulfate 90 mcg/actuation aepb Take 1-2 Puffs by inhalation every four (4) hours as needed. Qty: 1 Inhaler, Refills: 0      metFORMIN (GLUCOPHAGE) 1,000 mg tablet Take 1,000 mg by mouth two (2) times daily (with meals). metoprolol succinate (TOPROL-XL) 50 mg XL tablet Take 100 mg by mouth daily. DULoxetine (CYMBALTA) 30 mg capsule Take 60 mg by mouth daily. atorvastatin (LIPITOR) 40 mg tablet Take 40 mg by mouth daily. losartan (COZAAR) 100 mg tablet Take 100 mg by mouth daily. OTHER,NON-FORMULARY, Medication for irregular heart beat      gabapentin (NEURONTIN) 300 mg capsule Take 300 mg by mouth three (3) times daily. furosemide (LASIX) 40 mg tablet Take 1 Tab by mouth daily. Qty: 45 Tab, Refills: 1      insulin NPH/insulin regular (NOVOLIN 70/30) 100 unit/mL (70-30) injection 50 Units by SubCUTAneous route every twelve (12) hours. aspirin 81 mg chewable tablet Take 1 tablet by mouth daily. Qty: 30 tablet, Refills: 1             Procedures : none     Consults: Cardiology      PHYSICAL EXAM   Visit Vitals  /72 (BP 1 Location: Left arm, BP Patient Position: At rest)   Pulse (!) 59   Temp 97.2 °F (36.2 °C)   Resp 20   Ht 5' 2\" (1.575 m)   Wt 149.5 kg (329 lb 9.6 oz)   SpO2 97%   BMI 60.28 kg/m²     General: Awake, cooperative, no acute distress    HEENT: NC, Atraumatic. PERRLA, EOMI. Anicteric sclerae. Lungs:  CTA Bilaterally. No Wheezing/Rhonchi/Rales. Heart:  Regular  rhythm,  No murmur, No Rubs, No Gallops  Abdomen: Soft, Non distended, Non tender. +Bowel sounds,   Extremities: No c/c/e  Psych:   Not anxious or agitated. Neurologic:  No acute neurological deficits. Admission HPI :   Stew Concepcion is a 53 y.o. female who has a hx of АНДРЕЙ (CPAP not at home yet), poorly controlled DMT2, morbid obesity, diastolic CHF, and HTN who presents with 3 week history of illness with worsening respiratory distress and cough over the last 2 days.  She has been sleeping in a chair and also can't walk to the bathroom without becoming SOB. She has also noticed some leg swelling over the past few days. No fevers, not on home O2. She has had some nausea but no vomiting today and denies frequent UTIs. Her daughter lives with her and is at bedside. No history of intubations for her asthma    Hospital Course :   Since pt  was admitted, iv steroid/breathing treatment were administrated with empiric iv abx. Glucose was controlled per indulin. With the treatment, her sob was back to her base line. Nebulizer machine was prescribed. She will go home with tapering steroid. During her stay, she had on episode of chest pain, cardiology was on board, stress test was negative for ischemic changes. She was cleared to be discharged per cardiologist.  She remained chest pain-free on discharge. She has diabetes, uses insulin at home. insulin drip was a started due to hypoglycemia over 500 while on high dose of steroid. Insulin drip was to stop and  her blood sugar was controlled by subacute insulin. She has recent sleep study completed, recommended use CPAP at night. She received CPAP during her stay. Recommended continue to follow-up with pulmonologist to have CPAP at home. We also completed treatment for urinary tract infection. Discharge planning discussed with patient and family, agree with the plan and no questions and concerns at this point.        Activity: Activity as tolerated    Diet: Cardiac Diet and Diabetic Diet    Follow-up: PCP and pulmonologist     Disposition: home     Minutes spent on discharge: 45 min       Labs: Results:       Chemistry Recent Labs     12/14/19  0100   *      K 5.1      CO2 22   *   CREA 2.35*   CA 8.6   AGAP 8   BUCR 43*   AP 93   TP 6.1*   ALB 2.7*   GLOB 3.4   AGRAT 0.8      CBC w/Diff Recent Labs     12/14/19  0100   WBC 15.2*   RBC 3.79*   HGB 9.6*   HCT 31.0*         Cardiac Enzymes Recent Labs     12/14/19  1330 12/14/19  0735   CPK 78 72 CKND1 1.3 CALCULATION NOT PERFORMED WHEN RESULT IS BELOW LINEAR LIMIT      Coagulation Recent Labs     12/16/19  0345 12/15/19  2018   APTT 83.4* 66.4*       Lipid Panel Lab Results   Component Value Date/Time    Cholesterol, total 146 05/28/2015 02:58 AM    HDL Cholesterol 64 (H) 05/28/2015 02:58 AM    LDL, calculated 66 05/28/2015 02:58 AM    VLDL, calculated 16 05/28/2015 02:58 AM    Triglyceride 80 05/28/2015 02:58 AM    CHOL/HDL Ratio 2.3 05/28/2015 02:58 AM      BNP No results for input(s): BNPP in the last 72 hours. Liver Enzymes Recent Labs     12/14/19  0100   TP 6.1*   ALB 2.7*   AP 93   SGOT 11      Thyroid Studies Lab Results   Component Value Date/Time    TSH 0.56 01/23/2019 01:33 AM            Significant Diagnostic Studies: Xr Chest Port    Result Date: 12/10/2019  --------------------------------------------------------------------------- <<<<<<<<<           [facility]           >>>>>>>>> --------------------------------------------------------------------------- CLINICAL HISTORY:  Cough. COMPARISON EXAMINATIONS:  January 23, 2019. ---  SINGLE FRONTAL VIEW OF THE CHEST  --- AP portable technique as well as patient body habitus limits evaluation. There appears to be bronchial thickening. There is no focal consolidation or evidence for significant pleural effusion. The cardiomediastinal silhouette is stable. No significant osseous abnormalities are identified.  --------------    Impression: -------------- Somewhat limited study due to AP portable technique and patient body habitus. There appears to be bronchial thickening which may be seen with bronchitis. No focal consolidation or pleural effusion.              [facility]     CC: Debbie Arteaga MD

## 2019-12-16 NOTE — PROGRESS NOTES
Problem: Diabetes Self-Management  Goal: *Disease process and treatment process  Description  Define diabetes and identify own type of diabetes; list 3 options for treating diabetes. Outcome: Progressing Towards Goal  Goal: *Incorporating nutritional management into lifestyle  Description  Describe effect of type, amount and timing of food on blood glucose; list 3 methods for planning meals. Outcome: Progressing Towards Goal  Goal: *Incorporating physical activity into lifestyle  Description  State effect of exercise on blood glucose levels. Outcome: Progressing Towards Goal  Goal: *Developing strategies to promote health/change behavior  Description  Define the ABC's of diabetes; identify appropriate screenings, schedule and personal plan for screenings. Outcome: Progressing Towards Goal  Goal: *Using medications safely  Description  State effect of diabetes medications on diabetes; name diabetes medication taking, action and side effects. Outcome: Progressing Towards Goal  Goal: *Monitoring blood glucose, interpreting and using results  Description  Identify recommended blood glucose targets  and personal targets. Outcome: Progressing Towards Goal  Goal: *Prevention, detection, treatment of acute complications  Description  List symptoms of hyper- and hypoglycemia; describe how to treat low blood sugar and actions for lowering  high blood glucose level. Outcome: Progressing Towards Goal  Goal: *Prevention, detection and treatment of chronic complications  Description  Define the natural course of diabetes and describe the relationship of blood glucose levels to long term complications of diabetes.   Outcome: Progressing Towards Goal  Goal: *Developing strategies to address psychosocial issues  Description  Describe feelings about living with diabetes; identify support needed and support network  Outcome: Progressing Towards Goal  Goal: *Insulin pump training  Outcome: Progressing Towards Goal  Goal: *Sick day guidelines  Outcome: Progressing Towards Goal  Goal: *Patient Specific Goal (EDIT GOAL, INSERT TEXT)  Outcome: Progressing Towards Goal     Problem: Patient Education: Go to Patient Education Activity  Goal: Patient/Family Education  Outcome: Progressing Towards Goal     Problem: Falls - Risk of  Goal: *Absence of Falls  Description  Document JoseC ruz Lee Fall Risk and appropriate interventions in the flowsheet. Outcome: Progressing Towards Goal  Note: Fall Risk Interventions:  Mobility Interventions: Patient to call before getting OOB         Medication Interventions: Teach patient to arise slowly    Elimination Interventions: Call light in reach    History of Falls Interventions: Evaluate medications/consider consulting pharmacy         Problem: Patient Education: Go to Patient Education Activity  Goal: Patient/Family Education  Outcome: Progressing Towards Goal     Problem: Pain  Goal: *Control of Pain  Outcome: Progressing Towards Goal  Goal: *PALLIATIVE CARE:  Alleviation of Pain  Outcome: Progressing Towards Goal     Problem: Patient Education: Go to Patient Education Activity  Goal: Patient/Family Education  Outcome: Progressing Towards Goal     Problem: Pressure Injury - Risk of  Goal: *Prevention of pressure injury  Description  Document Elian Scale and appropriate interventions in the flowsheet.   Outcome: Progressing Towards Goal  Note: Pressure Injury Interventions:  Sensory Interventions: Assess changes in LOC, Monitor skin under medical devices         Activity Interventions: Pressure redistribution bed/mattress(bed type)    Mobility Interventions: Pressure redistribution bed/mattress (bed type)    Nutrition Interventions: Document food/fluid/supplement intake    Friction and Shear Interventions: HOB 30 degrees or less                Problem: Patient Education: Go to Patient Education Activity  Goal: Patient/Family Education  Outcome: Progressing Towards Goal     Problem: Gas Exchange - Impaired  Goal: *Absence of hypoxia  Outcome: Progressing Towards Goal     Problem: Patient Education: Go to Patient Education Activity  Goal: Patient/Family Education  Outcome: Progressing Towards Goal     Problem: Patient Education: Go to Patient Education Activity  Goal: Patient/Family Education  Outcome: Progressing Towards Goal     Problem: Patient Education: Go to Patient Education Activity  Goal: Patient/Family Education  Outcome: Progressing Towards Goal     Problem: Unstable angina/NSTEMI: Day of Admission/Day 1  Goal: Treatments/Interventions/Procedures  Outcome: Progressing Towards Goal

## 2019-12-16 NOTE — PROGRESS NOTES
0900 Assumed care of patient Denise Porter RN    1100 patient returned back to unit from scheduled stress test    1300 Assessment completed, patient is alert and oriented x's 4, no c/o pain. NSR on the monitor, lung fields are clear with diminished bases on RA, 02 sat sustained in the upper 90's with a nonproductive cough throughout. Patient is tolerating a diabetic diet without any N/V or gastric distention. Scheduled insulin administered per  protocol without any complications. Patient is currently sitting up on side of bed, no s/s of any distress, will continue to monitor. 1500 NAD, will continue to monitor. [address] discharge instructions explained to patient,understanding verbalized. Instructions included COPD breathing techniques, inhaler instructions, heart failure, low sodium diet and diabetic diets. Patient escorted out of facility via wheelchair in stable condition with son at side.

## 2019-12-16 NOTE — PROGRESS NOTES
Transition of care: anticipate home today  Met with patient and Dr. Meir Danielson at bedside. Anticipate d/c home today. Patients daughter at [facility] and will drive her home  Cm has called [facility] about the nebulizer they will call patient if they are going to deliver to home or to hosptal. Informed they will call patient. Follow up with [facility]    [address] [facility]   [address] [facility]   [phone]    Patient's CPAP has been ordered.  Physician's office is not sure when it will be received, but they will give patient a call so that she can pick it up. Jackie Cisneros should then schedule an appointment for 31 days after receiving CPAP  After discussion with patient she wants to wait on cpap so the insurance will pay for it. She has follow  Up as     [address]    Next steps: Follow up    [phone]    Chosen to continue managing your healthcare needs. Follow up with [facility]    [address] [facility]   [address] [facility]   [phone]    Patient's CPAP has been ordered.  Physician's office is not sure when it will be received, but they will give patient a call so that she can pick it up. Jackie Cisneros should then schedule an appointment for 31 days after receiving CPAP. Follow up with Ese Herrera MD on 12/18/2019    Specialty: Internal Medicine    [id number]   [address] [facility]   [phone]    Follow-up appointment @ 1:00 p.m.  Physician's office is requesting that you give them a call when you get home so that they can do a telephone interview   patient had chosen [facility] but they do not accept her insurance she has second choice [facility] referral was placed and accepted prior. She denies other needs from cm she has a ride home. She has [employer] medicaid for [place]. No other needs from   Care Management Interventions  PCP Verified by CM:  Yes  Transition of Care Consult (CM Consult): [address]: No  Reason Outside [place] [address] Chosen: Unable to staff case([facility] not accept insurance but personal touch will)  Physical Therapy Consult: Yes  Occupational Therapy Consult: Yes  Current Support Network:  Other  Confirm Follow Up Transport: Family  Plan discussed with Pt/Family/Caregiver: Yes  Freedom of Choice Offered: Yes  Discharge Location  Discharge Placement: Home with home health

## 2019-12-16 NOTE — PROGRESS NOTES
Case discussed with Dr. Daniel Cisse. Stress test was negative. Can be discharged home. Discussed with patient about stress test results. She agrees to be discharged.

## 2019-12-16 NOTE — PROGRESS NOTES
Problem: Mobility Impaired (Adult and Pediatric)  Goal: *Acute Goals and Plan of Care (Insert Text)  Description  Physical Therapy Goals   Initiated 12/12/2019 and to be accomplished within 2-4 day(s)  1. Patient will move from supine <> sit with mod I in prep for out of bed activity and change of position. 2.  Patient will perform sit<> stand with S/mod I/RW in prep for transfers/ambulation. 3.  Patient will transfer from bed <> chair with S/mod I with RW for time up in chair for completion of ADL activity. 4.  Patient will ambulate 130 feet with S/mod I with RW for improved functional mobility/safe discharge. Outcome: Progressing Towards Goal   PHYSICAL THERAPY TREATMENT    Patient: Madina Tierney (53 y.o. female)  Date: 12/16/2019  Diagnosis: Asthma [J45.909]   Respiratory distress    Precautions: Fall   Chart, physical therapy assessment, plan of care and goals were reviewed. ASSESSMENT:  Steady progress noted. Pt amb 200' with RW SBA. No LOB. X1 standing rest. No wheezing noted on exertion. Cont POC. Progression toward goals:  []      Improving appropriately and progressing toward goals  [x]      Improving slowly and progressing toward goals  []      Not making progress toward goals and plan of care will be adjusted     PLAN:  Patient continues to benefit from skilled intervention to address the above impairments. Continue treatment per established plan of care.   Discharge Recommendations:  Home Health  Further Equipment Recommendations for Discharge:  rolling walker     SUBJECTIVE:   Patient stated  okay I'm ready     OBJECTIVE DATA SUMMARY:   Critical Behavior:  Neurologic State: Alert  Orientation Level: Oriented X4  Cognition: Follows commands  Safety/Judgement: Fall prevention  Functional Mobility Training:  Bed Mobility:  Rolling: Modified independent  Supine to Sit: Modified independent  Scooting: Modified independent    Transfers:  Sit to Stand: Supervision  Stand to Sit: Supervision    Balance:  Sitting: Intact  Standing: Intact; With support  Standing - Static: Good  Standing - Dynamic : Good  Ambulation/Gait Training:  Distance (ft): 200 Feet (ft)  Assistive Device: Walker, rolling;Gait belt  Ambulation - Level of Assistance: Stand-by assistance  Gait Abnormalities: Foot drop;Decreased step clearance  Base of Support: Widened  Speed/Arlette: Slow      Pain:  Pain Scale 1: Numeric (0 - 10)  Pain Intensity 1: 0    Activity Tolerance:   Fair     After treatment:   [] Patient left in no apparent distress sitting up in chair  [x] Patient left in no apparent distress in bed (EOB)   [x] Call bell left within reach  [] Nursing notified  [] Caregiver present  [] Bed alarm activated      Wen Jeong PTA   Time Calculation: 23 mins

## 2019-12-16 NOTE — CDMP QUERY
Patient admitted with asthma exacerbation, noted to have suspected NSTEMI 12/14 @ Jana Dyson MD with no further documentation of NSTEMI noted. If possible, please document in progress notes and d/c summary if you are evaluating and/or treating any of the following: 
 
=> NSTEMI 
=> Other Explanation of clinical findings 
=> Clinically Undetermined (no explanation for clinical findings) The medical record reflects the following: 
 
   Risk Factors: Elevated troponin, PMH diastolic CHF Clinical Indicators: troponin 0.19, 0.20, 0.15 Treatment: receivng trending cardiac enzymes, cardiology consulted, nuclear stress     test ordered by cardiology Please clarify and document your clinical opinion in the progress notes and discharge summary including the definitive and/or presumptive diagnosis, (suspected or probable), related to the above clinical findings. Please include clinical findings supporting your diagnosis. Thank you, Mckenzie Guillen RN BSN, [address] [place] [address] 
[phone]

## 2019-12-16 NOTE — DIABETES MGMT
GLYCEMIC CONTROL PROGRESS NOTE:    - known h/o T2DM, Hba1C not within recommended range for age + comorbids on mixed insulin/oral home regimen  - Solumedrol 60 mg Q 12 hours, steroid-associated hyperglycemia  - BG out of target range non-ICU: < 180 mg/dL  - NPO for procedure  - TDD = 144 (60 Lantus + 45(15) MTI + 39 units - Humalog Very Insulin Resistant Corrective Coverage  - recommend 50% decrease in basal insulin   *Lantus 30 units  Addendum:  - attempted to see pt this AM, off unit for cardiac cath  Recent Glucose Results:   Lab Results   Component Value Date/Time    GLUCPOC 257 (H) 12/16/2019 06:07 AM    GLUCPOC 364 (H) 12/15/2019 09:23 PM    GLUCPOC 265 (H) 12/15/2019 04:30 PM     Susan Wilkes MS, RN, CDE  Glycemic Control Team  [phone]  Pager [phone] (M-TH 8:00-4:30P)  *After Hours pager [phone]

## 2019-12-17 LAB
BACTERIA SPEC CULT: NORMAL
SERVICE CMNT-IMP: NORMAL

## 2019-12-17 NOTE — PROGRESS NOTES
Telephone call from patient reports she has not heard from the [facility].  Cms will call and find out about patients nebulizer

## 2020-02-12 ENCOUNTER — APPOINTMENT (OUTPATIENT)
Dept: NUCLEAR MEDICINE | Age: 55
DRG: 194 | End: 2020-02-12
Attending: PHYSICIAN ASSISTANT
Payer: COMMERCIAL

## 2020-02-12 ENCOUNTER — APPOINTMENT (OUTPATIENT)
Dept: CT IMAGING | Age: 55
DRG: 194 | End: 2020-02-12
Attending: PHYSICIAN ASSISTANT
Payer: COMMERCIAL

## 2020-02-12 ENCOUNTER — APPOINTMENT (OUTPATIENT)
Dept: GENERAL RADIOLOGY | Age: 55
DRG: 194 | End: 2020-02-12
Attending: PHYSICIAN ASSISTANT
Payer: COMMERCIAL

## 2020-02-12 ENCOUNTER — HOSPITAL ENCOUNTER (INPATIENT)
Age: 55
LOS: 12 days | Discharge: HOME HEALTH | DRG: 194 | End: 2020-02-24
Attending: EMERGENCY MEDICINE | Admitting: INTERNAL MEDICINE
Payer: COMMERCIAL

## 2020-02-12 DIAGNOSIS — E87.5 ACUTE HYPERKALEMIA: Primary | ICD-10-CM

## 2020-02-12 DIAGNOSIS — N18.9 ACUTE ON CHRONIC RENAL INSUFFICIENCY: ICD-10-CM

## 2020-02-12 DIAGNOSIS — R19.7 DIARRHEA, UNSPECIFIED TYPE: ICD-10-CM

## 2020-02-12 DIAGNOSIS — I50.33 DIASTOLIC CHF, ACUTE ON CHRONIC (HCC): ICD-10-CM

## 2020-02-12 DIAGNOSIS — Z71.89 ADVANCED CARE PLANNING/COUNSELING DISCUSSION: ICD-10-CM

## 2020-02-12 DIAGNOSIS — R07.9 CHEST PAIN, UNSPECIFIED TYPE: ICD-10-CM

## 2020-02-12 DIAGNOSIS — J96.01 ACUTE RESPIRATORY FAILURE WITH HYPOXIA (HCC): ICD-10-CM

## 2020-02-12 DIAGNOSIS — N17.9 ACUTE RENAL FAILURE SUPERIMPOSED ON STAGE 3 CHRONIC KIDNEY DISEASE, UNSPECIFIED ACUTE RENAL FAILURE TYPE: ICD-10-CM

## 2020-02-12 DIAGNOSIS — N28.9 ACUTE ON CHRONIC RENAL INSUFFICIENCY: ICD-10-CM

## 2020-02-12 DIAGNOSIS — N18.3 ACUTE RENAL FAILURE SUPERIMPOSED ON STAGE 3 CHRONIC KIDNEY DISEASE, UNSPECIFIED ACUTE RENAL FAILURE TYPE: ICD-10-CM

## 2020-02-12 LAB
ALBUMIN SERPL-MCNC: 2.8 G/DL (ref 3.4–5)
ALBUMIN/GLOB SERPL: 0.8 {RATIO} (ref 0.8–1.7)
ALP SERPL-CCNC: 116 U/L (ref 45–117)
ALT SERPL-CCNC: 19 U/L (ref 13–56)
ANION GAP SERPL CALC-SCNC: 7 MMOL/L (ref 3–18)
ANION GAP SERPL CALC-SCNC: 7 MMOL/L (ref 3–18)
AST SERPL-CCNC: 18 U/L (ref 10–38)
ATRIAL RATE: 52 BPM
BASOPHILS # BLD: 0 K/UL (ref 0–0.1)
BASOPHILS NFR BLD: 0 % (ref 0–2)
BILIRUB SERPL-MCNC: 0.1 MG/DL (ref 0.2–1)
BNP SERPL-MCNC: 884 PG/ML (ref 0–900)
BUN SERPL-MCNC: 68 MG/DL (ref 7–18)
BUN SERPL-MCNC: 71 MG/DL (ref 7–18)
BUN/CREAT SERPL: 25 (ref 12–20)
BUN/CREAT SERPL: 26 (ref 12–20)
CALCIUM SERPL-MCNC: 8.6 MG/DL (ref 8.5–10.1)
CALCIUM SERPL-MCNC: 8.7 MG/DL (ref 8.5–10.1)
CALCULATED P AXIS, ECG09: 50 DEGREES
CALCULATED R AXIS, ECG10: 69 DEGREES
CALCULATED T AXIS, ECG11: 69 DEGREES
CHLORIDE SERPL-SCNC: 111 MMOL/L (ref 100–111)
CHLORIDE SERPL-SCNC: 114 MMOL/L (ref 100–111)
CK MB CFR SERPL CALC: 2 % (ref 0–4)
CK MB CFR SERPL CALC: NORMAL % (ref 0–4)
CK MB SERPL-MCNC: 1 NG/ML (ref 5–25)
CK MB SERPL-MCNC: <1 NG/ML (ref 5–25)
CK SERPL-CCNC: 50 U/L (ref 26–192)
CK SERPL-CCNC: 58 U/L (ref 26–192)
CO2 SERPL-SCNC: 21 MMOL/L (ref 21–32)
CO2 SERPL-SCNC: 23 MMOL/L (ref 21–32)
CREAT SERPL-MCNC: 2.69 MG/DL (ref 0.6–1.3)
CREAT SERPL-MCNC: 2.7 MG/DL (ref 0.6–1.3)
D DIMER PPP FEU-MCNC: 1.3 UG/ML(FEU)
DIAGNOSIS, 93000: NORMAL
DIFFERENTIAL METHOD BLD: ABNORMAL
EOSINOPHIL # BLD: 0.3 K/UL (ref 0–0.4)
EOSINOPHIL NFR BLD: 3 % (ref 0–5)
ERYTHROCYTE [DISTWIDTH] IN BLOOD BY AUTOMATED COUNT: 15.4 % (ref 11.6–14.5)
EST. AVERAGE GLUCOSE BLD GHB EST-MCNC: 226 MG/DL
GLOBULIN SER CALC-MCNC: 3.7 G/DL (ref 2–4)
GLUCOSE BLD STRIP.AUTO-MCNC: 102 MG/DL (ref 70–110)
GLUCOSE BLD STRIP.AUTO-MCNC: 89 MG/DL (ref 70–110)
GLUCOSE SERPL-MCNC: 82 MG/DL (ref 74–99)
GLUCOSE SERPL-MCNC: 88 MG/DL (ref 74–99)
HBA1C MFR BLD: 9.5 % (ref 4.2–5.6)
HCT VFR BLD AUTO: 31.8 % (ref 35–45)
HGB BLD-MCNC: 9.6 G/DL (ref 12–16)
LIPASE SERPL-CCNC: 356 U/L (ref 73–393)
LYMPHOCYTES # BLD: 1.9 K/UL (ref 0.9–3.6)
LYMPHOCYTES NFR BLD: 18 % (ref 21–52)
MCH RBC QN AUTO: 25 PG (ref 24–34)
MCHC RBC AUTO-ENTMCNC: 30.2 G/DL (ref 31–37)
MCV RBC AUTO: 82.8 FL (ref 74–97)
MONOCYTES # BLD: 0.6 K/UL (ref 0.05–1.2)
MONOCYTES NFR BLD: 6 % (ref 3–10)
NEUTS SEG # BLD: 7.8 K/UL (ref 1.8–8)
NEUTS SEG NFR BLD: 73 % (ref 40–73)
P-R INTERVAL, ECG05: 144 MS
PLATELET # BLD AUTO: 326 K/UL (ref 135–420)
PMV BLD AUTO: 9.3 FL (ref 9.2–11.8)
POTASSIUM SERPL-SCNC: 5.4 MMOL/L (ref 3.5–5.5)
POTASSIUM SERPL-SCNC: 6 MMOL/L (ref 3.5–5.5)
PROT SERPL-MCNC: 6.5 G/DL (ref 6.4–8.2)
Q-T INTERVAL, ECG07: 454 MS
QRS DURATION, ECG06: 84 MS
QTC CALCULATION (BEZET), ECG08: 422 MS
RBC # BLD AUTO: 3.84 M/UL (ref 4.2–5.3)
SODIUM SERPL-SCNC: 141 MMOL/L (ref 136–145)
SODIUM SERPL-SCNC: 142 MMOL/L (ref 136–145)
TROPONIN I SERPL-MCNC: <0.02 NG/ML (ref 0–0.04)
TROPONIN I SERPL-MCNC: <0.02 NG/ML (ref 0–0.04)
VENTRICULAR RATE, ECG03: 52 BPM
WBC # BLD AUTO: 10.6 K/UL (ref 4.6–13.2)

## 2020-02-12 PROCEDURE — 74011250636 HC RX REV CODE- 250/636: Performed by: PHYSICIAN ASSISTANT

## 2020-02-12 PROCEDURE — A9540 TC99M MAA: HCPCS

## 2020-02-12 PROCEDURE — 99285 EMERGENCY DEPT VISIT HI MDM: CPT

## 2020-02-12 PROCEDURE — 80053 COMPREHEN METABOLIC PANEL: CPT

## 2020-02-12 PROCEDURE — 83690 ASSAY OF LIPASE: CPT

## 2020-02-12 PROCEDURE — 85025 COMPLETE CBC W/AUTO DIFF WBC: CPT

## 2020-02-12 PROCEDURE — 96375 TX/PRO/DX INJ NEW DRUG ADDON: CPT

## 2020-02-12 PROCEDURE — 96374 THER/PROPH/DIAG INJ IV PUSH: CPT

## 2020-02-12 PROCEDURE — 74011000250 HC RX REV CODE- 250: Performed by: PHYSICIAN ASSISTANT

## 2020-02-12 PROCEDURE — 82962 GLUCOSE BLOOD TEST: CPT

## 2020-02-12 PROCEDURE — 65660000000 HC RM CCU STEPDOWN

## 2020-02-12 PROCEDURE — 83036 HEMOGLOBIN GLYCOSYLATED A1C: CPT

## 2020-02-12 PROCEDURE — 74176 CT ABD & PELVIS W/O CONTRAST: CPT

## 2020-02-12 PROCEDURE — 93005 ELECTROCARDIOGRAM TRACING: CPT

## 2020-02-12 PROCEDURE — 85379 FIBRIN DEGRADATION QUANT: CPT

## 2020-02-12 PROCEDURE — 82550 ASSAY OF CK (CPK): CPT

## 2020-02-12 PROCEDURE — 71045 X-RAY EXAM CHEST 1 VIEW: CPT

## 2020-02-12 PROCEDURE — 83880 ASSAY OF NATRIURETIC PEPTIDE: CPT

## 2020-02-12 RX ORDER — ONDANSETRON 2 MG/ML
4 INJECTION INTRAMUSCULAR; INTRAVENOUS
Status: COMPLETED | OUTPATIENT
Start: 2020-02-12 | End: 2020-02-12

## 2020-02-12 RX ORDER — SODIUM CHLORIDE 0.9 % (FLUSH) 0.9 %
5-40 SYRINGE (ML) INJECTION AS NEEDED
Status: DISCONTINUED | OUTPATIENT
Start: 2020-02-12 | End: 2020-02-24 | Stop reason: HOSPADM

## 2020-02-12 RX ORDER — FUROSEMIDE 10 MG/ML
40 INJECTION INTRAMUSCULAR; INTRAVENOUS
Status: COMPLETED | OUTPATIENT
Start: 2020-02-12 | End: 2020-02-12

## 2020-02-12 RX ORDER — OXYCODONE AND ACETAMINOPHEN 5; 325 MG/1; MG/1
1 TABLET ORAL
Status: DISCONTINUED | OUTPATIENT
Start: 2020-02-12 | End: 2020-02-24 | Stop reason: HOSPADM

## 2020-02-12 RX ORDER — ALBUTEROL SULFATE 0.83 MG/ML
2.5 SOLUTION RESPIRATORY (INHALATION)
Status: COMPLETED | OUTPATIENT
Start: 2020-02-12 | End: 2020-02-12

## 2020-02-12 RX ORDER — MORPHINE SULFATE 4 MG/ML
4 INJECTION INTRAVENOUS
Status: COMPLETED | OUTPATIENT
Start: 2020-02-12 | End: 2020-02-12

## 2020-02-12 RX ORDER — IPRATROPIUM BROMIDE AND ALBUTEROL SULFATE 2.5; .5 MG/3ML; MG/3ML
3 SOLUTION RESPIRATORY (INHALATION)
Status: COMPLETED | OUTPATIENT
Start: 2020-02-12 | End: 2020-02-12

## 2020-02-12 RX ORDER — ACETAMINOPHEN 325 MG/1
650 TABLET ORAL
Status: DISCONTINUED | OUTPATIENT
Start: 2020-02-12 | End: 2020-02-24 | Stop reason: HOSPADM

## 2020-02-12 RX ORDER — SODIUM BICARBONATE 1 MEQ/ML
50 SYRINGE (ML) INTRAVENOUS ONCE
Status: COMPLETED | OUTPATIENT
Start: 2020-02-13 | End: 2020-02-13

## 2020-02-12 RX ORDER — SODIUM CHLORIDE 0.9 % (FLUSH) 0.9 %
5-40 SYRINGE (ML) INJECTION EVERY 8 HOURS
Status: DISCONTINUED | OUTPATIENT
Start: 2020-02-13 | End: 2020-02-24 | Stop reason: HOSPADM

## 2020-02-12 RX ORDER — FAMOTIDINE 10 MG/ML
20 INJECTION INTRAVENOUS
Status: COMPLETED | OUTPATIENT
Start: 2020-02-12 | End: 2020-02-12

## 2020-02-12 RX ORDER — DEXTROSE MONOHYDRATE 100 MG/ML
125-250 INJECTION, SOLUTION INTRAVENOUS AS NEEDED
Status: DISCONTINUED | OUTPATIENT
Start: 2020-02-12 | End: 2020-02-17 | Stop reason: SDUPTHER

## 2020-02-12 RX ADMIN — ALBUTEROL SULFATE 2.5 MG: 2.5 SOLUTION RESPIRATORY (INHALATION) at 21:19

## 2020-02-12 RX ADMIN — FAMOTIDINE 20 MG: 10 INJECTION, SOLUTION INTRAVENOUS at 21:19

## 2020-02-12 RX ADMIN — ONDANSETRON 4 MG: 2 INJECTION INTRAMUSCULAR; INTRAVENOUS at 21:19

## 2020-02-12 RX ADMIN — MORPHINE SULFATE 4 MG: 4 INJECTION INTRAVENOUS at 21:19

## 2020-02-12 RX ADMIN — FUROSEMIDE 40 MG: 10 INJECTION, SOLUTION INTRAMUSCULAR; INTRAVENOUS at 21:28

## 2020-02-12 RX ADMIN — IPRATROPIUM BROMIDE AND ALBUTEROL SULFATE 3 ML: .5; 3 SOLUTION RESPIRATORY (INHALATION) at 19:44

## 2020-02-12 NOTE — ED PROVIDER NOTES
EMERGENCY DEPARTMENT HISTORY AND PHYSICAL EXAM    Date: 2/12/2020  Patient Name: Melida Johnson    History of Presenting Illness     Chief Complaint   Patient presents with    Shortness of Breath    Low Blood Sugar         History Provided By: Patient    6:49 PM  Melida Johnson is a 53 y.o. female with PMHX of asthma, COPD, CHF, diabetes, CKD who presents to the emergency department C/O diarrhea onset today and midsternal/nonradiating chest pain x2 days. Patient also reports worsening shortness of breath times several days, but then states she is always SOB. She developed nonbloody, nonblack diarrhea today and after several episodes she noticed that she was feeling fatigued and her blood sugar was as low as 40, prompting her to come to ED. Patient and her daughter initially thought her chest pain may have been due to eating jalapeno-containing food 2 nights ago. She complains of 6/10 abdominal pain as well. Pt denies fever, cough, nasal congestion, vomiting, and any other sxs or complaints. PCP: Afia Krishna MD    Current Facility-Administered Medications   Medication Dose Route Frequency Provider Last Rate Last Dose    technetium pentetate (DTPA) aerosol 33 millicurie  33 millicurie Inhalation RAD ONCE Nishant Diego MD        technetium albumin aggregated (MAA) solution 6 millicurie  6 millicurie IntraVENous RAD ONCE Nishant Diego MD         Current Outpatient Medications   Medication Sig Dispense Refill    predniSONE (STERAPRED) 5 mg dose pack See administration instruction per 5mg dose pack 21 Tab 0    guaiFENesin ER (MUCINEX) 600 mg ER tablet Take 1 Tab by mouth every twelve (12) hours. 10 Tab 0    albuterol-ipratropium (DUO-NEB) 2.5 mg-0.5 mg/3 ml nebu 3 mL by Nebulization route every four (4) hours as needed for Wheezing. 30 Nebule 0    amLODIPine (NORVASC) 5 mg tablet Take 5 mg by mouth daily.  hydrALAZINE (APRESOLINE) 25 mg tablet Take 25 mg by mouth three (3) times daily.  albuterol sulfate 90 mcg/actuation aepb Take 1-2 Puffs by inhalation every four (4) hours as needed. 1 Inhaler 0    metFORMIN (GLUCOPHAGE) 1,000 mg tablet Take 1,000 mg by mouth two (2) times daily (with meals).  metoprolol succinate (TOPROL-XL) 50 mg XL tablet Take 100 mg by mouth daily.  DULoxetine (CYMBALTA) 30 mg capsule Take 60 mg by mouth daily.  atorvastatin (LIPITOR) 40 mg tablet Take 40 mg by mouth daily.  losartan (COZAAR) 100 mg tablet Take 100 mg by mouth daily.  OTHER,NON-FORMULARY, Medication for irregular heart beat      gabapentin (NEURONTIN) 300 mg capsule Take 300 mg by mouth three (3) times daily.  furosemide (LASIX) 40 mg tablet Take 1 Tab by mouth daily. (Patient taking differently: Take 40 mg by mouth two (2) times a day.) 45 Tab 1    insulin NPH/insulin regular (NOVOLIN 70/30) 100 unit/mL (70-30) injection 50 Units by SubCUTAneous route every twelve (12) hours.  aspirin 81 mg chewable tablet Take 1 tablet by mouth daily. 30 tablet 1       Past History     Past Medical History:  Past Medical History:   Diagnosis Date    Asthma     Note: As child had asthma    Diabetes mellitus ([facility] [address]     Type 2    Heart palpitations     Hypertension     Ill-defined condition     fibromyalgia     Morbid obesity ([facility] [address]     MRSA infection 8/2014       Past Surgical History:  Past Surgical History:   Procedure Laterality Date    BREAST SURGERY PROCEDURE UNLISTED      cyst removed    HX CATARACT REMOVAL      HX CHOLECYSTECTOMY      HX TUBAL LIGATION         Family History:  Family History   Problem Relation Age of Onset    Heart Attack Mother     Heart Attack Maternal Grandmother        Social History:  Social History     Tobacco Use    Smoking status: Never Smoker    Smokeless tobacco: Never Used   Substance Use Topics    Alcohol use: No    Drug use: No       Allergies:   Allergies   Allergen Reactions    Bees [Sting, Bee] Swelling    Penicillins Hives    Strawberry Hives         Review of Systems   Review of Systems   Constitutional: Negative for fever. HENT: Negative for congestion. Respiratory: Positive for shortness of breath. Cardiovascular: Positive for chest pain. Gastrointestinal: Positive for abdominal pain and diarrhea. Negative for nausea and vomiting. All other systems reviewed and are negative. Physical Exam     Vitals:    02/12/20 2119 02/12/20 2128 02/12/20 2130 02/12/20 2145   BP: 109/66 109/66 104/65 102/63   Pulse: (!) 52 (!) 50 (!) 51 (!) 52   Resp:   22 20   Temp:       SpO2:   100% 97%   Weight:       Height:         Physical Exam    Vital signs and nursing notes reviewed. CONSTITUTIONAL: Alert. Well-appearing; morbidly obese; in no apparent distress. HEAD: Normocephalic; atraumatic. EYES: PERRL; Conjunctiva clear. ENT: TM's normal. External ear normal. Normal nose; no rhinorrhea. Normal pharynx. Moist mucus membranes. NECK: Supple; FROM without difficulty, non-tender; no cervical lymphadenopathy. CV: Normal S1, S2; no murmurs, rubs, or gallops. No chest wall tenderness. RESPIRATORY: Normal chest excursion with respiration; decreased breath sounds bilateral lower lungs. GI: Normal bowel sounds; non-distended; +mild-moderate periumbilical and epigastric tenderness; no guarding or rigidity; no palpable organomegaly. No CVA tenderness. BACK:  No evidence of trauma or deformity. Non-tender to palpation. FROM without difficulty. EXT: Normal ROM in all four extremities; non-tender to palpation. No edema or calf tenderness  SKIN: Normal for age and race; warm; dry; good turgor; no apparent lesions or exudate. NEURO: A & O x3. PSYCH:  Mood and affect appropriate.          Diagnostic Study Results     Labs -     Recent Results (from the past 12 hour(s))   GLUCOSE, POC    Collection Time: 02/12/20  6:03 PM   Result Value Ref Range    Glucose (POC) 102 70 - 110 mg/dL   GLUCOSE, POC    Collection Time: 02/12/20  6:21 PM Result Value Ref Range    Glucose (POC) 89 70 - 110 mg/dL   EKG, 12 LEAD, INITIAL    Collection Time: 02/12/20  6:40 PM   Result Value Ref Range    Ventricular Rate 52 BPM    Atrial Rate 52 BPM    P-R Interval 144 ms    QRS Duration 84 ms    Q-T Interval 454 ms    QTC Calculation (Bezet) 422 ms    Calculated P Axis 50 degrees    Calculated R Axis 69 degrees    Calculated T Axis 69 degrees    Diagnosis       Sinus bradycardia  Low voltage QRS  Abnormal ECG  Confirmed by Carlee Newton MD, [facility] ([id number]) on 2/12/2020 9:18:10 PM     CBC WITH AUTOMATED DIFF    Collection Time: 02/12/20  7:28 PM   Result Value Ref Range    WBC 10.6 4.6 - 13.2 K/uL    RBC 3.84 (L) 4.20 - 5.30 M/uL    HGB 9.6 (L) 12.0 - 16.0 g/dL    HCT 31.8 (L) 35.0 - 45.0 %    MCV 82.8 74.0 - 97.0 FL    MCH 25.0 24.0 - 34.0 PG    MCHC 30.2 (L) 31.0 - 37.0 g/dL    RDW 15.4 (H) 11.6 - 14.5 %    PLATELET 163 636 - 547 K/uL    MPV 9.3 9.2 - 11.8 FL    NEUTROPHILS 73 40 - 73 %    LYMPHOCYTES 18 (L) 21 - 52 %    MONOCYTES 6 3 - 10 %    EOSINOPHILS 3 0 - 5 %    BASOPHILS 0 0 - 2 %    ABS. NEUTROPHILS 7.8 1.8 - 8.0 K/UL    ABS. LYMPHOCYTES 1.9 0.9 - 3.6 K/UL    ABS. MONOCYTES 0.6 0.05 - 1.2 K/UL    ABS. EOSINOPHILS 0.3 0.0 - 0.4 K/UL    ABS. BASOPHILS 0.0 0.0 - 0.1 K/UL    DF AUTOMATED     METABOLIC PANEL, COMPREHENSIVE    Collection Time: 02/12/20  7:28 PM   Result Value Ref Range    Sodium 142 136 - 145 mmol/L    Potassium 6.0 (H) 3.5 - 5.5 mmol/L    Chloride 114 (H) 100 - 111 mmol/L    CO2 21 21 - 32 mmol/L    Anion gap 7 3.0 - 18 mmol/L    Glucose 82 74 - 99 mg/dL    BUN 68 (H) 7.0 - 18 MG/DL    Creatinine 2.69 (H) 0.6 - 1.3 MG/DL    BUN/Creatinine ratio 25 (H) 12 - 20      GFR est AA 22 (L) >60 ml/min/1.73m2    GFR est non-AA 18 (L) >60 ml/min/1.73m2    Calcium 8.6 8.5 - 10.1 MG/DL    Bilirubin, total 0.1 (L) 0.2 - 1.0 MG/DL    ALT (SGPT) 19 13 - 56 U/L    AST (SGOT) 18 10 - 38 U/L    Alk.  phosphatase 116 45 - 117 U/L    Protein, total 6.5 6.4 - 8.2 g/dL    Albumin 2.8 (L) 3.4 - 5.0 g/dL    Globulin 3.7 2.0 - 4.0 g/dL    A-G Ratio 0.8 0.8 - 1.7     LIPASE    Collection Time: 02/12/20  7:28 PM   Result Value Ref Range    Lipase 356 73 - 393 U/L   NT-PRO BNP    Collection Time: 02/12/20  7:28 PM   Result Value Ref Range    NT pro- 0 - 900 PG/ML   D DIMER    Collection Time: 02/12/20  7:28 PM   Result Value Ref Range    D DIMER 1.30 (H) <0.46 ug/ml(FEU)   CARDIAC PANEL,(CK, CKMB & TROPONIN)    Collection Time: 02/12/20  7:29 PM   Result Value Ref Range    CK 58 26 - 192 U/L    CK - MB <1.0 <3.6 ng/ml    CK-MB Index  0.0 - 4.0 %     CALCULATION NOT PERFORMED WHEN RESULT IS BELOW LINEAR LIMIT    Troponin-I, QT <0.02 0.0 - 0.045 NG/ML       Radiologic Studies -   Chest x-ray shows no acute process  Pending review by radiologist  CT ABD PELV WO CONT   Final Result   IMPRESSION:      1. No evidence of nephrolithiasis, hydronephrosis, or other signs of obstructive   nephropathy. 2. No evidence of bowel obstruction or acute inflammatory process in the abdomen   or pelvis. 3. Superficial pannus inflammatory changes. Please correlate for clinical signs   of cellulitis. XR CHEST PORT   Final Result   IMPRESSION:      No acute cardiopulmonary process. NM LUNG VENT/PERF    (Results Pending)     CT Results  (Last 48 hours)               02/12/20 2106  CT ABD PELV WO CONT Final result    Impression:  IMPRESSION:       1. No evidence of nephrolithiasis, hydronephrosis, or other signs of obstructive   nephropathy. 2. No evidence of bowel obstruction or acute inflammatory process in the abdomen   or pelvis. 3. Superficial pannus inflammatory changes. Please correlate for clinical signs   of cellulitis. Narrative:  EXAM: CT of the abdomen and pelvis       CLINICAL INDICATION/HISTORY:  Abdominal pain and diarrhea. COMPARISON: 07/17/2014.        TECHNIQUE: Axial CT imaging of the abdomen and pelvis was performed without   contrast. Multiplanar reformats were generated. One or more dose reduction techniques were used on this CT: automated exposure   control, adjustment of the mAs and/or kVp according to patient size, and   iterative reconstruction techniques. The specific techniques used on this CT   exam have been documented in the patient's electronic medical record. Digital   Imaging and Communications in Medicine (DICOM) format image data are available   to nonaffiliated external healthcare facilities or entities on a secure, media   free, reciprocally searchable basis with patient authorization for at least a   12-month period after this study. _______________       FINDINGS:       LOWER CHEST: The visualized lung bases are clear. Heart size is normal. There is   no pericardial or pleural effusion. LIVER, BILIARY: Liver is normal with no focal parenchymal lesion identified. There is no intra-or extrahepatic biliary ductal dilatation. Gallbladder is   surgically absent. PANCREAS: Normal.       SPLEEN: Normal.       ADRENALS: Normal.       KIDNEYS: Normal. There is no nephrolithiasis. There is no hydronephrosis,   hydroureter, or other signs of obstructive nephropathy. LYMPH NODES: No enlarged lymph nodes. GASTROINTESTINAL TRACT: There is no evidence of bowel obstruction. While limited   without contrast, there is no definitive wall thickening.] The appendix is   visualized and unremarkable. PELVIC ORGANS: Unremarkable. VASCULATURE: Unremarkable. BONES: No acute or aggressive osseous abnormalities identified. OTHER: There is no free intraperitoneal fluid or free air. SUPERFICIAL SOFT TISSUES: Mild inflammatory changes at the pannus. There is a   fat-containing ventral hernia.        Please note that evaluation of the intra-abdominal structures is somewhat   limited due to lack of oral or IV contrast.       _______________               CXR Results  (Last 48 hours)               02/12/20 1922  XR CHEST PORT Final result    Impression:  IMPRESSION:       No acute cardiopulmonary process. Narrative:  EXAM: One view chest x-ray       CLINICAL INDICATION/HISTORY: Chest pain and dyspnea. COMPARISON: 12/10/2019. TECHNIQUE: Single AP view of the chest was obtained.       _______________       FINDINGS:       HEART, VESSELS, MEDIASTINUM: Heart size is stable. No vascular congestion. LUNGS, PLEURAL SPACES: The lungs are clear. No effusion or pneumothorax. BONY THORAX, SOFT TISSUES: Unremarkable.       _______________                 Medications given in the ED-  Medications   technetium pentetate (DTPA) aerosol 33 millicurie (has no administration in time range)   technetium albumin aggregated (MAA) solution 6 millicurie (has no administration in time range)   albuterol-ipratropium (DUO-NEB) 2.5 MG-0.5 MG/3 ML (3 mL Nebulization Given 2/12/20 1944)   morphine injection 4 mg (4 mg IntraVENous Given 2/12/20 2119)   ondansetron (ZOFRAN) injection 4 mg (4 mg IntraVENous Given 2/12/20 2119)   famotidine (PF) (PEPCID) injection 20 mg (20 mg IntraVENous Given 2/12/20 2119)   albuterol (PROVENTIL VENTOLIN) nebulizer solution 2.5 mg (2.5 mg Nebulization Given 2/12/20 2119)   furosemide (LASIX) injection 40 mg (40 mg IntraVENous Given 2/12/20 2128)         Medical Decision Making   I am the first provider for this patient. I reviewed the vital signs, available nursing notes, past medical history, past surgical history, family history and social history. Vital Signs-Reviewed the patient's vital signs. Pulse Oximetry Analysis - 99% on RA     Cardiac Monitor:  Rate: 52 bpm  Rhythm: Sinus Bradycardia    EKG interpretation: (Preliminary)  Sinus bradycardia, rate 52, no acute changes    Records Reviewed: Nursing Notes and Old Medical Records      Procedures:  Procedures    ED Course:  6:49 PM   Initial assessment performed.  The patients presenting problems have been discussed, and they are in agreement with the care plan formulated and outlined with them. I have encouraged them to ask questions as they arise throughout their visit. 8:30 PM progress note  Patient feels that she is moving a little more air and now with mild expiratory wheezes bilaterally. D-dimer elevated but with CKD (not on dialysis) will do VQ scan. Initial troponin negative and EKG without ischemic changes. 9:45 PM consult note  Case discussed with ED attending Dr. Samantha Hercules, he agrees with treatment of hyperkalemia with albuterol and Lasix given that Kayexalate may make her diarrhea worse and insulin with dextrose may put her at risk of hypoglycemia which she experienced earlier today. Also agrees with consulting hospitalist for admission at this time. 10 PM consult note  Case discussed with hospitalist Dr. Hasmukh Brunner who will admit to telemetry. She is aware that patient is going over for VQ scan now but requests a repeat BMP to recheck her potassium. Diagnosis and Disposition     ADMISSION NOTE:  10:01 PM  Patient is being admitted to the hospital by Dr. Lavern Peace The results of their tests and reasons for their admission have been discussed with them and/or available family. They convey agreement and understanding for the need to be admitted and for their admission diagnosis. CONDITIONS ON ADMISSION:  Deep Vein Thrombosis is not present at the time of admission. Thrombosis unknown at the time of admission. Urinary Tract Infection is not present at the time of admission. Pneumonia is not present at the time of admission. MRSA is not present at the time of admission. Wound infection is not present at the time of admission. Pressure Ulcer is not present at the time of admission. 11:13 PM  I have spent 35 minutes of critical care time involved in lab review, consultations with specialist, family decision-making, and documentation.   During this entire length of time I was immediately available to the patient. Critical Care: The reason for providing this level of medical care for this critically ill patient was due a critical illness that impaired one or more vital organ systems such that there was a high probability of imminent or life threatening deterioration in the patients condition. This care involved high complexity decision making to assess, manipulate, and support vital system functions, to treat this degreee vital organ system failure and to prevent further life threatening deterioration of the patients condition. CLINICAL IMPRESSION:    1. Acute hyperkalemia    2. Chest pain, unspecified type    3. Acute on chronic renal insufficiency    4. Diarrhea, unspecified type        PLAN:    1. Admit        Please note that this dictation was completed with [employer], the computer voice recognition software. Quite often unanticipated grammatical, syntax, homophones, and other interpretive errors are inadvertently transcribed by the computer software. Please disregard these errors. Please excuse any errors that have escaped final proofreading.

## 2020-02-12 NOTE — ED TRIAGE NOTES
Patient arrive to the ED with shortness of breath since yesterday, mid chest pain for several weeks, and intermittent low blood sugars since this morning, patient reports blood glucose as low as 40s at home, current blood glucose 102

## 2020-02-13 ENCOUNTER — APPOINTMENT (OUTPATIENT)
Dept: VASCULAR SURGERY | Age: 55
DRG: 194 | End: 2020-02-13
Attending: INTERNAL MEDICINE
Payer: COMMERCIAL

## 2020-02-13 ENCOUNTER — APPOINTMENT (OUTPATIENT)
Dept: NON INVASIVE DIAGNOSTICS | Age: 55
DRG: 194 | End: 2020-02-13
Attending: INTERNAL MEDICINE
Payer: COMMERCIAL

## 2020-02-13 PROBLEM — N17.9 ACUTE RENAL FAILURE SUPERIMPOSED ON STAGE 3 CHRONIC KIDNEY DISEASE (HCC): Status: ACTIVE | Noted: 2020-02-13

## 2020-02-13 PROBLEM — N18.30 ACUTE RENAL FAILURE SUPERIMPOSED ON STAGE 3 CHRONIC KIDNEY DISEASE (HCC): Status: ACTIVE | Noted: 2020-02-13

## 2020-02-13 PROBLEM — R07.9 CHEST PAIN: Status: ACTIVE | Noted: 2020-02-13

## 2020-02-13 LAB
ALBUMIN SERPL-MCNC: 2.5 G/DL (ref 3.4–5)
ALBUMIN/GLOB SERPL: 0.8 {RATIO} (ref 0.8–1.7)
ALP SERPL-CCNC: 114 U/L (ref 45–117)
ALT SERPL-CCNC: 19 U/L (ref 13–56)
ANION GAP SERPL CALC-SCNC: 6 MMOL/L (ref 3–18)
AST SERPL-CCNC: 19 U/L (ref 10–38)
AV VELOCITY RATIO: 0.72
AV VTI RATIO: 0.8
BASOPHILS # BLD: 0 K/UL (ref 0–0.1)
BASOPHILS NFR BLD: 1 % (ref 0–2)
BILIRUB SERPL-MCNC: 0.2 MG/DL (ref 0.2–1)
BUN SERPL-MCNC: 72 MG/DL (ref 7–18)
BUN/CREAT SERPL: 26 (ref 12–20)
CALCIUM SERPL-MCNC: 8.4 MG/DL (ref 8.5–10.1)
CHLORIDE SERPL-SCNC: 113 MMOL/L (ref 100–111)
CK MB CFR SERPL CALC: 2.3 % (ref 0–4)
CK MB CFR SERPL CALC: NORMAL % (ref 0–4)
CK MB SERPL-MCNC: 1.4 NG/ML (ref 5–25)
CK MB SERPL-MCNC: <1 NG/ML (ref 5–25)
CK SERPL-CCNC: 38 U/L (ref 26–192)
CK SERPL-CCNC: 62 U/L (ref 26–192)
CO2 SERPL-SCNC: 22 MMOL/L (ref 21–32)
CREAT SERPL-MCNC: 2.81 MG/DL (ref 0.6–1.3)
DIFFERENTIAL METHOD BLD: ABNORMAL
ECHO AO ASC DIAM: 3.45 CM
ECHO AO ROOT DIAM: 3.19 CM
ECHO AV AREA PEAK VELOCITY: 2 CM2
ECHO AV AREA VTI: 2.2 CM2
ECHO AV AREA/BSA PEAK VELOCITY: 0.8 CM2/M2
ECHO AV AREA/BSA VTI: 0.9 CM2/M2
ECHO AV MEAN GRADIENT: 3.7 MMHG
ECHO AV MEAN VELOCITY: 0.89 M/S
ECHO AV PEAK GRADIENT: 8.2 MMHG
ECHO AV PEAK VELOCITY: 142.8 CM/S
ECHO AV VTI: 30.01 CM
ECHO IVC PROX: 2.2 CM
ECHO IVC SNIFF: 2.2 CM
ECHO LA MAJOR AXIS: 3 CM
ECHO LA VOL 4C: 77.06 ML (ref 22–52)
ECHO LA VOLUME INDEX A4C: 32.67 ML/M2 (ref 16–28)
ECHO LV E' LATERAL VELOCITY: 8 CM/S
ECHO LV E' SEPTAL VELOCITY: 8 CM/S
ECHO LV EDV A2C: 54 ML
ECHO LV EDV A4C: 105.6 ML
ECHO LV EDV BP: 78.6 ML (ref 56–104)
ECHO LV EDV INDEX A4C: 44.8 ML/M2
ECHO LV EDV INDEX BP: 33.3 ML/M2
ECHO LV EDV NDEX A2C: 22.9 ML/M2
ECHO LV EDV TEICHHOLZ: 0.75 ML
ECHO LV EJECTION FRACTION A2C: 82 %
ECHO LV EJECTION FRACTION A4C: 64 %
ECHO LV EJECTION FRACTION BIPLANE: 72.6 % (ref 55–100)
ECHO LV ESV A2C: 9.5 ML
ECHO LV ESV A4C: 38.6 ML
ECHO LV ESV BP: 21.5 ML (ref 19–49)
ECHO LV ESV INDEX A2C: 4 ML/M2
ECHO LV ESV INDEX A4C: 16.4 ML/M2
ECHO LV ESV INDEX BP: 9.1 ML/M2
ECHO LV ESV TEICHHOLZ: 0.27 ML
ECHO LV INTERNAL DIMENSION DIASTOLIC: 5 CM (ref 3.9–5.3)
ECHO LV INTERNAL DIMENSION SYSTOLIC: 3.23 CM
ECHO LV IVSD: 0.89 CM (ref 0.6–0.9)
ECHO LV MASS 2D: 165.4 G (ref 67–162)
ECHO LV MASS INDEX 2D: 70.1 G/M2 (ref 43–95)
ECHO LV POSTERIOR WALL DIASTOLIC: 0.78 CM (ref 0.6–0.9)
ECHO LVOT DIAM: 1.89 CM
ECHO LVOT PEAK GRADIENT: 4.2 MMHG
ECHO LVOT PEAK VELOCITY: 102.6 CM/S
ECHO LVOT VTI: 23.5 CM
ECHO MV A VELOCITY: 92.41 CM/S
ECHO MV AREA PHT: 3.8 CM2
ECHO MV E DECELERATION TIME (DT): 199.2 MS
ECHO MV E VELOCITY: 104.01 CM/S
ECHO MV E/A RATIO: 1.13
ECHO MV E/E' LATERAL: 13
ECHO MV E/E' RATIO (AVERAGED): 13
ECHO MV E/E' SEPTAL: 13
ECHO MV PRESSURE HALF TIME (PHT): 57.8 MS
ECHO PULMONARY ARTERY SYSTOLIC PRESSURE (PASP): 40 MMHG
ECHO RA AREA 4C: 17.03 CM2
ECHO TV REGURGITANT MAX VELOCITY: 407.64 CM/S
ECHO TV REGURGITANT PEAK GRADIENT: 66.5 MMHG
EOSINOPHIL # BLD: 0.4 K/UL (ref 0–0.4)
EOSINOPHIL NFR BLD: 5 % (ref 0–5)
ERYTHROCYTE [DISTWIDTH] IN BLOOD BY AUTOMATED COUNT: 15.7 % (ref 11.6–14.5)
GLOBULIN SER CALC-MCNC: 3.3 G/DL (ref 2–4)
GLUCOSE BLD STRIP.AUTO-MCNC: 139 MG/DL (ref 70–110)
GLUCOSE BLD STRIP.AUTO-MCNC: 151 MG/DL (ref 70–110)
GLUCOSE BLD STRIP.AUTO-MCNC: 253 MG/DL (ref 70–110)
GLUCOSE BLD STRIP.AUTO-MCNC: 282 MG/DL (ref 70–110)
GLUCOSE BLD STRIP.AUTO-MCNC: 56 MG/DL (ref 70–110)
GLUCOSE BLD STRIP.AUTO-MCNC: 64 MG/DL (ref 70–110)
GLUCOSE SERPL-MCNC: 58 MG/DL (ref 74–99)
HCT VFR BLD AUTO: 29.1 % (ref 35–45)
HGB BLD-MCNC: 8.7 G/DL (ref 12–16)
LVFS 2D: 35.46 %
LVOT MG: 2.22 MMHG
LVOT MV: 0.69 CM/S
LVSV (MOD BI): 23.39 ML
LVSV (MOD SINGLE 4C): 27.46 ML
LVSV (MOD SINGLE): 18.21 ML
LVSV (TEICH): 31.34 ML
LYMPHOCYTES # BLD: 2.4 K/UL (ref 0.9–3.6)
LYMPHOCYTES NFR BLD: 33 % (ref 21–52)
MAGNESIUM SERPL-MCNC: 2.4 MG/DL (ref 1.6–2.6)
MCH RBC QN AUTO: 24.9 PG (ref 24–34)
MCHC RBC AUTO-ENTMCNC: 29.9 G/DL (ref 31–37)
MCV RBC AUTO: 83.1 FL (ref 74–97)
MONOCYTES # BLD: 0.7 K/UL (ref 0.05–1.2)
MONOCYTES NFR BLD: 10 % (ref 3–10)
MV DEC SLOPE: 5.22
NEUTS SEG # BLD: 3.9 K/UL (ref 1.8–8)
NEUTS SEG NFR BLD: 51 % (ref 40–73)
PHOSPHATE SERPL-MCNC: 6.4 MG/DL (ref 2.5–4.9)
PLATELET # BLD AUTO: 284 K/UL (ref 135–420)
PMV BLD AUTO: 9.4 FL (ref 9.2–11.8)
POTASSIUM SERPL-SCNC: 5 MMOL/L (ref 3.5–5.5)
PROT SERPL-MCNC: 5.8 G/DL (ref 6.4–8.2)
RBC # BLD AUTO: 3.5 M/UL (ref 4.2–5.3)
SODIUM SERPL-SCNC: 141 MMOL/L (ref 136–145)
TROPONIN I SERPL-MCNC: <0.02 NG/ML (ref 0–0.04)
TROPONIN I SERPL-MCNC: <0.02 NG/ML (ref 0–0.04)
WBC # BLD AUTO: 7.5 K/UL (ref 4.6–13.2)

## 2020-02-13 PROCEDURE — 74011250637 HC RX REV CODE- 250/637: Performed by: INTERNAL MEDICINE

## 2020-02-13 PROCEDURE — 74011250636 HC RX REV CODE- 250/636: Performed by: INTERNAL MEDICINE

## 2020-02-13 PROCEDURE — 74011636637 HC RX REV CODE- 636/637: Performed by: HOSPITALIST

## 2020-02-13 PROCEDURE — C8929 TTE W OR WO FOL WCON,DOPPLER: HCPCS

## 2020-02-13 PROCEDURE — 80053 COMPREHEN METABOLIC PANEL: CPT

## 2020-02-13 PROCEDURE — 65660000000 HC RM CCU STEPDOWN

## 2020-02-13 PROCEDURE — 74011000250 HC RX REV CODE- 250: Performed by: INTERNAL MEDICINE

## 2020-02-13 PROCEDURE — 85025 COMPLETE CBC W/AUTO DIFF WBC: CPT

## 2020-02-13 PROCEDURE — 82550 ASSAY OF CK (CPK): CPT

## 2020-02-13 PROCEDURE — 82962 GLUCOSE BLOOD TEST: CPT

## 2020-02-13 PROCEDURE — 93970 EXTREMITY STUDY: CPT

## 2020-02-13 PROCEDURE — 36415 COLL VENOUS BLD VENIPUNCTURE: CPT

## 2020-02-13 PROCEDURE — 84100 ASSAY OF PHOSPHORUS: CPT

## 2020-02-13 PROCEDURE — 74011000258 HC RX REV CODE- 258: Performed by: INTERNAL MEDICINE

## 2020-02-13 PROCEDURE — 74011636637 HC RX REV CODE- 636/637: Performed by: INTERNAL MEDICINE

## 2020-02-13 PROCEDURE — 83735 ASSAY OF MAGNESIUM: CPT

## 2020-02-13 PROCEDURE — 74011250636 HC RX REV CODE- 250/636: Performed by: HOSPITALIST

## 2020-02-13 RX ORDER — HYDRALAZINE HYDROCHLORIDE 25 MG/1
25 TABLET, FILM COATED ORAL 3 TIMES DAILY
Status: DISCONTINUED | OUTPATIENT
Start: 2020-02-13 | End: 2020-02-13

## 2020-02-13 RX ORDER — DULOXETIN HYDROCHLORIDE 60 MG/1
60 CAPSULE, DELAYED RELEASE ORAL DAILY
Status: DISCONTINUED | OUTPATIENT
Start: 2020-02-13 | End: 2020-02-24 | Stop reason: HOSPADM

## 2020-02-13 RX ORDER — INSULIN GLARGINE 100 [IU]/ML
35 INJECTION, SOLUTION SUBCUTANEOUS
Status: DISCONTINUED | OUTPATIENT
Start: 2020-02-13 | End: 2020-02-13

## 2020-02-13 RX ORDER — DEXTROSE MONOHYDRATE 100 MG/ML
125-250 INJECTION, SOLUTION INTRAVENOUS AS NEEDED
Status: DISCONTINUED | OUTPATIENT
Start: 2020-02-13 | End: 2020-02-24 | Stop reason: HOSPADM

## 2020-02-13 RX ORDER — ATORVASTATIN CALCIUM 20 MG/1
40 TABLET, FILM COATED ORAL DAILY
Status: DISCONTINUED | OUTPATIENT
Start: 2020-02-13 | End: 2020-02-24 | Stop reason: HOSPADM

## 2020-02-13 RX ORDER — HEPARIN SODIUM 5000 [USP'U]/ML
5000 INJECTION, SOLUTION INTRAVENOUS; SUBCUTANEOUS EVERY 8 HOURS
Status: DISCONTINUED | OUTPATIENT
Start: 2020-02-13 | End: 2020-02-24 | Stop reason: HOSPADM

## 2020-02-13 RX ORDER — INSULIN GLARGINE 100 [IU]/ML
15 INJECTION, SOLUTION SUBCUTANEOUS
Status: DISCONTINUED | OUTPATIENT
Start: 2020-02-13 | End: 2020-02-24 | Stop reason: HOSPADM

## 2020-02-13 RX ORDER — CALCIUM GLUCONATE 20 MG/ML
1 INJECTION, SOLUTION INTRAVENOUS ONCE
Status: DISCONTINUED | OUTPATIENT
Start: 2020-02-13 | End: 2020-02-13

## 2020-02-13 RX ORDER — CALCIUM GLUCONATE 20 MG/ML
1 INJECTION, SOLUTION INTRAVENOUS ONCE
Status: COMPLETED | OUTPATIENT
Start: 2020-02-13 | End: 2020-02-13

## 2020-02-13 RX ORDER — HYDRALAZINE HYDROCHLORIDE 50 MG/1
50 TABLET, FILM COATED ORAL 3 TIMES DAILY
Status: DISCONTINUED | OUTPATIENT
Start: 2020-02-13 | End: 2020-02-14

## 2020-02-13 RX ORDER — GUAIFENESIN 100 MG/5ML
81 LIQUID (ML) ORAL DAILY
Status: DISCONTINUED | OUTPATIENT
Start: 2020-02-13 | End: 2020-02-24 | Stop reason: HOSPADM

## 2020-02-13 RX ORDER — CALCIUM ACETATE 667 MG/1
1 CAPSULE ORAL
Status: DISCONTINUED | OUTPATIENT
Start: 2020-02-14 | End: 2020-02-24 | Stop reason: HOSPADM

## 2020-02-13 RX ORDER — INSULIN LISPRO 100 [IU]/ML
INJECTION, SOLUTION INTRAVENOUS; SUBCUTANEOUS
Status: DISCONTINUED | OUTPATIENT
Start: 2020-02-13 | End: 2020-02-23

## 2020-02-13 RX ORDER — IPRATROPIUM BROMIDE AND ALBUTEROL SULFATE 2.5; .5 MG/3ML; MG/3ML
3 SOLUTION RESPIRATORY (INHALATION)
Status: DISCONTINUED | OUTPATIENT
Start: 2020-02-13 | End: 2020-02-24 | Stop reason: HOSPADM

## 2020-02-13 RX ADMIN — HYDRALAZINE HYDROCHLORIDE 50 MG: 50 TABLET, FILM COATED ORAL at 22:39

## 2020-02-13 RX ADMIN — HUMAN INSULIN 10 UNITS: 100 INJECTION, SOLUTION SUBCUTANEOUS at 00:51

## 2020-02-13 RX ADMIN — POLYMYXIN B SULFATE, BACITRACIN ZINC, NEOMYCIN SULFATE: 5000; 3.5; 4 OINTMENT TOPICAL at 09:00

## 2020-02-13 RX ADMIN — HYDRALAZINE HYDROCHLORIDE 25 MG: 25 TABLET, FILM COATED ORAL at 17:12

## 2020-02-13 RX ADMIN — INSULIN LISPRO 6 UNITS: 100 INJECTION, SOLUTION INTRAVENOUS; SUBCUTANEOUS at 22:40

## 2020-02-13 RX ADMIN — Medication 10 ML: at 14:32

## 2020-02-13 RX ADMIN — Medication 10 ML: at 22:00

## 2020-02-13 RX ADMIN — HEPARIN SODIUM 5000 UNITS: 5000 INJECTION INTRAVENOUS; SUBCUTANEOUS at 15:04

## 2020-02-13 RX ADMIN — POLYMYXIN B SULFATE, BACITRACIN ZINC, NEOMYCIN SULFATE: 5000; 3.5; 4 OINTMENT TOPICAL at 16:00

## 2020-02-13 RX ADMIN — CALCIUM GLUCONATE 1000 MG: 20 INJECTION, SOLUTION INTRAVENOUS at 01:10

## 2020-02-13 RX ADMIN — INSULIN GLARGINE 15 UNITS: 100 INJECTION, SOLUTION SUBCUTANEOUS at 22:40

## 2020-02-13 RX ADMIN — ATORVASTATIN CALCIUM 40 MG: 20 TABLET, FILM COATED ORAL at 09:24

## 2020-02-13 RX ADMIN — HYDRALAZINE HYDROCHLORIDE 25 MG: 25 TABLET, FILM COATED ORAL at 09:24

## 2020-02-13 RX ADMIN — Medication 10 ML: at 01:14

## 2020-02-13 RX ADMIN — ASPIRIN 81 MG 81 MG: 81 TABLET ORAL at 09:24

## 2020-02-13 RX ADMIN — DEXTROSE MONOHYDRATE 250 ML: 100 INJECTION, SOLUTION INTRAVENOUS at 00:52

## 2020-02-13 RX ADMIN — HEPARIN SODIUM 5000 UNITS: 5000 INJECTION INTRAVENOUS; SUBCUTANEOUS at 22:40

## 2020-02-13 RX ADMIN — PERFLUTREN 1 ML: 6.52 INJECTION, SUSPENSION INTRAVENOUS at 10:34

## 2020-02-13 RX ADMIN — DULOXETINE HYDROCHLORIDE 60 MG: 60 CAPSULE, DELAYED RELEASE ORAL at 09:24

## 2020-02-13 RX ADMIN — OXYCODONE HYDROCHLORIDE AND ACETAMINOPHEN 1 TABLET: 5; 325 TABLET ORAL at 22:49

## 2020-02-13 RX ADMIN — SODIUM BICARBONATE 50 MEQ: 84 INJECTION, SOLUTION INTRAVENOUS at 00:52

## 2020-02-13 RX ADMIN — POLYMYXIN B SULFATE, BACITRACIN ZINC, NEOMYCIN SULFATE: 5000; 3.5; 4 OINTMENT TOPICAL at 22:00

## 2020-02-13 NOTE — PROGRESS NOTES
NUTRITION UPDATE    -pt expressed confusion regarding diet regarding her kidney function; she has been trying to eat healthier, but then kidney doctor said she couldn't have things in her salads she was making; she was feeling very overwhelmed     Provided Kidney Diet grocery list per department to help pt feel more comfortable; her daughter is her eyes as she is going blind; daughter not present in room; will follow up to answer any further questions      Deborah Catherine RD  PAGER:  [phone]

## 2020-02-13 NOTE — DIABETES MGMT
GLYCEMIC CONTROL PROGRESS NOTE:    - known h/o T2DM, HbA1C not within recommended range for age + comorbids on TID mixed insulin/oral home regimen  - A1C is trending down 1% since last IP admission  - recommend evaluate home regimen prior to d/c concerned about Metformin with current kidney function  - pt does not eat consistent meals which may be causing hypoglycemia @ home, also there is insulin overlap with time action profile of TID dosing of mixed insulin  - daughter will contact prescription plan to determine coverage for basal insulin, if covered recommend the following home regimen   *Basal insulin 15-30 units daily   *SSI with meals  Noted:  - pt with severe hypoglycemic episode on AM labs 58 mg/dL r/t administration of regular insulin for hyperkalemia  - bedtime Lantus was held last night  - BG trending up today with PO intake recommend adjustment to basal insulin (orders entered with permission from Dr. Treva Blakely)   *Lantus 15 units at bedtime   *- Humalog Normal Insulin Sensitivity Corrective Coverage  - with improved PO intake recommend initiate weight based mealtime insulin (Humalog 7 units qac)  Recent Glucose Results:   Lab Results   Component Value Date/Time    GLU 58 (L) 02/13/2020 04:45 AM    GLU 88 02/12/2020 11:10 PM    GLU 82 02/12/2020 07:28 PM    GLUCPOC 253 (H) 02/13/2020 05:30 PM    GLUCPOC 139 (H) 02/13/2020 11:55 AM    GLUCPOC 151 (H) 02/13/2020 06:59 AM       Sandy Mejia MS, RN, CDE  Glycemic Control Team  [phone]  Pager [phone] (M-TH 8:00-4:30P)  *After Hours pager [phone]

## 2020-02-13 NOTE — PROGRESS NOTES
Problem: Diabetes Self-Management  Goal: *Disease process and treatment process  Description  Define diabetes and identify own type of diabetes; list 3 options for treating diabetes. Outcome: Progressing Towards Goal  Goal: *Incorporating nutritional management into lifestyle  Description  Describe effect of type, amount and timing of food on blood glucose; list 3 methods for planning meals. Outcome: Progressing Towards Goal  Goal: *Incorporating physical activity into lifestyle  Description  State effect of exercise on blood glucose levels. Outcome: Progressing Towards Goal  Goal: *Developing strategies to promote health/change behavior  Description  Define the ABC's of diabetes; identify appropriate screenings, schedule and personal plan for screenings. Outcome: Progressing Towards Goal  Goal: *Using medications safely  Description  State effect of diabetes medications on diabetes; name diabetes medication taking, action and side effects. Outcome: Progressing Towards Goal  Goal: *Monitoring blood glucose, interpreting and using results  Description  Identify recommended blood glucose targets  and personal targets. Outcome: Progressing Towards Goal  Goal: *Prevention, detection, treatment of acute complications  Description  List symptoms of hyper- and hypoglycemia; describe how to treat low blood sugar and actions for lowering  high blood glucose level. Outcome: Progressing Towards Goal  Goal: *Prevention, detection and treatment of chronic complications  Description  Define the natural course of diabetes and describe the relationship of blood glucose levels to long term complications of diabetes.   Outcome: Progressing Towards Goal  Goal: *Developing strategies to address psychosocial issues  Description  Describe feelings about living with diabetes; identify support needed and support network  Outcome: Progressing Towards Goal  Goal: *Insulin pump training  Outcome: Progressing Towards Goal  Goal: *Sick day guidelines  Outcome: Progressing Towards Goal  Goal: *Patient Specific Goal (EDIT GOAL, INSERT TEXT)  Outcome: Progressing Towards Goal     Problem: Risk for Spread of Infection  Goal: Prevent transmission of infectious organism to others  Description  Prevent the transmission of infectious organisms to other patients, staff members, and visitors.   Outcome: Progressing Towards Goal

## 2020-02-13 NOTE — H&P
History & Physical    Patient: Shaista Bauman MRN: [id number]  [facility]: [id number]    YOB: 1965  Age: 53 y.o. Sex: female      DOA: 2/12/2020    Chief Complaint:   Chief Complaint   Patient presents with    Shortness of Breath    Low Blood Sugar          HPI:     Shaista Bauman is a 53 y.o.  female who history of diabetes, sleep apnea, CHF diastolic dysfunction, asthma presents to the emergency room with multiple complaints. She has chronic pain and fibromyalgia but notes that she had worsening chest pain and worsening dyspnea over the last few days. She also is complaining of diarrhea that started earlier today and difficulty with evacuation. She complains of generalized fatigue not feeling well as well as worsening pain in her ingrown toenails. In the emergency room CT of the abdomen was done with no acute pathology her diarrhea was found to be resolved she was found to be hyperkalemic and she was treated with Lasix I am asked to admit for chest pain hyperkalemia and diarrhea    Past Medical History:   Diagnosis Date    Asthma     Note: As child had asthma    Diabetes mellitus ([facility]     Type 2    Heart palpitations     Hypertension     Ill-defined condition     fibromyalgia     Morbid obesity ([facility]     MRSA infection 8/2014       Past Surgical History:   Procedure Laterality Date    BREAST SURGERY PROCEDURE UNLISTED      cyst removed    HX CATARACT REMOVAL      HX CHOLECYSTECTOMY      HX TUBAL LIGATION         Family History   Problem Relation Age of Onset    Heart Attack Mother     Heart Attack Maternal Grandmother        Social History     Socioeconomic History    Marital status: SINGLE     Spouse name: Not on file    Number of children: Not on file    Years of education: Not on file    Highest education level: Not on file   Tobacco Use    Smoking status: Never Smoker    Smokeless tobacco: Never Used   Substance and Sexual Activity    Alcohol use: No    Drug use:  No  Sexual activity: Never     Partners: Male       Prior to Admission medications    Medication Sig Start Date End Date Taking? Authorizing Provider   predniSONE (STERAPRED) 5 mg dose pack See administration instruction per 5mg dose pack 12/16/19   Cathleen Blackwood MD   guaiFENesin ER [facility]) 600 mg ER tablet Take 1 Tab by mouth every twelve (12) hours. 12/16/19   Cathleen Blackwood MD   albuterol-ipratropium (DUO-NEB) 2.5 mg-0.5 mg/3 ml nebu 3 mL by Nebulization route every four (4) hours as needed for Wheezing. 12/16/19   Cathleen Blackwood MD   amLODIPine (NORVASC) 5 mg tablet Take 5 mg by mouth daily. Yoana Crocker MD   hydrALAZINE (APRESOLINE) 25 mg tablet Take 25 mg by mouth three (3) times daily. Yoana Crocker MD   albuterol sulfate 90 mcg/actuation aepb Take 1-2 Puffs by inhalation every four (4) hours as needed. 1/24/19   Cathleen Blackwood MD   metFORMIN (GLUCOPHAGE) 1,000 mg tablet Take 1,000 mg by mouth two (2) times daily (with meals). Yoana Crocker MD   metoprolol succinate (TOPROL-XL) 50 mg XL tablet Take 100 mg by mouth daily. Yoana Crocker MD   DULoxetine (CYMBALTA) 30 mg capsule Take 60 mg by mouth daily. Yoana Crocker MD   atorvastatin (LIPITOR) 40 mg tablet Take 40 mg by mouth daily. Yoana Crocker MD   losartan (COZAAR) 100 mg tablet Take 100 mg by mouth daily. Yoana Crocker MD   OTHER,NON-FORMULARY, Medication for irregular heart beat    Yoana Crocker MD   gabapentin (NEURONTIN) 300 mg capsule Take 300 mg by mouth three (3) times daily. Yoana Crocker MD   furosemide (LASIX) 40 mg tablet Take 1 Tab by mouth daily. Patient taking differently: Take 40 mg by mouth two (2) times a day. 2/13/17   Mirian Sykes MD   insulin NPH/insulin regular (NOVOLIN 70/30) 100 unit/mL (70-30) injection 50 Units by SubCUTAneous route every twelve (12) hours. Provider, Historical   aspirin 81 mg chewable tablet Take 1 tablet by mouth daily.  8/20/14   Regina Lou MD       Allergies   Allergen Reactions    Bees [Sting, Bee] Swelling    Penicillins Hives    [place] Hives         Review of Systems  GENERAL: Patient alert, awake and oriented times 3, able to communicate full sentences and not in distress. HEENT: No change in vision, no earache, tinnitus, sore throat or sinus congestion. NECK: No pain or stiffness. PULMONARY: + shortness of breath, cough or wheeze. Cardiovascular: no pnd or orthopnea, + CP  GASTROINTESTINAL: No abdominal pain, nausea, vomiting +diarrhea, melena or bright red blood per rectum. GENITOURINARY: No urinary frequency, urgency, hesitancy or dysuria. MUSCULOSKELETAL: +joint + muscle pain, no back pain, no recent trauma. DERMATOLOGIC: No rash, no itching, no lesions. ENDOCRINE: No polyuria, polydipsia, no heat or cold intolerance. No recent change in weight. HEMATOLOGICAL: No anemia or easy bruising or bleeding. NEUROLOGIC: No headache, seizures, numbness, tingling or weakness. Physical Exam:     Physical Exam:  Visit Vitals  /63   Pulse (!) 52   Temp 97 °F (36.1 °C)   Resp 20   Ht 5' 2\" (1.575 m)   Wt 136.1 kg (300 lb)   SpO2 97%   BMI 54.87 kg/m²      O2 Device: Room air    Temp (24hrs), Av °F (36.1 °C), Min:97 °F (36.1 °C), Max:97 °F (36.1 °C)    No intake/output data recorded. No intake/output data recorded. General:  Alert, cooperative, no distress, appears stated age. Head: Normocephalic, without obvious abnormality, atraumatic. Eyes:  Conjunctivae/corneas clear. PERRL, EOMs intact. Nose: Nares normal. No drainage or sinus tenderness. Neck: Supple, symmetrical, trachea midline, no adenopathy, thyroid: no enlargement, no carotid bruit and no JVD. Lungs:   Clear to auscultation bilaterally. Heart:  Regular rate and rhythm, S1, S2 normal.     Abdomen: Soft, non-tender. Bowel sounds normal.    Extremities: Extremities normal, atraumatic, no cyanosis or edema. Pulses: 2+ and symmetric all extremities.    Skin:  No rashes or lesions multiple dog scratches throughout her skin including her face bilateral ingrown toenails with mild pustular drainage on the right   Neurologic: AAOx3, No focal motor or sensory deficit. Labs Reviewed: All lab results for the last 24 hours reviewed. and EKG    Procedures/imaging: see electronic medical records for all procedures/Xrays and details which were not copied into this note but were reviewed prior to creation of Plan      Assessment/Plan     Active Problems:    Hyperkalemia (2/12/2020)    We will give insulin glucose as well as calcium gluconate  Hold her losartan and Lasix      Chest Pain   Check cardiac enzymes and echo recent stress test in December that was equivocal we will have cardiology see in consultation      Diarrhea  Resolved in the emergency room will hold metformin consider stool for C. difficile but for now we will just check ova and parasites and white blood cells      FARHAN/CKD  Hold Lasix and losartan  Has history of diastolic heart failure  If no improvement in potassium will obtain renal consult      DM   Start Lantus and sliding scale hold metformin      Sleep Apnea   Resume home CPAP machine  Has history of asthma resuming maintenance regimen      Pulmonary HTN   Likely from CHF with VQ scan obtained in the emergency room low probability PE still will check duplex of legs     Bilateral ingrown toenails we will start topical antibiotics  DVT/GI Prophylaxis: Hep SQ    Discussed with patient at bedside about hospital admission and my plan care, who understood and agree with my plan care.     Magalys Brenner MD  2/12/2020 9:58 PM

## 2020-02-13 NOTE — PROGRESS NOTES
Reason for Admission:   Jaylin Lipscomb is a 53 y.o. female with PMHX of asthma, COPD, CHF, diabetes, CKD who presents to the emergency department C/O diarrhea onset today and midsternal/nonradiating chest pain x2 days. Patient also reports worsening shortness of breath times several days, but then states she is always SOB. She developed nonbloody, nonblack diarrhea today and after several episodes she noticed that she was feeling fatigued and her blood sugar was as low as 40, prompting her to come to ED. Patient and her daughter initially thought her chest pain may have been due to eating jalapeno-containing food 2 nights ago. She complains of 6/10 abdominal pain as well. Pt denies fever, cough, nasal congestion, vomiting, and any other sxs or complaints.      PCP: Alicia Velazco MD                   RUR Score:     23             Do you (patient/family) have any concerns for transition/discharge? Plan for utilizing home health:   tbd    Current Advanced Directive/Advance Care Plan:  Not on file  Please consider placing a consult to pastoral or palliative care to address acp            Transition of Care Plan:     Transition of care  Met with patients and adam corona at bedside. Patient states she lives alone  States she was able to get CPAP since last d/c in December and she has nebulizer. pateint stats she sees Dr. Ksenia David for pcp. She has medicaid for insurance  Patient expressed she was very digruntled with personal touch who just d/ c her yesterday. cm will ontinue to follow  Care Management Interventions  PCP Verified by CM:  Yes  Transition of Care Consult (CM Consult): Discharge Planning  The Patient and/or Patient Representative was Provided with a Choice of Provider and Agrees with the Discharge Plan?: Yes

## 2020-02-13 NOTE — ED NOTES
Radha Pettit from lab called to clarify that Potassium level, 6.0 is not critical. SAMARA Moreira made aware

## 2020-02-13 NOTE — PROGRESS NOTES
Notified by CNA that POC Glucose was 56 @ 0622. A recheck of POC glucose = 64. Patient treated per hypoglycemia protocol with 4 oz orange juice at her request.  Re-checked POC glucose after treatment = 151. No further treatments were needed. It should be noted that patient DID NOT receive the 35 units of Lantus insulin that was ordered for her last night due to refusal.  See MAR. Oncoming RN aware of all of the above.

## 2020-02-13 NOTE — PROGRESS NOTES
Bedside and Verbal shift change report given to FAUSTINA Carrillo RN (oncoming nurse) by LETICIA Duenas RN (offgoing nurse). Report included the following information SBAR, Kardex, Intake/Output, MAR and Med Rec Status.

## 2020-02-13 NOTE — ED NOTES
Spoke w/Aditya from [facility], would be a few hours before test could be performed. Informed provider.

## 2020-02-13 NOTE — PROGRESS NOTES
NUTRITION UPDATE    - Other: Order daily [employer] [facility], [address] [place] [address]  PAGER:  [phone]

## 2020-02-13 NOTE — PROGRESS NOTES
Hospitalist Progress Note-critical care note     Patient: Alfredo Whitehead MRN: [id number]  CSN: [id number]    YOB: 1965  Age: 53 y.o. Sex: female    DOA: 2/12/2020 LOS:  LOS: 1 day            Chief complaint: chest pain, acute on chronic renal failure, hyperkalemia, kelly morbid obesity     Assessment/Plan         Hospital Problems  Date Reviewed: 12/11/2019          Codes Class Noted POA    Chest pain ICD-10-CM: R07.9  ICD-9-CM: 786.50  2/13/2020 Unknown        Acute renal failure superimposed on stage 3 chronic kidney disease ([facility] ICD-10-CM: N17.9, N18.3  ICD-9-CM: 584.9, 585.3  2/13/2020 Unknown        * (Principal) Hyperkalemia ICD-10-CM: E87.5  ICD-9-CM: 276.7  2/12/2020 Unknown        KELLY (obstructive sleep apnea) ICD-10-CM: G47.33  ICD-9-CM: 327.23  12/11/2019 Yes        Morbid obesity ([facility] ICD-10-CM: E66.01  ICD-9-CM: 278.01  Unknown Yes        Type II or unspecified type diabetes mellitus with renal manifestations, uncontrolled(250.42) ([facility] ICD-10-CM: E11.29, E11.65  ICD-9-CM: 250.42  9/4/2014 Yes        HTN (hypertension) ICD-10-CM: I10  ICD-9-CM: 401.9  8/16/2014 Yes              Hyperkalemia and acute on chronic renal failure 3   resolved now per  insulin glucose +calcium gluconate   Continue hold arbs    case discussed with Dr. Faraz Bocanegra   Lasix was hold       Chest Pain   No pain now. ce wnl   recent stress test in December in 2019  Will let card on board        Diarrhea  No episodes noted since admission         DM   Start Lantus and sliding scale hold metformin      Sleep Apnea   cpap at night        Pulmonary HTN   VQ scan obtained in the emergency room low probability PE   Negative for dvt    DM type II   Hypoglycemia AM resolved, will resume lantus low dose at night      htn  Start norvasc     Subjective : no chest pain, glucose was low     Disposition :2-3 days   Review of systems:    General: No fevers or chills. Cardiovascular: No chest pain or pressure. No palpitations. Pulmonary: No shortness of breath. Gastrointestinal: No nausea, vomiting. Vital signs/Intake and Output:  Visit Vitals  /53   Pulse 65   Temp 97.7 °F (36.5 °C)   Resp 18   Ht 5' 2\" (1.575 m)   Wt 148.8 kg (328 lb)   SpO2 100%   Breastfeeding No   BMI 59.99 kg/m²     Current Shift:  02/13 0701 - 02/13 1900  In: 240 [P.O.:240]  Out: 300 [Urine:300]  Last three shifts:  No intake/output data recorded. Physical Exam:  General: WD, WN. Alert, cooperative, no acute distress    HEENT: NC, Atraumatic. PERRLA, anicteric sclerae. Lungs: CTA Bilaterally. No Wheezing/Rhonchi/Rales. Heart:  Regular  rhythm,  No murmur, No Rubs, No Gallops  Abdomen: Soft, Non distended, Non tender.  +Bowel sounds,   Extremities: No c/c/e  Psych:   Not anxious or agitated. Neurologic:  No acute neurological deficit. Labs: Results:       Chemistry Recent Labs     02/13/20 0445 02/12/20  2310 02/12/20 1928   GLU 58* 88 82    141 142   K 5.0 5.4 6.0*   * 111 114*   CO2 22 23 21   BUN 72* 71* 68*   CREA 2.81* 2.70* 2.69*   CA 8.4* 8.7 8.6   AGAP 6 7 7   BUCR 26* 26* 25*     --  116   TP 5.8*  --  6.5   ALB 2.5*  --  2.8*   GLOB 3.3  --  3.7   AGRAT 0.8  --  0.8      CBC w/Diff Recent Labs     02/13/20 0445 02/12/20 1928   WBC 7.5 10.6   RBC 3.50* 3.84*   HGB 8.7* 9.6*   HCT 29.1* 31.8*    326   GRANS 51 73   LYMPH 33 18*   EOS 5 3      Cardiac Enzymes Recent Labs     02/13/20  1150 02/13/20 0445   CPK 62 38   CKND1 2.3 CALCULATION NOT PERFORMED WHEN RESULT IS BELOW LINEAR LIMIT      Coagulation No results for input(s): PTP, INR, APTT, INREXT, INREXT in the last 72 hours.     Lipid Panel Lab Results   Component Value Date/Time    Cholesterol, total 146 05/28/2015 02:58 AM    HDL Cholesterol 64 (H) 05/28/2015 02:58 AM    LDL, calculated 66 05/28/2015 02:58 AM    VLDL, calculated 16 05/28/2015 02:58 AM    Triglyceride 80 05/28/2015 02:58 AM    CHOL/HDL Ratio 2.3 05/28/2015 02:58 AM      BNP No results for input(s): BNPP in the last 72 hours.    Liver Enzymes Recent Labs     02/13/20  0445   TP 5.8*   ALB 2.5*      SGOT 19      Thyroid Studies Lab Results   Component Value Date/Time    TSH 0.56 01/23/2019 01:33 AM        Procedures/imaging: see electronic medical records for all procedures/Xrays and details which were not copied into this note but were reviewed prior to creation of Primitivo Thurman MD

## 2020-02-13 NOTE — CONSULTS
Nephrology Consult Note    Consult requesting Physican:   Dr Elida Stone      Reason for Consult:   Acute on CKD      HPI:   Chika Whiting is 52 yo female with PMHx of HTN, uncontrolled DM, CKD, D-CMP and anemia who presented with bouts of diarrhea and nausea. She also had complaints of SOB and CP. On initial eval in the ED her HD was in the 50's otherwise stable VS. Labs showed K of 6.0 and Cr 2.69. Home med included Losartan. Pt FU with Dr Jonathan East for her CKD and recent baseline Cr in Dec was ~1.6. VQ scan, CxR, CT lower chest/abd/pelvis, and LE doppler US were all unremarkable. Echo showed EF 81%, mild diastolic dysfunction and mild- to mod Pulm HTN. Pt was treated medically for high K and received a dose of Lasix. Today K is down tp normal. But Cr slightly up to 2.8. No diarrhea, N/V. No SOB at rest. No CP. Has LE edema. Impression:   -Acute on CKD stage 3 vs worsening CKD. Cr up at 2.6-2.8, from recent baseline of 1.6 in Dec.   -Hyperkalemia  -HTN  -Proteinuria likely sec to DMN  -Hyperphosphatemia  -DM, uncontrolled.  HbA1C 9.5%  -Anemia  -D-CMP  -Mod Pulm HTN  -Diarrhea: better  -АНДРЕЙ  -Obese      Plan:   -Continue to hold Lasix for now   -Continue to hold Losartan  -Encourage PO fluid hydration  -Phoslo tid for high phos  -Increase Hydralazine to 50mg po tid  -Renal diet  -Check PTH, and iron studies  -Needs better BS control  -No urgent indication for HD        Medications:     Current Facility-Administered Medications:     hydrALAZINE (APRESOLINE) tablet 25 mg, 25 mg, Oral, TID, Bianca Fallon MD, 25 mg at 02/13/20 1712    atorvastatin (LIPITOR) tablet 40 mg, 40 mg, Oral, DAILY, Bianca Fallon MD, 40 mg at 02/13/20 0924    albuterol-ipratropium (DUO-NEB) 2.5 MG-0.5 MG/3 ML, 3 mL, Nebulization, Q4H PRN, Bianca Fallon MD    aspirin chewable tablet 81 mg, 81 mg, Oral, DAILY, Bianca Fallon MD, 81 mg at 02/13/20 0924    DULoxetine (CYMBALTA) capsule 60 mg, 60 mg, Oral, DAILY, Javier Fallon MD, 60 mg at 02/13/20 0924    insulin glargine (LANTUS) injection 35 Units, 35 Units, SubCUTAneous, QHS, Javier Fallon MD    neomycin-bacitracin-polymyxin (NEOSPORIN) ointment, , Topical, TID, Javier Fallon MD    heparin (porcine) injection 5,000 Units, 5,000 Units, SubCUTAneous, Q8H, Shandra Stearns MD, 5,000 Units at 02/13/20 1504    sodium chloride (NS) flush 5-40 mL, 5-40 mL, IntraVENous, Q8H, Javier Fallon MD, 10 mL at 02/13/20 1432    sodium chloride (NS) flush 5-40 mL, 5-40 mL, IntraVENous, PRN, Javier Fallon MD    dextrose 10% infusion 125-250 mL, 125-250 mL, IntraVENous, PRN, Javier Fallon MD, Last Rate: 999 mL/hr at 02/13/20 0052, 250 mL at 02/13/20 0052    acetaminophen (TYLENOL) tablet 650 mg, 650 mg, Oral, Q4H PRN, Javier Fallon MD    oxyCODONE-acetaminophen (PERCOCET) 5-325 mg per tablet 1 Tab, 1 Tab, Oral, Q4H PRN, Javier Fallon MD    PM/SHx:     Active Ambulatory Problems     Diagnosis Date Noted    HTN (hypertension) 08/16/2014    Type II or unspecified type diabetes mellitus with renal manifestations, uncontrolled(250.42) ([facility] 09/04/2014    [facility]     Respiratory distress 01/17/2019    Asthma exacerbation 08/96/2205    Diastolic CHF ([facility] 28/97/0495    АНДРЕЙ (obstructive sleep apnea) 12/11/2019    UTI (urinary tract infection) 12/11/2019     Resolved Ambulatory Problems     Diagnosis Date Noted    DKA (diabetic ketoacidoses) ([facility] 08/11/2014    MSSA (methicillin susceptible Staphylococcus aureus) septicemia ([facility] 08/16/2014    Right ankle swelling 08/16/2014    Constipation 08/17/2014    Dyspnea on exertion 09/04/2014    Volume overload 09/04/2014    CHF NYHA class II ([facility] 05/27/2015    Depression 05/27/2015    Community acquired pneumonia 05/27/2015     Past Medical History:   Diagnosis Date    Asthma     Diabetes mellitus ([facility]     Heart palpitations     Hypertension     Ill-defined condition     MRSA infection 8/2014       SHx:     Social History     Socioeconomic History    Marital status: SINGLE     Spouse name: Not on file    Number of children: Not on file    Years of education: Not on file    Highest education level: Not on file   Occupational History    Not on file   Social Needs    Financial resource strain: Not on file    Food insecurity:     Worry: Not on file     Inability: Not on file    Transportation needs:     Medical: Not on file     Non-medical: Not on file   Tobacco Use    Smoking status: Never Smoker    Smokeless tobacco: Never Used   Substance and Sexual Activity    Alcohol use: No    Drug use: No    Sexual activity: Never     Partners: Male   Lifestyle    Physical activity:     Days per week: Not on file     Minutes per session: Not on file    Stress: Not on file   Relationships    Social connections:     Talks on phone: Not on file     Gets together: Not on file     Attends Bahai service: Not on file     Active member of club or organization: Not on file     Attends meetings of clubs or organizations: Not on file     Relationship status: Not on file    Intimate partner violence:     Fear of current or ex partner: Not on file     Emotionally abused: Not on file     Physically abused: Not on file     Forced sexual activity: Not on file   Other Topics Concern    Not on file   Social History Narrative    Not on file       Allergies:      Allergies   Allergen Reactions    Bees [Sting, Bee] Swelling    Penicillins Hives    [place] Hives       Review of Systems:   Review of System is negative except per HPI    Physical Assessment:     Visit Vitals  /53   Pulse 65   Temp 97.7 °F (36.5 °C)   Resp 18   Ht 5' 2\" (1.575 m)   Wt 148.8 kg (328 lb)   SpO2 100%   Breastfeeding No   BMI 59.99 kg/m²       Intake/Output Summary (Last 24 hours) at 2/13/2020 7984  Last data filed at 2/13/2020 1453  Gross per 24 hour   Intake 240 ml   Output 300 ml Net -60 ml       General Appearance: no pain, no distress  Head: atraumatic  HEENT: no jaundice, moist oral mucosa  Neck: no JVD noted  Chest: LS clear to auscultation bilaterally  Heart: S1/S2, no murmur, gallop or rub, RRR  Adbomen: soft, nontender, BS active   : no flank tenderness, no terrell catheter  Skin: intact, no rash  Extremity: no edema, cyanosis or clubbing  MS: No joint deformity or tenderness  Neuro: conscious, alert, oriented x 3, no focal deficit    Adama Katz MD    Work Number: [phone]

## 2020-02-13 NOTE — PROGRESS NOTES
0700 Assumed patient care from [address], RN. Whiteboard updated, bed wheels locked, bed in lowest position, and call bell within reach. 0800 Assessment complete. Patient resting comfortably in bed. No complaint of pain or SOB at this time. Call bell within reach. 1200 Reassessment complete. Patient resting comfortably in bed. No complaint of pain or SOB at this time. Call bell within reach. 1600 Reassessment complete. Patient resting comfortably in bed. No complaint of pain or SOB at this time. Call bell within reach.

## 2020-02-14 LAB
ALBUMIN SERPL-MCNC: 2.5 G/DL (ref 3.4–5)
ALBUMIN/GLOB SERPL: 0.8 {RATIO} (ref 0.8–1.7)
ALP SERPL-CCNC: 123 U/L (ref 45–117)
ALT SERPL-CCNC: 20 U/L (ref 13–56)
ANION GAP SERPL CALC-SCNC: 8 MMOL/L (ref 3–18)
AST SERPL-CCNC: 13 U/L (ref 10–38)
BASOPHILS # BLD: 0 K/UL (ref 0–0.1)
BASOPHILS NFR BLD: 1 % (ref 0–2)
BILIRUB SERPL-MCNC: 0.2 MG/DL (ref 0.2–1)
BUN SERPL-MCNC: 73 MG/DL (ref 7–18)
BUN/CREAT SERPL: 25 (ref 12–20)
CALCIUM SERPL-MCNC: 8.3 MG/DL (ref 8.5–10.1)
CALCIUM SERPL-MCNC: 8.3 MG/DL (ref 8.5–10.1)
CHLORIDE SERPL-SCNC: 111 MMOL/L (ref 100–111)
CO2 SERPL-SCNC: 21 MMOL/L (ref 21–32)
CREAT SERPL-MCNC: 2.91 MG/DL (ref 0.6–1.3)
DIFFERENTIAL METHOD BLD: ABNORMAL
EOSINOPHIL # BLD: 0.4 K/UL (ref 0–0.4)
EOSINOPHIL NFR BLD: 6 % (ref 0–5)
ERYTHROCYTE [DISTWIDTH] IN BLOOD BY AUTOMATED COUNT: 15 % (ref 11.6–14.5)
FERRITIN SERPL-MCNC: 77 NG/ML (ref 8–388)
GLOBULIN SER CALC-MCNC: 3.3 G/DL (ref 2–4)
GLUCOSE BLD STRIP.AUTO-MCNC: 199 MG/DL (ref 70–110)
GLUCOSE BLD STRIP.AUTO-MCNC: 204 MG/DL (ref 70–110)
GLUCOSE BLD STRIP.AUTO-MCNC: 206 MG/DL (ref 70–110)
GLUCOSE BLD STRIP.AUTO-MCNC: 213 MG/DL (ref 70–110)
GLUCOSE BLD STRIP.AUTO-MCNC: 228 MG/DL (ref 70–110)
GLUCOSE SERPL-MCNC: 205 MG/DL (ref 74–99)
HCT VFR BLD AUTO: 29.2 % (ref 35–45)
HGB BLD-MCNC: 8.9 G/DL (ref 12–16)
IRON SATN MFR SERPL: 8 % (ref 20–50)
IRON SERPL-MCNC: 26 UG/DL (ref 50–175)
LYMPHOCYTES # BLD: 1.9 K/UL (ref 0.9–3.6)
LYMPHOCYTES NFR BLD: 32 % (ref 21–52)
MAGNESIUM SERPL-MCNC: 2.5 MG/DL (ref 1.6–2.6)
MCH RBC QN AUTO: 24.8 PG (ref 24–34)
MCHC RBC AUTO-ENTMCNC: 30.5 G/DL (ref 31–37)
MCV RBC AUTO: 81.3 FL (ref 74–97)
MONOCYTES # BLD: 0.5 K/UL (ref 0.05–1.2)
MONOCYTES NFR BLD: 8 % (ref 3–10)
NEUTS SEG # BLD: 3.2 K/UL (ref 1.8–8)
NEUTS SEG NFR BLD: 53 % (ref 40–73)
PHOSPHATE SERPL-MCNC: 5.9 MG/DL (ref 2.5–4.9)
PLATELET # BLD AUTO: 280 K/UL (ref 135–420)
PMV BLD AUTO: 9.3 FL (ref 9.2–11.8)
POTASSIUM SERPL-SCNC: 5.2 MMOL/L (ref 3.5–5.5)
PROT SERPL-MCNC: 5.8 G/DL (ref 6.4–8.2)
PTH-INTACT SERPL-MCNC: 244.7 PG/ML (ref 18.4–88)
RBC # BLD AUTO: 3.59 M/UL (ref 4.2–5.3)
SODIUM SERPL-SCNC: 140 MMOL/L (ref 136–145)
TIBC SERPL-MCNC: 322 UG/DL (ref 250–450)
WBC # BLD AUTO: 6 K/UL (ref 4.6–13.2)

## 2020-02-14 PROCEDURE — 80053 COMPREHEN METABOLIC PANEL: CPT

## 2020-02-14 PROCEDURE — 82962 GLUCOSE BLOOD TEST: CPT

## 2020-02-14 PROCEDURE — 74011250637 HC RX REV CODE- 250/637: Performed by: INTERNAL MEDICINE

## 2020-02-14 PROCEDURE — 83540 ASSAY OF IRON: CPT

## 2020-02-14 PROCEDURE — 5A09557 ASSISTANCE WITH RESPIRATORY VENTILATION, GREATER THAN 96 CONSECUTIVE HOURS, CONTINUOUS POSITIVE AIRWAY PRESSURE: ICD-10-PCS | Performed by: INTERNAL MEDICINE

## 2020-02-14 PROCEDURE — 85025 COMPLETE CBC W/AUTO DIFF WBC: CPT

## 2020-02-14 PROCEDURE — 94660 CPAP INITIATION&MGMT: CPT

## 2020-02-14 PROCEDURE — 36415 COLL VENOUS BLD VENIPUNCTURE: CPT

## 2020-02-14 PROCEDURE — 74011250637 HC RX REV CODE- 250/637: Performed by: HOSPITALIST

## 2020-02-14 PROCEDURE — 82728 ASSAY OF FERRITIN: CPT

## 2020-02-14 PROCEDURE — 74011636637 HC RX REV CODE- 636/637: Performed by: HOSPITALIST

## 2020-02-14 PROCEDURE — 83970 ASSAY OF PARATHORMONE: CPT

## 2020-02-14 PROCEDURE — 84100 ASSAY OF PHOSPHORUS: CPT

## 2020-02-14 PROCEDURE — 65660000000 HC RM CCU STEPDOWN

## 2020-02-14 PROCEDURE — 74011250636 HC RX REV CODE- 250/636: Performed by: HOSPITALIST

## 2020-02-14 PROCEDURE — 83735 ASSAY OF MAGNESIUM: CPT

## 2020-02-14 PROCEDURE — 74011636637 HC RX REV CODE- 636/637: Performed by: INTERNAL MEDICINE

## 2020-02-14 RX ORDER — AMLODIPINE BESYLATE 5 MG/1
5 TABLET ORAL DAILY
Status: DISCONTINUED | OUTPATIENT
Start: 2020-02-15 | End: 2020-02-14

## 2020-02-14 RX ORDER — HYDRALAZINE HYDROCHLORIDE 50 MG/1
100 TABLET, FILM COATED ORAL 3 TIMES DAILY
Status: DISCONTINUED | OUTPATIENT
Start: 2020-02-14 | End: 2020-02-24 | Stop reason: HOSPADM

## 2020-02-14 RX ORDER — GABAPENTIN 300 MG/1
300 CAPSULE ORAL 3 TIMES DAILY
Status: DISCONTINUED | OUTPATIENT
Start: 2020-02-14 | End: 2020-02-24 | Stop reason: HOSPADM

## 2020-02-14 RX ORDER — AMLODIPINE BESYLATE 5 MG/1
10 TABLET ORAL DAILY
Status: DISCONTINUED | OUTPATIENT
Start: 2020-02-14 | End: 2020-02-24 | Stop reason: HOSPADM

## 2020-02-14 RX ADMIN — OXYCODONE HYDROCHLORIDE AND ACETAMINOPHEN 1 TABLET: 5; 325 TABLET ORAL at 17:35

## 2020-02-14 RX ADMIN — POLYMYXIN B SULFATE, BACITRACIN ZINC, NEOMYCIN SULFATE: 5000; 3.5; 4 OINTMENT TOPICAL at 08:18

## 2020-02-14 RX ADMIN — INSULIN LISPRO 3 UNITS: 100 INJECTION, SOLUTION INTRAVENOUS; SUBCUTANEOUS at 22:31

## 2020-02-14 RX ADMIN — HYDRALAZINE HYDROCHLORIDE 50 MG: 50 TABLET, FILM COATED ORAL at 08:18

## 2020-02-14 RX ADMIN — INSULIN LISPRO 6 UNITS: 100 INJECTION, SOLUTION INTRAVENOUS; SUBCUTANEOUS at 12:07

## 2020-02-14 RX ADMIN — AMLODIPINE BESYLATE 10 MG: 5 TABLET ORAL at 16:42

## 2020-02-14 RX ADMIN — Medication 10 ML: at 22:31

## 2020-02-14 RX ADMIN — POLYMYXIN B SULFATE, BACITRACIN ZINC, NEOMYCIN SULFATE: 5000; 3.5; 4 OINTMENT TOPICAL at 22:31

## 2020-02-14 RX ADMIN — HYDRALAZINE HYDROCHLORIDE 100 MG: 50 TABLET, FILM COATED ORAL at 22:30

## 2020-02-14 RX ADMIN — Medication 10 ML: at 16:43

## 2020-02-14 RX ADMIN — GABAPENTIN 300 MG: 300 CAPSULE ORAL at 17:15

## 2020-02-14 RX ADMIN — HEPARIN SODIUM 5000 UNITS: 5000 INJECTION INTRAVENOUS; SUBCUTANEOUS at 16:42

## 2020-02-14 RX ADMIN — POLYMYXIN B SULFATE, BACITRACIN ZINC, NEOMYCIN SULFATE: 5000; 3.5; 4 OINTMENT TOPICAL at 17:15

## 2020-02-14 RX ADMIN — INSULIN LISPRO 4 UNITS: 100 INJECTION, SOLUTION INTRAVENOUS; SUBCUTANEOUS at 07:30

## 2020-02-14 RX ADMIN — HEPARIN SODIUM 5000 UNITS: 5000 INJECTION INTRAVENOUS; SUBCUTANEOUS at 07:44

## 2020-02-14 RX ADMIN — CALCIUM ACETATE 667 MG: 667 CAPSULE ORAL at 08:17

## 2020-02-14 RX ADMIN — ATORVASTATIN CALCIUM 40 MG: 20 TABLET, FILM COATED ORAL at 08:18

## 2020-02-14 RX ADMIN — HYDRALAZINE HYDROCHLORIDE 100 MG: 50 TABLET, FILM COATED ORAL at 17:14

## 2020-02-14 RX ADMIN — INSULIN LISPRO 6 UNITS: 100 INJECTION, SOLUTION INTRAVENOUS; SUBCUTANEOUS at 17:14

## 2020-02-14 RX ADMIN — DULOXETINE HYDROCHLORIDE 60 MG: 60 CAPSULE, DELAYED RELEASE ORAL at 08:17

## 2020-02-14 RX ADMIN — CALCIUM ACETATE 667 MG: 667 CAPSULE ORAL at 12:07

## 2020-02-14 RX ADMIN — INSULIN GLARGINE 15 UNITS: 100 INJECTION, SOLUTION SUBCUTANEOUS at 22:30

## 2020-02-14 RX ADMIN — CALCIUM ACETATE 667 MG: 667 CAPSULE ORAL at 17:14

## 2020-02-14 RX ADMIN — ASPIRIN 81 MG 81 MG: 81 TABLET ORAL at 08:17

## 2020-02-14 RX ADMIN — HEPARIN SODIUM 5000 UNITS: 5000 INJECTION INTRAVENOUS; SUBCUTANEOUS at 22:31

## 2020-02-14 RX ADMIN — GABAPENTIN 300 MG: 300 CAPSULE ORAL at 22:30

## 2020-02-14 NOTE — PROGRESS NOTES
Cardiology Progress Note        Patient: Eugenie Conde        Sex: female          DOA: 2/12/2020  YOB: 1965      Age:  53 y.o.        LOS:  LOS: 2 days    Patient seen and examined, chart reviewed. 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 713    Diastolic CHF (HCC) C47.62    АНДРЕЙ (obstructive sleep apnea) G47.33    UTI (urinary tract infection) N39.0    Hyperkalemia E87.5         Acute renal failure superimposed on stage 3 chronic kidney disease (HCC) N17.9, N18.3        Plan:    Continue current management as per hospital medicine. Cardiology sign off. Subjective:    cc:  Denies any chest pain       REVIEW OF SYSTEMS:     General: No fevers or chills. Cardiovascular: No chest pain,No palpitations, No orthopnea, No PND, No leg swelling, No claudication  Pulmonary: No dyspnea   Gastrointestinal: No nausea, vomiting, bleeding  Neurology: No Dizziness    Objective:      Visit Vitals  /70   Pulse 69   Temp 97.9 °F (36.6 °C)   Resp 18   Ht 5' 2\" (1.575 m)   Wt 148.8 kg (328 lb)   SpO2 100%   Breastfeeding No   BMI 59.99 kg/m²     Body mass index is 59.99 kg/m². Physical Exam:  General Appearance: Comfortable, not using accessory muscles of respiration. HEENT: STEPHANIE. HEAD: Atraumatic  NECK: No JVD, no thyroidomeglay. CAROTIDS: No bruit   LUNGS: Clear bilaterally. HEART: S1+S2 audible, no murmur, no pericardial rub. ABD: Non-tender, BS Audible    EXT: No edema, and no cyanosis. VASCULAR EXAM: Pulses are intact. PSYCHIATRIC EXAM: Mood is appropriate. MUSCULOSKELETAL: Grossly no joint deformity.   NEUROLOGICAL: AAO times 3, No motor and sensory deficit  Medication:  Current Facility-Administered Medications   Medication Dose Route Frequency    atorvastatin (LIPITOR) tablet 40 mg  40 mg Oral DAILY    albuterol-ipratropium (DUO-NEB) 2.5 MG-0.5 MG/3 ML  3 mL Nebulization Q4H PRN    aspirin chewable tablet 81 mg  81 mg Oral DAILY    DULoxetine (CYMBALTA) capsule 60 mg  60 mg Oral DAILY    neomycin-bacitracin-polymyxin (NEOSPORIN) ointment   Topical TID    heparin (porcine) injection 5,000 Units  5,000 Units SubCUTAneous Q8H    insulin glargine (LANTUS) injection 15 Units  15 Units SubCUTAneous QHS    insulin lispro (HUMALOG) injection   SubCUTAneous AC&HS    dextrose 10% infusion 125-250 mL  125-250 mL IntraVENous PRN    calcium acetate(phosphat bind) (PHOSLO) capsule 667 mg  1 Cap Oral TID WITH MEALS    hydrALAZINE (APRESOLINE) tablet 50 mg  50 mg Oral TID    sodium chloride (NS) flush 5-40 mL  5-40 mL IntraVENous Q8H    sodium chloride (NS) flush 5-40 mL  5-40 mL IntraVENous PRN    dextrose 10% infusion 125-250 mL  125-250 mL IntraVENous PRN    acetaminophen (TYLENOL) tablet 650 mg  650 mg Oral Q4H PRN    oxyCODONE-acetaminophen (PERCOCET) 5-325 mg per tablet 1 Tab  1 Tab Oral Q4H PRN               Lab/Data Reviewed:       Recent Labs     02/14/20 0222 02/13/20 0445 02/12/20  1928   WBC 6.0 7.5 10.6   HGB 8.9* 8.7* 9.6*   HCT 29.2* 29.1* 31.8*    284 326     Recent Labs     02/14/20 0222 02/13/20 0445 02/12/20  2310    141 141   K 5.2 5.0 5.4    113* 111   CO2 21 22 23   * 58* 88   BUN 73* 72* 71*   CREA 2.91* 2.81* 2.70*   CA 8.3*  8.3* 8.4* 8.7       Signed By: Juwan Mcfarland MD     February 14, 2020

## 2020-02-14 NOTE — PROGRESS NOTES
Problem: Diabetes Self-Management  Goal: *Disease process and treatment process  Description  Define diabetes and identify own type of diabetes; list 3 options for treating diabetes. Outcome: Progressing Towards Goal  Goal: *Incorporating nutritional management into lifestyle  Description  Describe effect of type, amount and timing of food on blood glucose; list 3 methods for planning meals. Outcome: Progressing Towards Goal  Goal: *Incorporating physical activity into lifestyle  Description  State effect of exercise on blood glucose levels. Outcome: Progressing Towards Goal  Goal: *Developing strategies to promote health/change behavior  Description  Define the ABC's of diabetes; identify appropriate screenings, schedule and personal plan for screenings. Outcome: Progressing Towards Goal  Goal: *Using medications safely  Description  State effect of diabetes medications on diabetes; name diabetes medication taking, action and side effects. Outcome: Progressing Towards Goal  Goal: *Monitoring blood glucose, interpreting and using results  Description  Identify recommended blood glucose targets  and personal targets. Outcome: Progressing Towards Goal  Goal: *Prevention, detection, treatment of acute complications  Description  List symptoms of hyper- and hypoglycemia; describe how to treat low blood sugar and actions for lowering  high blood glucose level. Outcome: Progressing Towards Goal  Goal: *Prevention, detection and treatment of chronic complications  Description  Define the natural course of diabetes and describe the relationship of blood glucose levels to long term complications of diabetes.   Outcome: Progressing Towards Goal  Goal: *Developing strategies to address psychosocial issues  Description  Describe feelings about living with diabetes; identify support needed and support network  Outcome: Progressing Towards Goal  Goal: *Insulin pump training  Outcome: Progressing Towards Goal  Goal: *Sick day guidelines  Outcome: Progressing Towards Goal  Goal: *Patient Specific Goal (EDIT GOAL, INSERT TEXT)  Outcome: Progressing Towards Goal     Problem: Risk for Spread of Infection  Goal: Prevent transmission of infectious organism to others  Description  Prevent the transmission of infectious organisms to other patients, staff members, and visitors. Outcome: Progressing Towards Goal     Problem: Falls - Risk of  Goal: *Absence of Falls  Description  Document Efrain Clayton Fall Risk and appropriate interventions in the flowsheet.   Outcome: Progressing Towards Goal  Note: Fall Risk Interventions:  Mobility Interventions: Communicate number of staff needed for ambulation/transfer, Patient to call before getting OOB

## 2020-02-14 NOTE — PROGRESS NOTES
Transition of care:  Home with Iam Lopez when medically cleared  Met with patient and Dr. Christopher Agee at bedside. Patient complains of personal touch offered foc she still wants personal touch  Transition of Care Plan:     The Plan for Transition of Care is related to the following treatment goals: home with Iam Lopez when medically cleared    The Patient  was provided with a choice of provider and agrees  with the discharge plan. Yes [] No []    A Freedom of choice list was provided with basic dialogue that supports the patient's individualized plan of care/goals and shares the quality data associated with the providers. Yes [x] No []. Patent   Lives alone  She has her own cpap machine. She has a nebulizer  She hs Dr Wilner Lyles for pcp. Patient states that she needs a shower chair order placed and also will send to personal touch  She denies other needs except she would like to have personal cg taht she reports personal touch has been working on  Explained to her that would have to be approved by . Cm will continue to follow  Care Management Interventions  PCP Verified by CM:  Yes  Transition of Care Consult (CM Consult): Discharge Planning  The Patient and/or Patient Representative was Provided with a Choice of Provider and Agrees with the Discharge Plan?: Yes

## 2020-02-14 NOTE — PROGRESS NOTES
Bedside and Verbal shift change report given to Chavez Mendoza RN (oncoming nurse) by Alejandro Burk RN (offgoing nurse). Report included the following information SBAR, Kardex, MAR and Recent Results.

## 2020-02-14 NOTE — PROGRESS NOTES
Shift uneventful.    0720 Bedside and Verbal shift change report given to Bill Montoya RN (oncoming nurse) by William Day RN   (offgoing nurse). Report included the following information SBAR, Kardex, Procedure Summary, Intake/Output, MAR, Recent Results and Med Rec Status.

## 2020-02-14 NOTE — PROGRESS NOTES
Nephrology Progress Note      HPI:   Jorge Dillon is 52 yo female with PMHx of HTN, uncontrolled DM, CKD, D-CMP and anemia who presented with bouts of diarrhea and nausea. She also had complaints of SOB and CP. On initial eval in the ED her HD was in the 50's otherwise stable VS. Labs showed K of 6.0 and Cr 2.69. Home med included Losartan. Pt FU with Dr Ap Marcelino for her CKD and recent baseline Cr in Dec was ~1.6. VQ scan, CxR, CT lower chest/abd/pelvis, and LE doppler US were all unremarkable. Echo showed EF 44%, mild diastolic dysfunction and mild- to mod Pulm HTN. Pt was treated medically for high K and received a dose of Lasix. Today K is down tp normal. But Cr slightly up to 2.8. No diarrhea, N/V. No SOB at rest. No CP. Has LE edema.     -Pt w/o new complaints today. No diarrhea, SOB or CP at rest. She has chronic LE edema. Impression:   -Acute on CKD stage 3 vs worsening CKD. Cr trending up to 2.9 today, from recent baseline of 1.6 in Dec.   -Hyperkalemia, resolved on medical management   -HTN, Hydralazine dose increased  -Proteinuria likely sec to DMN  -Hyperphosphatemia, started on Phoslo tid  -DM, uncontrolled.  HbA1C 9.5%  -Anemia  -D-CMP  -Mod Pulm HTN w/ Rt side CHF   -Diarrhea: better  -АНДРЕЙ  -Obese      Plan:   -Encourage PO fluid hydration, given Rt side CHF, cautious in IVF hydration   -Continue to hold Lasix for now   -Continue to hold Losartan  -Renal diet  -PTH, and iron studies pending  -If adequate iron, will consider JUAN C  -Needs better BS control  -No urgent indication for HD      Medications:     Current Facility-Administered Medications:     atorvastatin (LIPITOR) tablet 40 mg, 40 mg, Oral, DAILY, Diana Fallon MD, 40 mg at 02/14/20 0818    albuterol-ipratropium (DUO-NEB) 2.5 MG-0.5 MG/3 ML, 3 mL, Nebulization, Q4H PRN, Diana Fallon MD    aspirin chewable tablet 81 mg, 81 mg, Oral, DAILY, Diana Fallon MD, 81 mg at 02/14/20 0817    DULoxetine (CYMBALTA) capsule 60 mg, 60 mg, Oral, DAILY, Shaan Fallon MD, 60 mg at 02/14/20 0817    neomycin-bacitracin-polymyxin (NEOSPORIN) ointment, , Topical, TID, Shaan Fallon MD    heparin (porcine) injection 5,000 Units, 5,000 Units, SubCUTAneous, Q8H, Cathleen Blackwood MD, 5,000 Units at 02/14/20 0744    insulin glargine (LANTUS) injection 15 Units, 15 Units, SubCUTAneous, QHS, Cathleen Blackwood MD, 15 Units at 02/13/20 2240    insulin lispro (HUMALOG) injection, , SubCUTAneous, AC&HS, Cathleen Blackwood MD, 4 Units at 02/14/20 0730    dextrose 10% infusion 125-250 mL, 125-250 mL, IntraVENous, PRN, Cathleen Blackwood MD    calcium acetate(phosphat bind) (PHOSLO) capsule 667 mg, 1 Cap, Oral, TID WITH MEALS, Gia Narvaez MD, 667 mg at 02/14/20 0817    hydrALAZINE (APRESOLINE) tablet 50 mg, 50 mg, Oral, TID, Gia Narvaez MD, 50 mg at 02/14/20 0818    sodium chloride (NS) flush 5-40 mL, 5-40 mL, IntraVENous, Q8H, Shaan Fallon MD, 10 mL at 02/13/20 2200    sodium chloride (NS) flush 5-40 mL, 5-40 mL, IntraVENous, PRN, Shaan Fallon MD    dextrose 10% infusion 125-250 mL, 125-250 mL, IntraVENous, PRN, Shaan Fallon MD, Last Rate: 999 mL/hr at 02/13/20 0052, 250 mL at 02/13/20 0052    acetaminophen (TYLENOL) tablet 650 mg, 650 mg, Oral, Q4H PRN, Shaan Fallon MD    oxyCODONE-acetaminophen (PERCOCET) 5-325 mg per tablet 1 Tab, 1 Tab, Oral, Q4H PRN, Shaan Fallon MD, 1 Tab at 02/13/20 1750      Physical Assessment:     Visit Vitals  /70   Pulse 69   Temp 97.9 °F (36.6 °C)   Resp 18   Ht 5' 2\" (1.575 m)   Wt 148.8 kg (328 lb)   SpO2 100%   Breastfeeding No   BMI 59.99 kg/m²       Intake/Output Summary (Last 24 hours) at 2/14/2020 6431  Last data filed at 2/13/2020 1453  Gross per 24 hour   Intake    Output 300 ml   Net -300 ml       General Appearance: no pain, no distress  Head: atraumatic  HEENT: no jaundice, moist oral mucosa  Neck: no JVD noted  Chest: LS clear to auscultation bilaterally  Heart: S1/S2, no murmur, gallop or rub, RRR  Adbomen: soft, nontender, BS active   : no flank tenderness, no terrell catheter  Skin: intact, no rash  Extremity: ++ edema, cyanosis or clubbing  MS: No joint deformity or tenderness  Neuro: conscious, alert, oriented x 3, no focal deficit    Fabien Long MD    Work Number: [id number]

## 2020-02-14 NOTE — DIABETES MGMT
Diabetes Patient/Family Education Record  Factors That  May Influence Patients Ability  to Learn or  Comply with Recommendations   []   Language barrier    []   Cultural needs   []   Motivation    []   Cognitive limitation    [x]   Physical   []   Education    []   Physiological factors   [x]   Hearing/vision/speaking impairment   []   Cheondoism beliefs    [x]   Financial factors   []  Other:   []  No factors identified at this time.      Person Instructed:   [x]   Patient   [x]   Family   []  Other     Preference for Learning:   [x]   Verbal   []   Written   []  Demonstration     Level of Comprehension & Competence:    []  Good                                      [x] Fair                                     []  Poor                             []  Needs Reinforcement   [x]  Teachback completed    Education Component:   [x]  Medication management, including how to administer insulin (if appropriate) and potential medication interactions; confirmed home regimen; stressed importance of only taking mixed insulin with meals   [x]  Nutritional management -obtain usual meal pattern   []  Exercise   []  Signs, symptoms, and treatment of hyperglycemia and hypoglycemia   [x] Prevention, recognition and treatment of hyperglycemia and hypoglycemia   [x]  Importance of blood glucose monitoring and how to obtain a blood glucose meter    []  Instruction on use of the blood glucose meter   [x]  Discuss the importance of HbA1C monitoring    []  Sick day guidelines   []  Proper use and disposal of lancets, needles, syringes or insulin pens (if appropriate)   []  Potential long-term complications (retinopathy, kidney disease, neuropathy, foot care)   [] Information about whom to contact in case of emergency or for more information    [x]  Goal:  Patient/family will demonstrate understanding of Diabetes Self Management Skills by: (date) ___4/30____  Plan for post-discharge education or self-management support:    [] Outpatient class schedule provided            [] Patient Declined    [] Scheduled for outpatient classes (date) _______  Verify:  Does patient understand how diabetes medications work? ____________________________  Does patient know what their most recent A1c is? ___________________________________  Does patient monitor glucose at home? ___________________________________________  Does patient have difficulty obtaining diabetes medications or testing supplies? _________________         Rochelle Wood MS, RN, CDE  Glycemic Control Team  [phone]  Pager [phone] (M-TH 8:00-4:30P)  *After Hours pager [phone]

## 2020-02-14 NOTE — CONSULTS
[facility] Consult Note      Patient: Carmen Way MRN: [id number]  SSN: [id number]    YOB: 1965  Age: 53 y.o. Sex: female    Date of Consultation: 02/13/2020  Referring Physician: Allison Moseley MD  Reason for Consultation: Bradycardia    Chief complain: nausea, vomiting, diarrhea    HPI: 13-year-old female came to the emergency room with complaining of nausea, vomiting and diarrhea. She is also complaining generalized body ache, feeling of fatigue and tired and loss of balance. She mentioned that she was not feeling good and she does not remember how she came to emergency room. She denies any chest pain. She denies any dizziness, palpitation or presyncope. She is complaining of shortness of breath on exertion. She denies any orthopnea or PND. She was admitted for hyperkalemia. At present she feels some improvement in her symptoms.     Past Medical History:   Diagnosis Date    Asthma     Note: As child had asthma    Diabetes mellitus ([facility] [address]     Type 2    Heart palpitations     Hypertension     Ill-defined condition     fibromyalgia     Morbid obesity ([facility] [address]     MRSA infection 8/2014     Past Surgical History:   Procedure Laterality Date    BREAST SURGERY PROCEDURE UNLISTED      cyst removed    HX CATARACT REMOVAL      HX CHOLECYSTECTOMY      HX TUBAL LIGATION       Current Facility-Administered Medications   Medication Dose Route Frequency    atorvastatin (LIPITOR) tablet 40 mg  40 mg Oral DAILY    albuterol-ipratropium (DUO-NEB) 2.5 MG-0.5 MG/3 ML  3 mL Nebulization Q4H PRN    aspirin chewable tablet 81 mg  81 mg Oral DAILY    DULoxetine (CYMBALTA) capsule 60 mg  60 mg Oral DAILY    neomycin-bacitracin-polymyxin (NEOSPORIN) ointment   Topical TID    heparin (porcine) injection 5,000 Units  5,000 Units SubCUTAneous Q8H    insulin glargine (LANTUS) injection 15 Units  15 Units SubCUTAneous QHS    insulin lispro (HUMALOG) injection   SubCUTAneous AC&HS    dextrose 10% infusion 125-250 mL  125-250 mL IntraVENous PRN    [START ON 2/14/2020] calcium acetate(phosphat bind) (PHOSLO) capsule 667 mg  1 Cap Oral TID WITH MEALS    hydrALAZINE (APRESOLINE) tablet 50 mg  50 mg Oral TID    sodium chloride (NS) flush 5-40 mL  5-40 mL IntraVENous Q8H    sodium chloride (NS) flush 5-40 mL  5-40 mL IntraVENous PRN    dextrose 10% infusion 125-250 mL  125-250 mL IntraVENous PRN    acetaminophen (TYLENOL) tablet 650 mg  650 mg Oral Q4H PRN    oxyCODONE-acetaminophen (PERCOCET) 5-325 mg per tablet 1 Tab  1 Tab Oral Q4H PRN       Allergies and Intolerances: Allergies   Allergen Reactions    Bees [Sting, Bee] Swelling    Penicillins Hives    Crestline Hives       Family History:   Family History   Problem Relation Age of Onset    Heart Attack Mother     Heart Attack Maternal Grandmother        Social History:   She  reports that she has never smoked. She has never used smokeless tobacco.  She  reports no history of alcohol use. Review of Systems:     Gen: No fever, chills, malaise, weight loss/gain. Heent: No headache, rhinorrhea, epistaxis, ear pain, hearing loss, sinus pain, neck pain/stiffness, sore throat. Heart: Positive shortness of breath on exertion, No chest pain, palpitations, pnd, or orthopnea. Resp: No cough, hemoptysis, wheezing and dyspnea  GI: Positive nausea, vomiting, diarrhea, No constipation, melena or hematochezia. : No urinary obstruction, dysuria or hematuria. Derm: No rash, new skin lesion or pruritis. Musc/skeletal: Positive bone or joint complains. Vasc: No edema, cyanosis or claudication. Endo: No heat/cold intolerance, no polyuria,polydipsia or polyphagia. Neuro: No unilateral weakness, numbness, tingling. No seizures. Heme: No easy bruising or bleeding.       Physical:   Patient Vitals for the past 6 hrs:   Temp Pulse Resp BP SpO2   02/13/20 1934 97.8 °F (36.6 °C) 65 18 148/61 97 %         Exam:   General Appearance: Comfortable, not using accessory muscles of respiration. HEENT: STEPHANIE. HEAD: Atraumatic  NECK: No JVD, no thyroidomeglay. CAROTIDS: No bruit  LUNGS: Clear bilaterally. HEART: S1+S2 audible, no murmur, no pericardial rub. ABD: Non-tender, BS Audible    EXT: No edema, and no cyanosis. VASCULAR EXAM: Pulses are intact. PSYCHIATRIC EXAM: Mood is appropriate. MUSCULOSKELETAL: Grossly no joint deformity.   NEUROLOGICAL: AAO times 3, Motor and sensory sytem intact     Review of Data:   LABS:   Lab Results   Component Value Date/Time    WBC 7.5 02/13/2020 04:45 AM    HGB 8.7 (L) 02/13/2020 04:45 AM    HCT 29.1 (L) 02/13/2020 04:45 AM    PLATELET 959 06/51/6106 04:45 AM     Lab Results   Component Value Date/Time    Sodium 141 02/13/2020 04:45 AM    Potassium 5.0 02/13/2020 04:45 AM    Chloride 113 (H) 02/13/2020 04:45 AM    CO2 22 02/13/2020 04:45 AM    Glucose 58 (L) 02/13/2020 04:45 AM    BUN 72 (H) 02/13/2020 04:45 AM    Creatinine 2.81 (H) 02/13/2020 04:45 AM     Lab Results   Component Value Date/Time    Cholesterol, total 146 05/28/2015 02:58 AM    HDL Cholesterol 64 (H) 05/28/2015 02:58 AM    LDL, calculated 66 05/28/2015 02:58 AM    Triglyceride 80 05/28/2015 02:58 AM     No results found for: GPT  Lab Results   Component Value Date/Time    Hemoglobin A1c 9.5 (H) 02/12/2020 07:28 PM         Cardiology Procedures:   Results for orders placed or performed during the hospital encounter of 02/12/20   EKG, 12 LEAD, INITIAL   Result Value Ref Range    Ventricular Rate 52 BPM    Atrial Rate 52 BPM    P-R Interval 144 ms    QRS Duration 84 ms    Q-T Interval 454 ms    QTC Calculation (Bezet) 422 ms    Calculated P Axis 50 degrees    Calculated R Axis 69 degrees    Calculated T Axis 69 degrees    Diagnosis       Sinus bradycardia  Low voltage QRS  Abnormal ECG  Confirmed by Carlee Newton MD, Tad Bell ([id number]) on 2/12/2020 9:18:10 PM             Impression / Plan:    Patient Active Problem List   Diagnosis Code    HTN (hypertension) I10    Type II or unspecified type diabetes mellitus with renal manifestations, uncontrolled(250.42) ([facility] [address] E11.29, E11.65    Morbid obesity (HCC) E66.01                   АНДРЕЙ (obstructive sleep apnea) G47.33    UTI (urinary tract infection) N39.0    Hyperkalemia E87.5         Acute renal failure superimposed on stage 3 chronic kidney disease (HCC) N17.9, N253     53-year-old female came to the emergency room with nausea, vomiting and diarrhea. She is being managed for hyperkalemia and renal insufficiency. Patient denies any chest pain. She is complaining of shortness of breath on exertion. She denies any orthopnea or PND. Cardiac enzymes are negative. Echocardiogram revealed no wall motion abnormality and normal LVEF. No losartan due to hyperkalemia. She was on metoprolol but due to bradycardia metoprolol is on hold. Continue management as per hospital medicine.       Signed By: Moshe Hamman, MD     February 13, 2020

## 2020-02-14 NOTE — PROGRESS NOTES
02/14/20 0220   CPAP/BIPAP   CPAP/BIPAP Start/Stop On   Device Mode CPAP, auto-titrating   $$ CPAP Daily Yes   Mask Type and Size Full face; Medium   Skin Condition intact   Pt's Home Machine No   Biomedical Check Performed Yes   Settings Verified Yes

## 2020-02-14 NOTE — PROGRESS NOTES
Hospitalist Progress Note-critical care note     Patient: Jorge L Li MRN: [id number]  CSN: [id number]    YOB: 1965  Age: 53 y.o. Sex: female    DOA: 2/12/2020 LOS:  LOS: 2 days            Chief complaint: chest pain, acute on chronic renal failure, hyperkalemia, андрей morbid obesity     Assessment/Plan         Hospital Problems  Date Reviewed: 12/11/2019          Codes Class Noted POA    Chest pain ICD-10-CM: R07.9  ICD-9-CM: 786.50  2/13/2020 Unknown        Acute renal failure superimposed on stage 3 chronic kidney disease ([facility] ICD-10-CM: N17.9, N18.3  ICD-9-CM: 584.9, 585.3  2/13/2020 Unknown        * (Principal) Hyperkalemia ICD-10-CM: E87.5  ICD-9-CM: 276.7  2/12/2020 Unknown        АНДРЕЙ (obstructive sleep apnea) ICD-10-CM: G47.33  ICD-9-CM: 327.23  12/11/2019 Yes        Morbid obesity ([facility] ICD-10-CM: E66.01  ICD-9-CM: 278.01  Unknown Yes        Type II or unspecified type diabetes mellitus with renal manifestations, uncontrolled(250.42) ([facility] ICD-10-CM: E11.29, E11.65  ICD-9-CM: 250.42  9/4/2014 Yes        HTN (hypertension) ICD-10-CM: I10  ICD-9-CM: 401.9  8/16/2014 Yes              Hyperkalemia  Resolved      acute on chronic renal failure 3  Not improving, renal on board  Continue avoid nasids and iv contrast   Dr. Rolly May f/u pt   Lasix was hold       Chest Pain   No pain now. ce wnl   recent stress test in December in 2019  Case discussed with Ange oconnell-juve cardiac   V/q scan no PE        Diarrhea  No episodes noted since admission         DM   Start Lantus and sliding scale hold metformin      Sleep Apnea   cpap at night        Pulmonary HTN   VQ scan obtained in the emergency room low probability PE   Negative for dvt     htn  Increase  norvasc and hydralyzine, continue hold metoprolol due to jono     Subjective : no chest pain, no diarrhea, constipation now     Disposition :1-2 days   Review of systems:    General: No fevers or chills. Cardiovascular: + chest pain, no  pressure.  No palpitations. Pulmonary: No shortness of breath. Gastrointestinal: No nausea, vomiting. Vital signs/Intake and Output:  Visit Vitals  /69   Pulse 76   Temp 97.4 °F (36.3 °C)   Resp 17   Ht 5' 2\" (1.575 m)   Wt 148.8 kg (328 lb)   SpO2 98%   Breastfeeding No   BMI 59.99 kg/m²     Current Shift:  No intake/output data recorded. Last three shifts:  02/12 1901 - 02/14 0700  In: 240 [P.O.:240]  Out: 300 [Urine:300]    Physical Exam:  General: WD, WN. Alert, cooperative, no acute distress    HEENT: NC, Atraumatic. PERRLA, anicteric sclerae. Lungs: CTA Bilaterally. No Wheezing/Rhonchi/Rales. Heart:  Regular  rhythm,  No murmur, No Rubs, No Gallops  Abdomen: Soft, Non distended, Non tender.  +Bowel sounds,   Extremities: No c/c/e  Psych:   Not anxious or agitated. Neurologic:  No acute neurological deficit. Labs: Results:       Chemistry Recent Labs     02/14/20 0222 02/13/20 0445 02/12/20 2310 02/12/20 1928   * 58* 88 82    141 141 142   K 5.2 5.0 5.4 6.0*    113* 111 114*   CO2 21 22 23 21   BUN 73* 72* 71* 68*   CREA 2.91* 2.81* 2.70* 2.69*   CA 8.3*  8.3* 8.4* 8.7 8.6   AGAP 8 6 7 7   BUCR 25* 26* 26* 25*   * 114  --  116   TP 5.8* 5.8*  --  6.5   ALB 2.5* 2.5*  --  2.8*   GLOB 3.3 3.3  --  3.7   AGRAT 0.8 0.8  --  0.8      CBC w/Diff Recent Labs     02/14/20 0222 02/13/20 0445 02/12/20 1928   WBC 6.0 7.5 10.6   RBC 3.59* 3.50* 3.84*   HGB 8.9* 8.7* 9.6*   HCT 29.2* 29.1* 31.8*    284 326   GRANS 53 51 73   LYMPH 32 33 18*   EOS 6* 5 3      Cardiac Enzymes Recent Labs     02/13/20  1150 02/13/20  0445   CPK 62 38   CKND1 2.3 CALCULATION NOT PERFORMED WHEN RESULT IS BELOW LINEAR LIMIT      Coagulation No results for input(s): PTP, INR, APTT, INREXT, INREXT in the last 72 hours.     Lipid Panel Lab Results   Component Value Date/Time    Cholesterol, total 146 05/28/2015 02:58 AM    HDL Cholesterol 64 (H) 05/28/2015 02:58 AM    LDL, calculated 66 05/28/2015 02:58 AM    VLDL, calculated 16 05/28/2015 02:58 AM    Triglyceride 80 05/28/2015 02:58 AM    CHOL/HDL Ratio 2.3 05/28/2015 02:58 AM      BNP No results for input(s): BNPP in the last 72 hours.    Liver Enzymes Recent Labs     02/14/20  0222   TP 5.8*   ALB 2.5*   *   SGOT 13      Thyroid Studies Lab Results   Component Value Date/Time    TSH 0.56 01/23/2019 01:33 AM        Procedures/imaging: see electronic medical records for all procedures/Xrays and details which were not copied into this note but were reviewed prior to creation of Rama Phoenix, MD

## 2020-02-14 NOTE — PROGRESS NOTES
Problem: Diabetes Self-Management  Goal: *Disease process and treatment process  Description  Define diabetes and identify own type of diabetes; list 3 options for treating diabetes. Outcome: Progressing Towards Goal  Goal: *Incorporating physical activity into lifestyle  Description  State effect of exercise on blood glucose levels. Outcome: Progressing Towards Goal  Goal: *Monitoring blood glucose, interpreting and using results  Description  Identify recommended blood glucose targets  and personal targets. Outcome: Progressing Towards Goal     Problem: Management of Known or Suspected C. difficile  Goal: Minimize complications of C.difficile infection and prevent spread of infection  Outcome: Progressing Towards Goal     Problem: Falls - Risk of  Goal: *Absence of Falls  Description  Document Ramirez Ashby Fall Risk and appropriate interventions in the flowsheet. Outcome: Progressing Towards Goal  Note:   No falls during shift  Fall Risk Interventions:  Mobility Interventions: Communicate number of staff needed for ambulation/transfer         Medication Interventions: Patient to call before getting OOB, Evaluate medications/consider consulting pharmacy         History of Falls Interventions:  Investigate reason for fall

## 2020-02-14 NOTE — PROGRESS NOTES
0725 Bedside and Verbal shift change report given to BERT Serrato (oncoming nurse) by Tristin Duke, ASPEN (offgoing nurse). Report included the following information SBAR, Kardex, Intake/Output, and MAR. Pt in bed, call light in reach. 1150 Pt progressed to very insulin resistant scale. 1207 Glucose 206, 6 units insulin given. 1313 PIV placed in Right hand, 22g.    1714 Glucose 228, 6 units given. 1945 Bedside and Verbal shift change report given to Bereket David RN (oncoming nurse) by Rendall Curling. Jose Ribeiro, ASPEN (offgoing nurse). Report included the following information SBAR, Kardex, Intake/Output, and MAR. Pt in bed, call light in reach.

## 2020-02-15 LAB
ALBUMIN SERPL-MCNC: 2.6 G/DL (ref 3.4–5)
ALBUMIN/GLOB SERPL: 0.7 {RATIO} (ref 0.8–1.7)
ALP SERPL-CCNC: 122 U/L (ref 45–117)
ALT SERPL-CCNC: 19 U/L (ref 13–56)
ANION GAP SERPL CALC-SCNC: 8 MMOL/L (ref 3–18)
AST SERPL-CCNC: 9 U/L (ref 10–38)
BASOPHILS # BLD: 0.1 K/UL (ref 0–0.1)
BASOPHILS NFR BLD: 1 % (ref 0–2)
BILIRUB SERPL-MCNC: 0.2 MG/DL (ref 0.2–1)
BUN SERPL-MCNC: 69 MG/DL (ref 7–18)
BUN/CREAT SERPL: 29 (ref 12–20)
CALCIUM SERPL-MCNC: 8.6 MG/DL (ref 8.5–10.1)
CHLORIDE SERPL-SCNC: 112 MMOL/L (ref 100–111)
CK MB CFR SERPL CALC: 1.9 % (ref 0–4)
CK MB SERPL-MCNC: 1 NG/ML (ref 5–25)
CK SERPL-CCNC: 52 U/L (ref 26–192)
CO2 SERPL-SCNC: 20 MMOL/L (ref 21–32)
CREAT SERPL-MCNC: 2.38 MG/DL (ref 0.6–1.3)
DIFFERENTIAL METHOD BLD: ABNORMAL
EOSINOPHIL # BLD: 0.5 K/UL (ref 0–0.4)
EOSINOPHIL NFR BLD: 8 % (ref 0–5)
ERYTHROCYTE [DISTWIDTH] IN BLOOD BY AUTOMATED COUNT: 14.9 % (ref 11.6–14.5)
GLOBULIN SER CALC-MCNC: 3.5 G/DL (ref 2–4)
GLUCOSE BLD STRIP.AUTO-MCNC: 168 MG/DL (ref 70–110)
GLUCOSE BLD STRIP.AUTO-MCNC: 183 MG/DL (ref 70–110)
GLUCOSE BLD STRIP.AUTO-MCNC: 228 MG/DL (ref 70–110)
GLUCOSE BLD STRIP.AUTO-MCNC: 297 MG/DL (ref 70–110)
GLUCOSE SERPL-MCNC: 153 MG/DL (ref 74–99)
HCT VFR BLD AUTO: 30.3 % (ref 35–45)
HGB BLD-MCNC: 9.4 G/DL (ref 12–16)
LYMPHOCYTES # BLD: 1.4 K/UL (ref 0.9–3.6)
LYMPHOCYTES NFR BLD: 19 % (ref 21–52)
MAGNESIUM SERPL-MCNC: 2.5 MG/DL (ref 1.6–2.6)
MCH RBC QN AUTO: 24.9 PG (ref 24–34)
MCHC RBC AUTO-ENTMCNC: 31 G/DL (ref 31–37)
MCV RBC AUTO: 80.2 FL (ref 74–97)
MONOCYTES # BLD: 0.6 K/UL (ref 0.05–1.2)
MONOCYTES NFR BLD: 9 % (ref 3–10)
NEUTS SEG # BLD: 4.6 K/UL (ref 1.8–8)
NEUTS SEG NFR BLD: 63 % (ref 40–73)
PHOSPHATE SERPL-MCNC: 4.8 MG/DL (ref 2.5–4.9)
PLATELET # BLD AUTO: 295 K/UL (ref 135–420)
PMV BLD AUTO: 9.5 FL (ref 9.2–11.8)
POTASSIUM SERPL-SCNC: 4.8 MMOL/L (ref 3.5–5.5)
PROT SERPL-MCNC: 6.1 G/DL (ref 6.4–8.2)
RBC # BLD AUTO: 3.78 M/UL (ref 4.2–5.3)
SODIUM SERPL-SCNC: 140 MMOL/L (ref 136–145)
TROPONIN I SERPL-MCNC: <0.02 NG/ML (ref 0–0.04)
WBC # BLD AUTO: 7.2 K/UL (ref 4.6–13.2)

## 2020-02-15 PROCEDURE — 74011250637 HC RX REV CODE- 250/637: Performed by: INTERNAL MEDICINE

## 2020-02-15 PROCEDURE — 80053 COMPREHEN METABOLIC PANEL: CPT

## 2020-02-15 PROCEDURE — 36415 COLL VENOUS BLD VENIPUNCTURE: CPT

## 2020-02-15 PROCEDURE — 74011636637 HC RX REV CODE- 636/637: Performed by: INTERNAL MEDICINE

## 2020-02-15 PROCEDURE — 94660 CPAP INITIATION&MGMT: CPT

## 2020-02-15 PROCEDURE — 74011250637 HC RX REV CODE- 250/637: Performed by: HOSPITALIST

## 2020-02-15 PROCEDURE — 74011250636 HC RX REV CODE- 250/636: Performed by: INTERNAL MEDICINE

## 2020-02-15 PROCEDURE — 74011636637 HC RX REV CODE- 636/637: Performed by: HOSPITALIST

## 2020-02-15 PROCEDURE — 85025 COMPLETE CBC W/AUTO DIFF WBC: CPT

## 2020-02-15 PROCEDURE — 84100 ASSAY OF PHOSPHORUS: CPT

## 2020-02-15 PROCEDURE — 74011250636 HC RX REV CODE- 250/636: Performed by: HOSPITALIST

## 2020-02-15 PROCEDURE — 83735 ASSAY OF MAGNESIUM: CPT

## 2020-02-15 PROCEDURE — 82550 ASSAY OF CK (CPK): CPT

## 2020-02-15 PROCEDURE — 82962 GLUCOSE BLOOD TEST: CPT

## 2020-02-15 PROCEDURE — P9047 ALBUMIN (HUMAN), 25%, 50ML: HCPCS | Performed by: HOSPITALIST

## 2020-02-15 PROCEDURE — 65660000000 HC RM CCU STEPDOWN

## 2020-02-15 RX ORDER — ADHESIVE BANDAGE
30 BANDAGE TOPICAL DAILY PRN
Status: DISCONTINUED | OUTPATIENT
Start: 2020-02-15 | End: 2020-02-24 | Stop reason: HOSPADM

## 2020-02-15 RX ORDER — FUROSEMIDE 20 MG/1
20 TABLET ORAL DAILY
Status: DISCONTINUED | OUTPATIENT
Start: 2020-02-15 | End: 2020-02-17

## 2020-02-15 RX ORDER — ALBUMIN HUMAN 250 G/1000ML
25 SOLUTION INTRAVENOUS EVERY 6 HOURS
Status: COMPLETED | OUTPATIENT
Start: 2020-02-15 | End: 2020-02-15

## 2020-02-15 RX ORDER — POLYETHYLENE GLYCOL 3350 17 G/17G
17 POWDER, FOR SOLUTION ORAL 2 TIMES DAILY
Status: DISCONTINUED | OUTPATIENT
Start: 2020-02-15 | End: 2020-02-24 | Stop reason: HOSPADM

## 2020-02-15 RX ORDER — DOCUSATE SODIUM 100 MG/1
100 CAPSULE, LIQUID FILLED ORAL 2 TIMES DAILY
Status: DISCONTINUED | OUTPATIENT
Start: 2020-02-15 | End: 2020-02-24 | Stop reason: HOSPADM

## 2020-02-15 RX ORDER — ALBUMIN HUMAN 250 G/1000ML
25 SOLUTION INTRAVENOUS EVERY 6 HOURS
Status: DISCONTINUED | OUTPATIENT
Start: 2020-02-15 | End: 2020-02-15

## 2020-02-15 RX ADMIN — INSULIN LISPRO 9 UNITS: 100 INJECTION, SOLUTION INTRAVENOUS; SUBCUTANEOUS at 22:02

## 2020-02-15 RX ADMIN — INSULIN LISPRO 3 UNITS: 100 INJECTION, SOLUTION INTRAVENOUS; SUBCUTANEOUS at 06:59

## 2020-02-15 RX ADMIN — GABAPENTIN 300 MG: 300 CAPSULE ORAL at 22:01

## 2020-02-15 RX ADMIN — POLYETHYLENE GLYCOL 3350 17 G: 17 POWDER, FOR SOLUTION ORAL at 22:09

## 2020-02-15 RX ADMIN — ALBUMIN (HUMAN) 25 G: 0.25 INJECTION, SOLUTION INTRAVENOUS at 13:00

## 2020-02-15 RX ADMIN — CALCIUM ACETATE 667 MG: 667 CAPSULE ORAL at 12:00

## 2020-02-15 RX ADMIN — Medication 10 ML: at 13:23

## 2020-02-15 RX ADMIN — HYDRALAZINE HYDROCHLORIDE 100 MG: 50 TABLET, FILM COATED ORAL at 09:36

## 2020-02-15 RX ADMIN — FUROSEMIDE 20 MG: 20 TABLET ORAL at 12:00

## 2020-02-15 RX ADMIN — ACETAMINOPHEN 650 MG: 325 TABLET ORAL at 18:22

## 2020-02-15 RX ADMIN — DOCUSATE SODIUM 100 MG: 100 CAPSULE, LIQUID FILLED ORAL at 13:00

## 2020-02-15 RX ADMIN — CALCIUM ACETATE 667 MG: 667 CAPSULE ORAL at 09:36

## 2020-02-15 RX ADMIN — AMLODIPINE BESYLATE 10 MG: 5 TABLET ORAL at 09:36

## 2020-02-15 RX ADMIN — GABAPENTIN 300 MG: 300 CAPSULE ORAL at 18:23

## 2020-02-15 RX ADMIN — INSULIN LISPRO 3 UNITS: 100 INJECTION, SOLUTION INTRAVENOUS; SUBCUTANEOUS at 11:30

## 2020-02-15 RX ADMIN — POLYMYXIN B SULFATE, BACITRACIN ZINC, NEOMYCIN SULFATE: 5000; 3.5; 4 OINTMENT TOPICAL at 22:06

## 2020-02-15 RX ADMIN — ALBUMIN (HUMAN) 25 G: 0.25 INJECTION, SOLUTION INTRAVENOUS at 18:23

## 2020-02-15 RX ADMIN — POLYMYXIN B SULFATE, BACITRACIN ZINC, NEOMYCIN SULFATE: 5000; 3.5; 4 OINTMENT TOPICAL at 16:00

## 2020-02-15 RX ADMIN — POLYMYXIN B SULFATE, BACITRACIN ZINC, NEOMYCIN SULFATE: 5000; 3.5; 4 OINTMENT TOPICAL at 09:00

## 2020-02-15 RX ADMIN — HEPARIN SODIUM 5000 UNITS: 5000 INJECTION INTRAVENOUS; SUBCUTANEOUS at 22:03

## 2020-02-15 RX ADMIN — HYDRALAZINE HYDROCHLORIDE 100 MG: 50 TABLET, FILM COATED ORAL at 22:01

## 2020-02-15 RX ADMIN — GABAPENTIN 300 MG: 300 CAPSULE ORAL at 09:36

## 2020-02-15 RX ADMIN — HEPARIN SODIUM 5000 UNITS: 5000 INJECTION INTRAVENOUS; SUBCUTANEOUS at 06:59

## 2020-02-15 RX ADMIN — DOCUSATE SODIUM 100 MG: 100 CAPSULE, LIQUID FILLED ORAL at 22:01

## 2020-02-15 RX ADMIN — ALBUMIN (HUMAN) 25 G: 0.25 INJECTION, SOLUTION INTRAVENOUS at 23:39

## 2020-02-15 RX ADMIN — ATORVASTATIN CALCIUM 40 MG: 20 TABLET, FILM COATED ORAL at 09:36

## 2020-02-15 RX ADMIN — HYDRALAZINE HYDROCHLORIDE 100 MG: 50 TABLET, FILM COATED ORAL at 18:22

## 2020-02-15 RX ADMIN — Medication 10 ML: at 07:00

## 2020-02-15 RX ADMIN — DULOXETINE HYDROCHLORIDE 60 MG: 60 CAPSULE, DELAYED RELEASE ORAL at 09:36

## 2020-02-15 RX ADMIN — HEPARIN SODIUM 5000 UNITS: 5000 INJECTION INTRAVENOUS; SUBCUTANEOUS at 14:00

## 2020-02-15 RX ADMIN — ACETAMINOPHEN 650 MG: 325 TABLET ORAL at 04:50

## 2020-02-15 RX ADMIN — CALCIUM ACETATE 667 MG: 667 CAPSULE ORAL at 18:23

## 2020-02-15 RX ADMIN — INSULIN GLARGINE 15 UNITS: 100 INJECTION, SOLUTION SUBCUTANEOUS at 22:02

## 2020-02-15 RX ADMIN — IRON SUCROSE 200 MG: 20 INJECTION, SOLUTION INTRAVENOUS at 12:00

## 2020-02-15 RX ADMIN — ASPIRIN 81 MG 81 MG: 81 TABLET ORAL at 09:36

## 2020-02-15 RX ADMIN — Medication 10 ML: at 22:10

## 2020-02-15 RX ADMIN — INSULIN LISPRO 6 UNITS: 100 INJECTION, SOLUTION INTRAVENOUS; SUBCUTANEOUS at 18:22

## 2020-02-15 NOTE — PROGRESS NOTES
[id number]. Assumed care of patient. Patient in bed, alert and oriented x 4. Pt c/o chest pain for the last 3 days. On and off pain, feels like \"pulled muscle', associated with back pain. Rates it at 5 out of 10. CP is worse with coughing and movements. No SOB, sweating, N/V, headache. White board updated, bed wheels locked, call bell in reach. 2025. Dr. Jorge Ragsdale notified, will order cardiac enzymes in am.     [id number]. Patient resting quietly in bed with eyes closed. Cpap on. Respirations regular, no acute distress noted. Call bell within reach. 0700. Bedside and Verbal shift change report given to [facility] [address] (oncoming nurse) by Debra Alonzo RN (offgoing nurse). Report included the following information SBAR, Intake/Output, MAR and Recent Results.

## 2020-02-15 NOTE — PROGRESS NOTES
Hospitalist Progress Note-critical care note     Patient: Kaylan Moyer MRN: [id number]  CSN: [id number]    YOB: 1965  Age: 53 y.o. Sex: female    DOA: 2/12/2020 LOS:  LOS: 3 days            Chief complaint: chest pain, acute on chronic renal failure, hyperkalemia, андрей morbid obesity     Assessment/Plan         Hospital Problems  Date Reviewed: 12/11/2019          Codes Class Noted POA    Chest pain ICD-10-CM: R07.9  ICD-9-CM: 786.50  2/13/2020 Unknown        Acute renal failure superimposed on stage 3 chronic kidney disease ([facility] ICD-10-CM: N17.9, N18.3  ICD-9-CM: 584.9, 585.3  2/13/2020 Unknown        * (Principal) Hyperkalemia ICD-10-CM: E87.5  ICD-9-CM: 276.7  2/12/2020 Unknown        АНДРЕЙ (obstructive sleep apnea) ICD-10-CM: G47.33  ICD-9-CM: 327.23  12/11/2019 Yes        Morbid obesity ([facility] ICD-10-CM: E66.01  ICD-9-CM: 278.01  Unknown Yes        Type II or unspecified type diabetes mellitus with renal manifestations, uncontrolled(250.42) ([facility] ICD-10-CM: E11.29, E11.65  ICD-9-CM: 250.42  9/4/2014 Yes        HTN (hypertension) ICD-10-CM: I10  ICD-9-CM: 401.9  8/16/2014 Yes              Hyperkalemia  Resolved      acute on chronic renal failure 3  Continue improving,   Continue avoid nasids and iv contrast   Low dose lasix is restarted per renal   Clear to be dc       Chest Pain   No pain now.  ce wnl   recent stress test in December in 2019  Case discussed with Kristy oconnell cardiac   V/q scan no PE        Diarrhea/constipation   No episodes noted since admission   Reported constipation now   Will give mom and mirlax , mom         DM   Start Lantus and sliding scale hold metformin      Sleep Apnea   cpap at night        Pulmonary HTN   VQ scan obtained in the emergency room low probability PE   Negative for dvt     htn  Continue   norvasc and hydralyzine, continue hold metoprolol due to jono     Subjective : I have constipation, my leg swelling, I need lasix ,   She was very emotional about being d.c today. She agrees to go home tomorrow     Will have bowel regimen, give some albumin to help edema, albumin 2.6     Disposition :tomorrow   Review of systems:    General: No fevers or chills. Cardiovascular: no chest pain, no  pressure. No palpitations. Pulmonary: No shortness of breath. Gastrointestinal: No nausea, vomiting. Constipation     Vital signs/Intake and Output:  Visit Vitals  /80   Pulse 83   Temp 97.5 °F (36.4 °C)   Resp 20   Ht 5' 2\" (1.575 m)   Wt 150.4 kg (331 lb 9.2 oz)   SpO2 98%   Breastfeeding No   BMI 60.65 kg/m²     Current Shift:  02/15 0701 - 02/15 1900  In: 100 [I.V.:100]  Out: -   Last three shifts:  02/13 1901 - 02/15 0700  In: 250 [P.O.:250]  Out: -     Physical Exam:  General: WD, WN. Alert, cooperative, no acute distress    HEENT: NC, Atraumatic. PERRLA, anicteric sclerae. Lungs: CTA Bilaterally. No Wheezing/Rhonchi/Rales. Heart:  Regular  rhythm,  No murmur, No Rubs, No Gallops  Abdomen: Soft, Non distended, Non tender.  +Bowel sounds,   Extremities: No c/c mild edema   Psych:   Not anxious or agitated. teaful   Neurologic:  No acute neurological deficit. Labs: Results:       Chemistry Recent Labs     02/15/20  0345 02/14/20  0222 02/13/20  0445   * 205* 58*    140 141   K 4.8 5.2 5.0   * 111 113*   CO2 20* 21 22   BUN 69* 73* 72*   CREA 2.38* 2.91* 2.81*   CA 8.6 8.3*  8.3* 8.4*   AGAP 8 8 6   BUCR 29* 25* 26*   * 123* 114   TP 6.1* 5.8* 5.8*   ALB 2.6* 2.5* 2.5*   GLOB 3.5 3.3 3.3   AGRAT 0.7* 0.8 0.8      CBC w/Diff Recent Labs     02/15/20  0345 02/14/20  0222 02/13/20  0445   WBC 7.2 6.0 7.5   RBC 3.78* 3.59* 3.50*   HGB 9.4* 8.9* 8.7*   HCT 30.3* 29.2* 29.1*    280 284   GRANS 63 53 51   LYMPH 19* 32 33   EOS 8* 6* 5      Cardiac Enzymes Recent Labs     02/15/20  0345 02/13/20  1150   CPK 52 62   CKND1 1.9 2.3      Coagulation No results for input(s): PTP, INR, APTT, INREXT, INREXT in the last 72 hours.     Lipid Panel Lab Results   Component Value Date/Time    Cholesterol, total 146 05/28/2015 02:58 AM    HDL Cholesterol 64 (H) 05/28/2015 02:58 AM    LDL, calculated 66 05/28/2015 02:58 AM    VLDL, calculated 16 05/28/2015 02:58 AM    Triglyceride 80 05/28/2015 02:58 AM    CHOL/HDL Ratio 2.3 05/28/2015 02:58 AM      BNP No results for input(s): BNPP in the last 72 hours.    Liver Enzymes Recent Labs     02/15/20  0345   TP 6.1*   ALB 2.6*   *   SGOT 9*      Thyroid Studies Lab Results   Component Value Date/Time    TSH 0.56 01/23/2019 01:33 AM        Procedures/imaging: see electronic medical records for all procedures/Xrays and details which were not copied into this note but were reviewed prior to creation of Elisabeth Donaldson MD

## 2020-02-15 NOTE — PROGRESS NOTES
0730: Bedside and Verbal shift change report given to [address] [place] [address] Road (oncoming nurse) by Taylor Duran RN (offgoing nurse). Report included the following information SBAR, Kardex and Cardiac Rhythm sinus jono . Progress noted with repeat back purpose of fld restrictions    1000: MD Geovany Solis informs this nurse of pt tx plan for the day and nephrologist consulted, no further questions noted, continue to monitor pt  Annia Patel RN    1110: MD Bessie Goodman in to assess and discuss tx with pt  Annia Patel RN    [id number]: MD Bsesie Goodman informs this nurse of further tx and n/o's for pt after visit   Annia Patel RN    1900: Bedside and Verbal shift change report given to Anton Delaney (oncoming nurse) by Ismael Irvin RN (offgoing nurse). Report included the following information Kardex and Cardiac Rhythm NSR.

## 2020-02-15 NOTE — PROGRESS NOTES
Problem: Diabetes Self-Management  Goal: *Disease process and treatment process  Description  Define diabetes and identify own type of diabetes; list 3 options for treating diabetes. Outcome: Progressing Towards Goal  Goal: *Incorporating nutritional management into lifestyle  Description  Describe effect of type, amount and timing of food on blood glucose; list 3 methods for planning meals. Outcome: Progressing Towards Goal  Goal: *Incorporating physical activity into lifestyle  Description  State effect of exercise on blood glucose levels. Outcome: Progressing Towards Goal  Goal: *Developing strategies to promote health/change behavior  Description  Define the ABC's of diabetes; identify appropriate screenings, schedule and personal plan for screenings. Outcome: Progressing Towards Goal  Goal: *Using medications safely  Description  State effect of diabetes medications on diabetes; name diabetes medication taking, action and side effects. Outcome: Progressing Towards Goal  Goal: *Monitoring blood glucose, interpreting and using results  Description  Identify recommended blood glucose targets  and personal targets. Outcome: Progressing Towards Goal  Goal: *Prevention, detection, treatment of acute complications  Description  List symptoms of hyper- and hypoglycemia; describe how to treat low blood sugar and actions for lowering  high blood glucose level. Outcome: Progressing Towards Goal  Goal: *Prevention, detection and treatment of chronic complications  Description  Define the natural course of diabetes and describe the relationship of blood glucose levels to long term complications of diabetes.   Outcome: Progressing Towards Goal  Goal: *Developing strategies to address psychosocial issues  Description  Describe feelings about living with diabetes; identify support needed and support network  Outcome: Progressing Towards Goal  Goal: *Insulin pump training  Outcome: Progressing Towards Goal  Goal: *Sick day guidelines  Outcome: Progressing Towards Goal  Goal: *Patient Specific Goal (EDIT GOAL, INSERT TEXT)  Outcome: Progressing Towards Goal     Problem: Patient Education: Go to Patient Education Activity  Goal: Patient/Family Education  Outcome: Progressing Towards Goal     Problem: Risk for Spread of Infection  Goal: Prevent transmission of infectious organism to others  Description  Prevent the transmission of infectious organisms to other patients, staff members, and visitors. Outcome: Progressing Towards Goal     Problem: Patient Education:  Go to Education Activity  Goal: Patient/Family Education  Outcome: Progressing Towards Goal     Problem: Falls - Risk of  Goal: *Absence of Falls  Description  Document Edmund Lopez Fall Risk and appropriate interventions in the flowsheet. Outcome: Progressing Towards Goal  Note: Fall Risk Interventions:  Mobility Interventions: Patient to call before getting OOB, Utilize walker, cane, or other assistive device         Medication Interventions: Patient to call before getting OOB, Teach patient to arise slowly         History of Falls Interventions: Investigate reason for fall, Consult care management for discharge planning         Problem: Patient Education: Go to Patient Education Activity  Goal: Patient/Family Education  Outcome: Progressing Towards Goal     Problem: Pain  Goal: *Control of Pain  Outcome: Progressing Towards Goal  Goal: *PALLIATIVE CARE:  Alleviation of Pain  Outcome: Progressing Towards Goal     Problem: Patient Education: Go to Patient Education Activity  Goal: Patient/Family Education  Outcome: Progressing Towards Goal     Problem: Pressure Injury - Risk of  Goal: *Prevention of pressure injury  Description  Document Elian Scale and appropriate interventions in the flowsheet.   Outcome: Progressing Towards Goal  Note: Pressure Injury Interventions:  Sensory Interventions: Pressure redistribution bed/mattress (bed type), Minimize linen layers, Keep linens dry and wrinkle-free    Moisture Interventions: Absorbent underpads, Maintain skin hydration (lotion/cream), Minimize layers, Moisture barrier, Apply protective barrier, creams and emollients    Activity Interventions: Pressure redistribution bed/mattress(bed type)    Mobility Interventions: Pressure redistribution bed/mattress (bed type)    Nutrition Interventions: Document food/fluid/supplement intake    Friction and Shear Interventions: Minimize layers                Problem: Patient Education: Go to Patient Education Activity  Goal: Patient/Family Education  Outcome: Progressing Towards Goal

## 2020-02-15 NOTE — PROGRESS NOTES
Nephrology Progress Note      HPI:   Cody Jeong is 52 yo female with PMHx of HTN, uncontrolled DM, CKD, D-CMP and anemia who presented with bouts of diarrhea and nausea. She also had complaints of SOB and CP. On initial eval in the ED her HD was in the 50's otherwise stable VS. Labs showed K of 6.0 and Cr 2.69. Home med included Losartan. Pt FU with Dr Raj Mccloud for her CKD and recent baseline Cr in Dec was ~1.6. VQ scan, CxR, CT lower chest/abd/pelvis, and LE doppler US were all unremarkable. Echo showed EF 63%, mild diastolic dysfunction and mild- to mod Pulm HTN. Pt was treated medically for high K and received a dose of Lasix. Today K is down tp normal. But Cr slightly up to 2.8. No diarrhea, N/V. No SOB at rest. No CP. Has LE edema.     -Pt w/o new complaints today. With good po intake. No CP/SOB/N/V/D. Good UOP. No change in chronic LE edema. Impression:   -Acute on CKD stage 3 vs worsening CKD. Cr improved, 2.90->2.4 today, from recent baseline of 1.6 in Dec.   -Hyperkalemia, resolved on medical management   -HTN, controlled  -Proteinuria likely sec to DMN  -Hyperphosphatemia, started on Phoslo tid  -DM, uncontrolled.  HbA1C 9.5%  -Anemia, iron def  -D-CMP  -Mod Pulm HTN w/ Rt side CHF   -Diarrhea: resolving  -АНДРЕЙ  -Obese  - SHPT      Plan:   -Encourage PO fluid hydration  - restart low dose lasix 20mg  -Continue to hold Losartan  -Renal diet  -dose venofer  -Needs better BS control  -ok to d/c from renal standpoint, need to f/u with Dr Mehran Ramirez in one week  -d/w Dr David Metzger and staff, will cont follow      Medications:     Current Facility-Administered Medications:     gabapentin (NEURONTIN) capsule 300 mg, 300 mg, Oral, TID, Ciro Albright MD, 300 mg at 02/15/20 0936    amLODIPine (NORVASC) tablet 10 mg, 10 mg, Oral, DAILY, Ciro Albright MD, 10 mg at 02/15/20 0450    hydrALAZINE (APRESOLINE) tablet 100 mg, 100 mg, Oral, TID, Ciro Albright MD, 100 mg at 02/15/20 0936    atorvastatin (LIPITOR) tablet 40 mg, 40 mg, Oral, DAILY, Harsh Fallon MD, 40 mg at 02/15/20 0936    albuterol-ipratropium (DUO-NEB) 2.5 MG-0.5 MG/3 ML, 3 mL, Nebulization, Q4H PRN, Harsh Fallon MD    aspirin chewable tablet 81 mg, 81 mg, Oral, DAILY, Harsh Fallon MD, 81 mg at 02/15/20 0936    DULoxetine (CYMBALTA) capsule 60 mg, 60 mg, Oral, DAILY, Harsh Fallon MD, 60 mg at 02/15/20 0936    neomycin-bacitracin-polymyxin (NEOSPORIN) ointment, , Topical, TID, Harsh Fallon MD    heparin (porcine) injection 5,000 Units, 5,000 Units, SubCUTAneous, Q8H, Ann Marie Zuluaga MD, 5,000 Units at 02/15/20 0659    insulin glargine (LANTUS) injection 15 Units, 15 Units, SubCUTAneous, QHS, Ann Marie Zuluaga MD, 15 Units at 02/14/20 2230    insulin lispro (HUMALOG) injection, , SubCUTAneous, AC&HS, Harsh Fallon MD, 3 Units at 02/15/20 0659    dextrose 10% infusion 125-250 mL, 125-250 mL, IntraVENous, PRN, Ann Marie Zuluaga MD    calcium acetate(phosphat bind) (PHOSLO) capsule 667 mg, 1 Cap, Oral, TID WITH MEALS, Gia Narvaez MD, 667 mg at 02/15/20 0936    sodium chloride (NS) flush 5-40 mL, 5-40 mL, IntraVENous, Q8H, Harsh Fallon MD, 10 mL at 02/15/20 0700    sodium chloride (NS) flush 5-40 mL, 5-40 mL, IntraVENous, PRN, Harsh Fallon MD    dextrose 10% infusion 125-250 mL, 125-250 mL, IntraVENous, PRN, Harsh Fallon MD, Last Rate: 999 mL/hr at 02/13/20 0052, 250 mL at 02/13/20 0052    acetaminophen (TYLENOL) tablet 650 mg, 650 mg, Oral, Q4H PRN, Harsh Fallon MD, 650 mg at 02/15/20 0450    oxyCODONE-acetaminophen (PERCOCET) 5-325 mg per tablet 1 Tab, 1 Tab, Oral, Q4H PRN, Harsh Fallon MD, 1 Tab at 02/14/20 9235      Physical Assessment:     Visit Vitals  /62   Pulse 73   Temp 97.5 °F (36.4 °C)   Resp 20   Ht 5' 2\" (1.575 m)   Wt 150.4 kg (331 lb 9.2 oz)   SpO2 97%   Breastfeeding No   BMI 60.65 kg/m²       Intake/Output Summary (Last 24 hours) at 2/15/2020 1053  Last data filed at 2/15/2020 0659  Gross per 24 hour   Intake 250 ml   Output    Net 250 ml       General Appearance: no pain, no distress  Head: atraumatic  HEENT: no jaundice, moist oral mucosa  Neck: no JVD noted  Chest: LS clear to auscultation bilaterally  Heart: S1/S2, no murmur, gallop or rub, RRR  Adbomen: soft, nontender, BS active   : no flank tenderness, no terrell catheter  Skin: intact, no rash  Extremity: ++ edema, no cyanosis or clubbing  MS: No joint deformity or tenderness  Neuro: conscious, alert, oriented x 3, no focal deficit    Sherrie Coleman MD

## 2020-02-16 ENCOUNTER — APPOINTMENT (OUTPATIENT)
Dept: GENERAL RADIOLOGY | Age: 55
DRG: 194 | End: 2020-02-16
Attending: HOSPITALIST
Payer: COMMERCIAL

## 2020-02-16 ENCOUNTER — APPOINTMENT (OUTPATIENT)
Dept: GENERAL RADIOLOGY | Age: 55
DRG: 194 | End: 2020-02-16
Attending: INTERNAL MEDICINE
Payer: COMMERCIAL

## 2020-02-16 PROBLEM — I50.33 DIASTOLIC CHF, ACUTE ON CHRONIC (HCC): Status: ACTIVE | Noted: 2020-02-16

## 2020-02-16 PROBLEM — J96.01 ACUTE RESPIRATORY FAILURE WITH HYPOXIA (HCC): Status: ACTIVE | Noted: 2020-02-16

## 2020-02-16 LAB
ALBUMIN SERPL-MCNC: 3.4 G/DL (ref 3.4–5)
ALBUMIN/GLOB SERPL: 1.1 {RATIO} (ref 0.8–1.7)
ALP SERPL-CCNC: 109 U/L (ref 45–117)
ALT SERPL-CCNC: 15 U/L (ref 13–56)
ANION GAP SERPL CALC-SCNC: 8 MMOL/L (ref 3–18)
ARTERIAL PATENCY WRIST A: ABNORMAL
ARTERIAL PATENCY WRIST A: YES
AST SERPL-CCNC: 9 U/L (ref 10–38)
BASE DEFICIT BLD-SCNC: 6 MMOL/L
BASE DEFICIT BLD-SCNC: 6 MMOL/L
BASOPHILS # BLD: 0 K/UL (ref 0–0.1)
BASOPHILS NFR BLD: 1 % (ref 0–2)
BDY SITE: ABNORMAL
BDY SITE: ABNORMAL
BILIRUB SERPL-MCNC: 0.3 MG/DL (ref 0.2–1)
BNP SERPL-MCNC: 1737 PG/ML (ref 0–900)
BODY TEMPERATURE: 98.7
BUN SERPL-MCNC: 68 MG/DL (ref 7–18)
BUN/CREAT SERPL: 30 (ref 12–20)
CALCIUM SERPL-MCNC: 8.7 MG/DL (ref 8.5–10.1)
CHLORIDE SERPL-SCNC: 110 MMOL/L (ref 100–111)
CK MB CFR SERPL CALC: NORMAL % (ref 0–4)
CK MB SERPL-MCNC: <1 NG/ML (ref 5–25)
CK SERPL-CCNC: 49 U/L (ref 26–192)
CO2 SERPL-SCNC: 21 MMOL/L (ref 21–32)
CREAT SERPL-MCNC: 2.25 MG/DL (ref 0.6–1.3)
DIFFERENTIAL METHOD BLD: ABNORMAL
EOSINOPHIL # BLD: 0.4 K/UL (ref 0–0.4)
EOSINOPHIL NFR BLD: 7 % (ref 0–5)
ERYTHROCYTE [DISTWIDTH] IN BLOOD BY AUTOMATED COUNT: 15.1 % (ref 11.6–14.5)
GAS FLOW.O2 O2 DELIVERY SYS: ABNORMAL L/MIN
GAS FLOW.O2 O2 DELIVERY SYS: ABNORMAL L/MIN
GAS FLOW.O2 SETTING OXYMISER: 4 L/M
GLOBULIN SER CALC-MCNC: 3.1 G/DL (ref 2–4)
GLUCOSE BLD STRIP.AUTO-MCNC: 179 MG/DL (ref 70–110)
GLUCOSE BLD STRIP.AUTO-MCNC: 215 MG/DL (ref 70–110)
GLUCOSE BLD STRIP.AUTO-MCNC: 228 MG/DL (ref 70–110)
GLUCOSE BLD STRIP.AUTO-MCNC: 235 MG/DL (ref 70–110)
GLUCOSE BLD STRIP.AUTO-MCNC: 264 MG/DL (ref 70–110)
GLUCOSE SERPL-MCNC: 150 MG/DL (ref 74–99)
HCO3 BLD-SCNC: 20.5 MMOL/L (ref 22–26)
HCO3 BLD-SCNC: 20.7 MMOL/L (ref 22–26)
HCT VFR BLD AUTO: 27.7 % (ref 35–45)
HGB BLD-MCNC: 8.4 G/DL (ref 12–16)
LYMPHOCYTES # BLD: 1.3 K/UL (ref 0.9–3.6)
LYMPHOCYTES NFR BLD: 22 % (ref 21–52)
MAGNESIUM SERPL-MCNC: 2.7 MG/DL (ref 1.6–2.6)
MCH RBC QN AUTO: 24.4 PG (ref 24–34)
MCHC RBC AUTO-ENTMCNC: 30.3 G/DL (ref 31–37)
MCV RBC AUTO: 80.5 FL (ref 74–97)
MONOCYTES # BLD: 0.5 K/UL (ref 0.05–1.2)
MONOCYTES NFR BLD: 9 % (ref 3–10)
NEUTS SEG # BLD: 3.8 K/UL (ref 1.8–8)
NEUTS SEG NFR BLD: 61 % (ref 40–73)
O2/TOTAL GAS SETTING VFR VENT: 0.4 %
O2/TOTAL GAS SETTING VFR VENT: 36 %
PCO2 BLD: 43.4 MMHG (ref 35–45)
PCO2 BLD: 44.6 MMHG (ref 35–45)
PEEP RESPIRATORY: 7 CMH2O
PH BLD: 7.28 [PH] (ref 7.35–7.45)
PH BLD: 7.28 [PH] (ref 7.35–7.45)
PHOSPHATE SERPL-MCNC: 4.9 MG/DL (ref 2.5–4.9)
PIP ISTAT,IPIP: 15
PLATELET # BLD AUTO: 275 K/UL (ref 135–420)
PMV BLD AUTO: 9.5 FL (ref 9.2–11.8)
PO2 BLD: 140 MMHG (ref 80–100)
PO2 BLD: 60 MMHG (ref 80–100)
POTASSIUM SERPL-SCNC: 5.1 MMOL/L (ref 3.5–5.5)
PRESSURE SUPPORT SETTING VENT: 7 CMH2O
PROT SERPL-MCNC: 6.5 G/DL (ref 6.4–8.2)
RBC # BLD AUTO: 3.44 M/UL (ref 4.2–5.3)
SAO2 % BLD: 87 % (ref 92–97)
SAO2 % BLD: 99 % (ref 92–97)
SERVICE CMNT-IMP: 0.4 L/MIN
SERVICE CMNT-IMP: ABNORMAL
SERVICE CMNT-IMP: ABNORMAL
SODIUM SERPL-SCNC: 139 MMOL/L (ref 136–145)
SPECIMEN TYPE: ABNORMAL
SPECIMEN TYPE: ABNORMAL
TOTAL RESP. RATE, ITRR: 24
TOTAL RESP. RATE, ITRR: 25
TROPONIN I SERPL-MCNC: <0.02 NG/ML (ref 0–0.04)
WBC # BLD AUTO: 6 K/UL (ref 4.6–13.2)

## 2020-02-16 PROCEDURE — 65610000006 HC RM INTENSIVE CARE

## 2020-02-16 PROCEDURE — 71045 X-RAY EXAM CHEST 1 VIEW: CPT

## 2020-02-16 PROCEDURE — 74011250636 HC RX REV CODE- 250/636: Performed by: INTERNAL MEDICINE

## 2020-02-16 PROCEDURE — 74011250637 HC RX REV CODE- 250/637: Performed by: INTERNAL MEDICINE

## 2020-02-16 PROCEDURE — 74011636637 HC RX REV CODE- 636/637: Performed by: INTERNAL MEDICINE

## 2020-02-16 PROCEDURE — 83880 ASSAY OF NATRIURETIC PEPTIDE: CPT

## 2020-02-16 PROCEDURE — 84100 ASSAY OF PHOSPHORUS: CPT

## 2020-02-16 PROCEDURE — 77030035694 HC MSK BIPAP FLL FAC PERFMAX PHIL -B

## 2020-02-16 PROCEDURE — 74011636637 HC RX REV CODE- 636/637: Performed by: HOSPITALIST

## 2020-02-16 PROCEDURE — 97161 PT EVAL LOW COMPLEX 20 MIN: CPT

## 2020-02-16 PROCEDURE — 85025 COMPLETE CBC W/AUTO DIFF WBC: CPT

## 2020-02-16 PROCEDURE — 77030027138 HC INCENT SPIROMETER -A

## 2020-02-16 PROCEDURE — 80053 COMPREHEN METABOLIC PANEL: CPT

## 2020-02-16 PROCEDURE — 82550 ASSAY OF CK (CPK): CPT

## 2020-02-16 PROCEDURE — 97116 GAIT TRAINING THERAPY: CPT

## 2020-02-16 PROCEDURE — 36415 COLL VENOUS BLD VENIPUNCTURE: CPT

## 2020-02-16 PROCEDURE — 94660 CPAP INITIATION&MGMT: CPT

## 2020-02-16 PROCEDURE — 82962 GLUCOSE BLOOD TEST: CPT

## 2020-02-16 PROCEDURE — 73660 X-RAY EXAM OF TOE(S): CPT

## 2020-02-16 PROCEDURE — 74011000250 HC RX REV CODE- 250: Performed by: INTERNAL MEDICINE

## 2020-02-16 PROCEDURE — 77010033678 HC OXYGEN DAILY

## 2020-02-16 PROCEDURE — 82803 BLOOD GASES ANY COMBINATION: CPT

## 2020-02-16 PROCEDURE — 73630 X-RAY EXAM OF FOOT: CPT

## 2020-02-16 PROCEDURE — 74011250636 HC RX REV CODE- 250/636: Performed by: HOSPITALIST

## 2020-02-16 PROCEDURE — 97530 THERAPEUTIC ACTIVITIES: CPT

## 2020-02-16 PROCEDURE — 83735 ASSAY OF MAGNESIUM: CPT

## 2020-02-16 PROCEDURE — 36600 WITHDRAWAL OF ARTERIAL BLOOD: CPT

## 2020-02-16 PROCEDURE — 74011250637 HC RX REV CODE- 250/637: Performed by: HOSPITALIST

## 2020-02-16 RX ORDER — FUROSEMIDE 10 MG/ML
40 INJECTION INTRAMUSCULAR; INTRAVENOUS ONCE
Status: COMPLETED | OUTPATIENT
Start: 2020-02-16 | End: 2020-02-16

## 2020-02-16 RX ORDER — FUROSEMIDE 10 MG/ML
40 INJECTION INTRAMUSCULAR; INTRAVENOUS EVERY 12 HOURS
Status: DISCONTINUED | OUTPATIENT
Start: 2020-02-16 | End: 2020-02-17

## 2020-02-16 RX ORDER — FUROSEMIDE 10 MG/ML
40 INJECTION INTRAMUSCULAR; INTRAVENOUS 2 TIMES DAILY
Status: DISCONTINUED | OUTPATIENT
Start: 2020-02-16 | End: 2020-02-18

## 2020-02-16 RX ORDER — HYDRALAZINE HYDROCHLORIDE 20 MG/ML
10 INJECTION INTRAMUSCULAR; INTRAVENOUS
Status: DISCONTINUED | OUTPATIENT
Start: 2020-02-16 | End: 2020-02-24 | Stop reason: HOSPADM

## 2020-02-16 RX ADMIN — INSULIN GLARGINE 15 UNITS: 100 INJECTION, SOLUTION SUBCUTANEOUS at 21:40

## 2020-02-16 RX ADMIN — IRON SUCROSE 200 MG: 20 INJECTION, SOLUTION INTRAVENOUS at 11:27

## 2020-02-16 RX ADMIN — CALCIUM ACETATE 667 MG: 667 CAPSULE ORAL at 12:20

## 2020-02-16 RX ADMIN — Medication 10 ML: at 14:22

## 2020-02-16 RX ADMIN — CALCIUM ACETATE 667 MG: 667 CAPSULE ORAL at 09:20

## 2020-02-16 RX ADMIN — ASPIRIN 81 MG 81 MG: 81 TABLET ORAL at 09:22

## 2020-02-16 RX ADMIN — GABAPENTIN 300 MG: 300 CAPSULE ORAL at 09:20

## 2020-02-16 RX ADMIN — DULOXETINE HYDROCHLORIDE 60 MG: 60 CAPSULE, DELAYED RELEASE ORAL at 09:21

## 2020-02-16 RX ADMIN — HEPARIN SODIUM 5000 UNITS: 5000 INJECTION INTRAVENOUS; SUBCUTANEOUS at 05:37

## 2020-02-16 RX ADMIN — HEPARIN SODIUM 5000 UNITS: 5000 INJECTION INTRAVENOUS; SUBCUTANEOUS at 21:40

## 2020-02-16 RX ADMIN — DOCUSATE SODIUM 100 MG: 100 CAPSULE, LIQUID FILLED ORAL at 09:21

## 2020-02-16 RX ADMIN — INSULIN LISPRO 6 UNITS: 100 INJECTION, SOLUTION INTRAVENOUS; SUBCUTANEOUS at 12:19

## 2020-02-16 RX ADMIN — Medication 10 ML: at 05:37

## 2020-02-16 RX ADMIN — AMLODIPINE BESYLATE 10 MG: 5 TABLET ORAL at 09:19

## 2020-02-16 RX ADMIN — GABAPENTIN 300 MG: 300 CAPSULE ORAL at 16:41

## 2020-02-16 RX ADMIN — SODIUM PHOSPHATE, DIBASIC AND SODIUM PHOSPHATE, MONOBASIC 1 ENEMA: 7; 19 ENEMA RECTAL at 10:58

## 2020-02-16 RX ADMIN — POLYMYXIN B SULFATE, BACITRACIN ZINC, NEOMYCIN SULFATE: 5000; 3.5; 4 OINTMENT TOPICAL at 21:41

## 2020-02-16 RX ADMIN — HYDRALAZINE HYDROCHLORIDE 100 MG: 50 TABLET, FILM COATED ORAL at 16:39

## 2020-02-16 RX ADMIN — FUROSEMIDE 40 MG: 10 INJECTION, SOLUTION INTRAMUSCULAR; INTRAVENOUS at 21:41

## 2020-02-16 RX ADMIN — HEPARIN SODIUM 5000 UNITS: 5000 INJECTION INTRAVENOUS; SUBCUTANEOUS at 14:19

## 2020-02-16 RX ADMIN — HYDRALAZINE HYDROCHLORIDE 100 MG: 50 TABLET, FILM COATED ORAL at 09:19

## 2020-02-16 RX ADMIN — INSULIN LISPRO 3 UNITS: 100 INJECTION, SOLUTION INTRAVENOUS; SUBCUTANEOUS at 09:17

## 2020-02-16 RX ADMIN — FUROSEMIDE 40 MG: 10 INJECTION, SOLUTION INTRAMUSCULAR; INTRAVENOUS at 15:37

## 2020-02-16 RX ADMIN — INSULIN LISPRO 9 UNITS: 100 INJECTION, SOLUTION INTRAVENOUS; SUBCUTANEOUS at 16:38

## 2020-02-16 RX ADMIN — POLYMYXIN B SULFATE, BACITRACIN ZINC, NEOMYCIN SULFATE: 5000; 3.5; 4 OINTMENT TOPICAL at 16:42

## 2020-02-16 RX ADMIN — INSULIN LISPRO 6 UNITS: 100 INJECTION, SOLUTION INTRAVENOUS; SUBCUTANEOUS at 21:40

## 2020-02-16 RX ADMIN — POLYMYXIN B SULFATE, BACITRACIN ZINC, NEOMYCIN SULFATE: 5000; 3.5; 4 OINTMENT TOPICAL at 09:22

## 2020-02-16 RX ADMIN — POLYETHYLENE GLYCOL 3350 17 G: 17 POWDER, FOR SOLUTION ORAL at 09:19

## 2020-02-16 RX ADMIN — FUROSEMIDE 20 MG: 20 TABLET ORAL at 09:20

## 2020-02-16 RX ADMIN — Medication 10 ML: at 21:48

## 2020-02-16 RX ADMIN — ATORVASTATIN CALCIUM 40 MG: 20 TABLET, FILM COATED ORAL at 09:20

## 2020-02-16 NOTE — PROGRESS NOTES
1615 hrs SBAR report received form ANNABEL Amaya Rn    1700 hrs PT arrived from medical unit via bed, nurse at bedside with  belongings. PT alert, oriented x 4 , c/o  sob, sats @ 80's, RR 31 ,mild respiratory distress noted . CHG bath completed. Placed pt on BIPAP. Rt at bedside, Multiples tattoos, R foot great toe nail abrasion. Open to air. Pt resting tolerating BIPAP at this time. Family at bedside. Bedside and Verbal shift change report given to Gila Trimble (oncoming nurse) by Rose Mary Villatoro (offgoing nurse). Report included the following information SBAR, Kardex, ED Summary, Intake/Output, Med Rec Status and Cardiac Rhythm NSR.

## 2020-02-16 NOTE — PROGRESS NOTES
Nephrology Progress Note      HPI:   Doroteo Serrato is 54 yo female with PMHx of HTN, uncontrolled DM, CKD, D-CMP and anemia who presented with bouts of diarrhea and nausea. She also had complaints of SOB and CP. On initial eval in the ED her HD was in the 50's otherwise stable VS. Labs showed K of 6.0 and Cr 2.69. Home med included Losartan. Pt FU with Dr Serjio Pal for her CKD and recent baseline Cr in Dec was ~1.6. VQ scan, CxR, CT lower chest/abd/pelvis, and LE doppler US were all unremarkable. Echo showed EF 36%, mild diastolic dysfunction and mild- to mod Pulm HTN. Pt was treated medically for high K and received a dose of Lasix. Today K is down tp normal. But Cr slightly up to 2.8. No diarrhea, N/V. No SOB at rest. No CP. Has LE edema.     -Pt w/o new complaints today. No CP/SOB/N/V/D. Reports constipation for 3 days. UOP good. Impression:   -Acute on CKD stage 3 vs worsening CKD. Cr improved, 2.90->2.4->2.2 today, from recent baseline of 1.6 in Dec.   -Hyperkalemia, resolved on medical management   -HTN, controlled  -Proteinuria likely sec to DMN  -Hyperphosphatemia, started on Phoslo tid  -DM, uncontrolled.  HbA1C 9.5%  -Anemia, iron def  -D-CMP  -Mod Pulm HTN w/ Rt side CHF   -Diarrhea: resolving  -АНДРЕЙ  -Obese  - SHPT      Plan:   -Encourage PO intake  -cont lasix 20mg  -Continue to hold Losartan  -Renal diet  -cont venofer  -Needs better BS control  -laxative per team  -ok to d/c from renal standpoint, need to f/u with Dr Denny Raymundo in 1-2 weeks  -will cont follow      Medications:     Current Facility-Administered Medications:     sodium phosphate (FLEET'S) enema 1 Enema, 1 Enema, Rectal, NOW, Shell Ragsdale MD    iron sucrose (VENOFER) injection 200 mg, 200 mg, IntraVENous, DAILY, Gabrielle Baca MD, 200 mg at 02/15/20 1200    furosemide (LASIX) tablet 20 mg, 20 mg, Oral, DAILY, Gabrielle Baca MD, 20 mg at 02/16/20 0920    magnesium hydroxide (MILK OF MAGNESIA) 400 mg/5 mL oral suspension 30 mL, 30 mL, Oral, DAILY PRN, Vipul Strickland, Harley Brennan MD    docusate sodium (COLACE) capsule 100 mg, 100 mg, Oral, BID, Dereck Rogers MD, 100 mg at 02/16/20 7484    polyethylene glycol (MIRALAX) packet 17 g, 17 g, Oral, BID, Dereck Rogers MD, 17 g at 02/16/20 0919    gabapentin (NEURONTIN) capsule 300 mg, 300 mg, Oral, TID, Dereck Rogers MD, 300 mg at 02/16/20 0920    amLODIPine (NORVASC) tablet 10 mg, 10 mg, Oral, DAILY, Dereck Rogers MD, 10 mg at 02/16/20 8894    hydrALAZINE (APRESOLINE) tablet 100 mg, 100 mg, Oral, TID, Dereck Rogers MD, 100 mg at 02/16/20 0919    atorvastatin (LIPITOR) tablet 40 mg, 40 mg, Oral, DAILY, Eliazar Fallon MD, 40 mg at 02/16/20 0920    albuterol-ipratropium (DUO-NEB) 2.5 MG-0.5 MG/3 ML, 3 mL, Nebulization, Q4H PRN, Eliazar Fallon MD    aspirin chewable tablet 81 mg, 81 mg, Oral, DAILY, Eliazar Fallon MD, 81 mg at 02/16/20 3360    DULoxetine (CYMBALTA) capsule 60 mg, 60 mg, Oral, DAILY, Eliazar Fallon MD, 60 mg at 02/16/20 2539    neomycin-bacitracin-polymyxin (NEOSPORIN) ointment, , Topical, TID, Eliazar Fallon MD    heparin (porcine) injection 5,000 Units, 5,000 Units, SubCUTAneous, Q8H, Dereck Rogers MD, 5,000 Units at 02/16/20 0537    insulin glargine (LANTUS) injection 15 Units, 15 Units, SubCUTAneous, QHS, Dereck Rogers MD, 15 Units at 02/15/20 2202    insulin lispro (HUMALOG) injection, , SubCUTAneous, AC&HS, Eliazar Fallon MD, 3 Units at 02/16/20 6627    dextrose 10% infusion 125-250 mL, 125-250 mL, IntraVENous, PRN, Dereck Rogers MD    calcium acetate(phosphat bind) (PHOSLO) capsule 667 mg, 1 Cap, Oral, TID WITH MEALS, Gia Narvaez MD, 667 mg at 02/16/20 0920    sodium chloride (NS) flush 5-40 mL, 5-40 mL, IntraVENous, Q8H, Eliazar Fallon MD, 10 mL at 02/16/20 0537    sodium chloride (NS) flush 5-40 mL, 5-40 mL, IntraVENous, PRN, Eliazar Fallon MD    dextrose 10% infusion 125-250 mL, 125-250 mL, IntraVENous, PRN, Eliazar Fallon MD, Last Rate: 999 mL/hr at 02/13/20 0052, 250 mL at 02/13/20 0052    acetaminophen (TYLENOL) tablet 650 mg, 650 mg, Oral, Q4H PRN, Michelle Fallon MD, 650 mg at 02/15/20 1822    oxyCODONE-acetaminophen (PERCOCET) 5-325 mg per tablet 1 Tab, 1 Tab, Oral, Q4H PRN, Michelle Fallon MD, 1 Tab at 02/14/20 1735      Physical Assessment:     Visit Vitals  /65   Pulse 89   Temp 97.4 °F (36.3 °C)   Resp 20   Ht 5' 2\" (1.575 m)   Wt 152.2 kg (335 lb 9.6 oz)   SpO2 95%   Breastfeeding No   BMI 61.38 kg/m²       Intake/Output Summary (Last 24 hours) at 2/16/2020 1017  Last data filed at 2/16/2020 4470  Gross per 24 hour   Intake 240 ml   Output 1250 ml   Net -1010 ml       General Appearance: no pain, no distress  Head: atraumatic  HEENT: no jaundice, moist oral mucosa  Neck: no JVD noted  Chest: LS clear to auscultation bilaterally  Heart: S1/S2, no murmur, gallop or rub, RRR  Adbomen: soft, nontender, BS active   : no flank tenderness, no terrell catheter  Skin: intact, no rash  Extremity: ++ edema, no cyanosis or clubbing  MS: No joint deformity or tenderness  Neuro: conscious, alert, oriented x 3, no focal deficit    Michael Sheikh MD

## 2020-02-16 NOTE — PROGRESS NOTES
Problem: Diabetes Self-Management  Goal: *Disease process and treatment process  Description  Define diabetes and identify own type of diabetes; list 3 options for treating diabetes. Outcome: Progressing Towards Goal  Goal: *Incorporating nutritional management into lifestyle  Description  Describe effect of type, amount and timing of food on blood glucose; list 3 methods for planning meals. Outcome: Progressing Towards Goal  Goal: *Incorporating physical activity into lifestyle  Description  State effect of exercise on blood glucose levels. Outcome: Progressing Towards Goal  Goal: *Developing strategies to promote health/change behavior  Description  Define the ABC's of diabetes; identify appropriate screenings, schedule and personal plan for screenings. Outcome: Progressing Towards Goal  Goal: *Using medications safely  Description  State effect of diabetes medications on diabetes; name diabetes medication taking, action and side effects. Outcome: Progressing Towards Goal  Goal: *Monitoring blood glucose, interpreting and using results  Description  Identify recommended blood glucose targets  and personal targets. Outcome: Progressing Towards Goal  Goal: *Prevention, detection, treatment of acute complications  Description  List symptoms of hyper- and hypoglycemia; describe how to treat low blood sugar and actions for lowering  high blood glucose level. Outcome: Progressing Towards Goal  Goal: *Prevention, detection and treatment of chronic complications  Description  Define the natural course of diabetes and describe the relationship of blood glucose levels to long term complications of diabetes.   Outcome: Progressing Towards Goal  Goal: *Developing strategies to address psychosocial issues  Description  Describe feelings about living with diabetes; identify support needed and support network  Outcome: Progressing Towards Goal  Goal: *Insulin pump training  Outcome: Progressing Towards Goal  Goal: *Sick day guidelines  Outcome: Progressing Towards Goal  Goal: *Patient Specific Goal (EDIT GOAL, INSERT TEXT)  Outcome: Progressing Towards Goal     Problem: Patient Education: Go to Patient Education Activity  Goal: Patient/Family Education  Outcome: Progressing Towards Goal     Problem: Risk for Spread of Infection  Goal: Prevent transmission of infectious organism to others  Description  Prevent the transmission of infectious organisms to other patients, staff members, and visitors. Outcome: Progressing Towards Goal     Problem: Patient Education:  Go to Education Activity  Goal: Patient/Family Education  Outcome: Progressing Towards Goal     Problem: Falls - Risk of  Goal: *Absence of Falls  Description  Document Twila Benson Fall Risk and appropriate interventions in the flowsheet. Outcome: Progressing Towards Goal  Note: Fall Risk Interventions:  Mobility Interventions: Assess mobility with egress test, Communicate number of staff needed for ambulation/transfer         Medication Interventions: Assess postural VS orthostatic hypotension, Evaluate medications/consider consulting pharmacy         History of Falls Interventions: Consult care management for discharge planning         Problem: Patient Education: Go to Patient Education Activity  Goal: Patient/Family Education  Outcome: Progressing Towards Goal     Problem: Pain  Goal: *Control of Pain  Outcome: Progressing Towards Goal  Goal: *PALLIATIVE CARE:  Alleviation of Pain  Outcome: Progressing Towards Goal     Problem: Patient Education: Go to Patient Education Activity  Goal: Patient/Family Education  Outcome: Progressing Towards Goal     Problem: Pressure Injury - Risk of  Goal: *Prevention of pressure injury  Description  Document Elian Scale and appropriate interventions in the flowsheet.   Outcome: Progressing Towards Goal  Note: Pressure Injury Interventions:  Sensory Interventions: Assess changes in LOC    Moisture Interventions: Maintain skin hydration (lotion/cream), Minimize layers    Activity Interventions: Pressure redistribution bed/mattress(bed type)    Mobility Interventions: HOB 30 degrees or less, Pressure redistribution bed/mattress (bed type)    Nutrition Interventions: Document food/fluid/supplement intake    Friction and Shear Interventions: HOB 30 degrees or less, Minimize layers                Problem: Patient Education: Go to Patient Education Activity  Goal: Patient/Family Education  Outcome: Progressing Towards Goal

## 2020-02-16 NOTE — PROGRESS NOTES
Problem: Mobility Impaired (Adult and Pediatric)  Goal: *Acute Goals and Plan of Care (Insert Text)  Description  In 1-7 days pt will be able to perform:  ST. Supine to sit to supine S with HR for meals. 2.  Sit to stand to sit S with RW in prep for ambulation. LT.  Gait:  Ambulate >50ft S with RW for home/community mobility. 2.  Activity tolerance: Tolerate up in chair 1-2 hours for ADLs. 3.  Patient/Family Education:  Patient/family to be independent with HEP for follow-up care and safe discharge. Note:   PHYSICAL THERAPY EVALUATION    Patient: Loc Milton (53 y.o. female)  Date: 2020  Primary Diagnosis: Hyperkalemia [E87.5]        Precautions:   Fall    ASSESSMENT :  Based on the objective data described below, the patient presents with decreased functional mobility and independence in regard to bed mobility, transfers, gt quality and tolerance, generalized strength, impaired balance, poor activity tolerance and safety. Pt rating pain on numerical pain scale 0/10. Pt reports she has anxiety and noted initially during introduction pt unable to complete full sentence due to breathing and anxiety, with continued conversation and at end of session pt was able to complete several sentences w/ dec SOB. Pt reports agoraphobia, uses RW at home. Pt required additional time for all mobility and SBA/S for supine>sit<>stand. Pt required vc for safe techniques. Pt able to participate in gt training w/ RW and CGA w/ labored gt. Pt able to void on commode and perform own hygiene. Pt returned to sitting EOB w/ all needs within reach. Nurse Ilda Escalona aware. [facility]. Patient will benefit from skilled intervention to address the above impairments.   Patients rehabilitation potential is considered to be Fair  Factors which may influence rehabilitation potential include:   []         None noted  [x]         Mental ability/status  [x]         Medical condition  [x] Home/family situation and support systems  [x]         Safety awareness  []         Pain tolerance/management  []         Other:      PLAN :  Recommendations and Planned Interventions:  [x]           Bed Mobility Training             []    Neuromuscular Re-Education  [x]           Transfer Training                   []    Orthotic/Prosthetic Training  [x]           Gait Training                          []    Modalities  [x]           Therapeutic Exercises          []    Edema Management/Control  []           Therapeutic Activities            [x]    Patient and Family Training/Education  []           Other (comment):    Frequency/Duration: Patient will be followed by physical therapy 1-2 times per day/4-7 days per week to address goals. Discharge Recommendations: Home Health  Further Equipment Recommendations for Discharge: N/A     SUBJECTIVE:   Patient stated I am very anxious.     OBJECTIVE DATA SUMMARY:     Past Medical History:   Diagnosis Date    Asthma     Note: As child had asthma    Diabetes mellitus ([facility] [address]     Type 2    Heart palpitations     Hypertension     Ill-defined condition     fibromyalgia     Morbid obesity ([facility] [address]     MRSA infection 8/2014     Past Surgical History:   Procedure Laterality Date    BREAST SURGERY PROCEDURE UNLISTED      cyst removed    HX CATARACT REMOVAL      HX CHOLECYSTECTOMY      HX TUBAL LIGATION       Barriers to Learning/Limitations: yes;  emotional and sensory deficits-vision/hearing/speech  Compensate with: visual, verbal, tactile, kinesthetic cues/model  Prior Level of Function/Home Situation:   Home Situation  Home Environment: Apartment  # Steps to Enter: 0  One/Two Story Residence: One story  Living Alone: Yes  Support Systems: Family member(s), Friends \ neighbors  Patient Expects to be Discharged to[de-identified] Apartment  Current DME Used/Available at Home: Glucometer, Walker, rolling  Critical Behavior:  Neurologic State: Alert  Orientation Level: Oriented X4  Cognition: Appropriate decision making; Appropriate for age attention/concentration; Appropriate safety awareness; Follows commands  Safety/Judgement: Awareness of environment  Psychosocial  Patient Behaviors: Anxious; Cooperative  Purposeful Interaction: Yes  Pt Identified Daily Priority: Clinical issues (comment)  Caritas Process: Establish trust;Teaching/learning; Attend basic human needs  Caring Interventions: Reassure  Reassure: Therapeutic listening; Informing; Acceptance  Therapeutic Modalities: Humor  Skin Condition/Temp: Dry;Warm  Skin Integrity: Abrasion;Scars (comment); Tattoos (comment)  Skin Integumentary  Skin Color: Appropriate for ethnicity  Skin Condition/Temp: Dry;Warm  Skin Integrity: Abrasion;Scars (comment); Tattoos (comment)  Turgor: Non-tenting  Hair Growth: Present  Varicosities: Absent  Strength:    Strength: Generally decreased, functional  Tone & Sensation:   Tone: Normal  Sensation: Intact  Range Of Motion:  AROM: Generally decreased, functional  Functional Mobility:  Bed Mobility:  Supine to Sit: Supervision; Additional time(vc)  Scooting: Supervision  Transfers:  Sit to Stand: Stand-by assistance(vc)  Stand to Sit: Stand-by assistance(vc)  Balance:   Sitting: Intact  Standing: Intact; With support  Ambulation/Gait Training:  Distance (ft): 32 Feet (ft)  Assistive Device: Walker, rolling(2L O2)  Ambulation - Level of Assistance: Stand-by assistance  Gait Abnormalities: Decreased step clearance; Path deviations;Trunk sway increased  Base of Support: Widened  Stance: Weight shift;Time  Speed/Arlette: Slow  Step Length: Left shortened;Right shortened  Swing Pattern: Left asymmetrical;Right asymmetrical  Interventions: Safety awareness training; Tactile cues; Verbal cues; Visual/Demos  Therapeutic Exercises:   Pain:  Pain Scale 1: Numeric (0 - 10)  Pain Intensity 1: 0  Pain Location 1: Back  Pain Orientation 1: Posterior; Upper  Pain Description 1: Aching  Pain Intervention(s) 1: Environmental changes(Unable to give med due to change in pt condition)  Activity Tolerance:   Fair -  Please refer to the flowsheet for vital signs taken during this treatment. After treatment:   [x]         Patient left in no apparent distress sitting up EOB  []         Patient left in no apparent distress in bed  [x]         Call bell left within reach  [x]         Nursing notified  []         Caregiver present  []         Bed alarm activated    COMMUNICATION/EDUCATION:   [x]         Fall prevention education was provided and the patient/caregiver indicated understanding. [x]         Patient/family have participated as able in goal setting and plan of care. [x]         Patient/family agree to work toward stated goals and plan of care. []         Patient understands intent and goals of therapy, but is neutral about his/her participation. []         Patient is unable to participate in goal setting and plan of care.     Thank you for this referral.  Jeanine Verde, PT   Time Calculation: 65 mins     Eval Complexity: History: HIGH Complexity :3+ comorbidities / personal factors will impact the outcome/ POC Exam:MEDIUM Complexity : 3 Standardized tests and measures addressing body structure, function, activity limitation and / or participation in recreation  Presentation: MEDIUM Complexity : Evolving with changing characteristics  Clinical Decision Making:Low Complexity    Overall Complexity:LOW

## 2020-02-16 NOTE — PROGRESS NOTES
Received call from Dr. Cl Patel to assist with Acute hypercapnic hypoxic respiratory failure, sleep apnea, CHF diastolic dysfunction, chronic pain, fibromyalgia. Full consult to follow. Chart reviewed. Patient is a 53 y.o. female with pmhx for HTN, uncontrolled DM, CKD, asthma-COPD and anemia who presented with episodes of diarrhea, nausea, SOB and CP. In ER, labs showed K of 6.0 and Cr 2.69 and CT abd pelvis showed superficial pannus inflammatory changes without intra-abdominal abnormalities. VQ scan low probability for PE in ER. She was admitted to hospital for FARHAN on CKD and seen by cardiology, nephrology. Hyperkalemia resolved soon. Lasix was initially held but was restarted at low dose recently. Hospital course significant for SOB, increased work of breathing and hypoxia requiring 4 Lpm with finding of chf exacerbation - new comapred to admission CXR. Patient is being transferred to ICU on BiPAP for respiratory failure. Vital Signs:    Blood pressure 164/78, pulse 99, temperature 98.3 °F (36.8 °C), resp. rate (!) 34, height 5' 2\" (1.575 m), weight 152.2 kg (335 lb 9.6 oz), SpO2 98 %, not currently breastfeeding. Body mass index is 61.38 kg/m². O2 Device: BIPAP   O2 Flow Rate (L/min): 6 l/min   Temp (24hrs), Av.7 °F (36.5 °C), Min:97.3 °F (36.3 °C), Max:98.3 °F (36.8 °C)       Intake/Output:   Last shift:       07 -  190  In: 140 [P.O.:140]  Out: 975 [Urine:975]  Last 3 shifts:  190 -  0700  In: 350 [P.O.:250;  I.V.:100]  Out: 1000 [Urine:1000]    Intake/Output Summary (Last 24 hours) at 2020 1739  Last data filed at 2020 1648  Gross per 24 hour   Intake 140 ml   Output 1975 ml   Net -1835 ml      Imaging:  [x]I have personally reviewed the patients chest radiographs images and report   20  Results from Hospital Encounter encounter on 20   XR CHEST PORT    Narrative EXAM: XR CHEST PORT    CLINICAL INDICATION/HISTORY: Shortness of breath  -Additional: None    COMPARISON: 2/16/2020    TECHNIQUE: Portable frontal view of the chest    _______________    FINDINGS:    SUPPORT DEVICES: None. HEART AND MEDIASTINUM: Cardiac silhouette appears enlarged, unchanged. Table  mediastinal contours. LUNGS AND PLEURAL SPACES: Persistent central vascular congestion and faint  interstitial opacities. No pneumothorax or large pleural effusion. BONY THORAX AND SOFT TISSUES: Unremarkable.    _______________      Impression IMPRESSION:    Cardiac enlargement with persistent findings of central vascular congestion and  mild interstitial edema. Results from [place] encounter on 02/12/20   CT ABD PELV WO CONT    Narrative EXAM: CT of the abdomen and pelvis    CLINICAL INDICATION/HISTORY:  Abdominal pain and diarrhea. COMPARISON: 07/17/2014. TECHNIQUE: Axial CT imaging of the abdomen and pelvis was performed without  contrast. Multiplanar reformats were generated. One or more dose reduction techniques were used on this CT: automated exposure  control, adjustment of the mAs and/or kVp according to patient size, and  iterative reconstruction techniques. The specific techniques used on this CT  exam have been documented in the patient's electronic medical record. Digital  Imaging and Communications in Medicine (DICOM) format image data are available  to nonaffiliated external healthcare facilities or entities on a secure, media  free, reciprocally searchable basis with patient authorization for at least a  12-month period after this study. _______________    FINDINGS:    LOWER CHEST: The visualized lung bases are clear. Heart size is normal. There is  no pericardial or pleural effusion. LIVER, BILIARY: Liver is normal with no focal parenchymal lesion identified. There is no intra-or extrahepatic biliary ductal dilatation. Gallbladder is  surgically absent.     PANCREAS: Normal.    SPLEEN: Normal.    ADRENALS: Normal.    KIDNEYS: Normal. There is no nephrolithiasis. There is no hydronephrosis,  hydroureter, or other signs of obstructive nephropathy. LYMPH NODES: No enlarged lymph nodes. GASTROINTESTINAL TRACT: There is no evidence of bowel obstruction. While limited  without contrast, there is no definitive wall thickening.] The appendix is  visualized and unremarkable. PELVIC ORGANS: Unremarkable. VASCULATURE: Unremarkable. BONES: No acute or aggressive osseous abnormalities identified. OTHER: There is no free intraperitoneal fluid or free air. SUPERFICIAL SOFT TISSUES: Mild inflammatory changes at the pannus. There is a  fat-containing ventral hernia. Please note that evaluation of the intra-abdominal structures is somewhat  limited due to lack of oral or IV contrast.    _______________      Impression IMPRESSION:    1. No evidence of nephrolithiasis, hydronephrosis, or other signs of obstructive  nephropathy. 2. No evidence of bowel obstruction or acute inflammatory process in the abdomen  or pelvis. 3. Superficial pannus inflammatory changes. Please correlate for clinical signs  of cellulitis. 2/12/20  VQ scan  IMPRESSION:  Low probability of pulmonary embolism.       IMPRESSION:   Patient Active Problem List   Diagnosis Code    Acute respiratory failure with hypoxia ([address] 2' to below L81.36    Diastolic CHF, acute on chronic ([facility]) I50.33    Asthma J45.909    Acute renal failure superimposed on stage 3 chronic kidney disease ([facility]) N17.9, N18.3    HTN (hypertension) I10    Type II or unspecified type diabetes mellitus with renal manifestations, uncontrolled(250.42) ([facility]) E11.29, E11.65    Morbid obesity ([address] E66.01    АНДРЕЙ (obstructive sleep apnea) G47.33 ·      RECOMMENDATIONS:   Continue BiPAP for now, use routine at night and PRN during day- adjust settings per ABG or for goal SPO2> 91%  Supplemental O2 via NC when off of, titrate flow for goal SPO2> 91%  Repeat ABG on BiPAP today and in AM. CXR for follow up in AM  Aspiration prevention bundle, head of the bed at 30' all times  No evidence of pneumonia on CXR  Continue bronchodilators PRN, pulmonary hygiene care  Judicious IV Fluids  Monitor BP  Diuresis as tolerated for renal function  Glycemic control  Stress ulcer prophylaxis  DVT prophylaxis  AM labs. Diet - NPO while on BiPAP  Cardiology consult per hospitalist  Nephrology following patient    Will defer respective systems problem management to primary and other consultant and follow patient in ICU with primary and other medical team  Further recommendations will be based on the patient's response to recommended treatment and results of the investigation ordered.        Adama Almazan MD

## 2020-02-16 NOTE — PROGRESS NOTES
1915 Bedside and Verbal shift change  Received from [address], RN (outgoing nurse), to LETICIA Vann (oncoming)  Pt. Is AOX 4. IV Sl, Pt. denies  pain at this time. Report included the following information SBAR, Kardex, Procedure Summary, Intake/Output, MAR, Recent Lab Results, and  Cardiac Rhythm @ NSR. Will resume care and monitor Pt. Condition. Pt. Educated on call bell when in need of help and assistance. Pt. verbalized understanding. 1955 Pt. Head to toe Assessment Done and documented. 2100 Pt resting in bed CPAP on.    2200  Pt made no complaints. 2300  Pt. Resting comfortably in bed, no sign of distress. 0030  Pt. Made no complaints, denies pain. 0230  Pt. Resting with eyes closed, easily awaken. 0430  Pt. OOB to bathroom with x1 assist.    0545  TRANSFER - OUT REPORT:    Verbal report given to MAMADOU Weiss RN(name) on Dede Mejia  being transferred to (unit) for routine progression of care       Report consisted of patients Situation, Background, Assessment and   Recommendations(SBAR). Information from the following report(s) MAR, Med Rec Status and Cardiac Rhythm nsr was reviewed with the receiving nurse. Lines:   Peripheral IV 02/14/20 Anterior;Right Hand (Active)   Site Assessment Clean, dry, & intact 2/15/2020  7:55 PM   Phlebitis Assessment 0 2/15/2020  7:55 PM   Infiltration Assessment 0 2/15/2020  7:55 PM   Dressing Status Clean, dry, & intact 2/15/2020  7:55 PM   Dressing Type Transparent;Tape 2/15/2020  7:55 PM   Hub Color/Line Status Capped 2/15/2020  4:12 PM   Action Taken Open ports on tubing capped 2/15/2020  4:00 AM   Alcohol Cap Used Yes 2/15/2020  4:12 PM        Opportunity for questions and clarification was provided. Patient transported with:   Monitor  Registered Nurse     0600  Pt. transported per bed, tolerated well.

## 2020-02-16 NOTE — PROGRESS NOTES
Pt uses hospital-issued resmed for HS in auto-titrating cpap mode. Pt tolerates well. No distress noted. Will continue to monitor.

## 2020-02-16 NOTE — PROGRESS NOTES
1742 - Blood pressure 125/44, pulse 85, temperature 97.4 °F (36.3 °C), resp. rate 20, height 5' 2\" (1.575 m), weight 152.2 kg (335 lb 9.6 oz), SpO2 92 %, not currently breastfeeding. Pt reports pain level 8/10 to upper back. 6608 - Pt visibly SOB, dyspneic at rest with HOB elevated on RA. Audible wheezing heard. Pt reports chest pressure and difficulty catching her breath. NSR on tele with rate in 80's at this time. 5415 - Dr. Romel Donovan made aware of assessment changes. Orders received for stat portable chest xr    0641 - Spo2 92% on 2L NC. Bleeding to R great toe, kerlix dressing applied. 4600 - Xr complete    Bedside and Verbal shift change report given to Kelsey Shoemaker RN (oncoming nurse) by Judie Perez RN (offgoing nurse). Report included the following information SBAR, Kardex, Intake/Output, MAR and Recent Results.

## 2020-02-16 NOTE — ROUTINE PROCESS
1961 
Bedside and Verbal shift change report given by PAMELA Malik RN (off going nurse) to Cecilio Ellis RN (on coming nurse). Report included the following information SBAR, Kardex, OR Summary, Intake/Output and MAR. 
 
1520 Pt is saying that she can't breath, vital signs are stable but O2 is 86 and she has dyspnea. Notified Dr. Cristy Ching. 
 
[phone] TRANSFER - OUT REPORT: 
 
Verbal report given to SKYLAR Dockery RN (name) on Liliana Melara  being transferred to ICU (unit) for routine progression of care Report consisted of patients Situation, Background, Assessment and  
Recommendations(SBAR). Information from the following report(s) SBAR, Intake/Output, MAR and Recent Results was reviewed with the receiving nurse. Lines:  
Peripheral IV 02/14/20 Anterior;Right Hand (Active) Site Assessment Clean, dry, & intact 2/16/2020  7:47 AM  
Phlebitis Assessment 0 2/16/2020  7:47 AM  
Infiltration Assessment 0 2/16/2020  7:47 AM  
Dressing Status Clean, dry, & intact 2/16/2020  7:47 AM  
Dressing Type Tape;Transparent 2/16/2020  7:47 AM  
Hub Color/Line Status Blue;Flushed;Patent 2/16/2020  7:47 AM  
Action Taken Open ports on tubing capped 2/16/2020  7:47 AM  
Alcohol Cap Used Yes 2/16/2020  7:47 AM  
   
Peripheral IV 02/16/20 Left;Posterior Hand (Active) Site Assessment Clean, dry, & intact 2/16/2020  2:38 PM  
Phlebitis Assessment 0 2/16/2020  2:38 PM  
Infiltration Assessment 0 2/16/2020  2:38 PM  
Dressing Status Clean, dry, & intact 2/16/2020  2:38 PM  
Dressing Type Transparent 2/16/2020  2:38 PM  
Hub Color/Line Status Flushed;Patent 2/16/2020  2:38 PM  
Action Taken Open ports on tubing capped 2/16/2020  2:38 PM  
Alcohol Cap Used Yes 2/16/2020  2:38 PM  
  
 
Opportunity for questions and clarification was provided. Patient transported with: 
 O2 @ 6 liters Patient-specific medications from Pharmacy Registered Nurse Patient Vitals for the past 12 hrs: 
 Temp Pulse Resp BP SpO2 02/16/20 1715     98 % 02/16/20 1710  99 (!) 34  91 % 02/16/20 1648     90 % 02/16/20 1523 98.3 °F (36.8 °C) 100 24 164/78 (!) 86 % 02/16/20 1445 98.1 °F (36.7 °C) 96 20 149/65 90 % 02/16/20 1146 98 °F (36.7 °C) 91 20 147/56 94 % 02/16/20 0818 97.4 °F (36.3 °C) 89 20 140/65 95 % 02/16/20 0747     94 % 02/16/20 0641     92 % 02/16/20 0633     (!) 84 %

## 2020-02-16 NOTE — PROGRESS NOTES
Problem: Diabetes Self-Management  Goal: *Disease process and treatment process  Description  Define diabetes and identify own type of diabetes; list 3 options for treating diabetes. Outcome: Progressing Towards Goal  Goal: *Incorporating nutritional management into lifestyle  Description  Describe effect of type, amount and timing of food on blood glucose; list 3 methods for planning meals. Outcome: Progressing Towards Goal  Goal: *Incorporating physical activity into lifestyle  Description  State effect of exercise on blood glucose levels. Outcome: Progressing Towards Goal  Goal: *Developing strategies to promote health/change behavior  Description  Define the ABC's of diabetes; identify appropriate screenings, schedule and personal plan for screenings. Outcome: Progressing Towards Goal  Goal: *Using medications safely  Description  State effect of diabetes medications on diabetes; name diabetes medication taking, action and side effects. Outcome: Progressing Towards Goal  Goal: *Monitoring blood glucose, interpreting and using results  Description  Identify recommended blood glucose targets  and personal targets. Outcome: Progressing Towards Goal  Goal: *Prevention, detection, treatment of acute complications  Description  List symptoms of hyper- and hypoglycemia; describe how to treat low blood sugar and actions for lowering  high blood glucose level. Outcome: Progressing Towards Goal  Goal: *Prevention, detection and treatment of chronic complications  Description  Define the natural course of diabetes and describe the relationship of blood glucose levels to long term complications of diabetes.   Outcome: Progressing Towards Goal  Goal: *Developing strategies to address psychosocial issues  Description  Describe feelings about living with diabetes; identify support needed and support network  Outcome: Progressing Towards Goal  Goal: *Insulin pump training  Outcome: Progressing Towards Goal  Goal: *Sick day guidelines  Outcome: Progressing Towards Goal  Goal: *Patient Specific Goal (EDIT GOAL, INSERT TEXT)  Outcome: Progressing Towards Goal     Problem: Patient Education: Go to Patient Education Activity  Goal: Patient/Family Education  Outcome: Progressing Towards Goal     Problem: Pain  Goal: *Control of Pain  Outcome: Progressing Towards Goal  Goal: *PALLIATIVE CARE:  Alleviation of Pain  Outcome: Progressing Towards Goal     Problem: Patient Education: Go to Patient Education Activity  Goal: Patient/Family Education  Outcome: Progressing Towards Goal     Problem: Pressure Injury - Risk of  Goal: *Prevention of pressure injury  Description  Document Elian Scale and appropriate interventions in the flowsheet. Outcome: Progressing Towards Goal  Note: Pressure Injury Interventions:  Sensory Interventions: Assess changes in LOC, Assess need for specialty bed, Avoid rigorous massage over bony prominences, Chair cushion, Check visual cues for pain, Discuss PT/OT consult with provider, Float heels, Keep linens dry and wrinkle-free, Maintain/enhance activity level, Minimize linen layers, Monitor skin under medical devices, Pressure redistribution bed/mattress (bed type), Pad between skin to skin, Turn and reposition approx. every two hours (pillows and wedges if needed)    Moisture Interventions: Assess need for specialty bed, Check for incontinence Q2 hours and as needed, Limit adult briefs, Maintain skin hydration (lotion/cream), Minimize layers, Offer toileting Q_hr, Absorbent underpads    Activity Interventions: Assess need for specialty bed, Chair cushion, Increase time out of bed, Pressure redistribution bed/mattress(bed type), PT/OT evaluation    Mobility Interventions: Assess need for specialty bed, Chair cushion, Float heels, Pressure redistribution bed/mattress (bed type), PT/OT evaluation, Turn and reposition approx.  every two hours(pillow and wedges)    Nutrition Interventions: Document food/fluid/supplement intake, Discuss nutritional consult with provider, Offer support with meals,snacks and hydration    Friction and Shear Interventions: Feet elevated on foot rest, Foam dressings/transparent film/skin sealants, Lift sheet, Lift team/patient mobility team, Minimize layers, Transferring/repositioning devices

## 2020-02-16 NOTE — PROGRESS NOTES
Hospitalist Progress Note-critical care note     Patient: Shaista Bauman MRN: [id number]  CSN: [id number]    YOB: 1965  Age: 53 y.o. Sex: female    DOA: 2/12/2020 LOS:  LOS: 4 days            Chief complaint: chest pain, acute on chronic renal failure, hyperkalemia, андрей morbid obesity     Assessment/Plan         Hospital Problems  Date Reviewed: 12/11/2019          Codes Class Noted POA    Chest pain ICD-10-CM: R07.9  ICD-9-CM: 786.50  2/13/2020 Unknown        Acute renal failure superimposed on stage 3 chronic kidney disease ([facility] ICD-10-CM: N17.9, N18.3  ICD-9-CM: 584.9, 585.3  2/13/2020 Unknown        * (Principal) Hyperkalemia ICD-10-CM: E87.5  ICD-9-CM: 276.7  2/12/2020 Unknown        АНДРЕЙ (obstructive sleep apnea) ICD-10-CM: G47.33  ICD-9-CM: 327.23  12/11/2019 Yes        Morbid obesity ([facility] ICD-10-CM: E66.01  ICD-9-CM: 278.01  Unknown Yes        Type II or unspecified type diabetes mellitus with renal manifestations, uncontrolled(250.42) ([facility] ICD-10-CM: E11.29, E11.65  ICD-9-CM: 250.42  9/4/2014 Yes        HTN (hypertension) ICD-10-CM: I10  ICD-9-CM: 401.9  8/16/2014 Yes              Hyperkalemia  Resolved ,continue to hold arbs      acute on chronic renal failure 3  Continue improving,   Continue avoid nasids and iv contrast   Low dose lasix         Chest Pain   No pain now.  ce wnl   recent stress test in December in 2019  Case discussed with Heather oconnell Po cardiac   V/q scan no PE       constipation   Continue  mirlax , colace, increase physical activity   Enema         DM    Lantus and sliding scale hold metformin      Sleep Apnea   cpap at night        Pulmonary HTN   VQ scan obtained in the emergency room low probability PE   Negative for dvt     htn  Continue   norvasc and hydralyzine, continue hold metoprolol due to jono     Foot injury   Will have xray, continue local care     Subjective : my toe nail got injured- stepped on my foot, I did not have BM , I do not feel good to go home Will have bowel regimen, enema, lasix, foot toe x-ray, pt/ot     Came back to see pt because of sob and hypoxia -chf exacerbation   Stat abg done with hypoxia  On 4 L , cxr stat, lasix 40 mg once, cxr   bipap for now, transfer to icu   case discussed with dr. Paul Sepulveda, recommend to hold bicarb gtt for now, put on bipap   Her hypoxia like due to fluid overloaded   Echo done on 50DR , diastolic dysfunction  cxr Cardiac enlargement with persistent findings of central vascular congestion and  mild interstitial edema. 45 minutes of critical care time spent in the direct evaluation and treatment of this high risk patient. The reason for providing this level of medical care for this critically ill patient was due a critical illness that impaired one or more vital organ systems such that there was a high probability of imminent or life threatening deterioration in the patients condition. This care involved high complexity decision making to assess, manipulate, and support vital system functions, to treat this degreee vital organ system failure and to prevent further life threatening deterioration of the patients condition. Review of systems:    General: No fevers or chills. Cardiovascular: no chest pain, no  pressure. No palpitations. Pulmonary: No shortness of breath. Gastrointestinal: No nausea, vomiting. Constipation     Vital signs/Intake and Output:  Visit Vitals  /56   Pulse 91   Temp 98 °F (36.7 °C)   Resp 20   Ht 5' 2\" (1.575 m)   Wt 152.2 kg (335 lb 9.6 oz)   SpO2 94%   Breastfeeding No   BMI 61.38 kg/m²     Current Shift:  02/16 0701 - 02/16 1900  In: 140 [P.O.:140]  Out: 250 [Urine:250]  Last three shifts:  02/14 1901 - 02/16 0700  In: 350 [P.O.:250; I.V.:100]  Out: 1000 [Urine:1000]    Physical Exam:  General: WD, WN. Alert, cooperative, no acute distress    HEENT: NC, Atraumatic. PERRLA, anicteric sclerae. Lungs: CTA Bilaterally. No Wheezing/Rhonchi/Rales.   Heart:  Regular rhythm,  No murmur, No Rubs, No Gallops  Abdomen: Soft, Non distended, Non tender.  +Bowel sounds,   Extremities: No c/c/e, rt toe wrapped with gauze, some dry blood on rt toe nail   Psych:   Not anxious or agitated. teaful   Neurologic:  No acute neurological deficit. Labs: Results:       Chemistry Recent Labs     02/16/20  0456 02/15/20  0345 02/14/20  0222   * 153* 205*    140 140   K 5.1 4.8 5.2    112* 111   CO2 21 20* 21   BUN 68* 69* 73*   CREA 2.25* 2.38* 2.91*   CA 8.7 8.6 8.3*  8.3*   AGAP 8 8 8   BUCR 30* 29* 25*    122* 123*   TP 6.5 6.1* 5.8*   ALB 3.4 2.6* 2.5*   GLOB 3.1 3.5 3.3   AGRAT 1.1 0.7* 0.8      CBC w/Diff Recent Labs     02/16/20  0456 02/15/20  0345 02/14/20  0222   WBC 6.0 7.2 6.0   RBC 3.44* 3.78* 3.59*   HGB 8.4* 9.4* 8.9*   HCT 27.7* 30.3* 29.2*    295 280   GRANS 61 63 53   LYMPH 22 19* 32   EOS 7* 8* 6*      Cardiac Enzymes Recent Labs     02/15/20  0345   CPK 52   CKND1 1.9      Coagulation No results for input(s): PTP, INR, APTT, INREXT, INREXT in the last 72 hours. Lipid Panel Lab Results   Component Value Date/Time    Cholesterol, total 146 05/28/2015 02:58 AM    HDL Cholesterol 64 (H) 05/28/2015 02:58 AM    LDL, calculated 66 05/28/2015 02:58 AM    VLDL, calculated 16 05/28/2015 02:58 AM    Triglyceride 80 05/28/2015 02:58 AM    CHOL/HDL Ratio 2.3 05/28/2015 02:58 AM      BNP No results for input(s): BNPP in the last 72 hours.    Liver Enzymes Recent Labs     02/16/20 0456   TP 6.5   ALB 3.4      SGOT 9*      Thyroid Studies Lab Results   Component Value Date/Time    TSH 0.56 01/23/2019 01:33 AM        Procedures/imaging: see electronic medical records for all procedures/Xrays and details which were not copied into this note but were reviewed prior to creation of Grace Flores MD

## 2020-02-17 ENCOUNTER — APPOINTMENT (OUTPATIENT)
Dept: GENERAL RADIOLOGY | Age: 55
DRG: 194 | End: 2020-02-17
Attending: INTERNAL MEDICINE
Payer: COMMERCIAL

## 2020-02-17 LAB
ALBUMIN SERPL-MCNC: 3.1 G/DL (ref 3.4–5)
ALBUMIN/GLOB SERPL: 1 {RATIO} (ref 0.8–1.7)
ALP SERPL-CCNC: 106 U/L (ref 45–117)
ALT SERPL-CCNC: 13 U/L (ref 13–56)
ANION GAP SERPL CALC-SCNC: 6 MMOL/L (ref 3–18)
ARTERIAL PATENCY WRIST A: YES
AST SERPL-CCNC: 7 U/L (ref 10–38)
AVAPS, IAVAPS: YES
BASE DEFICIT BLD-SCNC: 3 MMOL/L
BASOPHILS # BLD: 0 K/UL (ref 0–0.1)
BASOPHILS NFR BLD: 0 % (ref 0–2)
BDY SITE: ABNORMAL
BILIRUB SERPL-MCNC: 0.3 MG/DL (ref 0.2–1)
BODY TEMPERATURE: 98.6
BUN SERPL-MCNC: 66 MG/DL (ref 7–18)
BUN/CREAT SERPL: 29 (ref 12–20)
CALCIUM SERPL-MCNC: 8.7 MG/DL (ref 8.5–10.1)
CHLORIDE SERPL-SCNC: 111 MMOL/L (ref 100–111)
CO2 SERPL-SCNC: 24 MMOL/L (ref 21–32)
CREAT SERPL-MCNC: 2.31 MG/DL (ref 0.6–1.3)
DIFFERENTIAL METHOD BLD: ABNORMAL
EOSINOPHIL # BLD: 0.2 K/UL (ref 0–0.4)
EOSINOPHIL NFR BLD: 4 % (ref 0–5)
ERYTHROCYTE [DISTWIDTH] IN BLOOD BY AUTOMATED COUNT: 15.3 % (ref 11.6–14.5)
GAS FLOW.O2 O2 DELIVERY SYS: ABNORMAL L/MIN
GLOBULIN SER CALC-MCNC: 3 G/DL (ref 2–4)
GLUCOSE BLD STRIP.AUTO-MCNC: 112 MG/DL (ref 70–110)
GLUCOSE BLD STRIP.AUTO-MCNC: 121 MG/DL (ref 70–110)
GLUCOSE BLD STRIP.AUTO-MCNC: 140 MG/DL (ref 70–110)
GLUCOSE BLD STRIP.AUTO-MCNC: 167 MG/DL (ref 70–110)
GLUCOSE SERPL-MCNC: 111 MG/DL (ref 74–99)
HCO3 BLD-SCNC: 22.8 MMOL/L (ref 22–26)
HCT VFR BLD AUTO: 26.4 % (ref 35–45)
HGB BLD-MCNC: 8 G/DL (ref 12–16)
LYMPHOCYTES # BLD: 1.2 K/UL (ref 0.9–3.6)
LYMPHOCYTES NFR BLD: 20 % (ref 21–52)
MAGNESIUM SERPL-MCNC: 2.6 MG/DL (ref 1.6–2.6)
MCH RBC QN AUTO: 24.5 PG (ref 24–34)
MCHC RBC AUTO-ENTMCNC: 30.3 G/DL (ref 31–37)
MCV RBC AUTO: 80.7 FL (ref 74–97)
MONOCYTES # BLD: 0.8 K/UL (ref 0.05–1.2)
MONOCYTES NFR BLD: 14 % (ref 3–10)
NEUTS SEG # BLD: 3.8 K/UL (ref 1.8–8)
NEUTS SEG NFR BLD: 62 % (ref 40–73)
O2/TOTAL GAS SETTING VFR VENT: 0.3 %
PCO2 BLD: 41.6 MMHG (ref 35–45)
PEEP RESPIRATORY: 12 CMH2O
PH BLD: 7.35 [PH] (ref 7.35–7.45)
PHOSPHATE SERPL-MCNC: 4.2 MG/DL (ref 2.5–4.9)
PIP ISTAT,IPIP: 28
PLATELET # BLD AUTO: 239 K/UL (ref 135–420)
PMV BLD AUTO: 9.3 FL (ref 9.2–11.8)
PO2 BLD: 126 MMHG (ref 80–100)
POTASSIUM SERPL-SCNC: 4.8 MMOL/L (ref 3.5–5.5)
PRESSURE SUPPORT SETTING VENT: 16 CMH2O
PROT SERPL-MCNC: 6.1 G/DL (ref 6.4–8.2)
RBC # BLD AUTO: 3.27 M/UL (ref 4.2–5.3)
SAO2 % BLD: 99 % (ref 92–97)
SERVICE CMNT-IMP: ABNORMAL
SODIUM SERPL-SCNC: 141 MMOL/L (ref 136–145)
SPECIMEN TYPE: ABNORMAL
TOTAL RESP. RATE, ITRR: 21
WBC # BLD AUTO: 6.1 K/UL (ref 4.6–13.2)

## 2020-02-17 PROCEDURE — 84100 ASSAY OF PHOSPHORUS: CPT

## 2020-02-17 PROCEDURE — 74011250637 HC RX REV CODE- 250/637: Performed by: INTERNAL MEDICINE

## 2020-02-17 PROCEDURE — 82962 GLUCOSE BLOOD TEST: CPT

## 2020-02-17 PROCEDURE — 65660000000 HC RM CCU STEPDOWN

## 2020-02-17 PROCEDURE — 36415 COLL VENOUS BLD VENIPUNCTURE: CPT

## 2020-02-17 PROCEDURE — 94660 CPAP INITIATION&MGMT: CPT

## 2020-02-17 PROCEDURE — 36600 WITHDRAWAL OF ARTERIAL BLOOD: CPT

## 2020-02-17 PROCEDURE — 80053 COMPREHEN METABOLIC PANEL: CPT

## 2020-02-17 PROCEDURE — 71045 X-RAY EXAM CHEST 1 VIEW: CPT

## 2020-02-17 PROCEDURE — 82803 BLOOD GASES ANY COMBINATION: CPT

## 2020-02-17 PROCEDURE — 74011636637 HC RX REV CODE- 636/637: Performed by: HOSPITALIST

## 2020-02-17 PROCEDURE — 74011636637 HC RX REV CODE- 636/637: Performed by: INTERNAL MEDICINE

## 2020-02-17 PROCEDURE — 77010033678 HC OXYGEN DAILY

## 2020-02-17 PROCEDURE — 83735 ASSAY OF MAGNESIUM: CPT

## 2020-02-17 PROCEDURE — 97167 OT EVAL HIGH COMPLEX 60 MIN: CPT

## 2020-02-17 PROCEDURE — 97535 SELF CARE MNGMENT TRAINING: CPT

## 2020-02-17 PROCEDURE — 74011250637 HC RX REV CODE- 250/637: Performed by: HOSPITALIST

## 2020-02-17 PROCEDURE — 97530 THERAPEUTIC ACTIVITIES: CPT

## 2020-02-17 PROCEDURE — 85025 COMPLETE CBC W/AUTO DIFF WBC: CPT

## 2020-02-17 PROCEDURE — 74011250636 HC RX REV CODE- 250/636: Performed by: HOSPITALIST

## 2020-02-17 RX ORDER — CARVEDILOL 3.12 MG/1
3.12 TABLET ORAL 2 TIMES DAILY WITH MEALS
Status: DISCONTINUED | OUTPATIENT
Start: 2020-02-17 | End: 2020-02-24 | Stop reason: HOSPADM

## 2020-02-17 RX ADMIN — INSULIN LISPRO 2 UNITS: 100 INJECTION, SOLUTION INTRAVENOUS; SUBCUTANEOUS at 22:22

## 2020-02-17 RX ADMIN — POLYMYXIN B SULFATE, BACITRACIN ZINC, NEOMYCIN SULFATE: 5000; 3.5; 4 OINTMENT TOPICAL at 13:28

## 2020-02-17 RX ADMIN — AMLODIPINE BESYLATE 10 MG: 5 TABLET ORAL at 09:49

## 2020-02-17 RX ADMIN — POLYMYXIN B SULFATE, BACITRACIN ZINC, NEOMYCIN SULFATE: 5000; 3.5; 4 OINTMENT TOPICAL at 22:00

## 2020-02-17 RX ADMIN — FUROSEMIDE 40 MG: 10 INJECTION, SOLUTION INTRAMUSCULAR; INTRAVENOUS at 09:49

## 2020-02-17 RX ADMIN — ACETAMINOPHEN 650 MG: 325 TABLET ORAL at 18:12

## 2020-02-17 RX ADMIN — DOCUSATE SODIUM 100 MG: 100 CAPSULE, LIQUID FILLED ORAL at 09:49

## 2020-02-17 RX ADMIN — INSULIN GLARGINE 15 UNITS: 100 INJECTION, SOLUTION SUBCUTANEOUS at 22:21

## 2020-02-17 RX ADMIN — HEPARIN SODIUM 5000 UNITS: 5000 INJECTION INTRAVENOUS; SUBCUTANEOUS at 13:30

## 2020-02-17 RX ADMIN — CALCIUM ACETATE 667 MG: 667 CAPSULE ORAL at 09:49

## 2020-02-17 RX ADMIN — HEPARIN SODIUM 5000 UNITS: 5000 INJECTION INTRAVENOUS; SUBCUTANEOUS at 22:22

## 2020-02-17 RX ADMIN — CARVEDILOL 3.12 MG: 3.12 TABLET, FILM COATED ORAL at 18:12

## 2020-02-17 RX ADMIN — DOCUSATE SODIUM 100 MG: 100 CAPSULE, LIQUID FILLED ORAL at 22:21

## 2020-02-17 RX ADMIN — HYDRALAZINE HYDROCHLORIDE 100 MG: 50 TABLET, FILM COATED ORAL at 16:31

## 2020-02-17 RX ADMIN — DULOXETINE HYDROCHLORIDE 60 MG: 60 CAPSULE, DELAYED RELEASE ORAL at 09:49

## 2020-02-17 RX ADMIN — FUROSEMIDE 40 MG: 10 INJECTION, SOLUTION INTRAMUSCULAR; INTRAVENOUS at 22:21

## 2020-02-17 RX ADMIN — POLYMYXIN B SULFATE, BACITRACIN ZINC, NEOMYCIN SULFATE: 5000; 3.5; 4 OINTMENT TOPICAL at 16:31

## 2020-02-17 RX ADMIN — CALCIUM ACETATE 667 MG: 667 CAPSULE ORAL at 13:29

## 2020-02-17 RX ADMIN — Medication 10 ML: at 13:29

## 2020-02-17 RX ADMIN — POLYETHYLENE GLYCOL 3350 17 G: 17 POWDER, FOR SOLUTION ORAL at 09:49

## 2020-02-17 RX ADMIN — HYDRALAZINE HYDROCHLORIDE 100 MG: 50 TABLET, FILM COATED ORAL at 22:21

## 2020-02-17 RX ADMIN — HEPARIN SODIUM 5000 UNITS: 5000 INJECTION INTRAVENOUS; SUBCUTANEOUS at 06:15

## 2020-02-17 RX ADMIN — Medication 10 ML: at 22:00

## 2020-02-17 RX ADMIN — HYDRALAZINE HYDROCHLORIDE 100 MG: 50 TABLET, FILM COATED ORAL at 09:49

## 2020-02-17 RX ADMIN — ASPIRIN 81 MG 81 MG: 81 TABLET ORAL at 09:49

## 2020-02-17 RX ADMIN — GABAPENTIN 300 MG: 300 CAPSULE ORAL at 09:50

## 2020-02-17 RX ADMIN — Medication 10 ML: at 06:15

## 2020-02-17 RX ADMIN — CALCIUM ACETATE 667 MG: 667 CAPSULE ORAL at 16:31

## 2020-02-17 RX ADMIN — ATORVASTATIN CALCIUM 40 MG: 20 TABLET, FILM COATED ORAL at 09:49

## 2020-02-17 RX ADMIN — POLYETHYLENE GLYCOL 3350 17 G: 17 POWDER, FOR SOLUTION ORAL at 22:21

## 2020-02-17 RX ADMIN — GABAPENTIN 300 MG: 300 CAPSULE ORAL at 22:22

## 2020-02-17 RX ADMIN — GABAPENTIN 300 MG: 300 CAPSULE ORAL at 16:31

## 2020-02-17 NOTE — DIABETES MGMT
GLYCEMIC CONTROL PROGRESS NOTE:    - known h/o T2DM, HbA1C not within the recommended range for age + comorbids on TID mixed insulin/oral home regimen  - NPO, BG out of target range ICU: 140-180 mg/dL, trending down  - TDD = 39 (15 Lantus + 24 - Humalog Very Insulin Resistant Corrective Coverage)  - recommend, monitor BG trends today if BG remains < 140 mg/dL, pt may benefit from adjustment to basal insulin    *Humalog Normal Insulin Sensitivity Corrective Coverage (orders entered per protocol)  Recent Glucose Results:   Lab Results   Component Value Date/Time     (H) 02/17/2020 04:12 AM    GLUCPOC 112 (H) 02/17/2020 11:45 AM    GLUCPOC 121 (H) 02/17/2020 07:29 AM    GLUCPOC 215 (H) 02/16/2020 09:40 PM     Sanjiv Miller MS, RN, CDE  Glycemic Control Team  [phone]  Pager [phone] (M-TH 8:00-4:30P)  *After Hours pager [phone]

## 2020-02-17 NOTE — PROGRESS NOTES
Plan: home with [facility]     Chart reviewed, pt transferred to ICU for respiratory distress, on bipap in ICU but improving, per MD Mariajose Gutierres note now on 2L. Pt lives alone, has cpap and nebulizer. If pt needs home oxygen will need qualifying testing 24-48 hrs prior to discharge for CM to assist with arranging. CM following for additional needs/changes to discharge plan. Care Management Interventions  PCP Verified by CM:  Yes  Transition of Care Consult (CM Consult): [address]: No  Reason Outside [place]: Patient already serviced by other home care/hospice agency([facility])  Discharge Durable Medical Equipment: Yes  The Patient and/or Patient Representative was Provided with a Choice of Provider and Agrees with the Discharge Plan?: Yes  Freedom of Choice List was Provided with Basic Dialogue that Supports the Patient's Individualized Plan of Care/Goals, Treatment Preferences and Shares the Quality Data Associated with the Providers?: Yes  Discharge Location  Discharge Placement: Home with home health

## 2020-02-17 NOTE — PROGRESS NOTES
Problem: Mobility Impaired (Adult and Pediatric)  Goal: *Acute Goals and Plan of Care (Insert Text)  Description  In 1-7 days pt will be able to perform:  ST. Supine to sit to supine S with HR for meals. 2.  Sit to stand to sit S with RW in prep for ambulation. LT.  Gait:  Ambulate >50ft S with RW for home/community mobility. 2.  Activity tolerance: Tolerate up in chair 1-2 hours for ADLs. 3.  Patient/Family Education:  Patient/family to be independent with HEP for follow-up care and safe discharge. Outcome: Progressing Towards Goal    PHYSICAL THERAPY TREATMENT    Patient: Jaylin Lipscomb (53 y.o. female)  Date: 2020  Diagnosis: Hyperkalemia [E87.5]   Hyperkalemia  Precautions: Skin, Fall(O2)   Chart, physical therapy assessment, plan of care and goals were reviewed. ASSESSMENT:  Pt seen in ICU. C/o feeling tired. Verbal cues to avoid discussion of family dynamics during session for increased ability to focus on tasks and limit felling of anxiety/SOB. Increasing independence with bed mobility (supervision) and transfers (SBA) today. Completed transfer to [facility] with CGA/HHA. Pt requesting additional time seated for possible BM. Pt left up in NAD, nurse Yrn Styles present. /53 during session; O2 st 100%. Progressing overall. For possible transfer to telemetry today per nurse. Progression toward goals:  []      Improving appropriately and progressing toward goals  [x]      Improving slowly and progressing toward goals  []      Not making progress toward goals and plan of care will be adjusted     PLAN:  Patient continues to benefit from skilled intervention to address the above impairments. Continue treatment per established plan of care. Discharge Recommendations:  Home Health  Further Equipment Recommendations for Discharge:  N/A     SUBJECTIVE:   Patient stated I'm tired.     OBJECTIVE DATA SUMMARY:   Critical Behavior:  Neurologic State: Alert  Orientation Level: Oriented X4  Cognition: Decreased attention/concentration, Follows commands, Poor safety awareness, Impaired decision making  Safety/Judgement: Awareness of environment, Decreased awareness of need for assistance, Decreased awareness of need for safety, Decreased insight into deficits  Functional Mobility Training:  Bed Mobility:  Supine to Sit: Supervision  Scooting: Supervision  Transfers:  Sit to Stand: Stand-by assistance  Stand to Sit: Stand-by assistance  Bed to Chair: Contact guard assistance(to BSC)  Balance:  Sitting: Intact  Sitting - Static: Good (unsupported)  Standing: Intact; With support  Pain:  Pain Scale 1: Numeric (0 - 10)  Pain Intensity 1: 0  Activity Tolerance:   Fair   Please refer to the flowsheet for vital signs taken during this treatment. After treatment:   [x] Patient left in no apparent distress sitting up on [facility]  [] Patient left in no apparent distress in bed  [x] Call bell left within reach  [x] Nursing notified and present: Susan Guo  [] Caregiver present  [] Bed alarm activated      Aaron Hanna PT   Time Calculation: 20 mins     Addendum: assisted CNA transfer pt off BSC >bedside HHA, then to transport chair SHARI Kendall, PT

## 2020-02-17 NOTE — PROGRESS NOTES
Problem: Diabetes Self-Management  Goal: *Disease process and treatment process  Description  Define diabetes and identify own type of diabetes; list 3 options for treating diabetes. Outcome: Progressing Towards Goal  Goal: *Incorporating nutritional management into lifestyle  Description  Describe effect of type, amount and timing of food on blood glucose; list 3 methods for planning meals. Outcome: Progressing Towards Goal  Goal: *Incorporating physical activity into lifestyle  Description  State effect of exercise on blood glucose levels. Outcome: Progressing Towards Goal  Goal: *Developing strategies to promote health/change behavior  Description  Define the ABC's of diabetes; identify appropriate screenings, schedule and personal plan for screenings. Outcome: Progressing Towards Goal  Goal: *Using medications safely  Description  State effect of diabetes medications on diabetes; name diabetes medication taking, action and side effects. Outcome: Progressing Towards Goal  Goal: *Monitoring blood glucose, interpreting and using results  Description  Identify recommended blood glucose targets  and personal targets. Outcome: Progressing Towards Goal  Goal: *Prevention, detection, treatment of acute complications  Description  List symptoms of hyper- and hypoglycemia; describe how to treat low blood sugar and actions for lowering  high blood glucose level. Outcome: Progressing Towards Goal  Goal: *Prevention, detection and treatment of chronic complications  Description  Define the natural course of diabetes and describe the relationship of blood glucose levels to long term complications of diabetes.   Outcome: Progressing Towards Goal  Goal: *Developing strategies to address psychosocial issues  Description  Describe feelings about living with diabetes; identify support needed and support network  Outcome: Progressing Towards Goal  Goal: *Insulin pump training  Outcome: Progressing Towards Goal  Goal: *Sick day guidelines  Outcome: Progressing Towards Goal  Goal: *Patient Specific Goal (EDIT GOAL, INSERT TEXT)  Outcome: Progressing Towards Goal     Problem: Risk for Spread of Infection  Goal: Prevent transmission of infectious organism to others  Description  Prevent the transmission of infectious organisms to other patients, staff members, and visitors. Outcome: Progressing Towards Goal     Problem: Falls - Risk of  Goal: *Absence of Falls  Description  Document Luc Echavarria Fall Risk and appropriate interventions in the flowsheet. Outcome: Progressing Towards Goal  Note: Fall Risk Interventions:  Mobility Interventions: Patient to call before getting OOB, PT Consult for assist device competence         Medication Interventions: Bed/chair exit alarm, Patient to call before getting OOB, Teach patient to arise slowly    Elimination Interventions: Call light in reach, Patient to call for help with toileting needs, Toileting schedule/hourly rounds    History of Falls Interventions: Consult care management for discharge planning, Door open when patient unattended         Problem: Pain  Goal: *Control of Pain  Outcome: Progressing Towards Goal  Goal: *PALLIATIVE CARE:  Alleviation of Pain  Outcome: Progressing Towards Goal     Problem: Pressure Injury - Risk of  Goal: *Prevention of pressure injury  Description  Document Elian Scale and appropriate interventions in the flowsheet.   Outcome: Progressing Towards Goal  Note: Pressure Injury Interventions:  Sensory Interventions: Assess changes in LOC, Maintain/enhance activity level    Moisture Interventions: Absorbent underpads, Internal/External urinary devices    Activity Interventions: Pressure redistribution bed/mattress(bed type), Increase time out of bed    Mobility Interventions: Pressure redistribution bed/mattress (bed type)    Nutrition Interventions: Document food/fluid/supplement intake    Friction and Shear Interventions: HOB 30 degrees or less              The care plan was reviewed with pt. Care plan options were discussed and questions answered. The pt understand instructions and will follow up as directed.

## 2020-02-17 NOTE — PROGRESS NOTES
2345  Pt is resting quietly in bed with Bipap mask on. Pt awakens easily, reports feeling generally uncomfortable. Repositioned pt for comfort. VSS. Pt has generalized edema, lower extremities greater than upper. Purewick in place, pt has voided clear yellow urine. States she feels that she needs to void some more, encouraged to do so. NAD.    0022  Bipap mask repositioned due to air leak, NAD.    0156  Resting quietly in NAD, voided more per purewick.    0321  Taking off Bipap, states she just wants a short break. Recounts events of last few days, frustrated about having to be moved to different levels of care. After few minutes requests to be placed on Bipap again, declines ice chip and lip care. No c/o pain. VSS.      0430  Resp and lab in for blood draws. No c/o. No change in status.          0609  Resting quietly in NAD, remains on Bipap. ABG WNL, as are most labs except for BUN/creat. [id number]  [facility]_Bedside shift change report given to YENNY Lomeli (oncoming nurse) by YENNY Blanton RN (offgoing nurse). Report included the following information SBAR, Kardex, Intake/Output, Recent Results, Cardiac Rhythm sr and Alarm Parameters .

## 2020-02-17 NOTE — PROGRESS NOTES
Nephrology Progress Note      HPI:   Jovani Barahona is 54 yo female with PMHx of HTN, uncontrolled DM, CKD, D-CMP and anemia who presented with bouts of diarrhea and nausea. She also had complaints of SOB and CP. On initial eval in the ED her HD was in the 50's otherwise stable VS. Labs showed K of 6.0 and Cr 2.69. Home med included Losartan. Pt FU with Dr Renetta Bunch for her CKD and recent baseline Cr in Dec was ~1.6. VQ scan, CxR, CT lower chest/abd/pelvis, and LE doppler US were all unremarkable. Echo showed EF 97%, mild diastolic dysfunction and mild- to mod Pulm HTN. Pt was treated medically for high K and received a dose of Lasix. Today K is down tp normal. But Cr slightly up to 2.8. No diarrhea, N/V. No SOB at rest. No CP. Has LE edema.     -Pt transferred to ICU due to acute hypercapnic hypoxic respiratory failure      -SOB better today. No CP. Has significant baseline LE edema. Impression:   -Acute on CKD stage 3. Improving, Cr peaked at 2.9, now down to 2.2-2.3, from recent baseline of 1.6 in Dec.   -Hyperkalemia, resolved on medical management   -HTN, controlled  -Proteinuria likely sec to DMN  -Hyperphosphatemia, started on Phoslo tid  -DM, uncontrolled.  HbA1C 9.5%  -Anemia, iron def  -D-CMP  -Mod Pulm HTN w/ Rt side CHF   -Diarrhea: resolving  -АНДРЕЙ  -Morbidly Obese  -SHPT  -Acute hypercapnic hypoxic respiratory failure, improving       Plan:   -Cont lasix   -Continue to hold Losartan for now  -Renal diet  -cont venofer  -Better BS control  -will cont follow      Medications:     Current Facility-Administered Medications:     furosemide (LASIX) injection 40 mg, 40 mg, IntraVENous, BID, Landen Miller MD, 40 mg at 02/17/20 0949    ELECTROLYTE REPLACEMENT PROTOCOL - Potassium Renal Dosing, 1 Each, Other, PRLanden PRAJAPATI MD    ELECTROLYTE REPLACEMENT PROTOCOL  - Phosphorus Renal Dosing, 1 Each, Other, PRNLanden MD    ELECTROLYTE REPLACEMENT PROTOCOL - Calcium , 1 Each, Lizzette, PRLanden PRAJAPATI MD    ELECTROLYTE REPLACEMENT PROTOCOL - Magnesium , 1 Each, Other, PRN, Georgiana Allen MD    hydrALAZINE (APRESOLINE) 20 mg/mL injection 10 mg, 10 mg, IntraVENous, Q6H PRN, Lisa Fallon MD    iron sucrose (VENOFER) injection 200 mg, 200 mg, IntraVENous, DAILY, Tom Hopkins MD, 200 mg at 02/16/20 1127    magnesium hydroxide (MILK OF MAGNESIA) 400 mg/5 mL oral suspension 30 mL, 30 mL, Oral, DAILY PRN, Georgiana Allen MD    docusate sodium (COLACE) capsule 100 mg, 100 mg, Oral, BID, Georgiana Allen MD, 100 mg at 02/17/20 0949    polyethylene glycol (MIRALAX) packet 17 g, 17 g, Oral, BID, Georgiana Allen MD, 17 g at 02/17/20 1137    gabapentin (NEURONTIN) capsule 300 mg, 300 mg, Oral, TID, Georgiana Allen MD, 300 mg at 02/17/20 0950    amLODIPine (NORVASC) tablet 10 mg, 10 mg, Oral, DAILY, Georgiana Allen MD, 10 mg at 02/17/20 2446    hydrALAZINE (APRESOLINE) tablet 100 mg, 100 mg, Oral, TID, Georgiana Allen MD, 100 mg at 02/17/20 0949    atorvastatin (LIPITOR) tablet 40 mg, 40 mg, Oral, DAILY, Lisa Fallon MD, 40 mg at 02/17/20 0949    albuterol-ipratropium (DUO-NEB) 2.5 MG-0.5 MG/3 ML, 3 mL, Nebulization, Q4H PRN, Lisa Fallon MD    aspirin chewable tablet 81 mg, 81 mg, Oral, DAILY, Lisa Fallon MD, 81 mg at 02/17/20 0949    DULoxetine (CYMBALTA) capsule 60 mg, 60 mg, Oral, DAILY, Lisa Fallon MD, 60 mg at 02/17/20 0949    neomycin-bacitracin-polymyxin (NEOSPORIN) ointment, , Topical, TID, Lisa Fallon MD    heparin (porcine) injection 5,000 Units, 5,000 Units, SubCUTAneous, Q8H, Georgiana Allen MD, 5,000 Units at 02/17/20 0615    insulin glargine (LANTUS) injection 15 Units, 15 Units, SubCUTAneous, QHS, Georgiana Allen MD, 15 Units at 02/16/20 2140    insulin lispro (HUMALOG) injection, , SubCUTAneous, AC&HS, Lisa Fallon MD, Stopped at 02/17/20 0730    dextrose 10% infusion 125-250 mL, 125-250 mL, IntraVENous, PRN, Georgiana Allen MD    calcium acetate(phosphat bind) (PHOSLO) capsule 667 mg, 1 Cap, Oral, TID WITH MEALS, Gia Narvaez MD, 667 mg at 02/17/20 0949    sodium chloride (NS) flush 5-40 mL, 5-40 mL, IntraVENous, Q8H, Yoav Fallon MD, 10 mL at 02/17/20 0615    sodium chloride (NS) flush 5-40 mL, 5-40 mL, IntraVENous, PRN, Yoav Fallon MD    dextrose 10% infusion 125-250 mL, 125-250 mL, IntraVENous, PRN, Yoav Fallon MD, Last Rate: 999 mL/hr at 02/13/20 0052, 250 mL at 02/13/20 0052    acetaminophen (TYLENOL) tablet 650 mg, 650 mg, Oral, Q4H PRN, Yoav Fallon MD, 650 mg at 02/15/20 1822    oxyCODONE-acetaminophen (PERCOCET) 5-325 mg per tablet 1 Tab, 1 Tab, Oral, Q4H PRN, Yoav Fallon MD, 1 Tab at 02/14/20 1735      Physical Assessment:     Visit Vitals  /53   Pulse 75   Temp 98.8 °F (37.1 °C)   Resp 20   Ht 5' 2\" (1.575 m)   Wt 151.4 kg (333 lb 12.4 oz)   SpO2 96%   Breastfeeding No   BMI 61.05 kg/m²       Intake/Output Summary (Last 24 hours) at 2/17/2020 1036  Last data filed at 2/17/2020 7089  Gross per 24 hour   Intake    Output 2125 ml   Net -2125 ml       General Appearance: no pain, no distress  Head: atraumatic  HEENT: no jaundice, moist oral mucosa  Neck: no JVD noted  Chest: LS clear to auscultation bilaterally  Heart: S1/S2, no murmur, gallop or rub, RRR  Adbomen: soft, nontender, BS active   : no flank tenderness, no terrell catheter  Skin: intact, no rash  Extremity: ++ edema, no cyanosis or clubbing  MS: No joint deformity or tenderness  Neuro: conscious, alert, oriented x 3, no focal deficit    Dodie Tracey MD

## 2020-02-17 NOTE — PROGRESS NOTES
Juan Antonio Small TRANSFER - IN REPORT:    Verbal report received from  Jocelin Colin on Shiv Shows  being received from ICU (unit) for routine progression of care      Report consisted of patients Situation, Background, Assessment and   Recommendations(SBAR). Information from the following report(s) SBAR, Kardex, ED Summary, Intake/Output, MAR, Accordion, Recent Results, Med Rec Status, Cardiac Rhythm NSR and Quality Measures was reviewed with the receiving nurse. Opportunity for questions and clarification was provided.     Patient will be transferred shortly per Jocelin Colin

## 2020-02-17 NOTE — PROGRESS NOTES
Patient arrived to the unit at this time, awake, alert, and denies any pain or distress. SBar report given to oncoming Nurse Gopi Reagan

## 2020-02-17 NOTE — PROGRESS NOTES
Hospitalist Progress Note-critical care note     Patient: Jaylin Lipscomb MRN: [id number]  CSN: [id number]    YOB: 1965  Age: 53 y.o. Sex: female    DOA: 2/12/2020 LOS:  LOS: 5 days            Chief complaint: chest pain, acute on chronic renal failure, hyperkalemia, андрей morbid obesity     Assessment/Plan         Hospital Problems  Date Reviewed: 12/11/2019          Codes Class Noted POA    Acute respiratory failure with hypoxia ([facility] ICD-10-CM: J96.01  ICD-9-CM: 518.81  2/16/2020 Unknown        Diastolic CHF, acute on chronic (HCC) ICD-10-CM: I50.33  ICD-9-CM: 428.33, 428.0  2/16/2020 Unknown        Chest pain ICD-10-CM: R07.9  ICD-9-CM: 786.50  2/13/2020 Unknown        Acute renal failure superimposed on stage 3 chronic kidney disease ([facility] ICD-10-CM: N17.9, N18.3  ICD-9-CM: 584.9, 585.3  2/13/2020 Unknown        * (Principal) Hyperkalemia ICD-10-CM: E87.5  ICD-9-CM: 276.7  2/12/2020 Unknown        АНДРЕЙ (obstructive sleep apnea) ICD-10-CM: G47.33  ICD-9-CM: 327.23  12/11/2019 Yes        Morbid obesity ([facility] ICD-10-CM: E66.01  ICD-9-CM: 278.01  Unknown Yes        Type II or unspecified type diabetes mellitus with renal manifestations, uncontrolled(250.42) ([facility] ICD-10-CM: E11.29, E11.65  ICD-9-CM: 250.42  9/4/2014 Yes        HTN (hypertension) ICD-10-CM: I10  ICD-9-CM: 401.9  8/16/2014 Yes              Acute respiratory failure with hypoxia   Off bipap AM,   Continue lasix     Acute on chronic chf ,diastolic  Case discussed with dr. Dariana Marte  He will come to see pt   continue lasix      acute on chronic renal failure 3  Resolved, will continue monitoring,   Continue avoid nasids and iv contrast   Renal on board         Chest Pain   No pain now.  ce wnl   recent stress test in December in 2019  Case discussed with Mike oconnell cardiac   V/q scan no PE       constipation   Continue  mirlax , colace, increase physical activity           DM    Lantus and sliding scale hold metformin      Sleep Apnea   cpap at night       Pulmonary HTN   VQ scan obtained in the emergency room low probability PE   Negative for dvt     htn  Continue   norvasc and hydralyzine, continue hold metoprolol due to jono     Foot injury   No acute issue from x-ray     Subjective : feel better   Family was at the bedside   Review of systems:    General: No fevers or chills. Cardiovascular: no chest pain, no  pressure. No palpitations. Pulmonary: shortness of breath better   Gastrointestinal: No nausea, vomiting. Constipation     Vital signs/Intake and Output:  Visit Vitals  BP (!) 120/39   Pulse 90   Temp 98 °F (36.7 °C)   Resp 25   Ht 5' 2\" (1.575 m)   Wt 151.4 kg (333 lb 12.4 oz)   SpO2 97%   Breastfeeding No   BMI 61.05 kg/m²     Current Shift:  No intake/output data recorded. Last three shifts:  02/15 1901 - 02/17 0700  In: 140 [P.O.:140]  Out: 4756 [Urine:3525]    Physical Exam:  General: WD, WN. Alert, cooperative, no acute distress    HEENT: NC, Atraumatic. PERRLA, anicteric sclerae. Lungs: CTA Bilaterally. No Wheezing/Rhonchi/Rales. Heart:  Regular  rhythm,  No murmur, No Rubs, No Gallops  Abdomen: Soft, Non distended, Non tender.  +Bowel sounds,   Extremities: No c/c/e, rt toe wrapped with gauze, some dry blood on rt toe nail   Psych:   Not anxious or agitated. teaful   Neurologic:  No acute neurological deficit.              Labs: Results:       Chemistry Recent Labs     02/17/20 0412 02/16/20  0456 02/15/20  0345   * 150* 153*    139 140   K 4.8 5.1 4.8    110 112*   CO2 24 21 20*   BUN 66* 68* 69*   CREA 2.31* 2.25* 2.38*   CA 8.7 8.7 8.6   AGAP 6 8 8   BUCR 29* 30* 29*    109 122*   TP 6.1* 6.5 6.1*   ALB 3.1* 3.4 2.6*   GLOB 3.0 3.1 3.5   AGRAT 1.0 1.1 0.7*      CBC w/Diff Recent Labs     02/17/20 0412 02/16/20  0456 02/15/20  0345   WBC 6.1 6.0 7.2   RBC 3.27* 3.44* 3.78*   HGB 8.0* 8.4* 9.4*   HCT 26.4* 27.7* 30.3*    275 295   GRANS 62 61 63   LYMPH 20* 22 19*   EOS 4 7* 8*      Cardiac Enzymes Recent Labs     02/16/20  1550 02/15/20  0345   CPK 49 52   CKND1 CALCULATION NOT PERFORMED WHEN RESULT IS BELOW LINEAR LIMIT 1.9      Coagulation No results for input(s): PTP, INR, APTT, INREXT, INREXT in the last 72 hours. Lipid Panel Lab Results   Component Value Date/Time    Cholesterol, total 146 05/28/2015 02:58 AM    HDL Cholesterol 64 (H) 05/28/2015 02:58 AM    LDL, calculated 66 05/28/2015 02:58 AM    VLDL, calculated 16 05/28/2015 02:58 AM    Triglyceride 80 05/28/2015 02:58 AM    CHOL/HDL Ratio 2.3 05/28/2015 02:58 AM      BNP No results for input(s): BNPP in the last 72 hours.    Liver Enzymes Recent Labs     02/17/20  0412   TP 6.1*   ALB 3.1*      SGOT 7*      Thyroid Studies Lab Results   Component Value Date/Time    TSH 0.56 01/23/2019 01:33 AM        Procedures/imaging: see electronic medical records for all procedures/Xrays and details which were not copied into this note but were reviewed prior to creation of Becca Tilley MD

## 2020-02-17 NOTE — CONSULTS
[facility]  Pulmonary, Critical Care, and Sleep Medicine    Name: Francisco J Wisdom MRN: [id number]   : 1965 Hospital: [facility] [place]    Date: 2020  Room: [address] [place] [address] Note                                              Consult requesting physician: Dr. Jesse Godwin  Reason for Consult: acute respiratory failure requiring BiPAP      IMPRESSION:    Acute respiratory failure with hypoxia ([address] 2' to below, required BiPAP J75.46    Diastolic CHF, acute on chronic (HCC) I50.33    Asthma J45.909    Acute renal failure superimposed on stage 3 chronic kidney disease (HCC) N17.9, N18.3    HTN (hypertension) I10    Type II or unspecified type diabetes mellitus with renal manifestations, uncontrolled(250.42) ([facility]) E11.29, E11.65    Morbid obesity ([address] E66.01    АНДРЕЙ (obstructive sleep apnea) G47.33       ·   Patient Active Problem List   Diagnosis Code    HTN (hypertension) I10    Type II or unspecified type diabetes mellitus with renal manifestations, uncontrolled(250.42) ([address] E11.29, E11.65    Morbid obesity ([address] E66.01    Respiratory distress R06.03    Asthma exacerbation J45. 345    Diastolic CHF (HCC) N21.05    АНДРЕЙ (obstructive sleep apnea) G47.33    UTI (urinary tract infection) N39.0    Hyperkalemia E87.5    Chest pain R07.9    Acute renal failure superimposed on stage 3 chronic kidney disease (HCC) N17.9, N18.3    Acute respiratory failure with hypoxia (HCC) Z73.71    Diastolic CHF, acute on chronic (HCC) I50.33 ·       · Code status: Full code      RECOMMENDATIONS:   Respiratory:   Required BiPAP overnight. Now on 2 LPM NC. Continue to titrate FiO2. Used BiPAP as needed. АНДРЕЙ on CPAP at home. Continue CPAP at nighttime. Keep SPO2 >=92%. HOB 30 degree elevation all the time. Aggressive pulmonary toileting. Aspiration precautions. Incentive spirometry. CVS: Chest pain on admission. VQ scan low probability for PE.   Echo showed grade 1 diastolic dysfunction with normal LVEF. Chronic leg edema on Lasix at home. Lasix dose was reduced on admission due to elevated creatinine. I believe that led to worsening diastolic CHF requiring BiPAP and transferred down to ICU. After increasing Lasix 40 mg twice daily, and use of BiPAP overnight, her respiratory distress improved and now she is on 2 LPM NC. Continue Norvasc, aspirin, Lipitor, Lasix 40 twice daily, hydralazine. ID: No acute issues. Hematology/Oncology: Chronic anemia. No external bleeding. Renal: CKD, defer to nephrologist.  GI/: No acute issues  Endocrine: Monitor BS. SSI. Neurology: No acute issues  Toxicology: No acute issues  Pain/Sedation: No acute issues  Skin/Wound: No acute issues  Electrolytes: Replace electrolytes per ICU electrolyte replacement protocol. IVF: None  Nutrition: P.o. diet while off BiPAP  Prophylaxis: DVT Prophylaxis: SCD/Heparin. GI Prophylaxis: Low risk for stress ulcer. Restraints: none  Lines/Tubes: PIV    Will defer respective systems problem management to primary and other respective consultant and follow patient in ICU with primary and other medical team.  Further recommendations will be based on the patient's response to recommended treatment and results of the investigation ordered. Quality Care: PPI, DVT prophylaxis, HOB elevated, Infection control all reviewed and addressed. Care of plan d/w RN, RT, MDR.  D/w patient, family- at bedside (answered all questions to satisfaction). D/w Dr. Alesia Lopez. If remains stable, then transfer to tele later today     High complexity decision making was performed during the evaluation of this patient at high risk for decompensation with multiple organ involvement. Total critical care time spent rendering care exclusive of procedures: 33 minutes.          Subjective/History of Present Illness:       Patient is a 53 y.o. female with pmhx for HTN, uncontrolled DM, CKD, asthma-COPD , anemia, fibromyalgia, chronic diastolic CHF on diuretics at home, morbid obesity, АНДРЕЙ on CPAP, who presented with episodes of diarrhea, nausea, SOB and CP. In ER, labs showed K of 6.0 and Cr 2.69 and CT abd pelvis showed superficial pannus inflammatory changes without intra-abdominal abnormalities. VQ scan low probability for PE in ER. She was admitted to hospital for FARHAN on CKD and seen by cardiology, nephrology. Hyperkalemia resolved soon. Lasix was initially held but was restarted at low dose recently. Hospital course significant for SOB, increased work of breathing and hypoxia requiring 4 Lpm with finding of chf exacerbation - new comapred to admission CXR. Patient transferred to ICU on BiPAP for respiratory failure. 2/17/2020:  Patient transferred to ICU for respiratory failure, hypoxemia requiring BiPAP. Diuresed with increase Lasix to 40 mg twice daily and I's and O's negative. Persistent leg edema. She is able to talk full sentences without respiratory distress. Used BiPAP overnight. Now on 2 LPM O2 NC. No fever. Mild cough without sputum production. No chest pain or PND. Hemodynamic stable. No other issues overnight. I/O last 24 hrs:      Intake/Output Summary (Last 24 hours) at 2/17/2020 0930  Last data filed at 2/17/2020 5806  Gross per 24 hour   Intake    Output 2125 ml   Net -2125 ml       History taken from patient, EMR     Review of Systems:   HEENT: No epistaxis, no nasal drainage, no difficulty in swallowing, no redness in eyes  Respiratory: as above  Cardiovascular: no chest pain, no palpitations, + chronic leg edema, no syncope  Gastrointestinal: no abd pain, no vomiting, no diarrhea, no bleeding symptoms  Genitourinary: No urinary symptoms or hematuria  Musculoskeletal: Neg  Neurological: No focal weakness, no seizures, no headaches  Behvioral/Psych: No anxiety, no depression  Constitutional: No fever, no chills, no weight loss, no night sweats     Allergies   Allergen Reactions    Bees [Sting, Bee] Swelling    Penicillins Hives    Strawberry Hives      Past Medical History:   Diagnosis Date    Asthma     Note: As child had asthma    Diabetes mellitus ([facility]     Type 2    Heart palpitations     Hypertension     Ill-defined condition     fibromyalgia     Morbid obesity ([facility]     MRSA infection 8/2014      Past Surgical History:   Procedure Laterality Date    BREAST SURGERY PROCEDURE UNLISTED      cyst removed    HX CATARACT REMOVAL      HX CHOLECYSTECTOMY      HX TUBAL LIGATION        Social History     Tobacco Use    Smoking status: Never Smoker    Smokeless tobacco: Never Used   Substance Use Topics    Alcohol use: No      Family History   Problem Relation Age of Onset    Heart Attack Mother     Heart Attack Maternal Grandmother       Prior to Admission medications    Medication Sig Start Date End Date Taking? Authorizing Provider   predniSONE (STERAPRED) 5 mg dose pack See administration instruction per 5mg dose pack 12/16/19   Lilly Handy MD   guaiFENesin ER [facility]) 600 mg ER tablet Take 1 Tab by mouth every twelve (12) hours. 12/16/19   Lilly Handy MD   albuterol-ipratropium (DUO-NEB) 2.5 mg-0.5 mg/3 ml nebu 3 mL by Nebulization route every four (4) hours as needed for Wheezing. 12/16/19   Lilly Handy MD   amLODIPine (NORVASC) 5 mg tablet Take 5 mg by mouth daily. Yoana Crocker MD   hydrALAZINE (APRESOLINE) 25 mg tablet Take 25 mg by mouth three (3) times daily. Yoana Crocker MD   albuterol sulfate 90 mcg/actuation aepb Take 1-2 Puffs by inhalation every four (4) hours as needed. 1/24/19   Lilly Handy MD   metFORMIN (GLUCOPHAGE) 1,000 mg tablet Take 1,000 mg by mouth two (2) times daily (with meals). Yoana Crocker MD   metoprolol succinate (TOPROL-XL) 50 mg XL tablet Take 100 mg by mouth daily. Yoana Crocker MD   DULoxetine (CYMBALTA) 30 mg capsule Take 60 mg by mouth daily. Yoana Crocker MD   atorvastatin (LIPITOR) 40 mg tablet Take 40 mg by mouth daily.     Yoana Crocker MD   losartan (COZAAR) 100 mg tablet Take 100 mg by mouth daily. Yoana Crocker MD   OTHER,NON-FORMULARY, Medication for irregular heart beat    Yoana Crocker MD   gabapentin (NEURONTIN) 300 mg capsule Take 300 mg by mouth three (3) times daily. Yoana Crocker MD   furosemide (LASIX) 40 mg tablet Take 1 Tab by mouth daily. Patient taking differently: Take 40 mg by mouth two (2) times a day. 2/13/17   Deonte Valle MD   insulin NPH/insulin regular (NOVOLIN 70/30) 100 unit/mL (70-30) injection 50 Units by SubCUTAneous route every twelve (12) hours. 50 units am; 40 units noon; 35 units pm    Provider, Historical   aspirin 81 mg chewable tablet Take 1 tablet by mouth daily.  8/20/14   Anusha Muse MD     Current Facility-Administered Medications   Medication Dose Route Frequency    furosemide (LASIX) injection 40 mg  40 mg IntraVENous BID    iron sucrose (VENOFER) injection 200 mg  200 mg IntraVENous DAILY    docusate sodium (COLACE) capsule 100 mg  100 mg Oral BID    polyethylene glycol (MIRALAX) packet 17 g  17 g Oral BID    gabapentin (NEURONTIN) capsule 300 mg  300 mg Oral TID    amLODIPine (NORVASC) tablet 10 mg  10 mg Oral DAILY    hydrALAZINE (APRESOLINE) tablet 100 mg  100 mg Oral TID    atorvastatin (LIPITOR) tablet 40 mg  40 mg Oral DAILY    aspirin chewable tablet 81 mg  81 mg Oral DAILY    DULoxetine (CYMBALTA) capsule 60 mg  60 mg Oral DAILY    neomycin-bacitracin-polymyxin (NEOSPORIN) ointment   Topical TID    heparin (porcine) injection 5,000 Units  5,000 Units SubCUTAneous Q8H    insulin glargine (LANTUS) injection 15 Units  15 Units SubCUTAneous QHS    insulin lispro (HUMALOG) injection   SubCUTAneous AC&HS    calcium acetate(phosphat bind) (PHOSLO) capsule 667 mg  1 Cap Oral TID WITH MEALS    sodium chloride (NS) flush 5-40 mL  5-40 mL IntraVENous Q8H         Objective:   Vital Signs:    Visit Vitals  /53   Pulse 75   Temp 98.8 °F (37.1 °C)   Resp 20   Ht 5' 2\" (1.575 m)   Wt 151.4 kg (333 lb 12.4 oz)   SpO2 96%   Breastfeeding No   BMI 61.05 kg/m²       O2 Device: BIPAP   O2 Flow Rate (L/min): 6 l/min   Temp (24hrs), Av.3 °F (36.8 °C), Min:98 °F (36.7 °C), Max:98.8 °F (37.1 °C)       Intake/Output:   Last shift:      No intake/output data recorded. Last 3 shifts: 02/15 1901 -  0700  In: 140 [P.O.:140]  Out: 3525 [Urine:3525]      Intake/Output Summary (Last 24 hours) at 2020 0930  Last data filed at 2020 0558  Gross per 24 hour   Intake    Output 2125 ml   Net -2125 ml       Last 3 Recorded Weights in this Encounter    20 0325 20 0608 20 0327   Weight: 153 kg (337 lb 6.4 oz) 152.2 kg (335 lb 9.6 oz) 151.4 kg (333 lb 12.4 oz)         Recent Labs     20  0432 20  2104 20  1537   PHI 7.347* 7.276* 7.281*   PCO2I 41.6 44.6 43.4   PO2I 126* 140* 60*   HCO3I 22.8 20.7* 20.5*   FIO2I 0.30 0.40 36       Physical Exam:     General/Neurology: Alert, Awake, NAD. Morbidly obese. Head:   Normocephalic, without obvious abnormality, atraumatic. Eye:   EOM intact, no scleral icterus, no pallor, no cyanosis. Throat:  Lips, mucosa, and tongue normal. No oral thrush. Neck:   Supple, symmetric. No lymphadenopathy. Trachea midline  Lung:   Reduced air entry bilateral due to obese chest wall. No rhonchi. No wheezing. No stridors. No prolongded expiration. No accessory muscle use. Heart:   Regular rate & rhythm. S1 S2 present. No murmur. No JVD. Abdomen:  Soft. NT. ND. +BS. No masses. Extremities:  Trace to 1+ pitting b/l pedal edema. No cyanosis. No clubbing. Pulses: 2+ and symmetric in DP. Capillary refill: normal  Lymphatic:  No cervical or supraclavicular palpable lymphadenopathy. Musculoskeletal: No joint swelling. No tenderness. Skin:   Color, texture, turgor normal. No rashes or lesions.        Data:       Recent Results (from the past 24 hour(s))   GLUCOSE, POC    Collection Time: 20 11:47 AM   Result Value Ref Range    Glucose (POC) 228 (H) 70 - 110 mg/dL   POC G3    Collection Time: 02/16/20  3:37 PM   Result Value Ref Range    Device: NASAL CANNULA      Flow rate (POC) 4.0 L/M    FIO2 (POC) 36 %    pH (POC) 7.281 (L) 7.35 - 7.45      pCO2 (POC) 43.4 35.0 - 45.0 MMHG    pO2 (POC) 60 (L) 80 - 100 MMHG    HCO3 (POC) 20.5 (L) 22 - 26 MMOL/L    sO2 (POC) 87 (L) 92 - 97 %    Base deficit (POC) 6 mmol/L    Allens test (POC) YES      Total resp. rate 24      Site LEFT RADIAL      Specimen type (POC) ARTERIAL      Performed by Kori Gonzalez    CARDIAC PANEL,(CK, CKMB & TROPONIN)    Collection Time: 02/16/20  3:50 PM   Result Value Ref Range    CK 49 26 - 192 U/L    CK - MB <1.0 <3.6 ng/ml    CK-MB Index  0.0 - 4.0 %     CALCULATION NOT PERFORMED WHEN RESULT IS BELOW LINEAR LIMIT    Troponin-I, QT <0.02 0.0 - 0.045 NG/ML   GLUCOSE, POC    Collection Time: 02/16/20  4:30 PM   Result Value Ref Range    Glucose (POC) 264 (H) 70 - 110 mg/dL   GLUCOSE, POC    Collection Time: 02/16/20  5:41 PM   Result Value Ref Range    Glucose (POC) 235 (H) 70 - 110 mg/dL   NT-PRO BNP    Collection Time: 02/16/20  6:39 PM   Result Value Ref Range    NT pro-BNP 1,737 (H) 0 - 900 PG/ML   POC G3    Collection Time: 02/16/20  9:04 PM   Result Value Ref Range    Device: BIPAP      FIO2 (POC) 0.40 %    pH (POC) 7.276 (L) 7.35 - 7.45      pCO2 (POC) 44.6 35.0 - 45.0 MMHG    pO2 (POC) 140 (H) 80 - 100 MMHG    HCO3 (POC) 20.7 (L) 22 - 26 MMOL/L    sO2 (POC) 99 (H) 92 - 97 %    Base deficit (POC) 6 mmol/L    PEEP/CPAP (POC) 7 cmH2O    PIP (POC) 15      Pressure support 7 cmH2O    Allens test (POC) N/A      Bleed In 0.4 L/min    Total resp. rate 25      Site RIGHT RADIAL      Patient temp.  98.7      Specimen type (POC) ARTERIAL      Performed by Cullen Fothergill    GLUCOSE, POC    Collection Time: 02/16/20  9:40 PM   Result Value Ref Range    Glucose (POC) 215 (H) 70 - 110 mg/dL   MAGNESIUM    Collection Time: 02/17/20  4:12 AM   Result Value Ref Range    Magnesium 2.6 1.6 - 2.6 mg/dL PHOSPHORUS    Collection Time: 02/17/20  4:12 AM   Result Value Ref Range    Phosphorus 4.2 2.5 - 4.9 MG/DL   CBC WITH AUTOMATED DIFF    Collection Time: 02/17/20  4:12 AM   Result Value Ref Range    WBC 6.1 4.6 - 13.2 K/uL    RBC 3.27 (L) 4.20 - 5.30 M/uL    HGB 8.0 (L) 12.0 - 16.0 g/dL    HCT 26.4 (L) 35.0 - 45.0 %    MCV 80.7 74.0 - 97.0 FL    MCH 24.5 24.0 - 34.0 PG    MCHC 30.3 (L) 31.0 - 37.0 g/dL    RDW 15.3 (H) 11.6 - 14.5 %    PLATELET 105 245 - 866 K/uL    MPV 9.3 9.2 - 11.8 FL    NEUTROPHILS 62 40 - 73 %    LYMPHOCYTES 20 (L) 21 - 52 %    MONOCYTES 14 (H) 3 - 10 %    EOSINOPHILS 4 0 - 5 %    BASOPHILS 0 0 - 2 %    ABS. NEUTROPHILS 3.8 1.8 - 8.0 K/UL    ABS. LYMPHOCYTES 1.2 0.9 - 3.6 K/UL    ABS. MONOCYTES 0.8 0.05 - 1.2 K/UL    ABS. EOSINOPHILS 0.2 0.0 - 0.4 K/UL    ABS. BASOPHILS 0.0 0.0 - 0.1 K/UL    DF AUTOMATED     METABOLIC PANEL, COMPREHENSIVE    Collection Time: 02/17/20  4:12 AM   Result Value Ref Range    Sodium 141 136 - 145 mmol/L    Potassium 4.8 3.5 - 5.5 mmol/L    Chloride 111 100 - 111 mmol/L    CO2 24 21 - 32 mmol/L    Anion gap 6 3.0 - 18 mmol/L    Glucose 111 (H) 74 - 99 mg/dL    BUN 66 (H) 7.0 - 18 MG/DL    Creatinine 2.31 (H) 0.6 - 1.3 MG/DL    BUN/Creatinine ratio 29 (H) 12 - 20      GFR est AA 27 (L) >60 ml/min/1.73m2    GFR est non-AA 22 (L) >60 ml/min/1.73m2    Calcium 8.7 8.5 - 10.1 MG/DL    Bilirubin, total 0.3 0.2 - 1.0 MG/DL    ALT (SGPT) 13 13 - 56 U/L    AST (SGOT) 7 (L) 10 - 38 U/L    Alk.  phosphatase 106 45 - 117 U/L    Protein, total 6.1 (L) 6.4 - 8.2 g/dL    Albumin 3.1 (L) 3.4 - 5.0 g/dL    Globulin 3.0 2.0 - 4.0 g/dL    A-G Ratio 1.0 0.8 - 1.7     POC G3    Collection Time: 02/17/20  4:32 AM   Result Value Ref Range    Device: BIPAP      FIO2 (POC) 0.30 %    pH (POC) 7.347 (L) 7.35 - 7.45      pCO2 (POC) 41.6 35.0 - 45.0 MMHG    pO2 (POC) 126 (H) 80 - 100 MMHG    HCO3 (POC) 22.8 22 - 26 MMOL/L    sO2 (POC) 99 (H) 92 - 97 %    Base deficit (POC) 3 mmol/L    PEEP/CPAP (POC) 12 cmH2O    PIP (POC) 28      Pressure support 16 cmH2O    Allens test (POC) YES      Total resp. rate 21      Site RIGHT RADIAL      Patient temp. 98.6      Specimen type (POC) ARTERIAL      Performed by LA NENA Rand YES     GLUCOSE, POC    Collection Time: 02/17/20  7:29 AM   Result Value Ref Range    Glucose (POC) 121 (H) 70 - 110 mg/dL         Chemistry Recent Labs     02/17/20  0412 02/16/20  0456 02/15/20  0345   * 150* 153*    139 140   K 4.8 5.1 4.8    110 112*   CO2 24 21 20*   BUN 66* 68* 69*   CREA 2.31* 2.25* 2.38*   CA 8.7 8.7 8.6   MG 2.6 2.7* 2.5   PHOS 4.2 4.9 4.8   AGAP 6 8 8   BUCR 29* 30* 29*    109 122*   TP 6.1* 6.5 6.1*   ALB 3.1* 3.4 2.6*   GLOB 3.0 3.1 3.5   AGRAT 1.0 1.1 0.7*        Lactic Acid Lactic acid   Date Value Ref Range Status   08/11/2014 0.7 0.4 - 2.0 MMOL/L Final     No results for input(s): LAC in the last 72 hours. Liver Enzymes Protein, total   Date Value Ref Range Status   02/17/2020 6.1 (L) 6.4 - 8.2 g/dL Final     Albumin   Date Value Ref Range Status   02/17/2020 3.1 (L) 3.4 - 5.0 g/dL Final     Globulin   Date Value Ref Range Status   02/17/2020 3.0 2.0 - 4.0 g/dL Final     A-G Ratio   Date Value Ref Range Status   02/17/2020 1.0 0.8 - 1.7   Final     AST (SGOT)   Date Value Ref Range Status   02/17/2020 7 (L) 10 - 38 U/L Final     Alk.  phosphatase   Date Value Ref Range Status   02/17/2020 106 45 - 117 U/L Final     Recent Labs     02/17/20  0412 02/16/20  0456 02/15/20  0345   TP 6.1* 6.5 6.1*   ALB 3.1* 3.4 2.6*   GLOB 3.0 3.1 3.5   AGRAT 1.0 1.1 0.7*   SGOT 7* 9* 9*    109 122*        CBC w/Diff Recent Labs     02/17/20  0412 02/16/20  0456 02/15/20  0345   WBC 6.1 6.0 7.2   RBC 3.27* 3.44* 3.78*   HGB 8.0* 8.4* 9.4*   HCT 26.4* 27.7* 30.3*    275 295   GRANS 62 61 63   LYMPH 20* 22 19*   EOS 4 7* 8*        Cardiac Enzymes Lab Results   Component Value Date/Time    CPK 49 02/16/2020 03:50 PM    CKMB <1.0 02/16/2020 03:50 PM    CKND1  02/16/2020 03:50 PM     CALCULATION NOT PERFORMED WHEN RESULT IS BELOW LINEAR LIMIT    Arianna [place] <0.02 02/16/2020 03:50 PM        BNP No results found for: BNP, BNPP, XBNPT     Coagulation No results for input(s): PTP, INR, APTT, INREXT in the last 72 hours. Thyroid  Lab Results   Component Value Date/Time    TSH 0.56 01/23/2019 01:33 AM       No results found for: T4     Urinalysis Lab Results   Component Value Date/Time    Color YELLOW 12/11/2019 02:30 AM    Appearance CLOUDY 12/11/2019 02:30 AM    Specific gravity 1.021 12/11/2019 02:30 AM    pH (UA) 5.0 12/11/2019 02:30 AM    Protein >1,000 (A) 12/11/2019 02:30 AM    Glucose >1,000 (A) 12/11/2019 02:30 AM    Ketone 15 (A) 12/11/2019 02:30 AM    Bilirubin NEGATIVE  12/11/2019 02:30 AM    Urobilinogen 0.2 12/11/2019 02:30 AM    Nitrites NEGATIVE  12/11/2019 02:30 AM    Leukocyte Esterase NEGATIVE  12/11/2019 02:30 AM    Epithelial cells 2 to 3 12/11/2019 02:30 AM    Bacteria 3+ (A) 12/11/2019 02:30 AM    WBC 40 to 50 12/11/2019 02:30 AM    RBC 4 to 6 12/11/2019 02:30 AM        Micro  No results for input(s): SDES, CULT in the last 72 hours. No results for input(s): CULT in the last 72 hours. Culture data during this hospitalization. All Micro Results     Procedure Component Value Units Date/Time    OVA & PARASITES, STOOL [[id number]] Collected:  02/13/20 0000    Order Status:  Canceled Specimen:  Stool     C. DIFFICILE AG & TOXIN A/B [[id number]]     Order Status:  Canceled Specimen:  Stool              ECHO 2/13/20 Result status: Final result ·   Left Ventricle: Normal cavity size, wall thickness and systolic function (ejection fraction normal). The estimated ejection fraction is 60 - 65%. There is mild (grade 1) left ventricular diastolic dysfunction E/E' ratio is 13.00.  · Moderately dilated left atrium. · Left Atrium: Moderately dilated left atrium. · Tricuspid Valve: Normal valve structure and no stenosis.  Mild regurgitation. Pulmonary arterial systolic pressure is 58.8 mmHg. Mild to moderate pulmonary hypertension. · IVC/Hepatic Veins: Severely elevated central venous pressure (15+ mmHg); IVC diameter is larger than 21 mm and collapses less than 50% with respiration. VQ scan 2/12/20: Low probability of pulmonary embolism. Images report reviewed by me:  CT (Most Recent) (CT chest reviewed by me) Results from Hospital Encounter encounter on 02/12/20   CT ABD PELV WO CONT    Narrative EXAM: CT of the abdomen and pelvis    CLINICAL INDICATION/HISTORY:  Abdominal pain and diarrhea. COMPARISON: 07/17/2014. TECHNIQUE: Axial CT imaging of the abdomen and pelvis was performed without  contrast. Multiplanar reformats were generated. One or more dose reduction techniques were used on this CT: automated exposure  control, adjustment of the mAs and/or kVp according to patient size, and  iterative reconstruction techniques. The specific techniques used on this CT  exam have been documented in the patient's electronic medical record. Digital  Imaging and Communications in Medicine (DICOM) format image data are available  to nonaffiliated external healthcare facilities or entities on a secure, media  free, reciprocally searchable basis with patient authorization for at least a  12-month period after this study. _______________    FINDINGS:    LOWER CHEST: The visualized lung bases are clear. Heart size is normal. There is  no pericardial or pleural effusion. LIVER, BILIARY: Liver is normal with no focal parenchymal lesion identified. There is no intra-or extrahepatic biliary ductal dilatation. Gallbladder is  surgically absent. PANCREAS: Normal.    SPLEEN: Normal.    ADRENALS: Normal.    KIDNEYS: Normal. There is no nephrolithiasis. There is no hydronephrosis,  hydroureter, or other signs of obstructive nephropathy. LYMPH NODES: No enlarged lymph nodes.     GASTROINTESTINAL TRACT: There is no evidence of bowel obstruction. While limited  without contrast, there is no definitive wall thickening.] The appendix is  visualized and unremarkable. PELVIC ORGANS: Unremarkable. VASCULATURE: Unremarkable. BONES: No acute or aggressive osseous abnormalities identified. OTHER: There is no free intraperitoneal fluid or free air. SUPERFICIAL SOFT TISSUES: Mild inflammatory changes at the pannus. There is a  fat-containing ventral hernia. Please note that evaluation of the intra-abdominal structures is somewhat  limited due to lack of oral or IV contrast.    _______________      Impression IMPRESSION:    1. No evidence of nephrolithiasis, hydronephrosis, or other signs of obstructive  nephropathy. 2. No evidence of bowel obstruction or acute inflammatory process in the abdomen  or pelvis. 3. Superficial pannus inflammatory changes. Please correlate for clinical signs  of cellulitis. CXR reviewed by me:  XR (Most Recent). CXR  reviewed by me and compared with previous CXR Results from Hospital Encounter encounter on 02/12/20   XR CHEST PORT    Narrative EXAM: XR CHEST PORT    CLINICAL INDICATION/HISTORY: Shortness of breath  -Additional: None    COMPARISON: 2/16/2020    TECHNIQUE: Portable frontal view of the chest    _______________    FINDINGS:    SUPPORT DEVICES: None. HEART AND MEDIASTINUM: Cardiac silhouette appears enlarged, unchanged. Table  mediastinal contours. LUNGS AND PLEURAL SPACES: Persistent central vascular congestion and faint  interstitial opacities. No pneumothorax or large pleural effusion. BONY THORAX AND SOFT TISSUES: Unremarkable.    _______________      Impression IMPRESSION:    Cardiac enlargement with persistent findings of central vascular congestion and  mild interstitial edema.              Nuria Bowers MD  2/17/2020

## 2020-02-17 NOTE — PROGRESS NOTES
0700  Bedside, Verbal and Written shift change report given to LEORA Casiano (oncoming nurse) by Blanca Howard (offgoing nurse). Report included the following information SBAR, Kardex, Intake/Output and Recent Results. 0700  Pt taken off Bipap placed on 3L NC.   0800 Pt aox4, following commands, denies pain, SR on tele, 3L NC, external catheter. 0900 Scheduled meds given, pt tolerated well, family at bedside. VSS  1400 Pt status unchanged, sitting up watching TV, denies pain or sob at this time, remains on 2L NC.   1600 Pt sitting at bedside, PT at bedside working with pt. VSS  1722  1st attempt to call report to 3N, RN discharging pt at this time, states she will call for report when she has finished. [phone]  2nd attempt to call report to 3N, Transfer being put on hold due to staffing per Nursing supervisor. 1915 Bedside, Verbal and Written shift change report given to Blanca Howard (oncoming nurse) by Hanny Casiano (offgoing nurse). Report included the following information SBAR, Kardex, Intake/Output and Recent Results.

## 2020-02-17 NOTE — PROGRESS NOTES
Problem: Self Care Deficits Care Plan (Adult)  Goal: *Acute Goals and Plan of Care (Insert Text)  Description  Initial Occupational Therapy Goals (2/17/2020) Within 7 day(s):    1. Patient will perform grooming seated EOB with setup x 15 minutes for increased independence with ADLs. 2. Patient will perform UB dressing with setup for increased independence with ADLs. 3. Patient will perform LB dressing with setup/Tanisha & A/E PRN for increased independence with ADLs. 4. Patient will perform all aspects of toileting with min/modA w/ A/E PRN for increased independence in ADLs  5. Patient will independently apply energy conservation techniques with 1 verbal cue(s) for increased independence with ADLs. 6. Patient will utilize good body mechanics during ADLs with 1 verbal cue(s). 7. Patient will perform functional transfer with CGA in prep for ADLs. Outcome: Progressing Towards Goal     OCCUPATIONAL THERAPY EVALUATION    Patient: Elysia Reddy (53 y.o. female)  Date: 2/17/2020  Primary Diagnosis: Hyperkalemia [E87.5]        Precautions:  Skin(O2), Fall  PLOF: Patient reporting independence, but then stating she has an aide and her daughter Ranulfo Velazquez) comes on Tuesdays to  her to MD appts, bathe her, and cook meals. Pt reports working at [employer]) but didn't specify how long ago; pt reports a lot of vane with being with her grandchildren    ASSESSMENT :  Based on the objective data described below, the patient presents with significant functional decline d/t c/o SOB. Pt talkative w/ cues for attn to task. Pt w/ a lot of family drama and stress and noted when pt talks of other daughter Atilio Lopez & her son, she feels SOB, but SPO2>94%. Pt requires cues for stopping talking and performing pursed lip breathing. Pt able to complete simple grooming and bathing in seated position in bed w/ increased time and cues for attn to task.  Noted as pt became more emotional about family despite SPO2 maintained during ADLs, at end, pt w/ decreasing SPO2 to 91-92% w/ noted increased s/p tx termination. (Pt w/ decreased return demo and verbalization of instruction as pt appears perseverative and caught up in stress of family). Education: Patient instructed on home safety, body mechanics for optimal respiratory effort, Energy Conservation/Work Simplification Techniques, adaptive strategies and adaptive dressing techniques including clothing modifications with patient  verbalizing understanding at this time. Patient will benefit from skilled intervention to address the above impairments. Patient's rehabilitation potential is considered to be Fair  Factors which may influence rehabilitation potential include:   []             None noted  [x]             Mental ability/status  [x]             Medical condition  []             Home/family situation and support systems  [x]             Safety awareness  []             Pain tolerance/management  []             Other:      PLAN :  Recommendations and Planned Interventions:   [x]               Self Care Training                  [x]      Therapeutic Activities  [x]               Functional Mobility Training   [x]      Cognitive Retraining  [x]               Therapeutic Exercises           [x]      Endurance Activities  [x]               Balance Training                    [x]      Neuromuscular Re-Education  []               Visual/Perceptual Training     [x]      Home Safety Training  [x]               Patient Education                   [x]      Family Training/Education  []               Other (comment):    Frequency/Duration: Patient will be followed by occupational therapy 1-2 times per day/1-3 days per week to address goals. Discharge Recommendations: [facility]  Further Equipment Recommendations for Discharge: To Be Determined (TBD) at next level of care      SUBJECTIVE:   Patient stated [place] is this happening? I was working not too long ago.  (pt w/ poor insight into her comorbidities and obesity effecting SOB and health)    OBJECTIVE DATA SUMMARY:     Past Medical History:   Diagnosis Date    Asthma     Note: As child had asthma    Diabetes mellitus ([facility] [address]     Type 2    Heart palpitations     Hypertension     Ill-defined condition     fibromyalgia     Morbid obesity ([facility] [address]     MRSA infection 8/2014     Past Surgical History:   Procedure Laterality Date    BREAST SURGERY PROCEDURE UNLISTED      cyst removed    HX CATARACT REMOVAL      HX CHOLECYSTECTOMY      HX TUBAL LIGATION       Barriers to Learning/Limitations: yes;  emotional and cognitive  Compensate with: visual, verbal, tactile, kinesthetic cues/model    Home Situation: Lives along; daughter Sierra Prieto) supportive; reports other children drop their kids off for \"Free babysitting\"; pt sleeps on couch  Home Situation  Home Environment: Apartment  # Steps to Enter: 0  One/Two Story Residence: One story  Living Alone: Yes  Support Systems: Child(vero), Family member(s)  Patient Expects to be Discharged to[de-identified] Apartment  Current DME Used/Available at Home: Glucometer, Walker, rolling  Tub or Shower Type: Tub/Shower combination  [x]  Right hand dominant   []  Left hand dominant    Cognitive/Behavioral Status:  Neurologic State: Alert  Orientation Level: Oriented X4  Cognition: Decreased attention/concentration; Follows commands;Poor safety awareness; Impaired decision making  Safety/Judgement: Awareness of environment;Decreased awareness of need for assistance;Decreased awareness of need for safety;Decreased insight into deficits    Skin: R big toe w/ nail bloody; at risk d/t habitus and immobility  Edema: [see nursing note]    Vision/Perceptual:     appears WFL      Coordination: BUE  Coordination: Generally decreased, functional  Fine Motor Skills-Upper: Left Intact; Right Intact    Gross Motor Skills-Upper: Left Intact; Right Intact    Strength: BUE  Strength: Generally decreased, functional    Tone & Sensation: BUE  Tone: Normal  Sensation: Intact    Range of Motion: BUE  AROM: Generally decreased, functional    Functional Mobility and Transfers for ADLs:  Bed Mobility:   EOB/OOB deferred d/t hyperverbility and anxiety       ADL Assessment:   Feeding: Setup; Additional time(NPO, but has adequate ROM to perform)    Oral Facial Hygiene/Grooming: Setup; Additional time    Bathing: Maximum assistance    Upper Body Dressing: Minimum assistance    Lower Body Dressing: Maximum assistance; Total assistance    Toileting: Total assistance    ADL Intervention:  Grooming  Washing Face: Set-up  Washing Hands: Set-up(hand wipes)  Brushing Teeth: Set-up    Lower Body Dressing Assistance  Socks: Total assistance (dependent)(c-sitting)    Toileting  Bladder Hygiene: (catheter)    Cognitive Retraining  Problem Solving: Inductive reason; Identifying the task; Identifying the problem;General alternative solution;Deductive reason; Awareness of environment  Executive Functions: Executing cognitive plans;Managing time;Regulating behavior  Organizing/Sequencing: Breaking task down;Prioritizing  Attention to Task: Distractibility; Single task  Maintains Attention For (Time): 30 seconds  Following Commands: Awareness of environment; Follows one step commands/directions  Safety/Judgement: Awareness of environment;Decreased awareness of need for assistance;Decreased awareness of need for safety;Decreased insight into deficits  Cues: Tactile cues provided;Verbal cues provided;Visual cues provided    Pain:  Pain level pre-treatment: 0/10   Pain level post-treatment: 0/10   Pain Intervention(s): Medication provided by Nursing (see MAR); Rest, Ice, Repositioning   Response to intervention: Nurse notified, See doc flow sheet    Activity Tolerance:   Fair-. Patient able to complete ADLs with frequent rest breaks. Patient limited by cognition, respiratory status, anxiety, balance. Patient unsteady.      Please refer to the flowsheet for vital signs taken during this treatment. After treatment:   [] Patient left in no apparent distress sitting up in chair  [x] Patient left in no apparent distress in bed  [x] Call bell left within reach  [x] Nursing notified/Darío  [] Caregiver present  [] Bed alarm activated    COMMUNICATION/EDUCATION:   [x] Role of Occupational Therapy in the acute care setting  [x] Home safety education was provided and the patient/caregiver indicated understanding. [x] Patient/family have participated as able in goal setting and plan of care. [x] Patient/family agree to work toward stated goals and plan of care. [] Patient understands intent and goals of therapy, but is neutral about his/her participation. [] Patient is unable to participate in goal setting and plan of care. Thank you for this referral.  Maritza Herrmann  Time Calculation: (P) 32 mins    Eval Complexity: History: HIGH Complexity : Extensive review of history including physical, cognitive and psychosocial history ; Examination: HIGH Complexity : 5 or more performance deficits relating to physical, cognitive , or psychosocial skils that result in activity limitations and / or participation restrictions; Decision Making:HIGH Complexity : Patient presents with comorbidities that affect occupational performance.  Signifigant modification of tasks or assistance (eg, physical or verbal) with assessment (s) is necessary to enable patient to complete evaluation

## 2020-02-17 NOTE — PROGRESS NOTES
Cardiology Progress Note        Patient: Melida Johnson        Sex: female          DOA: 2/12/2020  YOB: 1965      Age:  53 y.o.        LOS:  LOS: 5 days   Assessment/Plan     Principal Problem:    Hyperkalemia (2/12/2020)    Active Problems:    HTN (hypertension) (8/16/2014)      Type II or unspecified type diabetes mellitus with renal manifestations, uncontrolled(250.42) ([facility] [address] (9/4/2014)      Morbid obesity (HCC) ()      АНДРЕЙ (obstructive sleep apnea) (12/11/2019)      Chest pain (2/13/2020)      Acute renal failure superimposed on stage 3 chronic kidney disease ([facility] [address] (2/13/2020)      Acute respiratory failure with hypoxia (HCC) (4/53/3854)      Diastolic CHF, acute on chronic ([facility] [address] (2/16/2020)        Plan:    SOB  Diastolic heart failure  Chronic renal disease  Sleep apnea      plan  Fluid restriction 1500 ml/day  Salt restriction to 4 gm per day  Start Coreg 3.125 mg po BID then titrate  May need high dose lasix - diuretic as per nephrology   Minimal trop due to CHF- recent stress test was normal  Discussed with pt and Dr. Dayna Rothman. Subjective:    cc:  SOB  Diastolic heart failure        REVIEW OF SYSTEMS:     General: No fevers or chills. Cardiovascular: No chest pain or pressure. No palpitations. No ankle swelling  Pulmonary: No SOB, orthopnea, PND  Gastrointestinal: No nausea, vomiting or diarrhea      Objective:      Visit Vitals  /53   Pulse 98   Temp 98.9 °F (37.2 °C)   Resp 23   Ht 5' 2\" (1.575 m)   Wt 151.4 kg (333 lb 12.4 oz)   SpO2 95%   Breastfeeding No   BMI 61.05 kg/m²     Body mass index is 61.05 kg/m². Physical Exam:  General Appearance: Comfortable, not using accessory muscles of respiration. NECK: No JVD, no thyroidomeglay. LUNGS: fine basal crackles on both sides   HEART: S1+S2 audible,    ABD: Non-tender, BS Audible    EXT: ++ edema, and no cysnosis. VASCULAR EXAM: Pulses are intact.     PSYCHIATRIC EXAM: Mood is appropriate.     Medication:  Current Facility-Administered Medications   Medication Dose Route Frequency    carvediloL (COREG) tablet 3.125 mg  3.125 mg Oral BID WITH MEALS    furosemide (LASIX) injection 40 mg  40 mg IntraVENous BID    ELECTROLYTE REPLACEMENT PROTOCOL - Potassium Renal Dosing  1 Each Other PRN    ELECTROLYTE REPLACEMENT PROTOCOL  - Phosphorus Renal Dosing  1 Each Other PRN    ELECTROLYTE REPLACEMENT PROTOCOL - Calcium   1 Each Other PRN    ELECTROLYTE REPLACEMENT PROTOCOL - Magnesium   1 Each Other PRN    hydrALAZINE (APRESOLINE) 20 mg/mL injection 10 mg  10 mg IntraVENous Q6H PRN    iron sucrose (VENOFER) injection 200 mg  200 mg IntraVENous DAILY    magnesium hydroxide (MILK OF MAGNESIA) 400 mg/5 mL oral suspension 30 mL  30 mL Oral DAILY PRN    docusate sodium (COLACE) capsule 100 mg  100 mg Oral BID    polyethylene glycol (MIRALAX) packet 17 g  17 g Oral BID    gabapentin (NEURONTIN) capsule 300 mg  300 mg Oral TID    amLODIPine (NORVASC) tablet 10 mg  10 mg Oral DAILY    hydrALAZINE (APRESOLINE) tablet 100 mg  100 mg Oral TID    atorvastatin (LIPITOR) tablet 40 mg  40 mg Oral DAILY    albuterol-ipratropium (DUO-NEB) 2.5 MG-0.5 MG/3 ML  3 mL Nebulization Q4H PRN    aspirin chewable tablet 81 mg  81 mg Oral DAILY    DULoxetine (CYMBALTA) capsule 60 mg  60 mg Oral DAILY    neomycin-bacitracin-polymyxin (NEOSPORIN) ointment   Topical TID    heparin (porcine) injection 5,000 Units  5,000 Units SubCUTAneous Q8H    insulin glargine (LANTUS) injection 15 Units  15 Units SubCUTAneous QHS    insulin lispro (HUMALOG) injection   SubCUTAneous AC&HS    dextrose 10% infusion 125-250 mL  125-250 mL IntraVENous PRN    calcium acetate(phosphat bind) (PHOSLO) capsule 667 mg  1 Cap Oral TID WITH MEALS    sodium chloride (NS) flush 5-40 mL  5-40 mL IntraVENous Q8H    sodium chloride (NS) flush 5-40 mL  5-40 mL IntraVENous PRN    acetaminophen (TYLENOL) tablet 650 mg  650 mg Oral Q4H PRN  oxyCODONE-acetaminophen (PERCOCET) 5-325 mg per tablet 1 Tab  1 Tab Oral Q4H PRN               Lab/Data Reviewed:  Procedures/imaging: see electronic medical records for all procedures/Xrays   and details which were not copied into this note but were reviewed prior to creation of Plan       All lab results for the last 24 hours reviewed.      Recent Labs     02/17/20  0412 02/16/20  0456 02/15/20  0345   WBC 6.1 6.0 7.2   HGB 8.0* 8.4* 9.4*   HCT 26.4* 27.7* 30.3*    275 295     Recent Labs     02/17/20  0412 02/16/20  0456 02/15/20  0345    139 140   K 4.8 5.1 4.8    110 112*   CO2 24 21 20*   * 150* 153*   BUN 66* 68* 69*   CREA 2.31* 2.25* 2.38*   CA 8.7 8.7 8.6       Signed By: Maria Fernanda Anaya MD     February 17, 2020

## 2020-02-17 NOTE — PROGRESS NOTES
Bedside and Verbal shift change report given to [id number] (oncoming nurse) by AIRAM Rondon RN (offgoing nurse). Report included the following information SBAR, Kardex, ED Summary, Intake/Output, Med Rec Status and Cardiac Rhythm NSR.

## 2020-02-18 LAB
ALBUMIN SERPL-MCNC: 3 G/DL (ref 3.4–5)
ALBUMIN/GLOB SERPL: 1 {RATIO} (ref 0.8–1.7)
ALP SERPL-CCNC: 107 U/L (ref 45–117)
ALT SERPL-CCNC: 17 U/L (ref 13–56)
ANION GAP SERPL CALC-SCNC: 7 MMOL/L (ref 3–18)
AST SERPL-CCNC: 9 U/L (ref 10–38)
BASOPHILS # BLD: 0.1 K/UL (ref 0–0.1)
BASOPHILS NFR BLD: 1 % (ref 0–2)
BILIRUB SERPL-MCNC: 0.4 MG/DL (ref 0.2–1)
BUN SERPL-MCNC: 64 MG/DL (ref 7–18)
BUN/CREAT SERPL: 27 (ref 12–20)
CALCIUM SERPL-MCNC: 9 MG/DL (ref 8.5–10.1)
CHLORIDE SERPL-SCNC: 109 MMOL/L (ref 100–111)
CO2 SERPL-SCNC: 24 MMOL/L (ref 21–32)
CREAT SERPL-MCNC: 2.38 MG/DL (ref 0.6–1.3)
DIFFERENTIAL METHOD BLD: ABNORMAL
EOSINOPHIL # BLD: 0.3 K/UL (ref 0–0.4)
EOSINOPHIL NFR BLD: 6 % (ref 0–5)
ERYTHROCYTE [DISTWIDTH] IN BLOOD BY AUTOMATED COUNT: 15.3 % (ref 11.6–14.5)
GLOBULIN SER CALC-MCNC: 3.1 G/DL (ref 2–4)
GLUCOSE BLD STRIP.AUTO-MCNC: 150 MG/DL (ref 70–110)
GLUCOSE BLD STRIP.AUTO-MCNC: 242 MG/DL (ref 70–110)
GLUCOSE BLD STRIP.AUTO-MCNC: 312 MG/DL (ref 70–110)
GLUCOSE BLD STRIP.AUTO-MCNC: 325 MG/DL (ref 70–110)
GLUCOSE SERPL-MCNC: 147 MG/DL (ref 74–99)
HCT VFR BLD AUTO: 27 % (ref 35–45)
HGB BLD-MCNC: 8.2 G/DL (ref 12–16)
LYMPHOCYTES # BLD: 1.6 K/UL (ref 0.9–3.6)
LYMPHOCYTES NFR BLD: 27 % (ref 21–52)
MAGNESIUM SERPL-MCNC: 2.4 MG/DL (ref 1.6–2.6)
MCH RBC QN AUTO: 24.8 PG (ref 24–34)
MCHC RBC AUTO-ENTMCNC: 30.4 G/DL (ref 31–37)
MCV RBC AUTO: 81.6 FL (ref 74–97)
MONOCYTES # BLD: 0.6 K/UL (ref 0.05–1.2)
MONOCYTES NFR BLD: 11 % (ref 3–10)
NEUTS SEG # BLD: 3.3 K/UL (ref 1.8–8)
NEUTS SEG NFR BLD: 55 % (ref 40–73)
PHOSPHATE SERPL-MCNC: 4.4 MG/DL (ref 2.5–4.9)
PLATELET # BLD AUTO: 245 K/UL (ref 135–420)
PMV BLD AUTO: 9.3 FL (ref 9.2–11.8)
POTASSIUM SERPL-SCNC: 4.7 MMOL/L (ref 3.5–5.5)
PROT SERPL-MCNC: 6.1 G/DL (ref 6.4–8.2)
RBC # BLD AUTO: 3.31 M/UL (ref 4.2–5.3)
SODIUM SERPL-SCNC: 140 MMOL/L (ref 136–145)
WBC # BLD AUTO: 5.9 K/UL (ref 4.6–13.2)

## 2020-02-18 PROCEDURE — 97535 SELF CARE MNGMENT TRAINING: CPT

## 2020-02-18 PROCEDURE — 77030018846 HC SOL IRR STRL H20 ICUM -A

## 2020-02-18 PROCEDURE — 74011250636 HC RX REV CODE- 250/636: Performed by: INTERNAL MEDICINE

## 2020-02-18 PROCEDURE — 74011250637 HC RX REV CODE- 250/637: Performed by: HOSPITALIST

## 2020-02-18 PROCEDURE — 97530 THERAPEUTIC ACTIVITIES: CPT

## 2020-02-18 PROCEDURE — 65660000000 HC RM CCU STEPDOWN

## 2020-02-18 PROCEDURE — 85025 COMPLETE CBC W/AUTO DIFF WBC: CPT

## 2020-02-18 PROCEDURE — 97116 GAIT TRAINING THERAPY: CPT

## 2020-02-18 PROCEDURE — 74011250637 HC RX REV CODE- 250/637: Performed by: INTERNAL MEDICINE

## 2020-02-18 PROCEDURE — 80053 COMPREHEN METABOLIC PANEL: CPT

## 2020-02-18 PROCEDURE — 82962 GLUCOSE BLOOD TEST: CPT

## 2020-02-18 PROCEDURE — 36415 COLL VENOUS BLD VENIPUNCTURE: CPT

## 2020-02-18 PROCEDURE — 94660 CPAP INITIATION&MGMT: CPT

## 2020-02-18 PROCEDURE — 77010033678 HC OXYGEN DAILY

## 2020-02-18 PROCEDURE — 74011636637 HC RX REV CODE- 636/637: Performed by: INTERNAL MEDICINE

## 2020-02-18 PROCEDURE — 84100 ASSAY OF PHOSPHORUS: CPT

## 2020-02-18 PROCEDURE — 74011250636 HC RX REV CODE- 250/636: Performed by: HOSPITALIST

## 2020-02-18 PROCEDURE — 74011636637 HC RX REV CODE- 636/637: Performed by: HOSPITALIST

## 2020-02-18 PROCEDURE — 83735 ASSAY OF MAGNESIUM: CPT

## 2020-02-18 RX ORDER — FUROSEMIDE 10 MG/ML
80 INJECTION INTRAMUSCULAR; INTRAVENOUS 2 TIMES DAILY
Status: DISCONTINUED | OUTPATIENT
Start: 2020-02-18 | End: 2020-02-20

## 2020-02-18 RX ORDER — INSULIN LISPRO 100 [IU]/ML
5 INJECTION, SOLUTION INTRAVENOUS; SUBCUTANEOUS
Status: DISCONTINUED | OUTPATIENT
Start: 2020-02-18 | End: 2020-02-24 | Stop reason: HOSPADM

## 2020-02-18 RX ADMIN — ASPIRIN 81 MG 81 MG: 81 TABLET ORAL at 08:46

## 2020-02-18 RX ADMIN — HEPARIN SODIUM 5000 UNITS: 5000 INJECTION INTRAVENOUS; SUBCUTANEOUS at 13:21

## 2020-02-18 RX ADMIN — POLYETHYLENE GLYCOL 3350 17 G: 17 POWDER, FOR SOLUTION ORAL at 08:46

## 2020-02-18 RX ADMIN — POLYMYXIN B SULFATE, BACITRACIN ZINC, NEOMYCIN SULFATE: 5000; 3.5; 4 OINTMENT TOPICAL at 22:00

## 2020-02-18 RX ADMIN — Medication 10 ML: at 13:21

## 2020-02-18 RX ADMIN — ACETAMINOPHEN 650 MG: 325 TABLET ORAL at 10:19

## 2020-02-18 RX ADMIN — HYDRALAZINE HYDROCHLORIDE 100 MG: 50 TABLET, FILM COATED ORAL at 18:08

## 2020-02-18 RX ADMIN — CARVEDILOL 3.12 MG: 3.12 TABLET, FILM COATED ORAL at 18:08

## 2020-02-18 RX ADMIN — ATORVASTATIN CALCIUM 40 MG: 20 TABLET, FILM COATED ORAL at 08:45

## 2020-02-18 RX ADMIN — DOCUSATE SODIUM 100 MG: 100 CAPSULE, LIQUID FILLED ORAL at 08:45

## 2020-02-18 RX ADMIN — HYDRALAZINE HYDROCHLORIDE 100 MG: 50 TABLET, FILM COATED ORAL at 08:45

## 2020-02-18 RX ADMIN — INSULIN LISPRO 8 UNITS: 100 INJECTION, SOLUTION INTRAVENOUS; SUBCUTANEOUS at 21:21

## 2020-02-18 RX ADMIN — GABAPENTIN 300 MG: 300 CAPSULE ORAL at 21:19

## 2020-02-18 RX ADMIN — HEPARIN SODIUM 5000 UNITS: 5000 INJECTION INTRAVENOUS; SUBCUTANEOUS at 06:17

## 2020-02-18 RX ADMIN — GABAPENTIN 300 MG: 300 CAPSULE ORAL at 08:45

## 2020-02-18 RX ADMIN — POLYMYXIN B SULFATE, BACITRACIN ZINC, NEOMYCIN SULFATE: 5000; 3.5; 4 OINTMENT TOPICAL at 18:09

## 2020-02-18 RX ADMIN — DOCUSATE SODIUM 100 MG: 100 CAPSULE, LIQUID FILLED ORAL at 21:19

## 2020-02-18 RX ADMIN — CARVEDILOL 3.12 MG: 3.12 TABLET, FILM COATED ORAL at 08:45

## 2020-02-18 RX ADMIN — FUROSEMIDE 80 MG: 10 INJECTION, SOLUTION INTRAMUSCULAR; INTRAVENOUS at 21:19

## 2020-02-18 RX ADMIN — CALCIUM ACETATE 667 MG: 667 CAPSULE ORAL at 08:45

## 2020-02-18 RX ADMIN — AMLODIPINE BESYLATE 10 MG: 5 TABLET ORAL at 08:45

## 2020-02-18 RX ADMIN — HYDRALAZINE HYDROCHLORIDE 100 MG: 50 TABLET, FILM COATED ORAL at 21:19

## 2020-02-18 RX ADMIN — INSULIN LISPRO 8 UNITS: 100 INJECTION, SOLUTION INTRAVENOUS; SUBCUTANEOUS at 18:09

## 2020-02-18 RX ADMIN — HEPARIN SODIUM 5000 UNITS: 5000 INJECTION INTRAVENOUS; SUBCUTANEOUS at 21:18

## 2020-02-18 RX ADMIN — CALCIUM ACETATE 667 MG: 667 CAPSULE ORAL at 18:08

## 2020-02-18 RX ADMIN — CALCIUM ACETATE 667 MG: 667 CAPSULE ORAL at 12:35

## 2020-02-18 RX ADMIN — GABAPENTIN 300 MG: 300 CAPSULE ORAL at 18:08

## 2020-02-18 RX ADMIN — DULOXETINE HYDROCHLORIDE 60 MG: 60 CAPSULE, DELAYED RELEASE ORAL at 08:45

## 2020-02-18 RX ADMIN — INSULIN GLARGINE 15 UNITS: 100 INJECTION, SOLUTION SUBCUTANEOUS at 21:20

## 2020-02-18 RX ADMIN — INSULIN LISPRO 5 UNITS: 100 INJECTION, SOLUTION INTRAVENOUS; SUBCUTANEOUS at 18:08

## 2020-02-18 RX ADMIN — POLYMYXIN B SULFATE, BACITRACIN ZINC, NEOMYCIN SULFATE: 5000; 3.5; 4 OINTMENT TOPICAL at 09:12

## 2020-02-18 RX ADMIN — IRON SUCROSE 200 MG: 20 INJECTION, SOLUTION INTRAVENOUS at 09:12

## 2020-02-18 RX ADMIN — Medication 10 ML: at 21:22

## 2020-02-18 RX ADMIN — Medication 10 ML: at 05:02

## 2020-02-18 RX ADMIN — FUROSEMIDE 40 MG: 10 INJECTION, SOLUTION INTRAMUSCULAR; INTRAVENOUS at 08:44

## 2020-02-18 RX ADMIN — INSULIN LISPRO 4 UNITS: 100 INJECTION, SOLUTION INTRAVENOUS; SUBCUTANEOUS at 12:35

## 2020-02-18 NOTE — PROGRESS NOTES
02/17/20 3928   CPAP/BIPAP   CPAP/BIPAP Start/Stop On   Device Mode BIPAP;AVAP   AVAP Set Resp Rate 20   AVAP Set VT (ml) 425   139shop ID # [id number]   Mask Type and Size Full face; Large   PIP Observed 20 cm H20   IPAP (cm H2O)   (20-35)   EPAP (cm H2O) 12 cm H2O   Inspiratory Time (sec) 0.9 seconds   Vt Spont (ml) 336 ml   Ve Observed (l/min) 6.7 l/min   Backup Rate 20   Total RR (Spontaneous) 24 breaths per minute   Insp Rise Time (sec) 0.9   Leak (Estimated) 8 L/min   Pt's Home Machine No   Biomedical Check Performed Yes   Settings Verified Yes

## 2020-02-18 NOTE — PROGRESS NOTES
3634:  Assumed care for patient, received bedside report from Gary Bishop RN. Patient quietly lying in bed with no complaints of pain or discomfort at the time. Bedside skin assessment performed, no abnormalities noted. Patient oriented to room, bed, telephone, television, bathroom, and call bell. Whiteboard updated, bed at the lowest position with call bell within reach.

## 2020-02-18 NOTE — PROGRESS NOTES
Problem: Diabetes Self-Management  Goal: *Disease process and treatment process  Description  Define diabetes and identify own type of diabetes; list 3 options for treating diabetes. Outcome: Progressing Towards Goal  Goal: *Incorporating nutritional management into lifestyle  Description  Describe effect of type, amount and timing of food on blood glucose; list 3 methods for planning meals. Outcome: Progressing Towards Goal  Goal: *Incorporating physical activity into lifestyle  Description  State effect of exercise on blood glucose levels. Outcome: Progressing Towards Goal  Goal: *Developing strategies to promote health/change behavior  Description  Define the ABC's of diabetes; identify appropriate screenings, schedule and personal plan for screenings. Outcome: Progressing Towards Goal  Goal: *Using medications safely  Description  State effect of diabetes medications on diabetes; name diabetes medication taking, action and side effects. Outcome: Progressing Towards Goal  Goal: *Monitoring blood glucose, interpreting and using results  Description  Identify recommended blood glucose targets  and personal targets. Outcome: Progressing Towards Goal  Goal: *Prevention, detection, treatment of acute complications  Description  List symptoms of hyper- and hypoglycemia; describe how to treat low blood sugar and actions for lowering  high blood glucose level. Outcome: Progressing Towards Goal  Goal: *Prevention, detection and treatment of chronic complications  Description  Define the natural course of diabetes and describe the relationship of blood glucose levels to long term complications of diabetes.   Outcome: Progressing Towards Goal  Goal: *Developing strategies to address psychosocial issues  Description  Describe feelings about living with diabetes; identify support needed and support network  Outcome: Progressing Towards Goal  Goal: *Insulin pump training  Outcome: Progressing Towards Goal  Goal: *Sick day guidelines  Outcome: Progressing Towards Goal  Goal: *Patient Specific Goal (EDIT GOAL, INSERT TEXT)  Outcome: Progressing Towards Goal     Problem: Patient Education: Go to Patient Education Activity  Goal: Patient/Family Education  Outcome: Progressing Towards Goal     Problem: Risk for Spread of Infection  Goal: Prevent transmission of infectious organism to others  Description  Prevent the transmission of infectious organisms to other patients, staff members, and visitors. Outcome: Progressing Towards Goal     Problem: Patient Education:  Go to Education Activity  Goal: Patient/Family Education  Outcome: Progressing Towards Goal     Problem: Falls - Risk of  Goal: *Absence of Falls  Description  Document Glory Chance Fall Risk and appropriate interventions in the flowsheet. Outcome: Progressing Towards Goal  Note: Fall Risk Interventions:  Mobility Interventions: Assess mobility with egress test         Medication Interventions: Teach patient to arise slowly    Elimination Interventions: Patient to call for help with toileting needs    History of Falls Interventions: Bed/chair exit alarm         Problem: Patient Education: Go to Patient Education Activity  Goal: Patient/Family Education  Outcome: Progressing Towards Goal     Problem: Pain  Goal: *Control of Pain  Outcome: Progressing Towards Goal  Goal: *PALLIATIVE CARE:  Alleviation of Pain  Outcome: Progressing Towards Goal     Problem: Patient Education: Go to Patient Education Activity  Goal: Patient/Family Education  Outcome: Progressing Towards Goal     Problem: Pressure Injury - Risk of  Goal: *Prevention of pressure injury  Description  Document Elian Scale and appropriate interventions in the flowsheet.   Outcome: Progressing Towards Goal  Note: Pressure Injury Interventions:  Sensory Interventions: Assess changes in LOC    Moisture Interventions: Absorbent underpads    Activity Interventions: Pressure redistribution bed/mattress(bed type), PT/OT evaluation    Mobility Interventions: Pressure redistribution bed/mattress (bed type), PT/OT evaluation    Nutrition Interventions: Document food/fluid/supplement intake    Friction and Shear Interventions: Lift sheet, HOB 30 degrees or less                Problem: Patient Education: Go to Patient Education Activity  Goal: Patient/Family Education  Outcome: Progressing Towards Goal     Problem: Patient Education: Go to Patient Education Activity  Goal: Patient/Family Education  Outcome: Progressing Towards Goal     Problem: Patient Education: Go to Patient Education Activity  Goal: Patient/Family Education  Outcome: Progressing Towards Goal

## 2020-02-18 NOTE — PROGRESS NOTES
Problem: Mobility Impaired (Adult and Pediatric)  Goal: *Acute Goals and Plan of Care (Insert Text)  Description  In 1-7 days pt will be able to perform:  ST. Supine to sit to supine S with HR for meals. 2.  Sit to stand to sit S with RW in prep for ambulation. LT.  Gait:  Ambulate >50ft S with RW for home/community mobility. 2.  Activity tolerance: Tolerate up in chair 1-2 hours for ADLs. 3.  Patient/Family Education:  Patient/family to be independent with HEP for follow-up care and safe discharge. Outcome: Progressing Towards Goal   PHYSICAL THERAPY TREATMENT    Patient: Dede Mejia (53 y.o. female)  Date: 2020  Diagnosis: Hyperkalemia [E87.5]   Hyperkalemia       Precautions: Skin, Fall   Chart, physical therapy assessment, plan of care and goals were reviewed. ASSESSMENT:  Pt seen in supine prior to session w/ supplemental O2 donned at 2L. Pt reported 7/10 generalized pain prior to session. Pt initially not wanting to participate but with encouragement pt more willing. Pt able to ambulate in the hallway w/ RW no LOB noted. Pt reported feeling SOB during gait training and required frequent rest breaks but O2 levels remained in the mid 90s during gait with supplemental O2. Pt transferred back to room where pt was left sitting at the EOB after session left with family entering the room, call bell and tray in reach, nurse notified after session. Progression toward goals:  [x]      Improving appropriately and progressing toward goals  []      Improving slowly and progressing toward goals  []      Not making progress toward goals and plan of care will be adjusted     PLAN:  Patient continues to benefit from skilled intervention to address the above impairments. Continue treatment per established plan of care. Discharge Recommendations:  Home Health  Further Equipment Recommendations for Discharge:  N/A     SUBJECTIVE:   Patient stated I can't get up I am SOB.     OBJECTIVE DATA SUMMARY:   Critical Behavior:  Neurologic State: Alert  Orientation Level: Oriented X4  Cognition: Appropriate decision making, Appropriate for age attention/concentration, Appropriate safety awareness, Follows commands  Safety/Judgement: Awareness of environment, Insight into deficits, Home safety  Functional Mobility Training:  Bed Mobility:  Supine to Sit: Supervision  Transfers:  Sit to Stand: Supervision;Stand-by assistance  Stand to Sit: Supervision;Stand-by assistance  Balance:  Sitting: Intact  Standing: Intact; With support  Ambulation/Gait Training:  Distance (ft): 60 Feet (ft)  Assistive Device: Walker, rolling  Ambulation - Level of Assistance: Stand-by assistance  Gait Abnormalities: Decreased step clearance  Base of Support: Widened  Stance: Time;Weight shift  Speed/Arlette: Slow  Step Length: Left shortened;Right shortened  Swing Pattern: Left asymmetrical;Right asymmetrical  Interventions: Safety awareness training; Tactile cues; Verbal cues  Pain:  Pain Scale 1: Numeric (0 - 10)  Pain Intensity 1: 7  Pain Location 1: Generalized  Activity Tolerance:   Fair  Please refer to the flowsheet for vital signs taken during this treatment.   After treatment:   [] Patient left in no apparent distress sitting up in chair  [x] Patient left in no apparent distress in bed (sitting at the EOB)  [x] Call bell left within reach  [x] Nursing notified  [] Caregiver present  [] Bed alarm activated      Tesha Buckley PT   Time Calculation: 20 mins

## 2020-02-18 NOTE — PROGRESS NOTES
Hospitalist Progress Note-critical care note     Patient: Amy Gaspar MRN: [id number]  CSN: [id number]    YOB: 1965  Age: 53 y.o. Sex: female    DOA: 2/12/2020 LOS:  LOS: 6 days            Chief complaint: chest pain, acute on chronic renal failure, hyperkalemia, андрей morbid obesity     Assessment/Plan         Hospital Problems  Date Reviewed: 12/11/2019          Codes Class Noted POA    Acute respiratory failure with hypoxia ([facility] ICD-10-CM: J96.01  ICD-9-CM: 518.81  2/16/2020 Unknown        Diastolic CHF, acute on chronic (HCC) ICD-10-CM: I50.33  ICD-9-CM: 428.33, 428.0  2/16/2020 Unknown        Chest pain ICD-10-CM: R07.9  ICD-9-CM: 786.50  2/13/2020 Unknown        Acute renal failure superimposed on stage 3 chronic kidney disease ([facility] ICD-10-CM: N17.9, N18.3  ICD-9-CM: 584.9, 585.3  2/13/2020 Unknown        * (Principal) Hyperkalemia ICD-10-CM: E87.5  ICD-9-CM: 276.7  2/12/2020 Unknown        АНДРЕЙ (obstructive sleep apnea) ICD-10-CM: G47.33  ICD-9-CM: 327.23  12/11/2019 Yes        Morbid obesity ([facility] ICD-10-CM: E66.01  ICD-9-CM: 278.01  Unknown Yes        Type II or unspecified type diabetes mellitus with renal manifestations, uncontrolled(250.42) ([facility] ICD-10-CM: E11.29, E11.65  ICD-9-CM: 250.42  9/4/2014 Yes        HTN (hypertension) ICD-10-CM: I10  ICD-9-CM: 401.9  8/16/2014 Yes              Patient was admitted due to acute renal failure on chronic, hyperkalemia, chest pain/diarrhea. Losartan was hold due to hyperkalemia and lasix was  hold on admission. Lasix was  restarted after renal function was better,  however patient presented shortness of breath with hypoxia was transferred to ICU which needed BiPAP. She was transferred back to the floor. Lasix iv is  continued  because of CHF exacerbation and hypoxia. Cardiology has been  on board,  recent stress test which is negative. No diarrhea since admission, treating constipation now.        Acute respiratory failure with hypoxia Continue bipap at night   Continue lasix   Continue weaning off     Acute on chronic chf ,diastolic  Continue fluid restriction, low sat , lasix iv   He will come to see pt   continue lasix      acute on chronic renal failure 3  Cr 2.38 continue monitoring renal function and electrolytes       Chest Pain   No pain now. ce wnl   recent stress test in December in 2019  Case discussed with Anjel oconnell cardiac   V/q scan no PE       constipation   Continue  mirlax , colace, increase physical activity           DM    Lantus and sliding scale hold metformin      Sleep Apnea   cpap at night       Pulmonary HTN   VQ scan obtained in the emergency room low probability PE   Negative for dvt     htn  Continue   norvasc and hydralyzine, continue hold metoprolol due to jono     Foot injury   No acute issue from x-ray     Subjective : feel tired     Review of systems:    General: No fevers or chills. Tired   Cardiovascular: no chest pain, no  pressure. No palpitations. Pulmonary: shortness of breath better   Gastrointestinal: No nausea, vomiting. Constipation     Vital signs/Intake and Output:  Visit Vitals  /67   Pulse 74   Temp 97.2 °F (36.2 °C)   Resp 17   Ht 5' 2\" (1.575 m)   Wt 148.8 kg (328 lb 1.6 oz)   SpO2 100%   Breastfeeding No   BMI 60.01 kg/m²     Current Shift:  02/18 0701 - 02/18 1900  In: 200 [P.O.:200]  Out: -   Last three shifts:  02/16 1901 - 02/18 0700  In: -   Out: 1975 [Urine:1975]    Physical Exam:  General: WD, WN. Alert, cooperative, no acute distress    HEENT: NC, Atraumatic. PERRLA, anicteric sclerae. Lungs: CTA Bilaterally. No Wheezing/Rhonchi/Rales. Heart:  Regular  rhythm,  No murmur, No Rubs, No Gallops  Abdomen: Soft, Non distended, Non tender.  +Bowel sounds,   Extremities: No c/c/e, rt toe wrapped with gauze, some dry blood on rt toe nail   Psych:   Not anxious or agitated. teaful   Neurologic:  No acute neurological deficit.              Labs: Results:       Chemistry Recent Labs 02/18/20 0526 02/17/20 0412 02/16/20 0456   * 111* 150*    141 139   K 4.7 4.8 5.1    111 110   CO2 24 24 21   BUN 64* 66* 68*   CREA 2.38* 2.31* 2.25*   CA 9.0 8.7 8.7   AGAP 7 6 8   BUCR 27* 29* 30*    106 109   TP 6.1* 6.1* 6.5   ALB 3.0* 3.1* 3.4   GLOB 3.1 3.0 3.1   AGRAT 1.0 1.0 1.1      CBC w/Diff Recent Labs     02/18/20 0526 02/17/20 0412 02/16/20 0456   WBC 5.9 6.1 6.0   RBC 3.31* 3.27* 3.44*   HGB 8.2* 8.0* 8.4*   HCT 27.0* 26.4* 27.7*    239 275   GRANS 55 62 61   LYMPH 27 20* 22   EOS 6* 4 7*      Cardiac Enzymes Recent Labs     02/16/20  1550   CPK 49   CKND1 CALCULATION NOT PERFORMED WHEN RESULT IS BELOW LINEAR LIMIT      Coagulation No results for input(s): PTP, INR, APTT, INREXT, INREXT in the last 72 hours. Lipid Panel Lab Results   Component Value Date/Time    Cholesterol, total 146 05/28/2015 02:58 AM    HDL Cholesterol 64 (H) 05/28/2015 02:58 AM    LDL, calculated 66 05/28/2015 02:58 AM    VLDL, calculated 16 05/28/2015 02:58 AM    Triglyceride 80 05/28/2015 02:58 AM    CHOL/HDL Ratio 2.3 05/28/2015 02:58 AM      BNP No results for input(s): BNPP in the last 72 hours.    Liver Enzymes Recent Labs     02/18/20 0526   TP 6.1*   ALB 3.0*      SGOT 9*      Thyroid Studies Lab Results   Component Value Date/Time    TSH 0.56 01/23/2019 01:33 AM        Procedures/imaging: see electronic medical records for all procedures/Xrays and details which were not copied into this note but were reviewed prior to creation of Rosalva Yeung MD

## 2020-02-18 NOTE — PROGRESS NOTES
0730  Assumed care of patient at this time, assessment complete. Patient alert and oriented x4. Denies SOB and chest pain. Patient lungs clear, diminished bilaterally. Cap refilled  less than 3 seconds. Patient denies numbness and tingling to all extremities, 2+ edema BUE, 3+ pitting edema BLE. Stated pain 3/10. Patient has 22G IV to right and left hands. Call light and personal items in reach, bed in low position and locked, will continue to monitor patient. Fall risk arm band in place. 1150  OT working with patient in room at this time. [id number]  Physical therapist is working with patient at this time, patient ambulating hallway. 1900  Patient had uneventful shift. No signs or symptoms of distress. Patient ambulating and voiding sufficient amounts. Plan for patient to d/c home. 1914  Bedside in verbal shift change report given to [facility] (oncoming nurse) by Lisbet Villalba RN (off going nurse). Report included the following information: SBAR, KARDEX, MAR and recent results.

## 2020-02-18 NOTE — PROGRESS NOTES
[facility]  Pulmonary, Critical Care, and Sleep Medicine    Name: Alfredo Whitehead MRN: [id number]   : 1965 Hospital: [facility] [place]    Date: 2020  Room: [address] [place] [address] Note                                              Consult requesting physician: Dr. Milady Parker  Reason for Consult: acute respiratory failure requiring BiPAP      IMPRESSION:    Acute respiratory failure with hypoxia ([address] 2' to below, required BiPAP N49.44    Diastolic CHF, acute on chronic (HCC) I50.33    Asthma J45.909    Acute renal failure superimposed on stage 3 chronic kidney disease (HCC) N17.9, N18.3    HTN (hypertension) I10    Type II or unspecified type diabetes mellitus with renal manifestations, uncontrolled(250.42) (HCC) E11.29, E11.65    Morbid obesity ([address] E66.01    АНДРЕЙ (obstructive sleep apnea) G47.33       ·   Patient Active Problem List   Diagnosis Code    HTN (hypertension) I10    Type II or unspecified type diabetes mellitus with renal manifestations, uncontrolled(250.42) ([address] E11.29, E11.65    Morbid obesity ([address] E66.01    Respiratory distress R06.03    Asthma exacerbation J45. 253    Diastolic CHF (HCC) J28.52    АНДРЕЙ (obstructive sleep apnea) G47.33    UTI (urinary tract infection) N39.0    Hyperkalemia E87.5    Chest pain R07.9    Acute renal failure superimposed on stage 3 chronic kidney disease (HCC) N17.9, N18.3    Acute respiratory failure with hypoxia (HCC) Y42.20    Diastolic CHF, acute on chronic (HCC) I50.33       · Code status: Full code      RECOMMENDATIONS:   Respiratory:   Required BiPAP overnight.  now on 2 LPM NC. Continue to titrate FiO2. Use BiPAP nightly and PRN. АНДРЕЙ on CPAP at home. Continue CPAP at nighttime when not needing to use BiPAP. Keep SPO2 >=92%. HOB 30 degree elevation all the time. Aggressive pulmonary toileting. Aspiration precautions. Incentive spirometry. CVS: Chest pain on admission. VQ scan low probability for PE.   Echo showed grade 1 diastolic dysfunction with normal LVEF. Chronic leg edema on Lasix at home. Lasix dose was reduced on admission due to elevated creatinine. I believe that led to worsening diastolic CHF requiring BiPAP and transferred down to ICU. After increasing Lasix 40 mg twice daily, and use of BiPAP overnight, her respiratory distress improved and now she is on 2 LPM NC. Continue Lasix 40 mg twice daily and monitor BUN/creatinine and electrolytes. Renal: CKD, diuretics, defer to nephrologist.  Prophylaxis: DVT Prophylaxis: SCD/Heparin. GI Prophylaxis: Low risk for stress ulcer. Lines/Tubes: PIV    Will defer respective systems problem management to primary and other respective consultant  Further recommendations will be based on the patient's response to recommended treatment and results of the investigation ordered. Quality Care: PPI, DVT prophylaxis, HOB elevated, Infection control all reviewed and addressed. Care of plan d/w RN  D/w patient at bedside (answered all questions to satisfaction). Moderate complexity decision making was performed during the evaluation of this patient at high risk for decompensation with multiple organ involvement. Subjective/History of Present Illness:       Patient is a 53 y.o. female with pmhx for HTN, uncontrolled DM, CKD, asthma-COPD , anemia, fibromyalgia, chronic diastolic CHF on diuretics at home, morbid obesity, АНДРЕЙ on CPAP, who presented with episodes of diarrhea, nausea, SOB and CP. In ER, labs showed K of 6.0 and Cr 2.69 and CT abd pelvis showed superficial pannus inflammatory changes without intra-abdominal abnormalities. VQ scan low probability for PE in ER. She was admitted to hospital for FARHAN on CKD and seen by cardiology, nephrology. Hyperkalemia resolved soon. Lasix was initially held but was restarted at low dose recently.  Hospital course significant for SOB, increased work of breathing and hypoxia requiring 4 Lpm with finding of chf exacerbation - new comapred to admission CXR. Patient transferred to ICU on BiPAP for respiratory failure. 2/18/2020:  Used BiPAP overnight. Now on NC O2.  I's and O's +200 despite of Lasix 40 mg every 12 hours. Persistent 1+ pitting bilateral pedal edema. Mild HORAN on minimal exertion, not associated with wheezing or coughing. No chest pain, PND. Hemodynamic stable. [facility] was not called for any issues overnight. No other overnight issues reported. I/O last 24 hrs:      Intake/Output Summary (Last 24 hours) at 2/18/2020 1715  Last data filed at 2/18/2020 0950  Gross per 24 hour   Intake 200 ml   Output    Net 200 ml       History taken from patient, EMR     Review of Systems:   HEENT: No epistaxis, no nasal drainage, no difficulty in swallowing, no redness in eyes  Respiratory: as above  Cardiovascular: no chest pain, no palpitations, + chronic leg edema, no syncope  Gastrointestinal: no abd pain, no vomiting, no diarrhea, no bleeding symptoms  Genitourinary: No urinary symptoms or hematuria  Musculoskeletal: Neg  Neurological: No focal weakness, no seizures, no headaches  Behvioral/Psych: No anxiety, no depression  Constitutional: No fever, no chills, no weight loss, no night sweats     Allergies   Allergen Reactions    Bees [Sting, Bee] Swelling    Penicillins Hives    [place] Hives      Past Medical History:   Diagnosis Date    Asthma     Note: As child had asthma    Diabetes mellitus [address]     Type 2    Heart palpitations     Hypertension     Ill-defined condition     fibromyalgia     Morbid obesity ([facility]     MRSA infection 8/2014      Past Surgical History:   Procedure Laterality Date    BREAST SURGERY PROCEDURE UNLISTED      cyst removed    HX CATARACT REMOVAL      HX CHOLECYSTECTOMY      HX TUBAL LIGATION        Social History     Tobacco Use    Smoking status: Never Smoker    Smokeless tobacco: Never Used   Substance Use Topics    Alcohol use: No      Family History Problem Relation Age of Onset    Heart Attack Mother     Heart Attack Maternal Grandmother       Prior to Admission medications    Medication Sig Start Date End Date Taking? Authorizing Provider   predniSONE (STERAPRED) 5 mg dose pack See administration instruction per 5mg dose pack 12/16/19   Ciro Albright MD   guaiFENesin ER [facility]) 600 mg ER tablet Take 1 Tab by mouth every twelve (12) hours. 12/16/19   Ciro Albright MD   albuterol-ipratropium (DUO-NEB) 2.5 mg-0.5 mg/3 ml nebu 3 mL by Nebulization route every four (4) hours as needed for Wheezing. 12/16/19   Ciro Albright MD   amLODIPine (NORVASC) 5 mg tablet Take 5 mg by mouth daily. Yoana Crocker MD   hydrALAZINE (APRESOLINE) 25 mg tablet Take 25 mg by mouth three (3) times daily. Yoana Crocker MD   albuterol sulfate 90 mcg/actuation aepb Take 1-2 Puffs by inhalation every four (4) hours as needed. 1/24/19   Ciro Albright MD   metFORMIN (GLUCOPHAGE) 1,000 mg tablet Take 1,000 mg by mouth two (2) times daily (with meals). Yoana Crocker MD   metoprolol succinate (TOPROL-XL) 50 mg XL tablet Take 100 mg by mouth daily. Yoana Crocker MD   DULoxetine (CYMBALTA) 30 mg capsule Take 60 mg by mouth daily. Yoana Crocker MD   atorvastatin (LIPITOR) 40 mg tablet Take 40 mg by mouth daily. Yoana Crocker MD   losartan (COZAAR) 100 mg tablet Take 100 mg by mouth daily. Yoana Crocker MD   OTHER,NON-FORMULARY, Medication for irregular heart beat    Yoana Crocker MD   gabapentin (NEURONTIN) 300 mg capsule Take 300 mg by mouth three (3) times daily. Yoana Crocker MD   furosemide (LASIX) 40 mg tablet Take 1 Tab by mouth daily. Patient taking differently: Take 40 mg by mouth two (2) times a day. 2/13/17   Darío Ni MD   insulin NPH/insulin regular (NOVOLIN 70/30) 100 unit/mL (70-30) injection 50 Units by SubCUTAneous route every twelve (12) hours.  50 units am; 40 units noon; 35 units pm    Provider, Historical   aspirin 81 mg chewable tablet Take 1 tablet by mouth daily. 14   Sola Leach MD     Current Facility-Administered Medications   Medication Dose Route Frequency    insulin lispro (HUMALOG) injection 5 Units  5 Units SubCUTAneous TIDAC    carvediloL (COREG) tablet 3.125 mg  3.125 mg Oral BID WITH MEALS    furosemide (LASIX) injection 40 mg  40 mg IntraVENous BID    iron sucrose (VENOFER) injection 200 mg  200 mg IntraVENous DAILY    docusate sodium (COLACE) capsule 100 mg  100 mg Oral BID    polyethylene glycol (MIRALAX) packet 17 g  17 g Oral BID    gabapentin (NEURONTIN) capsule 300 mg  300 mg Oral TID    amLODIPine (NORVASC) tablet 10 mg  10 mg Oral DAILY    hydrALAZINE (APRESOLINE) tablet 100 mg  100 mg Oral TID    atorvastatin (LIPITOR) tablet 40 mg  40 mg Oral DAILY    aspirin chewable tablet 81 mg  81 mg Oral DAILY    DULoxetine (CYMBALTA) capsule 60 mg  60 mg Oral DAILY    neomycin-bacitracin-polymyxin (NEOSPORIN) ointment   Topical TID    heparin (porcine) injection 5,000 Units  5,000 Units SubCUTAneous Q8H    insulin glargine (LANTUS) injection 15 Units  15 Units SubCUTAneous QHS    insulin lispro (HUMALOG) injection   SubCUTAneous AC&HS    calcium acetate(phosphat bind) (PHOSLO) capsule 667 mg  1 Cap Oral TID WITH MEALS    sodium chloride (NS) flush 5-40 mL  5-40 mL IntraVENous Q8H         Objective:   Vital Signs:    Visit Vitals  /55   Pulse 80   Temp 97.8 °F (36.6 °C)   Resp 18   Ht 5' 2\" (1.575 m)   Wt 148.8 kg (328 lb 1.6 oz)   SpO2 100%   Breastfeeding No   BMI 60.01 kg/m²       O2 Device: Nasal cannula   O2 Flow Rate (L/min): 1.5 l/min   Temp (24hrs), Av.8 °F (36.6 °C), Min:97.2 °F (36.2 °C), Max:98.2 °F (36.8 °C)       Intake/Output:   Last shift:      701 - 1900  In: 200 [P.O.:200]  Out: -     Last 3 shifts: 1901 -  07  In: -   Out:  [Urine:]      Intake/Output Summary (Last 24 hours) at 2020 1714  Last data filed at 2020 0950  Gross per 24 hour   Intake 200 ml   Output  Net 200 ml       Last 3 Recorded Weights in this Encounter    02/16/20 0608 02/17/20 0327 02/18/20 0403   Weight: 152.2 kg (335 lb 9.6 oz) 151.4 kg (333 lb 12.4 oz) 148.8 kg (328 lb 1.6 oz)         Recent Labs     02/17/20  0432 02/16/20  2104 02/16/20  1537   PHI 7.347* 7.276* 7.281*   PCO2I 41.6 44.6 43.4   PO2I 126* 140* 60*   HCO3I 22.8 20.7* 20.5*   FIO2I 0.30 0.40 36       Physical Exam:     General/Neurology: Alert, Awake, NAD. Morbidly obese. Head:   Normocephalic, without obvious abnormality, atraumatic. Eye:   EOM intact, no scleral icterus, no pallor, no cyanosis. Lung:   Reduced air entry bilateral due to obese chest wall. No rhonchi. No wheezing. No stridors. No prolongded expiration. No accessory muscle use. Heart:   Regular rate & rhythm. S1 S2 present. No murmur. No JVD. Abdomen:  Soft. NT. ND. +BS. No masses. Extremities:  1+ pitting b/l pedal edema. No cyanosis. No clubbing. Pulses: 2+ and symmetric in DP. Capillary refill: normal  Lymphatic:  No cervical or supraclavicular palpable lymphadenopathy. Skin:   Color, texture, turgor normal. No rashes or lesions.        Data:       Recent Results (from the past 24 hour(s))   GLUCOSE, POC    Collection Time: 02/17/20  8:37 PM   Result Value Ref Range    Glucose (POC) 167 (H) 70 - 110 mg/dL   MAGNESIUM    Collection Time: 02/18/20  5:26 AM   Result Value Ref Range    Magnesium 2.4 1.6 - 2.6 mg/dL   PHOSPHORUS    Collection Time: 02/18/20  5:26 AM   Result Value Ref Range    Phosphorus 4.4 2.5 - 4.9 MG/DL   CBC WITH AUTOMATED DIFF    Collection Time: 02/18/20  5:26 AM   Result Value Ref Range    WBC 5.9 4.6 - 13.2 K/uL    RBC 3.31 (L) 4.20 - 5.30 M/uL    HGB 8.2 (L) 12.0 - 16.0 g/dL    HCT 27.0 (L) 35.0 - 45.0 %    MCV 81.6 74.0 - 97.0 FL    MCH 24.8 24.0 - 34.0 PG    MCHC 30.4 (L) 31.0 - 37.0 g/dL    RDW 15.3 (H) 11.6 - 14.5 %    PLATELET 004 170 - 980 K/uL    MPV 9.3 9.2 - 11.8 FL    NEUTROPHILS 55 40 - 73 %    LYMPHOCYTES 27 21 - 52 % MONOCYTES 11 (H) 3 - 10 %    EOSINOPHILS 6 (H) 0 - 5 %    BASOPHILS 1 0 - 2 %    ABS. NEUTROPHILS 3.3 1.8 - 8.0 K/UL    ABS. LYMPHOCYTES 1.6 0.9 - 3.6 K/UL    ABS. MONOCYTES 0.6 0.05 - 1.2 K/UL    ABS. EOSINOPHILS 0.3 0.0 - 0.4 K/UL    ABS. BASOPHILS 0.1 0.0 - 0.1 K/UL    DF AUTOMATED     METABOLIC PANEL, COMPREHENSIVE    Collection Time: 02/18/20  5:26 AM   Result Value Ref Range    Sodium 140 136 - 145 mmol/L    Potassium 4.7 3.5 - 5.5 mmol/L    Chloride 109 100 - 111 mmol/L    CO2 24 21 - 32 mmol/L    Anion gap 7 3.0 - 18 mmol/L    Glucose 147 (H) 74 - 99 mg/dL    BUN 64 (H) 7.0 - 18 MG/DL    Creatinine 2.38 (H) 0.6 - 1.3 MG/DL    BUN/Creatinine ratio 27 (H) 12 - 20      GFR est AA 26 (L) >60 ml/min/1.73m2    GFR est non-AA 21 (L) >60 ml/min/1.73m2    Calcium 9.0 8.5 - 10.1 MG/DL    Bilirubin, total 0.4 0.2 - 1.0 MG/DL    ALT (SGPT) 17 13 - 56 U/L    AST (SGOT) 9 (L) 10 - 38 U/L    Alk. phosphatase 107 45 - 117 U/L    Protein, total 6.1 (L) 6.4 - 8.2 g/dL    Albumin 3.0 (L) 3.4 - 5.0 g/dL    Globulin 3.1 2.0 - 4.0 g/dL    A-G Ratio 1.0 0.8 - 1.7     GLUCOSE, POC    Collection Time: 02/18/20  6:24 AM   Result Value Ref Range    Glucose (POC) 150 (H) 70 - 110 mg/dL   GLUCOSE, POC    Collection Time: 02/18/20 12:16 PM   Result Value Ref Range    Glucose (POC) 242 (H) 70 - 110 mg/dL         Chemistry Recent Labs     02/18/20  0526 02/17/20  0412 02/16/20  0456   * 111* 150*    141 139   K 4.7 4.8 5.1    111 110   CO2 24 24 21   BUN 64* 66* 68*   CREA 2.38* 2.31* 2.25*   CA 9.0 8.7 8.7   MG 2.4 2.6 2.7*   PHOS 4.4 4.2 4.9   AGAP 7 6 8   BUCR 27* 29* 30*    106 109   TP 6.1* 6.1* 6.5   ALB 3.0* 3.1* 3.4   GLOB 3.1 3.0 3.1   AGRAT 1.0 1.0 1.1        Lactic Acid Lactic acid   Date Value Ref Range Status   08/11/2014 0.7 0.4 - 2.0 MMOL/L Final     No results for input(s): LAC in the last 72 hours.      Liver Enzymes Protein, total   Date Value Ref Range Status   02/18/2020 6.1 (L) 6.4 - 8.2 g/dL Final     Albumin   Date Value Ref Range Status   02/18/2020 3.0 (L) 3.4 - 5.0 g/dL Final     Globulin   Date Value Ref Range Status   02/18/2020 3.1 2.0 - 4.0 g/dL Final     A-G Ratio   Date Value Ref Range Status   02/18/2020 1.0 0.8 - 1.7   Final     AST (SGOT)   Date Value Ref Range Status   02/18/2020 9 (L) 10 - 38 U/L Final     Alk. phosphatase   Date Value Ref Range Status   02/18/2020 107 45 - 117 U/L Final     Recent Labs     02/18/20  0526 02/17/20  0412 02/16/20  0456   TP 6.1* 6.1* 6.5   ALB 3.0* 3.1* 3.4   GLOB 3.1 3.0 3.1   AGRAT 1.0 1.0 1.1   SGOT 9* 7* 9*    106 109        CBC w/Diff Recent Labs     02/18/20  0526 02/17/20 0412 02/16/20  0456   WBC 5.9 6.1 6.0   RBC 3.31* 3.27* 3.44*   HGB 8.2* 8.0* 8.4*   HCT 27.0* 26.4* 27.7*    239 275   GRANS 55 62 61   LYMPH 27 20* 22   EOS 6* 4 7*        Cardiac Enzymes No results found for: CPK, CK, CKMMB, CKMB, RCK3, CKMBT, CKNDX, CKND1, MARIA ISABEL, TROPT, TROIQ, ANITA, TROPT, TNIPOC, BNP, BNPP     BNP No results found for: BNP, BNPP, XBNPT     Coagulation No results for input(s): PTP, INR, APTT, INREXT, INREXT in the last 72 hours.       Thyroid  Lab Results   Component Value Date/Time    TSH 0.56 01/23/2019 01:33 AM       No results found for: T4     Urinalysis Lab Results   Component Value Date/Time    Color YELLOW 12/11/2019 02:30 AM    Appearance CLOUDY 12/11/2019 02:30 AM    Specific gravity 1.021 12/11/2019 02:30 AM    pH (UA) 5.0 12/11/2019 02:30 AM    Protein >1,000 (A) 12/11/2019 02:30 AM    Glucose >1,000 (A) 12/11/2019 02:30 AM    Ketone 15 (A) 12/11/2019 02:30 AM    Bilirubin NEGATIVE  12/11/2019 02:30 AM    Urobilinogen 0.2 12/11/2019 02:30 AM    Nitrites NEGATIVE  12/11/2019 02:30 AM    Leukocyte Esterase NEGATIVE  12/11/2019 02:30 AM    Epithelial cells 2 to 3 12/11/2019 02:30 AM    Bacteria 3+ (A) 12/11/2019 02:30 AM    WBC 40 to 50 12/11/2019 02:30 AM    RBC 4 to 6 12/11/2019 02:30 AM        Micro  No results for input(s): SDES, CULT in the last 72 hours. No results for input(s): CULT in the last 72 hours. Culture data during this hospitalization. All Micro Results     Procedure Component Value Units Date/Time    OVA & PARASITES, STOOL [[id number]] Collected:  02/13/20 0000    Order Status:  Canceled Specimen:  Stool     C. DIFFICILE AG & TOXIN A/B [[id number]]     Order Status:  Canceled Specimen:  Stool              ECHO 2/13/20 Result status: Final result ·   Left Ventricle: Normal cavity size, wall thickness and systolic function (ejection fraction normal). The estimated ejection fraction is 60 - 65%. There is mild (grade 1) left ventricular diastolic dysfunction E/E' ratio is 13.00.  · Moderately dilated left atrium. · Left Atrium: Moderately dilated left atrium. · Tricuspid Valve: Normal valve structure and no stenosis. Mild regurgitation. Pulmonary arterial systolic pressure is 51.5 mmHg. Mild to moderate pulmonary hypertension. · IVC/Hepatic Veins: Severely elevated central venous pressure (15+ mmHg); IVC diameter is larger than 21 mm and collapses less than 50% with respiration. VQ scan 2/12/20: Low probability of pulmonary embolism. Images report reviewed by me:  CT (Most Recent) (CT chest reviewed by me) Results from Hospital Encounter encounter on 02/12/20   CT ABD PELV WO CONT    Narrative EXAM: CT of the abdomen and pelvis    CLINICAL INDICATION/HISTORY:  Abdominal pain and diarrhea. COMPARISON: 07/17/2014. TECHNIQUE: Axial CT imaging of the abdomen and pelvis was performed without  contrast. Multiplanar reformats were generated. One or more dose reduction techniques were used on this CT: automated exposure  control, adjustment of the mAs and/or kVp according to patient size, and  iterative reconstruction techniques. The specific techniques used on this CT  exam have been documented in the patient's electronic medical record.   Digital  Imaging and Communications in Medicine (DICOM) format image data are available  to nonaffiliated external healthcare facilities or entities on a secure, media  free, reciprocally searchable basis with patient authorization for at least a  12-month period after this study. _______________    FINDINGS:    LOWER CHEST: The visualized lung bases are clear. Heart size is normal. There is  no pericardial or pleural effusion. LIVER, BILIARY: Liver is normal with no focal parenchymal lesion identified. There is no intra-or extrahepatic biliary ductal dilatation. Gallbladder is  surgically absent. PANCREAS: Normal.    SPLEEN: Normal.    ADRENALS: Normal.    KIDNEYS: Normal. There is no nephrolithiasis. There is no hydronephrosis,  hydroureter, or other signs of obstructive nephropathy. LYMPH NODES: No enlarged lymph nodes. GASTROINTESTINAL TRACT: There is no evidence of bowel obstruction. While limited  without contrast, there is no definitive wall thickening.] The appendix is  visualized and unremarkable. PELVIC ORGANS: Unremarkable. VASCULATURE: Unremarkable. BONES: No acute or aggressive osseous abnormalities identified. OTHER: There is no free intraperitoneal fluid or free air. SUPERFICIAL SOFT TISSUES: Mild inflammatory changes at the pannus. There is a  fat-containing ventral hernia. Please note that evaluation of the intra-abdominal structures is somewhat  limited due to lack of oral or IV contrast.    _______________      Impression IMPRESSION:    1. No evidence of nephrolithiasis, hydronephrosis, or other signs of obstructive  nephropathy. 2. No evidence of bowel obstruction or acute inflammatory process in the abdomen  or pelvis. 3. Superficial pannus inflammatory changes. Please correlate for clinical signs  of cellulitis. CXR reviewed by me:  XR (Most Recent).  CXR  reviewed by me and compared with previous CXR Results from Hospital Encounter encounter on 02/12/20   XR CHEST PORT    Narrative EXAM: XR CHEST PORT    CLINICAL INDICATION/HISTORY: CHF  -Additional: None    COMPARISON: 2/16/2020    TECHNIQUE: Portable frontal view of the chest    _______________    FINDINGS:    SUPPORT DEVICES: None. HEART AND MEDIASTINUM: Cardiomegaly    LUNGS AND PLEURAL SPACES: Vascular congestion and interstitial edema. No large  effusion or pneumothorax. BONY THORAX AND SOFT TISSUES: Unremarkable.    _______________      Impression IMPRESSION:    Cardiomegaly. Vascular congestion and interstitial edema.              Sridhar Farah MD  2/18/2020

## 2020-02-18 NOTE — PROGRESS NOTES
Problem: Self Care Deficits Care Plan (Adult)  Goal: *Acute Goals and Plan of Care (Insert Text)  Description  Initial Occupational Therapy Goals (2/17/2020) Within 7 day(s):    1. Patient will perform grooming seated EOB with setup x 15 minutes for increased independence with ADLs. 2. Patient will perform UB dressing with setup for increased independence with ADLs. 3. Patient will perform LB dressing with setup/Tanisha & A/E PRN for increased independence with ADLs. 4. Patient will perform all aspects of toileting with min/modA w/ A/E PRN for increased independence in ADLs  5. Patient will independently apply energy conservation techniques with 1 verbal cue(s) for increased independence with ADLs. 6. Patient will utilize good body mechanics during ADLs with 1 verbal cue(s). 7. Patient will perform functional transfer with CGA in prep for ADLs. Outcome: Progressing Towards Goal   OCCUPATIONAL THERAPY TREATMENT    Patient: Lety Mcgarry (53 y.o. female)  Date: 2/18/2020  Diagnosis: Hyperkalemia [E87.5]   Hyperkalemia       Precautions: Skin, Fall  PLOF: Pt was receiving assistance from daughter when available for lower body dressing, toileting, bathing    Chart, occupational therapy assessment, plan of care, and goals were reviewed. ASSESSMENT:  Pt is hyper verbal and requires frequent cueing to stay on task. Pt able to complete bathroom mobility, however becomes SOB and requires to rest on toilet to catch breath. Pt able to doff/don clean hospital gown. Pt reported difficulty with ADLs PTA; provided hip kit to increase independence in functional activities. Pt able to don B socks with sock aid/setup. Pt also reporting difficulty with bowel hygiene and stated she would need help at home. Provided/educated on use of toilet tongs to increase independence with toileting.  Educated pt on energy conservation/work simplification techniques, instructed pt on frequent rest breaks/prioritizing activities throughout the day. Pt tolerated ~50 min sitting at EOB during session. Pt left seated EOB, call bell/phone within reach. Progression toward goals:  [x]          Improving appropriately and progressing toward goals  []          Improving slowly and progressing toward goals  []          Not making progress toward goals and plan of care will be adjusted     PLAN:  Patient continues to benefit from skilled intervention to address the above impairments. Continue treatment per established plan of care. Discharge Recommendations:  Rehab  Further Equipment Recommendations for Discharge: To Be Determined (TBD) at next level of care     SUBJECTIVE:   Patient stated i've got so much fluid all over.     OBJECTIVE DATA SUMMARY:   Cognitive/Behavioral Status:  Neurologic State: Alert  Orientation Level: Oriented X4  Cognition: Appropriate decision making, Appropriate for age attention/concentration, Appropriate safety awareness, Follows commands  Safety/Judgement: Awareness of environment, Insight into deficits, Home safety    Functional Mobility and Transfers for ADLs:   Bed Mobility:  Supine to Sit: Supervision     Transfers:  Sit to Stand: Stand-by assistance   Bathroom Mobility: Contact guard assistance    Balance:  Sitting: Intact  Standing: Intact; With support    ADL Intervention:  Upper Body Dressing Assistance  Dressing Assistance: Stand-by assistance  Hospital Gown: Stand-by assistance    Lower Body Dressing Assistance  Dressing Assistance: Moderate assistance;Maximum assistance  Pants With Elastic Waist: Maximum assistance  Socks: Set-up; Stand-by assistance(sock aid)  Position Performed: Seated edge of bed  Adaptive Equipment Used: Sock aid    Toileting  Toileting Assistance: Minimum assistance; Moderate assistance  Bladder Hygiene: Contact guard assistance  Bowel Hygiene: Minimum assistance  Clothing Management: Minimum assistance    Cognitive Retraining  Orientation Retraining: Awareness of environment  Problem Solving: Awareness of environment;Deductive reason;General alternative solution; Identifying the problem  Executive Functions: Executing cognitive plans;Managing time  Organizing/Sequencing: Breaking task down;Prioritizing  Attention to Task: Distractibility; Single task  Maintains Attention For (Time): 1 minute  Following Commands: Awareness of environment; Follows one step commands/directions  Safety/Judgement: Awareness of environment; Insight into deficits;Home safety  Cues: Tactile cues provided;Visual cues provided;Verbal cues provided    Pain:  Pain level pre-treatment: 1/10   Pain level post-treatment: 1/10  Pain Intervention(s): Medication administered by RN (see MAR); Rest, Repositioning   Response to intervention: Nurse notified, See doc flow sheet    Activity Tolerance:    Poor. Pt becomes SOB during all functional tasks. Pt able to sit up EOB ~30min. Pt able to stand ~1min before sitting down to catch breath. Pt limited by strength, activity tolerance, balance, edema    Please refer to the flowsheet for vital signs taken during this treatment. After treatment:   []  Patient left in no apparent distress sitting up in chair  [x]  Patient left in no apparent distress sitting EOB  [x]  Call bell left within reach  [x]  Nursing notified  []  Caregiver present  []  Bed alarm activated    COMMUNICATION/EDUCATION:   [x] Role of Occupational Therapy in the acute care setting  [x] Home safety education was provided and the patient/caregiver indicated understanding. [x] Patient/family have participated as able in working towards goals and plan of care. [x] Patient/family agree to work toward stated goals and plan of care. [] Patient understands intent and goals of therapy, but is neutral about his/her participation. [] Patient is unable to participate in goal setting and plan of care.       Thank you for this referral.  Mayra Fermin, OTR/L  Time Calculation: 49 mins

## 2020-02-18 NOTE — PROGRESS NOTES
Transition of care: home with [facility] AT [facility] when medically cleared  Patient was transferred from ICU to telemetry unit. Patient has cpap and nebulizer at home. She is currently wearing oxygen  RN please wean from oxygen or place qualifying walk test on this chart. Patient has been using bipap will need orders if she does need this for home  Patient wants to use [facility] even the presence of her complaining of the care she recieves. She has been offered [facility] but she wants to use [facility]. Patient has Dr. Romulo Pedro for pcp  She has [facility] for [facility]. Cm has also sent referral to [facility] for shower chair. Cm will continue to follow  Care Management Interventions  PCP Verified by CM:  Yes  Transition of Care Consult (CM Consult): [address]: No  Reason Outside [place]: Patient already serviced by other home care/hospice agency([facility])  Discharge Durable Medical Equipment: Yes  The Patient and/or Patient Representative was Provided with a Choice of Provider and Agrees with the Discharge Plan?: Yes  Freedom of Choice List was Provided with Basic Dialogue that Supports the Patient's Individualized Plan of Care/Goals, Treatment Preferences and Shares the Quality Data Associated with the Providers?: Yes  Discharge Location  Discharge Placement: Home with home health

## 2020-02-18 NOTE — PROGRESS NOTES
Nephrology Progress Note      HPI:   Richelle Shell is 52 yo female with PMHx of HTN, uncontrolled DM, CKD, D-CMP and anemia who presented with bouts of diarrhea and nausea. She also had complaints of SOB and CP. On initial eval in the ED her HD was in the 50's otherwise stable VS. Labs showed K of 6.0 and Cr 2.69. Home med included Losartan. Pt FU with Dr Brandee Marion for her CKD and recent baseline Cr in Dec was ~1.6. VQ scan, CxR, CT lower chest/abd/pelvis, and LE doppler US were all unremarkable. Echo showed EF 13%, mild diastolic dysfunction and mild- to mod Pulm HTN. Pt was treated medically for high K and received a dose of Lasix. Today K is down tp normal. But Cr slightly up to 2.8. No diarrhea, N/V. No SOB at rest. No CP. Has LE edema.     -Pt transferred to ICU due to acute hypercapnic hypoxic respiratory failure      -SOB better today. No CP. Has significant baseline LE edema. Impression:   -Acute on CKD stage 3. Improving, Cr peaked at 2.9, now down to 2.2-2.3, from recent baseline of 1.6 in Dec.   -Hyperkalemia, resolved on medical management   -HTN, controlled  -Proteinuria likely sec to DMN  -Hyperphosphatemia, started on Phoslo tid  -DM, uncontrolled.  HbA1C 9.5%  -Anemia, iron def s/p Venofer  -D-CMP  -Mod Pulm HTN w/ Rt side CHF   -Diarrhea: resolving  -АНДРЕЙ  -Morbidly Obese  -SHPT  -Acute hypercapnic hypoxic respiratory failure, improving       Plan:   -Cont lasix   -Continue to hold Losartan for now  -Renal diet  -Advised on better BS control  -Continue office fu with Dr Robert Robbins after d/c for CKD care       Medications:     Current Facility-Administered Medications:     carvediloL (COREG) tablet 3.125 mg, 3.125 mg, Oral, BID WITH MEALS, Keith Olea MD, 3.125 mg at 02/18/20 0845    furosemide (LASIX) injection 40 mg, 40 mg, IntraVENous, BID, Beti Coelho MD, 40 mg at 02/18/20 0844    ELECTROLYTE REPLACEMENT PROTOCOL - Potassium Renal Dosing, 1 Each, Other, PRN, Beti Coelho MD    ELECTROLYTE REPLACEMENT PROTOCOL  - Phosphorus Renal Dosing, 1 Each, Lizzette, PRN, Va Hodge MD    ELECTROLYTE REPLACEMENT PROTOCOL - Calcium , 1 Each, Lizzette, PRN, Va Hodge MD    ELECTROLYTE REPLACEMENT PROTOCOL - Magnesium , 1 Each, Lizzette, PRN, Va Hodge MD    hydrALAZINE (APRESOLINE) 20 mg/mL injection 10 mg, 10 mg, IntraVENous, Q6H PRN, Radha Fallon MD    iron sucrose (VENOFER) injection 200 mg, 200 mg, IntraVENous, DAILY, Darryl Parker MD, 200 mg at 02/18/20 0912    magnesium hydroxide (MILK OF MAGNESIA) 400 mg/5 mL oral suspension 30 mL, 30 mL, Oral, DAILY PRN, Va Hodge MD    docusate sodium (COLACE) capsule 100 mg, 100 mg, Oral, BID, Va Hodge MD, 100 mg at 02/18/20 0845    polyethylene glycol (MIRALAX) packet 17 g, 17 g, Oral, BID, Va Hodge MD, 17 g at 02/18/20 0846    gabapentin (NEURONTIN) capsule 300 mg, 300 mg, Oral, TID, Va Hodge MD, 300 mg at 02/18/20 0845    amLODIPine (NORVASC) tablet 10 mg, 10 mg, Oral, DAILY, Va Hodge MD, 10 mg at 02/18/20 0845    hydrALAZINE (APRESOLINE) tablet 100 mg, 100 mg, Oral, TID, Va Hodge MD, 100 mg at 02/18/20 0845    atorvastatin (LIPITOR) tablet 40 mg, 40 mg, Oral, DAILY, Radha Fallon MD, 40 mg at 02/18/20 0845    albuterol-ipratropium (DUO-NEB) 2.5 MG-0.5 MG/3 ML, 3 mL, Nebulization, Q4H PRN, Radha Fallon MD    aspirin chewable tablet 81 mg, 81 mg, Oral, DAILY, Radha Fallon MD, 81 mg at 02/18/20 0846    DULoxetine (CYMBALTA) capsule 60 mg, 60 mg, Oral, DAILY, Radha Fallon MD, 60 mg at 02/18/20 0845    neomycin-bacitracin-polymyxin (NEOSPORIN) ointment, , Topical, TID, Radha Fallon MD    heparin (porcine) injection 5,000 Units, 5,000 Units, SubCUTAneous, Q8H, Va Hodge MD, 5,000 Units at 02/18/20 0617    insulin glargine (LANTUS) injection 15 Units, 15 Units, SubCUTAneous, QHS, Va Hodge MD, 15 Units at 02/17/20 2221    insulin lispro (HUMALOG) injection, , SubCUTAneous, AC&HS, Nestor Torres MD, Stopped at 02/18/20 0730   dextrose 10% infusion 125-250 mL, 125-250 mL, IntraVENous, PRN, Teddy Dan MD    calcium acetate(phosphat bind) (PHOSLO) capsule 667 mg, 1 Cap, Oral, TID WITH MEALS, Gia Narvaez MD, 667 mg at 02/18/20 0845    sodium chloride (NS) flush 5-40 mL, 5-40 mL, IntraVENous, Q8H, Dolores Fallon MD, 10 mL at 02/18/20 0502    sodium chloride (NS) flush 5-40 mL, 5-40 mL, IntraVENous, PRN, Dolores Fallon MD    acetaminophen (TYLENOL) tablet 650 mg, 650 mg, Oral, Q4H PRN, Dolores Fallon MD, 650 mg at 02/18/20 1019    oxyCODONE-acetaminophen (PERCOCET) 5-325 mg per tablet 1 Tab, 1 Tab, Oral, Q4H PRN, Dolores Fallon MD, 1 Tab at 02/14/20 1735      Physical Assessment:     Visit Vitals  /81   Pulse 82   Temp 97.6 °F (36.4 °C)   Resp 18   Ht 5' 2\" (1.575 m)   Wt 148.8 kg (328 lb 1.6 oz)   SpO2 97%   Breastfeeding No   BMI 60.01 kg/m²       Intake/Output Summary (Last 24 hours) at 2/18/2020 1119  Last data filed at 2/18/2020 0950  Gross per 24 hour   Intake 200 ml   Output 425 ml   Net -225 ml       General Appearance: no pain, no distress  Head: atraumatic  HEENT: no jaundice, moist oral mucosa  Neck: no JVD noted  Chest: LS clear to auscultation bilaterally  Heart: S1/S2, no murmur, gallop or rub, RRR  Adbomen: soft, nontender, BS active   : no flank tenderness, no terrell catheter  Skin: intact, no rash  Extremity: ++ edema, no cyanosis or clubbing  MS: No joint deformity or tenderness  Neuro: conscious, alert, oriented x 3, no focal deficit    Samantha Sevilla MD

## 2020-02-19 LAB
ALBUMIN SERPL-MCNC: 3 G/DL (ref 3.4–5)
ALBUMIN/GLOB SERPL: 1 {RATIO} (ref 0.8–1.7)
ALP SERPL-CCNC: 116 U/L (ref 45–117)
ALT SERPL-CCNC: 18 U/L (ref 13–56)
ANION GAP SERPL CALC-SCNC: 8 MMOL/L (ref 3–18)
AST SERPL-CCNC: 11 U/L (ref 10–38)
BASOPHILS # BLD: 0 K/UL (ref 0–0.1)
BASOPHILS NFR BLD: 1 % (ref 0–2)
BILIRUB SERPL-MCNC: 0.3 MG/DL (ref 0.2–1)
BUN SERPL-MCNC: 70 MG/DL (ref 7–18)
BUN/CREAT SERPL: 27 (ref 12–20)
CALCIUM SERPL-MCNC: 8.7 MG/DL (ref 8.5–10.1)
CHLORIDE SERPL-SCNC: 105 MMOL/L (ref 100–111)
CO2 SERPL-SCNC: 24 MMOL/L (ref 21–32)
CREAT SERPL-MCNC: 2.59 MG/DL (ref 0.6–1.3)
DIFFERENTIAL METHOD BLD: ABNORMAL
EOSINOPHIL # BLD: 0.4 K/UL (ref 0–0.4)
EOSINOPHIL NFR BLD: 7 % (ref 0–5)
ERYTHROCYTE [DISTWIDTH] IN BLOOD BY AUTOMATED COUNT: 15.1 % (ref 11.6–14.5)
GLOBULIN SER CALC-MCNC: 3.1 G/DL (ref 2–4)
GLUCOSE BLD STRIP.AUTO-MCNC: 177 MG/DL (ref 70–110)
GLUCOSE BLD STRIP.AUTO-MCNC: 186 MG/DL (ref 70–110)
GLUCOSE BLD STRIP.AUTO-MCNC: 210 MG/DL (ref 70–110)
GLUCOSE BLD STRIP.AUTO-MCNC: 255 MG/DL (ref 70–110)
GLUCOSE SERPL-MCNC: 184 MG/DL (ref 74–99)
HCT VFR BLD AUTO: 27 % (ref 35–45)
HGB BLD-MCNC: 8.2 G/DL (ref 12–16)
LYMPHOCYTES # BLD: 1.5 K/UL (ref 0.9–3.6)
LYMPHOCYTES NFR BLD: 23 % (ref 21–52)
MAGNESIUM SERPL-MCNC: 2.5 MG/DL (ref 1.6–2.6)
MCH RBC QN AUTO: 24.6 PG (ref 24–34)
MCHC RBC AUTO-ENTMCNC: 30.4 G/DL (ref 31–37)
MCV RBC AUTO: 81.1 FL (ref 74–97)
MONOCYTES # BLD: 0.6 K/UL (ref 0.05–1.2)
MONOCYTES NFR BLD: 10 % (ref 3–10)
NEUTS SEG # BLD: 3.9 K/UL (ref 1.8–8)
NEUTS SEG NFR BLD: 59 % (ref 40–73)
PHOSPHATE SERPL-MCNC: 4.1 MG/DL (ref 2.5–4.9)
PLATELET # BLD AUTO: 259 K/UL (ref 135–420)
PMV BLD AUTO: 9.6 FL (ref 9.2–11.8)
POTASSIUM SERPL-SCNC: 4.8 MMOL/L (ref 3.5–5.5)
PROT SERPL-MCNC: 6.1 G/DL (ref 6.4–8.2)
RBC # BLD AUTO: 3.33 M/UL (ref 4.2–5.3)
SODIUM SERPL-SCNC: 137 MMOL/L (ref 136–145)
WBC # BLD AUTO: 6.5 K/UL (ref 4.6–13.2)

## 2020-02-19 PROCEDURE — 74011250637 HC RX REV CODE- 250/637: Performed by: INTERNAL MEDICINE

## 2020-02-19 PROCEDURE — 85025 COMPLETE CBC W/AUTO DIFF WBC: CPT

## 2020-02-19 PROCEDURE — 65660000000 HC RM CCU STEPDOWN

## 2020-02-19 PROCEDURE — 36415 COLL VENOUS BLD VENIPUNCTURE: CPT

## 2020-02-19 PROCEDURE — 82962 GLUCOSE BLOOD TEST: CPT

## 2020-02-19 PROCEDURE — 74011250636 HC RX REV CODE- 250/636: Performed by: HOSPITALIST

## 2020-02-19 PROCEDURE — 80053 COMPREHEN METABOLIC PANEL: CPT

## 2020-02-19 PROCEDURE — 74011250636 HC RX REV CODE- 250/636: Performed by: INTERNAL MEDICINE

## 2020-02-19 PROCEDURE — 77010033678 HC OXYGEN DAILY

## 2020-02-19 PROCEDURE — 74011636637 HC RX REV CODE- 636/637: Performed by: HOSPITALIST

## 2020-02-19 PROCEDURE — 94660 CPAP INITIATION&MGMT: CPT

## 2020-02-19 PROCEDURE — 84100 ASSAY OF PHOSPHORUS: CPT

## 2020-02-19 PROCEDURE — 83735 ASSAY OF MAGNESIUM: CPT

## 2020-02-19 PROCEDURE — 76450000000

## 2020-02-19 PROCEDURE — 97530 THERAPEUTIC ACTIVITIES: CPT

## 2020-02-19 PROCEDURE — 74011636637 HC RX REV CODE- 636/637: Performed by: INTERNAL MEDICINE

## 2020-02-19 PROCEDURE — 74011250637 HC RX REV CODE- 250/637: Performed by: HOSPITALIST

## 2020-02-19 PROCEDURE — 97116 GAIT TRAINING THERAPY: CPT

## 2020-02-19 RX ORDER — ONDANSETRON 2 MG/ML
4 INJECTION INTRAMUSCULAR; INTRAVENOUS
Status: DISCONTINUED | OUTPATIENT
Start: 2020-02-19 | End: 2020-02-24 | Stop reason: HOSPADM

## 2020-02-19 RX ORDER — ONDANSETRON 2 MG/ML
INJECTION INTRAMUSCULAR; INTRAVENOUS
Status: DISPENSED
Start: 2020-02-19 | End: 2020-02-20

## 2020-02-19 RX ADMIN — POLYETHYLENE GLYCOL 3350 17 G: 17 POWDER, FOR SOLUTION ORAL at 21:32

## 2020-02-19 RX ADMIN — HYDRALAZINE HYDROCHLORIDE 100 MG: 50 TABLET, FILM COATED ORAL at 21:31

## 2020-02-19 RX ADMIN — INSULIN GLARGINE 15 UNITS: 100 INJECTION, SOLUTION SUBCUTANEOUS at 21:29

## 2020-02-19 RX ADMIN — DULOXETINE HYDROCHLORIDE 60 MG: 60 CAPSULE, DELAYED RELEASE ORAL at 09:31

## 2020-02-19 RX ADMIN — CARVEDILOL 3.12 MG: 3.12 TABLET, FILM COATED ORAL at 17:29

## 2020-02-19 RX ADMIN — POLYETHYLENE GLYCOL 3350 17 G: 17 POWDER, FOR SOLUTION ORAL at 09:31

## 2020-02-19 RX ADMIN — INSULIN LISPRO 2 UNITS: 100 INJECTION, SOLUTION INTRAVENOUS; SUBCUTANEOUS at 12:06

## 2020-02-19 RX ADMIN — HEPARIN SODIUM 5000 UNITS: 5000 INJECTION INTRAVENOUS; SUBCUTANEOUS at 06:34

## 2020-02-19 RX ADMIN — OXYCODONE HYDROCHLORIDE AND ACETAMINOPHEN 1 TABLET: 5; 325 TABLET ORAL at 12:58

## 2020-02-19 RX ADMIN — INSULIN LISPRO 6 UNITS: 100 INJECTION, SOLUTION INTRAVENOUS; SUBCUTANEOUS at 21:28

## 2020-02-19 RX ADMIN — ONDANSETRON 4 MG: 2 INJECTION INTRAMUSCULAR; INTRAVENOUS at 12:58

## 2020-02-19 RX ADMIN — DOCUSATE SODIUM 100 MG: 100 CAPSULE, LIQUID FILLED ORAL at 21:31

## 2020-02-19 RX ADMIN — CALCIUM ACETATE 667 MG: 667 CAPSULE ORAL at 12:06

## 2020-02-19 RX ADMIN — INSULIN LISPRO 5 UNITS: 100 INJECTION, SOLUTION INTRAVENOUS; SUBCUTANEOUS at 09:32

## 2020-02-19 RX ADMIN — HEPARIN SODIUM 5000 UNITS: 5000 INJECTION INTRAVENOUS; SUBCUTANEOUS at 21:31

## 2020-02-19 RX ADMIN — HEPARIN SODIUM 5000 UNITS: 5000 INJECTION INTRAVENOUS; SUBCUTANEOUS at 17:29

## 2020-02-19 RX ADMIN — FUROSEMIDE 80 MG: 10 INJECTION, SOLUTION INTRAMUSCULAR; INTRAVENOUS at 21:31

## 2020-02-19 RX ADMIN — INSULIN LISPRO 5 UNITS: 100 INJECTION, SOLUTION INTRAVENOUS; SUBCUTANEOUS at 12:06

## 2020-02-19 RX ADMIN — HYDRALAZINE HYDROCHLORIDE 100 MG: 50 TABLET, FILM COATED ORAL at 17:28

## 2020-02-19 RX ADMIN — ASPIRIN 81 MG 81 MG: 81 TABLET ORAL at 09:31

## 2020-02-19 RX ADMIN — CALCIUM ACETATE 667 MG: 667 CAPSULE ORAL at 09:31

## 2020-02-19 RX ADMIN — INSULIN LISPRO 2 UNITS: 100 INJECTION, SOLUTION INTRAVENOUS; SUBCUTANEOUS at 16:30

## 2020-02-19 RX ADMIN — CARVEDILOL 3.12 MG: 3.12 TABLET, FILM COATED ORAL at 09:31

## 2020-02-19 RX ADMIN — Medication 10 ML: at 21:33

## 2020-02-19 RX ADMIN — INSULIN LISPRO 5 UNITS: 100 INJECTION, SOLUTION INTRAVENOUS; SUBCUTANEOUS at 16:30

## 2020-02-19 RX ADMIN — POLYMYXIN B SULFATE, BACITRACIN ZINC, NEOMYCIN SULFATE: 5000; 3.5; 4 OINTMENT TOPICAL at 09:33

## 2020-02-19 RX ADMIN — ATORVASTATIN CALCIUM 40 MG: 20 TABLET, FILM COATED ORAL at 09:31

## 2020-02-19 RX ADMIN — INSULIN LISPRO 2 UNITS: 100 INJECTION, SOLUTION INTRAVENOUS; SUBCUTANEOUS at 06:33

## 2020-02-19 RX ADMIN — HYDRALAZINE HYDROCHLORIDE 100 MG: 50 TABLET, FILM COATED ORAL at 09:31

## 2020-02-19 RX ADMIN — GABAPENTIN 300 MG: 300 CAPSULE ORAL at 17:28

## 2020-02-19 RX ADMIN — AMLODIPINE BESYLATE 10 MG: 5 TABLET ORAL at 09:31

## 2020-02-19 RX ADMIN — DOCUSATE SODIUM 100 MG: 100 CAPSULE, LIQUID FILLED ORAL at 09:31

## 2020-02-19 RX ADMIN — CALCIUM ACETATE 667 MG: 667 CAPSULE ORAL at 17:28

## 2020-02-19 RX ADMIN — POLYMYXIN B SULFATE, BACITRACIN ZINC, NEOMYCIN SULFATE: 5000; 3.5; 4 OINTMENT TOPICAL at 21:50

## 2020-02-19 RX ADMIN — GABAPENTIN 300 MG: 300 CAPSULE ORAL at 09:31

## 2020-02-19 RX ADMIN — POLYMYXIN B SULFATE, BACITRACIN ZINC, NEOMYCIN SULFATE: 5000; 3.5; 4 OINTMENT TOPICAL at 17:30

## 2020-02-19 RX ADMIN — FUROSEMIDE 80 MG: 10 INJECTION, SOLUTION INTRAMUSCULAR; INTRAVENOUS at 10:10

## 2020-02-19 RX ADMIN — GABAPENTIN 300 MG: 300 CAPSULE ORAL at 21:31

## 2020-02-19 RX ADMIN — Medication 10 ML: at 17:31

## 2020-02-19 RX ADMIN — IRON SUCROSE 200 MG: 20 INJECTION, SOLUTION INTRAVENOUS at 10:10

## 2020-02-19 NOTE — PROGRESS NOTES
Transition of care: home with [facility] AT [facility] when medically cleared    Patient has cpap and nebulizer at home. She is currently wearing oxygen  RN please wean from oxygen or place qualifying walk test on this chart. Patient has been using bipap will need orders if she does need this for home  Patient wants to use [facility] even the presence of her complaining of the care she recieves. She has been offered [facility] but she wants to use [facility]. Met with Dr. Alesia Lopez for IDR she informed cm patient is not ready for d/c. Patient has Dr. Jamshid Reyes for pcp  She has [facility] for insurance. Cm has also sent referral to [facility] for shower chair. Cm will continue to follow   Care Management Interventions  PCP Verified by CM:  Yes  Transition of Care Consult (CM Consult): [address]: No  Reason Outside [place]: Patient already serviced by other home care/hospice agency([facility])  Discharge Durable Medical Equipment: Yes  The Patient and/or Patient Representative was Provided with a Choice of Provider and Agrees with the Discharge Plan?: Yes  Freedom of Choice List was Provided with Basic Dialogue that Supports the Patient's Individualized Plan of Care/Goals, Treatment Preferences and Shares the Quality Data Associated with the Providers?: Yes  Discharge Location  Discharge Placement: Home with home health

## 2020-02-19 NOTE — PROGRESS NOTES
Cardiology Progress Note        Patient: Dede Mejia        Sex: female          DOA: 2/12/2020  YOB: 1965      Age:  53 y.o.        LOS:  LOS: 7 days    Patient seen and examined, chart reviewed. Assessment/Plan     Patient Active Problem List   Diagnosis Code    HTN (hypertension) I10    Type II or unspecified type diabetes mellitus with renal manifestations, uncontrolled(250.42) ([facility] E11.29, E11.65    Morbid obesity ([facility] E66.01    Respiratory distress R06.03    Asthma exacerbation J45. 102    Diastolic CHF (HCC) X60.27    АНДРЕЙ (obstructive sleep apnea) G47.33    UTI (urinary tract infection) N39.0    Hyperkalemia E87.5         Acute renal failure superimposed on stage 3 chronic kidney disease (HCC) N17.9, N18.3      Acute hypoxic respiratory failure from volume overload  As per chart it appears she gained 37 pounds from 12 February to 16 February. Patient was transferred to ICU and she was on BiPAP for hypoxic respiratory failure    Plan:    Continue IV Lasix to 80 mg twice a day  Strict input output  Salt restriction up to 2 g per day and fluid restriction up to 1.5 L per day  Continue carvedilol and amlodipine  Continue management as per hospital medicine  Monitor renal function and electrolytes. Plan discussed with patient's nurse. Subjective:    cc:  Denies any chest pain , complaining of shortness of breath on exertion, denies any orthopnea or PND, complaining of leg swelling      REVIEW OF SYSTEMS:     General: No fevers or chills.   Cardiovascular: No chest pain,No palpitations, No orthopnea, No PND, positive leg swelling, No claudication, positive shortness of breath on exertion  Pulmonary: No dyspnea   Gastrointestinal: No nausea, vomiting, bleeding  Neurology: No Dizziness    Objective:      Visit Vitals  /74 (BP 1 Location: Right arm, BP Patient Position: At rest;Sitting)   Pulse 81   Temp 97.7 °F (36.5 °C)   Resp 18   Ht 5' 2\" (1.575 m)   Wt 148.8 kg (328 lb 1.6 oz)   SpO2 99%   Breastfeeding No   BMI 60.01 kg/m²     Body mass index is 60.01 kg/m². Physical Exam:  General Appearance: Comfortable, not using accessory muscles of respiration. HEENT: STEPHANIE. HEAD: Atraumatic  NECK: No JVD, no thyroidomeglay. CAROTIDS: No bruit   LUNGS: Clear bilaterally. HEART: S1+S2 audible, no murmur, no pericardial rub. ABD: Non-tender, BS Audible    EXT: +++ lower extremity edema, and no cyanosis. VASCULAR EXAM: Pulses are intact. PSYCHIATRIC EXAM: Mood is appropriate. MUSCULOSKELETAL: Grossly no joint deformity.   NEUROLOGICAL: AAO times 3, No motor and sensory deficit  Medication:  Current Facility-Administered Medications   Medication Dose Route Frequency    ondansetron (ZOFRAN) injection 4 mg  4 mg IntraVENous Q8H PRN    insulin lispro (HUMALOG) injection 5 Units  5 Units SubCUTAneous TIDAC    furosemide (LASIX) injection 80 mg  80 mg IntraVENous BID    carvediloL (COREG) tablet 3.125 mg  3.125 mg Oral BID WITH MEALS    hydrALAZINE (APRESOLINE) 20 mg/mL injection 10 mg  10 mg IntraVENous Q6H PRN    iron sucrose (VENOFER) injection 200 mg  200 mg IntraVENous DAILY    magnesium hydroxide (MILK OF MAGNESIA) 400 mg/5 mL oral suspension 30 mL  30 mL Oral DAILY PRN    docusate sodium (COLACE) capsule 100 mg  100 mg Oral BID    polyethylene glycol (MIRALAX) packet 17 g  17 g Oral BID    gabapentin (NEURONTIN) capsule 300 mg  300 mg Oral TID    amLODIPine (NORVASC) tablet 10 mg  10 mg Oral DAILY    hydrALAZINE (APRESOLINE) tablet 100 mg  100 mg Oral TID    atorvastatin (LIPITOR) tablet 40 mg  40 mg Oral DAILY    albuterol-ipratropium (DUO-NEB) 2.5 MG-0.5 MG/3 ML  3 mL Nebulization Q4H PRN    aspirin chewable tablet 81 mg  81 mg Oral DAILY    DULoxetine (CYMBALTA) capsule 60 mg  60 mg Oral DAILY    neomycin-bacitracin-polymyxin (NEOSPORIN) ointment   Topical TID    heparin (porcine) injection 5,000 Units  5,000 Units SubCUTAneous Q8H    insulin glargine (LANTUS) injection 15 Units  15 Units SubCUTAneous QHS    insulin lispro (HUMALOG) injection   SubCUTAneous AC&HS    dextrose 10% infusion 125-250 mL  125-250 mL IntraVENous PRN    calcium acetate(phosphat bind) (PHOSLO) capsule 667 mg  1 Cap Oral TID WITH MEALS    sodium chloride (NS) flush 5-40 mL  5-40 mL IntraVENous Q8H    sodium chloride (NS) flush 5-40 mL  5-40 mL IntraVENous PRN    acetaminophen (TYLENOL) tablet 650 mg  650 mg Oral Q4H PRN    oxyCODONE-acetaminophen (PERCOCET) 5-325 mg per tablet 1 Tab  1 Tab Oral Q4H PRN               Lab/Data Reviewed:       Recent Labs     02/19/20 0335 02/18/20  0526 02/17/20  0412   WBC 6.5 5.9 6.1   HGB 8.2* 8.2* 8.0*   HCT 27.0* 27.0* 26.4*    245 239     Recent Labs     02/19/20  0335 02/18/20  0526 02/17/20  0412    140 141   K 4.8 4.7 4.8    109 111   CO2 24 24 24   * 147* 111*   BUN 70* 64* 66*   CREA 2.59* 2.38* 2.31*   CA 8.7 9.0 8.7     Total time spent 45 minutes  Signed By: Kodi Badillo MD     February 19, 2020

## 2020-02-19 NOTE — PROGRESS NOTES
1808 [facility] care of the patient from [address] [place] [address] (offgoing Nurse). Patient is alert and oriented. Pt denies any pain or discomfort at this moment. bed in low position, call bell within reach. 1900 Patient had an uneventful shift and remained stable. Purposeful hourly rounding completed throughout the shift, NAD noted at this time, and patient is resting quietly in bed. No concerns or requests voiced    1913 Bedside and Verbal shift change report given to Elisha Serra RN (oncoming nurse) by Ralph Toure RN (offgoing nurse). Report included the following information SBAR, Kardex, MAR, Recent Results and Cardiac Rhythm .

## 2020-02-19 NOTE — PROGRESS NOTES
INITIAL NUTRITION ASSESSMENT     RECOMMENDATIONS/PLAN:   Other: Continue w/ POC  Monitor labs/lytes, PO intakes, skin integrity, wt, fluid status, BM    REASON FOR ASSESSMENT:       [x] LOS    NUTRITION ASSESSMENT:   Client History: 53 yrs old Female admitted with acute renal failure on chronic hyperkalemia, chest pain, diarrhea-no diarrhea since admission now treating constipation, SOB w/ hypoxia, CHF exacerbation, pulmonary HTN, foot injury      PMHx: asthma, DM, heart palpitation, HTN, fibromyalgia, morbid obesity, MRSA infection    Cultural/Voodoo Food Preferences: None Identified    FOOD/NUTRITION HISTORY  Diet History: provided in pt diet edu as pt was confused regarding diet at home; she has been trying to eat healthier at home   Food Allergies:  [] NKFA     [x] Yes (strawberry)   Pertinent PTA Medications: metformin, lasix, novolin      NUTRITION INTAKE   Diet Order:  DM       Average PO Intake:       Patient Vitals for the past 100 hrs:   % Diet Eaten   02/18/20 1810 25 %   02/18/20 0950 100 %   02/16/20 0808 80 %      Pertinent Medications:  [x] Reviewed; colace, lasix, venofer, MOM, miralax,    Electrolyte Replacement Protocol: []K  []Mg  []PO4    Insulin:  [] SSI  [x] Pre-meal   [x]  Basal   [] Drip  [] None  Pt expected to meet estimated nutrient needs through next review:          [x]  Yes     [] No;  ANTHROPOMETRICS  Height: 5' 2\" (157.5 cm)       Weight: 148.8 kg (328 lb 1.6 oz)    BMI: 60 kg/m^2  -  morbidly obese (Greater than or = to 40% BMI)        Weight change: no wt loss per chart review                                   Comparison to Reference Standards:  IBW: 110 lbs      %IBW: 298%      AdjBW: 74.8 kg    NUTRITION-FOCUSED PHYSICAL ASSESSMENT  Skin: No PU     GI: +BM 2/18/20    BIOCHEMICAL DATA & MEDICAL TESTS  Pertinent Labs:  [x] Reviewed; BUN-70 Creatinine-2.59 GFR est AA-23    NUTRITION PRESCRIPTION  Calories: 7698-6470 kcal/day based on 30-35kcal/kg of IBW  Protein: 50-60 g/day based on 1.0-1.2 g/kg of IBW  CHO: 188-219g/day based on 50% of total energy  Fluid:9270-9747 ml/day based on 1 kcal/ml      NUTRITION DIAGNOSES:   1. Knowledge related nutrition deficit related to renal/DM/Cardaic diet as evidence by pt reported confusion     NUTRITION INTERVENTIONS:   INTERVENTIONS:        GOALS:  1. Other: Continue w/ POC 1. Encourage PO intake >50% at most meals by next review 5-7 days     LEARNING NEEDS (Diet, Supplementation, Food/Nutrient-Drug Interaction):   [] None Identified  [x] Inpatient education provided/documented    [] Identified and patient:  [] Declined     [] Was not appropriate/indicated  NUTRITION MONITORING /EVALUATION:   Follow PO intake  Monitor wt  Monitor renal labs, electrolytes, fluid status    [] Participated in Interdisciplinary Rounds  [x] [address] [place] [address] Reviewed/Documented  DISCHARGE NUTRITION RECOMMENDATIONS ADDRESSED:      [x] To be determined closer to discharge    NUTRITION RISK:     []  At risk                     [x]  Not currently at risk     Will follow-up per policy.   Doyle Mc 9

## 2020-02-19 NOTE — PROGRESS NOTES
1914:  Assumed care for patient, received bedside report from Belia Nelson RN. Patient quietly lying in bed with no complaints of pain or discomfort at the time. Whiteboard updated, bed at the lowest position with call bell within reach.

## 2020-02-19 NOTE — PROGRESS NOTES
[facility]  Pulmonary, Critical Care, and Sleep Medicine    Name: Sawyer Sahu MRN: [id number]   : 1965 Hospital: [facility]    Date: 2020  Room: [address] [place] [address] Note                                              Consult requesting physician: Dr. Dilcia Lancaster  Reason for Consult: acute respiratory failure requiring BiPAP      IMPRESSION:    Acute respiratory failure with hypoxia ([address] 2' to below, required BiPAP  D73.22    Diastolic CHF, acute on chronic (HCC) I50.33    Asthma J45.909    Acute renal failure superimposed on stage 3 chronic kidney disease (HCC) N17.9, N18.3    HTN (hypertension) I10    Type II or unspecified type diabetes mellitus with renal manifestations, uncontrolled(250.42) ([facility]) E11.29, E11.65    Morbid obesity ([address] E66.01    АНДРЕЙ (obstructive sleep apnea) G47.33       ·   Patient Active Problem List   Diagnosis Code    HTN (hypertension) I10    Type II or unspecified type diabetes mellitus with renal manifestations, uncontrolled(250.42) ([address] E11.29, E11.65    Morbid obesity ([address] E66.01    Respiratory distress R06.03    Asthma exacerbation J45. 773    Diastolic CHF (HCC) X96.39    АНДРЕЙ (obstructive sleep apnea) G47.33    UTI (urinary tract infection) N39.0    Hyperkalemia E87.5    Chest pain R07.9    Acute renal failure superimposed on stage 3 chronic kidney disease (HCC) N17.9, N18.3    Acute respiratory failure with hypoxia (HCC) J31.35    Diastolic CHF, acute on chronic (HCC) I50.33       · Code status: Full code      RECOMMENDATIONS:   Respiratory:   Required BiPAP overnight.  now on RA. Use BiPAP PRN. АНДРЕЙ on CPAP at home. Continue CPAP at nighttime when not needing to use BiPAP. Dyspnea is due to volume overload and diastolic CHF exacerbation rather than asthma exacerbation. No need for steroids. Bronchodilators PRN. Keep SPO2 >=92%. HOB 30 degree elevation all the time. Aggressive pulmonary toileting.  Aspiration precautions. Incentive spirometry. CVS: Chest pain on admission. VQ scan low probability for PE. Echo showed grade 1 diastolic dysfunction with normal LVEF. Chronic leg edema on Lasix at home. Lasix dose was reduced on admission due to elevated creatinine. I believe that led to worsening diastolic CHF requiring BiPAP and transferred down to ICU. After increasing Lasix her dyspnea is improved but continued to have leg edema. Hypoxemia resolved and now she is on room air. Lasix increased to 80 mg twice daily by Dr. Armando Arana. Monitor BUN/creatinine and electrolytes. Renal: CKD, diuretics, defer to nephrologist.  Prophylaxis: DVT Prophylaxis: SCD/Heparin. GI Prophylaxis: Low risk for stress ulcer. Lines/Tubes: PIV    Will defer respective systems problem management to primary and other respective consultant  Further recommendations will be based on the patient's response to recommended treatment and results of the investigation ordered. Quality Care: PPI, DVT prophylaxis, HOB elevated, Infection control all reviewed and addressed. Care of plan d/w RN  D/w patient at bedside (answered all questions to satisfaction). Moderate complexity decision making was performed during the evaluation of this patient at high risk for decompensation with multiple organ involvement. PCCM will follow intermittently, call us with any questions. Subjective/History of Present Illness:       Patient is a 53 y.o. female with pmhx for HTN, uncontrolled DM, CKD, asthma-COPD , anemia, fibromyalgia, chronic diastolic CHF on diuretics at home, morbid obesity, АНДРЕЙ on CPAP, who presented with episodes of diarrhea, nausea, SOB and CP. In ER, labs showed K of 6.0 and Cr 2.69 and CT abd pelvis showed superficial pannus inflammatory changes without intra-abdominal abnormalities. VQ scan low probability for PE in ER. She was admitted to hospital for FARHAN on CKD and seen by cardiology, nephrology. Hyperkalemia resolved soon. Lasix was initially held but was restarted at low dose recently. Hospital course significant for SOB, increased work of breathing and hypoxia requiring 4 Lpm with finding of chf exacerbation - new comapred to admission CXR. Patient transferred to ICU on BiPAP for respiratory failure. 2/19/2020:  Used BiPAP overnight. Now on room air. Lasix increased to 80 mg twice daily since yesterday evening. Fluid restriction started since yesterday. Slight increase in renal indicis as expected after increasing Lasix. Persistent leg edema 2+ and arm edema. HORAN with mild exertion, not associated with wheezing or coughing. No chest pain, PND. Hemodynamic stable. I's and O's are not accurate. PCCM was not called for any issues overnight. No other overnight issues reported. I/O last 24 hrs:      Intake/Output Summary (Last 24 hours) at 2/19/2020 1337  Last data filed at 2/19/2020 1335  Gross per 24 hour   Intake 820 ml   Output    Net 820 ml       History taken from patient, EMR     Review of Systems:   HEENT: No epistaxis, no nasal drainage, no difficulty in swallowing, no redness in eyes  Respiratory: as above  Cardiovascular: no chest pain, no palpitations, + chronic leg edema, no syncope  Gastrointestinal: no abd pain, no vomiting, no diarrhea, no bleeding symptoms  Genitourinary: No urinary symptoms or hematuria  Musculoskeletal: Neg  Neurological: No focal weakness, no seizures, no headaches  Behvioral/Psych: No anxiety, no depression  Constitutional: No fever, no chills, no weight loss, no night sweats     Allergies   Allergen Reactions    Bees [Sting, Bee] Swelling    Penicillins Hives    [place] Hives      Past Medical History:   Diagnosis Date    Asthma     Note: As child had asthma    Diabetes mellitus ([address]     Type 2    Heart palpitations     Hypertension     Ill-defined condition     fibromyalgia     Morbid obesity ([address]     MRSA infection 8/2014      Past Surgical History: Procedure Laterality Date    BREAST SURGERY PROCEDURE UNLISTED      cyst removed    HX CATARACT REMOVAL      HX CHOLECYSTECTOMY      HX TUBAL LIGATION        Social History     Tobacco Use    Smoking status: Never Smoker    Smokeless tobacco: Never Used   Substance Use Topics    Alcohol use: No      Family History   Problem Relation Age of Onset    Heart Attack Mother     Heart Attack Maternal Grandmother       Prior to Admission medications    Medication Sig Start Date End Date Taking? Authorizing Provider   predniSONE (STERAPRED) 5 mg dose pack See administration instruction per 5mg dose pack 12/16/19   Maria Dolores aMtias MD   guaiFENesin ER [facility]) 600 mg ER tablet Take 1 Tab by mouth every twelve (12) hours. 12/16/19   Maria Dolores Matias MD   albuterol-ipratropium (DUO-NEB) 2.5 mg-0.5 mg/3 ml nebu 3 mL by Nebulization route every four (4) hours as needed for Wheezing. 12/16/19   Maria Dolores Matias MD   amLODIPine (NORVASC) 5 mg tablet Take 5 mg by mouth daily. Yoana Crocker MD   hydrALAZINE (APRESOLINE) 25 mg tablet Take 25 mg by mouth three (3) times daily. Yoana Crocker MD   albuterol sulfate 90 mcg/actuation aepb Take 1-2 Puffs by inhalation every four (4) hours as needed. 1/24/19   Maria Dolores Matias MD   metFORMIN (GLUCOPHAGE) 1,000 mg tablet Take 1,000 mg by mouth two (2) times daily (with meals). Yoana Crocker MD   metoprolol succinate (TOPROL-XL) 50 mg XL tablet Take 100 mg by mouth daily. Yoana Crocker MD   DULoxetine (CYMBALTA) 30 mg capsule Take 60 mg by mouth daily. Yoana Crocker MD   atorvastatin (LIPITOR) 40 mg tablet Take 40 mg by mouth daily. Yoana Crocker MD   losartan (COZAAR) 100 mg tablet Take 100 mg by mouth daily. Yoana Crocker MD   OTHER,NON-FORMULARY, Medication for irregular heart beat    Yoana Crocker MD   gabapentin (NEURONTIN) 300 mg capsule Take 300 mg by mouth three (3) times daily. Yoana Crocker MD   furosemide (LASIX) 40 mg tablet Take 1 Tab by mouth daily.   Patient taking differently: Take 40 mg by mouth two (2) times a day. 2/13/17   Philip Henson MD   insulin NPH/insulin regular (NOVOLIN 70/30) 100 unit/mL (70-30) injection 50 Units by SubCUTAneous route every twelve (12) hours. 50 units am; 40 units noon; 35 units pm    Provider, Historical   aspirin 81 mg chewable tablet Take 1 tablet by mouth daily.  8/20/14   Chintan Barlow MD     Current Facility-Administered Medications   Medication Dose Route Frequency    ondansetron (ZOFRAN) 4 mg/2 mL injection        insulin lispro (HUMALOG) injection 5 Units  5 Units SubCUTAneous TIDAC    furosemide (LASIX) injection 80 mg  80 mg IntraVENous BID    carvediloL (COREG) tablet 3.125 mg  3.125 mg Oral BID WITH MEALS    iron sucrose (VENOFER) injection 200 mg  200 mg IntraVENous DAILY    docusate sodium (COLACE) capsule 100 mg  100 mg Oral BID    polyethylene glycol (MIRALAX) packet 17 g  17 g Oral BID    gabapentin (NEURONTIN) capsule 300 mg  300 mg Oral TID    amLODIPine (NORVASC) tablet 10 mg  10 mg Oral DAILY    hydrALAZINE (APRESOLINE) tablet 100 mg  100 mg Oral TID    atorvastatin (LIPITOR) tablet 40 mg  40 mg Oral DAILY    aspirin chewable tablet 81 mg  81 mg Oral DAILY    DULoxetine (CYMBALTA) capsule 60 mg  60 mg Oral DAILY    neomycin-bacitracin-polymyxin (NEOSPORIN) ointment   Topical TID    heparin (porcine) injection 5,000 Units  5,000 Units SubCUTAneous Q8H    insulin glargine (LANTUS) injection 15 Units  15 Units SubCUTAneous QHS    insulin lispro (HUMALOG) injection   SubCUTAneous AC&HS    calcium acetate(phosphat bind) (PHOSLO) capsule 667 mg  1 Cap Oral TID WITH MEALS    sodium chloride (NS) flush 5-40 mL  5-40 mL IntraVENous Q8H         Objective:   Vital Signs:    Visit Vitals  /57 (BP 1 Location: Left arm, BP Patient Position: At rest;Sitting)   Pulse 75   Temp 97.7 °F (36.5 °C)   Resp 18   Ht 5' 2\" (1.575 m)   Wt 148.8 kg (328 lb 1.6 oz)   SpO2 100%   Breastfeeding No   BMI 60.01 kg/m²       O2 Device: Nasal cannula   O2 Flow Rate (L/min): 1.5 l/min   Temp (24hrs), Av.7 °F (36.5 °C), Min:97.4 °F (36.3 °C), Max:98.1 °F (36.7 °C)       Intake/Output:   Last shift:       07 - 1900  In: 720 [P.O.:720]  Out: -     Last 3 shifts: 1901 -  0700  In: 300 [P.O.:300]  Out: -       Intake/Output Summary (Last 24 hours) at 2020 1337  Last data filed at 2020 1335  Gross per 24 hour   Intake 820 ml   Output    Net 820 ml       Last 3 Recorded Weights in this Encounter    20 0608 20 0327 20 0403   Weight: 152.2 kg (335 lb 9.6 oz) 151.4 kg (333 lb 12.4 oz) 148.8 kg (328 lb 1.6 oz)         Recent Labs     20  0432 20  2104 20  1537   PHI 7.347* 7.276* 7.281*   PCO2I 41.6 44.6 43.4   PO2I 126* 140* 60*   HCO3I 22.8 20.7* 20.5*   FIO2I 0.30 0.40 36       Physical Exam:     General/Neurology: Alert, Awake, NAD. Morbidly obese. Head:   Normocephalic, without obvious abnormality, atraumatic. Eye:   EOM intact, no scleral icterus, no pallor, no cyanosis. Lung:   Reduced air entry bilateral due to obese chest wall. No rhonchi. No wheezing. No stridors. No prolongded expiration. No accessory muscle use. Heart:   Regular rate & rhythm. S1 S2 present. No murmur. No JVD. Abdomen: Morbidly obese. Soft. NT. ND. +BS. No masses. Extremities:  2+ pitting b/l pedal and hand edema. No cyanosis. No clubbing. Pulses: 2+ and symmetric in DP. Capillary refill: normal  Lymphatic:  No cervical or supraclavicular palpable lymphadenopathy. Skin:   Color, texture, turgor normal. No rashes or lesions.        Data:       Recent Results (from the past 24 hour(s))   GLUCOSE, POC    Collection Time: 20  4:51 PM   Result Value Ref Range    Glucose (POC) 325 (H) 70 - 110 mg/dL   GLUCOSE, POC    Collection Time: 20  9:17 PM   Result Value Ref Range    Glucose (POC) 312 (H) 70 - 110 mg/dL   MAGNESIUM    Collection Time: 20  3:35 AM   Result Value Ref Range Magnesium 2.5 1.6 - 2.6 mg/dL   PHOSPHORUS    Collection Time: 02/19/20  3:35 AM   Result Value Ref Range    Phosphorus 4.1 2.5 - 4.9 MG/DL   CBC WITH AUTOMATED DIFF    Collection Time: 02/19/20  3:35 AM   Result Value Ref Range    WBC 6.5 4.6 - 13.2 K/uL    RBC 3.33 (L) 4.20 - 5.30 M/uL    HGB 8.2 (L) 12.0 - 16.0 g/dL    HCT 27.0 (L) 35.0 - 45.0 %    MCV 81.1 74.0 - 97.0 FL    MCH 24.6 24.0 - 34.0 PG    MCHC 30.4 (L) 31.0 - 37.0 g/dL    RDW 15.1 (H) 11.6 - 14.5 %    PLATELET 774 720 - 237 K/uL    MPV 9.6 9.2 - 11.8 FL    NEUTROPHILS 59 40 - 73 %    LYMPHOCYTES 23 21 - 52 %    MONOCYTES 10 3 - 10 %    EOSINOPHILS 7 (H) 0 - 5 %    BASOPHILS 1 0 - 2 %    ABS. NEUTROPHILS 3.9 1.8 - 8.0 K/UL    ABS. LYMPHOCYTES 1.5 0.9 - 3.6 K/UL    ABS. MONOCYTES 0.6 0.05 - 1.2 K/UL    ABS. EOSINOPHILS 0.4 0.0 - 0.4 K/UL    ABS. BASOPHILS 0.0 0.0 - 0.1 K/UL    DF AUTOMATED     METABOLIC PANEL, COMPREHENSIVE    Collection Time: 02/19/20  3:35 AM   Result Value Ref Range    Sodium 137 136 - 145 mmol/L    Potassium 4.8 3.5 - 5.5 mmol/L    Chloride 105 100 - 111 mmol/L    CO2 24 21 - 32 mmol/L    Anion gap 8 3.0 - 18 mmol/L    Glucose 184 (H) 74 - 99 mg/dL    BUN 70 (H) 7.0 - 18 MG/DL    Creatinine 2.59 (H) 0.6 - 1.3 MG/DL    BUN/Creatinine ratio 27 (H) 12 - 20      GFR est AA 23 (L) >60 ml/min/1.73m2    GFR est non-AA 19 (L) >60 ml/min/1.73m2    Calcium 8.7 8.5 - 10.1 MG/DL    Bilirubin, total 0.3 0.2 - 1.0 MG/DL    ALT (SGPT) 18 13 - 56 U/L    AST (SGOT) 11 10 - 38 U/L    Alk.  phosphatase 116 45 - 117 U/L    Protein, total 6.1 (L) 6.4 - 8.2 g/dL    Albumin 3.0 (L) 3.4 - 5.0 g/dL    Globulin 3.1 2.0 - 4.0 g/dL    A-G Ratio 1.0 0.8 - 1.7     GLUCOSE, POC    Collection Time: 02/19/20  6:25 AM   Result Value Ref Range    Glucose (POC) 186 (H) 70 - 110 mg/dL   GLUCOSE, POC    Collection Time: 02/19/20 11:27 AM   Result Value Ref Range    Glucose (POC) 177 (H) 70 - 110 mg/dL         Chemistry Recent Labs     02/19/20  0335 02/18/20  0525 02/17/20 0412   * 147* 111*    140 141   K 4.8 4.7 4.8    109 111   CO2 24 24 24   BUN 70* 64* 66*   CREA 2.59* 2.38* 2.31*   CA 8.7 9.0 8.7   MG 2.5 2.4 2.6   PHOS 4.1 4.4 4.2   AGAP 8 7 6   BUCR 27* 27* 29*    107 106   TP 6.1* 6.1* 6.1*   ALB 3.0* 3.0* 3.1*   GLOB 3.1 3.1 3.0   AGRAT 1.0 1.0 1.0        Lactic Acid Lactic acid   Date Value Ref Range Status   08/11/2014 0.7 0.4 - 2.0 MMOL/L Final     No results for input(s): LAC in the last 72 hours. Liver Enzymes Protein, total   Date Value Ref Range Status   02/19/2020 6.1 (L) 6.4 - 8.2 g/dL Final     Albumin   Date Value Ref Range Status   02/19/2020 3.0 (L) 3.4 - 5.0 g/dL Final     Globulin   Date Value Ref Range Status   02/19/2020 3.1 2.0 - 4.0 g/dL Final     A-G Ratio   Date Value Ref Range Status   02/19/2020 1.0 0.8 - 1.7   Final     AST (SGOT)   Date Value Ref Range Status   02/19/2020 11 10 - 38 U/L Final     Alk. phosphatase   Date Value Ref Range Status   02/19/2020 116 45 - 117 U/L Final     Recent Labs     02/19/20  0335 02/18/20 0526 02/17/20 0412   TP 6.1* 6.1* 6.1*   ALB 3.0* 3.0* 3.1*   GLOB 3.1 3.1 3.0   AGRAT 1.0 1.0 1.0   SGOT 11 9* 7*    107 106        CBC w/Diff Recent Labs     02/19/20 0335 02/18/20 0526 02/17/20 0412   WBC 6.5 5.9 6.1   RBC 3.33* 3.31* 3.27*   HGB 8.2* 8.2* 8.0*   HCT 27.0* 27.0* 26.4*    245 239   GRANS 59 55 62   LYMPH 23 27 20*   EOS 7* 6* 4        Cardiac Enzymes No results found for: CPK, CK, CKMMB, CKMB, RCK3, CKMBT, CKNDX, CKND1, MARIA ISABEL, TROPT, TROIQ, ANITA, TROPT, TNIPOC, BNP, BNPP     BNP No results found for: BNP, BNPP, XBNPT     Coagulation No results for input(s): PTP, INR, APTT, INREXT, INREXT in the last 72 hours.       Thyroid  Lab Results   Component Value Date/Time    TSH 0.56 01/23/2019 01:33 AM       No results found for: T4     Urinalysis Lab Results   Component Value Date/Time    Color YELLOW 12/11/2019 02:30 AM    Appearance CLOUDY 12/11/2019 02:30 AM Specific gravity 1.021 12/11/2019 02:30 AM    pH (UA) 5.0 12/11/2019 02:30 AM    Protein >1,000 (A) 12/11/2019 02:30 AM    Glucose >1,000 (A) 12/11/2019 02:30 AM    Ketone 15 (A) 12/11/2019 02:30 AM    Bilirubin NEGATIVE  12/11/2019 02:30 AM    Urobilinogen 0.2 12/11/2019 02:30 AM    Nitrites NEGATIVE  12/11/2019 02:30 AM    Leukocyte Esterase NEGATIVE  12/11/2019 02:30 AM    Epithelial cells 2 to 3 12/11/2019 02:30 AM    Bacteria 3+ (A) 12/11/2019 02:30 AM    WBC 40 to 50 12/11/2019 02:30 AM    RBC 4 to 6 12/11/2019 02:30 AM        Micro  No results for input(s): SDES, CULT in the last 72 hours. No results for input(s): CULT in the last 72 hours. Culture data during this hospitalization. All Micro Results     Procedure Component Value Units Date/Time    OVA & PARASITES, STOOL [[id number]] Collected:  02/13/20 0000    Order Status:  Canceled Specimen:  Stool     C. DIFFICILE AG & TOXIN A/B [[id number]]     Order Status:  Canceled Specimen:  Stool              ECHO 2/13/20 Result status: Final result ·   Left Ventricle: Normal cavity size, wall thickness and systolic function (ejection fraction normal). The estimated ejection fraction is 60 - 65%. There is mild (grade 1) left ventricular diastolic dysfunction E/E' ratio is 13.00.  · Moderately dilated left atrium. · Left Atrium: Moderately dilated left atrium. · Tricuspid Valve: Normal valve structure and no stenosis. Mild regurgitation. Pulmonary arterial systolic pressure is 30.6 mmHg. Mild to moderate pulmonary hypertension. · IVC/Hepatic Veins: Severely elevated central venous pressure (15+ mmHg); IVC diameter is larger than 21 mm and collapses less than 50% with respiration. VQ scan 2/12/20: Low probability of pulmonary embolism.     Images report reviewed by me:  CT (Most Recent) (CT chest reviewed by me) Results from Hospital Encounter encounter on 02/12/20   CT ABD PELV WO CONT    Narrative EXAM: CT of the abdomen and pelvis    CLINICAL INDICATION/HISTORY:  Abdominal pain and diarrhea. COMPARISON: 07/17/2014. TECHNIQUE: Axial CT imaging of the abdomen and pelvis was performed without  contrast. Multiplanar reformats were generated. One or more dose reduction techniques were used on this CT: automated exposure  control, adjustment of the mAs and/or kVp according to patient size, and  iterative reconstruction techniques. The specific techniques used on this CT  exam have been documented in the patient's electronic medical record. Digital  Imaging and Communications in Medicine (DICOM) format image data are available  to nonaffiliated external healthcare facilities or entities on a secure, media  free, reciprocally searchable basis with patient authorization for at least a  12-month period after this study. _______________    FINDINGS:    LOWER CHEST: The visualized lung bases are clear. Heart size is normal. There is  no pericardial or pleural effusion. LIVER, BILIARY: Liver is normal with no focal parenchymal lesion identified. There is no intra-or extrahepatic biliary ductal dilatation. Gallbladder is  surgically absent. PANCREAS: Normal.    SPLEEN: Normal.    ADRENALS: Normal.    KIDNEYS: Normal. There is no nephrolithiasis. There is no hydronephrosis,  hydroureter, or other signs of obstructive nephropathy. LYMPH NODES: No enlarged lymph nodes. GASTROINTESTINAL TRACT: There is no evidence of bowel obstruction. While limited  without contrast, there is no definitive wall thickening.] The appendix is  visualized and unremarkable. PELVIC ORGANS: Unremarkable. VASCULATURE: Unremarkable. BONES: No acute or aggressive osseous abnormalities identified. OTHER: There is no free intraperitoneal fluid or free air. SUPERFICIAL SOFT TISSUES: Mild inflammatory changes at the pannus. There is a  fat-containing ventral hernia.     Please note that evaluation of the intra-abdominal structures is somewhat  limited due to lack of oral or IV contrast.    _______________      Impression IMPRESSION:    1. No evidence of nephrolithiasis, hydronephrosis, or other signs of obstructive  nephropathy. 2. No evidence of bowel obstruction or acute inflammatory process in the abdomen  or pelvis. 3. Superficial pannus inflammatory changes. Please correlate for clinical signs  of cellulitis. CXR reviewed by me:  XR (Most Recent). CXR  reviewed by me and compared with previous CXR Results from Hospital Encounter encounter on 02/12/20   XR CHEST PORT    Narrative EXAM: XR CHEST PORT    CLINICAL INDICATION/HISTORY: CHF  -Additional: None    COMPARISON: 2/16/2020    TECHNIQUE: Portable frontal view of the chest    _______________    FINDINGS:    SUPPORT DEVICES: None. HEART AND MEDIASTINUM: Cardiomegaly    LUNGS AND PLEURAL SPACES: Vascular congestion and interstitial edema. No large  effusion or pneumothorax. BONY THORAX AND SOFT TISSUES: Unremarkable.    _______________      Impression IMPRESSION:    Cardiomegaly. Vascular congestion and interstitial edema.              Nuria Bowers MD  2/19/2020

## 2020-02-19 NOTE — DIABETES MGMT
GLYCEMIC CONTROL PROGRESS NOTE:    - known h/o T2DM, HbA1C not within the recommended range for age + comorbids on TID mixed insulin/oral home regimen  - NPO, BG out of target range ICU: 140-180 mg/dL, trending down  - TDD = 40 (15 Lantus + 5 (MTI) + 20 - Humalog Normal Insulin Sensitivity Corrective Coverage)  - monitor BG trends, pt may benefit from additional adjustment to mealtime insulin dose   *Humalog 7 units qa    Recent Glucose Results:   Lab Results   Component Value Date/Time     (H) 02/19/2020 03:35 AM    GLUCPOC 177 (H) 02/19/2020 11:27 AM    GLUCPOC 186 (H) 02/19/2020 06:25 AM    GLUCPOC 312 (H) 02/18/2020 09:17 PM     Kyle Sahu MS, RN, CDE  Glycemic Control Team  [phone]  Pager [phone] (M-TH 8:00-4:30P)  *After Hours pager [phone]

## 2020-02-19 NOTE — PROGRESS NOTES
Problem: Mobility Impaired (Adult and Pediatric)  Goal: *Acute Goals and Plan of Care (Insert Text)  Description  In 1-7 days pt will be able to perform:  ST. Supine to sit to supine S with HR for meals. 2.  Sit to stand to sit S with RW in prep for ambulation. LT.  Gait:  Ambulate >50ft S with RW for home/community mobility. 2.  Activity tolerance: Tolerate up in chair 1-2 hours for ADLs. 3.  Patient/Family Education:  Patient/family to be independent with HEP for follow-up care and safe discharge. Outcome: Progressing Towards Goal  PHYSICAL THERAPY TREATMENT    Patient: Dora Barboza (53 y.o. female)  Date: 2020  Diagnosis: Hyperkalemia [E87.5]   Hyperkalemia       Precautions: Skin, Fall   Chart, physical therapy assessment, plan of care and goals were reviewed. ASSESSMENT:  Pt on telemetry unit now. Transitions to sit EOB with S and sit <>stand with S/SBA. Able to increase gt distance to 136ft with RW/O2 at 2Lnc with S/SBA. Three rest breaks while standing. O2 sat 96% or better during session. Overall activity tolerance increased significantly since last seen by this writer. Pt returned to room and left seated at bedside. Also noted significantly less anxiety this session. Progression toward goals:  [x]      Improving appropriately and progressing toward goals  []      Improving slowly and progressing toward goals  []      Not making progress toward goals and plan of care will be adjusted     PLAN:  Patient continues to benefit from skilled intervention to address the above impairments. Continue treatment per established plan of care. Discharge Recommendations:  Home Health  Further Equipment Recommendations for Discharge:  N/A     SUBJECTIVE:   Patient stated I'm okay.     OBJECTIVE DATA SUMMARY:   Critical Behavior:  Neurologic State: Alert  Orientation Level: Oriented X4  Cognition: Appropriate decision making, Follows commands  Safety/Judgement: Awareness of environment, Insight into deficits, Home safety  Functional Mobility Training:  Bed Mobility:  Supine to Sit: Supervision;Modified independent  Transfers:  Sit to Stand: Supervision;Stand-by assistance  Stand to Sit: Supervision;Stand-by assistance  Balance:  Sitting: Intact; Without support  Standing: Intact; With support  Ambulation/Gait Training:  Distance (ft): 136 Feet (ft)  Assistive Device: Gait belt;Walker, rolling(port O2 at 2Lnc)  Ambulation - Level of Assistance: Supervision;Stand-by assistance  Gait Abnormalities: Decreased step clearance  Base of Support: Widened  Stance: Time;Weight shift  Speed/Arlette: Slow  Step Length: Right shortened;Left shortened  Interventions: Safety awareness training;Verbal cues  Pain:  Pain Scale 1: Numeric (0 - 10)  Pain Intensity 1: 0  Pain Location 1: Head;Neck  Pain Orientation 1: Anterior;Posterior  Pain Description 1: Aching  Pain Intervention(s) 1: Medication (see MAR)  Activity Tolerance:   Fair +  Please refer to the flowsheet for vital signs taken during this treatment.   After treatment:   [x] Patient left in no apparent distress sitting up EOB  [] Patient left in no apparent distress in bed  [x] Call bell left within reach  [] Nursing notified  [] Caregiver present  [] Bed alarm activated      Nohemi Adame PT   Time Calculation: 25 mins

## 2020-02-19 NOTE — PROGRESS NOTES
Cardiology Progress Note        Patient: Jenny Pisano        Sex: female          DOA: 2/12/2020  YOB: 1965      Age:  53 y.o.        LOS:  LOS: 6 days    Patient seen and examined, chart reviewed. Assessment/Plan     Patient Active Problem List   Diagnosis Code    HTN (hypertension) I10    Type II or unspecified type diabetes mellitus with renal manifestations, uncontrolled(250.42) ([facility] E11.29, E11.65    Morbid obesity ([facility] E66.01    Respiratory distress R06.03    Asthma exacerbation J45. 278    Diastolic CHF (HCC) H34.61    АНДРЕЙ (obstructive sleep apnea) G47.33    UTI (urinary tract infection) N39.0    Hyperkalemia E87.5         Acute renal failure superimposed on stage 3 chronic kidney disease (HCC) N17.9, N18.3      Acute hypoxic respiratory failure from volume overload  As per chart it appears she gained 37 pounds from 12 February to 16 February. Patient was transferred to ICU and she was on BiPAP for hypoxic respiratory failure    Plan:    Change IV Lasix to 80 mg twice a day  Strict input output  Salt restriction up to 2 g per day and fluid restriction up to 1.5 L per day  Continue carvedilol and amlodipine  Continue management as per hospital medicine  Monitor renal function and electrolytes. Subjective:    cc:  Denies any chest pain , complaining of shortness of breath on exertion, denies any orthopnea or PND, complaining of leg swelling      REVIEW OF SYSTEMS:     General: No fevers or chills.   Cardiovascular: No chest pain,No palpitations, No orthopnea, No PND, positive leg swelling, No claudication, positive shortness of breath on exertion  Pulmonary: No dyspnea   Gastrointestinal: No nausea, vomiting, bleeding  Neurology: No Dizziness    Objective:      Visit Vitals  /53 (BP 1 Location: Left arm, BP Patient Position: At rest)   Pulse 79   Temp 97.4 °F (36.3 °C)   Resp 18   Ht 5' 2\" (1.575 m)   Wt 148.8 kg (328 lb 1.6 oz)   SpO2 95% Breastfeeding No   BMI 60.01 kg/m²     Body mass index is 60.01 kg/m². Physical Exam:  General Appearance: Comfortable, not using accessory muscles of respiration. HEENT: STEPHANIE. HEAD: Atraumatic  NECK: No JVD, no thyroidomeglay. CAROTIDS: No bruit   LUNGS: Clear bilaterally. HEART: S1+S2 audible, no murmur, no pericardial rub. ABD: Non-tender, BS Audible    EXT: ++ lower extremity edema, and no cyanosis. VASCULAR EXAM: Pulses are intact. PSYCHIATRIC EXAM: Mood is appropriate. MUSCULOSKELETAL: Grossly no joint deformity.   NEUROLOGICAL: AAO times 3, No motor and sensory deficit  Medication:  Current Facility-Administered Medications   Medication Dose Route Frequency    insulin lispro (HUMALOG) injection 5 Units  5 Units SubCUTAneous TIDAC    furosemide (LASIX) injection 80 mg  80 mg IntraVENous BID    carvediloL (COREG) tablet 3.125 mg  3.125 mg Oral BID WITH MEALS    ELECTROLYTE REPLACEMENT PROTOCOL - Potassium Renal Dosing  1 Each Other PRN    ELECTROLYTE REPLACEMENT PROTOCOL  - Phosphorus Renal Dosing  1 Each Other PRN    ELECTROLYTE REPLACEMENT PROTOCOL - Calcium   1 Each Other PRN    ELECTROLYTE REPLACEMENT PROTOCOL - Magnesium   1 Each Other PRN    hydrALAZINE (APRESOLINE) 20 mg/mL injection 10 mg  10 mg IntraVENous Q6H PRN    iron sucrose (VENOFER) injection 200 mg  200 mg IntraVENous DAILY    magnesium hydroxide (MILK OF MAGNESIA) 400 mg/5 mL oral suspension 30 mL  30 mL Oral DAILY PRN    docusate sodium (COLACE) capsule 100 mg  100 mg Oral BID    polyethylene glycol (MIRALAX) packet 17 g  17 g Oral BID    gabapentin (NEURONTIN) capsule 300 mg  300 mg Oral TID    amLODIPine (NORVASC) tablet 10 mg  10 mg Oral DAILY    hydrALAZINE (APRESOLINE) tablet 100 mg  100 mg Oral TID    atorvastatin (LIPITOR) tablet 40 mg  40 mg Oral DAILY    albuterol-ipratropium (DUO-NEB) 2.5 MG-0.5 MG/3 ML  3 mL Nebulization Q4H PRN    aspirin chewable tablet 81 mg  81 mg Oral DAILY    DULoxetine (CYMBALTA) capsule 60 mg  60 mg Oral DAILY    neomycin-bacitracin-polymyxin (NEOSPORIN) ointment   Topical TID    heparin (porcine) injection 5,000 Units  5,000 Units SubCUTAneous Q8H    insulin glargine (LANTUS) injection 15 Units  15 Units SubCUTAneous QHS    insulin lispro (HUMALOG) injection   SubCUTAneous AC&HS    dextrose 10% infusion 125-250 mL  125-250 mL IntraVENous PRN    calcium acetate(phosphat bind) (PHOSLO) capsule 667 mg  1 Cap Oral TID WITH MEALS    sodium chloride (NS) flush 5-40 mL  5-40 mL IntraVENous Q8H    sodium chloride (NS) flush 5-40 mL  5-40 mL IntraVENous PRN    acetaminophen (TYLENOL) tablet 650 mg  650 mg Oral Q4H PRN    oxyCODONE-acetaminophen (PERCOCET) 5-325 mg per tablet 1 Tab  1 Tab Oral Q4H PRN               Lab/Data Reviewed:       Recent Labs     02/18/20  0526 02/17/20  0412 02/16/20  0456   WBC 5.9 6.1 6.0   HGB 8.2* 8.0* 8.4*   HCT 27.0* 26.4* 27.7*    239 275     Recent Labs     02/18/20  0526 02/17/20  0412 02/16/20  0456    141 139   K 4.7 4.8 5.1    111 110   CO2 24 24 21   * 111* 150*   BUN 64* 66* 68*   CREA 2.38* 2.31* 2.25*   CA 9.0 8.7 8.7     Total time spent 45 minutes  Signed By: Audie López MD     February 18, 2020

## 2020-02-19 NOTE — PROGRESS NOTES
Nephrology Progress note    Subjective: Eugenie Conde is a 53 y.o. female with PMHx of HTN, uncontrolled DM, CKD, D-CMP and Anemia who presented with bouts of diarrhea and nausea. She also had complaints of SOB and CP. On initial eval in the ED her HR was in the 50's otherwise stable VS. Labs showed K of 6.0 and Cr 2.69. Home med included Losartan. Pt  Is followed by Dr Eric Horta for her CKD and recent baseline Cr in Dec was ~1.6. VQ scan, CxR, CT lower chest/abd/pelvis, and LE doppler US were all unremarkable. Echo showed EF 27%, mild diastolic dysfunction and mild- to mod Pulm HTN. Pt was treated medically for high K and received a dose of Lasix. Hyperkalemia corrected but Cr slightly up to 2.8. No diarrhea, N/V.      -Pt transferred to ICU due to acute hypercapnic hypoxic respiratory failure, improved and now back on monitored floor      Admits to SOB but better today. No CP. Has significant baseline LE edema.      Admit Date: 2/12/2020  Principal Problem:    Hyperkalemia (2/12/2020)    Active Problems:    HTN (hypertension) (8/16/2014)      Type II or unspecified type diabetes mellitus with renal manifestations, uncontrolled(250.42) ([facility] [address] (9/4/2014)      Morbid obesity (HCC) ()      АНДРЕЙ (obstructive sleep apnea) (12/11/2019)      Chest pain (2/13/2020)      Acute renal failure superimposed on stage 3 chronic kidney disease (HCC) (2/13/2020)      Acute respiratory failure with hypoxia (HCC) (6/86/2679)      Diastolic CHF, acute on chronic (HCC) (2/16/2020)      Current Facility-Administered Medications   Medication Dose Route Frequency    ondansetron (ZOFRAN) injection 4 mg  4 mg IntraVENous Q8H PRN    ondansetron (ZOFRAN) 4 mg/2 mL injection        insulin lispro (HUMALOG) injection 5 Units  5 Units SubCUTAneous TIDAC    furosemide (LASIX) injection 80 mg  80 mg IntraVENous BID    carvediloL (COREG) tablet 3.125 mg  3.125 mg Oral BID WITH MEALS    hydrALAZINE (APRESOLINE) 20 mg/mL injection 10 mg  10 mg IntraVENous Q6H PRN    iron sucrose (VENOFER) injection 200 mg  200 mg IntraVENous DAILY    magnesium hydroxide (MILK OF MAGNESIA) 400 mg/5 mL oral suspension 30 mL  30 mL Oral DAILY PRN    docusate sodium (COLACE) capsule 100 mg  100 mg Oral BID    polyethylene glycol (MIRALAX) packet 17 g  17 g Oral BID    gabapentin (NEURONTIN) capsule 300 mg  300 mg Oral TID    amLODIPine (NORVASC) tablet 10 mg  10 mg Oral DAILY    hydrALAZINE (APRESOLINE) tablet 100 mg  100 mg Oral TID    atorvastatin (LIPITOR) tablet 40 mg  40 mg Oral DAILY    albuterol-ipratropium (DUO-NEB) 2.5 MG-0.5 MG/3 ML  3 mL Nebulization Q4H PRN    aspirin chewable tablet 81 mg  81 mg Oral DAILY    DULoxetine (CYMBALTA) capsule 60 mg  60 mg Oral DAILY    neomycin-bacitracin-polymyxin (NEOSPORIN) ointment   Topical TID    heparin (porcine) injection 5,000 Units  5,000 Units SubCUTAneous Q8H    insulin glargine (LANTUS) injection 15 Units  15 Units SubCUTAneous QHS    insulin lispro (HUMALOG) injection   SubCUTAneous AC&HS    dextrose 10% infusion 125-250 mL  125-250 mL IntraVENous PRN    calcium acetate(phosphat bind) (PHOSLO) capsule 667 mg  1 Cap Oral TID WITH MEALS    sodium chloride (NS) flush 5-40 mL  5-40 mL IntraVENous Q8H    sodium chloride (NS) flush 5-40 mL  5-40 mL IntraVENous PRN    acetaminophen (TYLENOL) tablet 650 mg  650 mg Oral Q4H PRN    oxyCODONE-acetaminophen (PERCOCET) 5-325 mg per tablet 1 Tab  1 Tab Oral Q4H PRN         Allergy:   Allergies   Allergen Reactions    Bees [Sting, Bee] Swelling    Penicillins Hives    Strawberry Hives        Objective:     Visit Vitals  /57 (BP 1 Location: Left arm, BP Patient Position: At rest;Sitting)   Pulse 75   Temp 97.7 °F (36.5 °C)   Resp 18   Ht 5' 2\" (1.575 m)   Wt 148.8 kg (328 lb 1.6 oz)   SpO2 100%   Breastfeeding No   BMI 60.01 kg/m²         Intake/Output Summary (Last 24 hours) at 2/19/2020 1504  Last data filed at 2/19/2020 1335  Gross per 24 hour   Intake 820 ml   Output    Net 820 ml       Physical Exam:       General: Obese, No acute distress   HENT: Atraumatic and normocephalic   Eyes: Normal conjunctiva   Neck: No JVD   Cardiovascular: Normal S1 & S2, no m/r/g   Pulmonary/Chest Wall: Clear to auscultation bilaterally   Abdominal: Soft and non-tender   Musculoskeletal: ++ pedal edema   Neurological: No focal deficits     Data Review:  Lab Results   Component Value Date/Time    Sodium 137 02/19/2020 03:35 AM    Potassium 4.8 02/19/2020 03:35 AM    Chloride 105 02/19/2020 03:35 AM    CO2 24 02/19/2020 03:35 AM    Anion gap 8 02/19/2020 03:35 AM    Glucose 184 (H) 02/19/2020 03:35 AM    BUN 70 (H) 02/19/2020 03:35 AM    Creatinine 2.59 (H) 02/19/2020 03:35 AM    BUN/Creatinine ratio 27 (H) 02/19/2020 03:35 AM    GFR est AA 23 (L) 02/19/2020 03:35 AM    GFR est non-AA 19 (L) 02/19/2020 03:35 AM    Calcium 8.7 02/19/2020 03:35 AM     Lab Results   Component Value Date/Time    WBC 6.5 02/19/2020 03:35 AM    HGB 8.2 (L) 02/19/2020 03:35 AM    HCT 27.0 (L) 02/19/2020 03:35 AM    PLATELET 092 00/05/8637 03:35 AM    MCV 81.1 02/19/2020 03:35 AM     Lab Results   Component Value Date/Time    Calcium 8.7 02/19/2020 03:35 AM    Phosphorus 4.1 02/19/2020 03:35 AM     Lab Results   Component Value Date/Time    Iron 26 (L) 02/14/2020 02:22 AM    TIBC 322 02/14/2020 02:22 AM    Iron % saturation 8 (L) 02/14/2020 02:22 AM    Ferritin 77 02/14/2020 02:22 AM     Lab Results   Component Value Date/Time    Ferritin 77 02/14/2020 02:22 AM         Impression:     -Acute on CKD stage 3. Improving, Cr peaked at 2.9, => 2.3=> 2.5 today. Baseline Cr of 1.6 in Dec.   -Hyperkalemia, resolved on medical management   -HTN, controlled  -Proteinuria likely sec to DMN  -Hyperphosphatemia,  on Phoslo tid  -DM, uncontrolled.  HbA1C 9.5%  -Anemia, iron def s/p Venofer  -D-CMP  -Mod Pulm HTN w/ Rt side CHF   -Diarrhea: resolving  -АНДРЕЙ  -Morbidly Obese  -SHPT  -Acute hypercapnic hypoxic respiratory failure, improving        Plan:     -Cont lasix IV  -Continue to hold Losartan for now  -Renal diet  -Advised on better BS control  -Monitor I/O's  -Monitor Serum chemistry  -Continue office fu with Dr Jeffery Chang after d/c for CKD care        Darren Emanuel MD, [address] [facility]  [phone]

## 2020-02-19 NOTE — PROGRESS NOTES
Hospitalist Progress Note-critical care note     Patient: Lucian Benjamin MRN: [id number]  CSN: [id number]    YOB: 1965  Age: 53 y.o. Sex: female    DOA: 2/12/2020 LOS:  LOS: 7 days            Chief complaint: chest pain, acute on chronic renal failure, hyperkalemia, kelly morbid obesity     Assessment/Plan         Hospital Problems  Date Reviewed: 12/11/2019          Codes Class Noted POA    Acute respiratory failure with hypoxia ([facility] ICD-10-CM: J96.01  ICD-9-CM: 518.81  2/16/2020 Unknown        Diastolic CHF, acute on chronic (HCC) ICD-10-CM: I50.33  ICD-9-CM: 428.33, 428.0  2/16/2020 Unknown        Chest pain ICD-10-CM: R07.9  ICD-9-CM: 786.50  2/13/2020 Unknown        Acute renal failure superimposed on stage 3 chronic kidney disease ([facility] ICD-10-CM: N17.9, N18.3  ICD-9-CM: 584.9, 585.3  2/13/2020 Unknown        * (Principal) Hyperkalemia ICD-10-CM: E87.5  ICD-9-CM: 276.7  2/12/2020 Unknown        KELLY (obstructive sleep apnea) ICD-10-CM: G47.33  ICD-9-CM: 327.23  12/11/2019 Yes        Morbid obesity ([facility] ICD-10-CM: E66.01  ICD-9-CM: 278.01  Unknown Yes        Type II or unspecified type diabetes mellitus with renal manifestations, uncontrolled(250.42) ([facility] ICD-10-CM: E11.29, E11.65  ICD-9-CM: 250.42  9/4/2014 Yes        HTN (hypertension) ICD-10-CM: I10  ICD-9-CM: 401.9  8/16/2014 Yes              Patient was admitted due to acute renal failure on chronic, hyperkalemia, chest pain/diarrhea. Losartan was hold due to hyperkalemia and lasix was  hold on admission. Lasix was  restarted after renal function was better,  however patient presented shortness of breath with hypoxia was transferred to ICU which needed BiPAP. She was transferred back to the floor. Lasix iv is  continued  because of CHF exacerbation and hypoxia. Cardiology has been  on board,  recent stress test which is negative. No diarrhea since admission, treating constipation now.        Acute respiratory failure with hypoxia Continue bipap at night   Continue lasix   Continue weaning off O2     Acute on chronic chf ,diastolic  Continue fluid restriction, low sat , lasix increased to 80 mg twice daily        acute on chronic renal failure 3  Cr 2.59  continue monitoring renal function and electrolytes     Chest Pain   No pain now. ce wnl   recent stress test in December in 2019  Case discussed with Rafita oconnell cardiac   V/q scan no PE      constipation   Continue  mirlax , colace,           DM    Lantus and sliding scale hold metformin      Sleep Apnea   cpap at night-now bipap        Pulmonary HTN   VQ scan obtained in the emergency room low probability PE   Negative for dvt     htn  Continue   norvasc and hydralyzine, continue hold metoprolol due to jono     Foot injury   No acute issue from x-ray     She has multiple admissions  and social issue, will have palliative care on board. Recommend to increase physical activity   Subjective : feel tired , urinated a lot     Review of systems:    General: No fevers or chills. Tired   Cardiovascular: no chest pain, no  pressure. No palpitations. Pulmonary: shortness of breath better   Gastrointestinal: No nausea, vomiting. Constipation     Vital signs/Intake and Output:  Visit Vitals  /64 (BP Patient Position: At rest;Supine)   Pulse 77   Temp 98.1 °F (36.7 °C)   Resp 18   Ht 5' 2\" (1.575 m)   Wt 148.8 kg (328 lb 1.6 oz)   SpO2 99%   Breastfeeding No   BMI 60.01 kg/m²     Current Shift:  02/19 0701 - 02/19 1900  In: 240 [P.O.:240]  Out: -   Last three shifts:  02/17 1901 - 02/19 0700  In: 300 [P.O.:300]  Out: -     Physical Exam:  General: WD, WN. Alert, cooperative, no acute distress    HEENT: NC, Atraumatic. PERRLA, anicteric sclerae. Lungs: CTA Bilaterally. No Wheezing/Rhonchi/Rales.   Heart:  Regular  rhythm,  No murmur, No Rubs, No Gallops  Abdomen: Soft, Non distended, Non tender.  +Bowel sounds,   Extremities: No c/c/e, rt toe wrapped with gauze, some dry blood on rt toe nail   Psych:   Not anxious or agitated. teaful   Neurologic:  No acute neurological deficit. Labs: Results:       Chemistry Recent Labs     02/19/20 0335 02/18/20 0526 02/17/20 0412   * 147* 111*    140 141   K 4.8 4.7 4.8    109 111   CO2 24 24 24   BUN 70* 64* 66*   CREA 2.59* 2.38* 2.31*   CA 8.7 9.0 8.7   AGAP 8 7 6   BUCR 27* 27* 29*    107 106   TP 6.1* 6.1* 6.1*   ALB 3.0* 3.0* 3.1*   GLOB 3.1 3.1 3.0   AGRAT 1.0 1.0 1.0      CBC w/Diff Recent Labs     02/19/20 0335 02/18/20 0526 02/17/20 0412   WBC 6.5 5.9 6.1   RBC 3.33* 3.31* 3.27*   HGB 8.2* 8.2* 8.0*   HCT 27.0* 27.0* 26.4*    245 239   GRANS 59 55 62   LYMPH 23 27 20*   EOS 7* 6* 4      Cardiac Enzymes Recent Labs     02/16/20  1550   CPK 49   CKND1 CALCULATION NOT PERFORMED WHEN RESULT IS BELOW LINEAR LIMIT      Coagulation No results for input(s): PTP, INR, APTT, INREXT, INREXT in the last 72 hours. Lipid Panel Lab Results   Component Value Date/Time    Cholesterol, total 146 05/28/2015 02:58 AM    HDL Cholesterol 64 (H) 05/28/2015 02:58 AM    LDL, calculated 66 05/28/2015 02:58 AM    VLDL, calculated 16 05/28/2015 02:58 AM    Triglyceride 80 05/28/2015 02:58 AM    CHOL/HDL Ratio 2.3 05/28/2015 02:58 AM      BNP No results for input(s): BNPP in the last 72 hours.    Liver Enzymes Recent Labs     02/19/20 0335   TP 6.1*   ALB 3.0*      SGOT 11      Thyroid Studies Lab Results   Component Value Date/Time    TSH 0.56 01/23/2019 01:33 AM        Procedures/imaging: see electronic medical records for all procedures/Xrays and details which were not copied into this note but were reviewed prior to creation of Letcher Medin, MD

## 2020-02-20 LAB
ALBUMIN SERPL-MCNC: 3.2 G/DL (ref 3.4–5)
ALBUMIN/GLOB SERPL: 0.9 {RATIO} (ref 0.8–1.7)
ALP SERPL-CCNC: 133 U/L (ref 45–117)
ALT SERPL-CCNC: 24 U/L (ref 13–56)
ANION GAP SERPL CALC-SCNC: 7 MMOL/L (ref 3–18)
AST SERPL-CCNC: 20 U/L (ref 10–38)
BASOPHILS # BLD: 0 K/UL (ref 0–0.1)
BASOPHILS NFR BLD: 1 % (ref 0–2)
BILIRUB SERPL-MCNC: 0.3 MG/DL (ref 0.2–1)
BUN SERPL-MCNC: 77 MG/DL (ref 7–18)
BUN/CREAT SERPL: 26 (ref 12–20)
CALCIUM SERPL-MCNC: 8.9 MG/DL (ref 8.5–10.1)
CHLORIDE SERPL-SCNC: 101 MMOL/L (ref 100–111)
CO2 SERPL-SCNC: 26 MMOL/L (ref 21–32)
CREAT SERPL-MCNC: 2.99 MG/DL (ref 0.6–1.3)
DIFFERENTIAL METHOD BLD: ABNORMAL
EOSINOPHIL # BLD: 0.6 K/UL (ref 0–0.4)
EOSINOPHIL NFR BLD: 8 % (ref 0–5)
ERYTHROCYTE [DISTWIDTH] IN BLOOD BY AUTOMATED COUNT: 15.1 % (ref 11.6–14.5)
GLOBULIN SER CALC-MCNC: 3.4 G/DL (ref 2–4)
GLUCOSE BLD STRIP.AUTO-MCNC: 133 MG/DL (ref 70–110)
GLUCOSE BLD STRIP.AUTO-MCNC: 135 MG/DL (ref 70–110)
GLUCOSE BLD STRIP.AUTO-MCNC: 179 MG/DL (ref 70–110)
GLUCOSE BLD STRIP.AUTO-MCNC: 270 MG/DL (ref 70–110)
GLUCOSE SERPL-MCNC: 154 MG/DL (ref 74–99)
HCT VFR BLD AUTO: 28.1 % (ref 35–45)
HGB BLD-MCNC: 8.5 G/DL (ref 12–16)
LYMPHOCYTES # BLD: 1.7 K/UL (ref 0.9–3.6)
LYMPHOCYTES NFR BLD: 22 % (ref 21–52)
MAGNESIUM SERPL-MCNC: 2.3 MG/DL (ref 1.6–2.6)
MCH RBC QN AUTO: 24.7 PG (ref 24–34)
MCHC RBC AUTO-ENTMCNC: 30.2 G/DL (ref 31–37)
MCV RBC AUTO: 81.7 FL (ref 74–97)
MONOCYTES # BLD: 0.9 K/UL (ref 0.05–1.2)
MONOCYTES NFR BLD: 12 % (ref 3–10)
NEUTS SEG # BLD: 4.4 K/UL (ref 1.8–8)
NEUTS SEG NFR BLD: 57 % (ref 40–73)
PHOSPHATE SERPL-MCNC: 4.8 MG/DL (ref 2.5–4.9)
PLATELET # BLD AUTO: 287 K/UL (ref 135–420)
PMV BLD AUTO: 9.7 FL (ref 9.2–11.8)
POTASSIUM SERPL-SCNC: 4.9 MMOL/L (ref 3.5–5.5)
PROT SERPL-MCNC: 6.6 G/DL (ref 6.4–8.2)
RBC # BLD AUTO: 3.44 M/UL (ref 4.2–5.3)
SODIUM SERPL-SCNC: 134 MMOL/L (ref 136–145)
WBC # BLD AUTO: 7.7 K/UL (ref 4.6–13.2)

## 2020-02-20 PROCEDURE — 74011250637 HC RX REV CODE- 250/637: Performed by: INTERNAL MEDICINE

## 2020-02-20 PROCEDURE — 84100 ASSAY OF PHOSPHORUS: CPT

## 2020-02-20 PROCEDURE — 97535 SELF CARE MNGMENT TRAINING: CPT

## 2020-02-20 PROCEDURE — 36415 COLL VENOUS BLD VENIPUNCTURE: CPT

## 2020-02-20 PROCEDURE — 83735 ASSAY OF MAGNESIUM: CPT

## 2020-02-20 PROCEDURE — 74011250636 HC RX REV CODE- 250/636: Performed by: INTERNAL MEDICINE

## 2020-02-20 PROCEDURE — 80053 COMPREHEN METABOLIC PANEL: CPT

## 2020-02-20 PROCEDURE — 74011250637 HC RX REV CODE- 250/637: Performed by: HOSPITALIST

## 2020-02-20 PROCEDURE — 74011636637 HC RX REV CODE- 636/637: Performed by: INTERNAL MEDICINE

## 2020-02-20 PROCEDURE — 97116 GAIT TRAINING THERAPY: CPT

## 2020-02-20 PROCEDURE — 82962 GLUCOSE BLOOD TEST: CPT

## 2020-02-20 PROCEDURE — 97530 THERAPEUTIC ACTIVITIES: CPT

## 2020-02-20 PROCEDURE — 74011636637 HC RX REV CODE- 636/637: Performed by: HOSPITALIST

## 2020-02-20 PROCEDURE — 85025 COMPLETE CBC W/AUTO DIFF WBC: CPT

## 2020-02-20 PROCEDURE — 74011250636 HC RX REV CODE- 250/636: Performed by: HOSPITALIST

## 2020-02-20 PROCEDURE — 65660000000 HC RM CCU STEPDOWN

## 2020-02-20 PROCEDURE — 77010033678 HC OXYGEN DAILY

## 2020-02-20 RX ORDER — METOLAZONE 5 MG/1
5 TABLET ORAL DAILY
Status: DISCONTINUED | OUTPATIENT
Start: 2020-02-20 | End: 2020-02-22

## 2020-02-20 RX ORDER — FUROSEMIDE 10 MG/ML
80 INJECTION INTRAMUSCULAR; INTRAVENOUS DAILY
Status: DISCONTINUED | OUTPATIENT
Start: 2020-02-20 | End: 2020-02-20

## 2020-02-20 RX ORDER — FUROSEMIDE 10 MG/ML
80 INJECTION INTRAMUSCULAR; INTRAVENOUS 2 TIMES DAILY
Status: DISCONTINUED | OUTPATIENT
Start: 2020-02-20 | End: 2020-02-21

## 2020-02-20 RX ADMIN — HYDRALAZINE HYDROCHLORIDE 100 MG: 50 TABLET, FILM COATED ORAL at 16:33

## 2020-02-20 RX ADMIN — GABAPENTIN 300 MG: 300 CAPSULE ORAL at 21:23

## 2020-02-20 RX ADMIN — Medication 10 ML: at 06:34

## 2020-02-20 RX ADMIN — INSULIN LISPRO 5 UNITS: 100 INJECTION, SOLUTION INTRAVENOUS; SUBCUTANEOUS at 11:48

## 2020-02-20 RX ADMIN — CALCIUM ACETATE 667 MG: 667 CAPSULE ORAL at 09:03

## 2020-02-20 RX ADMIN — HYDRALAZINE HYDROCHLORIDE 100 MG: 50 TABLET, FILM COATED ORAL at 21:23

## 2020-02-20 RX ADMIN — ATORVASTATIN CALCIUM 40 MG: 20 TABLET, FILM COATED ORAL at 09:03

## 2020-02-20 RX ADMIN — CARVEDILOL 3.12 MG: 3.12 TABLET, FILM COATED ORAL at 16:32

## 2020-02-20 RX ADMIN — HEPARIN SODIUM 5000 UNITS: 5000 INJECTION INTRAVENOUS; SUBCUTANEOUS at 06:20

## 2020-02-20 RX ADMIN — Medication 10 ML: at 21:35

## 2020-02-20 RX ADMIN — OXYCODONE HYDROCHLORIDE AND ACETAMINOPHEN 1 TABLET: 5; 325 TABLET ORAL at 02:55

## 2020-02-20 RX ADMIN — ASPIRIN 81 MG 81 MG: 81 TABLET ORAL at 09:03

## 2020-02-20 RX ADMIN — DOCUSATE SODIUM 100 MG: 100 CAPSULE, LIQUID FILLED ORAL at 21:23

## 2020-02-20 RX ADMIN — INSULIN LISPRO 2 UNITS: 100 INJECTION, SOLUTION INTRAVENOUS; SUBCUTANEOUS at 21:50

## 2020-02-20 RX ADMIN — GABAPENTIN 300 MG: 300 CAPSULE ORAL at 09:03

## 2020-02-20 RX ADMIN — HEPARIN SODIUM 5000 UNITS: 5000 INJECTION INTRAVENOUS; SUBCUTANEOUS at 21:23

## 2020-02-20 RX ADMIN — POLYETHYLENE GLYCOL 3350 17 G: 17 POWDER, FOR SOLUTION ORAL at 21:23

## 2020-02-20 RX ADMIN — HYDRALAZINE HYDROCHLORIDE 100 MG: 50 TABLET, FILM COATED ORAL at 09:03

## 2020-02-20 RX ADMIN — INSULIN GLARGINE 15 UNITS: 100 INJECTION, SOLUTION SUBCUTANEOUS at 21:50

## 2020-02-20 RX ADMIN — FUROSEMIDE 80 MG: 10 INJECTION, SOLUTION INTRAMUSCULAR; INTRAVENOUS at 11:48

## 2020-02-20 RX ADMIN — Medication 10 ML: at 16:35

## 2020-02-20 RX ADMIN — GABAPENTIN 300 MG: 300 CAPSULE ORAL at 16:32

## 2020-02-20 RX ADMIN — INSULIN LISPRO 5 UNITS: 100 INJECTION, SOLUTION INTRAVENOUS; SUBCUTANEOUS at 16:34

## 2020-02-20 RX ADMIN — CALCIUM ACETATE 667 MG: 667 CAPSULE ORAL at 16:33

## 2020-02-20 RX ADMIN — FUROSEMIDE 80 MG: 10 INJECTION, SOLUTION INTRAMUSCULAR; INTRAVENOUS at 21:23

## 2020-02-20 RX ADMIN — INSULIN LISPRO 6 UNITS: 100 INJECTION, SOLUTION INTRAVENOUS; SUBCUTANEOUS at 16:34

## 2020-02-20 RX ADMIN — POLYMYXIN B SULFATE, BACITRACIN ZINC, NEOMYCIN SULFATE: 5000; 3.5; 4 OINTMENT TOPICAL at 09:04

## 2020-02-20 RX ADMIN — CARVEDILOL 3.12 MG: 3.12 TABLET, FILM COATED ORAL at 09:03

## 2020-02-20 RX ADMIN — AMLODIPINE BESYLATE 10 MG: 5 TABLET ORAL at 09:03

## 2020-02-20 RX ADMIN — CALCIUM ACETATE 667 MG: 667 CAPSULE ORAL at 11:48

## 2020-02-20 RX ADMIN — POLYMYXIN B SULFATE, BACITRACIN ZINC, NEOMYCIN SULFATE: 5000; 3.5; 4 OINTMENT TOPICAL at 16:35

## 2020-02-20 RX ADMIN — POLYETHYLENE GLYCOL 3350 17 G: 17 POWDER, FOR SOLUTION ORAL at 09:03

## 2020-02-20 RX ADMIN — DULOXETINE HYDROCHLORIDE 60 MG: 60 CAPSULE, DELAYED RELEASE ORAL at 09:03

## 2020-02-20 RX ADMIN — HEPARIN SODIUM 5000 UNITS: 5000 INJECTION INTRAVENOUS; SUBCUTANEOUS at 16:33

## 2020-02-20 RX ADMIN — ACETAMINOPHEN 650 MG: 325 TABLET ORAL at 16:33

## 2020-02-20 RX ADMIN — DOCUSATE SODIUM 100 MG: 100 CAPSULE, LIQUID FILLED ORAL at 09:03

## 2020-02-20 RX ADMIN — INSULIN LISPRO 5 UNITS: 100 INJECTION, SOLUTION INTRAVENOUS; SUBCUTANEOUS at 09:04

## 2020-02-20 RX ADMIN — POLYMYXIN B SULFATE, BACITRACIN ZINC, NEOMYCIN SULFATE: 5000; 3.5; 4 OINTMENT TOPICAL at 21:34

## 2020-02-20 RX ADMIN — METOLAZONE 5 MG: 5 TABLET ORAL at 16:33

## 2020-02-20 NOTE — PROGRESS NOTES
Hospitalist Progress Note-critical care note     Patient: Elysia Reddy MRN: [id number]  CSN: [id number]    YOB: 1965  Age: 53 y.o. Sex: female    DOA: 2/12/2020 LOS:  LOS: 8 days            Chief complaint: chest pain, acute on chronic renal failure, hyperkalemia, kelly morbid obesity     Assessment/Plan         Hospital Problems  Date Reviewed: 12/11/2019          Codes Class Noted POA    Acute respiratory failure with hypoxia ([facility] ICD-10-CM: J96.01  ICD-9-CM: 518.81  2/16/2020 Unknown        Diastolic CHF, acute on chronic (HCC) ICD-10-CM: I50.33  ICD-9-CM: 428.33, 428.0  2/16/2020 Unknown        Chest pain ICD-10-CM: R07.9  ICD-9-CM: 786.50  2/13/2020 Unknown        Acute renal failure superimposed on stage 3 chronic kidney disease ([facility] ICD-10-CM: N17.9, N18.3  ICD-9-CM: 584.9, 585.3  2/13/2020 Unknown        * (Principal) Hyperkalemia ICD-10-CM: E87.5  ICD-9-CM: 276.7  2/12/2020 Unknown        KELLY (obstructive sleep apnea) ICD-10-CM: G47.33  ICD-9-CM: 327.23  12/11/2019 Yes        Morbid obesity ([facility] ICD-10-CM: E66.01  ICD-9-CM: 278.01  Unknown Yes        Type II or unspecified type diabetes mellitus with renal manifestations, uncontrolled(250.42) ([facility] ICD-10-CM: E11.29, E11.65  ICD-9-CM: 250.42  9/4/2014 Yes        HTN (hypertension) ICD-10-CM: I10  ICD-9-CM: 401.9  8/16/2014 Yes              Patient was admitted due to acute renal failure on chronic, hyperkalemia, chest pain/diarrhea. Losartan was hold due to hyperkalemia and lasix was  hold on admission. Lasix was  restarted after renal function was better,  however patient presented shortness of breath with hypoxia was transferred to ICU which needed BiPAP. She was transferred back to the floor. Lasix iv is  continued  because of CHF exacerbation and hypoxia. Cardiology has been  on board,  recent stress test which is negative. No diarrhea since admission, treating constipation now.        Acute respiratory failure with hypoxia Resolving,will wean off nc O2   Continue lasix       Acute on chronic chf ,diastolic  Continue fluid restriction, low sat , lasix  Cardiologist on board        acute on chronic renal failure 3  Cr 2.99  continue monitoring renal function and electrolytes     Chest Pain   No pain now. ce wnl   recent stress test in December in 2019  Case discussed with Ange oconnell-not cardiac   V/q scan no PE      constipation   Continue  mirlax , colace,           DM    Lantus and sliding scale hold metformin      Sleep Apnea   cpap at night-now bipap        Pulmonary HTN   VQ scan obtained in the emergency room low probability PE   Negative for dvt     htn  Continue   norvasc and hydralyzine, continue hold metoprolol due to jono     Foot injury   No acute issue from x-ray     Subjective : feel fine ,use cpap at night    D/c in 1-2 days     Review of systems:    General: No fevers or chills. Tired   Cardiovascular: no chest pain, no  pressure. No palpitations. Pulmonary: shortness of breath better   Gastrointestinal: No nausea, vomiting. Constipation     Vital signs/Intake and Output:  Visit Vitals  /84 (BP 1 Location: Right arm, BP Patient Position: At rest)   Pulse 74   Temp 97.1 °F (36.2 °C)   Resp 18   Ht 5' 2\" (1.575 m)   Wt 148.8 kg (328 lb 1.6 oz)   SpO2 94%   Breastfeeding No   BMI 60.01 kg/m²     Current Shift:  02/20 0701 - 02/20 1900  In: 480 [P.O.:480]  Out: 200 [Urine:200]  Last three shifts:  02/18 1901 - 02/20 0700  In: 720 [P.O.:720]  Out: -     Physical Exam:  General: WD, WN. Alert, cooperative, no acute distress    HEENT: NC, Atraumatic. PERRLA, anicteric sclerae. Lungs: CTA Bilaterally. No Wheezing/Rhonchi/Rales. Heart:  Regular  rhythm,  No murmur, No Rubs, No Gallops  Abdomen: Soft, Non distended, Non tender.  +Bowel sounds,   Extremities: No c/c/e, rt toe wrapped with gauze, some dry blood on rt toe nail   Psych:   Not anxious or agitated. teaful   Neurologic:  No acute neurological deficit. Labs: Results:       Chemistry Recent Labs     02/20/20 0255 02/19/20 0335 02/18/20  0526   * 184* 147*   * 137 140   K 4.9 4.8 4.7    105 109   CO2 26 24 24   BUN 77* 70* 64*   CREA 2.99* 2.59* 2.38*   CA 8.9 8.7 9.0   AGAP 7 8 7   BUCR 26* 27* 27*   * 116 107   TP 6.6 6.1* 6.1*   ALB 3.2* 3.0* 3.0*   GLOB 3.4 3.1 3.1   AGRAT 0.9 1.0 1.0      CBC w/Diff Recent Labs     02/20/20 0255 02/19/20 0335 02/18/20 0526   WBC 7.7 6.5 5.9   RBC 3.44* 3.33* 3.31*   HGB 8.5* 8.2* 8.2*   HCT 28.1* 27.0* 27.0*    259 245   GRANS 57 59 55   LYMPH 22 23 27   EOS 8* 7* 6*      Cardiac Enzymes No results for input(s): CPK, CKND1, MARIA ISABEL in the last 72 hours. No lab exists for component: CKRMB, TROIP   Coagulation No results for input(s): PTP, INR, APTT, INREXT, INREXT in the last 72 hours. Lipid Panel Lab Results   Component Value Date/Time    Cholesterol, total 146 05/28/2015 02:58 AM    HDL Cholesterol 64 (H) 05/28/2015 02:58 AM    LDL, calculated 66 05/28/2015 02:58 AM    VLDL, calculated 16 05/28/2015 02:58 AM    Triglyceride 80 05/28/2015 02:58 AM    CHOL/HDL Ratio 2.3 05/28/2015 02:58 AM      BNP No results for input(s): BNPP in the last 72 hours.    Liver Enzymes Recent Labs     02/20/20 0255   TP 6.6   ALB 3.2*   *   SGOT 20      Thyroid Studies Lab Results   Component Value Date/Time    TSH 0.56 01/23/2019 01:33 AM        Procedures/imaging: see electronic medical records for all procedures/Xrays and details which were not copied into this note but were reviewed prior to creation of Grace Flores MD

## 2020-02-20 NOTE — PROGRESS NOTES
Nephrology Progress note    Subjective: Dontrell Drake is a 53 y.o. female with PMHx of HTN, uncontrolled DM, CKD, D-CMP and Anemia who presented with bouts of diarrhea and nausea. She also had complaints of SOB and CP. On initial eval in the ED her HR was in the 50's otherwise stable VS. Labs showed K of 6.0 and Cr 2.69. Home med included Losartan. Pt  Is followed by Dr Jonda Meckel for her CKD and recent baseline Cr in Dec was ~1.6. VQ scan, CxR, CT lower chest/abd/pelvis, and LE doppler US were all unremarkable. Echo showed EF 93%, mild diastolic dysfunction and mild- to mod Pulm HTN. Pt was treated medically for high K and received a dose of Lasix. Hyperkalemia corrected but Cr slightly up to 2.8. No diarrhea, N/V.      -Pt transferred to ICU due to acute hypercapnic hypoxic respiratory failure, improved and now back on monitored floor      Admits to SOB. No CP. Has significant baseline LE edema.      Admit Date: 2/12/2020  Principal Problem:    Hyperkalemia (2/12/2020)    Active Problems:    HTN (hypertension) (8/16/2014)      Type II or unspecified type diabetes mellitus with renal manifestations, uncontrolled(250.42) ([facility] (9/4/2014)      Morbid obesity (HCC) ()      АНДРЕЙ (obstructive sleep apnea) (12/11/2019)      Chest pain (2/13/2020)      Acute renal failure superimposed on stage 3 chronic kidney disease (HCC) (2/13/2020)      Acute respiratory failure with hypoxia (HCC) (8/69/9034)      Diastolic CHF, acute on chronic (HCC) (2/16/2020)      Current Facility-Administered Medications   Medication Dose Route Frequency    furosemide (LASIX) injection 80 mg  80 mg IntraVENous DAILY    ondansetron (ZOFRAN) injection 4 mg  4 mg IntraVENous Q8H PRN    insulin lispro (HUMALOG) injection 5 Units  5 Units SubCUTAneous TIDAC    carvediloL (COREG) tablet 3.125 mg  3.125 mg Oral BID WITH MEALS    hydrALAZINE (APRESOLINE) 20 mg/mL injection 10 mg  10 mg IntraVENous Q6H PRN    magnesium hydroxide (MILK OF MAGNESIA) 400 mg/5 mL oral suspension 30 mL  30 mL Oral DAILY PRN    docusate sodium (COLACE) capsule 100 mg  100 mg Oral BID    polyethylene glycol (MIRALAX) packet 17 g  17 g Oral BID    gabapentin (NEURONTIN) capsule 300 mg  300 mg Oral TID    amLODIPine (NORVASC) tablet 10 mg  10 mg Oral DAILY    hydrALAZINE (APRESOLINE) tablet 100 mg  100 mg Oral TID    atorvastatin (LIPITOR) tablet 40 mg  40 mg Oral DAILY    albuterol-ipratropium (DUO-NEB) 2.5 MG-0.5 MG/3 ML  3 mL Nebulization Q4H PRN    aspirin chewable tablet 81 mg  81 mg Oral DAILY    DULoxetine (CYMBALTA) capsule 60 mg  60 mg Oral DAILY    neomycin-bacitracin-polymyxin (NEOSPORIN) ointment   Topical TID    heparin (porcine) injection 5,000 Units  5,000 Units SubCUTAneous Q8H    insulin glargine (LANTUS) injection 15 Units  15 Units SubCUTAneous QHS    insulin lispro (HUMALOG) injection   SubCUTAneous AC&HS    dextrose 10% infusion 125-250 mL  125-250 mL IntraVENous PRN    calcium acetate(phosphat bind) (PHOSLO) capsule 667 mg  1 Cap Oral TID WITH MEALS    sodium chloride (NS) flush 5-40 mL  5-40 mL IntraVENous Q8H    sodium chloride (NS) flush 5-40 mL  5-40 mL IntraVENous PRN    acetaminophen (TYLENOL) tablet 650 mg  650 mg Oral Q4H PRN    oxyCODONE-acetaminophen (PERCOCET) 5-325 mg per tablet 1 Tab  1 Tab Oral Q4H PRN         Allergy:   Allergies   Allergen Reactions    Bees [Sting, Bee] Swelling    Penicillins Hives    Strawberry Hives        Objective:     Visit Vitals  /70 (BP 1 Location: Right arm, BP Patient Position: At rest)   Pulse 73   Temp 97.2 °F (36.2 °C)   Resp 18   Ht 5' 2\" (1.575 m)   Wt 148.8 kg (328 lb 1.6 oz)   SpO2 99%   Breastfeeding No   BMI 60.01 kg/m²         Intake/Output Summary (Last 24 hours) at 2/20/2020 1420  Last data filed at 2/20/2020 1023  Gross per 24 hour   Intake 240 ml   Output 200 ml   Net 40 ml       Physical Exam:       General: Obese, No acute distress   HENT: Atraumatic and normocephalic   Eyes: Normal conjunctiva   Neck: No JVD   Cardiovascular: Normal S1 & S2, no m/r/g   Pulmonary/Chest Wall: Clear to auscultation bilaterally   Abdominal: Soft and non-tender   Musculoskeletal: ++ + pedal edema   Neurological: No focal deficits     Data Review:  Lab Results   Component Value Date/Time    Sodium 134 (L) 02/20/2020 02:55 AM    Potassium 4.9 02/20/2020 02:55 AM    Chloride 101 02/20/2020 02:55 AM    CO2 26 02/20/2020 02:55 AM    Anion gap 7 02/20/2020 02:55 AM    Glucose 154 (H) 02/20/2020 02:55 AM    BUN 77 (H) 02/20/2020 02:55 AM    Creatinine 2.99 (H) 02/20/2020 02:55 AM    BUN/Creatinine ratio 26 (H) 02/20/2020 02:55 AM    GFR est AA 20 (L) 02/20/2020 02:55 AM    GFR est non-AA 16 (L) 02/20/2020 02:55 AM    Calcium 8.9 02/20/2020 02:55 AM     Lab Results   Component Value Date/Time    WBC 7.7 02/20/2020 02:55 AM    HGB 8.5 (L) 02/20/2020 02:55 AM    HCT 28.1 (L) 02/20/2020 02:55 AM    PLATELET 634 37/58/8859 02:55 AM    MCV 81.7 02/20/2020 02:55 AM     Lab Results   Component Value Date/Time    Calcium 8.9 02/20/2020 02:55 AM    Phosphorus 4.8 02/20/2020 02:55 AM     Lab Results   Component Value Date/Time    Iron 26 (L) 02/14/2020 02:22 AM    TIBC 322 02/14/2020 02:22 AM    Iron % saturation 8 (L) 02/14/2020 02:22 AM    Ferritin 77 02/14/2020 02:22 AM     Lab Results   Component Value Date/Time    Ferritin 77 02/14/2020 02:22 AM         Impression:     -Acute on CKD stage 3. Improving, Cr peaked at 2.9, => 2.3=> 2.5 today. Baseline Cr of 1.6 in Dec.   -Hyperkalemia, resolved on medical management   -HTN, controlled  -Proteinuria likely sec to DMN  -Hyperphosphatemia,  on Phoslo tid  -DM, uncontrolled.  HbA1C 9.5%  -Anemia, iron def s/p Venofer  -D-CMP  -Mod Pulm HTN w/ Rt side CHF   -Diarrhea: resolving  -АНДРЕЙ  -Morbidly Obese  -SHPT  -Acute hypercapnic hypoxic respiratory failure, improving        Plan:     -Cont lasix IV, add Metolazone for Synergy.   -Continue to hold Losartan for now  -Renal diet  -Monitor I/O's  -Monitor Serum chemistry  -Resume office f/u with Dr Eden Walker after d/c for CKD care        Ac Dewitt MD, [id number] [facility]  [phone]

## 2020-02-20 NOTE — PROGRESS NOTES
Problem: Diabetes Self-Management  Goal: *Disease process and treatment process  Description  Define diabetes and identify own type of diabetes; list 3 options for treating diabetes. Outcome: Progressing Towards Goal  Goal: *Incorporating nutritional management into lifestyle  Description  Describe effect of type, amount and timing of food on blood glucose; list 3 methods for planning meals. Outcome: Progressing Towards Goal  Goal: *Incorporating physical activity into lifestyle  Description  State effect of exercise on blood glucose levels. Outcome: Progressing Towards Goal  Goal: *Developing strategies to promote health/change behavior  Description  Define the ABC's of diabetes; identify appropriate screenings, schedule and personal plan for screenings. Outcome: Progressing Towards Goal  Goal: *Using medications safely  Description  State effect of diabetes medications on diabetes; name diabetes medication taking, action and side effects. Outcome: Progressing Towards Goal  Goal: *Monitoring blood glucose, interpreting and using results  Description  Identify recommended blood glucose targets  and personal targets. Outcome: Progressing Towards Goal  Goal: *Prevention, detection, treatment of acute complications  Description  List symptoms of hyper- and hypoglycemia; describe how to treat low blood sugar and actions for lowering  high blood glucose level. Outcome: Progressing Towards Goal  Goal: *Prevention, detection and treatment of chronic complications  Description  Define the natural course of diabetes and describe the relationship of blood glucose levels to long term complications of diabetes.   Outcome: Progressing Towards Goal  Goal: *Developing strategies to address psychosocial issues  Description  Describe feelings about living with diabetes; identify support needed and support network  Outcome: Progressing Towards Goal  Goal: *Insulin pump training  Outcome: Progressing Towards Goal  Goal: *Sick day guidelines  Outcome: Progressing Towards Goal  Goal: *Patient Specific Goal (EDIT GOAL, INSERT TEXT)  Outcome: Progressing Towards Goal     Problem: Patient Education: Go to Patient Education Activity  Goal: Patient/Family Education  Outcome: Progressing Towards Goal     Problem: Risk for Spread of Infection  Goal: Prevent transmission of infectious organism to others  Description  Prevent the transmission of infectious organisms to other patients, staff members, and visitors. Outcome: Progressing Towards Goal     Problem: Patient Education:  Go to Education Activity  Goal: Patient/Family Education  Outcome: Progressing Towards Goal     Problem: Falls - Risk of  Goal: *Absence of Falls  Description  Document Kwame Reas Fall Risk and appropriate interventions in the flowsheet. Outcome: Progressing Towards Goal  Note: Fall Risk Interventions:  Mobility Interventions: Assess mobility with egress test         Medication Interventions: Teach patient to arise slowly    Elimination Interventions: Call light in reach    History of Falls Interventions: Bed/chair exit alarm         Problem: Patient Education: Go to Patient Education Activity  Goal: Patient/Family Education  Outcome: Progressing Towards Goal     Problem: Pain  Goal: *Control of Pain  Outcome: Progressing Towards Goal  Goal: *PALLIATIVE CARE:  Alleviation of Pain  Outcome: Progressing Towards Goal     Problem: Patient Education: Go to Patient Education Activity  Goal: Patient/Family Education  Outcome: Progressing Towards Goal     Problem: Pressure Injury - Risk of  Goal: *Prevention of pressure injury  Description  Document Elian Scale and appropriate interventions in the flowsheet.   Outcome: Progressing Towards Goal  Note: Pressure Injury Interventions:  Sensory Interventions: Assess changes in LOC    Moisture Interventions: Internal/External urinary devices    Activity Interventions: PT/OT evaluation    Mobility Interventions: PT/OT evaluation    Nutrition Interventions: Document food/fluid/supplement intake, Offer support with meals,snacks and hydration    Friction and Shear Interventions: Lift sheet                Problem: Patient Education: Go to Patient Education Activity  Goal: Patient/Family Education  Outcome: Progressing Towards Goal     Problem: Patient Education: Go to Patient Education Activity  Goal: Patient/Family Education  Outcome: Progressing Towards Goal     Problem: Patient Education: Go to Patient Education Activity  Goal: Patient/Family Education  Outcome: Progressing Towards Goal

## 2020-02-20 NOTE — DIABETES MGMT
GLYCEMIC CONTROL PROGRESS NOTE:    - known h/o T2DM, HbA1C not within the recommended range for age + comorbids on TID mixed insulin/oral home regimen  - BG trending up   - TDD = 42 (15 Lantus + 15 (5) MTI + 12 - Humalog Normal Insulin Sensitivity Corrective Coverage)  - monitor BG trends, pt may benefit from additional adjustment to mealtime insulin dose   *Humalog 7 units qac    Recent Glucose Results:   Lab Results   Component Value Date/Time     (H) 02/20/2020 02:55 AM    GLUCPOC 133 (H) 02/20/2020 11:18 AM    GLUCPOC 135 (H) 02/20/2020 06:01 AM    GLUCPOC 255 (H) 02/19/2020 09:11 PM     Mathew Hoffmann MS, RN, CDE  Glycemic Control Team  [phone]  Pager [phone] (M-TH 8:00-4:30P)  *After Hours pager [phone]

## 2020-02-20 NOTE — PROGRESS NOTES
Transition of care: home with [facility] when medically cleared  Met with patient and dr. Vimal Keenan at bedside. Patient lives alone. She wants to return home when she is medically cleared  She is currently on 0.5LPM via NC. Cm met with ASPEN cade and she informed cm that patient has not been on oxygen she must have put it on. Patient has [employer] for insurance she has Dr. Haley Sheriff for pcp. Patient is acitive with personal care. She had numerous c/o but request to have them see her. When cm asked her if she would like the phone number as well for personal touch so she can talk to someone about her concerns she said no she wants them to be her [facility] provider. Cms has sent to [facility] touch as requested. Patient has a cpap and nebulizer at home. She denies other needs  Care Management Interventions  PCP Verified by CM:  Yes  Transition of Care Consult (CM Consult): [address]: No  Reason Outside [place]: Patient already serviced by other home care/hospice agency(Personal Touch)  Discharge Durable Medical Equipment: Yes  The Patient and/or Patient Representative was Provided with a Choice of Provider and Agrees with the Discharge Plan?: Yes  Freedom of Choice List was Provided with Basic Dialogue that Supports the Patient's Individualized Plan of Care/Goals, Treatment Preferences and Shares the Quality Data Associated with the Providers?: Yes  Discharge Location  Discharge Placement: Home with home health

## 2020-02-20 NOTE — PROGRESS NOTES
Problem: Diabetes Self-Management  Goal: *Disease process and treatment process  Description  Define diabetes and identify own type of diabetes; list 3 options for treating diabetes. Outcome: Progressing Towards Goal  Goal: *Incorporating nutritional management into lifestyle  Description  Describe effect of type, amount and timing of food on blood glucose; list 3 methods for planning meals. Outcome: Progressing Towards Goal  Goal: *Incorporating physical activity into lifestyle  Description  State effect of exercise on blood glucose levels. Outcome: Progressing Towards Goal  Goal: *Developing strategies to promote health/change behavior  Description  Define the ABC's of diabetes; identify appropriate screenings, schedule and personal plan for screenings. Outcome: Progressing Towards Goal  Goal: *Using medications safely  Description  State effect of diabetes medications on diabetes; name diabetes medication taking, action and side effects. Outcome: Progressing Towards Goal  Goal: *Monitoring blood glucose, interpreting and using results  Description  Identify recommended blood glucose targets  and personal targets. Outcome: Progressing Towards Goal  Goal: *Prevention, detection, treatment of acute complications  Description  List symptoms of hyper- and hypoglycemia; describe how to treat low blood sugar and actions for lowering  high blood glucose level. Outcome: Progressing Towards Goal  Goal: *Prevention, detection and treatment of chronic complications  Description  Define the natural course of diabetes and describe the relationship of blood glucose levels to long term complications of diabetes.   Outcome: Progressing Towards Goal  Goal: *Developing strategies to address psychosocial issues  Description  Describe feelings about living with diabetes; identify support needed and support network  Outcome: Progressing Towards Goal  Goal: *Insulin pump training  Outcome: Progressing Towards Goal  Goal: *Sick day guidelines  Outcome: Progressing Towards Goal  Goal: *Patient Specific Goal (EDIT GOAL, INSERT TEXT)  Outcome: Progressing Towards Goal     Problem: Patient Education: Go to Patient Education Activity  Goal: Patient/Family Education  Outcome: Progressing Towards Goal     Problem: Risk for Spread of Infection  Goal: Prevent transmission of infectious organism to others  Description  Prevent the transmission of infectious organisms to other patients, staff members, and visitors. Outcome: Progressing Towards Goal     Problem: Patient Education:  Go to Education Activity  Goal: Patient/Family Education  Outcome: Progressing Towards Goal     Problem: Falls - Risk of  Goal: *Absence of Falls  Description  Document Meng Hair Fall Risk and appropriate interventions in the flowsheet. Outcome: Progressing Towards Goal  Note: Fall Risk Interventions:  Mobility Interventions: Assess mobility with egress test         Medication Interventions: Teach patient to arise slowly    Elimination Interventions: Call light in reach    History of Falls Interventions: Door open when patient unattended         Problem: Patient Education: Go to Patient Education Activity  Goal: Patient/Family Education  Outcome: Progressing Towards Goal     Problem: Pain  Goal: *Control of Pain  Outcome: Progressing Towards Goal  Goal: *PALLIATIVE CARE:  Alleviation of Pain  Outcome: Progressing Towards Goal     Problem: Patient Education: Go to Patient Education Activity  Goal: Patient/Family Education  Outcome: Progressing Towards Goal     Problem: Pressure Injury - Risk of  Goal: *Prevention of pressure injury  Description  Document Elian Scale and appropriate interventions in the flowsheet.   Outcome: Progressing Towards Goal  Note: Pressure Injury Interventions:  Sensory Interventions: Assess need for specialty bed    Moisture Interventions: Internal/External urinary devices    Activity Interventions: PT/OT evaluation    Mobility Interventions: PT/OT evaluation    Nutrition Interventions: Document food/fluid/supplement intake, Offer support with meals,snacks and hydration    Friction and Shear Interventions: Lift sheet                Problem: Patient Education: Go to Patient Education Activity  Goal: Patient/Family Education  Outcome: Progressing Towards Goal     Problem: Patient Education: Go to Patient Education Activity  Goal: Patient/Family Education  Outcome: Progressing Towards Goal     Problem: Patient Education: Go to Patient Education Activity  Goal: Patient/Family Education  Outcome: Progressing Towards Goal

## 2020-02-20 NOTE — PROGRESS NOTES
1918:  Assumed care for patient, received bedside report from Ita Mattson RN. Patient quietly lying in bed watching television with no complaints of pain or discomfort at the time. Whiteboard updated, bed at the lowest position with call bell within reach.

## 2020-02-20 NOTE — PROGRESS NOTES
Problem: Self Care Deficits Care Plan (Adult)  Goal: *Acute Goals and Plan of Care (Insert Text)  Description  Initial Occupational Therapy Goals (2/17/2020) Within 7 day(s):    1. Patient will perform grooming seated EOB with setup x 15 minutes for increased independence with ADLs. 2. Patient will perform UB dressing with setup for increased independence with ADLs. 3. Patient will perform LB dressing with setup/Tanisha & A/E PRN for increased independence with ADLs. 4. Patient will perform all aspects of toileting with min/modA w/ A/E PRN for increased independence in ADLs  5. Patient will independently apply energy conservation techniques with 1 verbal cue(s) for increased independence with ADLs. 6. Patient will utilize good body mechanics during ADLs with 1 verbal cue(s). 7. Patient will perform functional transfer with CGA in prep for ADLs. Outcome: Progressing Towards Goal       OCCUPATIONAL THERAPY TREATMENT    Patient: Gabriel Benson (53 y.o. female)  Date: 2/20/2020  Diagnosis: Hyperkalemia [E87.5]   Hyperkalemia       Precautions: Skin, Fall  PLOF: mod I for ADLs and transfers     Chart, occupational therapy assessment, plan of care, and goals were reviewed. ASSESSMENT:  Pt presented supine in bed at the beginning of session. Drowsy but responding with min verbal cues and agreed to participate with therapy. Pt encouraged to use AEs for LB dressing and toilet tongs for juana care. Pt requires S for bed mobility, STS transfers, toilet transfers, toileting, juana care hygiene with CG-S, and LB dressing with S and AE. Pt was left seated at EOB at the end of session to eat lunch. NAD, pain 0/10.   Progression toward goals:  [x]          Improving appropriately and progressing toward goals  []          Improving slowly and progressing toward goals  []          Not making progress toward goals and plan of care will be adjusted     PLAN:  Patient continues to benefit from skilled intervention to address the above impairments. Continue treatment per established plan of care. Discharge Recommendations:  Home Health  Further Equipment Recommendations for Discharge:  N/A     SUBJECTIVE:   Patient stated  I can get up and move a little. I think it will be good for me.     OBJECTIVE DATA SUMMARY:   Cognitive/Behavioral Status:  Neurologic State: Alert  Orientation Level: Oriented X4  Cognition: Appropriate for age attention/concentration, Follows commands  Safety/Judgement: Fall prevention    Functional Mobility and Transfers for ADLs:   Bed Mobility:  Supine to Sit: Supervision  Scooting: Supervision   Transfers:  Sit to Stand: Supervision  Stand to Sit: Supervision   Toilet Transfer : Supervision  Balance:  Sitting: Intact  Standing: Intact; With support  ADL Intervention:  Lower Body Bathing  Bathing Assistance: Supervision  Perineal  : Supervision  Position Performed: Standing  Adaptive Equipment: Toilet tongs    Lower Body Dressing Assistance  Dressing Assistance: Supervision  Socks: Supervision  Leg Crossed Method Used: No  Position Performed: Seated edge of bed  Cues: Don  Adaptive Equipment Used: Reacher;Sock aid    Toileting  Toileting Assistance: Stand-by assistance  Bladder Hygiene: Stand-by assistance  Clothing Management: Contact guard assistance  Adaptive Equipment: Toilet tongs; Walker    Cognitive Retraining  Safety/Judgement: Fall prevention    Neuro Re-Education:  UE Therapeutic Exercises:   Pain:  Pain level pre-treatment: 0/10   Pain level post-treatment: 0/10  Pain Intervention(s): Medication (see MAR); Rest, Ice, Repositioning   Response to intervention: Nurse notified, See doc flow    Activity Tolerance:    Good   Please refer to the flowsheet for vital signs taken during this treatment.   After treatment:   []  Patient left in no apparent distress sitting up in chair  [x]  Patient left in no apparent distress in bed  [x]  Call bell left within reach  []  Nursing notified  []  Caregiver present  [] Bed alarm activated    COMMUNICATION/EDUCATION:   [x] Role of Occupational Therapy in the acute care setting  [x] Home safety education was provided and the patient/caregiver indicated understanding. [x] Patient/family have participated as able in working towards goals and plan of care. [x] Patient/family agree to work toward stated goals and plan of care. [] Patient understands intent and goals of therapy, but is neutral about his/her participation. [] Patient is unable to participate in goal setting and plan of care.       Thank you for this referral.  Hilary Stevenson OTR/L  Time Calculation: 20 mins

## 2020-02-20 NOTE — ACP (ADVANCE CARE PLANNING)
[address] [phone]    No Advance Directive in EMR. Patient states she thinks she completed one in the past but unsure. Discussed need for MPOA and reviewed [address] [place] Directive form. She verbally states her choice of MPOA would be her daughter, Anton Vela [phone]. Noted she is not  and has three children, Helder Panda, Jazlyn Moise and Jessica. She did not want to complete AMD at this time. Document provided for her to review. PM team christie readdress next visit if patient wants to complete document. She does state understanding that without an AMD it would be a consensus of all three of her children if she were incapable of makeing her own decisions. We did explain CPR, including cardiorespiratory resuscitation attempts with chest compressions, potential cardioversion \"shocks\" as well as intubation. We also explained Do Not Resuscitate which would mean we allow a natural death without aggressive interventions. We encouraged further discussion with her children as she is not ready to make a decision at this time. We ofered to speak with her children and answer any questions they may have. Contact information and blank AMD form left with patient. At this time, she remains Full Code with all possible interventions.

## 2020-02-20 NOTE — CONSULTS
[facility]: [phone]  [facility]: [phone]   [facility]: [phone]    Patient Name: Na Schwartz  YOB: 1965    Date of Initial Consult: 2/20/2020   Reason for Consult: support and care decisions   Requesting Provider: Dr Ruy Mcdonnell   Primary Care Physician: Kalie Coulter MD      SUMMARY:   Na Schwartz is a 53y.o. year old with a past history of diabetes, sleep apnea , uses CPAP, CHF diastolic dysfunction, asthma, Morbid obesity  who was admitted on 2/12/2020 from home. In the ER she was found to be hyperkalemic with a K of 6.0  She has CKD now with FARHAN  Cr of 2.69. VQ scan showed low probability of PE. Nephrology and cardiology have been consulted. She was also found to have CHF exacerbation. During hospital stay required transfer to ICU for respiratory failure. She was placed on BIPAP. Currently on medical unit on nasal cannula oxygen support with BIPAP / CPAP support. Palliative medicine is consulted for support and care decisions. PALLIATIVE DIAGNOSES:   1. Advanced Medical Plan Discussions  2. Acute respiratory failure   3. CHF exacerbation   4. CKD stage 3        PLAN:   1. Advanced care plan discussions seen along with Ms Deanne RN this am, no family in room. Sitting on side of bed, eating fruit for breakfast. Alert and oriented x 4 very talkative this am. Was able to share her multiple medical problems with us this am. She seems to have a good understanding of how they relate, ie CHF with Chronic Kidney disease. She tells us she is trying to be compliant with fluid restriction and feels she has been . We offered emphatic listening as she described her family. 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 Patient is not  has 3 children. Tells us she thinks she has completed an AMD, but not sure, did not recognize the [place] AMD form.  Discussed form at length. Shares with us she and her daughter Hali Castano have discussed her health and medical wishes at some length. Bon Cardozo shared with us she would not wish to be prolonged on life support including ventilator or long term feeding tubes. We encouraged Bon Cardozo to discuss with her family completion of AMD especially if she feels Hali Castano will support her medical decisions most accurately. Currently with no AMD it would be a consensus of her 3 children. Goals of care addressed and she shared she is not inclined to have CPR or be intubated but would like to discuss with her daughter of which we encouraged. She plans to go home post d/c with home health care. Goals of care Full code with full interventions. Of note she has also asked her family to make other baby sitting arrangements as she does not feel she is physically able any longer. 2. Acute respiratory failure nasal cannula / weaning as able/ CPAP support at night. PCCM on board. 3. CHF cardiology on board continuing lasix 80 mg twice a day. Salt and fluid restrict   4. CKD acute on chronic  Creat today 2.99   5. Initial consult note routed to primary continuity provider  6. Communicated plan of care with: Palliative IDT, patient     Patient/Health Care Proxy Stated Goals: Rehabilitation      TREATMENT PREFERENCES:   Code Status: Full Code    Advance Care Planning:  [] The [facility] Interdisciplinary Team has updated the ACP Navigator with [address] and Patient Capacity    Primary Decision Maker ([address]): no AMD on file not  3 children. Medical decision making would fall as a consensus of her 3 children     Medical Interventions: Full interventions       Other:  As far as possible, the palliative care team has discussed with patient / health care proxy about goals of care / treatment preferences for patient.      HISTORY:     History obtained from: chart and family     CHIEF COMPLAINT: shortness of breath     HPI/SUBJECTIVE:    The patient is:   [x] Verbal and participatory  [] Non-participatory due to:   Please see summary     Clinical Pain Assessment (nonverbal scale for nonverbal patients): Clinical Pain Assessment  Severity: 0          Duration: for how long has pt been experiencing pain (e.g., 2 days, 1 month, years)  Frequency: how often pain is an issue (e.g., several times per day, once every few days, constant)     FUNCTIONAL ASSESSMENT:     Palliative Performance Scale (PPS):  PPS: 50    ECOG  ECOG Status : Ambulatory, but unable to carry out work activities     PSYCHOSOCIAL/SPIRITUAL SCREENING:      Any spiritual / Episcopal concerns:  [] Yes /  [x] No    Caregiver Burnout:  [] Yes /  [] No /  [x] No Caregiver Present      Anticipatory grief assessment:   [x] Normal  / [] Maladaptive        REVIEW OF SYSTEMS:     Positive and pertinent negative findings in ROS are noted above in HPI. The following systems were [x] reviewed / [] unable to be reviewed as noted in HPI  Other findings are noted below. Systems: constitutional, ears/nose/mouth/throat, respiratory, gastrointestinal, genitourinary, musculoskeletal, integumentary, neurologic, psychiatric, endocrine. Positive findings noted below. Modified ESAS Completed by: provider   Fatigue: 4 Drowsiness: 0     Pain: 0   Anxiety: 0 Nausea: 0   Anorexia: 0 Dyspnea: 1           Stool Occurrence(s): 1        PHYSICAL EXAM:     Wt Readings from Last 3 Encounters:   02/18/20 148.8 kg (328 lb 1.6 oz)   12/15/19 149.5 kg (329 lb 9.6 oz)   10/05/19 130.2 kg (287 lb)     Blood pressure 158/70, pulse 73, temperature 97.2 °F (36.2 °C), resp. rate 18, height 5' 2\" (1.575 m), weight 148.8 kg (328 lb 1.6 oz), SpO2 99 %, not currently breastfeeding.   Pain:  Pain Scale 1: Numeric (0 - 10)  Pain Intensity 1: 0  Pain Onset 1: (gt; pt reports normal with activity)  Pain Location 1: Chest  Pain Orientation 1: Anterior, Posterior  Pain Description 1: Aching  Pain Intervention(s) 1: Rest  Last bowel movement: x 1 today     Constitutional: chronically ill appearing sitting on side of bed in NAD   Eyes: pupils equal, anicteric  Respiratory: breathing not labored on room air when we saw  Skin: warm, dry  Neurologic: following commands, moving all extremities         HISTORY:     Principal Problem:    Hyperkalemia (2/12/2020)    Active Problems:    HTN (hypertension) (8/16/2014)      Type II or unspecified type diabetes mellitus with renal manifestations, uncontrolled(250.42) ([address] (9/4/2014)      Morbid obesity (HCC) ()      АНДРЕЙ (obstructive sleep apnea) (12/11/2019)      Chest pain (2/13/2020)      Acute renal failure superimposed on stage 3 chronic kidney disease ([address] (2/13/2020)      Acute respiratory failure with hypoxia ([address] (6/72/3676)      Diastolic CHF, acute on chronic ([address] (2/16/2020)      Past Medical History:   Diagnosis Date    Asthma     Note: As child had asthma    Diabetes mellitus ([address]     Type 2    Heart palpitations     Hypertension     Ill-defined condition     fibromyalgia     Morbid obesity ([address]     MRSA infection 8/2014      Past Surgical History:   Procedure Laterality Date    BREAST SURGERY PROCEDURE UNLISTED      cyst removed    HX CATARACT REMOVAL      HX CHOLECYSTECTOMY      HX TUBAL LIGATION        Family History   Problem Relation Age of Onset    Heart Attack Mother     Heart Attack Maternal Grandmother      History reviewed, no pertinent family history.   Social History     Tobacco Use    Smoking status: Never Smoker    Smokeless tobacco: Never Used   Substance Use Topics    Alcohol use: No     Allergies   Allergen Reactions    Bees [Sting, Bee] Swelling    Penicillins Hives    Strawberry Hives      Current Facility-Administered Medications   Medication Dose Route Frequency    furosemide (LASIX) injection 80 mg  80 mg IntraVENous DAILY    ondansetron (ZOFRAN) injection 4 mg  4 mg IntraVENous Q8H PRN    insulin lispro (HUMALOG) injection 5 Units  5 Units SubCUTAneous TIDAC    carvediloL (COREG) tablet 3.125 mg  3.125 mg Oral BID WITH MEALS    hydrALAZINE (APRESOLINE) 20 mg/mL injection 10 mg  10 mg IntraVENous Q6H PRN    magnesium hydroxide (MILK OF MAGNESIA) 400 mg/5 mL oral suspension 30 mL  30 mL Oral DAILY PRN    docusate sodium (COLACE) capsule 100 mg  100 mg Oral BID    polyethylene glycol (MIRALAX) packet 17 g  17 g Oral BID    gabapentin (NEURONTIN) capsule 300 mg  300 mg Oral TID    amLODIPine (NORVASC) tablet 10 mg  10 mg Oral DAILY    hydrALAZINE (APRESOLINE) tablet 100 mg  100 mg Oral TID    atorvastatin (LIPITOR) tablet 40 mg  40 mg Oral DAILY    albuterol-ipratropium (DUO-NEB) 2.5 MG-0.5 MG/3 ML  3 mL Nebulization Q4H PRN    aspirin chewable tablet 81 mg  81 mg Oral DAILY    DULoxetine (CYMBALTA) capsule 60 mg  60 mg Oral DAILY    neomycin-bacitracin-polymyxin (NEOSPORIN) ointment   Topical TID    heparin (porcine) injection 5,000 Units  5,000 Units SubCUTAneous Q8H    insulin glargine (LANTUS) injection 15 Units  15 Units SubCUTAneous QHS    insulin lispro (HUMALOG) injection   SubCUTAneous AC&HS    dextrose 10% infusion 125-250 mL  125-250 mL IntraVENous PRN    calcium acetate(phosphat bind) (PHOSLO) capsule 667 mg  1 Cap Oral TID WITH MEALS    sodium chloride (NS) flush 5-40 mL  5-40 mL IntraVENous Q8H    sodium chloride (NS) flush 5-40 mL  5-40 mL IntraVENous PRN    acetaminophen (TYLENOL) tablet 650 mg  650 mg Oral Q4H PRN    oxyCODONE-acetaminophen (PERCOCET) 5-325 mg per tablet 1 Tab  1 Tab Oral Q4H PRN        LAB AND IMAGING FINDINGS:     Lab Results   Component Value Date/Time    WBC 7.7 02/20/2020 02:55 AM    HGB 8.5 (L) 02/20/2020 02:55 AM    PLATELET 122 63/08/0745 02:55 AM     Lab Results   Component Value Date/Time    Sodium 134 (L) 02/20/2020 02:55 AM    Potassium 4.9 02/20/2020 02:55 AM    Chloride 101 02/20/2020 02:55 AM    CO2 26 02/20/2020 02:55 AM    BUN 77 (H) 02/20/2020 02:55 AM    Creatinine 2.99 (H) 02/20/2020 02:55 AM    Calcium 8.9 02/20/2020 02:55 AM    Magnesium 2.3 02/20/2020 02:55 AM    Phosphorus 4.8 02/20/2020 02:55 AM      Lab Results   Component Value Date/Time    AST (SGOT) 20 02/20/2020 02:55 AM    Alk. phosphatase 133 (H) 02/20/2020 02:55 AM    Protein, total 6.6 02/20/2020 02:55 AM    Albumin 3.2 (L) 02/20/2020 02:55 AM    Globulin 3.4 02/20/2020 02:55 AM     Lab Results   Component Value Date/Time    INR 0.9 01/17/2019 04:25 AM    Prothrombin time 12.1 01/17/2019 04:25 AM    aPTT 83.4 (H) 12/16/2019 03:45 AM      Lab Results   Component Value Date/Time    Iron 26 (L) 02/14/2020 02:22 AM    TIBC 322 02/14/2020 02:22 AM    Iron % saturation 8 (L) 02/14/2020 02:22 AM    Ferritin 77 02/14/2020 02:22 AM      No results found for: PH, PCO2, PO2  No components found for: [place]   Lab Results   Component Value Date/Time    CK 49 02/16/2020 03:50 PM    CK - MB <1.0 02/16/2020 03:50 PM              Total time: 70 minutes   Counseling / coordination time, spent as noted above: 60 minutes   > 50% counseling / coordination: yes with patient     Prolonged service was provided for  []30 min   []75 min in face to face time in the presence of the patient, spent as noted above. Time Start:   Time End:   Note: this can only be billed with 44747 (initial) or 29847 (follow up). If multiple start / stop times, list each separately.

## 2020-02-20 NOTE — PROGRESS NOTES
Problem: Mobility Impaired (Adult and Pediatric)  Goal: *Acute Goals and Plan of Care (Insert Text)  Description  In 1-7 days pt will be able to perform:  ST. Supine to sit to supine S with HR for meals. 2.  Sit to stand to sit S with RW in prep for ambulation. LT.  Gait:  Ambulate >50ft S with RW for home/community mobility. 2.  Activity tolerance: Tolerate up in chair 1-2 hours for ADLs. 3.  Patient/Family Education:  Patient/family to be independent with HEP for follow-up care and safe discharge. Outcome: Progressing Towards Goal  PHYSICAL THERAPY TREATMENT    Patient: Marcela Trevizo (53 y.o. female)  Date: 2020  Diagnosis: Hyperkalemia [E87.5]   Hyperkalemia  Precautions: Skin, Fall   Chart, physical therapy assessment, plan of care and goals were reviewed. ASSESSMENT:  Pt has met STG 1-2, LTG 1-2 (reports sitting up in chair yesterday and today). Continues to complete activity with supplemental O2 at 1L nc today. O2 sat in high 90's throughout session, though pt SOB with activity (1 rest break during GT 100ft RW/S). Edema hands decreased as compared to yesterday with skin mildly pliable. Will add HEP tomorrow. Recommend HHPT at discharge for f/u of tolerance in home of activity. Progression toward goals:  [x]      Improving appropriately and progressing toward goals  []      Improving slowly and progressing toward goals  []      Not making progress toward goals and plan of care will be adjusted     PLAN:  Patient continues to benefit from skilled intervention to address the above impairments. Continue treatment per established plan of care. Discharge Recommendations:  Home Health  Further Equipment Recommendations for Discharge:  N/A     SUBJECTIVE:   Patient stated I sat up in the chair today.     OBJECTIVE DATA SUMMARY:   Critical Behavior:  Neurologic State: Alert  Orientation Level: Oriented X4  Cognition: Appropriate decision making, Appropriate for age attention/concentration, Follows commands  Safety/Judgement: Awareness of environment, Insight into deficits, Home safety  Functional Mobility Training:  Bed Mobility:  Supine to Sit: Supervision;Modified independent  Scooting: Modified independent(seated EOB)  Transfers:  Sit to Stand: Supervision  Stand to Sit: Supervision(vc reminder to reach for bed prior to sitting)  Balance:  Sitting: Intact  Standing: Intact; With support  Ambulation/Gait Training:  Distance (ft): 100 Feet (ft)  Assistive Device: Gait belt;Walker, rolling(with port O2)  Ambulation - Level of Assistance: Supervision;Stand-by assistance(SBA for epuipment)  Gait Abnormalities: Decreased step clearance  Base of Support: Widened  Speed/Arlette: Slow  Step Length: Left shortened;Right shortened  Interventions: Safety awareness training;Verbal cues  Pain:  Pain Scale 1: Numeric (0 - 10)  Pain Intensity 1: 4  Pain Location 1: Chest  Pain Intervention(s) 1: Rest  Activity Tolerance:   Fair   Please refer to the flowsheet for vital signs taken during this treatment.   After treatment:   [x] Patient left in no apparent distress sitting up EOB  [] Patient left in no apparent distress in bed  [x] Call bell left within reach  [] Nursing notified  [] Caregiver present  [] Bed alarm activated      Seabron Silence, PT   Time Calculation: 23 mins

## 2020-02-20 NOTE — PROGRESS NOTES
0560 [facility] care of the patient from [address] [place] [address] (offgoing Nurse). Patient is resting at this moment. bed in low position, call bell within reach. 1900 Patient had an uneventful shift and remained stable. Purposeful hourly rounding completed throughout the shift, NAD noted at this time, and patient is resting quietly in bed. No concerns or requests voiced    1915 Bedside and Verbal shift change report given to Jairo Moeller RN (oncoming nurse) by Eric Khan RN (offgoing nurse). Report included the following information SBAR, Kardex, Intake/Output, MAR, Recent Results, Med Rec Status, and Cardiac Rhythm .

## 2020-02-21 LAB
ALBUMIN SERPL-MCNC: 3 G/DL (ref 3.4–5)
ALBUMIN/GLOB SERPL: 0.9 {RATIO} (ref 0.8–1.7)
ALP SERPL-CCNC: 135 U/L (ref 45–117)
ALT SERPL-CCNC: 25 U/L (ref 13–56)
ANION GAP SERPL CALC-SCNC: 9 MMOL/L (ref 3–18)
AST SERPL-CCNC: 24 U/L (ref 10–38)
BASOPHILS # BLD: 0 K/UL (ref 0–0.1)
BASOPHILS NFR BLD: 1 % (ref 0–2)
BILIRUB SERPL-MCNC: 0.3 MG/DL (ref 0.2–1)
BUN SERPL-MCNC: 84 MG/DL (ref 7–18)
BUN/CREAT SERPL: 26 (ref 12–20)
CALCIUM SERPL-MCNC: 8.6 MG/DL (ref 8.5–10.1)
CHLORIDE SERPL-SCNC: 100 MMOL/L (ref 100–111)
CO2 SERPL-SCNC: 23 MMOL/L (ref 21–32)
CREAT SERPL-MCNC: 3.19 MG/DL (ref 0.6–1.3)
DIFFERENTIAL METHOD BLD: ABNORMAL
EOSINOPHIL # BLD: 0.5 K/UL (ref 0–0.4)
EOSINOPHIL NFR BLD: 9 % (ref 0–5)
ERYTHROCYTE [DISTWIDTH] IN BLOOD BY AUTOMATED COUNT: 15 % (ref 11.6–14.5)
GLOBULIN SER CALC-MCNC: 3.2 G/DL (ref 2–4)
GLUCOSE BLD STRIP.AUTO-MCNC: 154 MG/DL (ref 70–110)
GLUCOSE BLD STRIP.AUTO-MCNC: 159 MG/DL (ref 70–110)
GLUCOSE BLD STRIP.AUTO-MCNC: 167 MG/DL (ref 70–110)
GLUCOSE BLD STRIP.AUTO-MCNC: 185 MG/DL (ref 70–110)
GLUCOSE BLD STRIP.AUTO-MCNC: 226 MG/DL (ref 70–110)
GLUCOSE SERPL-MCNC: 137 MG/DL (ref 74–99)
HCT VFR BLD AUTO: 27.8 % (ref 35–45)
HGB BLD-MCNC: 8.5 G/DL (ref 12–16)
LYMPHOCYTES # BLD: 1.5 K/UL (ref 0.9–3.6)
LYMPHOCYTES NFR BLD: 25 % (ref 21–52)
MAGNESIUM SERPL-MCNC: 2.2 MG/DL (ref 1.6–2.6)
MCH RBC QN AUTO: 24.6 PG (ref 24–34)
MCHC RBC AUTO-ENTMCNC: 30.6 G/DL (ref 31–37)
MCV RBC AUTO: 80.3 FL (ref 74–97)
MONOCYTES # BLD: 0.6 K/UL (ref 0.05–1.2)
MONOCYTES NFR BLD: 10 % (ref 3–10)
NEUTS SEG # BLD: 3.4 K/UL (ref 1.8–8)
NEUTS SEG NFR BLD: 55 % (ref 40–73)
PHOSPHATE SERPL-MCNC: 5.3 MG/DL (ref 2.5–4.9)
PLATELET # BLD AUTO: 263 K/UL (ref 135–420)
PMV BLD AUTO: 9.5 FL (ref 9.2–11.8)
POTASSIUM SERPL-SCNC: 4.7 MMOL/L (ref 3.5–5.5)
PROT SERPL-MCNC: 6.2 G/DL (ref 6.4–8.2)
RBC # BLD AUTO: 3.46 M/UL (ref 4.2–5.3)
SODIUM SERPL-SCNC: 132 MMOL/L (ref 136–145)
WBC # BLD AUTO: 6.1 K/UL (ref 4.6–13.2)

## 2020-02-21 PROCEDURE — 74011250637 HC RX REV CODE- 250/637: Performed by: INTERNAL MEDICINE

## 2020-02-21 PROCEDURE — 74011250636 HC RX REV CODE- 250/636: Performed by: INTERNAL MEDICINE

## 2020-02-21 PROCEDURE — 84100 ASSAY OF PHOSPHORUS: CPT

## 2020-02-21 PROCEDURE — 97110 THERAPEUTIC EXERCISES: CPT

## 2020-02-21 PROCEDURE — 80053 COMPREHEN METABOLIC PANEL: CPT

## 2020-02-21 PROCEDURE — 74011636637 HC RX REV CODE- 636/637: Performed by: INTERNAL MEDICINE

## 2020-02-21 PROCEDURE — 82962 GLUCOSE BLOOD TEST: CPT

## 2020-02-21 PROCEDURE — 97530 THERAPEUTIC ACTIVITIES: CPT

## 2020-02-21 PROCEDURE — 74011250637 HC RX REV CODE- 250/637: Performed by: HOSPITALIST

## 2020-02-21 PROCEDURE — 74011636637 HC RX REV CODE- 636/637: Performed by: HOSPITALIST

## 2020-02-21 PROCEDURE — 74011250636 HC RX REV CODE- 250/636: Performed by: HOSPITALIST

## 2020-02-21 PROCEDURE — 83735 ASSAY OF MAGNESIUM: CPT

## 2020-02-21 PROCEDURE — 97116 GAIT TRAINING THERAPY: CPT

## 2020-02-21 PROCEDURE — 85025 COMPLETE CBC W/AUTO DIFF WBC: CPT

## 2020-02-21 PROCEDURE — 65660000000 HC RM CCU STEPDOWN

## 2020-02-21 PROCEDURE — 36415 COLL VENOUS BLD VENIPUNCTURE: CPT

## 2020-02-21 PROCEDURE — 97535 SELF CARE MNGMENT TRAINING: CPT

## 2020-02-21 RX ORDER — FUROSEMIDE 10 MG/ML
80 INJECTION INTRAMUSCULAR; INTRAVENOUS DAILY
Status: DISCONTINUED | OUTPATIENT
Start: 2020-02-22 | End: 2020-02-22

## 2020-02-21 RX ORDER — FUROSEMIDE 10 MG/ML
40 INJECTION INTRAMUSCULAR; INTRAVENOUS ONCE
Status: COMPLETED | OUTPATIENT
Start: 2020-02-21 | End: 2020-02-21

## 2020-02-21 RX ADMIN — INSULIN LISPRO 2 UNITS: 100 INJECTION, SOLUTION INTRAVENOUS; SUBCUTANEOUS at 17:06

## 2020-02-21 RX ADMIN — INSULIN LISPRO 5 UNITS: 100 INJECTION, SOLUTION INTRAVENOUS; SUBCUTANEOUS at 08:43

## 2020-02-21 RX ADMIN — DULOXETINE HYDROCHLORIDE 60 MG: 60 CAPSULE, DELAYED RELEASE ORAL at 08:33

## 2020-02-21 RX ADMIN — DOCUSATE SODIUM 100 MG: 100 CAPSULE, LIQUID FILLED ORAL at 08:33

## 2020-02-21 RX ADMIN — Medication 10 ML: at 07:05

## 2020-02-21 RX ADMIN — INSULIN LISPRO 2 UNITS: 100 INJECTION, SOLUTION INTRAVENOUS; SUBCUTANEOUS at 07:01

## 2020-02-21 RX ADMIN — INSULIN LISPRO 5 UNITS: 100 INJECTION, SOLUTION INTRAVENOUS; SUBCUTANEOUS at 17:05

## 2020-02-21 RX ADMIN — POLYETHYLENE GLYCOL 3350 17 G: 17 POWDER, FOR SOLUTION ORAL at 21:13

## 2020-02-21 RX ADMIN — CALCIUM ACETATE 667 MG: 667 CAPSULE ORAL at 17:04

## 2020-02-21 RX ADMIN — GABAPENTIN 300 MG: 300 CAPSULE ORAL at 21:13

## 2020-02-21 RX ADMIN — FUROSEMIDE 80 MG: 10 INJECTION, SOLUTION INTRAMUSCULAR; INTRAVENOUS at 08:33

## 2020-02-21 RX ADMIN — DOCUSATE SODIUM 100 MG: 100 CAPSULE, LIQUID FILLED ORAL at 21:13

## 2020-02-21 RX ADMIN — ACETAMINOPHEN 650 MG: 325 TABLET ORAL at 04:31

## 2020-02-21 RX ADMIN — HEPARIN SODIUM 5000 UNITS: 5000 INJECTION INTRAVENOUS; SUBCUTANEOUS at 12:29

## 2020-02-21 RX ADMIN — INSULIN GLARGINE 15 UNITS: 100 INJECTION, SOLUTION SUBCUTANEOUS at 21:14

## 2020-02-21 RX ADMIN — POLYETHYLENE GLYCOL 3350 17 G: 17 POWDER, FOR SOLUTION ORAL at 08:32

## 2020-02-21 RX ADMIN — Medication 10 ML: at 13:00

## 2020-02-21 RX ADMIN — FUROSEMIDE 40 MG: 10 INJECTION, SOLUTION INTRAMUSCULAR; INTRAVENOUS at 17:39

## 2020-02-21 RX ADMIN — METOLAZONE 5 MG: 5 TABLET ORAL at 08:33

## 2020-02-21 RX ADMIN — HYDRALAZINE HYDROCHLORIDE 100 MG: 50 TABLET, FILM COATED ORAL at 21:13

## 2020-02-21 RX ADMIN — CALCIUM ACETATE 667 MG: 667 CAPSULE ORAL at 12:29

## 2020-02-21 RX ADMIN — ATORVASTATIN CALCIUM 40 MG: 20 TABLET, FILM COATED ORAL at 08:33

## 2020-02-21 RX ADMIN — AMLODIPINE BESYLATE 10 MG: 5 TABLET ORAL at 08:33

## 2020-02-21 RX ADMIN — POLYMYXIN B SULFATE, BACITRACIN ZINC, NEOMYCIN SULFATE: 5000; 3.5; 4 OINTMENT TOPICAL at 08:36

## 2020-02-21 RX ADMIN — ASPIRIN 81 MG 81 MG: 81 TABLET ORAL at 08:33

## 2020-02-21 RX ADMIN — GABAPENTIN 300 MG: 300 CAPSULE ORAL at 08:33

## 2020-02-21 RX ADMIN — CARVEDILOL 3.12 MG: 3.12 TABLET, FILM COATED ORAL at 08:33

## 2020-02-21 RX ADMIN — INSULIN LISPRO 2 UNITS: 100 INJECTION, SOLUTION INTRAVENOUS; SUBCUTANEOUS at 12:29

## 2020-02-21 RX ADMIN — POLYMYXIN B SULFATE, BACITRACIN ZINC, NEOMYCIN SULFATE: 5000; 3.5; 4 OINTMENT TOPICAL at 17:06

## 2020-02-21 RX ADMIN — HYDRALAZINE HYDROCHLORIDE 100 MG: 50 TABLET, FILM COATED ORAL at 17:04

## 2020-02-21 RX ADMIN — HYDRALAZINE HYDROCHLORIDE 100 MG: 50 TABLET, FILM COATED ORAL at 08:33

## 2020-02-21 RX ADMIN — HEPARIN SODIUM 5000 UNITS: 5000 INJECTION INTRAVENOUS; SUBCUTANEOUS at 06:52

## 2020-02-21 RX ADMIN — GABAPENTIN 300 MG: 300 CAPSULE ORAL at 17:04

## 2020-02-21 RX ADMIN — INSULIN LISPRO 4 UNITS: 100 INJECTION, SOLUTION INTRAVENOUS; SUBCUTANEOUS at 21:14

## 2020-02-21 RX ADMIN — HEPARIN SODIUM 5000 UNITS: 5000 INJECTION INTRAVENOUS; SUBCUTANEOUS at 21:14

## 2020-02-21 RX ADMIN — CALCIUM ACETATE 667 MG: 667 CAPSULE ORAL at 08:33

## 2020-02-21 RX ADMIN — CARVEDILOL 3.12 MG: 3.12 TABLET, FILM COATED ORAL at 17:04

## 2020-02-21 RX ADMIN — INSULIN LISPRO 5 UNITS: 100 INJECTION, SOLUTION INTRAVENOUS; SUBCUTANEOUS at 12:29

## 2020-02-21 NOTE — PROGRESS NOTES
Cardiology Progress Note        Patient: Cynthia Wong        Sex: female          DOA: 2/12/2020  YOB: 1965      Age:  53 y.o.        LOS:  LOS: 8 days    Patient seen and examined, chart reviewed. Assessment/Plan     Patient Active Problem List   Diagnosis Code    HTN (hypertension) I10    Type II or unspecified type diabetes mellitus with renal manifestations, uncontrolled(250.42) ([facility] [facility] E11.29, E11.65    Morbid obesity ([facility] E66.01    Respiratory distress R06.03    Asthma exacerbation J45. 396    Diastolic CHF (HCC) W40.80    АНДРЕЙ (obstructive sleep apnea) G47.33    UTI (urinary tract infection) N39.0    Hyperkalemia E87.5         Acute renal failure superimposed on stage 3 chronic kidney disease (HCC) N17.9, N18.3      Acute hypoxic respiratory failure from volume overload  As per chart it appears she gained 37 pounds from 12 February to 16 February. Patient was transferred to ICU and she was on BiPAP for hypoxic respiratory failure    Plan:    Change IV Lasix to 80 mg once a day  Agree with adding Metolazone  Strict input output  Salt restriction up to 2 g per day and fluid restriction up to 1.5 L per day  Continue carvedilol and amlodipine  Continue management as per hospital medicine  Monitor renal function and electrolytes. Subjective:    cc:  Denies any chest pain , complaining of shortness of breath on exertion, denies any orthopnea or PND, complaining of leg swelling      REVIEW OF SYSTEMS:     General: No fevers or chills.   Cardiovascular: No chest pain,No palpitations, No orthopnea, No PND, positive leg swelling, No claudication, positive shortness of breath on exertion  Pulmonary: No dyspnea   Gastrointestinal: No nausea, vomiting, bleeding  Neurology: No Dizziness    Objective:      Visit Vitals  /72 (BP 1 Location: Right arm, BP Patient Position: At rest)   Pulse 70   Temp 97.4 °F (36.3 °C)   Resp 18   Ht 5' 2\" (1.575 m)   Wt 148.8 kg (328 lb 1.6 oz)   SpO2 94%   Breastfeeding No   BMI 60.01 kg/m²     Body mass index is 60.01 kg/m². Physical Exam:  General Appearance: Comfortable, not using accessory muscles of respiration. HEENT: STEPHANIE. HEAD: Atraumatic  NECK: No JVD, no thyroidomeglay. CAROTIDS: No bruit   LUNGS: Clear bilaterally. HEART: S1+S2 audible, no murmur, no pericardial rub. ABD: Non-tender, BS Audible    EXT: +++ lower extremity edema, and no cyanosis. VASCULAR EXAM: Pulses are intact. PSYCHIATRIC EXAM: Mood is appropriate. MUSCULOSKELETAL: Grossly no joint deformity.   NEUROLOGICAL: AAO times 3, No motor and sensory deficit  Medication:  Current Facility-Administered Medications   Medication Dose Route Frequency    metOLazone (ZAROXOLYN) tablet 5 mg  5 mg Oral DAILY    furosemide (LASIX) injection 80 mg  80 mg IntraVENous BID    ondansetron (ZOFRAN) injection 4 mg  4 mg IntraVENous Q8H PRN    insulin lispro (HUMALOG) injection 5 Units  5 Units SubCUTAneous TIDAC    carvediloL (COREG) tablet 3.125 mg  3.125 mg Oral BID WITH MEALS    hydrALAZINE (APRESOLINE) 20 mg/mL injection 10 mg  10 mg IntraVENous Q6H PRN    magnesium hydroxide (MILK OF MAGNESIA) 400 mg/5 mL oral suspension 30 mL  30 mL Oral DAILY PRN    docusate sodium (COLACE) capsule 100 mg  100 mg Oral BID    polyethylene glycol (MIRALAX) packet 17 g  17 g Oral BID    gabapentin (NEURONTIN) capsule 300 mg  300 mg Oral TID    amLODIPine (NORVASC) tablet 10 mg  10 mg Oral DAILY    hydrALAZINE (APRESOLINE) tablet 100 mg  100 mg Oral TID    atorvastatin (LIPITOR) tablet 40 mg  40 mg Oral DAILY    albuterol-ipratropium (DUO-NEB) 2.5 MG-0.5 MG/3 ML  3 mL Nebulization Q4H PRN    aspirin chewable tablet 81 mg  81 mg Oral DAILY    DULoxetine (CYMBALTA) capsule 60 mg  60 mg Oral DAILY    neomycin-bacitracin-polymyxin (NEOSPORIN) ointment   Topical TID    heparin (porcine) injection 5,000 Units  5,000 Units SubCUTAneous Q8H    insulin glargine (LANTUS) injection 15 Units  15 Units SubCUTAneous QHS    insulin lispro (HUMALOG) injection   SubCUTAneous AC&HS    dextrose 10% infusion 125-250 mL  125-250 mL IntraVENous PRN    calcium acetate(phosphat bind) (PHOSLO) capsule 667 mg  1 Cap Oral TID WITH MEALS    sodium chloride (NS) flush 5-40 mL  5-40 mL IntraVENous Q8H    sodium chloride (NS) flush 5-40 mL  5-40 mL IntraVENous PRN    acetaminophen (TYLENOL) tablet 650 mg  650 mg Oral Q4H PRN    oxyCODONE-acetaminophen (PERCOCET) 5-325 mg per tablet 1 Tab  1 Tab Oral Q4H PRN               Lab/Data Reviewed:       Recent Labs     02/20/20  0255 02/19/20  0335 02/18/20  0526   WBC 7.7 6.5 5.9   HGB 8.5* 8.2* 8.2*   HCT 28.1* 27.0* 27.0*    259 245     Recent Labs     02/20/20  0255 02/19/20  0335 02/18/20  0526   * 137 140   K 4.9 4.8 4.7    105 109   CO2 26 24 24   * 184* 147*   BUN 77* 70* 64*   CREA 2.99* 2.59* 2.38*   CA 8.9 8.7 9.0     Signed By: Moshe Hamman, MD     February 20, 2020

## 2020-02-21 NOTE — PROGRESS NOTES
TRansition of care: home with [facility] when medically cleared Dr. Leanna Long anticipates d/c tomorrow  Met with patient and dr. Sherri Lee at bedside. Patient lives alone. She wants to return home when she is medically cleared  She is currently on 0.5LPM via [place]. Cm met with ASPEN cade and she informed cm that patient has not been on oxygen she must have put it on. Patient has [employer] for insurance she has Dr. Kourtney Sahni for pcp. Patient is acitive with personal care. She had numerous c/o but request to have them see her. When cm asked her if she would like the phone number as well for personal touch so she can talk to someone about her concerns she said no she wants them to be her [facility] provider. Cms has sent to [facility] touch as requested. Patient has a cpap and nebulizer at home. She denies other needs     Care Management Interventions  PCP Verified by CM:  Yes  Transition of Care Consult (CM Consult): [address]: No  Reason Outside [place]: Patient already serviced by other home care/hospice agency([facility])  Discharge Durable Medical Equipment: Yes  The Patient and/or Patient Representative was Provided with a Choice of Provider and Agrees with the Discharge Plan?: Yes  Freedom of Choice List was Provided with Basic Dialogue that Supports the Patient's Individualized Plan of Care/Goals, Treatment Preferences and Shares the Quality Data Associated with the Providers?: Yes  Discharge Location  Discharge Placement: Home with home health

## 2020-02-21 NOTE — PROGRESS NOTES
Problem: Self Care Deficits Care Plan (Adult)  Goal: *Acute Goals and Plan of Care (Insert Text)  Description  Initial Occupational Therapy Goals (2020) Within 7 day(s):    1. Patient will perform grooming seated EOB with setup x 15 minutes for increased independence with ADLs. 2. Patient will perform UB dressing with setup for increased independence with ADLs. 3. Patient will perform LB dressing with setup/Tanisha & A/E PRN for increased independence with ADLs. 4. Patient will perform all aspects of toileting with min/modA w/ A/E PRN for increased independence in ADLs  5. Patient will independently apply energy conservation techniques with 1 verbal cue(s) for increased independence with ADLs. 6. Patient will utilize good body mechanics during ADLs with 1 verbal cue(s). 7. Patient will perform functional transfer with CGA in prep for ADLs. Outcome: Progressing Towards Goal     OCCUPATIONAL THERAPY TREATMENT    Patient: Gwendolyn Virk (53 y.o. female)  Date: 2020  Diagnosis: Hyperkalemia [E87.5]   Hyperkalemia       Precautions: Fall  Chart, occupational therapy assessment, plan of care, and goals were reviewed. ASSESSMENT:  Pt sitting EOB for session. Pt education on energy conservation technique and home safety. Pt concerned about bathing at home and reporting difficulty getting in/out of tub. Provided pt with information for [employer] to see if pt could qualify for possible tub bench secondary to decreased activity tolerance and inability in get in/out of tub at home. Pt supervision for AE at this time and did not report any concerns with use. Pt provided with spirometer and education on deep breathing technique and importance of breathing exercises. Continue addressing goals for therapy.    Progression toward goals:  [x]          Improving appropriately and progressing toward goals  []          Improving slowly and progressing toward goals  []          Not making progress toward goals and plan of care will be adjusted     PLAN:  Patient continues to benefit from skilled intervention to address the above impairments. Continue treatment per established plan of care. Discharge Recommendations:  Home Health  Further Equipment Recommendations for Discharge:  bariatric tub bench     SUBJECTIVE:   Patient stated I need help with everything.     OBJECTIVE DATA SUMMARY:   Cognitive/Behavioral Status:  Neurologic State: Alert, Appropriate for age  Orientation Level: Oriented X4  Cognition: Appropriate decision making, Appropriate for age attention/concentration, Appropriate safety awareness, Follows commands, Recognition of people/places  Safety/Judgement: Awareness of environment, Fall prevention  Functional Mobility and Transfers for ADLs:   Bed Mobility:  Sitting EOB for session   Transfers:  N/A this session  Balance:  Sitting: Intact  Sitting - Static: Good (unsupported)  ADL Intervention:    LB ADL: supervision with use of AE     Cognitive Retraining  Safety/Judgement: Awareness of environment; Fall prevention    Pain:  Pain Scale 1: Numeric (0 - 10)  Pain Intensity 1: 0    Activity Tolerance:    fair  Please refer to the flowsheet for vital signs taken during this treatment.   After treatment:   []  Patient left in no apparent distress sitting up in chair  [x]  Patient left in no apparent distress in bed  [x]  Call bell left within reach  []  Nursing notified  []  Caregiver present  []  Bed alarm activated    [employer], OTR/L  Time Calculation: 18 mins

## 2020-02-21 NOTE — PROGRESS NOTES
Problem: Diabetes Self-Management  Goal: *Disease process and treatment process  Description  Define diabetes and identify own type of diabetes; list 3 options for treating diabetes. Outcome: Progressing Towards Goal  Goal: *Incorporating nutritional management into lifestyle  Description  Describe effect of type, amount and timing of food on blood glucose; list 3 methods for planning meals. Outcome: Progressing Towards Goal  Goal: *Incorporating physical activity into lifestyle  Description  State effect of exercise on blood glucose levels. Outcome: Progressing Towards Goal  Goal: *Developing strategies to promote health/change behavior  Description  Define the ABC's of diabetes; identify appropriate screenings, schedule and personal plan for screenings. Outcome: Progressing Towards Goal  Goal: *Using medications safely  Description  State effect of diabetes medications on diabetes; name diabetes medication taking, action and side effects. Outcome: Progressing Towards Goal  Goal: *Monitoring blood glucose, interpreting and using results  Description  Identify recommended blood glucose targets  and personal targets. Outcome: Progressing Towards Goal  Goal: *Prevention, detection, treatment of acute complications  Description  List symptoms of hyper- and hypoglycemia; describe how to treat low blood sugar and actions for lowering  high blood glucose level. Outcome: Progressing Towards Goal  Goal: *Prevention, detection and treatment of chronic complications  Description  Define the natural course of diabetes and describe the relationship of blood glucose levels to long term complications of diabetes.   Outcome: Progressing Towards Goal  Goal: *Developing strategies to address psychosocial issues  Description  Describe feelings about living with diabetes; identify support needed and support network  Outcome: Progressing Towards Goal  Goal: *Insulin pump training  Outcome: Progressing Towards Goal  Goal: *Sick day guidelines  Outcome: Progressing Towards Goal  Goal: *Patient Specific Goal (EDIT GOAL, INSERT TEXT)  Outcome: Progressing Towards Goal     Problem: Patient Education: Go to Patient Education Activity  Goal: Patient/Family Education  Outcome: Progressing Towards Goal     Problem: Risk for Spread of Infection  Goal: Prevent transmission of infectious organism to others  Description  Prevent the transmission of infectious organisms to other patients, staff members, and visitors. Outcome: Progressing Towards Goal     Problem: Patient Education:  Go to Education Activity  Goal: Patient/Family Education  Outcome: Progressing Towards Goal     Problem: Falls - Risk of  Goal: *Absence of Falls  Description  Document Jazzy Chávez Fall Risk and appropriate interventions in the flowsheet.   Outcome: Progressing Towards Goal  Note: Fall Risk Interventions:  Mobility Interventions: Assess mobility with egress test, Bed/chair exit alarm, Patient to call before getting OOB, PT Consult for mobility concerns, PT Consult for assist device competence, Strengthening exercises (ROM-active/passive), Utilize walker, cane, or other assistive device         Medication Interventions: Assess postural VS orthostatic hypotension, Bed/chair exit alarm, Patient to call before getting OOB, Teach patient to arise slowly    Elimination Interventions: Bed/chair exit alarm, Call light in reach, Patient to call for help with toileting needs, Stay With Me (per policy), Toilet paper/wipes in reach, Toileting schedule/hourly rounds    History of Falls Interventions: Bed/chair exit alarm, Door open when patient unattended, Assess for delayed presentation/identification of injury for 48 hrs (comment for end date), Room close to nurse's station         Problem: Patient Education: Go to Patient Education Activity  Goal: Patient/Family Education  Outcome: Progressing Towards Goal     Problem: Pain  Goal: *Control of Pain  Outcome: Progressing Towards Goal  Goal: *PALLIATIVE CARE:  Alleviation of Pain  Outcome: Progressing Towards Goal     Problem: Patient Education: Go to Patient Education Activity  Goal: Patient/Family Education  Outcome: Progressing Towards Goal     Problem: Pressure Injury - Risk of  Goal: *Prevention of pressure injury  Description  Document Elian Scale and appropriate interventions in the flowsheet. Outcome: Progressing Towards Goal  Note: Pressure Injury Interventions:  Sensory Interventions: Assess changes in LOC, Turn and reposition approx.  every two hours (pillows and wedges if needed), Discuss PT/OT consult with provider, Maintain/enhance activity level, Pressure redistribution bed/mattress (bed type)    Moisture Interventions: Absorbent underpads, Apply protective barrier, creams and emollients, Check for incontinence Q2 hours and as needed, Offer toileting Q_hr, Limit adult briefs    Activity Interventions: Increase time out of bed, Pressure redistribution bed/mattress(bed type), PT/OT evaluation    Mobility Interventions: HOB 30 degrees or less, Pressure redistribution bed/mattress (bed type), PT/OT evaluation    Nutrition Interventions: Document food/fluid/supplement intake, Offer support with meals,snacks and hydration    Friction and Shear Interventions: HOB 30 degrees or less, Lift sheet, Lift team/patient mobility team                Problem: Patient Education: Go to Patient Education Activity  Goal: Patient/Family Education  Outcome: Progressing Towards Goal     Problem: Patient Education: Go to Patient Education Activity  Goal: Patient/Family Education  Outcome: Progressing Towards Goal     Problem: Patient Education: Go to Patient Education Activity  Goal: Patient/Family Education  Outcome: Progressing Towards Goal    Diana Nicolas RN

## 2020-02-21 NOTE — ROUTINE PROCESS
0700: received bedside shift report from Bird De Jesus RN (offgoing nurse) and assumed care of the pt. Updated white board, pt resting quietly, bed in lowest position with wheels locked and call light within reach. 1200: helped pt up to the bathroom, she had a coughing fit, and spit up some of her lunch. Pt did have a bowel movement, was able to ambulate with a walker on her own, with a standby assist, was dyspneic on exertion. 1600: all current needs assessed, family visiting at the bedside, bed in lowest position with wheels locked and call light within reach. 1900: Shift summary, shift uneventful, no complaints of chest pain or shortness of breath.   
 
Vielka Jones RN

## 2020-02-21 NOTE — PROGRESS NOTES
Problem: Mobility Impaired (Adult and Pediatric)  Goal: *Acute Goals and Plan of Care (Insert Text)  Description  In 1-7 days pt will be able to perform:  ST. Supine to sit to supine S with HR for meals. 2.  Sit to stand to sit S with RW in prep for ambulation. LT.  Gait:  Ambulate >50ft S with RW for home/community mobility. 2.  Activity tolerance: Tolerate up in chair 1-2 hours for ADLs. 3.  Patient/Family Education:  Patient/family to be independent with HEP for follow-up care and safe discharge. Outcome: Progressing Towards Goal     PHYSICAL THERAPY TREATMENT    Patient: Loc Milton (53 y.o. female)  Date: 2020  Diagnosis: Hyperkalemia [E87.5]   Hyperkalemia       Precautions: Fall   Chart, physical therapy assessment, plan of care and goals were reviewed. ASSESSMENT:  Pt is able to demonstrate mobility needed for home D/c, but she is very concerned about SOB and edema increase. SPO2 range of 93-97% on RA. RN informed of session outcome and concern. Will continue to address endurance deficit. Progression toward goals:  []      Improving appropriately and progressing toward goals  [x]      Improving slowly and progressing toward goals  []      Not making progress toward goals and plan of care will be adjusted     PLAN:  Patient continues to benefit from skilled intervention to address the above impairments. Continue treatment per established plan of care. Discharge Recommendations:  Home Health  Further Equipment Recommendations for Discharge:  rolling walker     SUBJECTIVE:   Patient stated  Every time I take breath, I can't let it all out. And when I take the next breath, it feels like it sits on top of the first one .     OBJECTIVE DATA SUMMARY:   Critical Behavior:  Neurologic State: Alert, Appropriate for age  Orientation Level: Oriented X4  Cognition: Appropriate decision making, Appropriate for age attention/concentration, Appropriate safety awareness, Follows commands, Recognition of people/places  Safety/Judgement: Awareness of environment, Fall prevention  Functional Mobility Training:  Bed Mobility:  Rolling: Supervision  Supine to Sit: Supervision  Sit to Supine: Supervision  Scooting: Supervision  Transfers:  Sit to Stand: Supervision  Stand to Sit: Supervision  Balance:  Sitting: Intact  Sitting - Static: Good (unsupported)  Standing: Intact; With support  Ambulation/Gait Training:  Distance (ft): 150 Feet (ft)(75+75)  Assistive Device: Gait belt;Walker, rolling  Ambulation - Level of Assistance: Supervision;Stand-by assistance  Gait Abnormalities: Decreased step clearance  Base of Support: Widened  Speed/Arlette: Slow  Step Length: Left shortened;Right shortened  Interventions: Safety awareness training;Verbal cues  Therapeutic Exercises:       EXERCISE   Sets   Reps   Active Active Assist   Passive Self ROM   Comments   Ankle Pumps 1 20 [x] [] [] []    Quad Sets/Glut Sets   [] [] [] []    Hamstring Sets   [] [] [] []    Short Arc Quads   [] [] [] []    Heel Slides   [] [] [] []    Straight Leg Raises   [] [] [] []    Hip Abd/Add 1 15 [x] [] [] []    Long Arc Quads 1 15 [x] [] [] []    Seated Marching 1 10 [x] [] [] []    Standing Marching   [] [] [] []       [] [] [] []        Pain:  Pain Scale 1: Numeric (0 - 10)  Pain Intensity 1: 0  Pain Out: 0   Activity Tolerance:   Good  Please refer to the flowsheet for vital signs taken during this treatment.   After treatment:   [x] Patient left in no apparent distress sitting EOB  [] Patient left in no apparent distress in bed  [x] Call bell left within reach  [x] Nursing notified  [] Caregiver present  [] Bed alarm activated      Valeri Vines PTA   Time Calculation: 39 mins

## 2020-02-21 NOTE — PROGRESS NOTES
Nephrology Inpatient Progress note    Subjective: Dora Barboza is a 53 y.o. female with PMHx of HTN, uncontrolled DM, CKD, D-CMP and Anemia who presented with bouts of diarrhea and nausea. She also had complaints of SOB and CP. On initial eval in the ED her HR was in the 50's otherwise stable VS. Labs showed K of 6.0 and Cr 2.69. Home med included Losartan. Pt  Is followed by Dr Brandee Marion for her CKD and recent baseline Cr in Dec was ~1.6. VQ scan, CxR, CT lower chest/abd/pelvis, and LE doppler US were all unremarkable. Echo showed EF 32%, mild diastolic dysfunction and mild- to mod Pulm HTN. Pt was treated medically for high K and received a dose of Lasix. Hyperkalemia corrected but Cr slightly up to 2.8. No diarrhea, N/V.      -Pt transferred to ICU due to acute hypercapnic hypoxic respiratory failure, improved and now back on monitored floor      C.o. edema today. SOB is better.     Admit Date: 2/12/2020  Principal Problem:    Hyperkalemia (2/12/2020)    Active Problems:    HTN (hypertension) (8/16/2014)      Type II or unspecified type diabetes mellitus with renal manifestations, uncontrolled(250.42) ([facility] [address] (9/4/2014)      Morbid obesity (HCC) ()      АНДРЕЙ (obstructive sleep apnea) (12/11/2019)      Chest pain (2/13/2020)      Acute renal failure superimposed on stage 3 chronic kidney disease (HCC) (2/13/2020)      Acute respiratory failure with hypoxia (HCC) (1/47/4000)      Diastolic CHF, acute on chronic (HCC) (2/16/2020)      Current Facility-Administered Medications   Medication Dose Route Frequency    metOLazone (ZAROXOLYN) tablet 5 mg  5 mg Oral DAILY    furosemide (LASIX) injection 80 mg  80 mg IntraVENous BID    ondansetron (ZOFRAN) injection 4 mg  4 mg IntraVENous Q8H PRN    insulin lispro (HUMALOG) injection 5 Units  5 Units SubCUTAneous TIDAC    carvediloL (COREG) tablet 3.125 mg  3.125 mg Oral BID WITH MEALS    hydrALAZINE (APRESOLINE) 20 mg/mL injection 10 mg  10 mg IntraVENous Q6H PRN    magnesium hydroxide (MILK OF MAGNESIA) 400 mg/5 mL oral suspension 30 mL  30 mL Oral DAILY PRN    docusate sodium (COLACE) capsule 100 mg  100 mg Oral BID    polyethylene glycol (MIRALAX) packet 17 g  17 g Oral BID    gabapentin (NEURONTIN) capsule 300 mg  300 mg Oral TID    amLODIPine (NORVASC) tablet 10 mg  10 mg Oral DAILY    hydrALAZINE (APRESOLINE) tablet 100 mg  100 mg Oral TID    atorvastatin (LIPITOR) tablet 40 mg  40 mg Oral DAILY    albuterol-ipratropium (DUO-NEB) 2.5 MG-0.5 MG/3 ML  3 mL Nebulization Q4H PRN    aspirin chewable tablet 81 mg  81 mg Oral DAILY    DULoxetine (CYMBALTA) capsule 60 mg  60 mg Oral DAILY    neomycin-bacitracin-polymyxin (NEOSPORIN) ointment   Topical TID    heparin (porcine) injection 5,000 Units  5,000 Units SubCUTAneous Q8H    insulin glargine (LANTUS) injection 15 Units  15 Units SubCUTAneous QHS    insulin lispro (HUMALOG) injection   SubCUTAneous AC&HS    dextrose 10% infusion 125-250 mL  125-250 mL IntraVENous PRN    calcium acetate(phosphat bind) (PHOSLO) capsule 667 mg  1 Cap Oral TID WITH MEALS    sodium chloride (NS) flush 5-40 mL  5-40 mL IntraVENous Q8H    sodium chloride (NS) flush 5-40 mL  5-40 mL IntraVENous PRN    acetaminophen (TYLENOL) tablet 650 mg  650 mg Oral Q4H PRN    oxyCODONE-acetaminophen (PERCOCET) 5-325 mg per tablet 1 Tab  1 Tab Oral Q4H PRN         Allergy:   Allergies   Allergen Reactions    Bees [Sting, Bee] Swelling    Penicillins Hives    Strawberry Hives        Objective:     Visit Vitals  /64   Pulse 73   Temp 97.7 °F (36.5 °C)   Resp 18   Ht 5' 2\" (1.575 m)   Wt 152.2 kg (335 lb 8 oz)   SpO2 95%   Breastfeeding No   BMI 61.36 kg/m²         Intake/Output Summary (Last 24 hours) at 2/21/2020 0908  Last data filed at 2/21/2020 0654  Gross per 24 hour   Intake 1280 ml   Output 850 ml   Net 430 ml       Physical Exam:       General: Obese, No acute distress   HENT: Atraumatic and normocephalic   Eyes: Normal conjunctiva Neck: No JVD   Cardiovascular: Normal S1 & S2, no m/r/g   Pulmonary/Chest Wall: Clear to auscultation bilaterally   Abdominal: Soft and non-tender   Musculoskeletal: +++ edema LE bilaterally   Neurological: No focal deficits     Data Review:  Lab Results   Component Value Date/Time    Sodium 132 (L) 02/21/2020 12:36 AM    Potassium 4.7 02/21/2020 12:36 AM    Chloride 100 02/21/2020 12:36 AM    CO2 23 02/21/2020 12:36 AM    Anion gap 9 02/21/2020 12:36 AM    Glucose 137 (H) 02/21/2020 12:36 AM    BUN 84 (H) 02/21/2020 12:36 AM    Creatinine 3.19 (H) 02/21/2020 12:36 AM    BUN/Creatinine ratio 26 (H) 02/21/2020 12:36 AM    GFR est AA 18 (L) 02/21/2020 12:36 AM    GFR est non-AA 15 (L) 02/21/2020 12:36 AM    Calcium 8.6 02/21/2020 12:36 AM     Lab Results   Component Value Date/Time    WBC 6.1 02/21/2020 12:36 AM    HGB 8.5 (L) 02/21/2020 12:36 AM    HCT 27.8 (L) 02/21/2020 12:36 AM    PLATELET 621 59/22/8692 12:36 AM    MCV 80.3 02/21/2020 12:36 AM     Lab Results   Component Value Date/Time    Calcium 8.6 02/21/2020 12:36 AM    Phosphorus 5.3 (H) 02/21/2020 12:36 AM     Lab Results   Component Value Date/Time    Iron 26 (L) 02/14/2020 02:22 AM    TIBC 322 02/14/2020 02:22 AM    Iron % saturation 8 (L) 02/14/2020 02:22 AM    Ferritin 77 02/14/2020 02:22 AM     Lab Results   Component Value Date/Time    Ferritin 77 02/14/2020 02:22 AM         Impression:     -Acute on CKD stage 3. Improving, Cr at 3.1 today. Baseline Cr of 1.6 in Dec.   -Hyperkalemia, resolved on medical management   -HTN, controlled  -Proteinuria likely sec to DMN  -Hyperphosphatemia,  on Phoslo tid  -DM, uncontrolled. HbA1C 9.5%  -Anemia, iron def s/p Venofer  -D-CMP  -Mod Pulm HTN w/ Rt side CHF   -Diarrhea: resolving  -АНДРЕЙ  -Morbidly Obese  -SHPT  -Acute hypercapnic hypoxic respiratory failure, improving        Plan:     -Cont lasix IV, and po Metolazone for Synergy.   Give an extra lasix today.  -Continue to hold Losartan for now  -Renal diet  -Monitor I/O's  -Monitor Serum chemistry  -Resume office f/u with Dr Mina Vazquez after d/c for CKD care        Renny Benson MD   [facility]  [phone]

## 2020-02-21 NOTE — ROUTINE PROCESS
Pt had an uneventful night. Bedside and Verbal shift change report given to  DONAVAN Montgomery RN (oncoming nurse) by Tre Hamilton RN (offgoing nurse). Report included the following information SBAR, Kardex, MAR, Accordion and Recent Results.

## 2020-02-21 NOTE — ACP (ADVANCE CARE PLANNING)
Hospitalist Progress Note    Patient: Carmen Way MRN: [id number]  CSN: [id number]    YOB: 1965  Age: 53 y.o. Sex: female    DOA: 2/12/2020 LOS:  LOS: 9 days                Assessment/Plan     Patient Active Problem List   Diagnosis Code    HTN (hypertension) I10    Type II or unspecified type diabetes mellitus with renal manifestations, uncontrolled(250.42) ([facility] [address] E11.29, E11.65    Morbid obesity ([facility] [address] E66.01    Respiratory distress R06.03    Asthma exacerbation J45. 277    Diastolic CHF ([facility]) S61.75    АНДРЕЙ (obstructive sleep apnea) G47.33    UTI (urinary tract infection) N39.0    Hyperkalemia E87.5    Chest pain R07.9    Acute renal failure superimposed on stage 3 chronic kidney disease (HCC) N17.9, N18.3    Acute respiratory failure with hypoxia ([facility]) G81.83    Diastolic CHF, acute on chronic ([facility]) I50.33            Patient was admitted due to acute renal failure on chronic, hyperkalemia, chest pain/diarrhea. Losartan was hold due to hyperkalemia and lasix was  hold on admission. Lasix was  restarted after renal function was better,  however patient presented shortness of breath with hypoxia was transferred to ICU which needed BiPAP. She was transferred back to the floor. Lasix iv is  continued  because of CHF exacerbation and hypoxia. Cardiology has been  on board,  recent stress test which is negative. No diarrhea since admission, treating constipation now.         Acute respiratory failure with hypoxia   Resolving,will wean off nc O2   Continue lasix, dose decreased to once daily        Acute on chronic chf ,diastolic  Continue fluid restriction, low sat , lasix  Cardiologist on board          acute on chronic renal failure 3  Cr 2.99  continue monitoring renal function and electrolytes      Chest Pain   No pain now.  ce wnl   recent stress test in December in 2019  Case discussed with Iris oconnell cardiac   V/q scan no PE      constipation   Continue  mirlax , colace,          DM    Lantus and sliding scale hold metformin      Sleep Apnea   cpap at night-now bipap        Pulmonary HTN   VQ scan obtained in the emergency room low probability PE   Negative for dvt      htn  Continue   norvasc and hydralyzine, continue hold metoprolol due to jono      Foot injury   No acute issue from x-ray        Disposition : 1-2 days    Review of systems  General: No fevers or chills. Cardiovascular: No chest pain or pressure. No palpitations. Pulmonary: No shortness of breath. Gastrointestinal: No nausea, vomiting. Physical Exam:  General: Awake, cooperative, no acute distress    HEENT: NC, Atraumatic. PERRLA, anicteric sclerae. Lungs: CTA Bilaterally. No Wheezing/Rhonchi/Rales. Heart:  S1 S2,  No murmur, No Rubs, No Gallops  Abdomen: Soft, Non distended, Non tender.  +Bowel sounds,   Extremities: Edema bilaterally  Psych:   Not anxious or agitated. Neurologic:  No acute neurological deficit. Vital signs/Intake and Output:  Visit Vitals  /69   Pulse 69   Temp 97.4 °F (36.3 °C)   Resp 18   Ht 5' 2\" (1.575 m)   Wt 152.2 kg (335 lb 8 oz)   SpO2 96%   Breastfeeding No   BMI 61.36 kg/m²     Current Shift:  No intake/output data recorded.   Last three shifts:  02/19 1901 - 02/21 0700  In: 1280 [P.O.:1280]  Out: 850 [Urine:850]            Labs: Results:       Chemistry Recent Labs     02/21/20 0036 02/20/20  0255 02/19/20  0335   * 154* 184*   * 134* 137   K 4.7 4.9 4.8    101 105   CO2 23 26 24   BUN 84* 77* 70*   CREA 3.19* 2.99* 2.59*   CA 8.6 8.9 8.7   AGAP 9 7 8   BUCR 26* 26* 27*   * 133* 116   TP 6.2* 6.6 6.1*   ALB 3.0* 3.2* 3.0*   GLOB 3.2 3.4 3.1   AGRAT 0.9 0.9 1.0      CBC w/Diff Recent Labs     02/21/20  0036 02/20/20  0255 02/19/20  0335   WBC 6.1 7.7 6.5   RBC 3.46* 3.44* 3.33*   HGB 8.5* 8.5* 8.2*   HCT 27.8* 28.1* 27.0*    287 259   GRANS 55 57 59   LYMPH 25 22 23   EOS 9* 8* 7*      Cardiac Enzymes No results for input(s): CPK, CKND1, MARIA ISABEL in the last 72 hours. No lab exists for component: CKRMB, TROIP   Coagulation No results for input(s): PTP, INR, APTT, INREXT in the last 72 hours. Lipid Panel Lab Results   Component Value Date/Time    Cholesterol, total 146 05/28/2015 02:58 AM    HDL Cholesterol 64 (H) 05/28/2015 02:58 AM    LDL, calculated 66 05/28/2015 02:58 AM    VLDL, calculated 16 05/28/2015 02:58 AM    Triglyceride 80 05/28/2015 02:58 AM    CHOL/HDL Ratio 2.3 05/28/2015 02:58 AM      BNP No results for input(s): BNPP in the last 72 hours.    Liver Enzymes Recent Labs     02/21/20  0036   TP 6.2*   ALB 3.0*   *   SGOT 24      Thyroid Studies Lab Results   Component Value Date/Time    TSH 0.56 01/23/2019 01:33 AM        Procedures/imaging: see electronic medical records for all procedures/Xrays and details which were not copied into this note but were reviewed prior to creation of Plan

## 2020-02-22 LAB
ALBUMIN SERPL-MCNC: 3.1 G/DL (ref 3.4–5)
ANION GAP SERPL CALC-SCNC: 11 MMOL/L (ref 3–18)
BUN SERPL-MCNC: 86 MG/DL (ref 7–18)
BUN/CREAT SERPL: 28 (ref 12–20)
CALCIUM SERPL-MCNC: 8.6 MG/DL (ref 8.5–10.1)
CHLORIDE SERPL-SCNC: 97 MMOL/L (ref 100–111)
CO2 SERPL-SCNC: 26 MMOL/L (ref 21–32)
CREAT SERPL-MCNC: 3.02 MG/DL (ref 0.6–1.3)
GLUCOSE BLD STRIP.AUTO-MCNC: 158 MG/DL (ref 70–110)
GLUCOSE BLD STRIP.AUTO-MCNC: 220 MG/DL (ref 70–110)
GLUCOSE BLD STRIP.AUTO-MCNC: 291 MG/DL (ref 70–110)
GLUCOSE BLD STRIP.AUTO-MCNC: 293 MG/DL (ref 70–110)
GLUCOSE SERPL-MCNC: 158 MG/DL (ref 74–99)
MAGNESIUM SERPL-MCNC: 2.1 MG/DL (ref 1.6–2.6)
PHOSPHATE SERPL-MCNC: 5 MG/DL (ref 2.5–4.9)
POTASSIUM SERPL-SCNC: 4.3 MMOL/L (ref 3.5–5.5)
SODIUM SERPL-SCNC: 134 MMOL/L (ref 136–145)

## 2020-02-22 PROCEDURE — 83735 ASSAY OF MAGNESIUM: CPT

## 2020-02-22 PROCEDURE — 80069 RENAL FUNCTION PANEL: CPT

## 2020-02-22 PROCEDURE — 65660000000 HC RM CCU STEPDOWN

## 2020-02-22 PROCEDURE — 74011250637 HC RX REV CODE- 250/637: Performed by: INTERNAL MEDICINE

## 2020-02-22 PROCEDURE — 74011636637 HC RX REV CODE- 636/637: Performed by: HOSPITALIST

## 2020-02-22 PROCEDURE — 74011250636 HC RX REV CODE- 250/636: Performed by: INTERNAL MEDICINE

## 2020-02-22 PROCEDURE — 74011250636 HC RX REV CODE- 250/636: Performed by: HOSPITALIST

## 2020-02-22 PROCEDURE — 74011250637 HC RX REV CODE- 250/637: Performed by: HOSPITALIST

## 2020-02-22 PROCEDURE — 36415 COLL VENOUS BLD VENIPUNCTURE: CPT

## 2020-02-22 PROCEDURE — 82962 GLUCOSE BLOOD TEST: CPT

## 2020-02-22 PROCEDURE — 74011636637 HC RX REV CODE- 636/637: Performed by: INTERNAL MEDICINE

## 2020-02-22 RX ORDER — FUROSEMIDE 10 MG/ML
80 INJECTION INTRAMUSCULAR; INTRAVENOUS 2 TIMES DAILY
Status: DISCONTINUED | OUTPATIENT
Start: 2020-02-22 | End: 2020-02-22

## 2020-02-22 RX ORDER — FUROSEMIDE 10 MG/ML
80 INJECTION INTRAMUSCULAR; INTRAVENOUS 2 TIMES DAILY
Status: DISCONTINUED | OUTPATIENT
Start: 2020-02-22 | End: 2020-02-23

## 2020-02-22 RX ORDER — METOLAZONE 5 MG/1
5 TABLET ORAL 2 TIMES DAILY
Status: DISCONTINUED | OUTPATIENT
Start: 2020-02-22 | End: 2020-02-24 | Stop reason: HOSPADM

## 2020-02-22 RX ADMIN — CARVEDILOL 3.12 MG: 3.12 TABLET, FILM COATED ORAL at 19:00

## 2020-02-22 RX ADMIN — METOLAZONE 5 MG: 5 TABLET ORAL at 19:00

## 2020-02-22 RX ADMIN — ASPIRIN 81 MG 81 MG: 81 TABLET ORAL at 09:23

## 2020-02-22 RX ADMIN — POLYMYXIN B SULFATE, BACITRACIN ZINC, NEOMYCIN SULFATE: 5000; 3.5; 4 OINTMENT TOPICAL at 09:22

## 2020-02-22 RX ADMIN — HYDRALAZINE HYDROCHLORIDE 100 MG: 50 TABLET, FILM COATED ORAL at 09:23

## 2020-02-22 RX ADMIN — OXYCODONE HYDROCHLORIDE AND ACETAMINOPHEN 1 TABLET: 5; 325 TABLET ORAL at 14:28

## 2020-02-22 RX ADMIN — Medication 10 ML: at 09:24

## 2020-02-22 RX ADMIN — CALCIUM ACETATE 667 MG: 667 CAPSULE ORAL at 09:22

## 2020-02-22 RX ADMIN — INSULIN LISPRO 5 UNITS: 100 INJECTION, SOLUTION INTRAVENOUS; SUBCUTANEOUS at 19:01

## 2020-02-22 RX ADMIN — CALCIUM ACETATE 667 MG: 667 CAPSULE ORAL at 12:32

## 2020-02-22 RX ADMIN — FUROSEMIDE 80 MG: 10 INJECTION, SOLUTION INTRAMUSCULAR; INTRAVENOUS at 19:00

## 2020-02-22 RX ADMIN — INSULIN LISPRO 6 UNITS: 100 INJECTION, SOLUTION INTRAVENOUS; SUBCUTANEOUS at 19:01

## 2020-02-22 RX ADMIN — GABAPENTIN 300 MG: 300 CAPSULE ORAL at 22:42

## 2020-02-22 RX ADMIN — HEPARIN SODIUM 5000 UNITS: 5000 INJECTION INTRAVENOUS; SUBCUTANEOUS at 22:42

## 2020-02-22 RX ADMIN — INSULIN LISPRO 5 UNITS: 100 INJECTION, SOLUTION INTRAVENOUS; SUBCUTANEOUS at 09:21

## 2020-02-22 RX ADMIN — INSULIN LISPRO 5 UNITS: 100 INJECTION, SOLUTION INTRAVENOUS; SUBCUTANEOUS at 12:32

## 2020-02-22 RX ADMIN — GABAPENTIN 300 MG: 300 CAPSULE ORAL at 09:23

## 2020-02-22 RX ADMIN — HEPARIN SODIUM 5000 UNITS: 5000 INJECTION INTRAVENOUS; SUBCUTANEOUS at 06:40

## 2020-02-22 RX ADMIN — INSULIN GLARGINE 15 UNITS: 100 INJECTION, SOLUTION SUBCUTANEOUS at 21:29

## 2020-02-22 RX ADMIN — POLYMYXIN B SULFATE, BACITRACIN ZINC, NEOMYCIN SULFATE: 5000; 3.5; 4 OINTMENT TOPICAL at 19:01

## 2020-02-22 RX ADMIN — INSULIN LISPRO 4 UNITS: 100 INJECTION, SOLUTION INTRAVENOUS; SUBCUTANEOUS at 12:32

## 2020-02-22 RX ADMIN — CARVEDILOL 3.12 MG: 3.12 TABLET, FILM COATED ORAL at 09:23

## 2020-02-22 RX ADMIN — METOLAZONE 5 MG: 5 TABLET ORAL at 09:23

## 2020-02-22 RX ADMIN — DULOXETINE HYDROCHLORIDE 60 MG: 60 CAPSULE, DELAYED RELEASE ORAL at 09:23

## 2020-02-22 RX ADMIN — ATORVASTATIN CALCIUM 40 MG: 20 TABLET, FILM COATED ORAL at 09:22

## 2020-02-22 RX ADMIN — INSULIN LISPRO 2 UNITS: 100 INJECTION, SOLUTION INTRAVENOUS; SUBCUTANEOUS at 06:41

## 2020-02-22 RX ADMIN — Medication 10 ML: at 22:44

## 2020-02-22 RX ADMIN — DOCUSATE SODIUM 100 MG: 100 CAPSULE, LIQUID FILLED ORAL at 09:23

## 2020-02-22 RX ADMIN — DOCUSATE SODIUM 100 MG: 100 CAPSULE, LIQUID FILLED ORAL at 22:42

## 2020-02-22 RX ADMIN — HYDRALAZINE HYDROCHLORIDE 100 MG: 50 TABLET, FILM COATED ORAL at 19:00

## 2020-02-22 RX ADMIN — FUROSEMIDE 80 MG: 10 INJECTION, SOLUTION INTRAMUSCULAR; INTRAVENOUS at 09:21

## 2020-02-22 RX ADMIN — INSULIN LISPRO 6 UNITS: 100 INJECTION, SOLUTION INTRAVENOUS; SUBCUTANEOUS at 21:30

## 2020-02-22 RX ADMIN — AMLODIPINE BESYLATE 10 MG: 5 TABLET ORAL at 09:22

## 2020-02-22 RX ADMIN — GABAPENTIN 300 MG: 300 CAPSULE ORAL at 19:00

## 2020-02-22 RX ADMIN — POLYETHYLENE GLYCOL 3350 17 G: 17 POWDER, FOR SOLUTION ORAL at 09:22

## 2020-02-22 RX ADMIN — POLYMYXIN B SULFATE, BACITRACIN ZINC, NEOMYCIN SULFATE: 5000; 3.5; 4 OINTMENT TOPICAL at 22:00

## 2020-02-22 RX ADMIN — Medication 10 ML: at 12:33

## 2020-02-22 RX ADMIN — HEPARIN SODIUM 5000 UNITS: 5000 INJECTION INTRAVENOUS; SUBCUTANEOUS at 12:32

## 2020-02-22 RX ADMIN — CALCIUM ACETATE 667 MG: 667 CAPSULE ORAL at 19:00

## 2020-02-22 RX ADMIN — HYDRALAZINE HYDROCHLORIDE 100 MG: 50 TABLET, FILM COATED ORAL at 22:42

## 2020-02-22 NOTE — PROGRESS NOTES
Problem: Mobility Impaired (Adult and Pediatric)  Goal: *Acute Goals and Plan of Care (Insert Text)  Description  In 1-7 days pt will be able to perform:  ST. Supine to sit to supine S with HR for meals. 2.  Sit to stand to sit S with RW in prep for ambulation. LT.  Gait:  Ambulate >50ft S with RW for home/community mobility. 2.  Activity tolerance: Tolerate up in chair 1-2 hours for ADLs. 3.  Patient/Family Education:  Patient/family to be independent with HEP for follow-up care and safe discharge. Outcome: Not Progressing Towards Goal     Pt refused PT due to:  [x]  Emotional post communication with hospitalist    []  Eating  []  Pain  []  Pt lethargic  []  Off Unit  Will f/u tomorrow. Thank you.   Rocío Gillespie, PTA

## 2020-02-22 NOTE — PROGRESS NOTES
Problem: Falls - Risk of  Goal: *Absence of Falls  Description  Document Trevor Valdezner Fall Risk and appropriate interventions in the flowsheet.   Outcome: Progressing Towards Goal  Note: Fall Risk Interventions:  Mobility Interventions: Assess mobility with egress test, Communicate number of staff needed for ambulation/transfer, Patient to call before getting OOB, PT Consult for mobility concerns, PT Consult for assist device competence, Strengthening exercises (ROM-active/passive), Utilize walker, cane, or other assistive device         Medication Interventions: Evaluate medications/consider consulting pharmacy, Teach patient to arise slowly, Patient to call before getting OOB    Elimination Interventions: Call light in reach, Patient to call for help with toileting needs, Toileting schedule/hourly rounds    History of Falls Interventions: Evaluate medications/consider consulting pharmacy         Problem: Patient Education: Go to Patient Education Activity  Goal: Patient/Family Education  Outcome: Progressing Towards Goal     Problem: Pain  Goal: *Control of Pain  Outcome: Progressing Towards Goal  Goal: *PALLIATIVE CARE:  Alleviation of Pain  Outcome: Progressing Towards Goal

## 2020-02-22 NOTE — PROGRESS NOTES
In patient nephrology progress note    Admit Date:    2020  Name:  Kaye Goodman  Age :  53 y.o. Impression:   1. Acute on CKD stage 3. Renal function Improving slowly. Cr  3.0 today. Baseline Cr of 1.6 in Dec.   2. Anasarca /fluid over load   3. DMN . Proteinuria   4. Hyponatremia , mild   5. Hyperphosphatemia,  on Phoslo tid  6. Pulm HTN w/ Rt side CHF   7. АНДРЕЙ, CPAP      Recommendations:   Increase IV Lasix and Metolazone bid   Fluid restriction of 1500 ml a day   Renal panel 0400    Low salt diet      Subjective: Kaye Goodman is a 53 y.o. female with PMHx of HTN, uncontrolled DM, CKD, D-CMP and Anemia who presented with bouts of diarrhea and nausea. She also had complaints of SOB and CP. On initial eval in the ED her HR was in the 50's otherwise stable VS. Labs showed K of 6.0 and Cr 2.69. Home med included Losartan. Pt  Is followed by Dr Jennifer Pinedo for her CKD and recent baseline Cr in Dec was ~1.6. VQ scan, CxR, CT lower chest/abd/pelvis, and LE doppler US were all unremarkable. Echo showed EF 18%, mild diastolic dysfunction and mild- to mod Pulm HTN. Pt was treated medically for high K and received a dose of Lasix. Hyperkalemia corrected but Cr slightly up to 2.8. No diarrhea, N/V.    Patient reports HORAN with minimal activity     Objective:   Temp (24hrs), Av.6 °F (36.4 °C), Min:97.3 °F (36.3 °C), Max:97.8 °F (36.6 °C)      Intake/Output Summary (Last 24 hours) at 2020 1745  Last data filed at 2020 1345  Gross per 24 hour   Intake 840 ml   Output 2550 ml   Net -1710 ml     Last 3 Recorded Weights in this Encounter    20 0403 20 0349 20 0357   Weight: 148.8 kg (328 lb 1.6 oz) 152.2 kg (335 lb 8 oz) 150 kg (330 lb 9.6 oz)       Physical Exam:     General Appearance: pleasant white obese female in no acute respiratory distress  Head: atraumatic  HEENT: no jaundice, moist oral mucosa  Neck: no JVD noted  Chest: LS clear to auscultation bilaterally  Heart: S1/S2, no murmur, gallop or rub, RRR  Adbomen: obese, non tended   : no flank tenderness, no terrell catheter  Skin: intact, no rash  Extremity: 3+ edema extended to thigh lower abdomin   MS: No joint deformity or tenderness  Neuro: conscious, alert, oriented x 3, no focal deficit    Data Review:    BMP  Lab Results   Component Value Date/Time    Sodium 134 (L) 02/22/2020 02:40 AM    Potassium 4.3 02/22/2020 02:40 AM    Chloride 97 (L) 02/22/2020 02:40 AM    CO2 26 02/22/2020 02:40 AM    Anion gap 11 02/22/2020 02:40 AM    Glucose 158 (H) 02/22/2020 02:40 AM    BUN 86 (H) 02/22/2020 02:40 AM    Creatinine 3.02 (H) 02/22/2020 02:40 AM    BUN/Creatinine ratio 28 (H) 02/22/2020 02:40 AM    GFR est AA 19 (L) 02/22/2020 02:40 AM    GFR est non-AA 16 (L) 02/22/2020 02:40 AM    Calcium 8.6 02/22/2020 02:40 AM       CBC  Lab Results   Component Value Date/Time    WBC 6.1 02/21/2020 12:36 AM    Hemoglobin, POC 12.6 08/10/2014 11:05 PM    HGB 8.5 (L) 02/21/2020 12:36 AM    Hematocrit, POC 37 08/10/2014 11:05 PM    HCT 27.8 (L) 02/21/2020 12:36 AM    PLATELET 404 95/94/3335 12:36 AM    MCV 80.3 02/21/2020 12:36 AM       No components found for: PTHINT  Lab Results   Component Value Date    IRON 26 (L) 02/14/2020       Dearl MD Alysia  [facility] [phone]  Pager [phone]

## 2020-02-22 NOTE — PROGRESS NOTES
Cardiology Progress Note        Patient: Marcela Trevizo        Sex: female          DOA: 2/12/2020  YOB: 1965      Age:  53 y.o.        LOS:  LOS: 10 days    Patient seen and examined, chart reviewed. Assessment/Plan     Patient Active Problem List   Diagnosis Code    HTN (hypertension) I10    Type II or unspecified type diabetes mellitus with renal manifestations, uncontrolled(250.42) ([facility] [address] E11.29, E11.65    Morbid obesity ([facility] [address] E66.01    Respiratory distress R06.03    Asthma exacerbation J45. 861    Diastolic CHF (HCC) K27.71    АНДРЕЙ (obstructive sleep apnea) G47.33    UTI (urinary tract infection) N39.0    Hyperkalemia E87.5         Acute renal failure superimposed on stage 3 chronic kidney disease (HCC) N17.9, N18.3      Acute hypoxic respiratory failure from volume overload  As per chart it appears she gained 37 pounds from 12 February to 16 February. Patient was transferred to ICU and she was on BiPAP for hypoxic respiratory failure    She is in 2550 negative balance in last 24 hours    Plan:    Continue IV Lasix to 80 mg once a day  Continue Metolazone  Strict input output  Salt restriction up to 2 g per day and fluid restriction up to 1.5 L per day  Continue carvedilol and amlodipine  Continue management as per hospital medicine  Monitor renal function and electrolytes. Subjective:    cc:  Denies any chest pain , complaining of shortness of breath on exertion, denies any orthopnea or PND, complaining of leg swelling      REVIEW OF SYSTEMS:     General: No fevers or chills.   Cardiovascular: No chest pain,No palpitations, No orthopnea, No PND, positive leg swelling, No claudication, positive shortness of breath on exertion  Pulmonary: No dyspnea   Gastrointestinal: No nausea, vomiting, bleeding  Neurology: No Dizziness    Objective:      Visit Vitals  /64   Pulse 76   Temp 97.4 °F (36.3 °C)   Resp 17   Ht 5' 2\" (1.575 m)   Wt 150 kg (330 lb 9.6 oz)   SpO2 97%   Breastfeeding No   BMI 60.47 kg/m²     Body mass index is 60.47 kg/m². Physical Exam:  General Appearance: Comfortable, not using accessory muscles of respiration. HEENT: STEPHANIE. HEAD: Atraumatic  NECK: No JVD, no thyroidomeglay. CAROTIDS: No bruit   LUNGS: Clear bilaterally. HEART: S1+S2 audible, no murmur, no pericardial rub. ABD: Non-tender, BS Audible    EXT: +++ lower extremity edema, and no cyanosis. VASCULAR EXAM: Pulses are intact. PSYCHIATRIC EXAM: Mood is appropriate. MUSCULOSKELETAL: Grossly no joint deformity.   NEUROLOGICAL: AAO times 3, No motor and sensory deficit  Medication:  Current Facility-Administered Medications   Medication Dose Route Frequency    furosemide (LASIX) injection 80 mg  80 mg IntraVENous DAILY    metOLazone (ZAROXOLYN) tablet 5 mg  5 mg Oral DAILY    ondansetron (ZOFRAN) injection 4 mg  4 mg IntraVENous Q8H PRN    insulin lispro (HUMALOG) injection 5 Units  5 Units SubCUTAneous TIDAC    carvediloL (COREG) tablet 3.125 mg  3.125 mg Oral BID WITH MEALS    hydrALAZINE (APRESOLINE) 20 mg/mL injection 10 mg  10 mg IntraVENous Q6H PRN    magnesium hydroxide (MILK OF MAGNESIA) 400 mg/5 mL oral suspension 30 mL  30 mL Oral DAILY PRN    docusate sodium (COLACE) capsule 100 mg  100 mg Oral BID    polyethylene glycol (MIRALAX) packet 17 g  17 g Oral BID    gabapentin (NEURONTIN) capsule 300 mg  300 mg Oral TID    amLODIPine (NORVASC) tablet 10 mg  10 mg Oral DAILY    hydrALAZINE (APRESOLINE) tablet 100 mg  100 mg Oral TID    atorvastatin (LIPITOR) tablet 40 mg  40 mg Oral DAILY    albuterol-ipratropium (DUO-NEB) 2.5 MG-0.5 MG/3 ML  3 mL Nebulization Q4H PRN    aspirin chewable tablet 81 mg  81 mg Oral DAILY    DULoxetine (CYMBALTA) capsule 60 mg  60 mg Oral DAILY    neomycin-bacitracin-polymyxin (NEOSPORIN) ointment   Topical TID    heparin (porcine) injection 5,000 Units  5,000 Units SubCUTAneous Q8H    insulin glargine (LANTUS) injection 15 Units  15 Units SubCUTAneous QHS    insulin lispro (HUMALOG) injection   SubCUTAneous AC&HS    dextrose 10% infusion 125-250 mL  125-250 mL IntraVENous PRN    calcium acetate(phosphat bind) (PHOSLO) capsule 667 mg  1 Cap Oral TID WITH MEALS    sodium chloride (NS) flush 5-40 mL  5-40 mL IntraVENous Q8H    sodium chloride (NS) flush 5-40 mL  5-40 mL IntraVENous PRN    acetaminophen (TYLENOL) tablet 650 mg  650 mg Oral Q4H PRN    oxyCODONE-acetaminophen (PERCOCET) 5-325 mg per tablet 1 Tab  1 Tab Oral Q4H PRN               Lab/Data Reviewed:       Recent Labs     02/21/20  0036 02/20/20  0255   WBC 6.1 7.7   HGB 8.5* 8.5*   HCT 27.8* 28.1*    287     Recent Labs     02/22/20  0240 02/21/20  0036 02/20/20  0255   * 132* 134*   K 4.3 4.7 4.9   CL 97* 100 101   CO2 26 23 26   * 137* 154*   BUN 86* 84* 77*   CREA 3.02* 3.19* 2.99*   CA 8.6 8.6 8.9     Signed By: Daniel Major MD     February 22, 2020

## 2020-02-22 NOTE — ROUTINE PROCESS
0700: received bedside shift report from Tomasz Matta RN ([address] [place] [address]) and assumed care of the pt. Updated white board, assessed all current needs, left bed in lowest position with wheels locked and call light within reach. 0920: pt spilled all of her morning oral medications on the floor, had to re-pull everything, will make pharmacy aware of this. 1130: Spoke with Narayan Loza (pharmacist) and let her know that I re-pulled the oral meds. 1900: Shift summary, shift uneventful, no complaints of chest pain or shortness of breath.   
 
Mariah Oconnell RN

## 2020-02-22 NOTE — ROUTINE PROCESS
Bedside shift change report given to Marck High RN (oncoming nurse) by Maylin Martinez RN (offgoing nurse). Report included the following information SBAR, Kardex, Intake/Output, MAR and Cardiac Rhythm SR. Visit Vitals /59 Pulse 69 Temp 97.3 °F (36.3 °C) Resp 18 Ht 5' 2\" (1.575 m) Wt 152.2 kg (335 lb 8 oz) SpO2 95% Breastfeeding No  
BMI 61.36 kg/m² Maylin Martinez RN

## 2020-02-22 NOTE — PROGRESS NOTES
1915 Pt received from offgoing nurse without any signs or symptoms of distress. Pt vitals are stable and within normal limits. Pt bed in low position with wheels locked and call bell within reach. 2000 Assessment completed and documented in flow sheet. Pt denies any further needs at this time. Pt in NAD with bed in low position, wheels locked and call bell within reach. Purposeful rounding completed. Pt resting quietly. No further needs voiced at this time. 2015 bladder scan showed >580 ml residual in bladder. Call out to Dr Jame Fair, updated on pt status. New order to place terrell catheter. 2030 terrell placed using sterile technique with Zenaida LOUISE assisting. 600 ml clear yellow urine returned in catheter. Pt tolerated without difficulty. Purposeful rounding completed. Pt resting quietly. No further needs voiced at this time. 2113 Scheduled medications administered as ordered. Purposeful rounding completed. Pt resting quietly. No further needs voiced at this time. 0006 Reassessment completed with no changes noted. Bed locked, in lowest position, with call light within reach. Purposeful rounding completed. Pt resting quietly. No further needs voiced at this time. 6730 Reassessment completed with no changes noted. Bed locked, in lowest position, with call light within reach. Purposeful rounding completed. Pt resting quietly. No further needs voiced at this time. 4082 Scheduled medications administered as ordered. Purposeful rounding completed. Pt resting quietly. No further needs voiced at this time. 0700 Bedside and Verbal shift change report given to [address] [place] [address],[address] (oncoming nurse) by Jeffrey Oviedo RN   (offgoing nurse). Report included the following information SBAR, Intake/Output, MAR and Recent Results.

## 2020-02-22 NOTE — PROGRESS NOTES
Cardiology Progress Note        Patient: Carolene Hammans        Sex: female          DOA: 2/12/2020  YOB: 1965      Age:  53 y.o.        LOS:  LOS: 9 days    Patient seen and examined, chart reviewed. Assessment/Plan     Patient Active Problem List   Diagnosis Code    HTN (hypertension) I10    Type II or unspecified type diabetes mellitus with renal manifestations, uncontrolled(250.42) ([facility] [address] E11.29, E11.65    Morbid obesity ([facility] [address] E66.01    Respiratory distress R06.03    Asthma exacerbation J45. 411    Diastolic CHF (HCC) I96.22    АНДРЕЙ (obstructive sleep apnea) G47.33    UTI (urinary tract infection) N39.0    Hyperkalemia E87.5         Acute renal failure superimposed on stage 3 chronic kidney disease (HCC) N17.9, N18.3      Acute hypoxic respiratory failure from volume overload  As per chart it appears she gained 37 pounds from 12 February to 16 February. Patient was transferred to ICU and she was on BiPAP for hypoxic respiratory failure    Plan:    Continue IV Lasix to 80 mg once a day  Continue Metolazone  Strict input output  Salt restriction up to 2 g per day and fluid restriction up to 1.5 L per day  Continue carvedilol and amlodipine  Continue management as per hospital medicine  Monitor renal function and electrolytes. Subjective:    cc:  Denies any chest pain , complaining of shortness of breath on exertion, denies any orthopnea or PND, complaining of leg swelling      REVIEW OF SYSTEMS:     General: No fevers or chills.   Cardiovascular: No chest pain,No palpitations, No orthopnea, No PND, positive leg swelling, No claudication, positive shortness of breath on exertion  Pulmonary: No dyspnea   Gastrointestinal: No nausea, vomiting, bleeding  Neurology: No Dizziness    Objective:      Visit Vitals  /68 (BP 1 Location: Right arm, BP Patient Position: At rest)   Pulse 71   Temp 97.6 °F (36.4 °C)   Resp 18   Ht 5' 2\" (1.575 m)   Wt 152.2 kg (335 lb 8 oz)   SpO2 96%   Breastfeeding No   BMI 61.36 kg/m²     Body mass index is 61.36 kg/m². Physical Exam:  General Appearance: Comfortable, not using accessory muscles of respiration. HEENT: STEPHANIE. HEAD: Atraumatic  NECK: No JVD, no thyroidomeglay. CAROTIDS: No bruit   LUNGS: Clear bilaterally. HEART: S1+S2 audible, no murmur, no pericardial rub. ABD: Non-tender, BS Audible    EXT: +++ lower extremity edema, and no cyanosis. VASCULAR EXAM: Pulses are intact. PSYCHIATRIC EXAM: Mood is appropriate. MUSCULOSKELETAL: Grossly no joint deformity.   NEUROLOGICAL: AAO times 3, No motor and sensory deficit  Medication:  Current Facility-Administered Medications   Medication Dose Route Frequency    [START ON 2/22/2020] furosemide (LASIX) injection 80 mg  80 mg IntraVENous DAILY    metOLazone (ZAROXOLYN) tablet 5 mg  5 mg Oral DAILY    ondansetron (ZOFRAN) injection 4 mg  4 mg IntraVENous Q8H PRN    insulin lispro (HUMALOG) injection 5 Units  5 Units SubCUTAneous TIDAC    carvediloL (COREG) tablet 3.125 mg  3.125 mg Oral BID WITH MEALS    hydrALAZINE (APRESOLINE) 20 mg/mL injection 10 mg  10 mg IntraVENous Q6H PRN    magnesium hydroxide (MILK OF MAGNESIA) 400 mg/5 mL oral suspension 30 mL  30 mL Oral DAILY PRN    docusate sodium (COLACE) capsule 100 mg  100 mg Oral BID    polyethylene glycol (MIRALAX) packet 17 g  17 g Oral BID    gabapentin (NEURONTIN) capsule 300 mg  300 mg Oral TID    amLODIPine (NORVASC) tablet 10 mg  10 mg Oral DAILY    hydrALAZINE (APRESOLINE) tablet 100 mg  100 mg Oral TID    atorvastatin (LIPITOR) tablet 40 mg  40 mg Oral DAILY    albuterol-ipratropium (DUO-NEB) 2.5 MG-0.5 MG/3 ML  3 mL Nebulization Q4H PRN    aspirin chewable tablet 81 mg  81 mg Oral DAILY    DULoxetine (CYMBALTA) capsule 60 mg  60 mg Oral DAILY    neomycin-bacitracin-polymyxin (NEOSPORIN) ointment   Topical TID    heparin (porcine) injection 5,000 Units  5,000 Units SubCUTAneous Q8H    insulin glargine (LANTUS) injection 15 Units  15 Units SubCUTAneous QHS    insulin lispro (HUMALOG) injection   SubCUTAneous AC&HS    dextrose 10% infusion 125-250 mL  125-250 mL IntraVENous PRN    calcium acetate(phosphat bind) (PHOSLO) capsule 667 mg  1 Cap Oral TID WITH MEALS    sodium chloride (NS) flush 5-40 mL  5-40 mL IntraVENous Q8H    sodium chloride (NS) flush 5-40 mL  5-40 mL IntraVENous PRN    acetaminophen (TYLENOL) tablet 650 mg  650 mg Oral Q4H PRN    oxyCODONE-acetaminophen (PERCOCET) 5-325 mg per tablet 1 Tab  1 Tab Oral Q4H PRN               Lab/Data Reviewed:       Recent Labs     02/21/20  0036 02/20/20  0255 02/19/20  0335   WBC 6.1 7.7 6.5   HGB 8.5* 8.5* 8.2*   HCT 27.8* 28.1* 27.0*    287 259     Recent Labs     02/21/20  0036 02/20/20  0255 02/19/20  0335   * 134* 137   K 4.7 4.9 4.8    101 105   CO2 23 26 24   * 154* 184*   BUN 84* 77* 70*   CREA 3.19* 2.99* 2.59*   CA 8.6 8.9 8.7     Signed By: eJnn Deal MD     February 21, 2020

## 2020-02-23 LAB
ALBUMIN SERPL-MCNC: 2.8 G/DL (ref 3.4–5)
ANION GAP SERPL CALC-SCNC: 9 MMOL/L (ref 3–18)
BUN SERPL-MCNC: 93 MG/DL (ref 7–18)
BUN/CREAT SERPL: 32 (ref 12–20)
CALCIUM SERPL-MCNC: 8.6 MG/DL (ref 8.5–10.1)
CHLORIDE SERPL-SCNC: 99 MMOL/L (ref 100–111)
CK MB CFR SERPL CALC: NORMAL % (ref 0–4)
CK MB SERPL-MCNC: <1 NG/ML (ref 5–25)
CK SERPL-CCNC: 42 U/L (ref 26–192)
CO2 SERPL-SCNC: 28 MMOL/L (ref 21–32)
CREAT SERPL-MCNC: 2.93 MG/DL (ref 0.6–1.3)
GLUCOSE BLD STRIP.AUTO-MCNC: 220 MG/DL (ref 70–110)
GLUCOSE BLD STRIP.AUTO-MCNC: 235 MG/DL (ref 70–110)
GLUCOSE BLD STRIP.AUTO-MCNC: 240 MG/DL (ref 70–110)
GLUCOSE BLD STRIP.AUTO-MCNC: 258 MG/DL (ref 70–110)
GLUCOSE SERPL-MCNC: 247 MG/DL (ref 74–99)
MAGNESIUM SERPL-MCNC: 2.4 MG/DL (ref 1.6–2.6)
PHOSPHATE SERPL-MCNC: 4.5 MG/DL (ref 2.5–4.9)
POTASSIUM SERPL-SCNC: 3.9 MMOL/L (ref 3.5–5.5)
SODIUM SERPL-SCNC: 136 MMOL/L (ref 136–145)
TROPONIN I SERPL-MCNC: <0.02 NG/ML (ref 0–0.04)

## 2020-02-23 PROCEDURE — 74011250637 HC RX REV CODE- 250/637: Performed by: INTERNAL MEDICINE

## 2020-02-23 PROCEDURE — 94660 CPAP INITIATION&MGMT: CPT

## 2020-02-23 PROCEDURE — 97116 GAIT TRAINING THERAPY: CPT

## 2020-02-23 PROCEDURE — 82962 GLUCOSE BLOOD TEST: CPT

## 2020-02-23 PROCEDURE — 82550 ASSAY OF CK (CPK): CPT

## 2020-02-23 PROCEDURE — 80069 RENAL FUNCTION PANEL: CPT

## 2020-02-23 PROCEDURE — 36415 COLL VENOUS BLD VENIPUNCTURE: CPT

## 2020-02-23 PROCEDURE — 74011636637 HC RX REV CODE- 636/637: Performed by: INTERNAL MEDICINE

## 2020-02-23 PROCEDURE — 74011250636 HC RX REV CODE- 250/636: Performed by: HOSPITALIST

## 2020-02-23 PROCEDURE — 93005 ELECTROCARDIOGRAM TRACING: CPT

## 2020-02-23 PROCEDURE — 74011636637 HC RX REV CODE- 636/637: Performed by: HOSPITALIST

## 2020-02-23 PROCEDURE — 83735 ASSAY OF MAGNESIUM: CPT

## 2020-02-23 PROCEDURE — 74011250636 HC RX REV CODE- 250/636: Performed by: INTERNAL MEDICINE

## 2020-02-23 PROCEDURE — 74011250637 HC RX REV CODE- 250/637: Performed by: HOSPITALIST

## 2020-02-23 PROCEDURE — 65660000000 HC RM CCU STEPDOWN

## 2020-02-23 RX ORDER — FUROSEMIDE 10 MG/ML
100 INJECTION INTRAMUSCULAR; INTRAVENOUS 2 TIMES DAILY
Status: DISCONTINUED | OUTPATIENT
Start: 2020-02-24 | End: 2020-02-24 | Stop reason: HOSPADM

## 2020-02-23 RX ORDER — INSULIN LISPRO 100 [IU]/ML
INJECTION, SOLUTION INTRAVENOUS; SUBCUTANEOUS
Status: DISCONTINUED | OUTPATIENT
Start: 2020-02-23 | End: 2020-02-24 | Stop reason: HOSPADM

## 2020-02-23 RX ADMIN — HYDRALAZINE HYDROCHLORIDE 100 MG: 50 TABLET, FILM COATED ORAL at 17:12

## 2020-02-23 RX ADMIN — HEPARIN SODIUM 5000 UNITS: 5000 INJECTION INTRAVENOUS; SUBCUTANEOUS at 07:03

## 2020-02-23 RX ADMIN — INSULIN GLARGINE 15 UNITS: 100 INJECTION, SOLUTION SUBCUTANEOUS at 22:37

## 2020-02-23 RX ADMIN — INSULIN LISPRO 5 UNITS: 100 INJECTION, SOLUTION INTRAVENOUS; SUBCUTANEOUS at 17:12

## 2020-02-23 RX ADMIN — HYDRALAZINE HYDROCHLORIDE 100 MG: 50 TABLET, FILM COATED ORAL at 22:37

## 2020-02-23 RX ADMIN — CALCIUM ACETATE 667 MG: 667 CAPSULE ORAL at 17:00

## 2020-02-23 RX ADMIN — ATORVASTATIN CALCIUM 40 MG: 20 TABLET, FILM COATED ORAL at 08:19

## 2020-02-23 RX ADMIN — GABAPENTIN 300 MG: 300 CAPSULE ORAL at 22:38

## 2020-02-23 RX ADMIN — INSULIN LISPRO 6 UNITS: 100 INJECTION, SOLUTION INTRAVENOUS; SUBCUTANEOUS at 17:13

## 2020-02-23 RX ADMIN — INSULIN LISPRO 6 UNITS: 100 INJECTION, SOLUTION INTRAVENOUS; SUBCUTANEOUS at 22:36

## 2020-02-23 RX ADMIN — HEPARIN SODIUM 5000 UNITS: 5000 INJECTION INTRAVENOUS; SUBCUTANEOUS at 13:11

## 2020-02-23 RX ADMIN — DULOXETINE HYDROCHLORIDE 60 MG: 60 CAPSULE, DELAYED RELEASE ORAL at 08:19

## 2020-02-23 RX ADMIN — CARVEDILOL 3.12 MG: 3.12 TABLET, FILM COATED ORAL at 17:12

## 2020-02-23 RX ADMIN — Medication 10 ML: at 22:38

## 2020-02-23 RX ADMIN — POLYMYXIN B SULFATE, BACITRACIN ZINC, NEOMYCIN SULFATE: 5000; 3.5; 4 OINTMENT TOPICAL at 17:13

## 2020-02-23 RX ADMIN — DOCUSATE SODIUM 100 MG: 100 CAPSULE, LIQUID FILLED ORAL at 08:19

## 2020-02-23 RX ADMIN — CALCIUM ACETATE 667 MG: 667 CAPSULE ORAL at 12:04

## 2020-02-23 RX ADMIN — AMLODIPINE BESYLATE 10 MG: 5 TABLET ORAL at 08:19

## 2020-02-23 RX ADMIN — CALCIUM ACETATE 667 MG: 667 CAPSULE ORAL at 08:19

## 2020-02-23 RX ADMIN — GABAPENTIN 300 MG: 300 CAPSULE ORAL at 17:12

## 2020-02-23 RX ADMIN — METOLAZONE 5 MG: 5 TABLET ORAL at 07:03

## 2020-02-23 RX ADMIN — GABAPENTIN 300 MG: 300 CAPSULE ORAL at 08:19

## 2020-02-23 RX ADMIN — FUROSEMIDE 80 MG: 10 INJECTION, SOLUTION INTRAMUSCULAR; INTRAVENOUS at 13:11

## 2020-02-23 RX ADMIN — INSULIN LISPRO 5 UNITS: 100 INJECTION, SOLUTION INTRAVENOUS; SUBCUTANEOUS at 12:04

## 2020-02-23 RX ADMIN — DOCUSATE SODIUM 100 MG: 100 CAPSULE, LIQUID FILLED ORAL at 22:38

## 2020-02-23 RX ADMIN — POLYETHYLENE GLYCOL 3350 17 G: 17 POWDER, FOR SOLUTION ORAL at 08:18

## 2020-02-23 RX ADMIN — METOLAZONE 5 MG: 5 TABLET ORAL at 12:04

## 2020-02-23 RX ADMIN — POLYMYXIN B SULFATE, BACITRACIN ZINC, NEOMYCIN SULFATE: 5000; 3.5; 4 OINTMENT TOPICAL at 22:38

## 2020-02-23 RX ADMIN — Medication 10 ML: at 12:05

## 2020-02-23 RX ADMIN — INSULIN LISPRO 9 UNITS: 100 INJECTION, SOLUTION INTRAVENOUS; SUBCUTANEOUS at 12:04

## 2020-02-23 RX ADMIN — INSULIN LISPRO 6 UNITS: 100 INJECTION, SOLUTION INTRAVENOUS; SUBCUTANEOUS at 07:02

## 2020-02-23 RX ADMIN — ASPIRIN 81 MG 81 MG: 81 TABLET ORAL at 08:19

## 2020-02-23 RX ADMIN — INSULIN LISPRO 5 UNITS: 100 INJECTION, SOLUTION INTRAVENOUS; SUBCUTANEOUS at 08:18

## 2020-02-23 RX ADMIN — POLYMYXIN B SULFATE, BACITRACIN ZINC, NEOMYCIN SULFATE: 5000; 3.5; 4 OINTMENT TOPICAL at 08:18

## 2020-02-23 RX ADMIN — HEPARIN SODIUM 5000 UNITS: 5000 INJECTION INTRAVENOUS; SUBCUTANEOUS at 22:38

## 2020-02-23 RX ADMIN — HYDRALAZINE HYDROCHLORIDE 100 MG: 50 TABLET, FILM COATED ORAL at 08:19

## 2020-02-23 RX ADMIN — FUROSEMIDE 80 MG: 10 INJECTION, SOLUTION INTRAMUSCULAR; INTRAVENOUS at 08:18

## 2020-02-23 RX ADMIN — CARVEDILOL 3.12 MG: 3.12 TABLET, FILM COATED ORAL at 08:19

## 2020-02-23 NOTE — ROUTINE PROCESS
0700: received bedside shift report from Chen Sanches RN (offgoing nurse) and assumed care of the pt. Updated white board, assessed all current needs, left bed in lowest position with wheels locked and call light within reach. 0900: pt states that she is hoping she will go home today. 1400: pt complaining of chest pressure. Dr. Eladio Hunter ordered cardiac enzymes and and EKG 
 
1900: Shift summary, shift uneventful, no complaints of chest pain or shortness of breath.   
 
Ranulfo Wheatley RN

## 2020-02-23 NOTE — PROGRESS NOTES
1915 Bedside and Verbal shift change  Received from [place]thing[place], RN (outgoing nurse), to LETICIA Guo (oncoming)  Pt. Is AOX 4. IV SL, Pt. denies  pain at this time. Report included the following information SBAR, Kardex, Procedure Summary, Intake/Output, MAR, Recent Lab Results, and  Cardiac Rhythm @ NSR. Will resume care and monitor Pt. Condition. Pt. Educated on call bell when in need of help and assistance. Pt. verbalized understanding. 1945 Pt. Head to toe Assessment Done and documented. 2100 Pt. Resting in bed, watching tv.    2200  Pt. Made no complaints. 2300  Pt. Denies pain. 0000  Pt. On CPAP, resting comfortably. 4858   Verbal and bedside Shift changed report given to Luzmaria Centeno RN (oncoming RN) on Pt. Condition. Report consisted of patients Situation, History, Activities, intake/output,  Background, Assessment and Recommendations(SBAR). Information from the following report(s) Kardex, order Summary, Lab results and MAR was reviewed with the receiving nurse. Opportunity for questions and clarification was provided.

## 2020-02-23 NOTE — ROUTINE PROCESS
Bedside shift change report given to Noe Muñiz RN (oncoming nurse) by Eliecer Mckeon RN (offgoing nurse). Report included the following information SBAR, Kardex, Intake/Output, MAR and Cardiac Rhythm SR. Visit Vitals /75 Pulse 81 Temp 97.8 °F (36.6 °C) Resp 16 Ht 5' 2\" (1.575 m) Wt 150 kg (330 lb 9.6 oz) SpO2 96% Breastfeeding No  
BMI 60.47 kg/m² Eliecer Mckeon RN

## 2020-02-23 NOTE — PROGRESS NOTES
Problem: Mobility Impaired (Adult and Pediatric)  Goal: *Acute Goals and Plan of Care (Insert Text)  Description  In 1-7 days pt will be able to perform:  ST. Supine to sit to supine S with HR for meals. 2.  Sit to stand to sit S with RW in prep for ambulation. LT.  Gait:  Ambulate >50ft S with RW for home/community mobility. 2.  Activity tolerance: Tolerate up in chair 1-2 hours for ADLs. 3.  Patient/Family Education:  Patient/family to be independent with HEP for follow-up care and safe discharge. Outcome: Progressing Towards Goal   PHYSICAL THERAPY TREATMENT    Patient: Alfredo Whitehead (53 y.o. female)  Date: 2020  Diagnosis: Hyperkalemia [E87.5]   Hyperkalemia    Precautions: Fall   Chart, physical therapy assessment, plan of care and goals were reviewed. ASSESSMENT:  Pt calm now, and agreeable to participate. Patient's daughter present. Pt amb 100' with RW CGA. Increase anxiety noted with increase fatigue. VCs for deep breathing. O2 WNLs on RA post ambulation. Cont POC     Progression toward goals:  []      Improving appropriately and progressing toward goals  [x]      Improving slowly and progressing toward goals  []      Not making progress toward goals and plan of care will be adjusted     PLAN:  Patient continues to benefit from skilled intervention to address the above impairments. Continue treatment per established plan of care.   Discharge Recommendations:  Home Health  Further Equipment Recommendations for Discharge:  rolling walker     SUBJECTIVE:   Patient stated  I will try to walk some     OBJECTIVE DATA SUMMARY:   Critical Behavior:  Neurologic State: Alert, Appropriate for age  Orientation Level: Oriented X4  Cognition: Follows commands, Recognition of people/places  Safety/Judgement: Awareness of environment, Fall prevention  Functional Mobility Training:    Transfers:  Sit to Stand: Supervision  Stand to Sit: Supervision    Balance:  Sitting: Intact  Sitting - Static: Good (unsupported)  Standing: Intact; With support  Ambulation/Gait Training:  Distance (ft): 100 Feet (ft)  Assistive Device: Walker, rolling;Gait belt  Ambulation - Level of Assistance: Contact guard assistance  Gait Abnormalities: Decreased step clearance  Base of Support: Widened  Speed/Arlette: Slow  Step Length: Right shortened;Left shortened  Interventions: Verbal cues; Safety awareness training    Pain:  Pain Scale 1: Numeric (0 - 10)  Pain Intensity 1: 0    Activity Tolerance:   Fair     After treatment:   [x] Patient left in no apparent distress sitting up in chair  [] Patient left in no apparent distress in bed  [x] Call bell left within reach  [] Nursing notified  [x] Caregiver present  [] Bed alarm activated      Steve Gaspar PTA   Time Calculation: 15 mins

## 2020-02-23 NOTE — PROGRESS NOTES
In patient nephrology progress note    Admit Date:    2020  Name:  Eugenie Setting  Age :  53 y.o. Impression:   1. Acute on CKD stage 3. Renal function Improving slowly. Cr  3.0 today.  Baseline Cr of 1.6 in Dec.   2. Anasarca /fluid over load did not respond to current dosage of diuretics   3. DMN / Proteinuria   4. Hyponatremia , mild --> resolved   5. Hyperphosphatemia,  controlled by PO4 binder   6. Pulm HTN w/ Rt side CHF   7. АНДРЕЙ, CPAP      Recommendations:   Increase IV Lasix dosage   Keep Metolazone bid   Fluid restriction of 1500 ml a day   Renal panel 0400    Low salt diet      Subjective: Og Alexis a 53 y. o. female with PMHx of HTN, uncontrolled DM, CKD, D-CMP and Anemia who presented with bouts of diarrhea and nausea. She also had complaints of SOB and CP. On initial eval in the ED her HR was in the 50's otherwise stable VS. Labs showed K of 6.0 and Cr 2.69. Home med included Losartan. Pt  Is followed by Dr Eric Horta for her CKD and recent baseline Cr in Dec was ~1.6. VQ scan, CxR, CT lower chest/abd/pelvis, and LE doppler US were all unremarkable. Echo showed EF 35%, mild diastolic dysfunction and mild- to mod Pulm HTN.     Patient reports HORAN with minimal activity , not much improvement of swellings     Objective:   Temp (24hrs), Av.9 °F (36.6 °C), Min:97.3 °F (36.3 °C), Max:98.2 °F (36.8 °C)      Intake/Output Summary (Last 24 hours) at 2020 1647  Last data filed at 2020 1505  Gross per 24 hour   Intake 540 ml   Output 2400 ml   Net -1860 ml     Last 3 Recorded Weights in this Encounter    20 0349 20 0357 20 0419   Weight: 152.2 kg (335 lb 8 oz) 150 kg (330 lb 9.6 oz) 151.7 kg (334 lb 8 oz)       Physical Exam:   General Appearance: pleasant white obese female in no acute respiratory distress  Head: atraumatic  HEENT: no jaundice, moist oral mucosa  Neck: no JVD noted  Chest: LS clear to auscultation bilaterally  Heart: S1/S2, no murmur, gallop or rub, RRR  Adbomen: obese, non tended   : no flank tenderness, no terrell catheter  Skin: intact, no rash  Extremity: 3+ edema extended to thigh lower abdomin   MS: No joint deformity or tenderness  Neuro: conscious, alert, oriented x 3, no focal deficit  Data Review:    BMP  Lab Results   Component Value Date/Time    Sodium 136 02/23/2020 01:20 AM    Potassium 3.9 02/23/2020 01:20 AM    Chloride 99 (L) 02/23/2020 01:20 AM    CO2 28 02/23/2020 01:20 AM    Anion gap 9 02/23/2020 01:20 AM    Glucose 247 (H) 02/23/2020 01:20 AM    BUN 93 (H) 02/23/2020 01:20 AM    Creatinine 2.93 (H) 02/23/2020 01:20 AM    BUN/Creatinine ratio 32 (H) 02/23/2020 01:20 AM    GFR est AA 20 (L) 02/23/2020 01:20 AM    GFR est non-AA 17 (L) 02/23/2020 01:20 AM    Calcium 8.6 02/23/2020 01:20 AM       CBC  Lab Results   Component Value Date/Time    WBC 6.1 02/21/2020 12:36 AM    Hemoglobin, POC 12.6 08/10/2014 11:05 PM    HGB 8.5 (L) 02/21/2020 12:36 AM    Hematocrit, POC 37 08/10/2014 11:05 PM    HCT 27.8 (L) 02/21/2020 12:36 AM    PLATELET 753 23/84/2415 12:36 AM    MCV 80.3 02/21/2020 12:36 AM       No components found for: PTHINT  Lab Results   Component Value Date    IRON 26 (L) 02/14/2020         Ioaan Vasquez MD  [facility] [phone]  Pager [phone]

## 2020-02-23 NOTE — PROGRESS NOTES
Problem: Diabetes Self-Management  Goal: *Disease process and treatment process  Description  Define diabetes and identify own type of diabetes; list 3 options for treating diabetes. 2/22/2020 2010 by Pascual Snellen., RN  Outcome: Progressing Towards Goal  2/22/2020 2004 by Pascual Snellen., RN  Outcome: Progressing Towards Goal  Goal: *Incorporating nutritional management into lifestyle  Description  Describe effect of type, amount and timing of food on blood glucose; list 3 methods for planning meals. 2/22/2020 2010 by Pascual Snellen., RN  Outcome: Progressing Towards Goal  2/22/2020 2004 by Pascual Snellen., RN  Outcome: Progressing Towards Goal  Goal: *Incorporating physical activity into lifestyle  Description  State effect of exercise on blood glucose levels. 2/22/2020 2010 by Pascual Snellen., RN  Outcome: Progressing Towards Goal  2/22/2020 2004 by Pascual Snellen., RN  Outcome: Progressing Towards Goal  Goal: *Developing strategies to promote health/change behavior  Description  Define the ABC's of diabetes; identify appropriate screenings, schedule and personal plan for screenings. 2/22/2020 2010 by Pascual Snellen., RN  Outcome: Progressing Towards Goal  2/22/2020 2004 by Pascual Snellen., RN  Outcome: Progressing Towards Goal  Goal: *Using medications safely  Description  State effect of diabetes medications on diabetes; name diabetes medication taking, action and side effects. 2/22/2020 2010 by Pascual Snellen., RN  Outcome: Progressing Towards Goal  2/22/2020 2004 by Pascual Snellen., RN  Outcome: Progressing Towards Goal  Goal: *Monitoring blood glucose, interpreting and using results  Description  Identify recommended blood glucose targets  and personal targets.   2/22/2020 2010 by Pascual Snellen., RN  Outcome: Progressing Towards Goal  2/22/2020 2004 by Pascual Snellen., RN  Outcome: Progressing Towards Goal  Goal: *Prevention, detection, treatment of acute complications  Description  List symptoms of hyper- and hypoglycemia; describe how to treat low blood sugar and actions for lowering  high blood glucose level. 2/22/2020 2010 by Freddi Babinski., RN  Outcome: Progressing Towards Goal  2/22/2020 2004 by Freddi Babinski., RN  Outcome: Progressing Towards Goal  Goal: *Prevention, detection and treatment of chronic complications  Description  Define the natural course of diabetes and describe the relationship of blood glucose levels to long term complications of diabetes.   2/22/2020 2010 by Freddi Babinski., RN  Outcome: Progressing Towards Goal  2/22/2020 2004 by Freddi Babinski., RN  Outcome: Progressing Towards Goal  Goal: *Developing strategies to address psychosocial issues  Description  Describe feelings about living with diabetes; identify support needed and support network  2/22/2020 2010 by Freddi Babinski., RN  Outcome: Progressing Towards Goal  2/22/2020 2004 by Freddi Babinski., RN  Outcome: Progressing Towards Goal  Goal: *Insulin pump training  2/22/2020 2010 by Freddi Babinski., RN  Outcome: Progressing Towards Goal  2/22/2020 2004 by Freddi Babinski., RN  Outcome: Progressing Towards Goal  Goal: *Sick day guidelines  2/22/2020 2010 by Freddi Babinski., RN  Outcome: Progressing Towards Goal  2/22/2020 2004 by Freddi Babinski., RN  Outcome: Progressing Towards Goal  Goal: *Patient Specific Goal (EDIT GOAL, INSERT TEXT)  2/22/2020 2010 by Freddi Babinski., RN  Outcome: Progressing Towards Goal  2/22/2020 2004 by Freddi Babinski., RN  Outcome: Progressing Towards Goal     Problem: Patient Education: Go to Patient Education Activity  Goal: Patient/Family Education  2/22/2020 2010 by Freddi Babinski., RN  Outcome: Progressing Towards Goal  2/22/2020 2004 by Freddi Babinski., RN  Outcome: Progressing Towards Goal     Problem: Risk for Spread of Infection  Goal: Prevent transmission of infectious organism to others  Description  Prevent the transmission of infectious organisms to other patients, staff members, and visitors. 2/22/2020 2010 by Morris Haas RN  Outcome: Progressing Towards Goal  2/22/2020 2004 by Morris Haas, RN  Outcome: Progressing Towards Goal     Problem: Patient Education:  Go to Education Activity  Goal: Patient/Family Education  2/22/2020 2010 by Morris Haas, RN  Outcome: Progressing Towards Goal  2/22/2020 2004 by Morris Haas RN  Outcome: Progressing Towards Goal     Problem: Falls - Risk of  Goal: *Absence of Falls  Description  Document Ramirez Ashby Fall Risk and appropriate interventions in the flowsheet.   2/22/2020 2010 by Morris Haas RN  Outcome: Progressing Towards Goal  Note: Fall Risk Interventions:  Mobility Interventions: Assess mobility with egress test, Communicate number of staff needed for ambulation/transfer, Patient to call before getting OOB, PT Consult for mobility concerns, PT Consult for assist device competence, Strengthening exercises (ROM-active/passive), Utilize walker, cane, or other assistive device         Medication Interventions: Assess postural VS orthostatic hypotension, Bed/chair exit alarm, Patient to call before getting OOB, Teach patient to arise slowly    Elimination Interventions: Bed/chair exit alarm, Call light in reach, Patient to call for help with toileting needs, Stay With Me (per policy), Toilet paper/wipes in reach, Toileting schedule/hourly rounds    History of Falls Interventions: Bed/chair exit alarm, Room close to nurse's station, Vital signs minimum Q4HRs X 24 hrs (comment for end date)      2/22/2020 2004 by Morris Haas RN  Outcome: Progressing Towards Goal  Note: Fall Risk Interventions:  Mobility Interventions: Assess mobility with egress test, Communicate number of staff needed for ambulation/transfer, Patient to call before getting OOB, PT Consult for mobility concerns, PT Consult for assist device competence, Strengthening exercises (ROM-active/passive), Utilize walker, cane, or other assistive device         Medication Interventions: Assess postural VS orthostatic hypotension, Bed/chair exit alarm, Patient to call before getting OOB, Teach patient to arise slowly    Elimination Interventions: Bed/chair exit alarm, Call light in reach, Patient to call for help with toileting needs, Stay With Me (per policy), Toilet paper/wipes in reach, Toileting schedule/hourly rounds    History of Falls Interventions: Bed/chair exit alarm, Room close to nurse's station, Vital signs minimum Q4HRs X 24 hrs (comment for end date)         Problem: Patient Education: Go to Patient Education Activity  Goal: Patient/Family Education  2/22/2020 2010 by Rakel Calvillo RN  Outcome: Progressing Towards Goal  2/22/2020 2004 by Rakel Calvillo, RN  Outcome: Progressing Towards Goal

## 2020-02-23 NOTE — PROGRESS NOTES
Hospitalist Progress Note    Patient: Jesica Lyn MRN: [id number]  CSN: [id number]    YOB: 1965  Age: 53 y.o. 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 703    Diastolic CHF ([facility]) U93.46    АНДРЕЙ (obstructive sleep apnea) G47.33    UTI (urinary tract infection) N39.0    Hyperkalemia E87.5    Chest pain R07.9    Acute renal failure superimposed on stage 3 chronic kidney disease (HCC) N17.9, N18.3    Acute respiratory failure with hypoxia ([facility]) P11.42    Diastolic CHF, acute on chronic ([facility]) I50.33            Patient was admitted due to acute renal failure on chronic, hyperkalemia, chest pain/diarrhea.  Losartan was hold due to hyperkalemia and lasix was [facility] on admission. Lasix was  restarted after renal function was better,  however patient presented shortness of breath with hypoxia was transferred to ICU which needed BiPAP. She was transferred back to the floor.  Lasix iv is  continued  because of CHF exacerbation and hypoxia.  Cardiology has been [facility],  recent stress test which is negative. No diarrhea since admission, treating constipation now.         Acute respiratory failure with hypoxia   Resolving,will wean off nc O2   Continue lasix, dose decreased to once daily        Acute on chronic chf ,diastolic  Continue fluid restriction, low sat , lasix  Cardiologist on board          acute on chronic renal failure 3  Cr 2.99  continue monitoring renal function and electrolytes      Chest Pain   No pain now.  ce wnl   recent stress test in December in 2019  Case discussed with [facility]   V/q scan no PE      constipation   Continue  mirlax , colace,          DM    Lantus and sliding scale hold metformin      Sleep Apnea   cpap at night-now bipap        Pulmonary HTN   VQ scan obtained in the emergency room low probability PE   Negative for dvt      htn  Continue   norvasc and hydralyzine, continue hold metoprolol due to jono      Foot injury   No acute issue from x-ray     Urinary retention -   Remove terrell, voiding trial.        Disposition : 1-2 days     Review of systems  General: No fevers or chills. Cardiovascular: No chest pain or pressure. No palpitations. Pulmonary: No shortness of breath. Gastrointestinal: No nausea, vomiting.         Physical Exam:  General:         Awake, cooperative, no acute distress    HEENT:           NC, Atraumatic. PERRLA, anicteric sclerae. Lungs:            CTA Bilaterally. No Wheezing/Rhonchi/Rales. Heart:              S1 S2,  No murmur, No Rubs, No Gallops  Abdomen:      Soft, Non distended, Non tender.  +Bowel sounds,   Extremities:   Edema bilaterally  Psych:            Not anxious or agitated.   Neurologic:    No acute neurological deficit.          Disposition :     Vital signs/Intake and Output:  Visit Vitals  /75   Pulse 75   Temp 97.3 °F (36.3 °C)   Resp 17   Ht 5' 2\" (1.575 m)   Wt 151.7 kg (334 lb 8 oz)   SpO2 97%   Breastfeeding No   BMI 61.18 kg/m²     Current Shift:  02/23 0701 - 02/23 1900  In: 360 [P.O.:360]  Out: 500 [Urine:500]  Last three shifts:  02/21 1901 - 02/23 0700  In: 1020 [P.O.:1020]  Out: 4450 [Urine:4450]            Labs: Results:       Chemistry Recent Labs     02/23/20  0120 02/22/20  0240 02/21/20  0036   * 158* 137*    134* 132*   K 3.9 4.3 4.7   CL 99* 97* 100   CO2 28 26 23   BUN 93* 86* 84*   CREA 2.93* 3.02* 3.19*   CA 8.6 8.6 8.6   AGAP 9 11 9   BUCR 32* 28* 26*   AP  --   --  135*   TP  --   --  6.2*   ALB 2.8* 3.1* 3.0*   GLOB  --   --  3.2   AGRAT  --   --  0.9      CBC w/Diff Recent Labs     02/21/20  0036   WBC 6.1   RBC 3.46*   HGB 8.5*   HCT 27.8*    GRANS 55   LYMPH 25   EOS 9*      Cardiac Enzymes No results for input(s): CPK, CKND1, MARIA ISABEL in the last 72 hours. No lab exists for component: CKRMB, TROIP   Coagulation No results for input(s): PTP, INR, APTT, INREXT, INREXT in the last 72 hours. Lipid Panel Lab Results   Component Value Date/Time    Cholesterol, total 146 05/28/2015 02:58 AM    HDL Cholesterol 64 (H) 05/28/2015 02:58 AM    LDL, calculated 66 05/28/2015 02:58 AM    VLDL, calculated 16 05/28/2015 02:58 AM    Triglyceride 80 05/28/2015 02:58 AM    CHOL/HDL Ratio 2.3 05/28/2015 02:58 AM      BNP No results for input(s): BNPP in the last 72 hours. Liver Enzymes Recent Labs     02/23/20  0120  02/21/20  0036   TP  --   --  6.2*   ALB 2.8*   < > 3.0*   AP  --   --  135*   SGOT  --   --  24    < > = values in this interval not displayed.       Thyroid Studies Lab Results   Component Value Date/Time    TSH 0.56 01/23/2019 01:33 AM        Procedures/imaging: see electronic medical records for all procedures/Xrays and details which were not copied into this note but were reviewed prior to creation of Plan

## 2020-02-23 NOTE — PROGRESS NOTES
Pt refused PT due to:  []  Nausea/vomiting  []  Eating  []  Pain  []  Pt lethargic  [x]  Pt tearful with increase anxiety. Pt c/o chest heaviness. Nursing aware. \" I dont know what's wrong. I just want to go home\"     Will f/u later as schedule allows. Thank you.   Gopal Cooper, PTA

## 2020-02-23 NOTE — PROGRESS NOTES
Problem: Diabetes Self-Management  Goal: *Disease process and treatment process  Description  Define diabetes and identify own type of diabetes; list 3 options for treating diabetes. Outcome: Progressing Towards Goal  Goal: *Incorporating nutritional management into lifestyle  Description  Describe effect of type, amount and timing of food on blood glucose; list 3 methods for planning meals. Outcome: Progressing Towards Goal  Goal: *Incorporating physical activity into lifestyle  Description  State effect of exercise on blood glucose levels. Outcome: Progressing Towards Goal  Goal: *Developing strategies to promote health/change behavior  Description  Define the ABC's of diabetes; identify appropriate screenings, schedule and personal plan for screenings. Outcome: Progressing Towards Goal  Goal: *Using medications safely  Description  State effect of diabetes medications on diabetes; name diabetes medication taking, action and side effects. Outcome: Progressing Towards Goal  Goal: *Monitoring blood glucose, interpreting and using results  Description  Identify recommended blood glucose targets  and personal targets. Outcome: Progressing Towards Goal  Goal: *Prevention, detection, treatment of acute complications  Description  List symptoms of hyper- and hypoglycemia; describe how to treat low blood sugar and actions for lowering  high blood glucose level. Outcome: Progressing Towards Goal  Goal: *Prevention, detection and treatment of chronic complications  Description  Define the natural course of diabetes and describe the relationship of blood glucose levels to long term complications of diabetes.   Outcome: Progressing Towards Goal  Goal: *Developing strategies to address psychosocial issues  Description  Describe feelings about living with diabetes; identify support needed and support network  Outcome: Progressing Towards Goal  Goal: *Insulin pump training  Outcome: Progressing Towards Goal  Goal: *Sick day guidelines  Outcome: Progressing Towards Goal  Goal: *Patient Specific Goal (EDIT GOAL, INSERT TEXT)  Outcome: Progressing Towards Goal     Problem: Patient Education: Go to Patient Education Activity  Goal: Patient/Family Education  Outcome: Progressing Towards Goal     Problem: Risk for Spread of Infection  Goal: Prevent transmission of infectious organism to others  Description  Prevent the transmission of infectious organisms to other patients, staff members, and visitors. Outcome: Progressing Towards Goal     Problem: Patient Education:  Go to Education Activity  Goal: Patient/Family Education  Outcome: Progressing Towards Goal     Problem: Falls - Risk of  Goal: *Absence of Falls  Description  Document Cortney Prater Fall Risk and appropriate interventions in the flowsheet.   Outcome: Progressing Towards Goal  Note: Fall Risk Interventions:  Mobility Interventions: Assess mobility with egress test, Bed/chair exit alarm, Patient to call before getting OOB, PT Consult for mobility concerns, PT Consult for assist device competence, Strengthening exercises (ROM-active/passive), Utilize walker, cane, or other assistive device         Medication Interventions: Assess postural VS orthostatic hypotension, Bed/chair exit alarm, Patient to call before getting OOB, Teach patient to arise slowly    Elimination Interventions: Bed/chair exit alarm, Call light in reach, Patient to call for help with toileting needs, Stay With Me (per policy), Toilet paper/wipes in reach, Toileting schedule/hourly rounds    History of Falls Interventions: Bed/chair exit alarm, Investigate reason for fall, Room close to nurse's station, Vital signs minimum Q4HRs X 24 hrs (comment for end date), Door open when patient unattended         Problem: Patient Education: Go to Patient Education Activity  Goal: Patient/Family Education  Outcome: Progressing Towards Goal     Problem: Pain  Goal: *Control of Pain  Outcome: Progressing Towards Goal  Goal: *PALLIATIVE CARE:  Alleviation of Pain  Outcome: Progressing Towards Goal     Problem: Patient Education: Go to Patient Education Activity  Goal: Patient/Family Education  Outcome: Progressing Towards Goal     Problem: Pressure Injury - Risk of  Goal: *Prevention of pressure injury  Description  Document Elian Scale and appropriate interventions in the flowsheet.   Outcome: Progressing Towards Goal  Note: Pressure Injury Interventions:  Sensory Interventions: Keep linens dry and wrinkle-free, Maintain/enhance activity level    Moisture Interventions: Absorbent underpads, Limit adult briefs, Offer toileting Q_hr    Activity Interventions: Increase time out of bed, Pressure redistribution bed/mattress(bed type), PT/OT evaluation    Mobility Interventions: HOB 30 degrees or less, Pressure redistribution bed/mattress (bed type), PT/OT evaluation    Nutrition Interventions: Document food/fluid/supplement intake, Offer support with meals,snacks and hydration    Friction and Shear Interventions: HOB 30 degrees or less, Lift sheet, Lift team/patient mobility team                Problem: Patient Education: Go to Patient Education Activity  Goal: Patient/Family Education  Outcome: Progressing Towards Goal     Problem: Patient Education: Go to Patient Education Activity  Goal: Patient/Family Education  Outcome: Progressing Towards Goal     Problem: Patient Education: Go to Patient Education Activity  Goal: Patient/Family Education  Outcome: Progressing Towards Goal    Sherrill Queen RN

## 2020-02-24 VITALS
HEART RATE: 78 BPM | SYSTOLIC BLOOD PRESSURE: 142 MMHG | WEIGHT: 293 LBS | BODY MASS INDEX: 53.92 KG/M2 | RESPIRATION RATE: 16 BRPM | DIASTOLIC BLOOD PRESSURE: 60 MMHG | HEIGHT: 62 IN | OXYGEN SATURATION: 97 % | TEMPERATURE: 97.4 F

## 2020-02-24 LAB
ALBUMIN SERPL-MCNC: 2.9 G/DL (ref 3.4–5)
ANION GAP SERPL CALC-SCNC: 9 MMOL/L (ref 3–18)
BUN SERPL-MCNC: 101 MG/DL (ref 7–18)
BUN/CREAT SERPL: 39 (ref 12–20)
CALCIUM SERPL-MCNC: 9 MG/DL (ref 8.5–10.1)
CHLORIDE SERPL-SCNC: 97 MMOL/L (ref 100–111)
CO2 SERPL-SCNC: 29 MMOL/L (ref 21–32)
CREAT SERPL-MCNC: 2.61 MG/DL (ref 0.6–1.3)
GLUCOSE BLD STRIP.AUTO-MCNC: 128 MG/DL (ref 70–110)
GLUCOSE BLD STRIP.AUTO-MCNC: 207 MG/DL (ref 70–110)
GLUCOSE BLD STRIP.AUTO-MCNC: 333 MG/DL (ref 70–110)
GLUCOSE SERPL-MCNC: 154 MG/DL (ref 74–99)
MAGNESIUM SERPL-MCNC: 2.4 MG/DL (ref 1.6–2.6)
PHOSPHATE SERPL-MCNC: 4.1 MG/DL (ref 2.5–4.9)
POTASSIUM SERPL-SCNC: 3.5 MMOL/L (ref 3.5–5.5)
SODIUM SERPL-SCNC: 135 MMOL/L (ref 136–145)

## 2020-02-24 PROCEDURE — 74011636637 HC RX REV CODE- 636/637: Performed by: HOSPITALIST

## 2020-02-24 PROCEDURE — 97116 GAIT TRAINING THERAPY: CPT

## 2020-02-24 PROCEDURE — 74011250637 HC RX REV CODE- 250/637: Performed by: INTERNAL MEDICINE

## 2020-02-24 PROCEDURE — 80069 RENAL FUNCTION PANEL: CPT

## 2020-02-24 PROCEDURE — 74011250636 HC RX REV CODE- 250/636: Performed by: INTERNAL MEDICINE

## 2020-02-24 PROCEDURE — 82962 GLUCOSE BLOOD TEST: CPT

## 2020-02-24 PROCEDURE — 74011250637 HC RX REV CODE- 250/637: Performed by: HOSPITALIST

## 2020-02-24 PROCEDURE — 83735 ASSAY OF MAGNESIUM: CPT

## 2020-02-24 PROCEDURE — 74011250636 HC RX REV CODE- 250/636: Performed by: HOSPITALIST

## 2020-02-24 PROCEDURE — 36415 COLL VENOUS BLD VENIPUNCTURE: CPT

## 2020-02-24 PROCEDURE — 97530 THERAPEUTIC ACTIVITIES: CPT

## 2020-02-24 PROCEDURE — 74011636637 HC RX REV CODE- 636/637: Performed by: INTERNAL MEDICINE

## 2020-02-24 RX ORDER — CARVEDILOL 3.12 MG/1
3.12 TABLET ORAL 2 TIMES DAILY WITH MEALS
Qty: 60 TAB | Refills: 0 | Status: ON HOLD | OUTPATIENT
Start: 2020-02-25 | End: 2020-04-10 | Stop reason: DRUGHIGH

## 2020-02-24 RX ORDER — METOLAZONE 5 MG/1
5 TABLET ORAL 2 TIMES DAILY
Qty: 60 TAB | Refills: 0 | Status: ON HOLD | OUTPATIENT
Start: 2020-02-25 | End: 2020-04-10

## 2020-02-24 RX ORDER — AMLODIPINE BESYLATE 10 MG/1
10 TABLET ORAL DAILY
Qty: 30 TAB | Refills: 0 | Status: ON HOLD | OUTPATIENT
Start: 2020-02-25 | End: 2020-04-10 | Stop reason: DRUGHIGH

## 2020-02-24 RX ORDER — CALCIUM ACETATE 667 MG/1
1 CAPSULE ORAL
Qty: 90 CAP | Refills: 0 | Status: SHIPPED | OUTPATIENT
Start: 2020-02-25

## 2020-02-24 RX ORDER — HYDRALAZINE HYDROCHLORIDE 100 MG/1
100 TABLET, FILM COATED ORAL 3 TIMES DAILY
Qty: 90 TAB | Refills: 0 | Status: ON HOLD | OUTPATIENT
Start: 2020-02-24 | End: 2020-04-10

## 2020-02-24 RX ADMIN — INSULIN LISPRO 5 UNITS: 100 INJECTION, SOLUTION INTRAVENOUS; SUBCUTANEOUS at 08:14

## 2020-02-24 RX ADMIN — ASPIRIN 81 MG 81 MG: 81 TABLET ORAL at 08:14

## 2020-02-24 RX ADMIN — DOCUSATE SODIUM 100 MG: 100 CAPSULE, LIQUID FILLED ORAL at 08:14

## 2020-02-24 RX ADMIN — CALCIUM ACETATE 667 MG: 667 CAPSULE ORAL at 08:14

## 2020-02-24 RX ADMIN — POLYMYXIN B SULFATE, BACITRACIN ZINC, NEOMYCIN SULFATE: 5000; 3.5; 4 OINTMENT TOPICAL at 08:15

## 2020-02-24 RX ADMIN — METOLAZONE 5 MG: 5 TABLET ORAL at 06:55

## 2020-02-24 RX ADMIN — METOLAZONE 5 MG: 5 TABLET ORAL at 12:54

## 2020-02-24 RX ADMIN — HYDRALAZINE HYDROCHLORIDE 100 MG: 50 TABLET, FILM COATED ORAL at 16:33

## 2020-02-24 RX ADMIN — AMLODIPINE BESYLATE 10 MG: 5 TABLET ORAL at 08:14

## 2020-02-24 RX ADMIN — Medication 10 ML: at 14:30

## 2020-02-24 RX ADMIN — ATORVASTATIN CALCIUM 40 MG: 20 TABLET, FILM COATED ORAL at 08:14

## 2020-02-24 RX ADMIN — FUROSEMIDE 100 MG: 10 INJECTION, SOLUTION INTRAMUSCULAR; INTRAVENOUS at 08:14

## 2020-02-24 RX ADMIN — FUROSEMIDE 100 MG: 10 INJECTION, SOLUTION INTRAMUSCULAR; INTRAVENOUS at 16:33

## 2020-02-24 RX ADMIN — CALCIUM ACETATE 667 MG: 667 CAPSULE ORAL at 16:33

## 2020-02-24 RX ADMIN — INSULIN LISPRO 5 UNITS: 100 INJECTION, SOLUTION INTRAVENOUS; SUBCUTANEOUS at 12:55

## 2020-02-24 RX ADMIN — HEPARIN SODIUM 5000 UNITS: 5000 INJECTION INTRAVENOUS; SUBCUTANEOUS at 14:29

## 2020-02-24 RX ADMIN — POLYMYXIN B SULFATE, BACITRACIN ZINC, NEOMYCIN SULFATE: 5000; 3.5; 4 OINTMENT TOPICAL at 16:35

## 2020-02-24 RX ADMIN — CALCIUM ACETATE 667 MG: 667 CAPSULE ORAL at 12:54

## 2020-02-24 RX ADMIN — HEPARIN SODIUM 5000 UNITS: 5000 INJECTION INTRAVENOUS; SUBCUTANEOUS at 06:54

## 2020-02-24 RX ADMIN — INSULIN LISPRO 5 UNITS: 100 INJECTION, SOLUTION INTRAVENOUS; SUBCUTANEOUS at 16:34

## 2020-02-24 RX ADMIN — INSULIN LISPRO 12 UNITS: 100 INJECTION, SOLUTION INTRAVENOUS; SUBCUTANEOUS at 16:34

## 2020-02-24 RX ADMIN — DULOXETINE HYDROCHLORIDE 60 MG: 60 CAPSULE, DELAYED RELEASE ORAL at 08:14

## 2020-02-24 RX ADMIN — CARVEDILOL 3.12 MG: 3.12 TABLET, FILM COATED ORAL at 08:14

## 2020-02-24 RX ADMIN — INSULIN LISPRO 6 UNITS: 100 INJECTION, SOLUTION INTRAVENOUS; SUBCUTANEOUS at 12:54

## 2020-02-24 RX ADMIN — GABAPENTIN 300 MG: 300 CAPSULE ORAL at 16:33

## 2020-02-24 RX ADMIN — HYDRALAZINE HYDROCHLORIDE 100 MG: 50 TABLET, FILM COATED ORAL at 08:14

## 2020-02-24 RX ADMIN — Medication 10 ML: at 06:55

## 2020-02-24 RX ADMIN — CARVEDILOL 3.12 MG: 3.12 TABLET, FILM COATED ORAL at 16:33

## 2020-02-24 RX ADMIN — GABAPENTIN 300 MG: 300 CAPSULE ORAL at 08:14

## 2020-02-24 NOTE — PROGRESS NOTES
Problem: Diabetes Self-Management  Goal: *Disease process and treatment process  Description  Define diabetes and identify own type of diabetes; list 3 options for treating diabetes. Outcome: Progressing Towards Goal  Goal: *Incorporating nutritional management into lifestyle  Description  Describe effect of type, amount and timing of food on blood glucose; list 3 methods for planning meals. Outcome: Progressing Towards Goal  Goal: *Incorporating physical activity into lifestyle  Description  State effect of exercise on blood glucose levels. Outcome: Progressing Towards Goal  Goal: *Developing strategies to promote health/change behavior  Description  Define the ABC's of diabetes; identify appropriate screenings, schedule and personal plan for screenings. Outcome: Progressing Towards Goal  Goal: *Using medications safely  Description  State effect of diabetes medications on diabetes; name diabetes medication taking, action and side effects. Outcome: Progressing Towards Goal  Goal: *Monitoring blood glucose, interpreting and using results  Description  Identify recommended blood glucose targets  and personal targets. Outcome: Progressing Towards Goal  Goal: *Prevention, detection, treatment of acute complications  Description  List symptoms of hyper- and hypoglycemia; describe how to treat low blood sugar and actions for lowering  high blood glucose level. Outcome: Progressing Towards Goal  Goal: *Prevention, detection and treatment of chronic complications  Description  Define the natural course of diabetes and describe the relationship of blood glucose levels to long term complications of diabetes.   Outcome: Progressing Towards Goal  Goal: *Developing strategies to address psychosocial issues  Description  Describe feelings about living with diabetes; identify support needed and support network  Outcome: Progressing Towards Goal  Goal: *Insulin pump training  Outcome: Progressing Towards Goal  Goal: *Sick day guidelines  Outcome: Progressing Towards Goal  Goal: *Patient Specific Goal (EDIT GOAL, INSERT TEXT)  Outcome: Progressing Towards Goal     Problem: Patient Education: Go to Patient Education Activity  Goal: Patient/Family Education  Outcome: Progressing Towards Goal     Problem: Risk for Spread of Infection  Goal: Prevent transmission of infectious organism to others  Description  Prevent the transmission of infectious organisms to other patients, staff members, and visitors. Outcome: Progressing Towards Goal     Problem: Patient Education:  Go to Education Activity  Goal: Patient/Family Education  Outcome: Progressing Towards Goal     Problem: Falls - Risk of  Goal: *Absence of Falls  Description  Document Kaylan Rush Fall Risk and appropriate interventions in the flowsheet.   Outcome: Progressing Towards Goal  Note: Fall Risk Interventions:  Mobility Interventions: Assess mobility with egress test, Bed/chair exit alarm, Communicate number of staff needed for ambulation/transfer         Medication Interventions: Assess postural VS orthostatic hypotension, Bed/chair exit alarm, Patient to call before getting OOB, Teach patient to arise slowly    Elimination Interventions: Bed/chair exit alarm, Call light in reach, Elevated toilet seat    History of Falls Interventions: Bed/chair exit alarm, Consult care management for discharge planning, Door open when patient unattended         Problem: Patient Education: Go to Patient Education Activity  Goal: Patient/Family Education  Outcome: Progressing Towards Goal     Problem: Pain  Goal: *Control of Pain  Outcome: Progressing Towards Goal  Goal: *PALLIATIVE CARE:  Alleviation of Pain  Outcome: Progressing Towards Goal     Problem: Patient Education: Go to Patient Education Activity  Goal: Patient/Family Education  Outcome: Progressing Towards Goal     Problem: Pressure Injury - Risk of  Goal: *Prevention of pressure injury  Description  Document Elian Scale and appropriate interventions in the flowsheet.   Outcome: Progressing Towards Goal  Note: Pressure Injury Interventions:  Sensory Interventions: Assess changes in LOC, Assess need for specialty bed    Moisture Interventions: Absorbent underpads, Apply protective barrier, creams and emollients    Activity Interventions: Assess need for specialty bed, Increase time out of bed, Pressure redistribution bed/mattress(bed type), PT/OT evaluation    Mobility Interventions: Assess need for specialty bed, HOB 30 degrees or less, Pressure redistribution bed/mattress (bed type), PT/OT evaluation    Nutrition Interventions: Document food/fluid/supplement intake, Discuss nutritional consult with provider    Friction and Shear Interventions: Apply protective barrier, creams and emollients, Foam dressings/transparent film/skin sealants, HOB 30 degrees or less, Lift sheet                Problem: Patient Education: Go to Patient Education Activity  Goal: Patient/Family Education  Outcome: Progressing Towards Goal     Problem: Patient Education: Go to Patient Education Activity  Goal: Patient/Family Education  Outcome: Progressing Towards Goal     Problem: Patient Education: Go to Patient Education Activity  Goal: Patient/Family Education  Outcome: Progressing Towards Goal

## 2020-02-24 NOTE — PROGRESS NOTES
Problem: Diabetes Self-Management  Goal: *Disease process and treatment process  Description  Define diabetes and identify own type of diabetes; list 3 options for treating diabetes. Outcome: Progressing Towards Goal  Goal: *Incorporating nutritional management into lifestyle  Description  Describe effect of type, amount and timing of food on blood glucose; list 3 methods for planning meals. Outcome: Progressing Towards Goal  Goal: *Incorporating physical activity into lifestyle  Description  State effect of exercise on blood glucose levels. Outcome: Progressing Towards Goal  Goal: *Developing strategies to promote health/change behavior  Description  Define the ABC's of diabetes; identify appropriate screenings, schedule and personal plan for screenings. Outcome: Progressing Towards Goal  Goal: *Using medications safely  Description  State effect of diabetes medications on diabetes; name diabetes medication taking, action and side effects. Outcome: Progressing Towards Goal  Goal: *Monitoring blood glucose, interpreting and using results  Description  Identify recommended blood glucose targets  and personal targets. Outcome: Progressing Towards Goal  Goal: *Prevention, detection, treatment of acute complications  Description  List symptoms of hyper- and hypoglycemia; describe how to treat low blood sugar and actions for lowering  high blood glucose level. Outcome: Progressing Towards Goal  Goal: *Prevention, detection and treatment of chronic complications  Description  Define the natural course of diabetes and describe the relationship of blood glucose levels to long term complications of diabetes.   Outcome: Progressing Towards Goal  Goal: *Developing strategies to address psychosocial issues  Description  Describe feelings about living with diabetes; identify support needed and support network  Outcome: Progressing Towards Goal  Goal: *Insulin pump training  Outcome: Progressing Towards Goal  Goal: *Sick day guidelines  Outcome: Progressing Towards Goal  Goal: *Patient Specific Goal (EDIT GOAL, INSERT TEXT)  Outcome: Progressing Towards Goal     Problem: Falls - Risk of  Goal: *Absence of Falls  Description  Document Tawnyaairam Jolly Fall Risk and appropriate interventions in the flowsheet.   Outcome: Progressing Towards Goal  Note: Fall Risk Interventions:  Mobility Interventions: Assess mobility with egress test, Bed/chair exit alarm, Patient to call before getting OOB, PT Consult for mobility concerns, PT Consult for assist device competence, Strengthening exercises (ROM-active/passive), Utilize walker, cane, or other assistive device         Medication Interventions: Assess postural VS orthostatic hypotension, Bed/chair exit alarm, Patient to call before getting OOB, Teach patient to arise slowly    Elimination Interventions: Bed/chair exit alarm, Call light in reach, Patient to call for help with toileting needs, Stay With Me (per policy), Toilet paper/wipes in reach, Toileting schedule/hourly rounds    History of Falls Interventions: Bed/chair exit alarm, Investigate reason for fall, Room close to nurse's station, Vital signs minimum Q4HRs X 24 hrs (comment for end date), Door open when patient unattended         Problem: Patient Education: Go to Patient Education Activity  Goal: Patient/Family Education  Outcome: Progressing Towards Goal

## 2020-02-24 NOTE — PROGRESS NOTES
In patient nephrology progress note    Admit Date:    2020  Name:  Jesica Lyn  Age :  53 y.o. Impression:   1. Acute on CKD stage 3. Renal function Improving slowly. Cr  3.0 today.  Baseline Cr of 1.6 in Dec.   2. Anasarca /fluid over load did not respond to current dosage of diuretics   3. DMN / Proteinuria   4. Hyponatremia , mild --> resolved   5. Hyperphosphatemia,  controlled by PO4 binder   6. Pulm HTN w/ Rt side CHF   7. АНДРЕЙ, CPAP      Recommendations:   Discussed with Dr Sherri Lee, ok to convert iv lasix to po today  Keep Metolazone po bid   Fluid restriction of 1500 ml a day   Low Na diet  If d/c, will need to see Dr Aminah Beverly and PCP in 1 week      Subjective: Julia Urbano a 53 y. o. female with PMHx of HTN, uncontrolled DM, CKD, D-CMP and Anemia who presented with bouts of diarrhea and nausea. She also had complaints of SOB and CP. On initial eval in the ED her HR was in the 50's otherwise stable VS. Labs showed K of 6.0 and Cr 2.69. Home med included Losartan. Pt  Is followed by Dr Jana Armando for her CKD and recent baseline Cr in Dec was ~1.6. VQ scan, CxR, CT lower chest/abd/pelvis, and LE doppler US were all unremarkable. Echo showed EF 49%, mild diastolic dysfunction and mild- to mod Pulm HTN. Pt has no cp/sob this afternoon. Reports more UO.     Objective:   Temp (24hrs), Av.6 °F (36.4 °C), Min:97.2 °F (36.2 °C), Max:97.9 °F (36.6 °C)      Intake/Output Summary (Last 24 hours) at 2020 1155  Last data filed at 2020 0244  Gross per 24 hour   Intake 360 ml   Output 1200 ml   Net -840 ml     Last 3 Recorded Weights in this Encounter    20 0357 20 0419 20 0019   Weight: 150 kg (330 lb 9.6 oz) 151.7 kg (334 lb 8 oz) 148.9 kg (328 lb 4.2 oz)       Physical Exam:   Gen: obese, NAD  Head: atraumatic  HEENT: no jaundice, moist oral mucosa  Neck: no JVD noted  Chest: LS clear to auscultation bilaterally  Heart: S1/S2, no murmur, gallop or rub, RRR  Adbomen: obese, non tended : no flank tenderness, no terrell catheter  Skin: intact, no rash  Extremity: 3+ edema extended to thigh bilat  MS: No joint deformity or tenderness  Neuro: conscious, alert, oriented x 3, no focal deficit    Data Review:    BMP  Lab Results   Component Value Date/Time    Sodium 135 (L) 02/24/2020 12:50 AM    Potassium 3.5 02/24/2020 12:50 AM    Chloride 97 (L) 02/24/2020 12:50 AM    CO2 29 02/24/2020 12:50 AM    Anion gap 9 02/24/2020 12:50 AM    Glucose 154 (H) 02/24/2020 12:50 AM     (H) 02/24/2020 12:50 AM    Creatinine 2.61 (H) 02/24/2020 12:50 AM    BUN/Creatinine ratio 39 (H) 02/24/2020 12:50 AM    GFR est AA 23 (L) 02/24/2020 12:50 AM    GFR est non-AA 19 (L) 02/24/2020 12:50 AM    Calcium 9.0 02/24/2020 12:50 AM       CBC  Lab Results   Component Value Date/Time    WBC 6.1 02/21/2020 12:36 AM    Hemoglobin, POC 12.6 08/10/2014 11:05 PM    HGB 8.5 (L) 02/21/2020 12:36 AM    Hematocrit, POC 37 08/10/2014 11:05 PM    HCT 27.8 (L) 02/21/2020 12:36 AM    PLATELET 047 74/74/2756 12:36 AM    MCV 80.3 02/21/2020 12:36 AM       No components found for: PTHINT  Lab Results   Component Value Date    IRON 26 (L) 02/14/2020         Gerda Bowman MD  [place] [phone]  Pager [phone]

## 2020-02-24 NOTE — PROGRESS NOTES
NUTRITION FOLLOW-UP    RECOMMENDATIONS/PLAN:   - Diet: Add 1.5L fluid restriction per cardiology note on 2/23   Monitor labs/lytes, PO intakes, skin integrity, wt, fluid status, BM  NUTRITION ASSESSMENT:   Client Update: 53 yrs old Female with acute renal failure on chronic, hyperkalemia, chest pain/diarrhea-now constipated, acute resp failure w/ hypoxia, acute on chronic chf,  Pulmonary HTN, foot injury, urinary retention; MD noted wt gain, but pt original wt at admission was stated-wt was not accurate; wt on 2/13/20 of 149.097kg was most accurate wt has been very stable over admission     FOOD/NUTRITION INTAKE   Diet Order:  DM 1800 NA2GM  Food Allergies: Strawberry   Average PO Intake:      Patient Vitals for the past 100 hrs:   % Diet Eaten   02/23/20 1505 50 %   02/22/20 1345 100 %   02/22/20 0912 95 %   02/20/20 1440 80 %   02/20/20 1023 25 %      Pertinent Medications:  [x] Reviewed; phoslo, colace, lasix, MOM, miralax, selma,    Electrolyte Replacement Protocol: []K []Mg []PO4  Insulin:  []SSI  [x]Pre-meal   [x]Basal    []Drip  []None  Cultural/Christian Food Preferences: None Identified    BIOCHEMICAL DATA & MEDICAL TESTS  Pertinent Labs:  [x] Reviewed; Na-135 BUN-101 Creatinine- 2.61 GFR est AA-23   ANTHROPOMETRICS  Height: 5' 2\" (157.5 cm)       Weight: 148.9 kg (328 lb 4.2 oz)         BMI: 60 kg/m^2 morbidly obese (Greater than or = to 40% BMI)   Adm Weight: 328 lbs                Weight change: 0lbs  Adjusted Body Weight: 74.8    NUTRITION-FOCUSED PHYSICAL ASSESSMENT  Skin: No PU      GI: +BM 2/23/20    NUTRITION PRESCRIPTION  Calories: 8803-0633 kcal/day based on 30-35kcal/kg of IBW  Protein: 50-60 g/day based on 1.0-1.2 g/kg of IBW  CHO: 188-219g/day based on 50% of total energy  Fluid:3433-1993 ml/day based on 1 kcal/ml         NUTRITION DIAGNOSES:   1.  Knowledge related nutrition deficit related to renal/DM/Cardaic diet as evidence by pt reported confusion      NUTRITION INTERVENTIONS: INTERVENTIONS:                                                                                         GOALS:  1. Diet: Add 1.5L fluid restriction per cardiology note on 2/23    1. Encourage PO intake >50% at most meals by next review 5-7 days          LEARNING NEEDS (Diet, Supplementation, Food/Nutrient-Drug Interaction):   [] None Identified   [x] Education provided/documented      Identified and patient: [] Declined   [] Was not appropriate/indicated        NUTRITION MONITORING /EVALUATION:   Follow PO intake  Monitor wt  Monitor renal labs, electrolytes, fluid status     Previous Recommendations Implemented: yes        Previous Goals Met:  yes -      [] Participated in Interdisciplinary Rounds    [x] Interdisciplinary Care Plan Reviewed  DISCHARGE NUTRITION RECOMMENDATIONS ADDRESSED:        [x] To be determined closer to discharge    NUTRITION RISK:           [x] At risk                        [] Not currently at risk        Will follow-up per policy.   Arturo Trinidad 1

## 2020-02-24 NOTE — PROGRESS NOTES
Case discussed with Dr. Tonio Wright, ok to d.c pt . Discussed with Ms. Silvia Melvin, she is ok to be d.c home today.

## 2020-02-24 NOTE — PROGRESS NOTES
Cardiology Progress Note        Patient: Erica Ross        Sex: female          DOA: 2/12/2020  YOB: 1965      Age:  53 y.o.        LOS:  LOS: 11 days    Patient seen and examined, chart reviewed. Assessment/Plan     Patient Active Problem List   Diagnosis Code    HTN (hypertension) I10    Type II or unspecified type diabetes mellitus with renal manifestations, uncontrolled(250.42) ([facility] [address] E11.29, E11.65    Morbid obesity ([facility] [address] E66.01    Respiratory distress R06.03    Asthma exacerbation J45. 413    Diastolic CHF ([facility]) I63.02    АНДРЕЙ (obstructive sleep apnea) G47.33    UTI (urinary tract infection) N39.0    Hyperkalemia E87.5         Acute renal failure superimposed on stage 3 chronic kidney disease (HCC) N17.9, N18.3      Acute hypoxic respiratory failure from volume overload  As per chart it appears she gained 37 pounds from 12 February to 16 February. Patient was transferred to ICU and she was on BiPAP for hypoxic respiratory failure  Mid sternal chest pain       Plan:    EKG  Cardiac enzyme  Continue IV Lasix 100 mg twice a day (Dose increased by Nephrologist)   Continue Metolazone  Strict input output  Salt restriction up to 2 g per day and fluid restriction up to 1.5 L per day  Continue carvedilol and amlodipine  Continue management as per hospital medicine  Monitor renal function and electrolytes. Plan discussed with               Subjective:    cc: I felt anxious and had chest pain, Chest pain was mid sternal , pressure like, no radiation and lasted for few minutes. complaining of shortness of breath on exertion, denies any orthopnea or PND, complaining of leg swelling. Denies any chest pain at this time      REVIEW OF SYSTEMS:     General: No fevers or chills.   Cardiovascular: Positive chest pain,No palpitations, No orthopnea, No PND, positive leg swelling, No claudication, positive shortness of breath on exertion  Pulmonary: No dyspnea Gastrointestinal: No nausea, vomiting, bleeding  Neurology: No Dizziness    Objective:      Visit Vitals  /52   Pulse 79   Temp 97.7 °F (36.5 °C)   Resp 17   Ht 5' 2\" (1.575 m)   Wt 151.7 kg (334 lb 8 oz)   SpO2 94%   Breastfeeding No   BMI 61.18 kg/m²     Body mass index is 61.18 kg/m². Physical Exam:  General Appearance: Comfortable, not using accessory muscles of respiration. HEENT: STEPHANIE. HEAD: Atraumatic  NECK: No JVD, no thyroidomeglay. CAROTIDS: No bruit   LUNGS: Clear bilaterally. HEART: S1+S2 audible, no murmur, no pericardial rub. ABD: Non-tender, BS Audible    EXT: +++ lower extremity edema, and no cyanosis. VASCULAR EXAM: Pulses are intact. PSYCHIATRIC EXAM: Mood is appropriate. MUSCULOSKELETAL: Grossly no joint deformity.   NEUROLOGICAL: AAO times 3, No motor and sensory deficit  Medication:  Current Facility-Administered Medications   Medication Dose Route Frequency    insulin lispro (HUMALOG) injection   SubCUTAneous AC&HS    [START ON 2/24/2020] furosemide (LASIX) injection 100 mg  100 mg IntraVENous BID    metOLazone (ZAROXOLYN) tablet 5 mg  5 mg Oral BID    ondansetron (ZOFRAN) injection 4 mg  4 mg IntraVENous Q8H PRN    insulin lispro (HUMALOG) injection 5 Units  5 Units SubCUTAneous TIDAC    carvediloL (COREG) tablet 3.125 mg  3.125 mg Oral BID WITH MEALS    hydrALAZINE (APRESOLINE) 20 mg/mL injection 10 mg  10 mg IntraVENous Q6H PRN    magnesium hydroxide (MILK OF MAGNESIA) 400 mg/5 mL oral suspension 30 mL  30 mL Oral DAILY PRN    docusate sodium (COLACE) capsule 100 mg  100 mg Oral BID    polyethylene glycol (MIRALAX) packet 17 g  17 g Oral BID    gabapentin (NEURONTIN) capsule 300 mg  300 mg Oral TID    amLODIPine (NORVASC) tablet 10 mg  10 mg Oral DAILY    hydrALAZINE (APRESOLINE) tablet 100 mg  100 mg Oral TID    atorvastatin (LIPITOR) tablet 40 mg  40 mg Oral DAILY    albuterol-ipratropium (DUO-NEB) 2.5 MG-0.5 MG/3 ML  3 mL Nebulization Q4H PRN    aspirin chewable tablet 81 mg  81 mg Oral DAILY    DULoxetine (CYMBALTA) capsule 60 mg  60 mg Oral DAILY    neomycin-bacitracin-polymyxin (NEOSPORIN) ointment   Topical TID    heparin (porcine) injection 5,000 Units  5,000 Units SubCUTAneous Q8H    insulin glargine (LANTUS) injection 15 Units  15 Units SubCUTAneous QHS    dextrose 10% infusion 125-250 mL  125-250 mL IntraVENous PRN    calcium acetate(phosphat bind) (PHOSLO) capsule 667 mg  1 Cap Oral TID WITH MEALS    sodium chloride (NS) flush 5-40 mL  5-40 mL IntraVENous Q8H    sodium chloride (NS) flush 5-40 mL  5-40 mL IntraVENous PRN    acetaminophen (TYLENOL) tablet 650 mg  650 mg Oral Q4H PRN    oxyCODONE-acetaminophen (PERCOCET) 5-325 mg per tablet 1 Tab  1 Tab Oral Q4H PRN               Lab/Data Reviewed:       Recent Labs     02/21/20  0036   WBC 6.1   HGB 8.5*   HCT 27.8*        Recent Labs     02/23/20  0120 02/22/20  0240 02/21/20  0036    134* 132*   K 3.9 4.3 4.7   CL 99* 97* 100   CO2 28 26 23   * 158* 137*   BUN 93* 86* 84*   CREA 2.93* 3.02* 3.19*   CA 8.6 8.6 8.6     Signed By: Norris Simon MD     February 23, 2020

## 2020-02-24 NOTE — PROGRESS NOTES
Bedside shift change report given to [address] [place] [address] (oncoming nurse) by LETICIA Vann RN  (offgoing nurse). Report included the following information SBAR, Kardex, ED Summary, Intake/Output, MAR and Alarm Parameters . 7759 Shift assessment complete. Dual AVS reviewed with Karen Jim. All medications reviewed individually with patient. Opportunities for questions and concerns provided. Patient discharged via (mode of transport ie. Car, ambulance or air transport) car with daughter. Patient's arm band appropriately discarded. Shift Summary: Shift uneventful. No complaints of chest pain or shortness of breath. Call light is within reach.

## 2020-02-24 NOTE — DIABETES MGMT
GLYCEMIC CONTROL PROGRESS NOTE:    - known h/o T2DM, HbA1C not within the recommended range for age + comorbids on TID mixed insulin/oral home regimen  - TDD = 57 (15 Lantus + 15 (5) MTI + 27 - Humalog Very Insulin Resistant Corrective Coverage)  - PPG consistently out of target range, recommend increase mealtime   *Humalog 10 units qac  Recent Glucose Results:   Lab Results   Component Value Date/Time     (H) 02/24/2020 12:50 AM    GLUCPOC 128 (H) 02/24/2020 06:08 AM    GLUCPOC 240 (H) 02/23/2020 10:35 PM    GLUCPOC 220 (H) 02/23/2020 04:01 PM     Rolly Sheikh MS, RN, CDE  Glycemic Control Team  [phone]  Pager [phone] (M-TH 8:00-4:30P)  *After Hours pager [phone]

## 2020-02-24 NOTE — PROGRESS NOTES
Problem: Diabetes Self-Management  Goal: *Disease process and treatment process  Description  Define diabetes and identify own type of diabetes; list 3 options for treating diabetes. Outcome: Progressing Towards Goal  Goal: *Incorporating nutritional management into lifestyle  Description  Describe effect of type, amount and timing of food on blood glucose; list 3 methods for planning meals. Outcome: Progressing Towards Goal  Goal: *Incorporating physical activity into lifestyle  Description  State effect of exercise on blood glucose levels. Outcome: Progressing Towards Goal  Goal: *Developing strategies to promote health/change behavior  Description  Define the ABC's of diabetes; identify appropriate screenings, schedule and personal plan for screenings. Outcome: Progressing Towards Goal  Goal: *Using medications safely  Description  State effect of diabetes medications on diabetes; name diabetes medication taking, action and side effects. Outcome: Progressing Towards Goal  Goal: *Monitoring blood glucose, interpreting and using results  Description  Identify recommended blood glucose targets  and personal targets. Outcome: Progressing Towards Goal  Goal: *Prevention, detection, treatment of acute complications  Description  List symptoms of hyper- and hypoglycemia; describe how to treat low blood sugar and actions for lowering  high blood glucose level. Outcome: Progressing Towards Goal  Goal: *Prevention, detection and treatment of chronic complications  Description  Define the natural course of diabetes and describe the relationship of blood glucose levels to long term complications of diabetes.   Outcome: Progressing Towards Goal  Goal: *Developing strategies to address psychosocial issues  Description  Describe feelings about living with diabetes; identify support needed and support network  Outcome: Progressing Towards Goal  Goal: *Insulin pump training  Outcome: Progressing Towards Goal  Goal: *Sick day guidelines  Outcome: Progressing Towards Goal  Goal: *Patient Specific Goal (EDIT GOAL, INSERT TEXT)  Outcome: Progressing Towards Goal     Problem: Patient Education: Go to Patient Education Activity  Goal: Patient/Family Education  Outcome: Progressing Towards Goal     Problem: Risk for Spread of Infection  Goal: Prevent transmission of infectious organism to others  Description  Prevent the transmission of infectious organisms to other patients, staff members, and visitors. Outcome: Progressing Towards Goal     Problem: Patient Education:  Go to Education Activity  Goal: Patient/Family Education  Outcome: Progressing Towards Goal     Problem: Falls - Risk of  Goal: *Absence of Falls  Description  Document Ramirez Ashby Fall Risk and appropriate interventions in the flowsheet. Outcome: Progressing Towards Goal  Note: Fall Risk Interventions:  Mobility Interventions: Bed/chair exit alarm, Patient to call before getting OOB         Medication Interventions: Evaluate medications/consider consulting pharmacy, Bed/chair exit alarm    Elimination Interventions: Bed/chair exit alarm    History of Falls Interventions: Bed/chair exit alarm, Evaluate medications/consider consulting pharmacy         Problem: Patient Education: Go to Patient Education Activity  Goal: Patient/Family Education  Outcome: Progressing Towards Goal     Problem: Pain  Goal: *Control of Pain  Outcome: Progressing Towards Goal  Goal: *PALLIATIVE CARE:  Alleviation of Pain  Outcome: Progressing Towards Goal     Problem: Patient Education: Go to Patient Education Activity  Goal: Patient/Family Education  Outcome: Progressing Towards Goal     Problem: Pressure Injury - Risk of  Goal: *Prevention of pressure injury  Description  Document Elian Scale and appropriate interventions in the flowsheet.   Outcome: Progressing Towards Goal  Note: Pressure Injury Interventions:  Sensory Interventions: Assess changes in LOC    Moisture Interventions: Maintain skin hydration (lotion/cream)    Activity Interventions: Pressure redistribution bed/mattress(bed type)    Mobility Interventions: HOB 30 degrees or less, Pressure redistribution bed/mattress (bed type)    Nutrition Interventions: Document food/fluid/supplement intake    Friction and Shear Interventions: HOB 30 degrees or less                Problem: Patient Education: Go to Patient Education Activity  Goal: Patient/Family Education  Outcome: Progressing Towards Goal     Problem: Patient Education: Go to Patient Education Activity  Goal: Patient/Family Education  Outcome: Progressing Towards Goal     Problem: Patient Education: Go to Patient Education Activity  Goal: Patient/Family Education  Outcome: Progressing Towards Goal

## 2020-02-24 NOTE — PROGRESS NOTES
02/23/20 2054   CPAP/BIPAP   CPAP/BIPAP Start/Stop On   Device Mode CPAP, auto-titrating   $$ CPAP Daily Yes   Pt's Home Machine No   Biomedical Check Performed Yes   Settings Verified Yes

## 2020-02-24 NOTE — ROUTINE PROCESS
Bedside shift change report given to Ramesh Cabrera RN (oncoming nurse) by Nancie Bateman RN (offgoing nurse). Report included the following information SBAR, Kardex, Intake/Output, MAR and Cardiac Rhythm SR. Visit Vitals /52 Pulse 79 Temp 97.7 °F (36.5 °C) Resp 17 Ht 5' 2\" (1.575 m) Wt 151.7 kg (334 lb 8 oz) SpO2 94% Breastfeeding No  
BMI 61.18 kg/m² Nancie Bateman RN

## 2020-02-24 NOTE — ROUTINE PROCESS
2320 Bedside and Verbal shift change  Received from ASPEN Baires (outgoing nurse), to LETICIA Self (oncoming)  Pt. Is AOX 4. IV SL, Pt. denies  pain at this time. Report included the following information SBAR, Kardex, Procedure Summary, Intake/Output, MAR, Recent Lab Results, and  Cardiac Rhythm @ NS[facility]. Will resume care and monitor Pt. Condition. 
  
 Pt. Educated on call bell when in need of help and assistance. Pt. verbalized understanding. 
  
2350 Pt. Head to toe Assessment Done and documented. 0130  Pt resting comfortably in bed, no sign of distress. 0300  Pt. OOB to bathroom x1 assist. 
 
0500  Pt. Able to rest and sleep well throughout the shift. 9949  Pt. Made no complaints. Verbal and bedside Shift changed report given to Sharee Raza RN (oncoming RN) on Pt. Condition. Report consisted of patients Situation, History, Activities, intake/output,  Background, Assessment and Recommendations(SBAR). Information from the following report(s) Kardex, order Summary, Lab results and MAR was reviewed with the receiving nurse. Opportunity for questions and clarification was provided.

## 2020-02-24 NOTE — PROGRESS NOTES
Transition of care: home with [facility] when medically cleared  Patient lives alone has family that lives nearby  Patient has insurance  Her pcp is Dr. Joseph Aviles. patient has chosen [facility] when she is ready for d/c. Referral has been sent  She has acpap and nebulizer at home  Patient continues to states she wants to return home when d/c.patient reports she is IADL's  Pt is recommending at this time [facility] and rolling walker. patient reports she has a rolling walker  Reports she has a ride home. She denies other needs. Cm will continue to follow  If d/c, will need to see Dr Jeffery Chang and PCP in 1 week    Care Management Interventions  PCP Verified by CM:  Yes  Transition of Care Consult (CM Consult): [address]: No  Reason Outside [place]: Patient already serviced by other home care/hospice agency([facility])  Discharge Durable Medical Equipment: Yes  The Patient and/or Patient Representative was Provided with a Choice of Provider and Agrees with the Discharge Plan?: Yes  Freedom of Choice List was Provided with Basic Dialogue that Supports the Patient's Individualized Plan of Care/Goals, Treatment Preferences and Shares the Quality Data Associated with the Providers?: Yes  Discharge Location  Discharge Placement: Home with home health

## 2020-02-24 NOTE — PROGRESS NOTES
Problem: Mobility Impaired (Adult and Pediatric)  Goal: *Acute Goals and Plan of Care (Insert Text)  Description  In 1-7 days pt will be able to perform:  ST. Supine to sit to supine S with HR for meals. 2.  Sit to stand to sit S with RW in prep for ambulation. LT.  Gait:  Ambulate >50ft S with RW for home/community mobility. 2.  Activity tolerance: Tolerate up in chair 1-2 hours for ADLs. 3.  Patient/Family Education:  Patient/family to be independent with HEP for follow-up care and safe discharge. Outcome: Progressing Towards Goal   PHYSICAL THERAPY TREATMENT    Patient: Loc Milton (53 y.o. female)  Date: 2020  Diagnosis: Hyperkalemia [E87.5]   Hyperkalemia    Precautions: Fall   Chart, physical therapy assessment, plan of care and goals were reviewed. ASSESSMENT:  Pt is talkative. Pt willing to participate. Pt able to maintain 100' with RW CGA. No LOB. However pt does fatigue quickly as pt deconditioned. Less anxiety noted today. VCs for correct breathing tx. Cont POC. Progression toward goals:  []      Improving appropriately and progressing toward goals  [x]      Improving slowly and progressing toward goals  []      Not making progress toward goals and plan of care will be adjusted     PLAN:  Patient continues to benefit from skilled intervention to address the above impairments. Continue treatment per established plan of care.   Discharge Recommendations:  Home Health  Further Equipment Recommendations for Discharge:  rolling walker     SUBJECTIVE:   Patient stated  My kids are driving me crazy     OBJECTIVE DATA SUMMARY:   Critical Behavior:  Neurologic State: Alert  Orientation Level: Oriented X4  Cognition: Appropriate decision making, Appropriate for age attention/concentration, Appropriate safety awareness, Follows commands  Safety/Judgement: Awareness of environment, Fall prevention  Functional Mobility Training:  Bed Mobility:  Rolling: Supervision  Supine to Sit: Supervision  Scooting: Supervision    Transfers:  Sit to Stand: Supervision  Stand to Sit: Supervision    Balance:  Sitting: Intact  Sitting - Static: Good (unsupported)  Standing: Intact; With support  Ambulation/Gait Training:  Distance (ft): 100 Feet (ft)  Assistive Device: Walker, rolling;Gait belt  Ambulation - Level of Assistance: Contact guard assistance  Gait Abnormalities: Decreased step clearance  Base of Support: Widened  Speed/Arlette: Slow  Step Length: Right shortened;Left shortened  Interventions: Verbal cues; Safety awareness training    Pain:  Pain Scale 1: Numeric (0 - 10)  Pain Intensity 1: 0    Activity Tolerance:   Fair     After treatment:   [] Patient left in no apparent distress sitting up in chair  [x] Patient left in no apparent distress in bed  [x] Call bell left within reach  [] Nursing notified  [] Caregiver present  [] Bed alarm activated      Michelle Nicole PTA   Time Calculation: 30 mins

## 2020-02-24 NOTE — ROUTINE PROCESS
Assume care of patient from [facility], [address] [facility]. Patient received in bed resting. Patient A&Ox4. No distress noted. Bed locked in low position. Call bell within reach. 2320- Bedside and Verbal shift change report given to Ely Sanders RN  (oncoming nurse) by Lesly Alvarado RN (offgoing nurse). Report included the following information SBAR, Kardex, Intake/Output, MAR and Recent Results.

## 2020-02-24 NOTE — DISCHARGE SUMMARY
Discharge Summary    Patient: Loc Milton MRN: [id number]  CSN: [id number]    YOB: 1965  Age: 53 y.o. Sex: female    DOA: 2/12/2020 LOS:  LOS: 12 days   Discharge Date:      Primary Care Provider:  Erwin Burgos MD    Admission Diagnoses: Hyperkalemia [E87.5]    Discharge Diagnoses:    Hospital Problems  Date Reviewed: 12/11/2019          Codes Class Noted POA    Acute respiratory failure with hypoxia ([facility] ICD-10-CM: J96.01  ICD-9-CM: 518.81  2/16/2020 Unknown        Diastolic CHF, acute on chronic (HCC) ICD-10-CM: I50.33  ICD-9-CM: 428.33, 428.0  2/16/2020 Unknown        Chest pain ICD-10-CM: R07.9  ICD-9-CM: 786.50  2/13/2020 Unknown        Acute renal failure superimposed on stage 3 chronic kidney disease ([facility] ICD-10-CM: N17.9, N18.3  ICD-9-CM: 584.9, 585.3  2/13/2020 Unknown        * (Principal) Hyperkalemia ICD-10-CM: E87.5  ICD-9-CM: 276.7  2/12/2020 Unknown        АНДРЕЙ (obstructive sleep apnea) ICD-10-CM: G47.33  ICD-9-CM: 327.23  12/11/2019 Yes        Morbid obesity ([facility] ICD-10-CM: E66.01  ICD-9-CM: 278.01  Unknown Yes        Type II or unspecified type diabetes mellitus with renal manifestations, uncontrolled(250.42) ([facility] ICD-10-CM: E11.29, E11.65  ICD-9-CM: 250.42  9/4/2014 Yes        HTN (hypertension) ICD-10-CM: I10  ICD-9-CM: 401.9  8/16/2014 Yes              Discharge Condition: stable     Discharge Medications:     Current Discharge Medication List      START taking these medications    Details   calcium acetate,phosphat bind, (PHOSLO) 667 mg cap Take 1 Cap by mouth three (3) times daily (with meals). Qty: 90 Cap, Refills: 0      carvediloL (COREG) 3.125 mg tablet Take 1 Tab by mouth two (2) times daily (with meals). Qty: 60 Tab, Refills: 0      metOLazone (ZAROXOLYN) 5 mg tablet Take 1 Tab by mouth two (2) times a day.   Qty: 60 Tab, Refills: 0         CONTINUE these medications which have CHANGED    Details   insulin NPH/insulin regular (NOVOLIN 70/30 U-100 INSULIN) 100 unit/mL (70-30) injection 55 Units by SubCUTAneous route every twelve (12) hours. 50 units am; 40 units noon; 35 units pm  Qty: 10 mL, Refills: 0      amLODIPine (NORVASC) 10 mg tablet Take 1 Tab by mouth daily. Qty: 30 Tab, Refills: 0      hydrALAZINE (APRESOLINE) 100 mg tablet Take 1 Tab by mouth three (3) times daily. Qty: 90 Tab, Refills: 0         CONTINUE these medications which have NOT CHANGED    Details   guaiFENesin ER (MUCINEX) 600 mg ER tablet Take 1 Tab by mouth every twelve (12) hours. Qty: 10 Tab, Refills: 0      albuterol-ipratropium (DUO-NEB) 2.5 mg-0.5 mg/3 ml nebu 3 mL by Nebulization route every four (4) hours as needed for Wheezing. Qty: 30 Nebule, Refills: 0      albuterol sulfate 90 mcg/actuation aepb Take 1-2 Puffs by inhalation every four (4) hours as needed. Qty: 1 Inhaler, Refills: 0      DULoxetine (CYMBALTA) 30 mg capsule Take 60 mg by mouth daily. atorvastatin (LIPITOR) 40 mg tablet Take 40 mg by mouth daily. gabapentin (NEURONTIN) 300 mg capsule Take 300 mg by mouth three (3) times daily. furosemide (LASIX) 40 mg tablet Take 1 Tab by mouth daily. Qty: 45 Tab, Refills: 1      aspirin 81 mg chewable tablet Take 1 tablet by mouth daily. Qty: 30 tablet, Refills: 1         STOP taking these medications       predniSONE (STERAPRED) 5 mg dose pack Comments:   Reason for Stopping:         metFORMIN (GLUCOPHAGE) 1,000 mg tablet Comments:   Reason for Stopping:         metoprolol succinate (TOPROL-XL) 50 mg XL tablet Comments:   Reason for Stopping:         losartan (COZAAR) 100 mg tablet Comments:   Reason for Stopping:               Procedures : none     Consults: Cardiology and Nephrology pulmonologist       PHYSICAL EXAM   Visit Vitals  /60   Pulse 78   Temp 97.4 °F (36.3 °C)   Resp 16   Ht 5' 2\" (1.575 m)   Wt 148.9 kg (328 lb 4.2 oz)   SpO2 97%   Breastfeeding No   BMI 60.04 kg/m²     General: Awake, cooperative, no acute distress    HEENT: NC, Atraumatic. PERRLA, EOMI. Anicteric sclerae. Lungs:  CTA Bilaterally. No Wheezing/Rhonchi/Rales. Heart:  Regular  rhythm,  No murmur, No Rubs, No Gallops  Abdomen: Soft, Non distended, Non tender. +Bowel sounds,   Extremities: No c/c/e  Psych:   Not anxious or agitated. Neurologic:  No acute neurological deficits. Admission HPI :   Gwendolyn  is a 53 y.o.  female who history of diabetes, sleep apnea, CHF diastolic dysfunction, asthma presents to the emergency room with multiple complaints. She has chronic pain and fibromyalgia but notes that she had worsening chest pain and worsening dyspnea over the last few days. She also is complaining of diarrhea that started earlier today and difficulty with evacuation. She complains of generalized fatigue not feeling well as well as worsening pain in her ingrown toenails. In the emergency room CT of the abdomen was done with no acute pathology her diarrhea was found to be resolved she was found to be hyperkalemic and she was treated with Lasix I am asked to admit for chest pain hyperkalemia and diarrhea  Hospital Course :   Patient was admitted due to acute renal failure on chronic, hyperkalemia, chest pain/diarrhea. Losartan was hold due to hyperkalemia and lasix was  hold on admission. Lasix was  restarted after renal function was better,  however patient presented shortness of breath with hypoxia was transferred to ICU which needed BiPAP s/p chf exacerbation. Iv lasix was increased and she received  diurectis aggressively. She was transferred back to the floor. Lasix iv is  continued  because of CHF exacerbation and hypoxia. Cardiology has been  on board,  recent stress test which is negative. With the treatment, her sob is back to her baseline and her renal function was stable. She was cleared to be d.c per cardiologist and nephrologist. She was off nc O2 on discharge.  Her sob was back to her baseline   No diarrhea since admission, treating constipation during her stay. Discharge planning discussed with patient, she agrees with the plan and no questions and concerns at this point. Activity: Activity as tolerated    Diet: Cardiac Diet and Diabetic Diet    Follow-up: PC[facility] and dr. Julianna Frazier, pulmonologist     Disposition: home /rehab [facility]     Minutes spent on discharge: 45 min       Labs: Results:       Chemistry Recent Labs     02/24/20  0050 02/23/20  0120 02/22/20  0240   * 247* 158*   * 136 134*   K 3.5 3.9 4.3   CL 97* 99* 97*   CO2 29 28 26   * 93* 86*   CREA 2.61* 2.93* 3.02*   CA 9.0 8.6 8.6   AGAP 9 9 11   BUCR 39* 32* 28*   ALB 2.9* 2.8* 3.1*      CBC w/Diff No results for input(s): WBC, RBC, HGB, HCT, PLT, GRANS, LYMPH, EOS, HGBEXT, HCTEXT, PLTEXT in the last 72 hours. Cardiac Enzymes Recent Labs     02/23/20  1814   CPK 42   CKND1 CALCULATION NOT PERFORMED WHEN RESULT IS BELOW LINEAR LIMIT      Coagulation No results for input(s): PTP, INR, APTT, INREXT in the last 72 hours. Lipid Panel Lab Results   Component Value Date/Time    Cholesterol, total 146 05/28/2015 02:58 AM    HDL Cholesterol 64 (H) 05/28/2015 02:58 AM    LDL, calculated 66 05/28/2015 02:58 AM    VLDL, calculated 16 05/28/2015 02:58 AM    Triglyceride 80 05/28/2015 02:58 AM    CHOL/HDL Ratio 2.3 05/28/2015 02:58 AM      BNP No results for input(s): BNPP in the last 72 hours. Liver Enzymes Recent Labs     02/24/20  0050   ALB 2.9*      Thyroid Studies Lab Results   Component Value Date/Time    TSH 0.56 01/23/2019 01:33 AM            Significant Diagnostic Studies: Nm Lung Vent/perf    Result Date: 2/12/2020  VQ SCAN INDICATION: Chest pain and dyspnea. COMPARISON: Chest x-ray performed earlier in day. VQ scan - 01/17/2019. SITE OF INJECTION: Left antecubital fossa. DESCRIPTION: After aerosolization of 0.8 mCi 99m Tc-DTPA, ventilatory images of the lungs were obtained in multiple projections.   After intravenous administration of 6.6 mCi 99m Tc-MAA, perfusion images of the lungs were obtained in multiple projections. Images are correlated with chest radiographic study which demonstrate no evidence of focal pulmonary infiltrate or pleural effusion. [The distribution of radiopharmaceutical is within normal limits on ventilatory and perfusion images. Ventilatory Central clumping of radiotracer most suggestive of an obstructive pulmonary process is unchanged from prior imaging. There is no evidence of moderate or large mismatched subsegmental perfusion defect to indicate the presence of pulmonary embolism. Incidental small, chronic photopenic defect in the anterior left upper lobe is unchanged from remote scintigraphy.]      IMPRESSION: [Low probability of pulmonary embolism. Xr Foot Rt Min 3 V    Result Date: 2/16/2020  EXAM: Right foot INDICATION: Right foot pain. Injury first toe COMPARISON: Right foot series 10/5/2019 _______________ FINDINGS: 3 views were performed. No acute fracture or dislocation. Bones are demineralized with mild narrowing first MTP joint. Dorsal and plantar calcaneal spurs. Possible injury to the nail of the first toe. Prominent dorsal soft tissue swelling increased over comparison study. Vascular calcifications are present. _______________     IMPRESSION: 1. No acute bony abnormality. 2. Osteopenia with degenerative changes. 3. Prominent dorsal soft tissue swelling. 4. Probable injury to the nail of the first toe. 5. Atherosclerosis. Ct Abd Pelv Wo Cont    Result Date: 2/12/2020  EXAM: CT of the abdomen and pelvis CLINICAL INDICATION/HISTORY:  Abdominal pain and diarrhea. COMPARISON: 07/17/2014. TECHNIQUE: Axial CT imaging of the abdomen and pelvis was performed without contrast. Multiplanar reformats were generated. One or more dose reduction techniques were used on this CT: automated exposure control, adjustment of the mAs and/or kVp according to patient size, and iterative reconstruction techniques.   The specific techniques used on this CT exam have been documented in the patient's electronic medical record. Digital Imaging and Communications in Medicine (DICOM) format image data are available to nonaffiliated external healthcare facilities or entities on a secure, media free, reciprocally searchable basis with patient authorization for at least a 12-month period after this study. _______________ FINDINGS: LOWER CHEST: The visualized lung bases are clear. Heart size is normal. There is no pericardial or pleural effusion. LIVER, BILIARY: Liver is normal with no focal parenchymal lesion identified. There is no intra-or extrahepatic biliary ductal dilatation. Gallbladder is surgically absent. PANCREAS: Normal. SPLEEN: Normal. ADRENALS: Normal. KIDNEYS: Normal. There is no nephrolithiasis. There is no hydronephrosis, hydroureter, or other signs of obstructive nephropathy. LYMPH NODES: No enlarged lymph nodes. GASTROINTESTINAL TRACT: There is no evidence of bowel obstruction. While limited without contrast, there is no definitive wall thickening.] The appendix is visualized and unremarkable. PELVIC ORGANS: Unremarkable. VASCULATURE: Unremarkable. BONES: No acute or aggressive osseous abnormalities identified. OTHER: There is no free intraperitoneal fluid or free air. SUPERFICIAL SOFT TISSUES: Mild inflammatory changes at the pannus. There is a fat-containing ventral hernia. Please note that evaluation of the intra-abdominal structures is somewhat limited due to lack of oral or IV contrast. _______________     IMPRESSION: 1. No evidence of nephrolithiasis, hydronephrosis, or other signs of obstructive nephropathy. 2. No evidence of bowel obstruction or acute inflammatory process in the abdomen or pelvis. 3. Superficial pannus inflammatory changes. Please correlate for clinical signs of cellulitis.     Xr Chest Port    Result Date: 2/17/2020  EXAM: XR CHEST PORT CLINICAL INDICATION/HISTORY: CHF -Additional: None COMPARISON: 2/16/2020 TECHNIQUE: Portable frontal view of the chest _______________ FINDINGS: SUPPORT DEVICES: None. HEART AND MEDIASTINUM: Cardiomegaly LUNGS AND PLEURAL SPACES: Vascular congestion and interstitial edema. No large effusion or pneumothorax. BONY THORAX AND SOFT TISSUES: Unremarkable. _______________     IMPRESSION: Cardiomegaly. Vascular congestion and interstitial edema. Xr Chest Port    Result Date: 2/16/2020  EXAM: XR CHEST PORT CLINICAL INDICATION/HISTORY: Shortness of breath -Additional: None COMPARISON: 2/16/2020 TECHNIQUE: Portable frontal view of the chest _______________ FINDINGS: SUPPORT DEVICES: None. HEART AND MEDIASTINUM: Cardiac silhouette appears enlarged, unchanged. Table mediastinal contours. LUNGS AND PLEURAL SPACES: Persistent central vascular congestion and faint interstitial opacities. No pneumothorax or large pleural effusion. BONY THORAX AND SOFT TISSUES: Unremarkable. _______________     IMPRESSION: Cardiac enlargement with persistent findings of central vascular congestion and mild interstitial edema. Xr Chest Port    Result Date: 2/16/2020  EXAM: CHEST RADIOGRAPH, SINGLE VIEW CLINICAL INDICATION/HISTORY: SOB, Desaturation      <Additional:  None. COMPARISON: 2/12/2020 TECHNIQUE: Portable frontal view of the chest was obtained. _______________ FINDINGS: SUPPORT DEVICES: None. HEART AND MEDIASTINUM: Cardiac silhouette is mildly prominent without change. Superior mediastinum appears normal. LUNGS AND PLEURAL SPACES: Pulmonary vascular redistribution is present with interval increased degree of pulmonary vascular plumpness. No dayna alveolar edema. Costophrenic angles are sharp. No pneumothorax. BONY THORAX AND SOFT TISSUES: No acute osseous abnormality. _______________     IMPRESSION: CHF. Xr Chest Port    Result Date: 2/12/2020  EXAM: One view chest x-ray CLINICAL INDICATION/HISTORY: Chest pain and dyspnea. COMPARISON: 12/10/2019.  TECHNIQUE: Single AP view of the chest was obtained. _______________ FINDINGS: HEART, VESSELS, MEDIASTINUM: Heart size is stable. No vascular congestion. LUNGS, PLEURAL SPACES: The lungs are clear. No effusion or pneumothorax. BONY THORAX, SOFT TISSUES: Unremarkable. _______________     IMPRESSION: No acute cardiopulmonary process.             [facility]     CC: Luiz Alvarez MD

## 2020-02-24 NOTE — DISCHARGE INSTRUCTIONS
DISCHARGE SUMMARY from Nurse    PATIENT INSTRUCTIONS:      What to do at Home:  Recommended activity: Activity as tolerated. *  Please give a list of your current medications to your Primary Care Provider. *  Please update this list whenever your medications are discontinued, doses are      changed, or new medications (including over-the-counter products) are added. *  Please carry medication information at all times in case of emergency situations. These are general instructions for a healthy lifestyle:    No smoking/ No tobacco products/ Avoid exposure to second hand smoke  Surgeon General's Warning:  Quitting smoking now greatly reduces serious risk to your health. Obesity, smoking, and sedentary lifestyle greatly increases your risk for illness    A healthy diet, regular physical exercise & weight monitoring are important for maintaining a healthy lifestyle    You may be retaining fluid if you have a history of heart failure or if you experience any of the following symptoms:  Weight gain of 3 pounds or more overnight or 5 pounds in a week, increased swelling in our hands or feet or shortness of breath while lying flat in bed. Please call your doctor as soon as you notice any of these symptoms; do not wait until your next office visit. The discharge information has been reviewed with the patient. The patient verbalized understanding. Discharge medications reviewed with the patient and appropriate educational materials and side effects teaching were provided.   ___________________________________________________________________________________________________________________________________

## 2020-02-25 NOTE — PROGRESS NOTES
Cardiology Progress Note        Patient: Amy Gaspar        Sex: female          DOA: 2/12/2020  YOB: 1965      Age:  53 y.o.        LOS:  LOS: 12 days    Patient seen and examined, chart reviewed. Assessment/Plan     Patient Active Problem List   Diagnosis Code    HTN (hypertension) I10    Type II or unspecified type diabetes mellitus with renal manifestations, uncontrolled(250.42) ([facility] E11.29, E11.65    Morbid obesity ([facility] E66.01    Respiratory distress R06.03    Asthma exacerbation J45. 652    Diastolic CHF (HCC) H85.57    АНДРЕЙ (obstructive sleep apnea) G47.33    UTI (urinary tract infection) N39.0    Hyperkalemia E87.5         Acute renal failure superimposed on stage 3 chronic kidney disease (HCC) N17.9, N18.3      Acute hypoxic respiratory failure from volume overload  As per chart it appears she gained 37 pounds from 12 February to 16 February. Patient was transferred to ICU and she was on BiPAP for hypoxic respiratory failure  Mid sternal chest pain - resolved - negative troponin       Plan:      Continue Lasix  And metolazone as per Nephrologist  Strict input output  Salt restriction up to 2 g per day and fluid restriction up to 1.5 L per day  Continue carvedilol and amlodipine  Continue management as per hospital medicine  Follow up in cardiology clinic 4 weeks after discharge  Plan discussed with               Subjective:    cc: My breathing is better, Leg swelling in improving       REVIEW OF SYSTEMS:     General: No fevers or chills.   Cardiovascular: Positive chest pain,No palpitations, No orthopnea, No PND, positive leg swelling, No claudication, positive shortness of breath on exertion  Pulmonary: No dyspnea   Gastrointestinal: No nausea, vomiting, bleeding  Neurology: No Dizziness    Objective:      Visit Vitals  /60   Pulse 78   Temp 97.4 °F (36.3 °C)   Resp 16   Ht 5' 2\" (1.575 m)   Wt 148.9 kg (328 lb 4.2 oz)   SpO2 97% Breastfeeding No   BMI 60.04 kg/m²     Body mass index is 60.04 kg/m². Physical Exam:  General Appearance: Comfortable, not using accessory muscles of respiration. HEENT: STEPHANIE. HEAD: Atraumatic  NECK: No JVD, no thyroidomeglay. CAROTIDS: No bruit   LUNGS: Clear bilaterally. HEART: S1+S2 audible, no murmur, no pericardial rub. ABD: Non-tender, BS Audible    EXT: ++ lower extremity edema, and no cyanosis. VASCULAR EXAM: Pulses are intact. PSYCHIATRIC EXAM: Mood is appropriate. MUSCULOSKELETAL: Grossly no joint deformity. NEUROLOGICAL: AAO times 3, No motor and sensory deficit  Medication:  No current facility-administered medications for this encounter. Current Outpatient Medications   Medication Sig    insulin NPH/insulin regular (NOVOLIN 70/30 U-100 INSULIN) 100 unit/mL (70-30) injection 55 Units by SubCUTAneous route every twelve (12) hours. 50 units am; 40 units noon; 35 units pm    [START ON 2/25/2020] calcium acetate,phosphat bind, (PHOSLO) 667 mg cap Take 1 Cap by mouth three (3) times daily (with meals).  [START ON 2/25/2020] carvediloL (COREG) 3.125 mg tablet Take 1 Tab by mouth two (2) times daily (with meals).  [START ON 2/25/2020] metOLazone (ZAROXOLYN) 5 mg tablet Take 1 Tab by mouth two (2) times a day.  [START ON 2/25/2020] amLODIPine (NORVASC) 10 mg tablet Take 1 Tab by mouth daily.  hydrALAZINE (APRESOLINE) 100 mg tablet Take 1 Tab by mouth three (3) times daily.  guaiFENesin ER (MUCINEX) 600 mg ER tablet Take 1 Tab by mouth every twelve (12) hours.  albuterol-ipratropium (DUO-NEB) 2.5 mg-0.5 mg/3 ml nebu 3 mL by Nebulization route every four (4) hours as needed for Wheezing.  albuterol sulfate 90 mcg/actuation aepb Take 1-2 Puffs by inhalation every four (4) hours as needed.  DULoxetine (CYMBALTA) 30 mg capsule Take 60 mg by mouth daily.  atorvastatin (LIPITOR) 40 mg tablet Take 40 mg by mouth daily.    [place], Medication for irregular heart beat    gabapentin (NEURONTIN) 300 mg capsule Take 300 mg by mouth three (3) times daily.  furosemide (LASIX) 40 mg tablet Take 1 Tab by mouth daily. (Patient taking differently: Take 40 mg by mouth two (2) times a day.)    aspirin 81 mg chewable tablet Take 1 tablet by mouth daily. Lab/Data Reviewed:       No results for input(s): WBC, HGB, HCT, PLT, HGBEXT, HCTEXT, PLTEXT, HGBEXT, HCTEXT, PLTEXT in the last 72 hours.   Recent Labs     02/24/20  0050 02/23/20  0120 02/22/20  0240   * 136 134*   K 3.5 3.9 4.3   CL 97* 99* 97*   CO2 29 28 26   * 247* 158*   * 93* 86*   CREA 2.61* 2.93* 3.02*   CA 9.0 8.6 8.6     Signed By: Eulis Curling, MD     February 24, 2020

## 2020-02-26 LAB
ATRIAL RATE: 81 BPM
CALCULATED P AXIS, ECG09: 55 DEGREES
CALCULATED R AXIS, ECG10: 54 DEGREES
CALCULATED T AXIS, ECG11: 78 DEGREES
DIAGNOSIS, 93000: NORMAL
P-R INTERVAL, ECG05: 146 MS
Q-T INTERVAL, ECG07: 406 MS
QRS DURATION, ECG06: 100 MS
QTC CALCULATION (BEZET), ECG08: 471 MS
VENTRICULAR RATE, ECG03: 81 BPM

## 2020-02-26 NOTE — ADT AUTH CERT NOTES
Systemic or Infectious Condition GRG - Care Day 11 (2/22/2020) by Miranda Botello  
 
   
Review Status Review Entered Completed 2/24/2020 16:00  
   
Criteria Review Care Day: 11 Care Date: 2/22/2020 Level of Care: Telemetry Guideline Day 3 Level Of Care   
(X) * Activity level acceptable 2/24/2020 16:00:41 EST by Petrona Paredes AMBULATED to restroom . refused PT - emotional   
Clinical Status   
(X) * Hemodynamic stability ( ) * Respiratory status acceptable 2/24/2020 16:00:41 EST by Tegan Snell   
  Patient reports HORAN with minimal activity   
(X) * Neurologic status acceptable   
(X) * Temperature status acceptable   
(X) * No infection, or status acceptable   
(X) * No neutropenia, or status acceptable   
(X) * Special infection or injury situations absent   
( ) * Electrolyte status acceptable 2/24/2020 15:50:56 EST by Tegan Snell   
  na 134, chloride 97,  gluc 158,  bun 86, cr 3.02,  phos 5.0,  gfr est aa 19, albumin 3.1.   
( ) * General Discharge Criteria met Interventions   
(X) * Intake acceptable ( ) * No inpatient interventions needed 2/24/2020 15:53:09 EST by Tegan Snell   
  Lasix 80 mg iv bid,  
ssi sc x 4 today. the zaroxolyn dosage as listed earlier is an increase from once to twice daily. 2/24/2020 15:50:56 EST by Tegan Snell   
  po meds; norvasc q d 10 mg , asa q d, phoslo tid, coreg 3.1 mg bid,  cymbalata q d, neurontin 300 mg tid,APRESOLINE 100 MG tid,  
zaroxolyn 5 mg bid, percocet 1 tab. mirilax x 1. OTHER  
 heparin 5000 u sc q 8h, lantus 15 u sc q hs. humalog 5 u sc tid * Milestone Additional Notes 97.8 °F (36.6 °C) 81 150/75 - - 16   
96 % Room air  330 lb  
  
----------NEPHROLOGY Impression: 1. Acute on CKD stage 3. Renal function Improving slowly. Cr  3.0 today.  Baseline Cr of 1.6 in Dec.   
2. Anasarca /fluid over load 3. DMN . Proteinuria 4. Hyponatremia , mild 5. Hyperphosphatemia,  on Phoslo tid 6. Pulm HTN w/ Rt side CHF 7. АНДРЕЙ, CPAP   
   
Recommendations:  
Increase IV Lasix and Metolazone bid ** Fluid restriction of 1500 ml a day Renal panel 0400   
 Low salt diet   
   
Subjective: Familia Newby a 53 y. o. female with PMHx of HTN, uncontrolled DM, CKD, D-CMP and Anemia who presented with bouts of diarrhea and nausea. She also had complaints of SOB and CP. On initial eval in the ED her HR was in the 50's otherwise stable VS. Labs showed K of 6.0 and Cr 2.69. Home med included Losartan. Pt  Is followed by Dr Didier Cleaning for her CKD and recent baseline Cr in Dec was ~1.6. VQ scan, CxR, CT lower chest/abd/pelvis, and LE doppler US were all unremarkable. Echo showed EF 75%, mild diastolic dysfunction and mild- to mod Pulm HTN. Pt was treated medically for high K and received a dose of Lasix. Hyperkalemia corrected but Cr slightly up to 2.8. No diarrhea, N/V. Patient reports HORAN with minimal activity .   
  
----------MEDICINE Acute respiratory failure with hypoxia   
Resolving,will wean off nc O2   
Continue lasix, dose decreased to once daily   
   
Acute on chronic chf ,diastolic Continue fluid restriction, low sat , lasix Cardiologist on board    
   
 acute on chronic renal failure 3 Cr 2.99  continue monitoring renal function and electrolytes   
   
Chest Pain -No pain now. ce wnl   
recent stress test in December in 2019 Case discussed with Mary oconnell cardiac V/q scan no PE   
   
constipation -Continue  mirlax , colace.     
  DM - Lantus and sliding scale hold metformin  
   
 Sleep Apnea -cpap at night-now bipap .   
  Pulmonary HTN   
VQ scan obtained in the emergency room low probability PE Negative for dvt.   
 htn Continue   norvasc and hydralyzine, continue hold metoprolol due to jono .   
Foot injury No acute issue from x-ray .   
Urinary retention - Remove terrell, voiding trial.   
Disposition : 1-2 days.  
  
  ----------CARDIOLOGY She is in 2550 negative balance in last 24 hours  
   
Plan:Continue IV Lasix to 80 mg once a day Continue Metolazone Strict input output Salt restriction up to 2 g per day and fluid restriction up to 1.5 L per day Continue carvedilol and amlodipine Continue management as per hospital medicine Monitor renal function and electrolytes.   
     
Subjective: cc:  
Denies any chest pain , complaining of shortness of breath on exertion, denies any orthopnea or PND, complaining of leg swelling.  
  
   
Systemic or Infectious Condition GRG - Care Day 10 (2/21/2020) by Carlos Lesches  
 
   
Review Status Review Entered Completed 2/21/2020 14:30  
   
Criteria Review Care Day: 10 Care Date: 2/21/2020 Level of Care: Telemetry Guideline Day 3 Level Of Care   
(X) * Activity level acceptable 2/21/2020 14:30:32 EST by Rose Mary Carrasco has been noted to ambulate yesterday with PT who recommends [facility] upon DC. Clinical Status   
(X) * Hemodynamic stability 2/21/2020 14:28:43 EST by Heena Mims   
  vs ;97.4-73 - 18  , sat 95 RA. /69,  LB. (X) * Respiratory status acceptable   
(X) * Neurologic status acceptable   
(X) * Temperature status acceptable   
(X) * No infection, or status acceptable   
(X) * No neutropenia, or status acceptable   
(X) * Special infection or injury situations absent   
( ) * Electrolyte status acceptable 2/21/2020 14:28:43 EST by Myriam Menendez; wbc 7.7 , hh 8.5/ 28.1,  
na 132, gluc 137, 
** bun 84, cr 3.19, phos 5.3, gfr est aa 18, 
  t protein 6.2, albumin 3.0, alk phos 135. ( ) * General Discharge Criteria met Interventions   
(X) * Intake acceptable ( ) * No inpatient interventions needed 2/21/2020 14:28:43 EST by Heena Mims   
  zaroxolyn 5 mg po daily, mirilax po bid 
other; 
 heparin sc 5000 u q 8h, lantus 15 u sc q hs, ssi sc x 4 over 24h, 
humalog 5 u sc tid Lasix iv bid -change to 80 mg iv q day 2/21/2020 14:22:43 EST by Charlene Veliz   
  po MEDS; tylenol 650 mg , norvasc 10 mg q d, asa 81 mg q d, lipitor q d, phoslo tid, coreg 3.1 mg bid, cymbalata 60 mg q d, neurontin 300 mg tid, Apresoline 100 mg tid, * Milestone Additional Notes  
------------2/21 Medicine Patient was admitted due to acute renal failure on chronic, hyperkalemia, chest pain/diarrhea. Faye Slay was hold due to hyperkalemia and lasix was [facility] on admission. Lasix was  restarted after renal function was better,  however patient presented shortness of breath with hypoxia was transferred to ICU which needed BiPAP. She was transferred back to the floor.  Lasix iv is  continued  because of CHF exacerbation and hypoxia.  Cardiology has been [facility],  recent stress test which is negative. No diarrhea since admission, treating constipation now.    
   
Acute respiratory failure with hypoxia   
Resolving,will wean off nc O2   
Continue lasix, dose decreased to once daily   
   
Acute on chronic chf ,diastolic Continue fluid restriction, low sat , lasix Cardiologist on board    
   
 acute on chronic renal failure 3 Cr 2.99  continue monitoring renal function and electrolytes   
   
Chest Pain -No pain now. ce wnl   
recent stress test in December in 2019 Case discussed with Anjel oconnell cardiac V/q scan no PE   
   
constipation -Continue  mirlax , colace,   
     
  DM - Lantus and sliding scale hold metformin  
   
 Sleep Apnea -cpap at night-now bipap   
   
  Pulmonary HTN -VQ scan obtained in the emergency room low probability PE -Negative for dvt  
   
 htn-Continue   norvasc and hydralyzine, continue hold metoprolol due to jono   
   
Foot injury -No acute issue from x-ray   
    
Disposition : 1-2 days. --------------( per CARDIOLOGY in PM of 2/20;    
Acute hypoxic respiratory failure from volume overload As per chart it appears she gained 37 pounds   
from 12 February to 16 February. Patient was transferred to ICU and she was on BiPAP for hypoxic respiratory failure  
   
Plan:   
Change IV Lasix to 80 mg once a day Agree with adding Metolazone Strict input output Salt restriction up to 2 g per day and fluid restriction up to 1.5 L per day Continue carvedilol and amlodipine Continue management as per hospital medicine Monitor renal function and electrolytes.   
         
Subjective: cc:  
Denies any chest pain , complaining of shortness of breath on exertion, denies any orthopnea or PND, complaining of leg swelling   
   
REVIEW OF SYSTEMS:    
General: No fevers or chills. Cardiovascular: No chest pain,No palpitations, No orthopnea, No PND, positive leg swelling, No claudication, positive shortness of breath on exertion. EXAM ; lungs clear EXT: +++ lower extremity edema, and no cyanosis

## 2020-03-05 ENCOUNTER — APPOINTMENT (OUTPATIENT)
Dept: MRI IMAGING | Age: 55
End: 2020-03-05
Attending: FAMILY MEDICINE
Payer: COMMERCIAL

## 2020-03-05 ENCOUNTER — HOSPITAL ENCOUNTER (OUTPATIENT)
Age: 55
Setting detail: OBSERVATION
Discharge: HOME | End: 2020-03-07
Attending: EMERGENCY MEDICINE | Admitting: FAMILY MEDICINE
Payer: COMMERCIAL

## 2020-03-05 ENCOUNTER — APPOINTMENT (OUTPATIENT)
Dept: GENERAL RADIOLOGY | Age: 55
End: 2020-03-05
Attending: PHYSICIAN ASSISTANT
Payer: COMMERCIAL

## 2020-03-05 ENCOUNTER — APPOINTMENT (OUTPATIENT)
Dept: CT IMAGING | Age: 55
End: 2020-03-05
Attending: PHYSICIAN ASSISTANT
Payer: COMMERCIAL

## 2020-03-05 DIAGNOSIS — E16.2 HYPOGLYCEMIA: ICD-10-CM

## 2020-03-05 DIAGNOSIS — R41.82 ALTERED MENTAL STATUS, UNSPECIFIED ALTERED MENTAL STATUS TYPE: Primary | ICD-10-CM

## 2020-03-05 DIAGNOSIS — N30.00 ACUTE CYSTITIS WITHOUT HEMATURIA: ICD-10-CM

## 2020-03-05 LAB
ALBUMIN SERPL-MCNC: 3.2 G/DL (ref 3.4–5)
ALBUMIN/GLOB SERPL: 0.9 {RATIO} (ref 0.8–1.7)
ALP SERPL-CCNC: 152 U/L (ref 45–117)
ALT SERPL-CCNC: 27 U/L (ref 13–56)
ANION GAP SERPL CALC-SCNC: 9 MMOL/L (ref 3–18)
APPEARANCE UR: ABNORMAL
ARTERIAL PATENCY WRIST A: YES
AST SERPL-CCNC: 20 U/L (ref 10–38)
ATRIAL RATE: 71 BPM
BACTERIA URNS QL MICRO: ABNORMAL /HPF
BASE EXCESS BLD CALC-SCNC: 2 MMOL/L
BASOPHILS # BLD: 0 K/UL (ref 0–0.1)
BASOPHILS NFR BLD: 0 % (ref 0–2)
BDY SITE: ABNORMAL
BILIRUB SERPL-MCNC: 0.4 MG/DL (ref 0.2–1)
BILIRUB UR QL: NEGATIVE
BNP SERPL-MCNC: 949 PG/ML (ref 0–900)
BODY TEMPERATURE: 98.6
BUN SERPL-MCNC: 81 MG/DL (ref 7–18)
BUN/CREAT SERPL: 35 (ref 12–20)
CALCIUM SERPL-MCNC: 8.7 MG/DL (ref 8.5–10.1)
CALCULATED P AXIS, ECG09: 48 DEGREES
CALCULATED R AXIS, ECG10: 49 DEGREES
CALCULATED T AXIS, ECG11: 40 DEGREES
CHLORIDE SERPL-SCNC: 105 MMOL/L (ref 100–111)
CK MB CFR SERPL CALC: NORMAL % (ref 0–4)
CK MB SERPL-MCNC: <1 NG/ML (ref 5–25)
CK SERPL-CCNC: 52 U/L (ref 26–192)
CO2 SERPL-SCNC: 29 MMOL/L (ref 21–32)
COLOR UR: YELLOW
CREAT SERPL-MCNC: 2.34 MG/DL (ref 0.6–1.3)
DIAGNOSIS, 93000: NORMAL
DIFFERENTIAL METHOD BLD: ABNORMAL
EOSINOPHIL # BLD: 0.1 K/UL (ref 0–0.4)
EOSINOPHIL NFR BLD: 1 % (ref 0–5)
EPITH CASTS URNS QL MICRO: ABNORMAL /LPF (ref 0–5)
ERYTHROCYTE [DISTWIDTH] IN BLOOD BY AUTOMATED COUNT: 15.7 % (ref 11.6–14.5)
GAS FLOW.O2 O2 DELIVERY SYS: ABNORMAL L/MIN
GLOBULIN SER CALC-MCNC: 3.5 G/DL (ref 2–4)
GLUCOSE BLD STRIP.AUTO-MCNC: 150 MG/DL (ref 70–110)
GLUCOSE BLD STRIP.AUTO-MCNC: 153 MG/DL (ref 70–110)
GLUCOSE BLD STRIP.AUTO-MCNC: 99 MG/DL (ref 70–110)
GLUCOSE SERPL-MCNC: 148 MG/DL (ref 74–99)
GLUCOSE UR STRIP.AUTO-MCNC: NEGATIVE MG/DL
HCO3 BLD-SCNC: 27.2 MMOL/L (ref 22–26)
HCT VFR BLD AUTO: 28.3 % (ref 35–45)
HGB BLD-MCNC: 8.5 G/DL (ref 12–16)
HGB UR QL STRIP: NEGATIVE
KETONES UR QL STRIP.AUTO: NEGATIVE MG/DL
LEUKOCYTE ESTERASE UR QL STRIP.AUTO: ABNORMAL
LYMPHOCYTES # BLD: 0.9 K/UL (ref 0.9–3.6)
LYMPHOCYTES NFR BLD: 8 % (ref 21–52)
MCH RBC QN AUTO: 25.1 PG (ref 24–34)
MCHC RBC AUTO-ENTMCNC: 30 G/DL (ref 31–37)
MCV RBC AUTO: 83.5 FL (ref 74–97)
MONOCYTES # BLD: 0.5 K/UL (ref 0.05–1.2)
MONOCYTES NFR BLD: 4 % (ref 3–10)
NEUTS SEG # BLD: 9.6 K/UL (ref 1.8–8)
NEUTS SEG NFR BLD: 87 % (ref 40–73)
NITRITE UR QL STRIP.AUTO: POSITIVE
O2/TOTAL GAS SETTING VFR VENT: 21 %
P-R INTERVAL, ECG05: 144 MS
PCO2 BLD: 45.8 MMHG (ref 35–45)
PH BLD: 7.38 [PH] (ref 7.35–7.45)
PH UR STRIP: 5 [PH] (ref 5–8)
PLATELET # BLD AUTO: 208 K/UL (ref 135–420)
PMV BLD AUTO: 9.6 FL (ref 9.2–11.8)
PO2 BLD: 63 MMHG (ref 80–100)
POTASSIUM SERPL-SCNC: 3.9 MMOL/L (ref 3.5–5.5)
PROT SERPL-MCNC: 6.7 G/DL (ref 6.4–8.2)
PROT UR STRIP-MCNC: 300 MG/DL
Q-T INTERVAL, ECG07: 434 MS
QRS DURATION, ECG06: 102 MS
QTC CALCULATION (BEZET), ECG08: 471 MS
RBC # BLD AUTO: 3.39 M/UL (ref 4.2–5.3)
RBC #/AREA URNS HPF: ABNORMAL /HPF (ref 0–5)
SAO2 % BLD: 91 % (ref 92–97)
SERVICE CMNT-IMP: ABNORMAL
SODIUM SERPL-SCNC: 143 MMOL/L (ref 136–145)
SP GR UR REFRACTOMETRY: 1.01 (ref 1–1.03)
SPECIMEN TYPE: ABNORMAL
TOTAL RESP. RATE, ITRR: 16
TROPONIN I SERPL-MCNC: <0.02 NG/ML (ref 0–0.04)
UROBILINOGEN UR QL STRIP.AUTO: 0.2 EU/DL (ref 0.2–1)
VENTRICULAR RATE, ECG03: 71 BPM
WBC # BLD AUTO: 11.2 K/UL (ref 4.6–13.2)
WBC URNS QL MICRO: >100 /HPF (ref 0–5)

## 2020-03-05 PROCEDURE — 74011250636 HC RX REV CODE- 250/636: Performed by: FAMILY MEDICINE

## 2020-03-05 PROCEDURE — 81001 URINALYSIS AUTO W/SCOPE: CPT

## 2020-03-05 PROCEDURE — 36600 WITHDRAWAL OF ARTERIAL BLOOD: CPT

## 2020-03-05 PROCEDURE — 74011250636 HC RX REV CODE- 250/636: Performed by: PHYSICIAN ASSISTANT

## 2020-03-05 PROCEDURE — 87186 SC STD MICRODIL/AGAR DIL: CPT

## 2020-03-05 PROCEDURE — 82962 GLUCOSE BLOOD TEST: CPT

## 2020-03-05 PROCEDURE — 96375 TX/PRO/DX INJ NEW DRUG ADDON: CPT

## 2020-03-05 PROCEDURE — 96374 THER/PROPH/DIAG INJ IV PUSH: CPT

## 2020-03-05 PROCEDURE — 83880 ASSAY OF NATRIURETIC PEPTIDE: CPT

## 2020-03-05 PROCEDURE — 82550 ASSAY OF CK (CPK): CPT

## 2020-03-05 PROCEDURE — 85025 COMPLETE CBC W/AUTO DIFF WBC: CPT

## 2020-03-05 PROCEDURE — 70450 CT HEAD/BRAIN W/O DYE: CPT

## 2020-03-05 PROCEDURE — 87086 URINE CULTURE/COLONY COUNT: CPT

## 2020-03-05 PROCEDURE — 93005 ELECTROCARDIOGRAM TRACING: CPT

## 2020-03-05 PROCEDURE — 99285 EMERGENCY DEPT VISIT HI MDM: CPT

## 2020-03-05 PROCEDURE — 82803 BLOOD GASES ANY COMBINATION: CPT

## 2020-03-05 PROCEDURE — 74011000250 HC RX REV CODE- 250: Performed by: PHYSICIAN ASSISTANT

## 2020-03-05 PROCEDURE — 65270000029 HC RM PRIVATE

## 2020-03-05 PROCEDURE — 99218 HC RM OBSERVATION: CPT

## 2020-03-05 PROCEDURE — 80053 COMPREHEN METABOLIC PANEL: CPT

## 2020-03-05 PROCEDURE — 74011250637 HC RX REV CODE- 250/637: Performed by: FAMILY MEDICINE

## 2020-03-05 PROCEDURE — 87077 CULTURE AEROBIC IDENTIFY: CPT

## 2020-03-05 PROCEDURE — 71045 X-RAY EXAM CHEST 1 VIEW: CPT

## 2020-03-05 PROCEDURE — 74011000250 HC RX REV CODE- 250: Performed by: FAMILY MEDICINE

## 2020-03-05 RX ORDER — FUROSEMIDE 10 MG/ML
20 INJECTION INTRAMUSCULAR; INTRAVENOUS ONCE
Status: COMPLETED | OUTPATIENT
Start: 2020-03-05 | End: 2020-03-05

## 2020-03-05 RX ORDER — SODIUM CHLORIDE 0.9 % (FLUSH) 0.9 %
5-40 SYRINGE (ML) INJECTION AS NEEDED
Status: DISCONTINUED | OUTPATIENT
Start: 2020-03-05 | End: 2020-03-07 | Stop reason: HOSPADM

## 2020-03-05 RX ORDER — SODIUM CHLORIDE 0.9 % (FLUSH) 0.9 %
5-40 SYRINGE (ML) INJECTION EVERY 8 HOURS
Status: DISCONTINUED | OUTPATIENT
Start: 2020-03-05 | End: 2020-03-07 | Stop reason: HOSPADM

## 2020-03-05 RX ORDER — DIPHENHYDRAMINE HYDROCHLORIDE 50 MG/ML
12.5 INJECTION, SOLUTION INTRAMUSCULAR; INTRAVENOUS
Status: DISCONTINUED | OUTPATIENT
Start: 2020-03-05 | End: 2020-03-07 | Stop reason: HOSPADM

## 2020-03-05 RX ORDER — ACETAMINOPHEN 325 MG/1
650 TABLET ORAL
Status: DISCONTINUED | OUTPATIENT
Start: 2020-03-05 | End: 2020-03-07 | Stop reason: SDUPTHER

## 2020-03-05 RX ORDER — ACETAMINOPHEN 325 MG/1
650 TABLET ORAL
Status: DISCONTINUED | OUTPATIENT
Start: 2020-03-05 | End: 2020-03-07 | Stop reason: HOSPADM

## 2020-03-05 RX ADMIN — CEFTRIAXONE 1 G: 1 INJECTION, POWDER, FOR SOLUTION INTRAMUSCULAR; INTRAVENOUS at 13:14

## 2020-03-05 RX ADMIN — ACETAMINOPHEN 650 MG: 325 TABLET ORAL at 22:30

## 2020-03-05 RX ADMIN — Medication 10 ML: at 17:51

## 2020-03-05 RX ADMIN — FUROSEMIDE 20 MG: 10 INJECTION, SOLUTION INTRAMUSCULAR; INTRAVENOUS at 22:30

## 2020-03-05 RX ADMIN — Medication 10 ML: at 22:00

## 2020-03-05 NOTE — ED PROVIDER NOTES
EMERGENCY DEPARTMENT HISTORY AND PHYSICAL EXAM    Date: 3/5/2020  Patient Name: Carolene Hammans    History of Presenting Illness     Chief Complaint   Patient presents with    Low Blood Sugar         History Provided By: Patient and EMS    9:21 AM  Carolene Hammans is a 53 y.o. female with PMHX of hypertension, CHF, diabetes, asthma who presents to the emergency department C/O episode of unresponsiveness this morning. Per EMS, daughter came over to patient's house this morning, found her to be unresponsive, sitting in her recliner. Upon EMS arrival, patient's blood sugar was 38 so they gave IM glucagon followed by oral dextrose with improvement of her alertness and mental status and repeat blood sugar was 68. EMS unable to obtain IV access. Patient states she does not believe that she took insulin last night as she only ate a salad for dinner. Pt denies chest pain, shortness of breath, nausea, vomiting, and any other sxs or complaints. PCP: Sandra Jaime MD    Current Facility-Administered Medications   Medication Dose Route Frequency Provider Last Rate Last Dose    cefTRIAXone (ROCEPHIN) 1 g in sterile water (preservative free) 10 mL IV syringe  1 g IntraVENous NOW SAMARA Bonilla         Current Outpatient Medications   Medication Sig Dispense Refill    insulin NPH/insulin regular (NOVOLIN 70/30 U-100 INSULIN) 100 unit/mL (70-30) injection 55 Units by SubCUTAneous route every twelve (12) hours. 50 units am; 40 units noon; 35 units pm 10 mL 0    calcium acetate,phosphat bind, (PHOSLO) 667 mg cap Take 1 Cap by mouth three (3) times daily (with meals). 90 Cap 0    carvediloL (COREG) 3.125 mg tablet Take 1 Tab by mouth two (2) times daily (with meals). 60 Tab 0    metOLazone (ZAROXOLYN) 5 mg tablet Take 1 Tab by mouth two (2) times a day. 60 Tab 0    amLODIPine (NORVASC) 10 mg tablet Take 1 Tab by mouth daily.  30 Tab 0    hydrALAZINE (APRESOLINE) 100 mg tablet Take 1 Tab by mouth three (3) times daily. 90 Tab 0    guaiFENesin ER (MUCINEX) 600 mg ER tablet Take 1 Tab by mouth every twelve (12) hours. 10 Tab 0    albuterol-ipratropium (DUO-NEB) 2.5 mg-0.5 mg/3 ml nebu 3 mL by Nebulization route every four (4) hours as needed for Wheezing. 30 Nebule 0    albuterol sulfate 90 mcg/actuation aepb Take 1-2 Puffs by inhalation every four (4) hours as needed. 1 Inhaler 0    DULoxetine (CYMBALTA) 30 mg capsule Take 60 mg by mouth daily.  atorvastatin (LIPITOR) 40 mg tablet Take 40 mg by mouth daily.  OTHER,NON-FORMULARY, Medication for irregular heart beat      gabapentin (NEURONTIN) 300 mg capsule Take 300 mg by mouth three (3) times daily.  furosemide (LASIX) 40 mg tablet Take 1 Tab by mouth daily. (Patient taking differently: Take 40 mg by mouth two (2) times a day.) 45 Tab 1    aspirin 81 mg chewable tablet Take 1 tablet by mouth daily. 30 tablet 1       Past History     Past Medical History:  Past Medical History:   Diagnosis Date    Asthma     Note: As child had asthma    Diabetes mellitus ([facility] [address]     Type 2    Heart palpitations     Hypertension     Ill-defined condition     fibromyalgia     Morbid obesity ([facility] [address]     MRSA infection 8/2014       Past Surgical History:  Past Surgical History:   Procedure Laterality Date    BREAST SURGERY PROCEDURE UNLISTED      cyst removed    HX CATARACT REMOVAL      HX CHOLECYSTECTOMY      HX TUBAL LIGATION         Family History:  Family History   Problem Relation Age of Onset    Heart Attack Mother     Heart Attack Maternal Grandmother        Social History:  Social History     Tobacco Use    Smoking status: Never Smoker    Smokeless tobacco: Never Used   Substance Use Topics    Alcohol use: No    Drug use: No       Allergies:   Allergies   Allergen Reactions    Bees [Sting, Bee] Swelling    Penicillins Hives    Willis Hives         Review of Systems   Review of Systems      Physical Exam     Vitals:    03/05/20 1125 03/05/20 1130 03/05/20 1222 03/05/20 1224   BP: 131/58 125/67 155/69    Pulse:   81 78   Resp:   16 20   SpO2: 95%   96%   Weight:         Physical Exam    Vital signs and nursing notes reviewed. CONSTITUTIONAL: Alert. Well-appearing; obese; and upset, but in no apparent distress. HEAD: Normocephalic; atraumatic. EYES: PERRL; EOM's intact. No nystagmus. Conjunctiva clear. ENT:  Moist mucus membranes. No tongue or dental injury. NECK: Supple; FROM without difficulty, non-tender; no cervical lymphadenopathy. CV: Normal S1, S2; no murmurs, rubs, or gallops. No chest wall tenderness. RESPIRATORY: Normal chest excursion with respiration; breath sounds clear and equal bilaterally; no wheezes, rhonchi, or rales. GI: Normal bowel sounds; non-distended; non-tender. SKIN: Normal for age and race; warm; dry; good turgor; no apparent lesions or exudate. NEURO: A & O x3. Cranial nerves 2-12 intact. Motor 5/5 bilaterally. Sensation intact. PSYCH:  Mood and affect appropriate. Diagnostic Study Results     Labs -     Recent Results (from the past 12 hour(s))   GLUCOSE, POC    Collection Time: 03/05/20  9:12 AM   Result Value Ref Range    Glucose (POC) 99 70 - 110 mg/dL   CBC WITH AUTOMATED DIFF    Collection Time: 03/05/20 10:20 AM   Result Value Ref Range    WBC 11.2 4.6 - 13.2 K/uL    RBC 3.39 (L) 4.20 - 5.30 M/uL    HGB 8.5 (L) 12.0 - 16.0 g/dL    HCT 28.3 (L) 35.0 - 45.0 %    MCV 83.5 74.0 - 97.0 FL    MCH 25.1 24.0 - 34.0 PG    MCHC 30.0 (L) 31.0 - 37.0 g/dL    RDW 15.7 (H) 11.6 - 14.5 %    PLATELET 107 489 - 366 K/uL    MPV 9.6 9.2 - 11.8 FL    NEUTROPHILS 87 (H) 40 - 73 %    LYMPHOCYTES 8 (L) 21 - 52 %    MONOCYTES 4 3 - 10 %    EOSINOPHILS 1 0 - 5 %    BASOPHILS 0 0 - 2 %    ABS. NEUTROPHILS 9.6 (H) 1.8 - 8.0 K/UL    ABS. LYMPHOCYTES 0.9 0.9 - 3.6 K/UL    ABS. MONOCYTES 0.5 0.05 - 1.2 K/UL    ABS. EOSINOPHILS 0.1 0.0 - 0.4 K/UL    ABS.  BASOPHILS 0.0 0.0 - 0.1 K/UL    DF AUTOMATED     METABOLIC PANEL, COMPREHENSIVE    Collection Time: 03/05/20 10:20 AM   Result Value Ref Range    Sodium 143 136 - 145 mmol/L    Potassium 3.9 3.5 - 5.5 mmol/L    Chloride 105 100 - 111 mmol/L    CO2 29 21 - 32 mmol/L    Anion gap 9 3.0 - 18 mmol/L    Glucose 148 (H) 74 - 99 mg/dL    BUN 81 (H) 7.0 - 18 MG/DL    Creatinine 2.34 (H) 0.6 - 1.3 MG/DL    BUN/Creatinine ratio 35 (H) 12 - 20      GFR est AA 26 (L) >60 ml/min/1.73m2    GFR est non-AA 22 (L) >60 ml/min/1.73m2    Calcium 8.7 8.5 - 10.1 MG/DL    Bilirubin, total 0.4 0.2 - 1.0 MG/DL    ALT (SGPT) 27 13 - 56 U/L    AST (SGOT) 20 10 - 38 U/L    Alk. phosphatase 152 (H) 45 - 117 U/L    Protein, total 6.7 6.4 - 8.2 g/dL    Albumin 3.2 (L) 3.4 - 5.0 g/dL    Globulin 3.5 2.0 - 4.0 g/dL    A-G Ratio 0.9 0.8 - 1.7     CARDIAC PANEL,(CK, CKMB & TROPONIN)    Collection Time: 03/05/20 10:20 AM   Result Value Ref Range    CK 52 26 - 192 U/L    CK - MB <1.0 <3.6 ng/ml    CK-MB Index  0.0 - 4.0 %     CALCULATION NOT PERFORMED WHEN RESULT IS BELOW LINEAR LIMIT    Troponin-I, QT <0.02 0.0 - 0.045 NG/ML   GLUCOSE, POC    Collection Time: 03/05/20 10:21 AM   Result Value Ref Range    Glucose (POC) 153 (H) 70 - 110 mg/dL   POC G3    Collection Time: 03/05/20 11:40 AM   Result Value Ref Range    Device: ROOM AIR      FIO2 (POC) 21 %    pH (POC) 7.382 7.35 - 7.45      pCO2 (POC) 45.8 (H) 35.0 - 45.0 MMHG    pO2 (POC) 63 (L) 80 - 100 MMHG    HCO3 (POC) 27.2 (H) 22 - 26 MMOL/L    sO2 (POC) 91 (L) 92 - 97 %    Base excess (POC) 2 mmol/L    Allens test (POC) YES      Total resp. rate 16      Site LEFT RADIAL      Patient temp.  98.6      Specimen type (POC) ARTERIAL      Performed by Brandon Polk    EKG, 12 LEAD, INITIAL    Collection Time: 03/05/20 11:53 AM   Result Value Ref Range    Ventricular Rate 0 BPM    Atrial Rate 0 BPM    QRS Duration 0 ms    Q-T Interval 0 ms    QTC Calculation (Bezet) 0 ms    Calculated R Axis 0 degrees    Calculated T Axis 0 degrees    Diagnosis       No QRS complexes found, no ECG analysis possible  When compared with ECG of 23-FEB-2020 16:41,  Current undetermined rhythm precludes rhythm comparison, needs review     URINALYSIS W/ RFLX MICROSCOPIC    Collection Time: 03/05/20 12:30 PM   Result Value Ref Range    Color YELLOW      Appearance CLOUDY      Specific gravity 1.014 1.005 - 1.030      pH (UA) 5.0 5.0 - 8.0      Protein 300 (A) NEG mg/dL    Glucose NEGATIVE  NEG mg/dL    Ketone NEGATIVE  NEG mg/dL    Bilirubin NEGATIVE  NEG      Blood NEGATIVE  NEG      Urobilinogen 0.2 0.2 - 1.0 EU/dL    Nitrites POSITIVE (A) NEG      Leukocyte Esterase MODERATE (A) NEG         Radiologic Studies -   XR CHEST PORT   Final Result   IMPRESSION:      Cardiac enlargement and central vascular congestion without significant change   from recent prior study. CT HEAD WO CONT   Final Result   IMPRESSION:         1. No acute intracranial abnormality. CT Results  (Last 48 hours)               03/05/20 1108  CT HEAD WO CONT Final result    Impression:  IMPRESSION:           1. No acute intracranial abnormality. Narrative:  EXAM: CT head       INDICATION: Altered mental status       COMPARISON: 12/23/2016       TECHNIQUE: Axial CT imaging of the head was performed without intravenous   contrast. Standard multiplanar coronal and sagittal reformatted images were   obtained and are included in interpretation. One or more dose reduction techniques were used on this CT: automated exposure   control, adjustment of the mAs and/or kVp according to patient size, and   iterative reconstruction techniques. The specific techniques used on this CT   exam have been documented in the patient's electronic medical record.   Digital   Imaging and Communications in Medicine (DICOM) format image data are available   to nonaffiliated external healthcare facilities or entities on a secure, media   free, reciprocally searchable basis with patient authorization for at least a   12-month period after this study. _______________       FINDINGS:       BRAIN AND POSTERIOR FOSSA: The sulci, folia, ventricles and basal cisterns are   within normal limits for the patient?s age. There is no intracranial hemorrhage,   mass effect, or midline shift. There are no areas of abnormal parenchymal   attenuation. EXTRA-AXIAL SPACES AND MENINGES: There are no abnormal extra-axial fluid   collections. CALVARIUM: Intact. SINUSES: Minor nonspecific thickening of the ethmoid air cells. OTHER: None.       _______________               CXR Results  (Last 48 hours)               03/05/20 1209  XR CHEST PORT Final result    Impression:  IMPRESSION:       Cardiac enlargement and central vascular congestion without significant change   from recent prior study. Narrative:  EXAM: XR CHEST PORT       CLINICAL INDICATION/HISTORY: Altered mental status   -Additional: None       COMPARISON: 2/17/2020       TECHNIQUE: Frontal view of the chest       _______________       FINDINGS:       HEART AND MEDIASTINUM: Stable appearing cardiac size and mediastinal contours,   with cardiac size again noted to be mildly enlarged. LUNGS AND PLEURAL SPACES: Unchanged central vascular congestion. No pneumothorax   or large pleural effusion. BONY THORAX AND SOFT TISSUES: No acute osseous abnormality       _______________                 Medications given in the ED-  Medications   cefTRIAXone (ROCEPHIN) 1 g in sterile water (preservative free) 10 mL IV syringe (has no administration in time range)         Medical Decision Making   I am the first provider for this patient. I reviewed the vital signs, available nursing notes, past medical history, past surgical history, family history and social history. Vital Signs-Reviewed the patient's vital signs.     Pulse Oximetry Analysis - 95% on RA     EKG interpretation: (Preliminary)  Normal sinus rhythm, rate 71, no acute changes    Records Reviewed: Nursing Notes      Procedures:  Procedures    ED Course:  9:21 AM   Initial assessment performed. The patients presenting problems have been discussed, and they are in agreement with the care plan formulated and outlined with them. I have encouraged them to ask questions as they arise throughout their visit. 10:20 AM progress note  Patient difficult IV access, able to obtain blood and awaiting CT head. She still seems somewhat confused despite her blood sugars returning to normal.  No obvious focal neurologic deficits/abnormalities other than confusion, will get ABG as well. Case discussed with ED attending Dr. Otoniel Keane who is aware of patient and agrees with work-up thus far. 11:45 AM progress note  Daughter at bedside states that patient still seems \"loopy\", confused, not back to baseline. Will consult hospitalist for admission. 1:05 PM consult note  Case discussed with hospitalist Dr. Jazmín Ramirez, who will admit to medical floor. She recommends Rocephin for UTI. Review of prior admissions shows that patient has had Rocephin without adverse effect despite penicillin allergy. Diagnosis and Disposition     ADMISSION NOTE:  1:07 PM  Patient is being admitted to the hospital by Dr. Jazmín Ramirez. The results of their tests and reasons for their admission have been discussed with them and/or available family. They convey agreement and understanding for the need to be admitted and for their admission diagnosis. CONDITIONS ON ADMISSION:  Deep Vein Thrombosis is not present at the time of admission. Thrombosis is not present at the time of admission. Urinary Tract Infection is present at the time of admission. Pneumonia is not present at the time of admission. MRSA is not present at the time of admission. Wound infection is not present at the time of admission. Pressure Ulcer is not present at the time of admission. CLINICAL IMPRESSION:    1.  Altered mental status, unspecified altered mental status type    2. Hypoglycemia    3. Acute cystitis without hematuria        PLAN:  1. Admit      Please note that this dictation was completed with [employer], the computer voice recognition software. Quite often unanticipated grammatical, syntax, homophones, and other interpretive errors are inadvertently transcribed by the computer software. Please disregard these errors. Please excuse any errors that have escaped final proofreading.

## 2020-03-05 NOTE — PROGRESS NOTES
Her weight does not exceed the scanning limit. However, Ms Moise's shoulders did not fit into the MRI scanner bore to advance for any head or neck MRI/MRA this evening.

## 2020-03-05 NOTE — H&P
History & Physical    Patient: Francisco J Wisdom MRN: [id number]  CSN: [id number]    YOB: 1965  Age: 53 y.o. Sex: female      DOA: 3/5/2020  Primary Care Provider:  Nelia Sutton MD      Assessment/Plan   52 y/o F with asthma, COPD, CHF, DM, and CKD Stage III. Admitted to the hospital for AMS. Altered Mental Status -ct head negative, monitoring blood glucose, holding insulin, treat UTI   Speech Disturbance -not at baseline, head ct negative, ordered MRI/MRA brain and neck > could not due to size, recheck ct head in AM, unable to check CTA brain and neck with IV contrast due to elevated creatinine   UTI -started rocephin, will continue, ordered prn benadryl in case of cross-reactivity with pcn allergy   Hypoglycemia -resolved, will continue to monitor, hold home insulin, check BG qac and qhs, SSI   Acute renal failure superimposed on CKD III -elevated creatinine, follow BMP, recheck creatinine in AM  Fluid Overload/LE Edema -Lasix IV, check BNP     DVT Prophylaxis ordered      Patient Active Problem List   Diagnosis Code    HTN (hypertension) I10    Type II or unspecified type diabetes mellitus with renal manifestations, uncontrolled(250.42) ([facility]) E11.29, E11.65    Morbid obesity ([facility] [address] E66.01    Respiratory distress R06.03    Asthma exacerbation J45. 930    Diastolic CHF ([facility]) F56.79    АНДРЕЙ (obstructive sleep apnea) G47.33    UTI (urinary tract infection) N39.0    Hyperkalemia E87.5    Chest pain R07.9    Acute renal failure superimposed on stage 3 chronic kidney disease (HCC) N17.9, N18.3    Acute respiratory failure with hypoxia ([facility]) N19.41    Diastolic CHF, acute on chronic ([facility]) I50.33    AMS (altered mental status) R41.82    Hypoglycemia E16.2     Estimated length of stay : 2-3 days     CC:  AMS        HPI:     Francisco J Wisdom is a 53 y.o. female with past medical history of hypertension, CHF, diabetes, asthma who presents to the emergency department C/O episode of unresponsiveness this morning. Per EMS, daughter came over to patient's house this morning, found her to be unresponsive, sitting in her recliner surrounded by copious amount of oral secretions. Upon EMS arrival, patient's blood sugar was 38 so they gave IM glucagon followed by oral dextrose with improvement of her alertness and mental status and repeat blood sugar was 68. EMS unable to obtain IV access. Patient states she does not believe that she took insulin last night as she only ate a salad for dinner. Pt denies chest pain, shortness of breath, nausea, vomiting, and any other sxs or complaints. I was asked to admit patient because pt has not returned back to baseline level of mental status. Current . Daughter and sister at bedside concerned that pt's speech is not at baseline -slowed. Past Medical History:   Diagnosis Date    Asthma     Note: As child had asthma    Diabetes mellitus ([facility] [address]     Type 2    Heart palpitations     Hypertension     Ill-defined condition     fibromyalgia     Morbid obesity ([facility] [address]     MRSA infection 8/2014       Past Surgical History:   Procedure Laterality Date    BREAST SURGERY PROCEDURE UNLISTED      cyst removed    HX CATARACT REMOVAL      HX CHOLECYSTECTOMY      HX TUBAL LIGATION         Family History   Problem Relation Age of Onset    Heart Attack Mother     Heart Attack Maternal Grandmother        Social History     Socioeconomic History    Marital status: SINGLE     Spouse name: Not on file    Number of children: Not on file    Years of education: Not on file    Highest education level: Not on file   Tobacco Use    Smoking status: Never Smoker    Smokeless tobacco: Never Used   Substance and Sexual Activity    Alcohol use: No    Drug use: No    Sexual activity: Never     Partners: Male       Prior to Admission medications    Medication Sig Start Date End Date Taking?  Authorizing Provider   insulin NPH/insulin regular (NOVOLIN 70/30 U-100 INSULIN) 100 unit/mL (70-30) injection 55 Units by SubCUTAneous route every twelve (12) hours. 50 units am; 40 units noon; 35 units pm 2/24/20   Roberta Sales MD   calcium acetate,phosphat bind, (PHOSLO) 667 mg cap Take 1 Cap by mouth three (3) times daily (with meals). 2/25/20   Roberta Sales MD   carvediloL (COREG) 3.125 mg tablet Take 1 Tab by mouth two (2) times daily (with meals). 2/25/20   Roberta Sales MD   metOLazone (ZAROXOLYN) 5 mg tablet Take 1 Tab by mouth two (2) times a day. 2/25/20   Roberta Sales MD   amLODIPine (NORVASC) 10 mg tablet Take 1 Tab by mouth daily. 2/25/20   Roberta Sales MD   hydrALAZINE (APRESOLINE) 100 mg tablet Take 1 Tab by mouth three (3) times daily. 2/24/20   Roberta Sales MD   guaiFENesin ER (MUCINEX) 600 mg ER tablet Take 1 Tab by mouth every twelve (12) hours. 12/16/19   Roberta Sales MD   albuterol-ipratropium (DUO-NEB) 2.5 mg-0.5 mg/3 ml nebu 3 mL by Nebulization route every four (4) hours as needed for Wheezing. 12/16/19   Roberta Sales MD   albuterol sulfate 90 mcg/actuation aepb Take 1-2 Puffs by inhalation every four (4) hours as needed. 1/24/19   Roberta Sales MD   DULoxetine (CYMBALTA) 30 mg capsule Take 60 mg by mouth daily. Yoana Crocker MD   atorvastatin (LIPITOR) 40 mg tablet Take 40 mg by mouth daily. Yoana Crocker MD   OTHER,NON-FORMULARY, Medication for irregular heart beat    Yoana Crocker MD   gabapentin (NEURONTIN) 300 mg capsule Take 300 mg by mouth three (3) times daily. Yoana Crocker MD   furosemide (LASIX) 40 mg tablet Take 1 Tab by mouth daily. Patient taking differently: Take 40 mg by mouth two (2) times a day. 2/13/17   Margarito Sanchez MD   aspirin 81 mg chewable tablet Take 1 tablet by mouth daily. 8/20/14   Vania Ramos MD       Allergies   Allergen Reactions    Bees [Sting, Bee] Swelling    Penicillins Hives    Bentley Hives       Review of Systems  Gen: No fever, chills, malaise, weight loss/gain.    Heent: No headache, rhinorrhea, epistaxis, ear pain, hearing loss, sinus pain, neck pain/stiffness, sore throat. Heart: No chest pain, palpitations, HORAN, pnd, or orthopnea. Resp: No cough, hemoptysis, wheezing and shortness of breath. GI: No nausea, vomiting, diarrhea, constipation, melena or hematochezia. : No urinary obstruction, dysuria or hematuria. Derm: No rash, new skin lesion or pruritis. Musc/skeletal: no bone or joint complains. Vasc: No edema, cyanosis or claudication. Endo: No heat/cold intolerance, no polyuria,polydipsia or polyphagia. Neuro: No unilateral weakness, numbness, tingling. No seizures. Heme: No easy bruising or bleeding. Physical Exam:     Physical Exam:  Visit Vitals  /69   Pulse 78   Resp 20   Wt 146.5 kg (323 lb)   SpO2 96%   BMI 59.08 kg/m²      O2 Device: Room air    No data recorded. No intake/output data recorded. No intake/output data recorded. General:  Awake, cooperative, no distress. Head:  Normocephalic, without obvious abnormality, atraumatic. Eyes:  Conjunctivae/corneas clear, sclera anicteric, PERRL, EOMs intact. Nose: Nares normal. No drainage or sinus tenderness. Throat: Lips, mucosa, and tongue normal.    Neck: Supple, symmetrical, trachea midline, no adenopathy. Lungs:   Clear to auscultation bilaterally. Heart:  Regular rate and rhythm, S1, S2 normal, no murmur, click, rub or gallop. Abdomen: Soft, non-tender. Bowel sounds normal. No masses,  No organomegaly. Extremities: Extremities normal, atraumatic, no cyanosis or edema. Capillary refill normal.   Pulses: 2+ and symmetric all extremities. Skin: Skin color appropriate for ethnicity, turgor normal. No rashes or lesions   Neurologic: CNII-XII intact. No focal motor or sensory deficit.        Labs Reviewed:  Recent Results (from the past 24 hour(s))   GLUCOSE, POC    Collection Time: 03/05/20  9:12 AM   Result Value Ref Range    Glucose (POC) 99 70 - 110 mg/dL   CBC WITH AUTOMATED DIFF    Collection Time: 03/05/20 10:20 AM   Result Value Ref Range    WBC 11.2 4.6 - 13.2 K/uL    RBC 3.39 (L) 4.20 - 5.30 M/uL    HGB 8.5 (L) 12.0 - 16.0 g/dL    HCT 28.3 (L) 35.0 - 45.0 %    MCV 83.5 74.0 - 97.0 FL    MCH 25.1 24.0 - 34.0 PG    MCHC 30.0 (L) 31.0 - 37.0 g/dL    RDW 15.7 (H) 11.6 - 14.5 %    PLATELET 429 176 - 401 K/uL    MPV 9.6 9.2 - 11.8 FL    NEUTROPHILS 87 (H) 40 - 73 %    LYMPHOCYTES 8 (L) 21 - 52 %    MONOCYTES 4 3 - 10 %    EOSINOPHILS 1 0 - 5 %    BASOPHILS 0 0 - 2 %    ABS. NEUTROPHILS 9.6 (H) 1.8 - 8.0 K/UL    ABS. LYMPHOCYTES 0.9 0.9 - 3.6 K/UL    ABS. MONOCYTES 0.5 0.05 - 1.2 K/UL    ABS. EOSINOPHILS 0.1 0.0 - 0.4 K/UL    ABS. BASOPHILS 0.0 0.0 - 0.1 K/UL    DF AUTOMATED     METABOLIC PANEL, COMPREHENSIVE    Collection Time: 03/05/20 10:20 AM   Result Value Ref Range    Sodium 143 136 - 145 mmol/L    Potassium 3.9 3.5 - 5.5 mmol/L    Chloride 105 100 - 111 mmol/L    CO2 29 21 - 32 mmol/L    Anion gap 9 3.0 - 18 mmol/L    Glucose 148 (H) 74 - 99 mg/dL    BUN 81 (H) 7.0 - 18 MG/DL    Creatinine 2.34 (H) 0.6 - 1.3 MG/DL    BUN/Creatinine ratio 35 (H) 12 - 20      GFR est AA 26 (L) >60 ml/min/1.73m2    GFR est non-AA 22 (L) >60 ml/min/1.73m2    Calcium 8.7 8.5 - 10.1 MG/DL    Bilirubin, total 0.4 0.2 - 1.0 MG/DL    ALT (SGPT) 27 13 - 56 U/L    AST (SGOT) 20 10 - 38 U/L    Alk.  phosphatase 152 (H) 45 - 117 U/L    Protein, total 6.7 6.4 - 8.2 g/dL    Albumin 3.2 (L) 3.4 - 5.0 g/dL    Globulin 3.5 2.0 - 4.0 g/dL    A-G Ratio 0.9 0.8 - 1.7     CARDIAC PANEL,(CK, CKMB & TROPONIN)    Collection Time: 03/05/20 10:20 AM   Result Value Ref Range    CK 52 26 - 192 U/L    CK - MB <1.0 <3.6 ng/ml    CK-MB Index  0.0 - 4.0 %     CALCULATION NOT PERFORMED WHEN RESULT IS BELOW LINEAR LIMIT    Troponin-I, QT <0.02 0.0 - 0.045 NG/ML   GLUCOSE, POC    Collection Time: 03/05/20 10:21 AM   Result Value Ref Range    Glucose (POC) 153 (H) 70 - 110 mg/dL   POC G3    Collection Time: 03/05/20 11:40 AM   Result Value Ref Range    Device: ROOM AIR      FIO2 (POC) 21 % pH (POC) 7.382 7.35 - 7.45      pCO2 (POC) 45.8 (H) 35.0 - 45.0 MMHG    pO2 (POC) 63 (L) 80 - 100 MMHG    HCO3 (POC) 27.2 (H) 22 - 26 MMOL/L    sO2 (POC) 91 (L) 92 - 97 %    Base excess (POC) 2 mmol/L    Allens test (POC) YES      Total resp. rate 16      Site LEFT RADIAL      Patient temp.  98.6      Specimen type (POC) ARTERIAL      Performed by Wilber Willard    EKG, 12 LEAD, INITIAL    Collection Time: 03/05/20 11:53 AM   Result Value Ref Range    Ventricular Rate 0 BPM    Atrial Rate 0 BPM    QRS Duration 0 ms    Q-T Interval 0 ms    QTC Calculation (Bezet) 0 ms    Calculated R Axis 0 degrees    Calculated T Axis 0 degrees    Diagnosis       No QRS complexes found, no ECG analysis possible  When compared with ECG of 23-FEB-2020 16:41,  Current undetermined rhythm precludes rhythm comparison, needs review     URINALYSIS W/ RFLX MICROSCOPIC    Collection Time: 03/05/20 12:30 PM   Result Value Ref Range    Color YELLOW      Appearance CLOUDY      Specific gravity 1.014 1.005 - 1.030      pH (UA) 5.0 5.0 - 8.0      Protein 300 (A) NEG mg/dL    Glucose NEGATIVE  NEG mg/dL    Ketone NEGATIVE  NEG mg/dL    Bilirubin NEGATIVE  NEG      Blood NEGATIVE  NEG      Urobilinogen 0.2 0.2 - 1.0 EU/dL    Nitrites POSITIVE (A) NEG      Leukocyte Esterase MODERATE (A) NEG         Procedures/imaging: see electronic medical records for all procedures/Xrays and details which were not copied into this note but were reviewed prior to creation of Plan    CC: Geovanny Ya MD

## 2020-03-05 NOTE — ED NOTES
Verbal shift change report given to [place] (oncoming nurse) by Michael Rosales (offgoing nurse). Report included the following information SBAR, ED Summary, Procedure Summary, Intake/Output, MAR and Recent Results.

## 2020-03-05 NOTE — ED NOTES
TRANSFER - OUT REPORT:    Verbal report given to Receiving RN on Manjula Richmond  being transferred to [facility] for routine progression of care       Report consisted of patients Situation, Background, Assessment and   Recommendations(SBAR). Information from the following report(s) SBAR, ED Summary, [facility] and Recent Results was reviewed with the receiving nurse. Lines:   Peripheral IV 03/05/20 Right;Posterior Hand (Active)   Site Assessment Clean, dry, & intact 3/5/2020 11:50 AM   Phlebitis Assessment 0 3/5/2020 11:50 AM   Infiltration Assessment 0 3/5/2020 11:50 AM   Dressing Status Clean, dry, & intact 3/5/2020 11:50 AM   Dressing Type Transparent 3/5/2020 11:50 AM   Hub Color/Line Status Pink 3/5/2020 11:50 AM   Action Taken Other (comment) 3/5/2020 11:50 AM   Alcohol Cap Used Yes 3/5/2020 11:50 AM        Opportunity for questions and clarification was provided.       Patient transported with:   "[employer]

## 2020-03-05 NOTE — ROUTINE PROCESS
TRANSFER - IN REPORT: 
 
Verbal report received from Breana Barcenas RN (name) on Liliana Melara  being received from ED (unit) for change in patient condition(AMS, hypoglycemia) Report consisted of patients Situation, Background, Assessment and  
Recommendations(SBAR). Information from the following report(s) SBAR, Kardex, ED Summary, Intake/Output, MAR and Recent Results was reviewed with the receiving nurse. Opportunity for questions and clarification was provided. Assessment to be completed upon patients arrival to unit and care assumed.

## 2020-03-05 NOTE — PROGRESS NOTES
1435 - Patient arrived to floor. 1522 - Call out to notify Dr. Carmen Collazo that patient has arrived to room 330 and very anxious and concerned about why she is here. Awaiting return call. [id number] - Patient alert and oriented x3 at this time. Family reports patient found this AM at home in recliner and unresponsive. 911 was called. BS was noted in 30's. Patient could not remember anything since last night. Speech is slurred. Patient reports jaws are tight and unable to suck through straw. Slight droop noted to left side of mouth. Hand  equal. Lungs clear and diminished. On room air. Denies any SOB. Reports chest pressure that has been occurring since before admission. Abdomen semi-soft and reports tenderness to left quadrants. Bowel sounds hypoactive. Unable to recall last BM. Pitting edema noted to BLE. Able to move all extremities but pain with movement. Patient sitting up in bed with family at bedside. Call light in reach.

## 2020-03-05 NOTE — ED TRIAGE NOTES
Patient arrive via EMS from home with low blood glucose, EMS reports initial blood glucose 38, glucagon and 1 tube oral glucose given, repeat blood glucose was 68, a/ox4

## 2020-03-06 ENCOUNTER — APPOINTMENT (OUTPATIENT)
Dept: GENERAL RADIOLOGY | Age: 55
End: 2020-03-06
Attending: FAMILY MEDICINE
Payer: COMMERCIAL

## 2020-03-06 ENCOUNTER — APPOINTMENT (OUTPATIENT)
Dept: MRI IMAGING | Age: 55
End: 2020-03-06
Attending: FAMILY MEDICINE
Payer: COMMERCIAL

## 2020-03-06 ENCOUNTER — APPOINTMENT (OUTPATIENT)
Dept: VASCULAR SURGERY | Age: 55
End: 2020-03-06
Attending: HOSPITALIST
Payer: COMMERCIAL

## 2020-03-06 ENCOUNTER — APPOINTMENT (OUTPATIENT)
Dept: CT IMAGING | Age: 55
End: 2020-03-06
Attending: HOSPITALIST
Payer: COMMERCIAL

## 2020-03-06 PROBLEM — N18.30 CHRONIC RENAL FAILURE, STAGE 3 (MODERATE) (HCC): Status: ACTIVE | Noted: 2020-03-06

## 2020-03-06 PROBLEM — G93.40 ENCEPHALOPATHY: Status: ACTIVE | Noted: 2020-03-06

## 2020-03-06 LAB
ANION GAP SERPL CALC-SCNC: 10 MMOL/L (ref 3–18)
BUN SERPL-MCNC: 81 MG/DL (ref 7–18)
BUN/CREAT SERPL: 38 (ref 12–20)
CALCIUM SERPL-MCNC: 8.9 MG/DL (ref 8.5–10.1)
CHLORIDE SERPL-SCNC: 106 MMOL/L (ref 100–111)
CO2 SERPL-SCNC: 28 MMOL/L (ref 21–32)
CREAT SERPL-MCNC: 2.15 MG/DL (ref 0.6–1.3)
ERYTHROCYTE [DISTWIDTH] IN BLOOD BY AUTOMATED COUNT: 15.8 % (ref 11.6–14.5)
EST. AVERAGE GLUCOSE BLD GHB EST-MCNC: 200 MG/DL
GLUCOSE BLD STRIP.AUTO-MCNC: 239 MG/DL (ref 70–110)
GLUCOSE BLD STRIP.AUTO-MCNC: 255 MG/DL (ref 70–110)
GLUCOSE BLD STRIP.AUTO-MCNC: 309 MG/DL (ref 70–110)
GLUCOSE SERPL-MCNC: 129 MG/DL (ref 74–99)
HBA1C MFR BLD: 8.6 % (ref 4.2–5.6)
HCT VFR BLD AUTO: 27.8 % (ref 35–45)
HGB BLD-MCNC: 8.2 G/DL (ref 12–16)
LEFT CCA DIST DIAS: 26.8 CM/S
LEFT CCA DIST SYS: 94.7 CM/S
LEFT CCA PROX DIAS: 27 CM/S
LEFT CCA PROX SYS: 138.5 CM/S
LEFT ECA DIAS: 43.59 CM/S
LEFT ECA SYS: 329.7 CM/S
LEFT ICA DIST DIAS: 36.7 CM/S
LEFT ICA DIST SYS: 109.5 CM/S
LEFT ICA MID DIAS: 40.6 CM/S
LEFT ICA MID SYS: 109.5 CM/S
LEFT ICA PROX DIAS: 34.7 CM/S
LEFT ICA PROX SYS: 147 CM/S
LEFT ICA/CCA SYS: 1.55
LEFT SUBCLAVIAN DIAS: 12.82 CM/S
LEFT SUBCLAVIAN SYS: 216.3 CM/S
LEFT VERTEBRAL DIAS: 16.05 CM/S
LEFT VERTEBRAL SYS: 65.5 CM/S
MCH RBC QN AUTO: 24.6 PG (ref 24–34)
MCHC RBC AUTO-ENTMCNC: 29.5 G/DL (ref 31–37)
MCV RBC AUTO: 83.5 FL (ref 74–97)
PLATELET # BLD AUTO: 232 K/UL (ref 135–420)
PMV BLD AUTO: 9.4 FL (ref 9.2–11.8)
POTASSIUM SERPL-SCNC: 3.6 MMOL/L (ref 3.5–5.5)
RBC # BLD AUTO: 3.33 M/UL (ref 4.2–5.3)
RIGHT CCA DIST DIAS: 20.4 CM/S
RIGHT CCA DIST SYS: 76.4 CM/S
RIGHT CCA PROX DIAS: 19 CM/S
RIGHT CCA PROX SYS: 99.9 CM/S
RIGHT ECA DIAS: 21.34 CM/S
RIGHT ECA SYS: 237.9 CM/S
RIGHT ICA DIST DIAS: 24.8 CM/S
RIGHT ICA DIST SYS: 77.6 CM/S
RIGHT ICA MID DIAS: 38.6 CM/S
RIGHT ICA MID SYS: 117.6 CM/S
RIGHT ICA PROX DIAS: 36.2 CM/S
RIGHT ICA PROX SYS: 115.2 CM/S
RIGHT ICA/CCA SYS: 1.5
RIGHT SUBCLAVIAN DIAS: 12.82 CM/S
RIGHT SUBCLAVIAN SYS: 205.2 CM/S
RIGHT VERTEBRAL DIAS: 18.25 CM/S
RIGHT VERTEBRAL SYS: 67.7 CM/S
SODIUM SERPL-SCNC: 144 MMOL/L (ref 136–145)
WBC # BLD AUTO: 7.4 K/UL (ref 4.6–13.2)

## 2020-03-06 PROCEDURE — 92522 EVALUATE SPEECH PRODUCTION: CPT

## 2020-03-06 PROCEDURE — 74011000250 HC RX REV CODE- 250: Performed by: FAMILY MEDICINE

## 2020-03-06 PROCEDURE — 74011636637 HC RX REV CODE- 636/637: Performed by: HOSPITALIST

## 2020-03-06 PROCEDURE — 74011250636 HC RX REV CODE- 250/636: Performed by: FAMILY MEDICINE

## 2020-03-06 PROCEDURE — 97161 PT EVAL LOW COMPLEX 20 MIN: CPT

## 2020-03-06 PROCEDURE — 96376 TX/PRO/DX INJ SAME DRUG ADON: CPT

## 2020-03-06 PROCEDURE — 74011250637 HC RX REV CODE- 250/637: Performed by: FAMILY MEDICINE

## 2020-03-06 PROCEDURE — 99218 HC RM OBSERVATION: CPT

## 2020-03-06 PROCEDURE — 74011636637 HC RX REV CODE- 636/637: Performed by: FAMILY MEDICINE

## 2020-03-06 PROCEDURE — 72070 X-RAY EXAM THORAC SPINE 2VWS: CPT

## 2020-03-06 PROCEDURE — 65270000029 HC RM PRIVATE

## 2020-03-06 PROCEDURE — 70450 CT HEAD/BRAIN W/O DYE: CPT

## 2020-03-06 PROCEDURE — 93880 EXTRACRANIAL BILAT STUDY: CPT

## 2020-03-06 PROCEDURE — 85027 COMPLETE CBC AUTOMATED: CPT

## 2020-03-06 PROCEDURE — 97535 SELF CARE MNGMENT TRAINING: CPT

## 2020-03-06 PROCEDURE — 74011250637 HC RX REV CODE- 250/637: Performed by: HOSPITALIST

## 2020-03-06 PROCEDURE — 72100 X-RAY EXAM L-S SPINE 2/3 VWS: CPT

## 2020-03-06 PROCEDURE — 80048 BASIC METABOLIC PNL TOTAL CA: CPT

## 2020-03-06 PROCEDURE — 36415 COLL VENOUS BLD VENIPUNCTURE: CPT

## 2020-03-06 PROCEDURE — 82962 GLUCOSE BLOOD TEST: CPT

## 2020-03-06 PROCEDURE — 92610 EVALUATE SWALLOWING FUNCTION: CPT

## 2020-03-06 PROCEDURE — 97166 OT EVAL MOD COMPLEX 45 MIN: CPT

## 2020-03-06 PROCEDURE — 83036 HEMOGLOBIN GLYCOSYLATED A1C: CPT

## 2020-03-06 RX ORDER — GABAPENTIN 300 MG/1
300 CAPSULE ORAL 3 TIMES DAILY
Status: DISCONTINUED | OUTPATIENT
Start: 2020-03-06 | End: 2020-03-07 | Stop reason: HOSPADM

## 2020-03-06 RX ORDER — OXYCODONE HYDROCHLORIDE 5 MG/1
5 TABLET ORAL
Status: DISCONTINUED | OUTPATIENT
Start: 2020-03-06 | End: 2020-03-07 | Stop reason: HOSPADM

## 2020-03-06 RX ORDER — ATORVASTATIN CALCIUM 20 MG/1
40 TABLET, FILM COATED ORAL DAILY
Status: DISCONTINUED | OUTPATIENT
Start: 2020-03-06 | End: 2020-03-07 | Stop reason: HOSPADM

## 2020-03-06 RX ORDER — CARVEDILOL 3.12 MG/1
3.12 TABLET ORAL 2 TIMES DAILY WITH MEALS
Status: DISCONTINUED | OUTPATIENT
Start: 2020-03-06 | End: 2020-03-07 | Stop reason: HOSPADM

## 2020-03-06 RX ORDER — INSULIN LISPRO 100 [IU]/ML
INJECTION, SOLUTION INTRAVENOUS; SUBCUTANEOUS
Status: DISCONTINUED | OUTPATIENT
Start: 2020-03-06 | End: 2020-03-06

## 2020-03-06 RX ORDER — INSULIN GLARGINE 100 [IU]/ML
30 INJECTION, SOLUTION SUBCUTANEOUS DAILY
Status: DISCONTINUED | OUTPATIENT
Start: 2020-03-06 | End: 2020-03-07 | Stop reason: HOSPADM

## 2020-03-06 RX ORDER — INSULIN LISPRO 100 [IU]/ML
INJECTION, SOLUTION INTRAVENOUS; SUBCUTANEOUS
Status: DISCONTINUED | OUTPATIENT
Start: 2020-03-06 | End: 2020-03-07 | Stop reason: HOSPADM

## 2020-03-06 RX ORDER — DULOXETIN HYDROCHLORIDE 60 MG/1
60 CAPSULE, DELAYED RELEASE ORAL DAILY
Status: DISCONTINUED | OUTPATIENT
Start: 2020-03-07 | End: 2020-03-07 | Stop reason: HOSPADM

## 2020-03-06 RX ORDER — GUAIFENESIN 100 MG/5ML
81 LIQUID (ML) ORAL DAILY
Status: DISCONTINUED | OUTPATIENT
Start: 2020-03-06 | End: 2020-03-07 | Stop reason: HOSPADM

## 2020-03-06 RX ORDER — AMLODIPINE BESYLATE 5 MG/1
10 TABLET ORAL DAILY
Status: DISCONTINUED | OUTPATIENT
Start: 2020-03-07 | End: 2020-03-07 | Stop reason: HOSPADM

## 2020-03-06 RX ORDER — FUROSEMIDE 40 MG/1
40 TABLET ORAL DAILY
Status: DISCONTINUED | OUTPATIENT
Start: 2020-03-07 | End: 2020-03-07 | Stop reason: HOSPADM

## 2020-03-06 RX ORDER — HYDRALAZINE HYDROCHLORIDE 25 MG/1
100 TABLET, FILM COATED ORAL 3 TIMES DAILY
Status: DISCONTINUED | OUTPATIENT
Start: 2020-03-06 | End: 2020-03-07 | Stop reason: HOSPADM

## 2020-03-06 RX ORDER — IPRATROPIUM BROMIDE AND ALBUTEROL SULFATE 2.5; .5 MG/3ML; MG/3ML
3 SOLUTION RESPIRATORY (INHALATION)
Status: DISCONTINUED | OUTPATIENT
Start: 2020-03-06 | End: 2020-03-07 | Stop reason: HOSPADM

## 2020-03-06 RX ORDER — METOLAZONE 5 MG/1
5 TABLET ORAL 2 TIMES DAILY
Status: DISCONTINUED | OUTPATIENT
Start: 2020-03-06 | End: 2020-03-07 | Stop reason: HOSPADM

## 2020-03-06 RX ORDER — MAGNESIUM SULFATE 100 %
4 CRYSTALS MISCELLANEOUS AS NEEDED
Status: DISCONTINUED | OUTPATIENT
Start: 2020-03-06 | End: 2020-03-07 | Stop reason: HOSPADM

## 2020-03-06 RX ADMIN — ACETAMINOPHEN 650 MG: 325 TABLET ORAL at 07:21

## 2020-03-06 RX ADMIN — INSULIN LISPRO 12 UNITS: 100 INJECTION, SOLUTION INTRAVENOUS; SUBCUTANEOUS at 17:18

## 2020-03-06 RX ADMIN — CEFTRIAXONE 1 G: 1 INJECTION, POWDER, FOR SOLUTION INTRAMUSCULAR; INTRAVENOUS at 17:18

## 2020-03-06 RX ADMIN — ACETAMINOPHEN 650 MG: 325 TABLET ORAL at 21:12

## 2020-03-06 RX ADMIN — Medication 10 ML: at 07:18

## 2020-03-06 RX ADMIN — CARVEDILOL 3.12 MG: 3.12 TABLET, FILM COATED ORAL at 17:18

## 2020-03-06 RX ADMIN — INSULIN LISPRO 6 UNITS: 100 INJECTION, SOLUTION INTRAVENOUS; SUBCUTANEOUS at 12:57

## 2020-03-06 RX ADMIN — METOLAZONE 5 MG: 5 TABLET ORAL at 21:12

## 2020-03-06 RX ADMIN — HYDRALAZINE HYDROCHLORIDE 100 MG: 25 TABLET, FILM COATED ORAL at 17:18

## 2020-03-06 RX ADMIN — GABAPENTIN 300 MG: 300 CAPSULE ORAL at 21:12

## 2020-03-06 RX ADMIN — INSULIN GLARGINE 30 UNITS: 100 INJECTION, SOLUTION SUBCUTANEOUS at 14:31

## 2020-03-06 RX ADMIN — GABAPENTIN 300 MG: 300 CAPSULE ORAL at 17:18

## 2020-03-06 RX ADMIN — INSULIN LISPRO 6 UNITS: 100 INJECTION, SOLUTION INTRAVENOUS; SUBCUTANEOUS at 21:18

## 2020-03-06 NOTE — ROUTINE PROCESS
1955 - Bedside and Verbal shift change report given to Simone Patrick RN (oncoming nurse) by PEGGY Sim RN (offgoing nurse). Report included the following information SBAR, Kardex, ED Summary, Intake/Output, MAR and Recent Results.

## 2020-03-06 NOTE — CONSULTS
NEUROLOGY CONSULTATION NOTE    Patient: Carmina Thompson MRN: [id number]  CSN: [id number]    YOB: 1965  Age: 53 y.o. Sex: female    DOA: 3/5/2020 LOS:  LOS: 1 day        Requesting Physician: Dr. Alesia Lopez  Reason for Consultation: Encephalopathy            HISTORY OF PRESENT ILLNESS:   Carmina Thompson is a 53 y.o. female with diabetes, CKD stage III who I have been asked to see for unresponsiveness. Patient told me that she was found to be unresponsive by her daughter yesterday morning. She was in her normal health the day before. The next morning, her daughter found her to be unresponsive in the bed. Blood sugar was 38. Patient woke up in the ambulance and emergency room. Patient has been greatly improved at this morning. She told me that mentally she is back to her normal self except that she talks slowly. But the speech is improving as well with time.     PAST MEDICAL HISTORY:  Past Medical History:   Diagnosis Date    Asthma     Note: As child had asthma    Diabetes mellitus ([facility] [address]     Type 2    Heart palpitations     Hypertension     Ill-defined condition     fibromyalgia     Morbid obesity ([facility] [address]     MRSA infection 8/2014     PAST SURGICAL HISTORY:  Past Surgical History:   Procedure Laterality Date    BREAST SURGERY PROCEDURE UNLISTED      cyst removed    HX CATARACT REMOVAL      HX CHOLECYSTECTOMY      HX TUBAL LIGATION       FAMILY HISTORY:  Family History   Problem Relation Age of Onset    Heart Attack Mother     Heart Attack Maternal Grandmother      SOCIAL HISTORY:  Social History     Socioeconomic History    Marital status: SINGLE     Spouse name: Not on file    Number of children: Not on file    Years of education: Not on file    Highest education level: Not on file   Tobacco Use    Smoking status: Never Smoker    Smokeless tobacco: Never Used   Substance and Sexual Activity    Alcohol use: No    Drug use: No    Sexual activity: Never     Partners: Male MEDICATIONS:  Current Facility-Administered Medications   Medication Dose Route Frequency    insulin lispro (HUMALOG) injection   SubCUTAneous AC&HS    glucose chewable tablet 16 g  4 Tab Oral PRN    glucagon (GLUCAGEN) injection 1 mg  1 mg IntraMUSCular PRN    albuterol-ipratropium (DUO-NEB) 2.5 MG-0.5 MG/3 ML  3 mL Nebulization Q4H PRN    aspirin chewable tablet 81 mg  81 mg Oral DAILY    atorvastatin (LIPITOR) tablet 40 mg  40 mg Oral DAILY    carvediloL (COREG) tablet 3.125 mg  3.125 mg Oral BID WITH MEALS    gabapentin (NEURONTIN) capsule 300 mg  300 mg Oral TID    hydrALAZINE (APRESOLINE) tablet 100 mg  100 mg Oral TID    metOLazone (ZAROXOLYN) tablet 5 mg  5 mg Oral BID    sodium chloride (NS) flush 5-40 mL  5-40 mL IntraVENous Q8H    sodium chloride (NS) flush 5-40 mL  5-40 mL IntraVENous PRN    acetaminophen (TYLENOL) tablet 650 mg  650 mg Oral Q4H PRN    diphenhydrAMINE (BENADRYL) injection 12.5 mg  12.5 mg IntraVENous Q6H PRN    cefTRIAXone (ROCEPHIN) 1 g in sterile water (preservative free) 10 mL IV syringe  1 g IntraVENous Q24H    acetaminophen (TYLENOL) tablet 650 mg  650 mg Oral Q6H PRN     Prior to Admission medications    Medication Sig Start Date End Date Taking? Authorizing Provider   insulin NPH/insulin regular (NOVOLIN 70/30 U-100 INSULIN) 100 unit/mL (70-30) injection 55 Units by SubCUTAneous route every twelve (12) hours. 50 units am; 40 units noon; 35 units pm 2/24/20   Kerri Lewis MD   calcium acetate,phosphat bind, (PHOSLO) 667 mg cap Take 1 Cap by mouth three (3) times daily (with meals). 2/25/20   Kerri Lewis MD   carvediloL (COREG) 3.125 mg tablet Take 1 Tab by mouth two (2) times daily (with meals). 2/25/20   Kerri Lewis MD   metOLazone (ZAROXOLYN) 5 mg tablet Take 1 Tab by mouth two (2) times a day. 2/25/20   Kerri Lewis MD   amLODIPine (NORVASC) 10 mg tablet Take 1 Tab by mouth daily.  2/25/20   Kerri Lewis MD   hydrALAZINE (APRESOLINE) 100 mg tablet Take 1 Tab by mouth three (3) times daily. 2/24/20   Nargis Owens MD   guaiFENesin ER (MUCINEX) 600 mg ER tablet Take 1 Tab by mouth every twelve (12) hours. 12/16/19   Nargis Owens MD   albuterol-ipratropium (DUO-NEB) 2.5 mg-0.5 mg/3 ml nebu 3 mL by Nebulization route every four (4) hours as needed for Wheezing. 12/16/19   Nargis Owens MD   albuterol sulfate 90 mcg/actuation aepb Take 1-2 Puffs by inhalation every four (4) hours as needed. 1/24/19   Nargis Owens MD   DULoxetine (CYMBALTA) 30 mg capsule Take 60 mg by mouth daily. Yoana Crocker MD   atorvastatin (LIPITOR) 40 mg tablet Take 40 mg by mouth daily. Yoana Crocker MD   OTHER,NON-FORMULARY, Medication for irregular heart beat    Yoana Crocker MD   gabapentin (NEURONTIN) 300 mg capsule Take 300 mg by mouth three (3) times daily. Yoana Crocker MD   furosemide (LASIX) 40 mg tablet Take 1 Tab by mouth daily. Patient taking differently: Take 40 mg by mouth two (2) times a day. 2/13/17   Ramya Canales MD   aspirin 81 mg chewable tablet Take 1 tablet by mouth daily. 8/20/14   Luca Farmer MD       ALLERGIES:  Allergies   Allergen Reactions    Bees [Sting, Bee] Swelling    Penicillins Hives    [place] Hives       Review of Systems  GENERAL: No fevers or chills. HEENT: No change in vision, earache, tinnitus, sore throat or sinus congestion. NECK: No pain or stiffness. CARDIOVASCULAR: No chest pain or pressure. No palpitations. PULMONARY: No shortness of breath, cough or wheeze. GASTROINTESTINAL: No abdominal pain, nausea, vomiting or diarrhea. GENITOURINARY: No urinary frequency, urgency, hesitancy or dysuria. MUSCULOSKELETAL: No joint or muscle pain, no back pain, no recent trauma. DERMATOLOGIC: No rash, no itching, no lesions. ENDOCRINE: No polyuria, polydipsia, no heat or cold intolerance. No recent change in weight. HEMATOLOGICAL: No anemia or easy bruising or bleeding. NEUROLOGIC: No headache, seizures, numbness, tingling or weakness.      PHYSICAL EXAMINATION:     Visit Vitals  /63 (BP 1 Location: Left arm, BP Patient Position: At rest)   Pulse 85   Temp 98.1 °F (36.7 °C)   Resp 16   Ht 5' 2\" (1.575 m)   Wt 146.5 kg (323 lb)   SpO2 95%   BMI 59.08 kg/m²      O2 Device: Room air  GENERAL: Pleasant, in no apparent distress. HEENT: Moist mucous membranes, sclerae anicteric, scalp is atraumatic. CVS: Regular rate and rhythm, no murmurs or gallops. No carotid bruits. PULMONARY: Clear to auscultation bilaterally. No rales or rhonchi. No wheezing. EXTREMITIES: Normal range of motion at all sites. No deformities. ABDOMEN: Soft, nontender. SKIN: No rashes or ecchymoses. Warm and dry. NEUROLOGIC: Alert and oriented x3. Speech is fluent without any aphasia or dysarthria. Mild slow sury in speech. Cranial nerves: Face is symmetric with symmetric smile. Facial sensation is intact. Extraocular movements are intact with no nystagmus. Visual fields are full to confrontation. PERRL. Tongue is midline. Palate elevates symmetrically. Hearing intact to speech. Sternocleidomastoid and trapezius strengths are full bilaterally. Motor: Normal tone and normal bulk on all four extremities. Strength is full on all four segmentally. There is no pronator drift or orbiting. Sensory: Intact to pinprick and touch on all four. Normal vibratory sensation on toes bilaterally. Coordination: Intact coordination with finger-nose-finger bilaterally. Normal fine movements. No bradykinesia detected. Deep tendon reflexes: 2+ at biceps, brachioradialis, patella and ankles bilaterally. Toes are down-going bilaterally. Gait assessment: Deferred.   Labs: Results:       Chemistry Recent Labs     03/06/20  0429 03/05/20  1020   * 148*    143   K 3.6 3.9    105   CO2 28 29   BUN 81* 81*   CREA 2.15* 2.34*   CA 8.9 8.7   AGAP 10 9   BUCR 38* 35*   AP  --  152*   TP  --  6.7   ALB  --  3.2*   GLOB  --  3.5   AGRAT  --  0.9      CBC w/Diff Recent Labs 03/06/20  0429 03/05/20  1020   WBC 7.4 11.2   RBC 3.33* 3.39*   HGB 8.2* 8.5*   HCT 27.8* 28.3*    208   GRANS  --  87*   LYMPH  --  8*   EOS  --  1      Cardiac Enzymes Recent Labs     03/05/20  1020   CPK 52   CKND1 CALCULATION NOT PERFORMED WHEN RESULT IS BELOW LINEAR LIMIT      Coagulation No results for input(s): PTP, INR, APTT, INREXT in the last 72 hours. Lipid Panel Lab Results   Component Value Date/Time    Cholesterol, total 146 05/28/2015 02:58 AM    HDL Cholesterol 64 (H) 05/28/2015 02:58 AM    LDL, calculated 66 05/28/2015 02:58 AM    VLDL, calculated 16 05/28/2015 02:58 AM    Triglyceride 80 05/28/2015 02:58 AM    CHOL/HDL Ratio 2.3 05/28/2015 02:58 AM      BNP No results for input(s): BNPP in the last 72 hours. Liver Enzymes Recent Labs     03/05/20  1020   TP 6.7   ALB 3.2*   *   SGOT 20      Thyroid Studies Lab Results   Component Value Date/Time    TSH 0.56 01/23/2019 01:33 AM          Radiology:  Nm Lung Vent/perf    Result Date: 2/12/2020  VQ SCAN INDICATION: Chest pain and dyspnea. COMPARISON: Chest x-ray performed earlier in day. VQ scan - 01/17/2019. SITE OF INJECTION: Left antecubital fossa. DESCRIPTION: After aerosolization of 0.8 mCi 99m Tc-DTPA, ventilatory images of the lungs were obtained in multiple projections. After intravenous administration of 6.6 mCi 99m Tc-MAA, perfusion images of the lungs were obtained in multiple projections. Images are correlated with chest radiographic study which demonstrate no evidence of focal pulmonary infiltrate or pleural effusion. [The distribution of radiopharmaceutical is within normal limits on ventilatory and perfusion images. Ventilatory Central clumping of radiotracer most suggestive of an obstructive pulmonary process is unchanged from prior imaging. There is no evidence of moderate or large mismatched subsegmental perfusion defect to indicate the presence of pulmonary embolism.  Incidental small, chronic photopenic defect in the anterior left upper lobe is unchanged from remote scintigraphy.]      IMPRESSION: [Low probability of pulmonary embolism. Xr Foot Rt Min 3 V    Result Date: 2/16/2020  EXAM: Right foot INDICATION: Right foot pain. Injury first toe COMPARISON: Right foot series 10/5/2019 _______________ FINDINGS: 3 views were performed. No acute fracture or dislocation. Bones are demineralized with mild narrowing first MTP joint. Dorsal and plantar calcaneal spurs. Possible injury to the nail of the first toe. Prominent dorsal soft tissue swelling increased over comparison study. Vascular calcifications are present. _______________     IMPRESSION: 1. No acute bony abnormality. 2. Osteopenia with degenerative changes. 3. Prominent dorsal soft tissue swelling. 4. Probable injury to the nail of the first toe. 5. Atherosclerosis. Ct Head Wo Cont    Result Date: 3/6/2020  EXAM: CT head INDICATION: Altered mental status COMPARISON: CT head March 5, 2020 at 11:03 AM TECHNIQUE: Axial CT imaging of the head was performed without intravenous contrast. Standard multiplanar coronal and sagittal reformatted images were obtained and are included in interpretation. One or more dose reduction techniques were used on this CT: automated exposure control, adjustment of the mAs and/or kVp according to patient size, and iterative reconstruction techniques. The specific techniques used on this CT exam have been documented in the patient's electronic medical record. Digital Imaging and Communications in Medicine (DICOM) format image data are available to nonaffiliated external healthcare facilities or entities on a secure, media free, reciprocally searchable basis with patient authorization for at least a 12-month period after this study. _______________ FINDINGS: BRAIN AND POSTERIOR FOSSA: The sulci, folia, ventricles and basal cisterns are within normal limits for the patient?s age.  There is no intracranial hemorrhage, mass effect, or midline shift. Gray-white matter differentiation remains within normal limits. Minor periventricular white matter low-attenuation unchanged. EXTRA-AXIAL SPACES AND MENINGES: There is no acute extra-axial fluid collection. CALVARIUM: Intact. SINUSES: Clear. OTHER: None. _______________     IMPRESSION: 1. No acute intracranial abnormality. Stable examination. Gray-white matter differentiation remains within normal limits. Ct Head Wo Cont    Result Date: 3/5/2020  EXAM: CT head INDICATION: Altered mental status COMPARISON: 12/23/2016 TECHNIQUE: Axial CT imaging of the head was performed without intravenous contrast. Standard multiplanar coronal and sagittal reformatted images were obtained and are included in interpretation. One or more dose reduction techniques were used on this CT: automated exposure control, adjustment of the mAs and/or kVp according to patient size, and iterative reconstruction techniques. The specific techniques used on this CT exam have been documented in the patient's electronic medical record. Digital Imaging and Communications in Medicine (DICOM) format image data are available to nonaffiliated external healthcare facilities or entities on a secure, media free, reciprocally searchable basis with patient authorization for at least a 12-month period after this study. _______________ FINDINGS: BRAIN AND POSTERIOR FOSSA: The sulci, folia, ventricles and basal cisterns are within normal limits for the patient?s age. There is no intracranial hemorrhage, mass effect, or midline shift. There are no areas of abnormal parenchymal attenuation. EXTRA-AXIAL SPACES AND MENINGES: There are no abnormal extra-axial fluid collections. CALVARIUM: Intact. SINUSES: Minor nonspecific thickening of the ethmoid air cells. OTHER: None. _______________     IMPRESSION: 1. No acute intracranial abnormality.     Ct Abd Pelv Wo Cont    Result Date: 2/12/2020  EXAM: CT of the abdomen and pelvis CLINICAL INDICATION/HISTORY:  Abdominal pain and diarrhea. COMPARISON: 07/17/2014. TECHNIQUE: Axial CT imaging of the abdomen and pelvis was performed without contrast. Multiplanar reformats were generated. One or more dose reduction techniques were used on this CT: automated exposure control, adjustment of the mAs and/or kVp according to patient size, and iterative reconstruction techniques. The specific techniques used on this CT exam have been documented in the patient's electronic medical record. Digital Imaging and Communications in Medicine (DICOM) format image data are available to nonaffiliated external healthcare facilities or entities on a secure, media free, reciprocally searchable basis with patient authorization for at least a 12-month period after this study. _______________ FINDINGS: LOWER CHEST: The visualized lung bases are clear. Heart size is normal. There is no pericardial or pleural effusion. LIVER, BILIARY: Liver is normal with no focal parenchymal lesion identified. There is no intra-or extrahepatic biliary ductal dilatation. Gallbladder is surgically absent. PANCREAS: Normal. SPLEEN: Normal. ADRENALS: Normal. KIDNEYS: Normal. There is no nephrolithiasis. There is no hydronephrosis, hydroureter, or other signs of obstructive nephropathy. LYMPH NODES: No enlarged lymph nodes. GASTROINTESTINAL TRACT: There is no evidence of bowel obstruction. While limited without contrast, there is no definitive wall thickening.] The appendix is visualized and unremarkable. PELVIC ORGANS: Unremarkable. VASCULATURE: Unremarkable. BONES: No acute or aggressive osseous abnormalities identified. OTHER: There is no free intraperitoneal fluid or free air. SUPERFICIAL SOFT TISSUES: Mild inflammatory changes at the pannus. There is a fat-containing ventral hernia. Please note that evaluation of the intra-abdominal structures is somewhat limited due to lack of oral or IV contrast. _______________     IMPRESSION: 1.  No evidence of nephrolithiasis, hydronephrosis, or other signs of obstructive nephropathy. 2. No evidence of bowel obstruction or acute inflammatory process in the abdomen or pelvis. 3. Superficial pannus inflammatory changes. Please correlate for clinical signs of cellulitis. Xr Chest Port    Result Date: 3/5/2020  EXAM: XR CHEST PORT CLINICAL INDICATION/HISTORY: Altered mental status -Additional: None COMPARISON: 2/17/2020 TECHNIQUE: Frontal view of the chest _______________ FINDINGS: HEART AND MEDIASTINUM: Stable appearing cardiac size and mediastinal contours, with cardiac size again noted to be mildly enlarged. LUNGS AND PLEURAL SPACES: Unchanged central vascular congestion. No pneumothorax or large pleural effusion. BONY THORAX AND SOFT TISSUES: No acute osseous abnormality _______________     IMPRESSION: Cardiac enlargement and central vascular congestion without significant change from recent prior study. Xr Chest Port    Result Date: 2/17/2020  EXAM: XR CHEST PORT CLINICAL INDICATION/HISTORY: CHF -Additional: None COMPARISON: 2/16/2020 TECHNIQUE: Portable frontal view of the chest _______________ FINDINGS: SUPPORT DEVICES: None. HEART AND MEDIASTINUM: Cardiomegaly LUNGS AND PLEURAL SPACES: Vascular congestion and interstitial edema. No large effusion or pneumothorax. BONY THORAX AND SOFT TISSUES: Unremarkable. _______________     IMPRESSION: Cardiomegaly. Vascular congestion and interstitial edema. Xr Chest Port    Result Date: 2/16/2020  EXAM: XR CHEST PORT CLINICAL INDICATION/HISTORY: Shortness of breath -Additional: None COMPARISON: 2/16/2020 TECHNIQUE: Portable frontal view of the chest _______________ FINDINGS: SUPPORT DEVICES: None. HEART AND MEDIASTINUM: Cardiac silhouette appears enlarged, unchanged. Table mediastinal contours. LUNGS AND PLEURAL SPACES: Persistent central vascular congestion and faint interstitial opacities. No pneumothorax or large pleural effusion.  BONY THORAX AND SOFT TISSUES: Unremarkable. _______________     IMPRESSION: Cardiac enlargement with persistent findings of central vascular congestion and mild interstitial edema. Xr Chest Port    Result Date: 2/16/2020  EXAM: CHEST RADIOGRAPH, SINGLE VIEW CLINICAL INDICATION/HISTORY: SOB, Desaturation      <Additional:  None. COMPARISON: 2/12/2020 TECHNIQUE: Portable frontal view of the chest was obtained. _______________ FINDINGS: SUPPORT DEVICES: None. HEART AND MEDIASTINUM: Cardiac silhouette is mildly prominent without change. Superior mediastinum appears normal. LUNGS AND PLEURAL SPACES: Pulmonary vascular redistribution is present with interval increased degree of pulmonary vascular plumpness. No dayna alveolar edema. Costophrenic angles are sharp. No pneumothorax. BONY THORAX AND SOFT TISSUES: No acute osseous abnormality. _______________     IMPRESSION: CHF. Xr Chest Port    Result Date: 2/12/2020  EXAM: One view chest x-ray CLINICAL INDICATION/HISTORY: Chest pain and dyspnea. COMPARISON: 12/10/2019. TECHNIQUE: Single AP view of the chest was obtained. _______________ FINDINGS: HEART, VESSELS, MEDIASTINUM: Heart size is stable. No vascular congestion. LUNGS, PLEURAL SPACES: The lungs are clear. No effusion or pneumothorax. BONY THORAX, SOFT TISSUES: Unremarkable. _______________     IMPRESSION: No acute cardiopulmonary process. ASSESSMENT/IMPRESSION:  58-year-old female with history of chronic kidney failure and diabetes presents with severe hypoglycemia and unresponsiveness. Patient has been back to her normal self now. 1. Encephalopathy: Resolved. CT head yesterday and this morning were unremarkable. Patient has contraindication for MRI due to body habitus. 2. Hypoglycemia  3. Slow sury in speech: there is no dysarthria. Patient has slow sury in speech. Unclear etiology, possibly underlying from stress. Slow sury has been improving with time. Repeated head CT was unremarkable. RECOMMENDATIONS:  1.  Continue speech therapy. 2. Neurology sign off.   Please do not hesitate to return with any questions.        ------------------------------------  Cecilio Kidd MD  3/6/2020  12:25 PM

## 2020-03-06 NOTE — ROUTINE PROCESS
Bedside and Verbal shift change report given to Alfred Rivera RN by Domingo Carlos. Report included the following information SBAR, Kardex, OR Summary, Intake/Output and MAR.

## 2020-03-06 NOTE — PROGRESS NOTES
Hospitalist Progress Note-critical care note     Patient: Carolene Hammans MRN: [id number]  CSN: [id number]    YOB: 1965  Age: 53 y.o. Sex: female    DOA: 3/5/2020 LOS:  LOS: 1 day            Chief complaint: encephalopathy, ckd3, uti, morbid obesity  Dm ,htn     Assessment/Plan         Hospital Problems  Date Reviewed: 12/11/2019          Codes Class Noted POA    Encephalopathy ICD-10-CM: G93.40  ICD-9-CM: 348.30  3/6/2020 Unknown        Chronic renal failure, stage 3 (moderate) ([facility] ICD-10-CM: N18.3  ICD-9-CM: 585.3  3/6/2020 Unknown        AMS (altered mental status) ICD-10-CM: R41.82  ICD-9-CM: 780.97  3/5/2020 Unknown        Hypoglycemia ICD-10-CM: E16.2  ICD-9-CM: 251.2  3/5/2020 Yes        UTI (urinary tract infection) ICD-10-CM: N39.0  ICD-9-CM: 599.0  12/11/2019 Yes        Morbid obesity ([facility] ICD-10-CM: E66.01  ICD-9-CM: 278.01  Unknown Yes        Type II or unspecified type diabetes mellitus with renal manifestations, uncontrolled(250.42) ([facility] ICD-10-CM: E11.29, E11.65  ICD-9-CM: 250.42  9/4/2014 Yes        HTN (hypertension) ICD-10-CM: I10  ICD-9-CM: 401.9  8/16/2014 Yes              Hypoglycemia   Resolved, will add lantus on   ssi     Slow speech   Case discussed with    Mri brain unable to obtain due to body habitus -I discussed mri group due to the shoulder size  Will have speech on board    Carotid ultrasound     Acute encephalopathy   Due hypoglycemia   Resolved, alert and oriented         DM   Use 50 at home , start 30    Lantus and sliding scale       chronic chf ,diastolic stable   Continue lasix and  metolazone       chronic renal failure 3  Stable,  F/u with dr. Roman Floor           Sleep Apnea   cpap at night       Pulmonary HTN       htn  Continue   norvasc and hydralyzine, continue hold metoprolol due to jono     uti continue rocephin   cx positive -still pending       Subjective : My glucose was 30s .  I do not know I talk like this     35 total min's spent on patient care including >50% on counseling/coordinating care. Discussed the above assessments. also discussed labs, medications and hospital course    Case discussed with Dr. Susanna Ramos, ct head     Disposition :1-2 days  Will have pt/ot /speech on board   Review of systems:    General: No fevers or chills. Cardiovascular: No chest pain or pressure. No palpitations. Pulmonary: No shortness of breath. Gastrointestinal: No nausea, vomiting. Vital signs/Intake and Output:  Visit Vitals  /63 (BP 1 Location: Left arm, BP Patient Position: At rest)   Pulse 85   Temp 98.1 °F (36.7 °C)   Resp 16   Ht 5' 2\" (1.575 m)   Wt 146.5 kg (323 lb)   SpO2 95%   BMI 59.08 kg/m²     Current Shift:  No intake/output data recorded. Last three shifts:  03/04 1901 - 03/06 0700  In: -   Out: 1450 [Urine:1450]    Physical Exam:  General: WD, WN. Alert, cooperative, no acute distress    HEENT: NC, Atraumatic. PERRLA, anicteric sclerae. Lungs: CTA Bilaterally. No Wheezing/Rhonchi/Rales. Heart:  Regular  rhythm,  No murmur, No Rubs, No Gallops  Abdomen: Soft, Non distended, Non tender. +Bowel sounds,   Extremities: No c/c/e  Psych:   Not anxious or agitated. Neurologic:  No acute neurological deficit except slow speech             Labs: Results:       Chemistry Recent Labs     03/06/20  0429 03/05/20  1020   * 148*    143   K 3.6 3.9    105   CO2 28 29   BUN 81* 81*   CREA 2.15* 2.34*   CA 8.9 8.7   AGAP 10 9   BUCR 38* 35*   AP  --  152*   TP  --  6.7   ALB  --  3.2*   GLOB  --  3.5   AGRAT  --  0.9      CBC w/Diff Recent Labs     03/06/20  0429 03/05/20  1020   WBC 7.4 11.2   RBC 3.33* 3.39*   HGB 8.2* 8.5*   HCT 27.8* 28.3*    208   GRANS  --  87*   LYMPH  --  8*   EOS  --  1      Cardiac Enzymes Recent Labs     03/05/20  1020   CPK 52   CKND1 CALCULATION NOT PERFORMED WHEN RESULT IS BELOW LINEAR LIMIT      Coagulation No results for input(s): PTP, INR, APTT, INREXT in the last 72 hours.     Lipid Panel Lab Results Component Value Date/Time    Cholesterol, total 146 05/28/2015 02:58 AM    HDL Cholesterol 64 (H) 05/28/2015 02:58 AM    LDL, calculated 66 05/28/2015 02:58 AM    VLDL, calculated 16 05/28/2015 02:58 AM    Triglyceride 80 05/28/2015 02:58 AM    CHOL/HDL Ratio 2.3 05/28/2015 02:58 AM      BNP No results for input(s): BNPP in the last 72 hours. Liver Enzymes Recent Labs     03/05/20  1020   TP 6.7   ALB 3.2*   *   SGOT 20      Thyroid Studies Lab Results   Component Value Date/Time    TSH 0.56 01/23/2019 01:33 AM        Procedures/imaging: see electronic medical records for all procedures/Xrays and details which were not copied into this note but were reviewed prior to creation of Plan    Nm Lung Vent/perf    Result Date: 2/12/2020  VQ SCAN INDICATION: Chest pain and dyspnea. COMPARISON: Chest x-ray performed earlier in day. VQ scan - 01/17/2019. SITE OF INJECTION: Left antecubital fossa. DESCRIPTION: After aerosolization of 0.8 mCi 99m Tc-DTPA, ventilatory images of the lungs were obtained in multiple projections. After intravenous administration of 6.6 mCi 99m Tc-MAA, perfusion images of the lungs were obtained in multiple projections. Images are correlated with chest radiographic study which demonstrate no evidence of focal pulmonary infiltrate or pleural effusion. [The distribution of radiopharmaceutical is within normal limits on ventilatory and perfusion images. Ventilatory Central clumping of radiotracer most suggestive of an obstructive pulmonary process is unchanged from prior imaging. There is no evidence of moderate or large mismatched subsegmental perfusion defect to indicate the presence of pulmonary embolism. Incidental small, chronic photopenic defect in the anterior left upper lobe is unchanged from remote scintigraphy.]      IMPRESSION: [Low probability of pulmonary embolism. Xr Foot Rt Min 3 V    Result Date: 2/16/2020  EXAM: Right foot INDICATION: Right foot pain.  Injury first toe COMPARISON: Right foot series 10/5/2019 _______________ FINDINGS: 3 views were performed. No acute fracture or dislocation. Bones are demineralized with mild narrowing first MTP joint. Dorsal and plantar calcaneal spurs. Possible injury to the nail of the first toe. Prominent dorsal soft tissue swelling increased over comparison study. Vascular calcifications are present. _______________     IMPRESSION: 1. No acute bony abnormality. 2. Osteopenia with degenerative changes. 3. Prominent dorsal soft tissue swelling. 4. Probable injury to the nail of the first toe. 5. Atherosclerosis. Ct Head Wo Cont    Result Date: 3/6/2020  EXAM: CT head INDICATION: Altered mental status COMPARISON: CT head March 5, 2020 at 11:03 AM TECHNIQUE: Axial CT imaging of the head was performed without intravenous contrast. Standard multiplanar coronal and sagittal reformatted images were obtained and are included in interpretation. One or more dose reduction techniques were used on this CT: automated exposure control, adjustment of the mAs and/or kVp according to patient size, and iterative reconstruction techniques. The specific techniques used on this CT exam have been documented in the patient's electronic medical record. Digital Imaging and Communications in Medicine (DICOM) format image data are available to nonaffiliated external healthcare facilities or entities on a secure, media free, reciprocally searchable basis with patient authorization for at least a 12-month period after this study. _______________ FINDINGS: BRAIN AND POSTERIOR FOSSA: The sulci, folia, ventricles and basal cisterns are within normal limits for the patient?s age. There is no intracranial hemorrhage, mass effect, or midline shift. Gray-white matter differentiation remains within normal limits. Minor periventricular white matter low-attenuation unchanged. EXTRA-AXIAL SPACES AND MENINGES: There is no acute extra-axial fluid collection. CALVARIUM: Intact. SINUSES: Clear. OTHER: None. _______________     IMPRESSION: 1. No acute intracranial abnormality. Stable examination. Gray-white matter differentiation remains within normal limits. Ct Head Wo Cont    Result Date: 3/5/2020  EXAM: CT head INDICATION: Altered mental status COMPARISON: 12/23/2016 TECHNIQUE: Axial CT imaging of the head was performed without intravenous contrast. Standard multiplanar coronal and sagittal reformatted images were obtained and are included in interpretation. One or more dose reduction techniques were used on this CT: automated exposure control, adjustment of the mAs and/or kVp according to patient size, and iterative reconstruction techniques. The specific techniques used on this CT exam have been documented in the patient's electronic medical record. Digital Imaging and Communications in Medicine (DICOM) format image data are available to nonaffiliated external healthcare facilities or entities on a secure, media free, reciprocally searchable basis with patient authorization for at least a 12-month period after this study. _______________ FINDINGS: BRAIN AND POSTERIOR FOSSA: The sulci, folia, ventricles and basal cisterns are within normal limits for the patient?s age. There is no intracranial hemorrhage, mass effect, or midline shift. There are no areas of abnormal parenchymal attenuation. EXTRA-AXIAL SPACES AND MENINGES: There are no abnormal extra-axial fluid collections. CALVARIUM: Intact. SINUSES: Minor nonspecific thickening of the ethmoid air cells. OTHER: None. _______________     IMPRESSION: 1. No acute intracranial abnormality. Ct Abd Pelv Wo Cont    Result Date: 2/12/2020  EXAM: CT of the abdomen and pelvis CLINICAL INDICATION/HISTORY:  Abdominal pain and diarrhea. COMPARISON: 07/17/2014. TECHNIQUE: Axial CT imaging of the abdomen and pelvis was performed without contrast. Multiplanar reformats were generated.  One or more dose reduction techniques were used on this CT: automated exposure control, adjustment of the mAs and/or kVp according to patient size, and iterative reconstruction techniques. The specific techniques used on this CT exam have been documented in the patient's electronic medical record. Digital Imaging and Communications in Medicine (DICOM) format image data are available to nonaffiliated external healthcare facilities or entities on a secure, media free, reciprocally searchable basis with patient authorization for at least a 12-month period after this study. _______________ FINDINGS: LOWER CHEST: The visualized lung bases are clear. Heart size is normal. There is no pericardial or pleural effusion. LIVER, BILIARY: Liver is normal with no focal parenchymal lesion identified. There is no intra-or extrahepatic biliary ductal dilatation. Gallbladder is surgically absent. PANCREAS: Normal. SPLEEN: Normal. ADRENALS: Normal. KIDNEYS: Normal. There is no nephrolithiasis. There is no hydronephrosis, hydroureter, or other signs of obstructive nephropathy. LYMPH NODES: No enlarged lymph nodes. GASTROINTESTINAL TRACT: There is no evidence of bowel obstruction. While limited without contrast, there is no definitive wall thickening.] The appendix is visualized and unremarkable. PELVIC ORGANS: Unremarkable. VASCULATURE: Unremarkable. BONES: No acute or aggressive osseous abnormalities identified. OTHER: There is no free intraperitoneal fluid or free air. SUPERFICIAL SOFT TISSUES: Mild inflammatory changes at the pannus. There is a fat-containing ventral hernia. Please note that evaluation of the intra-abdominal structures is somewhat limited due to lack of oral or IV contrast. _______________     IMPRESSION: 1. No evidence of nephrolithiasis, hydronephrosis, or other signs of obstructive nephropathy. 2. No evidence of bowel obstruction or acute inflammatory process in the abdomen or pelvis. 3. Superficial pannus inflammatory changes.  Please correlate for clinical signs of cellulitis. Xr Chest Port    Result Date: 3/5/2020  EXAM: XR CHEST PORT CLINICAL INDICATION/HISTORY: Altered mental status -Additional: None COMPARISON: 2/17/2020 TECHNIQUE: Frontal view of the chest _______________ FINDINGS: HEART AND MEDIASTINUM: Stable appearing cardiac size and mediastinal contours, with cardiac size again noted to be mildly enlarged. LUNGS AND PLEURAL SPACES: Unchanged central vascular congestion. No pneumothorax or large pleural effusion. BONY THORAX AND SOFT TISSUES: No acute osseous abnormality _______________     IMPRESSION: Cardiac enlargement and central vascular congestion without significant change from recent prior study. Xr Chest Port    Result Date: 2/17/2020  EXAM: XR CHEST PORT CLINICAL INDICATION/HISTORY: CHF -Additional: None COMPARISON: 2/16/2020 TECHNIQUE: Portable frontal view of the chest _______________ FINDINGS: SUPPORT DEVICES: None. HEART AND MEDIASTINUM: Cardiomegaly LUNGS AND PLEURAL SPACES: Vascular congestion and interstitial edema. No large effusion or pneumothorax. BONY THORAX AND SOFT TISSUES: Unremarkable. _______________     IMPRESSION: Cardiomegaly. Vascular congestion and interstitial edema. Xr Chest Port    Result Date: 2/16/2020  EXAM: XR CHEST PORT CLINICAL INDICATION/HISTORY: Shortness of breath -Additional: None COMPARISON: 2/16/2020 TECHNIQUE: Portable frontal view of the chest _______________ FINDINGS: SUPPORT DEVICES: None. HEART AND MEDIASTINUM: Cardiac silhouette appears enlarged, unchanged. Table mediastinal contours. LUNGS AND PLEURAL SPACES: Persistent central vascular congestion and faint interstitial opacities. No pneumothorax or large pleural effusion. BONY THORAX AND SOFT TISSUES: Unremarkable. _______________     IMPRESSION: Cardiac enlargement with persistent findings of central vascular congestion and mild interstitial edema.      Xr Chest Port    Result Date: 2/16/2020  EXAM: CHEST RADIOGRAPH, SINGLE VIEW CLINICAL INDICATION/HISTORY: SOB, Desaturation      <Additional:  None. COMPARISON: 2/12/2020 TECHNIQUE: Portable frontal view of the chest was obtained. _______________ FINDINGS: SUPPORT DEVICES: None. HEART AND MEDIASTINUM: Cardiac silhouette is mildly prominent without change. Superior mediastinum appears normal. LUNGS AND PLEURAL SPACES: Pulmonary vascular redistribution is present with interval increased degree of pulmonary vascular plumpness. No dayna alveolar edema. Costophrenic angles are sharp. No pneumothorax. BONY THORAX AND SOFT TISSUES: No acute osseous abnormality. _______________     IMPRESSION: CHF. Xr Chest Port    Result Date: 2/12/2020  EXAM: One view chest x-ray CLINICAL INDICATION/HISTORY: Chest pain and dyspnea. COMPARISON: 12/10/2019. TECHNIQUE: Single AP view of the chest was obtained. _______________ FINDINGS: HEART, VESSELS, MEDIASTINUM: Heart size is stable. No vascular congestion. LUNGS, PLEURAL SPACES: The lungs are clear. No effusion or pneumothorax. BONY THORAX, SOFT TISSUES: Unremarkable. _______________     IMPRESSION: No acute cardiopulmonary process.       Felisha Messina MD

## 2020-03-06 NOTE — PROGRESS NOTES
2000 - Bedside report received from Scott, [address] [place] [address]. Patient in bed. Pain 0/10.     2230 - Patient in bed at this time. IV to R thumb.  intact and patent. Sequential  compression device bilaterally. Jose LE edema noted, worse to the left. + CMS. Pt A & O x 4. LS clear, on RA. Abdomen soft, NT and ND. + BS to all 4 quadrants. Denies nausea. Pain 0/10. Call light within reach. CPAP on. Pt had uneventful shift. Remains A & O x 4 this AM. No issues/concerns at this time.  Call bell within reach

## 2020-03-06 NOTE — CDMP QUERY
Pt admitted with AMS/Pt noted to have UTI. If possible, please document in the progress notes and d/c summary if you are evaluating and / or treating any of the following: ? Metabolic Encephalopathy ? Septic Encephalopathy ? Toxic Encephalopathy 
? Encephalopathy due to medications or drugs (please specify) ? Toxic Metabolic Encephalopathy ? Wernickes Encephalopathy 
? Other Encephalopathy 
? Other, please specify ? Clinically unable to determine The medical record reflects the following: 
   Risk Factors: UTI, Clinical Indicators: Presented with ams, confusion and has UTI . Treatment:Rocephin Iv Thank-You Ligia Peterson RN [address] [place] [address] [phone]

## 2020-03-06 NOTE — PROGRESS NOTES
Reason for Readmission:   AMS                  RUR Score:   23%               PCP: First and Last name:     Name of Practice:    Are you a current patient: Yes/No:    Approximate date of last visit:     Do you (patient/family) have any concerns for transition/discharge? Plan for utilizing home health:   TBD    Current Advanced Directive/Advance Care Plan:  Not on file            Transition of Care Plan:    [facility] and physician follow up vs SNF      Chart reviewed. Per H&P \"Nancy Vieira is a 53 y.o. female with past medical history of hypertension, CHF, diabetes, asthma who presents to the emergency department C/O episode of unresponsiveness this morning.  Per EMS, daughter came over to patient's house this morning, found her to be unresponsive, sitting in her recliner surrounded by copious amount of oral secretions. [address] [facility] arrival, patient's blood sugar was 38 so they gave IM glucagon followed by oral dextrose with improvement of her alertness and mental status and repeat blood sugar was 68.  EMS unable to obtain IV access.  Patient states she does not believe that she took insulin last night as she only ate a salad for dinner.  Pt denies chest pain, shortness of breath, nausea, vomiting, and any other sxs or complaints. I was asked to admit patient because pt has not returned back to baseline level of mental status. Current . Daughter and sister at bedside concerned that pt's speech is not at baseline -slowed. \"    1150:  CM met with pt at bedside to discuss transition of care. Pt lives alone and has adult children in the area to assist as needed. Pt has a c-pap, neb and a RW that pt has indicated is now broken. ASHLEY discussed [facility] services. Pt has indicated she is current with [facility] (PT/OT/SN/HHA) and would like to continue with this agency. CMS has been notified to assist.  Pt currently with therapy consults bending.   Provider has indicated pt will remain inpt through the weekend. CM to continue to follow and assist.     Care Management Interventions  PCP Verified by CM: Yes  Mode of Transport at Discharge:  Other (see comment)(Family)  Transition of Care Consult (CM Consult): Discharge Planning, [address]: No  Reason Outside [place]: Patient already serviced by other home care/hospice agency(Current with [facility])  Health Maintenance Reviewed: Yes  Physical Therapy Consult: Yes  Occupational Therapy Consult: Yes  Speech Therapy Consult: Yes  Current Support Network: Family Lives [place], Lives Alone  Confirm Follow Up Transport: Family  The Plan for Transition of Care is Related to the Following Treatment Goals : [facility] and physician follow up  The Patient and/or Patient Representative was Provided with a Choice of Provider and Agrees with the Discharge Plan?: Yes  Name of the Patient Representative Who was Provided with a Choice of Provider and Agrees with the Discharge Plan: pt/Nancy Moise  [place] of Choice List was Provided with Basic Dialogue that Supports the Patient's Individualized Plan of Care/Goals, Treatment Preferences and Shares the Quality Data Associated with the Providers?: Yes  Discharge Location  Discharge Placement: Home with home health([facility] vs SNF)

## 2020-03-06 NOTE — PROGRESS NOTES
5256  Assumed care of pt at this time. Assessment complete. Pt alert and oriented x 3 answers questions appropriately. Speech slurred and delayed Shows no sign of distress. Fall risk arm band in place. Denies SOB and chest pain. Pt lungs clear bilaterally. Cap refill  less than 3 seconds. Pt denies numbness and tingling to all extremities. Stated pain 0/10. Pt has 20 G IV to right hand. Pt has no dressing Skin intact . SCDs in place  +2 +3 pitting edema to both feet  Call light and possessions within reach. Bed locked and in low position. Will continue to monitor. 1035  Pt being transported to CT at this time    1109  Pt returned to room at this time    1200  Paged speech therapy to see pt. Dr Geovany Solis wants pt NPO until evaluated     [phone]  Pt ambulating in hallway with PT    1849  Pt became short of breath after ambulating from bathroom. Nurse applied 1L O2 NC  O2 sat 98%    Shift summary  Pt is alert and oriented x 4. Pt had uneventful shift. Pt ambulating and voiding sufficient amounts. Pain to be controlled by PRN medication.  Slurred speech has improved some since beginning of shift

## 2020-03-06 NOTE — PROGRESS NOTES
Speech Therapy Note:    SLP orders received and attempted however, patient:      []  Lethargic, unable to be alerted enough for safe PO intake  []  Refused participation  [x]  Off the unit  []  NPO for procedure  []  Other:     SLP will f/u later this day or as medically indicated.  Thank you for this referral.     Zain Vazquez M.S., [address] [facility]  Speech Language Pathologist

## 2020-03-06 NOTE — PROGRESS NOTES
Problem: Self Care Deficits Care Plan (Adult)  Goal: *Acute Goals and Plan of Care (Insert Text)  Description  Initial Occupational Therapy Goals (3/6/2020) Within 7 day(s):    1. Patient will perform grooming standing at sink with supervision x 5 minutes for increased independence with ADLs. 2. Patient will perform UB dressing with mod I for increased independence with ADLs. 3. Patient will perform LB dressing with supervision & A/E PRN for increased independence with ADLs. 4. Patient will perform all aspects of toileting with mod I for increased independence in ADLs  5. Patient will independently apply energy conservation techniques with 1 verbal cue(s) for increased independence with ADLs. 6. Patient will utilize good body mechanics during ADLs with 1 verbal cue(s). Outcome: Progressing Towards Goal   OCCUPATIONAL THERAPY EVALUATION    Patient: Gwendolyn Virk (53 y.o. female)  Date: 3/6/2020  Primary Diagnosis: AMS (altered mental status) [R41.82]        Precautions: Fall  PLOF: pt requiring A from daughter for LE ADLs, bathing, occasional toileting, utilizing AE, living alone, daughter completing most IADLs    ASSESSMENT :  Based on the objective data described below, the patient presents with decreased activity tolerance, independence with LB ADLs. Pt reports daughter was helping with medication management, providing reminders. Pt owns hip kit at home and was using, however still required min A from dtr to complete LBD. Pt stating she continues to need assist with bowel hygiene; provided/educated use on toilet tongs to increase independence with toileting. Pt SpO2 92% at start of session, 85% after bathroom mobility and increased to 94% after 30 seconds, and decreased again to 84% when ambulated back to bed, increased to 92% after deep breathing.  Pt requiring frequent verbal cues to take deep breaths during functional mobility as pt is talkative, and is able to complete bathroom mobility/toilet transfer with CGA. Pt also reports HH in the process of obtaining TTB for pt. Education: Energy Conservation/Work Simplification Techniques, adaptive dressing strategies, focusing on deep breathing techniques during ambulation    Patient will benefit from skilled intervention to address the above impairments. Patient's rehabilitation potential is considered to be Good  Factors which may influence rehabilitation potential include:   []             None noted  []             Mental ability/status  [x]             Medical condition  [x]             Home/family situation and support systems  []             Safety awareness  []             Pain tolerance/management  []             Other:      PLAN :  Recommendations and Planned Interventions:   [x]               Self Care Training                  [x]      Therapeutic Activities  [x]               Functional Mobility Training   []      Cognitive Retraining  [x]               Therapeutic Exercises           [x]      Endurance Activities  [x]               Balance Training                    []      Neuromuscular Re-Education  []               Visual/Perceptual Training     [x]      Home Safety Training  [x]               Patient Education                   [x]      Family Training/Education  []               Other (comment):    Frequency/Duration: Patient will be followed by occupational therapy 1-2 times per day/4-7 days per week to address goals. Discharge Recommendations: Home Health  Further Equipment Recommendations for Discharge: To Be Determined (TBD) at next level of care     SUBJECTIVE:   Patient stated I don't know what happened.     OBJECTIVE DATA SUMMARY:     Past Medical History:   Diagnosis Date    Asthma     Note: As child had asthma    Diabetes mellitus ([facility] [address]     Type 2    Heart palpitations     Hypertension     Ill-defined condition     fibromyalgia     Morbid obesity ([facility] [address]     MRSA infection 8/2014     Past Surgical History:   Procedure Laterality Date BREAST SURGERY PROCEDURE UNLISTED      cyst removed    HX CATARACT REMOVAL      HX CHOLECYSTECTOMY      HX TUBAL LIGATION       Barriers to Learning/Limitations: yes;  physical  Compensate with: visual, verbal, tactile, kinesthetic cues/model    Home Situation: pt living alone, however dtr was coming over daily to assist with ADLs/IADLs, tub/shower, AE for LBD  Home Situation  Home Environment: Apartment  # Steps to Enter: 0  One/Two Story Residence: One story  Living Alone: Yes  Support Systems: Child(vero), Family member(s)  Patient Expects to be Discharged to[de-identified] Private residence  Current DME Used/Available at Home: Walker, rolling, Adaptive dressing aides  Tub or Shower Type: Tub/Shower combination  []  Right hand dominant   []  Left hand dominant    Cognitive/Behavioral Status:  Neurologic State: Alert  Orientation Level: Oriented X4  Cognition: Poor safety awareness;Decreased attention/concentration; Follows commands  Safety/Judgement: Decreased awareness of need for safety;Decreased insight into deficits    Coordination: BUE  Coordination: Within functional limits  Fine Motor Skills-Upper: Left Intact; Right Intact    Gross Motor Skills-Upper: Left Intact; Right Intact    Balance:  Sitting: Intact  Standing: Intact; With support    Strength: BUE  Strength: Generally decreased, functional    Tone & Sensation: BUE  Tone: Normal  Sensation: Intact    Range of Motion: BUE  AROM: Generally decreased, functional    Functional Mobility and Transfers for ADLs:  Bed Mobility:  Supine to Sit: Supervision  Sit to Supine: Supervision    Transfers:  Sit to Stand: Contact guard assistance   Toilet Transfer : Contact guard assistance   Assistive Device: Gait belt;Walker, rolling   Bathroom Mobility: Contact guard assistance    ADL Assessment:   Feeding: Setup    Oral Facial Hygiene/Grooming: Setup    Bathing: Moderate assistance    Upper Body Dressing: Supervision    Lower Body Dressing: Minimum assistance; Adaptive equipment    Toileting: Minimum assistance    ADL Intervention:  Lower Body Dressing Assistance  Dressing Assistance: Minimum assistance  Socks: Minimum assistance  Position Performed: Seated edge of bed  Adaptive Equipment Used: Sock aid    Toileting  Toileting Assistance: Minimum assistance  Bladder Hygiene: Stand-by assistance  Bowel Hygiene: Minimum assistance  Adaptive Equipment: Toilet tongs    Cognitive Retraining  Orientation Retraining: Awareness of environment  Problem Solving: Awareness of environment;Deductive reason;General alternative solution  Executive Functions: Managing time; Executing cognitive plans  Organizing/Sequencing: Breaking task down;Prioritizing; Two step sequence  Attention to Task: Distractibility  Maintains Attention For (Time): 5 minutes  Following Commands: Follows one step commands/directions  Safety/Judgement: Decreased awareness of need for safety;Decreased insight into deficits    Pain:  Pain level pre-treatment: 0/10   Pain level post-treatment: 0/10   Pain Intervention(s): Medication provided by Nursing (see MAR); Rest, Repositioning   Response to intervention: Nurse notified, See doc flow sheet    Activity Tolerance:   Poor. Patient able to stand ~2 minute(s). Patient able to complete ADLs with frequent rest breaks and physical assist. Patient limited by endurance, habitus, strength. Patient unsteady. Please refer to the flowsheet for vital signs taken during this treatment. After treatment:   [] Patient left in no apparent distress sitting up in chair  [x] Patient left in no apparent distress in bed  [x] Call bell left within reach  [x] Nursing notified  [] Caregiver present  [] Bed alarm activated    COMMUNICATION/EDUCATION:   [x] Role of Occupational Therapy in the acute care setting  [x] Home safety education was provided and the patient/caregiver indicated understanding. [x] Patient/family have participated as able in goal setting and plan of care.   [x] Patient/family agree to work toward stated goals and plan of care. [] Patient understands intent and goals of therapy, but is neutral about his/her participation. [] Patient is unable to participate in goal setting and plan of care. Thank you for this referral.  Chaitanya Rai, OTR/L  Time Calculation: 31 mins    Eval Complexity: History: MEDIUM Complexity : Expanded review of history including physical, cognitive and psychosocial  history ; Examination: MEDIUM Complexity : 3-5 performance deficits relating to physical, cognitive , or psychosocial skils that result in activity limitations and / or participation restrictions; Decision Making:MEDIUM Complexity : Patient may present with comorbidities that affect occupational performnce.  Miniml to moderate modification of tasks or assistance (eg, physical or verbal ) with assesment(s) is necessary to enable patient to complete evaluation

## 2020-03-06 NOTE — PROGRESS NOTES
Problem: Dysphagia (Adult)  Goal: *Acute Goals and Plan of Care (Insert Text)  Description  Recommendations:  Diet: Soft solids, thin liquid   Meds: Per patient preference  Aspiration Precautions  Oral Care TID    Goals:  Patient will:  1. Tolerate PO trials with 0 s/s overt distress in 4/5 trials  2. Utilize compensatory swallow strategies/maneuvers (decrease bite/sip, size/rate, alt. liq/sol) with min cues in 4/5 trials  3. Perform oral-motor/laryngeal exercises to increase oropharyngeal swallow function with min cues  4. Complete an objective swallow study (i.e., MBSS) to assess swallow integrity, r/o aspiration, and determine of safest LRD, min A   Outcome: Progressing Towards Goal     Problem: Communication Impaired (Adult)  Goal: *Acute Goals and Plan of Care (Insert Text)  Description  Goals:   1. Complete speech agility tasks (reading-conversation) to improve prosody and intonation with min A with visual/verbal cues in 4/5 trials. Outcome: Progressing Towards Goal    SPEECH LANGUAGE PATHOLOGY BEDSIDE SPEECH-LANGUAGE   AND SWALLOW EVALUATION    Patient: Na Schwartz (53 y.o. female)  Date: 3/6/2020  Primary Diagnosis: AMS (altered mental status) [R41.82]        Precautions: Aspiration Fall  PLOF: Independent    ASSESSMENT :  Swallowing evaluation:  Based on the objective data described below, the patient presents with mild pharyngeal dysphagia. Oral-motor exam revealed structures grossly intact for mastication and deglutition. Patient reports intermittent dysphagia; coughing with dry, crumbly foods. Patient observed self-feeding thin liquid +/- straw, puree and solid trials. Pt exhibited adequate bolus cohesion, manipulation and A-P transit. Further exhibited adequate swallow timing/reflex and hyolaryngeal excursion. Demo x1 cough following large bite of solids. Recommend soft cohesive solids with thin liquid diet. Aspiration precautions, small bites/sips, slow rate.  Educated pt on aspiration precautions and importance of compensatory swallow techniques to decrease aspiration risk (decrease rate of intake & sip/bite size, upright @HOB for all po intake and ~30 minutes after po); verbalized comprehension. Speech evaluation:  Per neurology note \"Slow sury in speech: there is no dysarthria. Patient has slow sury in speech. Unclear etiology, possibly underlying from stress. Slow sury has been improving with time. \"  Patient presents with altered speech pattern in the setting of metabolic encephalopathy, characterized by inconsistently reduced rate, reduced stress and reduced rate of speech. Patient A&Ox4. Expressive and receptive language intact. Patient 100% intelligible with respiration, phonation, resonance, and articulation. Patient expresses significant concern regarding speech, repeating \"I want it to go back to normal, I sound like a robot\". Prosody and intonation strategies discussed with patient, able to utilize with modA. Patient will benefit from skilled intervention to address the above impairments. Patient's rehabilitation potential is considered to be Good  Factors which may influence rehabilitation potential include:   []            None noted  []            Mental ability/status  [x]            Medical condition  []            Home/family situation and support systems  []            Safety awareness  []            Pain tolerance/management  []            Other:      PLAN :  Recommendations and Planned Interventions:  Soft solids, thin liquid   Frequency/Duration: Patient will be followed by speech-language pathology 1-2 times per day/4-7 days per week to address goals. Discharge Recommendations: To Be Determined     SUBJECTIVE:   Patient stated I sound like a robot.     OBJECTIVE:     Past Medical History:   Diagnosis Date    Asthma     Note: As child had asthma    Diabetes mellitus ([facility] [address]     Type 2    Heart palpitations     Hypertension     Ill-defined condition     fibromyalgia Morbid obesity ([facility] [address]     MRSA infection 8/2014     Past Surgical History:   Procedure Laterality Date    BREAST SURGERY PROCEDURE UNLISTED      cyst removed    HX CATARACT REMOVAL      HX CHOLECYSTECTOMY      HX TUBAL LIGATION       Home Situation:   Home Situation  Home Environment: Apartment  # Steps to Enter: 0  One/Two Story Residence: One story  Living Alone: Yes  Support Systems: Child(vero)  Current DME Used/Available at Home: Walker, rolling    Diet prior to admission: Soft/thin  Current Diet:  Soft/thin     Cognitive and Communication Status:  Neurologic State: Alert  Orientation Level: Oriented X4  Cognition: Follows commands, Decreased attention/concentration  Perception: Appears intact  Perseveration: No perseveration noted  Safety/Judgement: Awareness of environment    Motor Speech:  Oral-Motor Structure/Motor Speech  Labial: No impairment  Dentition: Natural;Intact  Oral Hygiene: Good  Lingual: No impairment  Velum: No impairment  Mandible: No impairment  Apraxic Characteristics: Abnormal prosodic features  Intelligibility: No impairment  Intonation: Flat  Rate: Reduced rate  Prosody: Reduced stress  Language Comprehension and Expression:  Auditory Comprehension  Auditory Impairment: No   Verbal Expression  Primary Mode of Expression: Verbal  Initiation: No impairment  Automatic Speech Task: No impairment  Repetition: No impairment  Naming: No impairment  Sentence Completion: No impairment  Sentence Formulation: No impairment  Conversation: Fluent    Neuro-Linguistics:  No impairment    Pragmatics:  No impairment    Voice:  Vocal Quality: No impairment    Oral Assessment:  Oral Assessment  Labial: No impairment  Dentition: Natural;Intact  Oral Hygiene: Good  Lingual: No impairment  Velum: No impairment  Mandible: No impairment  P.O. Trials:  Patient Position: HOB 60  Vocal quality prior to P.O.: No impairment  Consistency Presented: Thin liquid;Puree; Solid  How Presented: Self-fed/presented;Straw;Successive swallows;Spoon     Bolus Acceptance: No impairment  Bolus Formation/Control: No impairment     Propulsion: No impairment  Oral Residue: None  Initiation of Swallow: No impairment  Laryngeal Elevation: Functional  Aspiration Signs/Symptoms: Delayed cough/throat clear  Pharyngeal Phase Characteristics: Suspected pharyngeal residue  Effective Modifications: Alternate liquids/solids;Small sips and bites  Cues for Modifications: Minimal     Oral Phase Severity: No impairment  Pharyngeal Phase Severity : Mild    PAIN:  Pain level pre-treatment: 0/10   Pain level post-treatment: 0/10     After Evaluation:   []            Patient left in no apparent distress sitting up in chair  [x]            Patient left in no apparent distress in bed  [x]            Call bell left within reach  [x]            Nursing notified  []            Family present  []            Caregiver present  []            Bed alarm activated    COMMUNICATION/EDUCATION:   [x]            Aspiration precautions; swallow safety; compensatory techniques. [x]            Receptive/Expressive communication strategies  [x]            Patient/family have participated as able in goal setting and plan of care. [x]            Patient/family agree to work toward stated goals and plan of care. []            Patient understands intent and goals of therapy; neutral about participation. []            Patient unable to participate in goal setting/plan of care; educ ongoing with interdisciplinary staff  []         Posted safety precautions in patient's room.       Thank you for this referral,  MICHAEL Gallagher  Time Calculation: 18 mins  Speech Evaluation Time: 10 minutes  Swallow Evaluation Time: 8 minutes

## 2020-03-06 NOTE — PROGRESS NOTES
Problem: Mobility Impaired (Adult and Pediatric)  Goal: *Acute Goals and Plan of Care (Insert Text)  Description  Physical Therapy Goals   Initiated 3/6/2020 and to be accomplished within 5-7 day(s)  1. Patient will move from supine <> sit with S in prep for out of bed activity and change of position. 2.  Patient will perform sit<> stand with S with LRAD in prep for transfers/ambulation. 3.  Patient will transfer from bed <> chair with S with LRAD for time up in chair for completion of ADL activity. 4.  Patient will ambulate >100 feet with LRAD/S for improved functional mobility/safe discharge. Outcome: Progressing Towards Goal   PHYSICAL THERAPY EVALUATION    Patient: Eugenie Conde (53 y.o. female)  Date: 3/6/2020  Primary Diagnosis: AMS (altered mental status) [R41.82]        Precautions:Fall    ASSESSMENT :  Based on the objective data described below, the patient presents with decrease independence w/ bed mobility, transfers, gait, and step negotiation. Pt seen in supine prior to session. Pt reports she has 5/10 neck and head pain. Pt's speech is altered, pt speaks very slowly but sensible. Pt reports her RW has broken PTA and reports she will need a new one. Pt able to ambulate in the hallway w/ RW, very slow sury, and required a standing rest breaks secondary to decrease activity tolerance and reported feeling SOB. Pt's O2 levels assessed upon return to the room at 85 on RA however pt able to increase O2 levels to 92 with deep breathing. Pt left sitting at the EOB after session, call bell and tray in reach, nurse notified after session. Patient will benefit from skilled intervention to address the above impairments.   Patients rehabilitation potential is considered to be Fair  Factors which may influence rehabilitation potential include:   []         None noted  []         Mental ability/status  []         Medical condition  [x]         Home/family situation and support systems  [x] Safety awareness  [x]         Pain tolerance/management  []         Other:      PLAN :  Recommendations and Planned Interventions:  [x]           Bed Mobility Training             [x]    Neuromuscular Re-Education  [x]           Transfer Training                   []    Orthotic/Prosthetic Training  [x]           Gait Training                          []    Modalities  [x]           Therapeutic Exercises          []    Edema Management/Control  [x]           Therapeutic Activities            [x]    Patient and Family Training/Education  []           Other (comment):    Frequency/Duration: Patient will be followed by physical therapy 1-2 times per day to address goals. Discharge Recommendations: Rehab vs Home Health  Further Equipment Recommendations for Discharge: rolling walker     SUBJECTIVE:   Patient stated I don't know how much I can do right now but I can try.     OBJECTIVE DATA SUMMARY:     Past Medical History:   Diagnosis Date    Asthma     Note: As child had asthma    Diabetes mellitus ([facility] [address]     Type 2    Heart palpitations     Hypertension     Ill-defined condition     fibromyalgia     Morbid obesity ([facility] [address]     MRSA infection 8/2014     Past Surgical History:   Procedure Laterality Date    BREAST SURGERY PROCEDURE UNLISTED      cyst removed    HX CATARACT REMOVAL      HX CHOLECYSTECTOMY      HX TUBAL LIGATION       Barriers to Learning/Limitations: yes;  physical  Compensate with: Verbal Cues and Tactile Cues  Prior Level of Function/Home Situation:   Home Situation  Home Environment: Apartment  # Steps to Enter: 0  One/Two Story Residence: One story  Living Alone: Yes  Support Systems: Child(vero)  Current DME Used/Available at Home: Walker, rolling  Critical Behavior:  Neurologic State: Alert  Orientation Level: Oriented X4  Cognition: Follows commands;Decreased attention/concentration  Safety/Judgement: Awareness of environment  Psychosocial  Patient Behaviors: Calm;Flat affect  Skin Condition/Temp: Warm  Skin Integumentary  Skin Color: Appropriate for ethnicity  Skin Condition/Temp: Warm  Turgor: Non-tenting  Hair Growth: Present  Varicosities: Absent  Strength:    Strength: Generally decreased, functional  Tone & Sensation:   Tone: Normal  Sensation: Intact  Range Of Motion:  AROM: Generally decreased, functional  Functional Mobility:  Bed Mobility:  Supine to Sit: Supervision  Transfers:  Sit to Stand: Contact guard assistance  Stand to Sit: Contact guard assistance  Balance:   Sitting: Intact  Standing: Intact; With support  Ambulation/Gait Training:  Distance (ft): 100 Feet (ft)  Assistive Device: Gait belt;Walker, rolling  Ambulation - Level of Assistance: Contact guard assistance  Gait Description (WDL): Exceptions to WDL  Gait Abnormalities: Decreased step clearance  Base of Support: Widened  Stance: Weight shift  Speed/Arlette: Slow  Step Length: Left shortened;Right shortened  Swing Pattern: Left asymmetrical;Right asymmetrical  Pain:  Pain Scale 1: Numeric (0 - 10)  Pain Intensity 1: 5  Pain Location 1: Head;Neck  Activity Tolerance:   Fair  Please refer to the flowsheet for vital signs taken during this treatment. After treatment:   []         Patient left in no apparent distress sitting up in chair  [x]         Patient left in no apparent distress in bed (sitting at the EOB)  [x]         Call bell left within reach  [x]         Nursing notified  []         Caregiver present  []         Bed alarm activated    COMMUNICATION/EDUCATION:   [x]         Fall prevention education was provided and the patient/caregiver indicated understanding. [x]         Patient/family have participated as able in goal setting and plan of care. [x]         Patient/family agree to work toward stated goals and plan of care. []         Patient understands intent and goals of therapy, but is neutral about his/her participation. []         Patient is unable to participate in goal setting and plan of care.     Thank you for this referral.  Sakina Figueroa, PT   Time Calculation: 16 mins   Eval Complexity: History: HIGH Complexity :3+ comorbidities / personal factors will impact the outcome/ POC Exam:LOW Complexity : 1-2 Standardized tests and measures addressing body structure, function, activity limitation and / or participation in recreation  Presentation: LOW Complexity : Stable, uncomplicated  Clinical Decision Making:Low Complexity ambulate >30ft  Overall Complexity:LOW

## 2020-03-07 VITALS
SYSTOLIC BLOOD PRESSURE: 124 MMHG | WEIGHT: 293 LBS | HEIGHT: 62 IN | BODY MASS INDEX: 53.92 KG/M2 | TEMPERATURE: 97.6 F | OXYGEN SATURATION: 94 % | HEART RATE: 74 BPM | RESPIRATION RATE: 17 BRPM | DIASTOLIC BLOOD PRESSURE: 51 MMHG

## 2020-03-07 LAB
BACTERIA SPEC CULT: ABNORMAL
GLUCOSE BLD STRIP.AUTO-MCNC: 173 MG/DL (ref 70–110)
GLUCOSE BLD STRIP.AUTO-MCNC: 174 MG/DL (ref 70–110)
SERVICE CMNT-IMP: ABNORMAL

## 2020-03-07 PROCEDURE — 99218 HC RM OBSERVATION: CPT

## 2020-03-07 PROCEDURE — 97116 GAIT TRAINING THERAPY: CPT

## 2020-03-07 PROCEDURE — 74011636637 HC RX REV CODE- 636/637: Performed by: HOSPITALIST

## 2020-03-07 PROCEDURE — 82962 GLUCOSE BLOOD TEST: CPT

## 2020-03-07 PROCEDURE — 74011636637 HC RX REV CODE- 636/637: Performed by: FAMILY MEDICINE

## 2020-03-07 PROCEDURE — 97530 THERAPEUTIC ACTIVITIES: CPT

## 2020-03-07 PROCEDURE — 74011250637 HC RX REV CODE- 250/637: Performed by: FAMILY MEDICINE

## 2020-03-07 PROCEDURE — 74011250637 HC RX REV CODE- 250/637: Performed by: HOSPITALIST

## 2020-03-07 RX ORDER — CIPROFLOXACIN 250 MG/1
250 TABLET, FILM COATED ORAL EVERY 12 HOURS
Qty: 6 TAB | Refills: 0 | Status: SHIPPED | OUTPATIENT
Start: 2020-03-07 | End: 2020-03-10

## 2020-03-07 RX ADMIN — AMLODIPINE BESYLATE 10 MG: 5 TABLET ORAL at 09:13

## 2020-03-07 RX ADMIN — INSULIN LISPRO 3 UNITS: 100 INJECTION, SOLUTION INTRAVENOUS; SUBCUTANEOUS at 09:14

## 2020-03-07 RX ADMIN — ASPIRIN 81 MG 81 MG: 81 TABLET ORAL at 09:14

## 2020-03-07 RX ADMIN — ATORVASTATIN CALCIUM 40 MG: 20 TABLET, FILM COATED ORAL at 09:13

## 2020-03-07 RX ADMIN — METOLAZONE 5 MG: 5 TABLET ORAL at 09:13

## 2020-03-07 RX ADMIN — HYDRALAZINE HYDROCHLORIDE 100 MG: 25 TABLET, FILM COATED ORAL at 00:25

## 2020-03-07 RX ADMIN — FUROSEMIDE 40 MG: 40 TABLET ORAL at 09:13

## 2020-03-07 RX ADMIN — HYDRALAZINE HYDROCHLORIDE 100 MG: 25 TABLET, FILM COATED ORAL at 09:13

## 2020-03-07 RX ADMIN — GABAPENTIN 300 MG: 300 CAPSULE ORAL at 09:13

## 2020-03-07 RX ADMIN — INSULIN GLARGINE 30 UNITS: 100 INJECTION, SOLUTION SUBCUTANEOUS at 09:14

## 2020-03-07 RX ADMIN — DULOXETINE HYDROCHLORIDE 60 MG: 60 CAPSULE, DELAYED RELEASE ORAL at 09:14

## 2020-03-07 RX ADMIN — ACETAMINOPHEN 650 MG: 325 TABLET ORAL at 07:43

## 2020-03-07 RX ADMIN — Medication 10 ML: at 07:04

## 2020-03-07 RX ADMIN — CARVEDILOL 3.12 MG: 3.12 TABLET, FILM COATED ORAL at 07:44

## 2020-03-07 RX ADMIN — ACETAMINOPHEN 650 MG: 325 TABLET ORAL at 03:16

## 2020-03-07 NOTE — ROUTINE PROCESS
Bedside and Verbal shift change report given to Adam Reno (oncoming nurse) by Felisha Mcgee RN (offgoing nurse). Report included the following information SBAR, Kardex, MAR and Recent Results.

## 2020-03-07 NOTE — PROGRESS NOTES
2225 - Pt reports mid back pain described as aching and pressure, rated 7-9/10 post fall on 3/4/20. Visible reddened area without swelling. Patient supplied CPAP at bedside, [employer] called and will be in to assess DME in the morning. 200 - Dr. Breanne Watters updated. Orders received for stat Xr and prn medication. 2336 - Xr complete. Pt refuses prn Roxicodone due to hx of drug abuse. Pending urine drug screen but pt has not been able to supply urine specimen. 4631 - Prn Tylenol given at pt's request.     0400 - Pt resting quietly with eyes closed, respiration observed. 56 - Tech with [employer] at bedside to assess CPAP. Bedside and Verbal shift change report given to DONAVAN Colon RN (oncoming nurse) by Gabo Schulte RN (offgoing nurse). Report included the following information SBAR, Kardex, Intake/Output and MAR.

## 2020-03-07 NOTE — PROGRESS NOTES
Problem: Mobility Impaired (Adult and Pediatric)  Goal: *Acute Goals and Plan of Care (Insert Text)  Description  Physical Therapy Goals   Initiated 3/6/2020 and to be accomplished within 5-7 day(s)  1. Patient will move from supine <> sit with S in prep for out of bed activity and change of position. 2.  Patient will perform sit<> stand with S with LRAD in prep for transfers/ambulation. 3.  Patient will transfer from bed <> chair with S with LRAD for time up in chair for completion of ADL activity. 4.  Patient will ambulate >100 feet with LRAD/S for improved functional mobility/safe discharge. Outcome: Progressing Towards Goal     PHYSICAL THERAPY TREATMENT    Patient: Gabriel Benson (53 y.o. female)  Date: 3/7/2020  Diagnosis: AMS (altered mental status) [R41.82]   Encephalopathy       Precautions: Fall   Chart, physical therapy assessment, plan of care and goals were reviewed. ASSESSMENT:  Pt is very mobile and maintaining 93% + SPO2 . Safe to return to home, when medically cleared. Progression toward goals:  []      Improving appropriately and progressing toward goals  [x]      Improving slowly and progressing toward goals  []      Not making progress toward goals and plan of care will be adjusted     PLAN:  Patient continues to benefit from skilled intervention to address the above impairments. Continue treatment per established plan of care. Discharge Recommendations:  Home Health  Further Equipment Recommendations for Discharge:  rolling walker     SUBJECTIVE:   Patient stated I don't know why I'm here.     OBJECTIVE DATA SUMMARY:   Critical Behavior:  Neurologic State: Alert, Appropriate for age  Orientation Level: Oriented X4  Cognition: Appropriate decision making  Safety/Judgement: Decreased awareness of need for safety, Decreased insight into deficits  Functional Mobility Training:  Bed Mobility:  Rolling: Supervision  Supine to Sit: Supervision  Sit to Supine: Supervision  Transfers:  Sit to Stand: Supervision  Stand to Sit: Supervision  Balance:  Sitting: Intact  Standing: Intact; With support  Ambulation/Gait Training:  Distance (ft): 160 Feet (ft)  Assistive Device: Gait belt;Walker, rolling  Ambulation - Level of Assistance: Stand-by assistance  Gait Abnormalities: Decreased step clearance  Base of Support: Widened  Stance: Right decreased; Left decreased  Speed/Arlette: Slow  Step Length: Right shortened;Left shortened  Pain:  Pain Scale 1: Numeric (0 - 10)  Pain Intensity 1: 6  Pain out: 6  Pain Location 1: Back  Pain Intervention(s) 1: Ice  Activity Tolerance:   Good  Please refer to the flowsheet for vital signs taken during this treatment.   After treatment:   [x] Patient left in no apparent distress sitting EOB  [] Patient left in no apparent distress in bed  [x] Call bell left within reach  [x] Nursing notified  [] Caregiver present  [] Bed alarm activated      Rocío Gillespie PTA   Time Calculation: 24 mins

## 2020-03-07 NOTE — DISCHARGE INSTRUCTIONS
Patient Education     Altered Mental Status: Care Instructions  Your Care Instructions  Altered mental status is a change in how well your brain is working. As a result, you may be confused, be less alert than usual, or act in odd ways. This may include seeing or hearing things that aren't really there (hallucinations). A mental status change has many possible causes. For example, it may be the result of an infection, an imbalance of chemicals in the body, or a chronic disease such as diabetes or COPD. It can also be caused by things such as a head injury, taking certain medicines, or using alcohol or drugs. The doctor may do tests to look for the cause. These tests may include urine tests, blood tests, and imaging tests such as a CT scan. Sometimes a clear cause isn't found. But tests can help the doctor rule out a serious cause of your symptoms. A change in mental status can be scary. But mental status will often return to normal when the cause is treated. So it is important to get any follow-up testing or treatment the doctor has suggested. The doctor has checked you carefully, but problems can develop later. If you notice any problems or new symptoms, get medical treatment right away. Follow-up care is a key part of your treatment and safety. Be sure to make and go to all appointments, and call your doctor if you are having problems. It's also a good idea to know your test results and keep a list of the medicines you take. How can you care for yourself at home? · Be safe with medicines. Take your medicines exactly as prescribed. Call your doctor if you think you are having a problem with your medicine. · Have another adult stay with you until you are better. This can help keep you safe. Ask that person to watch for signs that your mental status is getting worse. When should you call for help? Call 911 anytime you think you may need emergency care.  For example, call if:  · You passed out (lost consciousness). Call your doctor now or seek immediate medical care if:  · Your mental status is getting worse. · You have new symptoms, such as a fever, chills, or shortness of breath. · You do not feel safe. Watch closely for changes in your health, and be sure to contact your doctor if:  · You do not get better as expected. Where can you learn more? Go to Amorelie.be  Enter J452 in the search box to learn more about \"Altered Mental Status: Care Instructions. \"   © 5868-6410 [employer]. Care instructions adapted under license by [address] [place] [address] (which disclaims liability or warranty for this information). This care instruction is for use with your licensed healthcare professional. If you have questions about a medical condition or this instruction, always ask your healthcare professional. [address] any warranty or liability for your use of this information. Content Version: 58.6.292484; Current as of: November 20, 2015           Patient Education        Learning About Diabetes Food Guidelines  Your Care Instructions    Meal planning is important to manage diabetes. It helps keep your blood sugar at a target level (which you set with your doctor). You don't have to eat special foods. You can eat what your family eats, including sweets once in a while. But you do have to pay attention to how often you eat and how much you eat of certain foods. You may want to work with a dietitian or a certified diabetes educator (CDE) to help you plan meals and snacks. A dietitian or CDE can also help you lose weight if that is one of your goals. What should you know about eating carbs? Managing the amount of carbohydrate (carbs) you eat is an important part of healthy meals when you have diabetes. Carbohydrate is found in many foods. · Learn which foods have carbs. And learn the amounts of carbs in different foods.   ? Bread, cereal, pasta, and rice have about 15 grams of carbs in a serving. A serving is 1 slice of bread (1 ounce), ½ cup of cooked cereal, or 1/3 cup of cooked pasta or rice. ? Fruits have 15 grams of carbs in a serving. A serving is 1 small fresh fruit, such as an apple or orange; ½ of a banana; ½ cup of cooked or canned fruit; ½ cup of fruit juice; 1 cup of melon or raspberries; or 2 tablespoons of dried fruit. ? Milk and no-sugar-added yogurt have 15 grams of carbs in a serving. A serving is 1 cup of milk or 2/3 cup of no-sugar-added yogurt. ? Starchy vegetables have 15 grams of carbs in a serving. A serving is ½ cup of mashed potatoes or sweet potato; 1 cup winter squash; ½ of a small baked potato; ½ cup of cooked beans; or ½ cup cooked corn or green peas. · Learn how much carbs to eat each day and at each meal. A dietitian or CDE can teach you how to keep track of the amount of carbs you eat. This is called carbohydrate counting. · If you are not sure how to count carbohydrate grams, use the Plate Method to plan meals. It is a good, quick way to make sure that you have a balanced meal. It also helps you spread carbs throughout the day. ? Divide your plate by types of foods. Put non-starchy vegetables on half the plate, meat or other protein food on one-quarter of the plate, and a grain or starchy vegetable in the final quarter of the plate. To this you can add a small piece of fruit and 1 cup of milk or yogurt, depending on how many carbs you are supposed to eat at a meal.  · Try to eat about the same amount of carbs at each meal. Do not \"save up\" your daily allowance of carbs to eat at one meal.  · Proteins have very little or no carbs per serving. Examples of proteins are beef, chicken, turkey, fish, eggs, tofu, cheese, cottage cheese, and peanut butter. A serving size of meat is 3 ounces, which is about the size of a deck of cards.  Examples of meat substitute serving sizes (equal to 1 ounce of meat) are 1/4 cup of cottage cheese, 1 egg, 1 tablespoon of peanut butter, and ½ cup of tofu. How can you eat out and still eat healthy? · Learn to estimate the serving sizes of foods that have carbohydrate. If you measure food at home, it will be easier to estimate the amount in a serving of restaurant food. · If the meal you order has too much carbohydrate (such as potatoes, corn, or baked beans), ask to have a low-carbohydrate food instead. Ask for a salad or green vegetables. · If you use insulin, check your blood sugar before and after eating out to help you plan how much to eat in the future. · If you eat more carbohydrate at a meal than you had planned, take a walk or do other exercise. This will help lower your blood sugar. What else should you know? · Limit saturated fat, such as the fat from meat and dairy products. This is a healthy choice because people who have diabetes are at higher risk of heart disease. So choose lean cuts of meat and nonfat or low-fat dairy products. Use olive or canola oil instead of butter or shortening when cooking. · Don't skip meals. Your blood sugar may drop too low if you skip meals and take insulin or certain medicines for diabetes. · Check with your doctor before you drink alcohol. Alcohol can cause your blood sugar to drop too low. Alcohol can also cause a bad reaction if you take certain diabetes medicines. Follow-up care is a key part of your treatment and safety. Be sure to make and go to all appointments, and call your doctor if you are having problems. It's also a good idea to know your test results and keep a list of the medicines you take. Where can you learn more? Go to http://tere-ann marie.info/. Enter V398 in the search box to learn more about \"Learning About Diabetes Food Guidelines. \"  Current as of: April 16, 2019  Content Version: 12.2  © 2362-3238 [employer].  Care instructions adapted under license by [employer] (which disclaims liability or warranty for this information). If you have questions about a medical condition or this instruction, always ask your healthcare professional. [address] any warranty or liability for your use of this information. Patient Education        Diabetes and Preventing Falls: Care Instructions  Your Care Instructions    If you are an older adult who has diabetes, you may have a higher risk of falling. Complications of diabetes--such as nerve damage, foot problems, and reduced vision--may increase your risk of a fall. Some of your medicines also may add to your risk. By making your home safer, you can lower your risk of falling. Doing things to prevent diabetes complications may also help to lower your risk. You can make your home safer with a few simple measures. Follow-up care is a key part of your treatment and safety. Be sure to make and go to all appointments, and call your doctor if you are having problems. It's also a good idea to know your test results and keep a list of the medicines you take. How can you care for yourself at home? Taking care of yourself  · Keep your blood sugar at a target level (which you set with your doctor). · Exercise regularly to improve your strength, muscle tone, and balance. Walk if you can. Swimming may be a good choice if you cannot walk easily. · Have your vision checked as often as your doctor recommends. It is usually once a year or more often if you have eye problems. · Know the side effects of the medicines you take. Ask your doctor or pharmacist whether the medicines you take can affect your balance. Sleeping pills or sedatives can affect your balance. · Limit the amount of alcohol you drink. Alcohol can impair your balance and other senses. · Have your doctor check your feet during each visit. If you have a foot problem, see your doctor. Preventing falls at home  · Remove raised doorway thresholds, throw rugs, and clutter.  Repair loose carpet or raised areas in the floor. · Move furniture and electrical cords to keep them out of walking paths. · Use nonskid floor wax, and wipe up spills right away, especially on ceramic tile floors. · If you use a walker or cane, put rubber tips on it. If you use crutches, clean the bottoms of them regularly with an abrasive pad, such as steel wool. · Keep your house well lit, especially [facility], and outside walkways. Use night-lights in areas such as hallways and bathrooms. Add extra light switches or use remote switches (such as switches that go on or off when you clap your hands) to make it easier to turn lights on if you have to get up during the night. · Install sturdy handrails on stairways. Put grab bars near your shower, bathtub, and toilet. · Store household items on low shelves so that you do not have to climb or reach high. Or use a reaching device that you can get at a medical supply store. If you have to climb for something, use a step stool with handrails, or ask someone to get it for you. · Keep a cordless phone and a flashlight with new batteries by your bed. If possible, put a phone in each of the main rooms of your house, or carry a cell phone in case you fall and cannot reach a phone. Or you can wear a device around your neck or wrist. You push a button that sends a signal for help. · Wear low-heeled shoes that fit well and give your feet good support. Use footwear with nonskid soles. Check the heels and soles of your shoes for wear. Repair or replace worn heels or soles. · Do not wear socks without shoes on wood floors. · Walk on the grass when the sidewalks are slippery. If you live in an area that gets snow and ice in the winter, sprinkle salt on slippery steps and sidewalks. Where can you learn more? Go to http://tere-ann marie.info/. Enter R503 in the search box to learn more about \"Diabetes and Preventing Falls: Care Instructions. \"  Current as of: November 7, 2018  Content Version: 12.2  © 1162-9321 [employer]. Care instructions adapted under license by [employer] (which disclaims liability or warranty for this information). If you have questions about a medical condition or this instruction, always ask your healthcare professional. [address] any warranty or liability for your use of this information. Patient Education        Hypoglycemia: Care Instructions  Your Care Instructions    Hypoglycemia means that your blood sugar is low and your body is not getting enough fuel. Some people get low blood sugar from not eating often enough. Some medicines to treat diabetes can cause low blood sugar. People who have had surgery on their stomachs or intestines may get hypoglycemia. Problems with the pancreas, kidneys, or liver also can cause low blood sugar. A snack or drink with sugar in it will raise your blood sugar and should ease your symptoms right away. Your doctor may recommend that you change or stop your medicines until you can get your blood sugar levels under control. In the long run, you may need to change your diet and eating habits so that you get enough fuel for your body throughout the day. Follow-up care is a key part of your treatment and safety. Be sure to make and go to all appointments, and call your doctor if you are having problems. It's also a good idea to know your test results and keep a list of the medicines you take. How can you care for yourself at home? · Learn to recognize the early signs of low blood sugar. Signs include:  ? Nausea. ? Hunger. ? Feeling nervous, irritable, or shaky. ? Cold, clammy, wet skin. ? Sweating (when you are not exercising). ? A fast heartbeat.  ? Numbness or tingling of the fingertips or lips.   · If you feel an episode of low blood sugar coming on, drink fruit juice or sugared (not diet) soda, or eat sugar in the form of candy, cubes, or tablets. [employer] Smart[employer] are another [employer]. · Eat small, frequent meals so that you do not get too hungry between meals. · Balance extra exercise with eating more. · Keep a written record of your low blood sugar episodes, including when you last ate and what you ate, so that you can learn what causes your blood sugar to drop. · Make sure your family, friends, and coworkers know the symptoms of low blood sugar and know what to do to get your sugar level up. · Wear medical alert jewelry that lists your condition. You can buy this at most [employer]. When should you call for help? Call 911 anytime you think you may need emergency care. For example, call if:    · You passed out (lost consciousness).     · You are confused or cannot think clearly.     · Your blood sugar is very high or very low.    Watch closely for changes in your health, and be sure to contact your doctor if:    · Your blood sugar stays outside the level your doctor set for you.     · You have any problems. Where can you learn more? Go to http://tere-ann marie.info/. Enter Y523 in the search box to learn more about \"Hypoglycemia: Care Instructions. \"  Current as of: April 16, 2019  Content Version: 12.2  © 9066-8098 [employer]. Care instructions adapted under license by [email or web address] (which disclaims liability or warranty for this information). If you have questions about a medical condition or this instruction, always ask your healthcare professional. [place] [address] any warranty or liability for your use of this information. Patient Education        Urinary Tract Infection in Women: Care Instructions  Your Care Instructions    A urinary tract infection, or UTI, is a general term for an infection anywhere between the kidneys and the urethra (where urine comes out). Most UTIs are bladder infections. They often cause pain or burning when you urinate.   UTIs are caused by bacteria and can be cured with antibiotics. Be sure to complete your treatment so that the infection goes away. Follow-up care is a key part of your treatment and safety. Be sure to make and go to all appointments, and call your doctor if you are having problems. It's also a good idea to know your test results and keep a list of the medicines you take. How can you care for yourself at home? · Take your antibiotics as directed. Do not stop taking them just because you feel better. You need to take the full course of antibiotics. · Drink extra water and other fluids for the next day or two. This may help wash out the bacteria that are causing the infection. (If you have kidney, heart, or liver disease and have to limit fluids, talk with your doctor before you increase your fluid intake.)  · Avoid drinks that are carbonated or have caffeine. They can irritate the bladder. · Urinate often. Try to empty your bladder each time. · To relieve pain, take a hot bath or lay a heating pad set on low over your lower belly or genital area. Never go to sleep with a heating pad in place. To prevent UTIs  · Drink plenty of water each day. This helps you urinate often, which clears bacteria from your system. (If you have kidney, heart, or liver disease and have to limit fluids, talk with your doctor before you increase your fluid intake.)  · Urinate when you need to. · Urinate right after you have sex. · Change sanitary pads often. · Avoid douches, bubble baths, feminine hygiene sprays, and other feminine hygiene products that have deodorants. · After going to the bathroom, wipe from front to back. When should you call for help? Call your doctor now or seek immediate medical care if:    · Symptoms such as fever, chills, nausea, or vomiting get worse or appear for the first time.     · You have new pain in your back just below your rib cage.  This is called flank pain.     · There is new blood or pus in your urine.     · You have any problems with your antibiotic medicine.    Watch closely for changes in your health, and be sure to contact your doctor if:    · You are not getting better after taking an antibiotic for 2 days.     · Your symptoms go away but then come back. Where can you learn more? Go to http://tere-ann marie.info/. Enter C437 in the search box to learn more about \"Urinary Tract Infection in Women: Care Instructions. \"  Current as of: December 19, 2018  Content Version: 12.2  © 0150-6809 [employer]. Care instructions adapted under license by [facility] [facility] (which disclaims liability or warranty for this information). If you have questions about a medical condition or this instruction, always ask your healthcare professional. [address] any warranty or liability for your use of this information.

## 2020-03-07 NOTE — PROGRESS NOTES
7609  Assumed care of pt at this time. Assessment complete. Pt alert and oriented x 4. Shows no sign of distress. Fall risk arm band in place. Denies SOB and chest pain. Pt lungs diminished bilaterally. Cap refill  less than 3 seconds. Pt denies numbness and tingling to all extremities. Stated pain 7/10. Pt has 20 G IV to right wrist. Pt has no dressing skin intact. Refuses SCD's in place  Call light and possessions within reach. Bed locked and in low position. Will continue to monitor. 1030  Pt ambulating in hallway with PT on room air no complications reported to nurse     1250  Pt states she needs a walker to go home with Nurse justin Yoder on call  awaiting return call     [phone]   aware and states that pt family member may purchase walker for safety at local [address] [place] [address] or [facility]    1311  Nurse informed pt that for safe ambulation at home PT recommends walker and family can purchase at [facility] or [facility]. Pt states \" I aint worried about no walker Im going home\"    1324  Dual AVS reviewed with DARIANA Santana RN. All medications reviewed individually with patient. Opportunities for questions and concerns provided. Patient to be discharged via (mode of transport ie. Car, ambulance or air transport) car. Patient's arm band appropriately discarded.     IV removed  D/c paper signed copied and placed in chart

## 2020-03-07 NOTE — DISCHARGE SUMMARY
Discharge Summary  Subjective:       Admit Date: 3/5/2020  Discharge Date:  3/7/2020, 11:47 AM    Discharging Physician: Shantal Coelho pager [phone]  Primary Care Provider:  Bryn Cooley MD    Patient ID:  Elysia Reddy  53 y.o. female  1965    Reason for Admission:  AMS (altered mental status) [R41.82]    Discharge Diagnosis: *    - metabolic encehpalopathy due to hypoglycemia  - Dm2  - slow speech sury  - lumbago  - АНДРЕЙ  - pulmonary HTN  - UTI    Patient Active Problem List   Diagnosis Code    HTN (hypertension) I10    Type II or unspecified type diabetes mellitus with renal manifestations, uncontrolled(250.42) ([facility]) E11.29, E11.65    Morbid obesity ([facility]) E66.01    Respiratory distress R06.03    Asthma exacerbation J45. 095    Diastolic CHF ([facility]) X24.71    АНДРЕЙ (obstructive sleep apnea) G47.33    UTI (urinary tract infection) N39.0    Hyperkalemia E87.5    Chest pain R07.9    Acute renal failure superimposed on stage 3 chronic kidney disease (HCC) N17.9, N18.3    Acute respiratory failure with hypoxia ([facility]) X27.61    Diastolic CHF, acute on chronic ([facility]) I50.33    AMS (altered mental status) R41.82    Hypoglycemia E16.2    Encephalopathy G93.40    Chronic renal failure, stage 3 (moderate) ([facility] [address] N18.3       Consultants:    neurology    Hospital Course:   Reason for admission ( Admitting HPI): \" Altered Mental Status -ct head negative, monitoring blood glucose, holding insulin, treat UTI   Speech Disturbance -not at baseline, head ct negative, ordered MRI/MRA brain and neck > could not due to size, recheck ct head in AM, unable to check CTA brain and neck with IV contrast due to elevated creatinine   UTI -started rocephin, will continue, ordered prn benadryl in case of cross-reactivity with pcn allergy   Hypoglycemia -resolved, will continue to monitor, hold home insulin, check BG qac and qhs, SSI   Acute renal failure superimposed on CKD III -elevated creatinine, follow BMP, recheck creatinine in AM  Fluid Overload/LE Edema -Lasix IV, check BNP \"  Patient was admitted to the hospitalist service with noted hypoglycemia and altered mental status. She was treated for hypoglycemia and her Mental state improved. She was seen by neurology, had a CT of her head which was unremarkable and recommendation was to continue with speech therapy due to slow speech sury. She was also noted to have urinary tract infection for which she had been she were to physical therapy and is able to ambulate in the hallways and was deemed stable for discharge home. Pertinent Imaging/ Operative procedures:   CT head:\" 1. No acute intracranial abnormality. Stable examination. Gray-white matter  differentiation remains within normal limits. \"        Physical Exam on Discharge:  Visit Vitals  /51 (BP 1 Location: Right arm, BP Patient Position: At rest)   Pulse 74   Temp 97.6 °F (36.4 °C)   Resp (!) 94   Ht 5' 2\" (1.575 m)   Wt 141.9 kg (312 lb 12.8 oz)   SpO2 95%   BMI 57.21 kg/m²     Body mass index is 57.21 kg/m². GEN:obese, NAD  HEART:S1 S2  NEURO: nonfocal   Condition at discharge: stable    Discharge Medications  Current Discharge Medication List      START taking these medications    Details   ciprofloxacin HCl (CIPRO) 250 mg tablet Take 1 Tab by mouth every twelve (12) hours for 3 days. Qty: 6 Tab, Refills: 0         CONTINUE these medications which have NOT CHANGED    Details   carvediloL (COREG) 3.125 mg tablet Take 1 Tab by mouth two (2) times daily (with meals). Qty: 60 Tab, Refills: 0      metOLazone (ZAROXOLYN) 5 mg tablet Take 1 Tab by mouth two (2) times a day. Qty: 60 Tab, Refills: 0      amLODIPine (NORVASC) 10 mg tablet Take 1 Tab by mouth daily. Qty: 30 Tab, Refills: 0      hydrALAZINE (APRESOLINE) 100 mg tablet Take 1 Tab by mouth three (3) times daily. Qty: 90 Tab, Refills: 0      DULoxetine (CYMBALTA) 30 mg capsule Take 60 mg by mouth daily.       atorvastatin (LIPITOR) 40 mg tablet Take 40 mg by mouth daily. gabapentin (NEURONTIN) 300 mg capsule Take 300 mg by mouth three (3) times daily. furosemide (LASIX) 40 mg tablet Take 1 Tab by mouth daily. Qty: 45 Tab, Refills: 1      aspirin 81 mg chewable tablet Take 1 tablet by mouth daily. Qty: 30 tablet, Refills: 1      insulin NPH/insulin regular (NOVOLIN 70/30 U-100 INSULIN) 100 unit/mL (70-30) injection 55 Units by SubCUTAneous route every twelve (12) hours. 50 units am; 40 units noon; 35 units pm  Qty: 10 mL, Refills: 0      calcium acetate,phosphat bind, (PHOSLO) 667 mg cap Take 1 Cap by mouth three (3) times daily (with meals). Qty: 90 Cap, Refills: 0      albuterol-ipratropium (DUO-NEB) 2.5 mg-0.5 mg/3 ml nebu 3 mL by Nebulization route every four (4) hours as needed for Wheezing. Qty: 30 Nebule, Refills: 0      albuterol sulfate 90 mcg/actuation aepb Take 1-2 Puffs by inhalation every four (4) hours as needed.   Qty: 1 Inhaler, Refills: 0         STOP taking these medications       guaiFENesin ER (MUCINEX) 600 mg ER tablet Comments:   Reason for Stopping:         OTHER,NON-FORMULARY, Comments:   Reason for Stopping:               Disposition: home3   Follow up:  pcp  Restrictions: none    Diagnostic Imaging/Labs pending at discharge: none      Elena Borges, [address] [facility]  Internal Medicine/Geriatrics    Time Spent 30 minutes with >50% coordination of care          CC: Diana Hastings MD

## 2020-04-06 ENCOUNTER — HOSPITAL ENCOUNTER (EMERGENCY)
Age: 55
Discharge: HOME | End: 2020-04-06
Attending: EMERGENCY MEDICINE
Payer: COMMERCIAL

## 2020-04-06 ENCOUNTER — APPOINTMENT (OUTPATIENT)
Dept: GENERAL RADIOLOGY | Age: 55
End: 2020-04-06
Attending: PHYSICIAN ASSISTANT
Payer: COMMERCIAL

## 2020-04-06 VITALS
SYSTOLIC BLOOD PRESSURE: 154 MMHG | RESPIRATION RATE: 28 BRPM | DIASTOLIC BLOOD PRESSURE: 66 MMHG | TEMPERATURE: 98.3 F | HEART RATE: 80 BPM | WEIGHT: 293 LBS | BODY MASS INDEX: 53.92 KG/M2 | OXYGEN SATURATION: 98 % | HEIGHT: 62 IN
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
## 2020-04-06 DIAGNOSIS — M79.675 GREAT TOE PAIN, LEFT: ICD-10-CM

## 2020-04-06 DIAGNOSIS — S99.911A INJURY OF RIGHT ANKLE, INITIAL ENCOUNTER: ICD-10-CM

## 2020-04-06 DIAGNOSIS — M79.671 RIGHT FOOT PAIN: ICD-10-CM

## 2020-04-06 PROCEDURE — 99283 EMERGENCY DEPT VISIT LOW MDM: CPT

## 2020-04-06 PROCEDURE — 73630 X-RAY EXAM OF FOOT: CPT

## 2020-04-06 PROCEDURE — 73610 X-RAY EXAM OF ANKLE: CPT

## 2020-04-06 NOTE — DISCHARGE INSTRUCTIONS

## 2020-04-06 NOTE — ED PROVIDER NOTES
EMERGENCY DEPARTMENT HISTORY AND PHYSICAL EXAM    Date: 4/6/2020  Patient Name: Rohini Wyman    History of Presenting Illness     Chief Complaint   Patient presents with    Fall       History Provided By: Patient    Chief Complaint: fall    Additional History (Context):   5:08 PM  Rohini Wyman is a 53 y.o. female with PMHX diabetes, heart palpitations, hypertension, asthma, fibromyalgia presents to the emergency department C/O fall. Patient was using her walker to ambulate as normal and tripped over a dog toy causing her to fall forward and landed on her knees. No head injury. No loss of consciousness. She is complaining of right ankle and foot pain and left great toe pain. She denies any arm back or neck pain. She reports recent fracture to the right great toe but no known history of left great toe fracture    PCP: Julisa Frausto MD    Current Outpatient Medications   Medication Sig Dispense Refill    insulin NPH/insulin regular (NOVOLIN 70/30 U-100 INSULIN) 100 unit/mL (70-30) injection 55 Units by SubCUTAneous route every twelve (12) hours. 50 units am; 40 units noon; 35 units pm 10 mL 0    calcium acetate,phosphat bind, (PHOSLO) 667 mg cap Take 1 Cap by mouth three (3) times daily (with meals). 90 Cap 0    carvediloL (COREG) 3.125 mg tablet Take 1 Tab by mouth two (2) times daily (with meals). 60 Tab 0    metOLazone (ZAROXOLYN) 5 mg tablet Take 1 Tab by mouth two (2) times a day. 60 Tab 0    amLODIPine (NORVASC) 10 mg tablet Take 1 Tab by mouth daily. 30 Tab 0    hydrALAZINE (APRESOLINE) 100 mg tablet Take 1 Tab by mouth three (3) times daily. 90 Tab 0    albuterol-ipratropium (DUO-NEB) 2.5 mg-0.5 mg/3 ml nebu 3 mL by Nebulization route every four (4) hours as needed for Wheezing. 30 Nebule 0    albuterol sulfate 90 mcg/actuation aepb Take 1-2 Puffs by inhalation every four (4) hours as needed. 1 Inhaler 0    DULoxetine (CYMBALTA) 30 mg capsule Take 60 mg by mouth daily.       atorvastatin (LIPITOR) 40 mg tablet Take 40 mg by mouth daily.  gabapentin (NEURONTIN) 300 mg capsule Take 300 mg by mouth three (3) times daily.  furosemide (LASIX) 40 mg tablet Take 1 Tab by mouth daily. (Patient taking differently: Take 40 mg by mouth two (2) times a day.) 45 Tab 1    aspirin 81 mg chewable tablet Take 1 tablet by mouth daily. 30 tablet 1       Past History     Past Medical History:  Past Medical History:   Diagnosis Date    Asthma     Note: As child had asthma    Diabetes mellitus ([facility] [address]     Type 2    Heart palpitations     Hypertension     Ill-defined condition     fibromyalgia     Morbid obesity ([facility] [address]     MRSA infection 8/2014       Past Surgical History:  Past Surgical History:   Procedure Laterality Date    BREAST SURGERY PROCEDURE UNLISTED      cyst removed    HX CATARACT REMOVAL      HX CHOLECYSTECTOMY      HX TUBAL LIGATION         Family History:  Family History   Problem Relation Age of Onset    Heart Attack Mother     Heart Attack Maternal Grandmother        Social History:  Social History     Tobacco Use    Smoking status: Never Smoker    Smokeless tobacco: Never Used   Substance Use Topics    Alcohol use: No    Drug use: No       Allergies: Allergies   Allergen Reactions    Bees [Sting, Bee] Swelling    Penicillins Hives    [place] Hives       Review of Systems   Review of Systems   Constitutional: Negative for chills and fever. Respiratory: Negative for shortness of breath. Cardiovascular: Negative for chest pain. Musculoskeletal: Positive for arthralgias. Negative for back pain. Right ankle and foot pain  Left great toe pain   Skin: Negative for wound. Neurological: Negative for weakness and numbness. All other systems reviewed and are negative.       Physical Exam     Vitals:    04/06/20 1644   BP: 154/66   Pulse: 80   Resp: 28   Temp: 98.3 °F (36.8 °C)   SpO2: 98%   Weight: 136.1 kg (300 lb)   Height: 5' 2\" (1.575 m)     Physical Exam  Vitals signs and nursing note reviewed. Constitutional:       Appearance: She is well-developed. Comments: Alert, lying on stretcher, no acute distress, morbidly obese female   HENT:      Head: Normocephalic and atraumatic. Neck:      Musculoskeletal: Normal range of motion and neck supple. Cardiovascular:      Rate and Rhythm: Normal rate and regular rhythm. Heart sounds: Normal heart sounds. No murmur. Pulmonary:      Effort: Pulmonary effort is normal. No respiratory distress. Breath sounds: Normal breath sounds. No wheezing or rales. Abdominal:      General: Bowel sounds are normal.      Palpations: Abdomen is soft. Tenderness: There is no abdominal tenderness. Musculoskeletal:        Feet:    Neurological:      Mental Status: She is alert and oriented to person, place, and time. Psychiatric:         Judgment: Judgment normal.           Diagnostic Study Results     Labs:   No results found for this or any previous visit (from the past 12 hour(s)). Radiologic Studies:   XR ANKLE RT MIN 3 V   Final Result   IMPRESSION:      Chronic changes, however, no acute findings. _______________                         XR FOOT RT MIN 3 V   Final Result   IMPRESSION:      Soft tissue swelling without radiographically apparent acute bone injury. _______________                        XR FOOT LT MIN 3 V   Final Result   IMPRESSION:      Soft tissue swelling without radiographically apparent acute bone injury. _______________                          CT Results  (Last 48 hours)    None        CXR Results  (Last 48 hours)    None          Medical Decision Making   I am the first provider for this patient. I reviewed the vital signs, available nursing notes, past medical history, past surgical history, family history and social history. Vital Signs: Reviewed the patient's vital signs.     Pulse Oximetry Analysis: 98% on 2 L     Records Reviewed: Nursing Notes and Old Medical Records    Procedures:  Procedures    ED Course:   5:08 PM Initial assessment performed. The patients presenting problems have been discussed, and they are in agreement with the care plan formulated and outlined with them. I have encouraged them to ask questions as they arise throughout their visit. Patient yelling and screaming from room. She yelled at her RN when she responded. I went to room and attempted to explain patient's x-ray results, which were delayed as a wet read had to be requested despite being within the usual timeframe when the radiologist is supposed to be reading. Patient began yelling at this provider at which point he left the room. Nursing supervisor returned to the room to speak with the patient who requested to speak with me again. Patient was apologetic but still somewhat upset. Informed patient that no acute process was seen on x-ray    Discussion:  Pt presents after a fall. No deformity seen. Patient complaining of right ankle and foot pain and left great toe pain. X-rays show no acute process. Will treat as contusion. Will have patient use walker and try to keep off of her feet until improved. Strict return precautions given, pt offering no questions or complaints. Diagnosis and Disposition     DISCHARGE NOTE:  Pierre Ventura  results have been reviewed with her. She has been counseled regarding her diagnosis, treatment, and plan. She verbally conveys understanding and agreement of the signs, symptoms, diagnosis, treatment and prognosis and additionally agrees to follow up as discussed. She also agrees with the care-plan and conveys that all of her questions have been answered. I have also provided discharge instructions for her that include: educational information regarding their diagnosis and treatment, and list of reasons why they would want to return to the ED prior to their follow-up appointment, should her condition change.  She has been provided with education for proper emergency department utilization. CLINICAL IMPRESSION:    1. Fall, initial encounter    2. Right foot pain    3. Great toe pain, left    4. Injury of right ankle, initial encounter        PLAN:  1. D/C Home  2. Discharge Medication List as of 4/6/2020  7:21 PM        3. Follow-up Information     Follow up With Specialties Details Why Contact Info    Castro Almazan MD Internal Medicine  call for follow up and recheck  [address] [facility],[address] [phone]      [facility] EMERGENCY DEPT Emergency Medicine  If symptoms worsen [address] [id number]  [phone]             Please note that this dictation was completed with [employer], the computer voice recognition software. Quite often unanticipated grammatical, syntax, homophones, and other interpretive errors are inadvertently transcribed by the computer software. Please disregard these errors. Please excuse any errors that have escaped final proofreading.

## 2020-04-06 NOTE — ED TRIAGE NOTES
Patient reports fall this morning. Patient with RIGHT foot pain.     Patient normally on 2L home oxygen

## 2020-04-07 ENCOUNTER — PATIENT OUTREACH (OUTPATIENT)
Dept: CASE MANAGEMENT | Age: 55
End: 2020-04-07

## 2020-04-07 NOTE — PROGRESS NOTES
Patient contacted regarding recent discharge and COVID-19 risk   Care Transition Nurse/ Ambulatory Care Manager contacted the patient by telephone to perform post discharge assessment. Verified name and  with patient as identifiers. Patient has following risk factors of: DM, HTN, Asthma, patient being on O2 at home and fibromyalgia. CTN/ACM reviewed discharge instructions, medical action plan and red flags related to discharge diagnosis. Reviewed and educated them on any new and changed medications related to discharge diagnosis. Advised obtaining a 90-day supply of all daily and as-needed medications. Education provided regarding infection prevention, and signs and symptoms of COVID-19 and when to seek medical attention with patient who verbalized understanding. Discussed exposure protocols and quarantine from [address] you at higher risk for severe illness  and given an opportunity for questions and concerns. The patient agrees to contact the COVID-19 hotline [phone] or PCP office for questions related to their healthcare. CTN/ACM provided contact information for future reference. From CDC: Are you at higher risk for severe illness?  Wash your hands often.  Avoid close contact (6 feet, which is about two arm lengths) with people who are sick.  Put distance between yourself and other people if COVID-19 is spreading in your community.  Clean and disinfect frequently touched surfaces.  Avoid all cruise travel and non-essential air travel.  Call your healthcare professional if you have concerns about COVID-19 and your underlying condition or if you are sick.     For more information on steps you can take to protect yourself, see CDC's How to Protect Yourself     Patient/family/caregiver given information for Haley Meyer and agrees to enroll no  Patient's preferred e-mail:    Patient's preferred phone number:  Based on Loop alert triggers, patient will be contacted by nurse care manager for worsening symptoms. Patient stated that her experience at [facility] yesterday was substandard. She felt as though the staff did not show exceptional care. Listened to patient concerns and talked with her about her diagnosis and treatment. Patient appreciated the time that I gave to her to listen and talk with her about her concerns. Will follow.      Plan for follow-up call in 14 days based on severity of symptoms and risk factors

## 2020-04-10 ENCOUNTER — APPOINTMENT (OUTPATIENT)
Dept: GENERAL RADIOLOGY | Age: 55
DRG: 194 | End: 2020-04-10
Attending: EMERGENCY MEDICINE
Payer: COMMERCIAL

## 2020-04-10 ENCOUNTER — HOSPITAL ENCOUNTER (INPATIENT)
Age: 55
LOS: 14 days | Discharge: HOME HEALTH | DRG: 194 | End: 2020-04-24
Attending: EMERGENCY MEDICINE | Admitting: HOSPITALIST
Payer: COMMERCIAL

## 2020-04-10 DIAGNOSIS — E66.01 MORBID OBESITY (HCC): ICD-10-CM

## 2020-04-10 DIAGNOSIS — N18.9 CHRONIC KIDNEY DISEASE, UNSPECIFIED CKD STAGE: ICD-10-CM

## 2020-04-10 DIAGNOSIS — R77.8 ELEVATED TROPONIN: ICD-10-CM

## 2020-04-10 DIAGNOSIS — N18.4 CKD (CHRONIC KIDNEY DISEASE) STAGE 4, GFR 15-29 ML/MIN (HCC): ICD-10-CM

## 2020-04-10 DIAGNOSIS — D64.9 NORMOCYTIC ANEMIA: ICD-10-CM

## 2020-04-10 DIAGNOSIS — I50.813 ACUTE ON CHRONIC RIGHT-SIDED CONGESTIVE HEART FAILURE (HCC): Primary | ICD-10-CM

## 2020-04-10 PROBLEM — N39.0 UTI (URINARY TRACT INFECTION): Status: RESOLVED | Noted: 2019-12-11 | Resolved: 2020-04-10

## 2020-04-10 PROBLEM — E16.2 HYPOGLYCEMIA: Status: RESOLVED | Noted: 2020-03-05 | Resolved: 2020-04-10

## 2020-04-10 PROBLEM — R41.82 AMS (ALTERED MENTAL STATUS): Status: RESOLVED | Noted: 2020-03-05 | Resolved: 2020-04-10

## 2020-04-10 PROBLEM — N18.30 ACUTE RENAL FAILURE SUPERIMPOSED ON STAGE 3 CHRONIC KIDNEY DISEASE (HCC): Status: RESOLVED | Noted: 2020-02-13 | Resolved: 2020-04-10

## 2020-04-10 PROBLEM — N18.30 CHRONIC RENAL FAILURE, STAGE 3 (MODERATE) (HCC): Status: RESOLVED | Noted: 2020-03-06 | Resolved: 2020-04-10

## 2020-04-10 PROBLEM — J45.901 ASTHMA EXACERBATION: Status: RESOLVED | Noted: 2019-01-17 | Resolved: 2020-04-10

## 2020-04-10 PROBLEM — J96.01 ACUTE RESPIRATORY FAILURE WITH HYPOXIA (HCC): Status: RESOLVED | Noted: 2020-02-16 | Resolved: 2020-04-10

## 2020-04-10 PROBLEM — G93.40 ENCEPHALOPATHY: Status: RESOLVED | Noted: 2020-03-06 | Resolved: 2020-04-10

## 2020-04-10 PROBLEM — R07.9 CHEST PAIN: Status: RESOLVED | Noted: 2020-02-13 | Resolved: 2020-04-10

## 2020-04-10 PROBLEM — E87.5 HYPERKALEMIA: Status: RESOLVED | Noted: 2020-02-12 | Resolved: 2020-04-10

## 2020-04-10 PROBLEM — I50.9 CHF (CONGESTIVE HEART FAILURE) (HCC): Status: ACTIVE | Noted: 2020-04-10

## 2020-04-10 PROBLEM — N17.9 ACUTE RENAL FAILURE SUPERIMPOSED ON STAGE 3 CHRONIC KIDNEY DISEASE (HCC): Status: RESOLVED | Noted: 2020-02-13 | Resolved: 2020-04-10

## 2020-04-10 PROBLEM — I50.30 DIASTOLIC CHF (HCC): Status: RESOLVED | Noted: 2019-01-17 | Resolved: 2020-04-10

## 2020-04-10 LAB
ALBUMIN SERPL-MCNC: 3.1 G/DL (ref 3.4–5)
ALBUMIN/GLOB SERPL: 0.8 {RATIO} (ref 0.8–1.7)
ALP SERPL-CCNC: 316 U/L (ref 45–117)
ALT SERPL-CCNC: 37 U/L (ref 13–56)
ANION GAP SERPL CALC-SCNC: 7 MMOL/L (ref 3–18)
AST SERPL-CCNC: 26 U/L (ref 10–38)
ATRIAL RATE: 78 BPM
BASOPHILS # BLD: 0 K/UL (ref 0–0.1)
BASOPHILS NFR BLD: 0 % (ref 0–2)
BILIRUB SERPL-MCNC: 0.5 MG/DL (ref 0.2–1)
BNP SERPL-MCNC: 998 PG/ML (ref 0–900)
BUN SERPL-MCNC: 73 MG/DL (ref 7–18)
BUN/CREAT SERPL: 34 (ref 12–20)
CALCIUM SERPL-MCNC: 8.8 MG/DL (ref 8.5–10.1)
CALCULATED P AXIS, ECG09: 41 DEGREES
CALCULATED R AXIS, ECG10: 46 DEGREES
CALCULATED T AXIS, ECG11: 33 DEGREES
CHLORIDE SERPL-SCNC: 109 MMOL/L (ref 100–111)
CK MB CFR SERPL CALC: ABNORMAL % (ref 0–4)
CK MB CFR SERPL CALC: ABNORMAL % (ref 0–4)
CK MB SERPL-MCNC: <1 NG/ML (ref 5–25)
CK MB SERPL-MCNC: <1 NG/ML (ref 5–25)
CK SERPL-CCNC: 36 U/L (ref 26–192)
CK SERPL-CCNC: 48 U/L (ref 26–192)
CO2 SERPL-SCNC: 28 MMOL/L (ref 21–32)
CREAT SERPL-MCNC: 2.15 MG/DL (ref 0.6–1.3)
DIAGNOSIS, 93000: NORMAL
DIFFERENTIAL METHOD BLD: ABNORMAL
EOSINOPHIL # BLD: 0.1 K/UL (ref 0–0.4)
EOSINOPHIL NFR BLD: 1 % (ref 0–5)
ERYTHROCYTE [DISTWIDTH] IN BLOOD BY AUTOMATED COUNT: 16.5 % (ref 11.6–14.5)
EST. AVERAGE GLUCOSE BLD GHB EST-MCNC: 166 MG/DL
GLOBULIN SER CALC-MCNC: 3.8 G/DL (ref 2–4)
GLUCOSE BLD STRIP.AUTO-MCNC: 148 MG/DL (ref 70–110)
GLUCOSE BLD STRIP.AUTO-MCNC: 91 MG/DL (ref 70–110)
GLUCOSE SERPL-MCNC: 90 MG/DL (ref 74–99)
HBA1C MFR BLD: 7.4 % (ref 4.2–5.6)
HCT VFR BLD AUTO: 27.8 % (ref 35–45)
HGB BLD-MCNC: 8.5 G/DL (ref 12–16)
LYMPHOCYTES # BLD: 0.7 K/UL (ref 0.9–3.6)
LYMPHOCYTES NFR BLD: 8 % (ref 21–52)
MAGNESIUM SERPL-MCNC: 2 MG/DL (ref 1.6–2.6)
MCH RBC QN AUTO: 25.7 PG (ref 24–34)
MCHC RBC AUTO-ENTMCNC: 30.6 G/DL (ref 31–37)
MCV RBC AUTO: 84 FL (ref 74–97)
MONOCYTES # BLD: 0.5 K/UL (ref 0.05–1.2)
MONOCYTES NFR BLD: 5 % (ref 3–10)
NEUTS SEG # BLD: 7.8 K/UL (ref 1.8–8)
NEUTS SEG NFR BLD: 86 % (ref 40–73)
P-R INTERVAL, ECG05: 122 MS
PLATELET # BLD AUTO: 196 K/UL (ref 135–420)
PMV BLD AUTO: 9.4 FL (ref 9.2–11.8)
POTASSIUM SERPL-SCNC: 4.2 MMOL/L (ref 3.5–5.5)
PROT SERPL-MCNC: 6.9 G/DL (ref 6.4–8.2)
Q-T INTERVAL, ECG07: 408 MS
QRS DURATION, ECG06: 86 MS
QTC CALCULATION (BEZET), ECG08: 465 MS
RBC # BLD AUTO: 3.31 M/UL (ref 4.2–5.3)
SODIUM SERPL-SCNC: 144 MMOL/L (ref 136–145)
TROPONIN I SERPL-MCNC: 0.06 NG/ML (ref 0–0.04)
TROPONIN I SERPL-MCNC: 0.17 NG/ML (ref 0–0.04)
VENTRICULAR RATE, ECG03: 78 BPM
WBC # BLD AUTO: 9 K/UL (ref 4.6–13.2)

## 2020-04-10 PROCEDURE — 65660000000 HC RM CCU STEPDOWN

## 2020-04-10 PROCEDURE — 93005 ELECTROCARDIOGRAM TRACING: CPT

## 2020-04-10 PROCEDURE — 96374 THER/PROPH/DIAG INJ IV PUSH: CPT

## 2020-04-10 PROCEDURE — 77010033678 HC OXYGEN DAILY

## 2020-04-10 PROCEDURE — 74011250636 HC RX REV CODE- 250/636: Performed by: EMERGENCY MEDICINE

## 2020-04-10 PROCEDURE — 82962 GLUCOSE BLOOD TEST: CPT

## 2020-04-10 PROCEDURE — 74011250636 HC RX REV CODE- 250/636: Performed by: HOSPITALIST

## 2020-04-10 PROCEDURE — 77030018846 HC SOL IRR STRL H20 ICUM -A

## 2020-04-10 PROCEDURE — 80053 COMPREHEN METABOLIC PANEL: CPT

## 2020-04-10 PROCEDURE — 83036 HEMOGLOBIN GLYCOSYLATED A1C: CPT

## 2020-04-10 PROCEDURE — 83880 ASSAY OF NATRIURETIC PEPTIDE: CPT

## 2020-04-10 PROCEDURE — 36415 COLL VENOUS BLD VENIPUNCTURE: CPT

## 2020-04-10 PROCEDURE — 71045 X-RAY EXAM CHEST 1 VIEW: CPT

## 2020-04-10 PROCEDURE — 74011250637 HC RX REV CODE- 250/637: Performed by: HOSPITALIST

## 2020-04-10 PROCEDURE — 85025 COMPLETE CBC W/AUTO DIFF WBC: CPT

## 2020-04-10 PROCEDURE — 94660 CPAP INITIATION&MGMT: CPT

## 2020-04-10 PROCEDURE — 74011250637 HC RX REV CODE- 250/637: Performed by: EMERGENCY MEDICINE

## 2020-04-10 PROCEDURE — 82550 ASSAY OF CK (CPK): CPT

## 2020-04-10 PROCEDURE — 99285 EMERGENCY DEPT VISIT HI MDM: CPT

## 2020-04-10 PROCEDURE — 74011636637 HC RX REV CODE- 636/637: Performed by: HOSPITALIST

## 2020-04-10 PROCEDURE — 83735 ASSAY OF MAGNESIUM: CPT

## 2020-04-10 RX ORDER — FUROSEMIDE 40 MG/1
40 TABLET ORAL 2 TIMES DAILY
Status: ON HOLD | COMMUNITY
End: 2020-04-10

## 2020-04-10 RX ORDER — CARVEDILOL 3.12 MG/1
6.25 TABLET ORAL 2 TIMES DAILY WITH MEALS
Status: DISCONTINUED | OUTPATIENT
Start: 2020-04-10 | End: 2020-04-24 | Stop reason: HOSPADM

## 2020-04-10 RX ORDER — ISOSORBIDE MONONITRATE 30 MG/1
TABLET, EXTENDED RELEASE ORAL
Status: ON HOLD | COMMUNITY
Start: 2020-03-25 | End: 2020-04-10

## 2020-04-10 RX ORDER — IPRATROPIUM BROMIDE AND ALBUTEROL SULFATE 2.5; .5 MG/3ML; MG/3ML
3 SOLUTION RESPIRATORY (INHALATION)
Status: DISCONTINUED | OUTPATIENT
Start: 2020-04-10 | End: 2020-04-24 | Stop reason: HOSPADM

## 2020-04-10 RX ORDER — FLUTICASONE PROPIONATE AND SALMETEROL 100; 50 UG/1; UG/1
1 POWDER RESPIRATORY (INHALATION) EVERY 12 HOURS
COMMUNITY

## 2020-04-10 RX ORDER — TORSEMIDE 20 MG/1
20 TABLET ORAL 2 TIMES DAILY
COMMUNITY
End: 2020-04-24

## 2020-04-10 RX ORDER — ASPIRIN 81 MG/1
81 TABLET ORAL DAILY
COMMUNITY

## 2020-04-10 RX ORDER — AMLODIPINE BESYLATE 5 MG/1
5 TABLET ORAL DAILY
Status: DISCONTINUED | OUTPATIENT
Start: 2020-04-11 | End: 2020-04-24 | Stop reason: HOSPADM

## 2020-04-10 RX ORDER — METOPROLOL SUCCINATE 100 MG/1
100 TABLET, EXTENDED RELEASE ORAL DAILY
Status: ON HOLD | COMMUNITY
End: 2020-04-10

## 2020-04-10 RX ORDER — INSULIN LISPRO 100 [IU]/ML
INJECTION, SOLUTION INTRAVENOUS; SUBCUTANEOUS
Status: DISCONTINUED | OUTPATIENT
Start: 2020-04-10 | End: 2020-04-24 | Stop reason: HOSPADM

## 2020-04-10 RX ORDER — HYDRALAZINE HYDROCHLORIDE 50 MG/1
25 TABLET, FILM COATED ORAL 3 TIMES DAILY
Status: ON HOLD | COMMUNITY
End: 2020-04-10

## 2020-04-10 RX ORDER — ACETAMINOPHEN 500 MG
1000 TABLET ORAL ONCE
Status: COMPLETED | OUTPATIENT
Start: 2020-04-10 | End: 2020-04-10

## 2020-04-10 RX ORDER — MELATONIN
1000 DAILY
Status: DISCONTINUED | OUTPATIENT
Start: 2020-04-11 | End: 2020-04-24 | Stop reason: HOSPADM

## 2020-04-10 RX ORDER — METFORMIN HYDROCHLORIDE 1000 MG/1
1000 TABLET ORAL 2 TIMES DAILY WITH MEALS
Status: ON HOLD | COMMUNITY
End: 2020-04-10

## 2020-04-10 RX ORDER — HYDRALAZINE HYDROCHLORIDE 50 MG/1
100 TABLET, FILM COATED ORAL 3 TIMES DAILY
Status: DISCONTINUED | OUTPATIENT
Start: 2020-04-10 | End: 2020-04-23

## 2020-04-10 RX ORDER — FUROSEMIDE 10 MG/ML
40 INJECTION INTRAMUSCULAR; INTRAVENOUS 2 TIMES DAILY
Status: DISCONTINUED | OUTPATIENT
Start: 2020-04-10 | End: 2020-04-13

## 2020-04-10 RX ORDER — INSULIN GLARGINE 100 [IU]/ML
10 INJECTION, SOLUTION SUBCUTANEOUS
Status: DISCONTINUED | OUTPATIENT
Start: 2020-04-10 | End: 2020-04-13

## 2020-04-10 RX ORDER — MELATONIN
DAILY
COMMUNITY

## 2020-04-10 RX ORDER — FUROSEMIDE 10 MG/ML
60 INJECTION INTRAMUSCULAR; INTRAVENOUS
Status: COMPLETED | OUTPATIENT
Start: 2020-04-10 | End: 2020-04-10

## 2020-04-10 RX ORDER — ASPIRIN 81 MG/1
81 TABLET ORAL DAILY
Status: DISCONTINUED | OUTPATIENT
Start: 2020-04-11 | End: 2020-04-24 | Stop reason: HOSPADM

## 2020-04-10 RX ORDER — CALCIUM ACETATE 667 MG/1
1 CAPSULE ORAL
Status: DISCONTINUED | OUTPATIENT
Start: 2020-04-10 | End: 2020-04-24 | Stop reason: HOSPADM

## 2020-04-10 RX ORDER — AMLODIPINE BESYLATE 5 MG/1
10 TABLET ORAL DAILY
Status: DISCONTINUED | OUTPATIENT
Start: 2020-04-11 | End: 2020-04-10 | Stop reason: SDUPTHER

## 2020-04-10 RX ORDER — CARVEDILOL 6.25 MG/1
6.25 TABLET ORAL 2 TIMES DAILY WITH MEALS
COMMUNITY

## 2020-04-10 RX ORDER — MAGNESIUM SULFATE 100 %
4 CRYSTALS MISCELLANEOUS AS NEEDED
Status: DISCONTINUED | OUTPATIENT
Start: 2020-04-10 | End: 2020-04-24 | Stop reason: HOSPADM

## 2020-04-10 RX ORDER — ACETAMINOPHEN 325 MG/1
650 TABLET ORAL
Status: DISCONTINUED | OUTPATIENT
Start: 2020-04-10 | End: 2020-04-24 | Stop reason: HOSPADM

## 2020-04-10 RX ORDER — ACETAMINOPHEN 325 MG/1
650 TABLET ORAL
COMMUNITY

## 2020-04-10 RX ORDER — FUROSEMIDE 10 MG/ML
40 INJECTION INTRAMUSCULAR; INTRAVENOUS
Status: DISCONTINUED | OUTPATIENT
Start: 2020-04-10 | End: 2020-04-10

## 2020-04-10 RX ORDER — DULOXETIN HYDROCHLORIDE 60 MG/1
60 CAPSULE, DELAYED RELEASE ORAL DAILY
Status: DISCONTINUED | OUTPATIENT
Start: 2020-04-11 | End: 2020-04-24 | Stop reason: HOSPADM

## 2020-04-10 RX ORDER — LOSARTAN POTASSIUM 100 MG/1
100 TABLET ORAL DAILY
Status: ON HOLD | COMMUNITY
End: 2020-04-10

## 2020-04-10 RX ORDER — ONDANSETRON 2 MG/ML
4 INJECTION INTRAMUSCULAR; INTRAVENOUS
Status: DISCONTINUED | OUTPATIENT
Start: 2020-04-10 | End: 2020-04-24 | Stop reason: HOSPADM

## 2020-04-10 RX ORDER — HEPARIN SODIUM 5000 [USP'U]/ML
5000 INJECTION, SOLUTION INTRAVENOUS; SUBCUTANEOUS EVERY 8 HOURS
Status: DISCONTINUED | OUTPATIENT
Start: 2020-04-10 | End: 2020-04-24 | Stop reason: HOSPADM

## 2020-04-10 RX ORDER — AMLODIPINE BESYLATE 5 MG/1
5 TABLET ORAL DAILY
COMMUNITY

## 2020-04-10 RX ORDER — GUAIFENESIN 100 MG/5ML
81 LIQUID (ML) ORAL DAILY
Status: DISCONTINUED | OUTPATIENT
Start: 2020-04-11 | End: 2020-04-10 | Stop reason: SDUPTHER

## 2020-04-10 RX ORDER — GABAPENTIN 300 MG/1
300 CAPSULE ORAL 3 TIMES DAILY
Status: DISCONTINUED | OUTPATIENT
Start: 2020-04-10 | End: 2020-04-24 | Stop reason: HOSPADM

## 2020-04-10 RX ORDER — HYDROCODONE BITARTRATE AND ACETAMINOPHEN 5; 325 MG/1; MG/1
1 TABLET ORAL
Status: DISCONTINUED | OUTPATIENT
Start: 2020-04-10 | End: 2020-04-24 | Stop reason: HOSPADM

## 2020-04-10 RX ORDER — ATORVASTATIN CALCIUM 20 MG/1
40 TABLET, FILM COATED ORAL DAILY
Status: DISCONTINUED | OUTPATIENT
Start: 2020-04-11 | End: 2020-04-24 | Stop reason: HOSPADM

## 2020-04-10 RX ORDER — IPRATROPIUM BROMIDE AND ALBUTEROL SULFATE 2.5; .5 MG/3ML; MG/3ML
3 SOLUTION RESPIRATORY (INHALATION) 2 TIMES DAILY
COMMUNITY
Start: 2019-12-16

## 2020-04-10 RX ORDER — CARVEDILOL 3.12 MG/1
3.12 TABLET ORAL 2 TIMES DAILY WITH MEALS
Status: DISCONTINUED | OUTPATIENT
Start: 2020-04-10 | End: 2020-04-10 | Stop reason: SDUPTHER

## 2020-04-10 RX ADMIN — FUROSEMIDE 60 MG: 10 INJECTION, SOLUTION INTRAMUSCULAR; INTRAVENOUS at 12:14

## 2020-04-10 RX ADMIN — HYDRALAZINE HYDROCHLORIDE 100 MG: 50 TABLET, FILM COATED ORAL at 21:50

## 2020-04-10 RX ADMIN — HYDRALAZINE HYDROCHLORIDE 100 MG: 50 TABLET, FILM COATED ORAL at 17:38

## 2020-04-10 RX ADMIN — CALCIUM ACETATE 667 MG: 667 CAPSULE ORAL at 17:38

## 2020-04-10 RX ADMIN — FUROSEMIDE 40 MG: 10 INJECTION, SOLUTION INTRAMUSCULAR; INTRAVENOUS at 21:50

## 2020-04-10 RX ADMIN — HEPARIN SODIUM 5000 UNITS: 5000 INJECTION INTRAVENOUS; SUBCUTANEOUS at 21:50

## 2020-04-10 RX ADMIN — ACETAMINOPHEN 1000 MG: 500 TABLET ORAL at 12:19

## 2020-04-10 RX ADMIN — GABAPENTIN 300 MG: 300 CAPSULE ORAL at 21:50

## 2020-04-10 RX ADMIN — HEPARIN SODIUM 5000 UNITS: 5000 INJECTION INTRAVENOUS; SUBCUTANEOUS at 17:38

## 2020-04-10 RX ADMIN — CARVEDILOL 6.25 MG: 3.12 TABLET, FILM COATED ORAL at 21:51

## 2020-04-10 RX ADMIN — CARVEDILOL 3.12 MG: 3.12 TABLET, FILM COATED ORAL at 17:39

## 2020-04-10 RX ADMIN — INSULIN GLARGINE 10 UNITS: 100 INJECTION, SOLUTION SUBCUTANEOUS at 21:51

## 2020-04-10 RX ADMIN — GABAPENTIN 300 MG: 300 CAPSULE ORAL at 17:38

## 2020-04-10 NOTE — ED NOTES
TRANSFER - OUT REPORT:    [facility] for routine progression of care       Report consisted of patients Situation, Background, Assessment and   Recommendations(SBAR). Information from the following report(s) SBAR, Kardex and ED Summary was reviewed with the receiving nurse. Lines:   Peripheral IV 04/10/20 (Active)   Site Assessment Clean, dry, & intact 4/10/2020  1:33 PM   Hub Color/Line Status Green 4/10/2020  1:33 PM        Opportunity for questions and clarification was provided.       Patient transported with:   Registered Nurse

## 2020-04-10 NOTE — PROGRESS NOTES
ADMISSION MEDICATION RECONCILIATION      Medication Reconciliation has been performed by pharmacist on this patient admitted through the ED today. Domingo Carlson is a 53 y.o. female with the following Problems:  Assessment This Admission:   Chief complaint:   Chief Complaint   Patient presents with    Shortness of Breath      Principal Problem:    Diastolic CHF, acute on chronic ([address] (2/16/2020)    Active Problems:    HTN (hypertension) (8/16/2014)      Type II or unspecified type diabetes mellitus with renal manifestations, uncontrolled(250.42) ([address] (9/4/2014)      Morbid obesity ([address] ()      Respiratory distress (1/17/2019)      CHF (congestive heart failure) ([facility] [address] (4/10/2020)      CKD (chronic kidney disease) stage 4, GFR 15-29 ml/min ([facility] [address] (4/10/2020)         Allergies as of 04/10/2020 - Review Complete 04/10/2020   Allergen Reaction Noted    Bees [sting, bee] Swelling 05/02/2012    Penicillins Hives 05/02/2012    Strawberry Hives 07/12/2012         Prior to Admission Medications   Prescriptions Last Dose Informant Patient Reported? Taking? DULoxetine (CYMBALTA) 30 mg capsule   Yes No   Sig: Take 60 mg by mouth daily. acetaminophen (TYLENOL) 325 mg tablet   Yes Yes   Sig: Take 650 mg by mouth every four (4) hours as needed for Pain. albuterol sulfate 90 mcg/actuation aepb  at    No No   Sig: Take 1-2 Puffs by inhalation every four (4) hours as needed. albuterol-ipratropium (DUO-NEB) 2.5 mg-0.5 mg/3 ml nebu   Yes Yes   Sig: Take 3 mL by inhalation two (2) times a day. amLODIPine (Norvasc) 5 mg tablet   Yes Yes   Sig: Take 5 mg by mouth daily. aspirin delayed-release 81 mg tablet   Yes Yes   Sig: Take 81 mg by mouth daily. atorvastatin (LIPITOR) 40 mg tablet   Yes No   Sig: Take 40 mg by mouth daily. calcium acetate,phosphat bind, (PHOSLO) 667 mg cap   No No   Sig: Take 1 Cap by mouth three (3) times daily (with meals).    carvediloL (Coreg) 6.25 mg tablet   Yes Yes   Sig: Take 6.25 mg by mouth two (2) times daily (with meals). cholecalciferol (Vitamin D3) (1000 Units /25 mcg) tablet   Yes Yes   Sig: Take  by mouth daily. fluticasone propion-salmeteroL (Advair Diskus) 100-50 mcg/dose diskus inhaler   Yes Yes   Sig: Take 1 Puff by inhalation every twelve (12) hours. insulin NPH/insulin regular (NovoLIN 70/30 U-100 Insulin) 100 unit/mL (70-30) injection   Yes Yes   Sig: by SubCUTAneous route Before breakfast, lunch, and dinner. Sliding scale not to exceed 40 units per dose. torsemide (DEMADEX) 20 mg tablet   Yes Yes   Sig: Take 20 mg by mouth two (2) times a day. Facility-Administered Medications: None          Interviewed patient Via telephone so as to maintain social distancing during the COVID-19 outbreak. This person was a Fair historian. Did patient/representative provide a written list of home medications? no pt states she has a list she brought with her to the hospital and was told it was in bag w/ her clothes but she doesn't know where. Medications are managed by: self  Patient's outpatient pharmacy is [facility] was  marked complete and did require modification. Medication Compliance Issues and/or Medication Concerns:  Pt seemed agitatated and stated she gets everything at [facility], she stated she takes her lasix BID but info from [facility] and [facility] show she was recently switched to torsemide 20mg BID on 4/1/2020 as a matter of fact there were quite a few med regiment changes on 3/30/2020 . Although Shipman reports having d/c'd her Advair on 3/30/2020 pt tells me she is currently using it. Alerted Dr. Yumi Waters via tigertext that clinically significant changes have been made to PAML/PTA medication list after being reviewed  by admitting physician. Added to PAML: advair, torsemide, duoneb, asa, apap, vitd    Removed from [facility]: furosemide, gabapentin, hydralazine, metolazone.     Modified dose/freq of PAML entry: norvasc decreased from 10 to 5 mg, coreg increased to 6.25mg     Note: meds not appearing on our PAML that were recently d/c'd per [facility] on 3/30/2020 : metformin, losartan, toprol XL, imdur     Car Tiwari [facility], [address] Pharmacist  [phone]

## 2020-04-10 NOTE — PROGRESS NOTES
Problem: Pressure Injury - Risk of  Goal: *Prevention of pressure injury  Description: Document Elian Scale and appropriate interventions in the flowsheet. Outcome: Progressing Towards Goal  Note: Pressure Injury Interventions:       Moisture Interventions: Absorbent underpads, Internal/External urinary devices    Activity Interventions: Pressure redistribution bed/mattress(bed type)    Mobility Interventions: Pressure redistribution bed/mattress (bed type)    Nutrition Interventions: Document food/fluid/supplement intake, Offer support with meals,snacks and hydration                     Problem: Patient Education: Go to Patient Education Activity  Goal: Patient/Family Education  Outcome: Progressing Towards Goal     Problem: Diabetes Self-Management  Goal: *Disease process and treatment process  Description: Define diabetes and identify own type of diabetes; list 3 options for treating diabetes. Outcome: Progressing Towards Goal  Goal: *Incorporating nutritional management into lifestyle  Description: Describe effect of type, amount and timing of food on blood glucose; list 3 methods for planning meals. Outcome: Progressing Towards Goal  Goal: *Incorporating physical activity into lifestyle  Description: State effect of exercise on blood glucose levels. Outcome: Progressing Towards Goal  Goal: *Developing strategies to promote health/change behavior  Description: Define the ABC's of diabetes; identify appropriate screenings, schedule and personal plan for screenings. Outcome: Progressing Towards Goal  Goal: *Using medications safely  Description: State effect of diabetes medications on diabetes; name diabetes medication taking, action and side effects. Outcome: Progressing Towards Goal  Goal: *Monitoring blood glucose, interpreting and using results  Description: Identify recommended blood glucose targets  and personal targets.   Outcome: Progressing Towards Goal  Goal: *Prevention, detection, treatment of acute complications  Description: List symptoms of hyper- and hypoglycemia; describe how to treat low blood sugar and actions for lowering  high blood glucose level. Outcome: Progressing Towards Goal  Goal: *Prevention, detection and treatment of chronic complications  Description: Define the natural course of diabetes and describe the relationship of blood glucose levels to long term complications of diabetes.   Outcome: Progressing Towards Goal  Goal: *Developing strategies to address psychosocial issues  Description: Describe feelings about living with diabetes; identify support needed and support network  Outcome: Progressing Towards Goal  Goal: *Insulin pump training  Outcome: Progressing Towards Goal  Goal: *Sick day guidelines  Outcome: Progressing Towards Goal  Goal: *Patient Specific Goal (EDIT GOAL, INSERT TEXT)  Outcome: Progressing Towards Goal     Problem: Patient Education: Go to Patient Education Activity  Goal: Patient/Family Education  Outcome: Progressing Towards Goal

## 2020-04-10 NOTE — ED TRIAGE NOTES
Patient arrived with EMS with c/o of sudden onset shortness of breath.  Patient states she has notice increase in swelling in her feet past couple days

## 2020-04-10 NOTE — ED PROVIDER NOTES
EMERGENCY DEPARTMENT HISTORY AND PHYSICAL EXAM    Date: 4/10/2020  Patient Name: Mónica Garcia    History of Presenting Illness     Chief Complaint   Patient presents with    Shortness of Breath         History Provided By: Patient    Tanner Carter is a 53 y.o. female with PMHX of right sided heart failure, HFpEF, АНДРЕЙ, DM2, CKD4, carotid disease, morbid obesity and chronic respiratory failure who presents to the emergency department C/O SOB. She arrives via EMS after stating that she woke up acutely short of breath. Patient uses CPAP at night. She says this morning her PT came over. When patient took off her CPAP she says there is lots of mucousy secretions and she was feeling more short of breath. She says her daughter also checked her BG and it was 44. She is on 3 L nasal cannula at baseline and they had to go up on her nasal cannula to improve her breathing. She states she has chest tightness without chest pain. No recent fever or viral URI symptoms. Patient was of her daughter who also has not been having any URI symptoms. Patient's is compliant with her medications takes 20 mg of Lasix twice daily. She reports no change in her urine output. She says she has noticed increased swelling of both of her legs. Was seen in the emergency department 6 days ago after a mechanical fall at home and discharged from emergency department.      PCP: Nichole Alonzo MD    Current Facility-Administered Medications   Medication Dose Route Frequency Provider Last Rate Last Dose    albuterol-ipratropium (DUO-NEB) 2.5 MG-0.5 MG/3 ML  3 mL Nebulization Q4H PRN Lauri Wilson MD        [START ON 4/11/2020] atorvastatin (LIPITOR) tablet 40 mg  40 mg Oral DAILY Lauri Wilson MD        calcium acetate(phosphat bind) (PHOSLO) capsule 667 mg  1 Cap Oral TID WITH MEALS Lauri Wilson MD   667 mg at 04/10/20 1738    [START ON 4/11/2020] DULoxetine (CYMBALTA) capsule 60 mg  60 mg Oral DAILY MD Maximino Pérez gabapentin (NEURONTIN) capsule 300 mg  300 mg Oral TID Francois Saenz MD   300 mg at 04/10/20 1738    hydrALAZINE (APRESOLINE) tablet 100 mg  100 mg Oral TID Francois Saenz MD   100 mg at 04/10/20 1738    furosemide (LASIX) injection 40 mg  40 mg IntraVENous BID Francois Saenz MD        insulin lispro (HUMALOG) injection   SubCUTAneous AC&HS Francois Saenz MD   Stopped at 04/10/20 1630    glucose chewable tablet 16 g  4 Tab Oral PRN Francois Saenz MD        glucagon [facility]) injection 1 mg  1 mg IntraMUSCular PRN Francois Saenz MD        heparin (porcine) injection 5,000 Units  5,000 Units SubCUTAneous Q8H Francois Saenz MD   5,000 Units at 04/10/20 1738    insulin glargine (LANTUS) injection 10 Units  10 Units SubCUTAneous QHS Francois Saenz MD        acetaminophen (TYLENOL) tablet 650 mg  650 mg Oral Q6H PRN Francois Saenz MD        ondansetron [facility]) injection 4 mg  4 mg IntraVENous Q6H PRN Francois Saenz MD        [START ON 4/11/2020] amLODIPine (NORVASC) tablet 5 mg  5 mg Oral DAILY Francois Saenz MD        [START ON 4/11/2020] aspirin delayed-release tablet 81 mg  81 mg Oral DAILY Francois Saenz MD        carvediloL (COREG) tablet 6.25 mg  6.25 mg Oral BID WITH MEALS Francois Saenz MD        [START ON 4/11/2020] cholecalciferol (VITAMIN D3) (1000 Units /25 mcg) tablet 1 Tab  1,000 Units Oral DAILY Francois Saenz MD           Past History     Past Medical History:  Past Medical History:   Diagnosis Date    Asthma     Note: As child had asthma    Diabetes mellitus ([address]     Type 2    Heart palpitations     Hypertension     Ill-defined condition     fibromyalgia     Morbid obesity ([address]     MRSA infection 8/2014       Past Surgical History:  Past Surgical History:   Procedure Laterality Date    BREAST SURGERY PROCEDURE UNLISTED      cyst removed    HX CATARACT REMOVAL      HX CHOLECYSTECTOMY      HX TUBAL LIGATION         Family History:  Family History   Problem Relation Age of Onset  Heart Attack Mother     Heart Attack Maternal Grandmother        Social History:  Social History     Tobacco Use    Smoking status: Never Smoker    Smokeless tobacco: Never Used   Substance Use Topics    Alcohol use: No    Drug use: No       Allergies: Allergies   Allergen Reactions    Bees [Sting, Bee] Swelling    Penicillins Hives    [place] Hives         Review of Systems   Review of Systems   Constitutional: Negative for chills and fever. Respiratory: Positive for chest tightness and shortness of breath. Cardiovascular: Positive for leg swelling. Gastrointestinal: Negative for abdominal pain, nausea and vomiting. Genitourinary: Negative for decreased urine volume and difficulty urinating. Neurological: Positive for headaches. All other systems reviewed and are negative. Physical Exam     Vitals:    04/10/20 1109 04/10/20 1300 04/10/20 1340 04/10/20 1531   BP: 157/67 140/60 134/73 152/62   Pulse: 78 67 68 73   Resp: 18 21 20 22   Temp: 97.8 °F (36.6 °C)  97.4 °F (36.3 °C) 97.4 °F (36.3 °C)   SpO2: 95% 100% 98% 96%   Weight: 136.1 kg (300 lb)        Physical Exam  Constitutional:       General: She is not in acute distress. Appearance: She is morbidly obese. Comments: Speaking briefly 2-3 words at a time, somewhat breathless   HENT:      Head: Normocephalic and atraumatic. Eyes:      Extraocular Movements: Extraocular movements intact. Pupils: Pupils are equal, round, and reactive to light. Cardiovascular:      Rate and Rhythm: Normal rate and regular rhythm. Pulmonary:      Effort: Tachypnea present. Breath sounds: Rales present. Comments: Challenging secondary to body habitus  Chest:      Chest wall: No tenderness. Abdominal:      Palpations: Abdomen is soft. Tenderness: There is no abdominal tenderness. Musculoskeletal:      Right lower leg: Edema (3+ pitting) present. Left lower leg: Edema (3+ pitting) present.    Neurological: General: No focal deficit present. Mental Status: She is alert and oriented to person, place, and time. Psychiatric:         Mood and Affect: Mood normal.         Behavior: Behavior normal. Behavior is cooperative. Diagnostic Study Results     Labs -     Recent Results (from the past 12 hour(s))   EKG, 12 LEAD, INITIAL    Collection Time: 04/10/20 11:13 AM   Result Value Ref Range    Ventricular Rate 78 BPM    Atrial Rate 78 BPM    P-R Interval 122 ms    QRS Duration 86 ms    Q-T Interval 408 ms    QTC Calculation (Bezet) 465 ms    Calculated P Axis 41 degrees    Calculated R Axis 46 degrees    Calculated T Axis 33 degrees    Diagnosis       Normal sinus rhythm  Low voltage QRS  Borderline ECG  When compared with ECG of 05-MAR-2020 11:56,  No significant change was found  Confirmed by Shine Urbano MD. (2023) on 4/10/2020 3:18:18 PM     CBC WITH AUTOMATED DIFF    Collection Time: 04/10/20 11:49 AM   Result Value Ref Range    WBC 9.0 4.6 - 13.2 K/uL    RBC 3.31 (L) 4.20 - 5.30 M/uL    HGB 8.5 (L) 12.0 - 16.0 g/dL    HCT 27.8 (L) 35.0 - 45.0 %    MCV 84.0 74.0 - 97.0 FL    MCH 25.7 24.0 - 34.0 PG    MCHC 30.6 (L) 31.0 - 37.0 g/dL    RDW 16.5 (H) 11.6 - 14.5 %    PLATELET 983 991 - 553 K/uL    MPV 9.4 9.2 - 11.8 FL    NEUTROPHILS 86 (H) 40 - 73 %    LYMPHOCYTES 8 (L) 21 - 52 %    MONOCYTES 5 3 - 10 %    EOSINOPHILS 1 0 - 5 %    BASOPHILS 0 0 - 2 %    ABS. NEUTROPHILS 7.8 1.8 - 8.0 K/UL    ABS. LYMPHOCYTES 0.7 (L) 0.9 - 3.6 K/UL    ABS. MONOCYTES 0.5 0.05 - 1.2 K/UL    ABS. EOSINOPHILS 0.1 0.0 - 0.4 K/UL    ABS.  BASOPHILS 0.0 0.0 - 0.1 K/UL    DF AUTOMATED     METABOLIC PANEL, COMPREHENSIVE    Collection Time: 04/10/20 11:49 AM   Result Value Ref Range    Sodium 144 136 - 145 mmol/L    Potassium 4.2 3.5 - 5.5 mmol/L    Chloride 109 100 - 111 mmol/L    CO2 28 21 - 32 mmol/L    Anion gap 7 3.0 - 18 mmol/L    Glucose 90 74 - 99 mg/dL    BUN 73 (H) 7.0 - 18 MG/DL    Creatinine 2.15 (H) 0.6 - 1.3 MG/DL BUN/Creatinine ratio 34 (H) 12 - 20      GFR est AA 29 (L) >60 ml/min/1.73m2    GFR est non-AA 24 (L) >60 ml/min/1.73m2    Calcium 8.8 8.5 - 10.1 MG/DL    Bilirubin, total 0.5 0.2 - 1.0 MG/DL    ALT (SGPT) 37 13 - 56 U/L    AST (SGOT) 26 10 - 38 U/L    Alk. phosphatase 316 (H) 45 - 117 U/L    Protein, total 6.9 6.4 - 8.2 g/dL    Albumin 3.1 (L) 3.4 - 5.0 g/dL    Globulin 3.8 2.0 - 4.0 g/dL    A-G Ratio 0.8 0.8 - 1.7     CARDIAC PANEL,(CK, CKMB & TROPONIN)    Collection Time: 04/10/20 11:49 AM   Result Value Ref Range    CK 48 26 - 192 U/L    CK - MB <1.0 <3.6 ng/ml    CK-MB Index  0.0 - 4.0 %     CALCULATION NOT PERFORMED WHEN RESULT IS BELOW LINEAR LIMIT    Troponin-I, QT 0.06 (H) 0.0 - 0.045 NG/ML   MAGNESIUM    Collection Time: 04/10/20 11:49 AM   Result Value Ref Range    Magnesium 2.0 1.6 - 2.6 mg/dL   NT-PRO BNP    Collection Time: 04/10/20 11:49 AM   Result Value Ref Range    NT pro- (H) 0 - 900 PG/ML   HEMOGLOBIN A1C WITH EAG    Collection Time: 04/10/20 11:49 AM   Result Value Ref Range    Hemoglobin A1c 7.4 (H) 4.2 - 5.6 %    Est. average glucose 166 mg/dL   GLUCOSE, POC    Collection Time: 04/10/20  4:45 PM   Result Value Ref Range    Glucose (POC) 91 70 - 110 mg/dL   CARDIAC PANEL,(CK, CKMB & TROPONIN)    Collection Time: 04/10/20  5:48 PM   Result Value Ref Range    CK 36 26 - 192 U/L    CK - MB <1.0 <3.6 ng/ml    CK-MB Index  0.0 - 4.0 %     CALCULATION NOT PERFORMED WHEN RESULT IS BELOW LINEAR LIMIT    Troponin-I, QT 0.17 (H) 0.0 - 0.045 NG/ML       Radiologic Studies -   XR CHEST PORT   Final Result   IMPRESSION:      Cardiomegaly. Diffuse interstitial edema. Probable small bilateral effusions. CT Results  (Last 48 hours)    None        CXR Results  (Last 48 hours)               04/10/20 1143  XR CHEST PORT Final result    Impression:  IMPRESSION:       Cardiomegaly. Diffuse interstitial edema. Probable small bilateral effusions.         Narrative:  EXAM: XR CHEST PORT       CLINICAL INDICATION/HISTORY: sob   -Additional: None       COMPARISON: 3/5/2020       TECHNIQUE: Portable frontal view of the chest       _______________       FINDINGS:       SUPPORT DEVICES: None. HEART AND MEDIASTINUM: Cardiomegaly. LUNGS AND PLEURAL SPACES: Diffuse interstitial edema. Probable small bilateral   effusions.  No pneumothorax.       _______________                 Medications given in the ED-  Medications   albuterol-ipratropium (DUO-NEB) 2.5 MG-0.5 MG/3 ML (has no administration in time range)   atorvastatin (LIPITOR) tablet 40 mg (has no administration in time range)   calcium acetate(phosphat bind) (PHOSLO) capsule 667 mg (667 mg Oral Given 4/10/20 1738)   DULoxetine (CYMBALTA) capsule 60 mg (has no administration in time range)   gabapentin (NEURONTIN) capsule 300 mg (300 mg Oral Given 4/10/20 1738)   hydrALAZINE (APRESOLINE) tablet 100 mg (100 mg Oral Given 4/10/20 1738)   furosemide (LASIX) injection 40 mg (has no administration in time range)   insulin lispro (HUMALOG) injection (0 Units SubCUTAneous Held 4/10/20 1630)   glucose chewable tablet 16 g (has no administration in time range)   glucagon (GLUCAGEN) injection 1 mg (has no administration in time range)   heparin (porcine) injection 5,000 Units (5,000 Units SubCUTAneous Given 4/10/20 1738)   insulin glargine (LANTUS) injection 10 Units (has no administration in time range)   acetaminophen (TYLENOL) tablet 650 mg (has no administration in time range)   ondansetron (ZOFRAN) injection 4 mg (has no administration in time range)   amLODIPine (NORVASC) tablet 5 mg (has no administration in time range)   aspirin delayed-release tablet 81 mg (has no administration in time range)   carvediloL (COREG) tablet 6.25 mg (has no administration in time range)   cholecalciferol (VITAMIN D3) (1000 Units /25 mcg) tablet 1 Tab (has no administration in time range)   furosemide (LASIX) injection 60 mg (60 mg IntraVENous Given 4/10/20 1214) acetaminophen (TYLENOL) tablet 1,000 mg (1,000 mg Oral Given 4/10/20 1219)         Medical Decision Making   I am the first provider for this patient. I reviewed the vital signs, available nursing notes, past medical history, past surgical history, family history and social history. Vital Signs-Reviewed the patient's vital signs. Pulse Oximetry Analysis - 96% on 5L NC     Cardiac Monitor:  Rate: 77 bpm  Rhythm: NSR    EKG interpretation: (Preliminary)  EKG read by Dr. Juventino Ormond at 1118   Normal sinus rhythm rate of 78, low voltage, no ST elevations or depressions    Records Reviewed: Nursing Notes and Old Medical Records    Provider Notes (Medical Decision Making): Sergio Yang is a 53 y.o. female with multiple comorbidities who presents with worsening shortness of breath and increased oxygen requirements. Not in acute distress in emergency department but does have some mild increased work of breathing and breathlessness. Otherwise stable. She is tolerating nasal cannula but at an increased rate from her baseline. Chest x-ray demonstrates volume overload. We will plan on IV Lasix and blood work. Procedures:  Procedures   VENOUS ACCESS.     Side and/or site verified, Patient identification confirmed, Sterile procedures observed, Verbal consent obtained, IV therapy indicated for fluid replacement, IV access indicated for medication administration, IV access by physician indicated due to poor venous access, IV access by a physician indicated due to RN unable to obtain access, Venipuncture requiring MD skill, Ultrasound was used to evaluate potential access sites, verify vessel location and patency, and to verify vascular needle entry in real-time, 18Ga long (2.5\") Nation intravenous catheter placed right AC under real-time HF U/S guidance; dark, non-pulsatile venous blood obtained      ED Course:   CONSULT NOTE:   1:03 PM   I discussed care with Dr Cari Cortes It was a standard discussion, including history of patients chief complaint, available diagnostic results, and treatment course. Discussed case. Will admit to telemetry for congestive heart failure. Mildy elevated troponin that requires trending. pBNP elevated. She has been tolerating NC although requires increase from her normal baseline (2L -> 5L). Diagnosis and Disposition     Critical Care Time:     Core Measures:  For Hospitalized Patients:    1. Hospitalization Decision Time:  The decision to hospitalize the patient was made by Dr Tatianna Rendon  on 4/10/2020    2. Aspirin: Aspirin was not given because the patient did not present with a stroke at the time of their Emergency Department evaluation    Patient is being admitted to the hospital by Dr Dayna Quigley. The results of their tests and reasons for their admission have been discussed with them and/or available family. They convey agreement and understanding for the need to be admitted and for their admission diagnosis. CONDITIONS ON ADMISSION:  Sepsis is not present at the time of admission. Deep Vein Thrombosis is not present at the time of admission. Thrombosis is not present at the time of admission. Urinary Tract Infection is not present at the time of admission. Pneumonia is not present at the time of admission. MRSA is not present at the time of admission. Wound infection is not present at the time of admission. Pressure Ulcer is not present at the time of admission. CLINICAL IMPRESSION:    1. Acute on chronic right-sided congestive heart failure ([address]    2. Chronic kidney disease, unspecified CKD stage    3. Normocytic anemia    4. Elevated troponin    5. Morbid obesity ([address]      _______________________________      Please note that this dictation was completed with [employer], the [employer] voice recognition software. Quite often unanticipated grammatical, syntax, homophones, and other interpretive errors are inadvertently transcribed by the computer software.   Please disregard these errors. Please excuse any errors that have escaped final proofreading.

## 2020-04-10 NOTE — H&P
[facility]  HISTORY AND PHYSICAL    Name:  Zeina Al  MR#:   [id number]  :  1965  ACCOUNT #:  [de-identified]  ADMIT DATE:  04/10/2020    ADMITTING DIAGNOSES:  1. Acute diastolic congestive heart failure exacerbation. 2.  Acute hypoxia due to that. 3.  Chronic kidney disease, stage IV. 4.  Insulin-dependent diabetes mellitus with renal complications, uncontrolled. 5.  Morbid obesity. 6.  Obstructive sleep apnea, on CPAP. HOSPITAL SUMMARY:  The patient is 53years old. She has a history of heart failure, obstructive sleep apnea, diabetes, chronic kidney disease, carotid disease, morbid obesity, chronic respiratory failure, on home oxygen, and she came into the emergency room with shortness of breath. She got up this morning about 06:30 in the morning, was waiting for her physical therapist to come over. Around 09:30, she felt that she had a mild cough, was feeling very weak, somewhat short of breath. Her nurse/therapist came over. They checked her O2 sat. They found it to be low. Her blood sugar was only 44, and they advised her to go via EMS to the ER. The patient has been compliant with her medication. She is taking Lasix twice daily. She states she has been checking her blood sugar and using her insulin as she has been instructed to do so. She was recently in the emergency department 6 days ago after she had a fall at home. She was discharged with no acute severe injury noted. She is feeling okay right now. She is on 5 L of nasal cannula oxygen. She was texting on a phone when I entered the room. She is wearing a mask. No COVID risk factors as she has been isolated at home, except for the ER visit, she had a few days ago. Same caretaker coming into the home and her daughter intermittently as well, all of whom have had no respiratory symptoms as far as she knows. Her PCP is Dr. Rhonda Rahman.     MEDICATIONS:  Her home meds include Coreg, Zaroxolyn, Norvasc, hydralazine, DuoNeb, albuterol inhaler, Cymbalta, Lipitor, Neurontin, Lasix, aspirin, calcium acetate and NPH/regular insulin. PAST MEDICAL HISTORY:  Obstructive sleep apnea, morbid obesity, fibromyalgia, hypertension, diastolic heart failure, chronic kidney disease. PREVIOUS SURGERIES:  Breast cyst removal, cataract surgery, cholecystectomy, tubal ligation. FAMILY HISTORY:  Mother and grandmother, both had heart disease. SOCIAL HISTORY:  Nonsmoker. No alcohol use. No drugs. Lives independently. Uses a walker to get around, has help from her daughter as needed for various chores. ALLERGIES:  BEE STING, PENICILLINS AND STRAWBERRIES. REVIEW OF SYSTEMS:  GENERAL:  She denies any fevers or chills. RESPIRATORY:  She has had chest tightness and shortness of breath starting this morning. CARDIOVASCULAR:  She has had no chest pain, palpitations or leg swelling. GI:  No nausea, vomiting, diarrhea or abdominal pain. :  No dysuria or hematuria. NEUROLOGICAL:  No syncopal events, seizures or notable headaches. No change in vision. No seizure activity. HEMATOLOGIC:  No bleeding or bruisability. PHYSICAL EXAMINATION:  GENERAL:  On examination, this is a morbidly obese  female, awake and alert, in no acute distress. She has a slow speech sury which is chronic for her. VITAL SIGNS:  98% O2 sat on 5 L nasal cannula, respiratory rate 20, pulse is 68, temperature 97.4, blood pressure 134/73. LUNGS:  Diminished breath sounds at the bases bilaterally. CARDIAC:  Regular rate and rhythm. No murmur, rub or gallop. ABDOMEN:  Obese, soft, nontender. LOWER EXTREMITIES:  A 2 to 3+ pitting edema to the knees bilaterally that is tender with no evidence for redness or acute cellulitis of the legs. LABORATORY DATA:  BUN is 73, creatinine 2.15. Sodium, potassium, bicarb and chloride are within normal limits. Troponin of 0.06. BNP is 998. H and H are 8.5 and 27.8. Her platelets are 046.   White count is 9.  No urinalysis was obtained, although it looks like she has had various urinary tract infections on prior admissions. So because of her risk factors, I want to get an urinalysis. Her blood sugar on her metabolic panel is 90. DIAGNOSTIC DATA:  Her chest x-ray is consistent with interstitial edema and pulmonary edema, small bilateral effusions. Her last stress test was 12/20/2019 with no evidence for ischemia. Her last echo was February of this year, which showed an EF of 60%-65% and EKG today showed normal sinus rhythm. No significant change from 03/05 EKG. ASSESSMENT:  1. Acute-on-chronic diastolic heart failure. 2.  Obstructive sleep apnea. 3.  Mildly elevated troponin. 4.  Insulin-dependent diabetes mellitus with renal complications. 5.  Chronic kidney disease, stage IV. 6.  Fibromyalgia. 7.  Morbid obesity. PLAN:  Admission to telemetry. We will do two more sets of cardiac markers. Continue Lasix IV twice daily, heparin for DVT prophylaxis and Humalog insulin four times daily and nightly as needed. Diabetic diet. Follow up the results of the hemoglobin C5H, daily metabolic panel and an urinalysis. I do not think she needs to have an acute repeat EKG. There is no acute respiratory infection symptoms that she is reporting or shows evidence of. I am going to plan to do strict Is and Os and daily weights. She will need a Physical Therapy consult while she is here. Continue with telemonitoring, and I have reconciled her home medications which are appropriate to continue, including her blood pressure medication, statin, antidepressant medicine, Neurontin. I am going to hold her NPH as that is not standard insulin that we carry here in the hospital pharmacy. I will go ahead and put her on Lantus 10 units nightly for now and see how her blood sugars look considering she had a low blood sugar early this morning. Expected length of stay 48-72 hours.       KRISTINA Lakhani, MD [id number]  D:  04/10/2020 14:32  T:  04/10/2020 15:04  JOB #:  [id number]

## 2020-04-10 NOTE — PROGRESS NOTES
3775 TRANSFER - IN REPORT:    Verbal report received from Hussain Sidhu RN (name) on Jovani Salguero  being received from ED (unit) for routine progression of care      Report consisted of patients Situation, Background, Assessment and   Recommendations(SBAR). Information from the following report(s) SBAR, Kardex, ED Summary, [facility] and Cardiac Rhythm SR was reviewed with the receiving nurse. Opportunity for questions and clarification was provided. Assessment completed upon patients arrival to unit and care assumed. 1400 Pt arrived on the unit. Care asumed    1920 Bedside and Verbal shift change report given to Zenaida Quach RN and daina RN(oncoming nurse) by Betty Armando RN (offgoing nurse). Report included the following information SBAR, Kardex, Accordion, Med Rec Status and Cardiac Rhythm.

## 2020-04-11 LAB
ANION GAP SERPL CALC-SCNC: 9 MMOL/L (ref 3–18)
ARTERIAL PATENCY WRIST A: YES
BASE EXCESS BLD CALC-SCNC: 5 MMOL/L
BDY SITE: ABNORMAL
BUN SERPL-MCNC: 75 MG/DL (ref 7–18)
BUN/CREAT SERPL: 36 (ref 12–20)
CALCIUM SERPL-MCNC: 8.7 MG/DL (ref 8.5–10.1)
CHLORIDE SERPL-SCNC: 107 MMOL/L (ref 100–111)
CK MB CFR SERPL CALC: ABNORMAL % (ref 0–4)
CK MB SERPL-MCNC: <1 NG/ML (ref 5–25)
CK SERPL-CCNC: 33 U/L (ref 26–192)
CK SERPL-CCNC: 33 U/L (ref 26–192)
CK SERPL-CCNC: 34 U/L (ref 26–192)
CO2 SERPL-SCNC: 27 MMOL/L (ref 21–32)
CREAT SERPL-MCNC: 2.06 MG/DL (ref 0.6–1.3)
GAS FLOW.O2 O2 DELIVERY SYS: ABNORMAL L/MIN
GLUCOSE BLD STRIP.AUTO-MCNC: 178 MG/DL (ref 70–110)
GLUCOSE BLD STRIP.AUTO-MCNC: 254 MG/DL (ref 70–110)
GLUCOSE BLD STRIP.AUTO-MCNC: 270 MG/DL (ref 70–110)
GLUCOSE BLD STRIP.AUTO-MCNC: 323 MG/DL (ref 70–110)
GLUCOSE SERPL-MCNC: 157 MG/DL (ref 74–99)
HCO3 BLD-SCNC: 29.9 MMOL/L (ref 22–26)
O2/TOTAL GAS SETTING VFR VENT: 44 %
PCO2 BLD: 51.7 MMHG (ref 35–45)
PH BLD: 7.37 [PH] (ref 7.35–7.45)
PO2 BLD: 173 MMHG (ref 80–100)
POTASSIUM SERPL-SCNC: 4.2 MMOL/L (ref 3.5–5.5)
SAO2 % BLD: 99 % (ref 92–97)
SERVICE CMNT-IMP: 6 L/MIN
SERVICE CMNT-IMP: ABNORMAL
SODIUM SERPL-SCNC: 143 MMOL/L (ref 136–145)
SPECIMEN TYPE: ABNORMAL
TOTAL RESP. RATE, ITRR: 32
TROPONIN I SERPL-MCNC: 0.08 NG/ML (ref 0–0.04)
TROPONIN I SERPL-MCNC: 0.12 NG/ML (ref 0–0.04)
TROPONIN I SERPL-MCNC: 0.17 NG/ML (ref 0–0.04)

## 2020-04-11 PROCEDURE — 82550 ASSAY OF CK (CPK): CPT

## 2020-04-11 PROCEDURE — 36600 WITHDRAWAL OF ARTERIAL BLOOD: CPT

## 2020-04-11 PROCEDURE — 65660000000 HC RM CCU STEPDOWN

## 2020-04-11 PROCEDURE — 80048 BASIC METABOLIC PNL TOTAL CA: CPT

## 2020-04-11 PROCEDURE — 77010033678 HC OXYGEN DAILY

## 2020-04-11 PROCEDURE — 97116 GAIT TRAINING THERAPY: CPT

## 2020-04-11 PROCEDURE — 82803 BLOOD GASES ANY COMBINATION: CPT

## 2020-04-11 PROCEDURE — 97530 THERAPEUTIC ACTIVITIES: CPT

## 2020-04-11 PROCEDURE — 74011250637 HC RX REV CODE- 250/637: Performed by: HOSPITALIST

## 2020-04-11 PROCEDURE — 36415 COLL VENOUS BLD VENIPUNCTURE: CPT

## 2020-04-11 PROCEDURE — 74011250636 HC RX REV CODE- 250/636: Performed by: INTERNAL MEDICINE

## 2020-04-11 PROCEDURE — 82962 GLUCOSE BLOOD TEST: CPT

## 2020-04-11 PROCEDURE — 97162 PT EVAL MOD COMPLEX 30 MIN: CPT

## 2020-04-11 PROCEDURE — 74011636637 HC RX REV CODE- 636/637: Performed by: HOSPITALIST

## 2020-04-11 PROCEDURE — 94660 CPAP INITIATION&MGMT: CPT

## 2020-04-11 PROCEDURE — 74011250637 HC RX REV CODE- 250/637: Performed by: FAMILY MEDICINE

## 2020-04-11 PROCEDURE — 74011250636 HC RX REV CODE- 250/636: Performed by: HOSPITALIST

## 2020-04-11 RX ORDER — FUROSEMIDE 10 MG/ML
80 INJECTION INTRAMUSCULAR; INTRAVENOUS ONCE
Status: COMPLETED | OUTPATIENT
Start: 2020-04-11 | End: 2020-04-11

## 2020-04-11 RX ADMIN — FUROSEMIDE 40 MG: 10 INJECTION, SOLUTION INTRAMUSCULAR; INTRAVENOUS at 09:05

## 2020-04-11 RX ADMIN — ATORVASTATIN CALCIUM 40 MG: 20 TABLET, FILM COATED ORAL at 09:04

## 2020-04-11 RX ADMIN — HYDRALAZINE HYDROCHLORIDE 100 MG: 50 TABLET, FILM COATED ORAL at 18:28

## 2020-04-11 RX ADMIN — HEPARIN SODIUM 5000 UNITS: 5000 INJECTION INTRAVENOUS; SUBCUTANEOUS at 21:48

## 2020-04-11 RX ADMIN — CARVEDILOL 6.25 MG: 3.12 TABLET, FILM COATED ORAL at 09:04

## 2020-04-11 RX ADMIN — INSULIN LISPRO 6 UNITS: 100 INJECTION, SOLUTION INTRAVENOUS; SUBCUTANEOUS at 18:28

## 2020-04-11 RX ADMIN — GABAPENTIN 300 MG: 300 CAPSULE ORAL at 21:47

## 2020-04-11 RX ADMIN — FUROSEMIDE 80 MG: 10 INJECTION, SOLUTION INTRAVENOUS at 09:09

## 2020-04-11 RX ADMIN — HEPARIN SODIUM 5000 UNITS: 5000 INJECTION INTRAVENOUS; SUBCUTANEOUS at 18:28

## 2020-04-11 RX ADMIN — CARVEDILOL 6.25 MG: 3.12 TABLET, FILM COATED ORAL at 18:28

## 2020-04-11 RX ADMIN — HYDRALAZINE HYDROCHLORIDE 100 MG: 50 TABLET, FILM COATED ORAL at 09:04

## 2020-04-11 RX ADMIN — HEPARIN SODIUM 5000 UNITS: 5000 INJECTION INTRAVENOUS; SUBCUTANEOUS at 06:39

## 2020-04-11 RX ADMIN — ASPIRIN 81 MG: 81 TABLET, COATED ORAL at 09:05

## 2020-04-11 RX ADMIN — INSULIN LISPRO 2 UNITS: 100 INJECTION, SOLUTION INTRAVENOUS; SUBCUTANEOUS at 06:39

## 2020-04-11 RX ADMIN — GABAPENTIN 300 MG: 300 CAPSULE ORAL at 18:27

## 2020-04-11 RX ADMIN — AMLODIPINE BESYLATE 5 MG: 5 TABLET ORAL at 09:04

## 2020-04-11 RX ADMIN — GABAPENTIN 300 MG: 300 CAPSULE ORAL at 09:04

## 2020-04-11 RX ADMIN — FUROSEMIDE 40 MG: 10 INJECTION, SOLUTION INTRAMUSCULAR; INTRAVENOUS at 21:47

## 2020-04-11 RX ADMIN — CALCIUM ACETATE 667 MG: 667 CAPSULE ORAL at 12:00

## 2020-04-11 RX ADMIN — Medication 1 TABLET: at 09:04

## 2020-04-11 RX ADMIN — CALCIUM ACETATE 667 MG: 667 CAPSULE ORAL at 18:28

## 2020-04-11 RX ADMIN — INSULIN GLARGINE 10 UNITS: 100 INJECTION, SOLUTION SUBCUTANEOUS at 21:48

## 2020-04-11 RX ADMIN — INSULIN LISPRO 8 UNITS: 100 INJECTION, SOLUTION INTRAVENOUS; SUBCUTANEOUS at 21:48

## 2020-04-11 RX ADMIN — HYDROCODONE BITARTRATE AND ACETAMINOPHEN 1 TABLET: 5; 325 TABLET ORAL at 18:32

## 2020-04-11 RX ADMIN — DULOXETINE HYDROCHLORIDE 60 MG: 60 CAPSULE, DELAYED RELEASE ORAL at 09:03

## 2020-04-11 RX ADMIN — INSULIN LISPRO 4 UNITS: 100 INJECTION, SOLUTION INTRAVENOUS; SUBCUTANEOUS at 12:46

## 2020-04-11 RX ADMIN — HYDRALAZINE HYDROCHLORIDE 100 MG: 50 TABLET, FILM COATED ORAL at 21:47

## 2020-04-11 NOTE — PROGRESS NOTES
Problem: Pressure Injury - Risk of  Goal: *Prevention of pressure injury  Description: Document Elian Scale and appropriate interventions in the flowsheet. Outcome: Progressing Towards Goal  Note: Pressure Injury Interventions:       Moisture Interventions: Absorbent underpads, Check for incontinence Q2 hours and as needed, Internal/External urinary devices, Assess need for specialty bed, Limit adult briefs, Minimize layers, Maintain skin hydration (lotion/cream), Offer toileting Q_hr    Activity Interventions: Assess need for specialty bed, Chair cushion, Increase time out of bed, Pressure redistribution bed/mattress(bed type), PT/OT evaluation    Mobility Interventions: Assess need for specialty bed, Chair cushion, Float heels, Pressure redistribution bed/mattress (bed type), PT/OT evaluation, Turn and reposition approx. every two hours(pillow and wedges)    Nutrition Interventions: Discuss nutritional consult with provider, Document food/fluid/supplement intake, Offer support with meals,snacks and hydration    Friction and Shear Interventions: Feet elevated on foot rest, Foam dressings/transparent film/skin sealants, Lift sheet, Lift team/patient mobility team, Minimize layers, Transferring/repositioning devices                Problem: Patient Education: Go to Patient Education Activity  Goal: Patient/Family Education  Outcome: Progressing Towards Goal     Problem: Diabetes Self-Management  Goal: *Disease process and treatment process  Description: Define diabetes and identify own type of diabetes; list 3 options for treating diabetes. Outcome: Progressing Towards Goal  Goal: *Incorporating nutritional management into lifestyle  Description: Describe effect of type, amount and timing of food on blood glucose; list 3 methods for planning meals. Outcome: Progressing Towards Goal  Goal: *Incorporating physical activity into lifestyle  Description: State effect of exercise on blood glucose levels.   Outcome: Progressing Towards Goal  Goal: *Developing strategies to promote health/change behavior  Description: Define the ABC's of diabetes; identify appropriate screenings, schedule and personal plan for screenings. Outcome: Progressing Towards Goal  Goal: *Using medications safely  Description: State effect of diabetes medications on diabetes; name diabetes medication taking, action and side effects. Outcome: Progressing Towards Goal  Goal: *Monitoring blood glucose, interpreting and using results  Description: Identify recommended blood glucose targets  and personal targets. Outcome: Progressing Towards Goal  Goal: *Prevention, detection, treatment of acute complications  Description: List symptoms of hyper- and hypoglycemia; describe how to treat low blood sugar and actions for lowering  high blood glucose level. Outcome: Progressing Towards Goal  Goal: *Prevention, detection and treatment of chronic complications  Description: Define the natural course of diabetes and describe the relationship of blood glucose levels to long term complications of diabetes. Outcome: Progressing Towards Goal  Goal: *Developing strategies to address psychosocial issues  Description: Describe feelings about living with diabetes; identify support needed and support network  Outcome: Progressing Towards Goal  Goal: *Insulin pump training  Outcome: Progressing Towards Goal  Goal: *Sick day guidelines  Outcome: Progressing Towards Goal  Goal: *Patient Specific Goal (EDIT GOAL, INSERT TEXT)  Outcome: Progressing Towards Goal     Problem: Patient Education: Go to Patient Education Activity  Goal: Patient/Family Education  Outcome: Progressing Towards Goal     Problem: Falls - Risk of  Goal: *Absence of Falls  Description: Document Mayito Fall Risk and appropriate interventions in the flowsheet.   Outcome: Progressing Towards Goal  Note: Fall Risk Interventions:  Mobility Interventions: Bed/chair exit alarm, Communicate number of staff needed for ambulation/transfer, Mechanical lift, OT consult for ADLs, Patient to call before getting OOB, PT Consult for assist device competence, PT Consult for mobility concerns, Utilize walker, cane, or other assistive device, Strengthening exercises (ROM-active/passive), Utilize gait belt for transfers/ambulation         Medication Interventions: Bed/chair exit alarm, Evaluate medications/consider consulting pharmacy, Patient to call before getting OOB, Teach patient to arise slowly    Elimination Interventions: Bed/chair exit alarm, Call light in reach, Elevated toilet seat, Patient to call for help with toileting needs, Stay With Me (per policy), Toilet paper/wipes in reach, Toileting schedule/hourly rounds    History of Falls Interventions: Bed/chair exit alarm, Consult care management for discharge planning, Door open when patient unattended, Evaluate medications/consider consulting pharmacy, Investigate reason for fall, Room close to nurse's station, Utilize gait belt for transfer/ambulation, Assess for delayed presentation/identification of injury for 48 hrs (comment for end date), Vital signs minimum Q4HRs X 24 hrs (comment for end date)         Problem: Patient Education: Go to Patient Education Activity  Goal: Patient/Family Education  Outcome: Progressing Towards Goal

## 2020-04-11 NOTE — PROGRESS NOTES
Hospitalist Progress Note    Patient: Rene Rothman MRN: [id number]  CSN: [id number]    YOB: 1965  Age: 53 y.o. Sex: female    DOA: 4/10/2020 LOS:  LOS: 1 day            Assessment/Plan     1. Acute decompensated diastolic CHF- pt tachypneic and in distress this AM  2. Pulmonary HTN  3. HTN  4. CKD 4  5. АНДРЕЙ  6. Morbid obesity  7. Elevated troponin, doubt NSTEMI      Plan:  - STAT ABG now, start bipap  - 80mg of lasix now  - trend cardiac enyzmes  - continue home antihypertensives  - full code  - may need to transfer to higher level of care, monitory closely      Patient Active Problem List   Diagnosis Code    HTN (hypertension) I10    Type II or unspecified type diabetes mellitus with renal manifestations, uncontrolled(250.42) ([facility] [address] E11.29, E11.65    Morbid obesity ([facility] [address] E66.01    Respiratory distress R06.03    АНДРЕЙ (obstructive sleep apnea) O79.64    Diastolic CHF, acute on chronic (HCC) I50.33    CHF (congestive heart failure) ([facility]) I50.9    CKD (chronic kidney disease) stage 4, GFR 15-29 ml/min ([facility]) N18.4    Elevated troponin R79.89               Subjective:    cc: \" im having trouble bleeding\"  Pt with increased work of breathing this AM  States she had a \" stressful phone conversation\"  Denies fever, chills, chest pain/ pressure, nausea or vomiting      REVIEW OF SYSTEMS:  General: No fevers or chills. Cardiovascular: No chest pain or pressure. No palpitations. Pulmonary: ++ shortness of breath. Gastrointestinal: No nausea, vomiting. Objective:        Vital signs/Intake and Output:  Visit Vitals  /75   Pulse 90   Temp 98.2 °F (36.8 °C)   Resp 22   Wt 136.1 kg (300 lb)   SpO2 92%   BMI 54.87 kg/m²     Current Shift:  04/11 0701 - 04/11 1900  In: 400 [P.O.:924]  Out: 1300 [Urine:1300]  Last three shifts:  No intake/output data recorded. Body mass index is 54.87 kg/m².     Physical Exam:  GEN: Alert, tachypneic  EYES: conjunctiva normal, lids with out lesions  HEENT: MMM, No thyromegaly, no lymphadenopathy  HEART: RRR +S1 +S2, no JVD, pulses 2+ distally  LUNGS: decr BS bilaterally, no wheezing, rales or rhonchi noted  ABDOMEN: + BS, soft NT/ND no organomegaly,  No rebound  EXTREMITIES: ++ edema no cyanosis, cap refill normal   SKIN: no rashes or skin breakdown, no nodules, normal turgor  Current Facility-Administered Medications   Medication Dose Route Frequency    albuterol-ipratropium (DUO-NEB) 2.5 MG-0.5 MG/3 ML  3 mL Nebulization Q4H PRN    atorvastatin (LIPITOR) tablet 40 mg  40 mg Oral DAILY    calcium acetate(phosphat bind) (PHOSLO) capsule 667 mg  1 Cap Oral TID WITH MEALS    DULoxetine (CYMBALTA) capsule 60 mg  60 mg Oral DAILY    gabapentin (NEURONTIN) capsule 300 mg  300 mg Oral TID    hydrALAZINE (APRESOLINE) tablet 100 mg  100 mg Oral TID    furosemide (LASIX) injection 40 mg  40 mg IntraVENous BID    insulin lispro (HUMALOG) injection   SubCUTAneous AC&HS    glucose chewable tablet 16 g  4 Tab Oral PRN    glucagon (GLUCAGEN) injection 1 mg  1 mg IntraMUSCular PRN    heparin (porcine) injection 5,000 Units  5,000 Units SubCUTAneous Q8H    insulin glargine (LANTUS) injection 10 Units  10 Units SubCUTAneous QHS    acetaminophen (TYLENOL) tablet 650 mg  650 mg Oral Q6H PRN    ondansetron (ZOFRAN) injection 4 mg  4 mg IntraVENous Q6H PRN    amLODIPine (NORVASC) tablet 5 mg  5 mg Oral DAILY    aspirin delayed-release tablet 81 mg  81 mg Oral DAILY    carvediloL (COREG) tablet 6.25 mg  6.25 mg Oral BID WITH MEALS    cholecalciferol (VITAMIN D3) (1000 Units /25 mcg) tablet 1 Tab  1,000 Units Oral DAILY    HYDROcodone-acetaminophen (NORCO) 5-325 mg per tablet 1 Tab  1 Tab Oral Q4H PRN         All the patient's labs over the past 24 hours were reviewed both during my initial daily workflow process and at the time notated as \"note time\" in [facility].   (It is not time stamped separately in this workflow.)  Select labs are listed below.         Labs: Results:       Chemistry Recent Labs     04/11/20  0129 04/10/20  1149   * 90    144   K 4.2 4.2    109   CO2 27 28   BUN 75* 73*   CREA 2.06* 2.15*   CA 8.7 8.8   AGAP 9 7   BUCR 36* 34*   AP  --  316*   TP  --  6.9   ALB  --  3.1*   GLOB  --  3.8   AGRAT  --  0.8      CBC w/Diff Recent Labs     04/10/20  1149   WBC 9.0   RBC 3.31*   HGB 8.5*   HCT 27.8*      GRANS 86*   LYMPH 8*   EOS 1      Cardiac Enzymes Recent Labs     04/11/20  0129 04/10/20  1748   CPK 33 36   CKND1 CALCULATION NOT PERFORMED WHEN RESULT IS BELOW LINEAR LIMIT CALCULATION NOT PERFORMED WHEN RESULT IS BELOW LINEAR LIMIT          Lipid Panel Lab Results   Component Value Date/Time    Cholesterol, total 146 05/28/2015 02:58 AM    HDL Cholesterol 64 (H) 05/28/2015 02:58 AM    LDL, calculated 66 05/28/2015 02:58 AM    VLDL, calculated 16 05/28/2015 02:58 AM    Triglyceride 80 05/28/2015 02:58 AM    CHOL/HDL Ratio 2.3 05/28/2015 02:58 AM          Liver Enzymes Recent Labs     04/10/20  1149   TP 6.9   ALB 3.1*   *   SGOT 26      Thyroid Studies Lab Results   Component Value Date/Time    TSH 0.56 01/23/2019 01:33 AM        Procedures/imaging: see electronic medical records for all procedures/Xrays and details which were not copied into this note but were reviewed prior to creation of Plan    Imaging personally reviewed:  CXR: pulmonary edema      Andres Galvin,   Internal Medicine/Geriatrics

## 2020-04-11 NOTE — PROGRESS NOTES
Problem: Pressure Injury - Risk of  Goal: *Prevention of pressure injury  Description: Document Elian Scale and appropriate interventions in the flowsheet. Outcome: Progressing Towards Goal  Note: Pressure Injury Interventions:       Moisture Interventions: Absorbent underpads, Check for incontinence Q2 hours and as needed    Activity Interventions: Pressure redistribution bed/mattress(bed type)    Mobility Interventions: Pressure redistribution bed/mattress (bed type)    Nutrition Interventions: Document food/fluid/supplement intake                     Problem: Patient Education: Go to Patient Education Activity  Goal: Patient/Family Education  Outcome: Progressing Towards Goal     Problem: Patient Education: Go to Patient Education Activity  Goal: Patient/Family Education  Outcome: Progressing Towards Goal     Problem: Falls - Risk of  Goal: *Absence of Falls  Description: Document Mayito Fall Risk and appropriate interventions in the flowsheet.   Outcome: Progressing Towards Goal  Note: Fall Risk Interventions:  Mobility Interventions: Bed/chair exit alarm, Patient to call before getting OOB         Medication Interventions: Bed/chair exit alarm    Elimination Interventions: Bed/chair exit alarm, Call light in reach    History of Falls Interventions: Bed/chair exit alarm         Problem: Patient Education: Go to Patient Education Activity  Goal: Patient/Family Education  Outcome: Progressing Towards Goal

## 2020-04-11 NOTE — PROGRESS NOTES
PATIENT FEELS THAT BREATHING HAS IMPROVED SINCE ADMINISTRATION OF LASIX AND USE OF CPAP.  (URINE OUTPUT AMT QUESTIONABLE DUE TO LEAK AROUND PURE WICK). REMOVED PATIENT FROM CPAP AND PLACED HER ON 5L NC (WEARS 2L @ HOME).

## 2020-04-11 NOTE — PROGRESS NOTES
PATIENT WITH INCREASED SOB/LABORED RESPIRATIONS @ 32. RETURNED PATIENT TO CPAP (AUTOTITRATION MODE) WITH 6L O2 BLED IN. PATIENT WAS GIVEN LASIX. ABG'S WERE OBTAINED. PO2 173. DECREASED O2 TO 4L. PATIENT IS ON CONTINUOUS PULSE OXIMETRY. WILL CONTINUE TO MONITOR CLOSELY. MAY HOLD BIPAP ORDER PER DR. Lucy Luque UNLESS PATIENT FAILS TO IMPROVE WITH CURRENT MEASURES TAKEN.

## 2020-04-11 NOTE — PROGRESS NOTES
Problem: Mobility Impaired (Adult and Pediatric)  Goal: *Acute Goals and Plan of Care (Insert Text)  Description: Physical Therapy Goals   Initiated 4/11/2020 and to be accomplished within 7 day(s)  1. Patient will move from supine <> sit with S in prep for out of bed activity and change of position. 2.  Patient will perform sit<> stand with S/RW in prep for transfers/ambulation. 3.  Patient will ambulate 50 feet with S/RW for improved functional mobility/safe discharge. Outcome: Progressing Towards Goal  PHYSICAL THERAPY EVALUATION    Patient: Miguelina Montilla (53 y.o. female)  Date: 4/11/2020  Primary Diagnosis: CHF (congestive heart failure) ([facility]) [I50.9]  Precautions:Fall, Skin    ASSESSMENT :  Based on the objective data described below, the patient is a 52 yo F seen on telemetry unit. Pt well known to rehab service. Pt found long sitting in bed with CPAP and Xin Coup in place. Pt presents with LE weakness, decreased independence in functional mobility with regard to bed mobility/transfers, gait quality and with decreased activity tolerance. Pt reports chest pain 5/10 r/t use of CPAP and bilat great toe region in stance d/t recent fall PTA. Pt performed supine >sit CGA, demonstrates intact sitting balance and able to transfer sit> stand with min assist.  Gait limited to 2ft lateral steps along side of bed with min HHA and bed rail. Decreased stance time R and wt shift L with short step length. Pt returned to supine and left back long sitting with all needs in reach. O2 sat high 90's, CPAP in place throughout session. Recommend HHPT for follow up physical therapy upon discharge to reach maximal level of independence/safety with functional mobility. Pt Education: Role of physical therapy in acute care setting, fall prevention and safety/technique during functional mobility tasks      Patient will benefit from skilled intervention to address the above impairments.   Patients rehabilitation potential is considered to be Fair  Factors which may influence rehabilitation potential include:   []         None noted  []         Mental ability/status  [x]         Medical condition  []         Home/family situation and support systems  []         Safety awareness  []         Pain tolerance/management  []         Other:      PLAN :  Recommendations and Planned Interventions:  [x]           Bed Mobility Training             []    Neuromuscular Re-Education  [x]           Transfer Training                   []    Orthotic/Prosthetic Training  [x]           Gait Training                          []    Modalities  [x]           Therapeutic Exercises          []    Edema Management/Control  [x]           Therapeutic Activities            [x]    Patient and Family Training/Education  []           Other (comment):    Frequency/Duration: Patient will be followed by physical therapy 1-2 times per day to address goals. Discharge Recommendations: Home Health   Further Equipment Recommendations for Discharge: rolling walker     SUBJECTIVE:   Patient stated I'm doing okay.     OBJECTIVE DATA SUMMARY:     Past Medical History:   Diagnosis Date    Asthma     Note: As child had asthma    Diabetes mellitus ([facility]     Type 2    Heart palpitations     Hypertension     Ill-defined condition     fibromyalgia     Morbid obesity ([facility] [address]     MRSA infection 8/2014     Past Surgical History:   Procedure Laterality Date    BREAST SURGERY PROCEDURE UNLISTED      cyst removed    HX CATARACT REMOVAL      HX CHOLECYSTECTOMY      HX TUBAL LIGATION       Barriers to Learning/Limitations: yes;  physical  Compensate with: Visual Cues, Verbal Cues, and Tactile Cues  Prior Level of Function/Home Situation: amb with RW borrowed from family member  Home Situation  Home Environment: Apartment  # Steps to Enter:  0(ground level apartment)  One/Two Story Residence: One story  Living Alone: No  Support Systems: Child(vero), Family member(s)  Patient Expects to be Discharged to[de-identified] Private residence  Current DME Used/Available at Home: Raised toilet seat, Safety frame toliet, Tub transfer bench  Tub or Shower Type: Tub/Shower combination  Critical Behavior:  Neurologic State: Alert; Appropriate for age  Orientation Level: Appropriate for age  Cognition: Appropriate decision making; Appropriate for age attention/concentration; Appropriate safety awareness; Follows commands  Psychosocial  Patient Behaviors: Calm; Cooperative  Purposeful Interaction: Yes  Pt Identified Daily Priority: Clinical issues (comment)  Caritas Process: Nurture loving kindness;Enable trev/hope;Establish trust;Nurture spiritual self;Teaching/learning; Attend basic human needs;Create healing environment;Supportive expression;Creative use of self  Caring Interventions: Reassure; Therapeutic modalities  Reassure: Therapeutic listening; Informing; Acceptance; Instilling trev and hope;Quiet presence; Sit with patient;Caring rounds;Story tellings  Therapeutic Modalities: Deep breathing; Intentional therapeutic touch  Skin Condition/Temp: Dry;Warm;Flaky  Skin Integrity: Intact  Skin Integumentary  Skin Color: Appropriate for ethnicity; Red(erythmic Rt and Lt hallax)  Skin Condition/Temp: Dry;Warm;Flaky  Skin Integrity: Intact  Turgor: Shiney/hard  Hair Growth: Absent  Varicosities: Absent  Strength:    Strength: Generally decreased, functional  Tone & Sensation:   Sensation: Impaired(neuropathy BLE's)  Range Of Motion:  AROM: Generally decreased, functional  Functional Mobility:  Bed Mobility:  Supine to Sit: Contact guard assistance  Sit to Supine: Contact guard assistance  Transfers:  Sit to Stand: Minimum assistance  Stand to Sit: Minimum assistance  Balance:   Sitting: Intact  Standing: Impaired; With support  Standing - Static: Fair  Standing - Dynamic : Fair;Poor(c/o pain bilat great toes d/t recent fall-bruise R noted)  Ambulation/Gait Training:  Distance (ft): 2 Feet (ft)(lateral steps at side of bed)  Ambulation - Level of Assistance: Minimal assistance(HHA and bed railw)  Gait Abnormalities: Antalgic;Decreased step clearance; Step to gait  Base of Support: Widened  Speed/Arlette: Slow;Shuffled  Step Length: Right shortened;Left shortened  Pain:  Pain Scale 1: Numeric (0 - 10)  Pain Intensity 1: 0  Pain Location 1: Chest  Pain Orientation 1: Anterior  Pain Description 1: Pressure(increases when CPAP off per pt report)  Pain Intervention(s) 1: MD notified (comment)  Activity Tolerance:   Fair   Please refer to the flowsheet for vital signs taken during this treatment. After treatment:   []         Patient left in no apparent distress sitting up in chair  [x]         Patient left in no apparent distress in bed  [x]         Call bell left within reach  [x]         Nursing notified  []         Caregiver present  []         Bed alarm activated    COMMUNICATION/EDUCATION:   [x]         Fall prevention education was provided and the patient/caregiver indicated understanding. [x]         Patient/family have participated as able in goal setting and plan of care. [x]         Patient/family agree to work toward stated goals and plan of care. []         Patient understands intent and goals of therapy, but is neutral about his/her participation. []         Patient is unable to participate in goal setting and plan of care.     Eval Complexity: History: HIGH Complexity :3+ comorbidities / personal factors will impact the outcome/ POC Exam:LOW Complexity : 1-2 Standardized tests and measures addressing body structure, function, activity limitation and / or participation in recreation  Presentation: MEDIUM Complexity : Evolving with changing characteristics  Clinical Decision Making:Medium Complexity    Overall Complexity:MEDIUM    Thank you for this referral.  Pauline Henry, PT   Time Calculation: 30 mins

## 2020-04-11 NOTE — PROGRESS NOTES
4/11/2020 PT note: consult received, chart reviewed and events of the morning noted (difficulty breathing, CPAP resumed (holding BiPAP currently). Spoke with pt nurse Yrn Faustin and will hold PT evaluation at this time and check on pt at later time with f/up as appropriate. Thank you.    Jackie Butler, PT

## 2020-04-11 NOTE — PROGRESS NOTES
Pt placed on hospital-issued auto-titrating cpap for HS at this time. O2 bled-in at 6 lpm. Pt having episodes of dyspnea. However, the cpap appears to have given pt some relief from her shortness of breath. Pt tolerating well at this time. Will continue to monitor.

## 2020-04-11 NOTE — PROGRESS NOTES
Chart reviewed as CM on call. Pt admitted to tele by hospitalist for CHF. Attempted to call patient in room and no answer. Not entering patient rooms due to covid policies. Per chart review from previous admission, pt was active with [facility] and lives alone. Pt had RW, CPAP and neb. PT eval and recommendations pending. Provider, please advise if palliative care consult indicated. CM will follow for transition of care needs and will be available via hospital  on weekends. Reason for Admission:   Per H&P, pt is \"The patient is 53years old. She has a history of heart failure, obstructive sleep apnea, diabetes, chronic kidney disease, carotid disease, morbid obesity, chronic respiratory failure, on home oxygen, and she came into the emergency room with shortness of breath. She got up this morning about 06:30 in the morning, was waiting for her physical therapist to come over. Around 09:30, she felt that she had a mild cough, was feeling very weak, somewhat short of breath. Her nurse/therapist came over. They checked her O2 sat. They found it to be low. Her blood sugar was only 44, and they advised her to go via EMS to the ER. The patient has been compliant with her medication. She is taking Lasix twice daily. She states she has been checking her blood sugar and using her insulin as she has been instructed to do so. She was recently in the emergency department 6 days ago after she had a fall at home. She was discharged with no acute severe injury noted. She is feeling okay right now. She is on 5 L of nasal cannula oxygen. She was texting on a phone when I entered the room. She is wearing a mask. No COVID risk factors as she has been isolated at home, except for the ER visit, she had a few days ago. Same caretaker coming into the home and her daughter intermittently as well, all of whom have had no respiratory symptoms as far as she knows. Her PCP is Dr. Virgilio Astudillo. \"               Yoni Metz Score: High 21    PCP: First and Last name: Listed as Bob Spencer [facility]   Name of Practice:   Are you a current patient: Yes/No:   Approximate date of last visit:              Resources/supports as identified by patient/family:                   Top Challenges facing patient (as identified by patient/family and CM): Finances/Medication cost?                    Transportation? Support system or lack thereof? Living arrangements? Self-care/ADLs/Cognition? Current Advanced Directive/Advance Care Plan:  Not on file, provider please consider palliative care consult to address ACP                          Plan for utilizing home health:    See note, active with [facility]                 Transition of Care Plan:      TBD              Care Management Interventions  Transition of Care Consult (CM Consult): Discharge Planning  Physical Therapy Consult:  Yes

## 2020-04-11 NOTE — PROGRESS NOTES
PLEASE NOTE THAT PATIENT IS USING CPAP INTERMITTENTLY DURING THIS SHIFT WITH 5L O2 BLED IN AND 4L NC WHEN OFF.

## 2020-04-11 NOTE — PROGRESS NOTES
1915  Assumed care of pt  2003  Shift assessment complete. Pt complaining of chest pain - MD notified. Orders received with readback   0031  Reassessment complete   0330  Reassessment complete     Shift uneventful     Bedside and Verbal shift change report given to Ishaan Earl RN (oncoming nurse) by Alen Bradley (offgoing nurse). Report included the following information SBAR, Kardex, ED Summary, Procedure Summary, Intake/Output, MAR, Recent Results, Med Rec Status and Cardiac Rhythm NSR.

## 2020-04-11 NOTE — PROGRESS NOTES
0730: Bedside and Verbal shift change report given to [id number] (oncoming nurse) by Sara Mcfadden RN (offgoing nurse). Report included the following information Kardex and Cardiac Rhythm NSR.     0855: pt c/o SOB MD Sabrina Severs request this nurse at the bedside  Albino Friend RN    8009: this nurse at the bedside, pt vital signs obtained, SOB noted, n/o for medication administered (see MAR), CPAP applied with O2, RT at the bedside to assess pt and obtain ABG's  Albino Friend RN    2615: MD Sabrina Severs at pt bedside to assess tx, continue to monitor pt for any change  Albino Friend RN    1020: pt resting quiet at this time, CPAP assessment completed by this nurse for appropriate positioning, continue to monitor pt for any change  Albino Friend RN    1100: RT makes this nurse aware to monitor pt output, RT made aware matter to be addressed, RT at the bedside with pt  Albino Friend RN    1110: RT makes this nurse aware pt has voided to continue to monitor pt for any change  Albino Friend RN    1550: PT working with pt at this time, continue to monitor pt for any change  Albino Friend RN    [phone]: MD Sabrina Severs communicates with this nurse about pt condition, MD made aware of pt condition at this time, this nurse instructed to monitor pt for any change  Albino Friend RN    1451: Bedside and Verbal shift change report given to [phone] Theresa Mcintosh (oncoming nurse) by Sakina Mckenzie RN (offgoing nurse). Report included the following information Kardex and Cardiac Rhythm NSR.

## 2020-04-12 LAB
ANION GAP SERPL CALC-SCNC: 8 MMOL/L (ref 3–18)
BUN SERPL-MCNC: 81 MG/DL (ref 7–18)
BUN/CREAT SERPL: 30 (ref 12–20)
CALCIUM SERPL-MCNC: 8.8 MG/DL (ref 8.5–10.1)
CHLORIDE SERPL-SCNC: 101 MMOL/L (ref 100–111)
CO2 SERPL-SCNC: 29 MMOL/L (ref 21–32)
CREAT SERPL-MCNC: 2.66 MG/DL (ref 0.6–1.3)
GLUCOSE BLD STRIP.AUTO-MCNC: 234 MG/DL (ref 70–110)
GLUCOSE BLD STRIP.AUTO-MCNC: 276 MG/DL (ref 70–110)
GLUCOSE BLD STRIP.AUTO-MCNC: 290 MG/DL (ref 70–110)
GLUCOSE BLD STRIP.AUTO-MCNC: 292 MG/DL (ref 70–110)
GLUCOSE SERPL-MCNC: 281 MG/DL (ref 74–99)
POTASSIUM SERPL-SCNC: 4.5 MMOL/L (ref 3.5–5.5)
SODIUM SERPL-SCNC: 138 MMOL/L (ref 136–145)

## 2020-04-12 PROCEDURE — 65660000000 HC RM CCU STEPDOWN

## 2020-04-12 PROCEDURE — 74011250637 HC RX REV CODE- 250/637: Performed by: HOSPITALIST

## 2020-04-12 PROCEDURE — 36415 COLL VENOUS BLD VENIPUNCTURE: CPT

## 2020-04-12 PROCEDURE — 77010033678 HC OXYGEN DAILY

## 2020-04-12 PROCEDURE — 74011636637 HC RX REV CODE- 636/637: Performed by: HOSPITALIST

## 2020-04-12 PROCEDURE — 74011250636 HC RX REV CODE- 250/636: Performed by: HOSPITALIST

## 2020-04-12 PROCEDURE — 74011250637 HC RX REV CODE- 250/637: Performed by: FAMILY MEDICINE

## 2020-04-12 PROCEDURE — 80048 BASIC METABOLIC PNL TOTAL CA: CPT

## 2020-04-12 PROCEDURE — 94660 CPAP INITIATION&MGMT: CPT

## 2020-04-12 PROCEDURE — 82962 GLUCOSE BLOOD TEST: CPT

## 2020-04-12 RX ADMIN — Medication 1 TABLET: at 09:17

## 2020-04-12 RX ADMIN — CARVEDILOL 6.25 MG: 3.12 TABLET, FILM COATED ORAL at 09:17

## 2020-04-12 RX ADMIN — CALCIUM ACETATE 667 MG: 667 CAPSULE ORAL at 18:21

## 2020-04-12 RX ADMIN — INSULIN LISPRO 6 UNITS: 100 INJECTION, SOLUTION INTRAVENOUS; SUBCUTANEOUS at 18:21

## 2020-04-12 RX ADMIN — INSULIN LISPRO 9 UNITS: 100 INJECTION, SOLUTION INTRAVENOUS; SUBCUTANEOUS at 21:20

## 2020-04-12 RX ADMIN — HEPARIN SODIUM 5000 UNITS: 5000 INJECTION INTRAVENOUS; SUBCUTANEOUS at 21:20

## 2020-04-12 RX ADMIN — HYDRALAZINE HYDROCHLORIDE 100 MG: 50 TABLET, FILM COATED ORAL at 18:21

## 2020-04-12 RX ADMIN — HEPARIN SODIUM 5000 UNITS: 5000 INJECTION INTRAVENOUS; SUBCUTANEOUS at 13:17

## 2020-04-12 RX ADMIN — HEPARIN SODIUM 5000 UNITS: 5000 INJECTION INTRAVENOUS; SUBCUTANEOUS at 06:46

## 2020-04-12 RX ADMIN — GABAPENTIN 300 MG: 300 CAPSULE ORAL at 18:21

## 2020-04-12 RX ADMIN — CALCIUM ACETATE 667 MG: 667 CAPSULE ORAL at 13:18

## 2020-04-12 RX ADMIN — FUROSEMIDE 40 MG: 10 INJECTION, SOLUTION INTRAMUSCULAR; INTRAVENOUS at 09:17

## 2020-04-12 RX ADMIN — DULOXETINE HYDROCHLORIDE 60 MG: 60 CAPSULE, DELAYED RELEASE ORAL at 09:17

## 2020-04-12 RX ADMIN — AMLODIPINE BESYLATE 5 MG: 5 TABLET ORAL at 09:17

## 2020-04-12 RX ADMIN — GABAPENTIN 300 MG: 300 CAPSULE ORAL at 09:17

## 2020-04-12 RX ADMIN — FUROSEMIDE 40 MG: 10 INJECTION, SOLUTION INTRAMUSCULAR; INTRAVENOUS at 21:20

## 2020-04-12 RX ADMIN — ATORVASTATIN CALCIUM 40 MG: 20 TABLET, FILM COATED ORAL at 09:17

## 2020-04-12 RX ADMIN — HYDRALAZINE HYDROCHLORIDE 100 MG: 50 TABLET, FILM COATED ORAL at 21:20

## 2020-04-12 RX ADMIN — INSULIN LISPRO 4 UNITS: 100 INJECTION, SOLUTION INTRAVENOUS; SUBCUTANEOUS at 13:18

## 2020-04-12 RX ADMIN — HYDROCODONE BITARTRATE AND ACETAMINOPHEN 1 TABLET: 5; 325 TABLET ORAL at 06:50

## 2020-04-12 RX ADMIN — INSULIN GLARGINE 10 UNITS: 100 INJECTION, SOLUTION SUBCUTANEOUS at 21:20

## 2020-04-12 RX ADMIN — HYDROCODONE BITARTRATE AND ACETAMINOPHEN 1 TABLET: 5; 325 TABLET ORAL at 21:26

## 2020-04-12 RX ADMIN — CALCIUM ACETATE 667 MG: 667 CAPSULE ORAL at 09:17

## 2020-04-12 RX ADMIN — ASPIRIN 81 MG: 81 TABLET, COATED ORAL at 09:17

## 2020-04-12 RX ADMIN — INSULIN LISPRO 6 UNITS: 100 INJECTION, SOLUTION INTRAVENOUS; SUBCUTANEOUS at 06:46

## 2020-04-12 RX ADMIN — CARVEDILOL 6.25 MG: 3.12 TABLET, FILM COATED ORAL at 18:21

## 2020-04-12 RX ADMIN — HYDRALAZINE HYDROCHLORIDE 100 MG: 50 TABLET, FILM COATED ORAL at 09:17

## 2020-04-12 RX ADMIN — GABAPENTIN 300 MG: 300 CAPSULE ORAL at 21:20

## 2020-04-12 NOTE — ROUTINE PROCESS
Bedside and Verbal shift change report given to BERT Pettit (oncoming nurse) by Ileana Eaton RN (offgoing nurse). Report included the following information SBAR, Kardex, MAR, Accordion and Recent Results. 0414 Pt c/o chest pain 5/10. Pt refused medication at this time. 0500 Pt found asleep during pain reassessment. 0650 Pt c/o chest pain 5/10. Pt requested medication. Nurse treated per mar.  
0700 Bedside and Verbal shift change report given to Ileana Eaton RN (oncoming nurse) by Laura Levy. Mitzi Pettit RN (offgoing nurse). Report included the following information SBAR, Kardex, MAR, Accordion and Recent Results.

## 2020-04-12 NOTE — PROGRESS NOTES
Hospitalist Progress Note    Patient: Sarah Gibbs MRN: [id number]  CSN: [id number]    YOB: 1965  Age: 53 y.o. Sex: female    DOA: 4/10/2020 LOS:  LOS: 2 days            Assessment/Plan     1. Acute decompensated diastolic CHF- pt tachypneic and in distress this AM  2. Pulmonary HTN  3. HTN  4. CKD 4  5. АНДРЕЙ  6. Morbid obesity  7. Elevated troponin, doubt NSTEMI  8. Musculoskeletal chest pain overnight, resolved     Plan:  - continue IV lasix   - continue antihypertensives  - mobilize  - CPAP    Patient Active Problem List   Diagnosis Code    HTN (hypertension) I10    Type II or unspecified type diabetes mellitus with renal manifestations, uncontrolled(250.42) ([facility]) E11.29, E11.65    Morbid [facility] E66.01    Respiratory distress R06.03    АНДРЕЙ (obstructive sleep apnea) W26.32    Diastolic CHF, acute on chronic ([facility]) I50.33    CHF (congestive heart failure) ([facility]) I50.9    CKD (chronic kidney disease) stage 4, GFR 15-29 ml/min ([facility]) N18.4    Elevated troponin R79.89               Subjective:    cc: I feel better  Pt w chest pain overnight, resolved norco  Dyspnea improved       REVIEW OF SYSTEMS:  General: No fevers or chills. Cardiovascular: No chest pain or pressure. No palpitations. Pulmonary: No shortness of breath. Gastrointestinal: No nausea, vomiting. Objective:        Vital signs/Intake and Output:  Visit Vitals  /65 (BP 1 Location: Left arm, BP Patient Position: At rest)   Pulse 73   Temp 97.7 °F (36.5 °C)   Resp 20   Wt 136.1 kg (300 lb)   SpO2 100%   BMI 54.87 kg/m²     Current Shift:  No intake/output data recorded. Last three shifts:  04/10 1901 - 04/12 0700  In: 1024 [P.O.:1024]  Out: 2300 [Urine:2300]    Body mass index is 54.87 kg/m².     Physical Exam:  GEN: Alert and oriented times three NAD, obese  EYES: conjunctiva normal, lids with out lesions  HEENT: MMM, No thyromegaly, no lymphadenopathy  HEART: RRR +S1 +S2, no JVD, pulses 2+ distally  LUNGS: CTA B/L no wheezes, rales or rhonchi  ABDOMEN: + BS, soft NT/ND no organomegaly,  No rebound  EXTREMITIES: No edema cyanosis, cap refill normal   SKIN: no rashes or skin breakdown, no nodules, normal turgor  Current Facility-Administered Medications   Medication Dose Route Frequency    albuterol-ipratropium (DUO-NEB) 2.5 MG-0.5 MG/3 ML  3 mL Nebulization Q4H PRN    atorvastatin (LIPITOR) tablet 40 mg  40 mg Oral DAILY    calcium acetate(phosphat bind) (PHOSLO) capsule 667 mg  1 Cap Oral TID WITH MEALS    DULoxetine (CYMBALTA) capsule 60 mg  60 mg Oral DAILY    gabapentin (NEURONTIN) capsule 300 mg  300 mg Oral TID    hydrALAZINE (APRESOLINE) tablet 100 mg  100 mg Oral TID    furosemide (LASIX) injection 40 mg  40 mg IntraVENous BID    insulin lispro (HUMALOG) injection   SubCUTAneous AC&HS    glucose chewable tablet 16 g  4 Tab Oral PRN    glucagon (GLUCAGEN) injection 1 mg  1 mg IntraMUSCular PRN    heparin (porcine) injection 5,000 Units  5,000 Units SubCUTAneous Q8H    insulin glargine (LANTUS) injection 10 Units  10 Units SubCUTAneous QHS    acetaminophen (TYLENOL) tablet 650 mg  650 mg Oral Q6H PRN    ondansetron (ZOFRAN) injection 4 mg  4 mg IntraVENous Q6H PRN    amLODIPine (NORVASC) tablet 5 mg  5 mg Oral DAILY    aspirin delayed-release tablet 81 mg  81 mg Oral DAILY    carvediloL (COREG) tablet 6.25 mg  6.25 mg Oral BID WITH MEALS    cholecalciferol (VITAMIN D3) (1000 Units /25 mcg) tablet 1 Tab  1,000 Units Oral DAILY    HYDROcodone-acetaminophen (NORCO) 5-325 mg per tablet 1 Tab  1 Tab Oral Q4H PRN         All the patient's labs over the past 24 hours were reviewed both during my initial daily workflow process and at the time notated as \"note time\" in [facility]. (It is not time stamped separately in this workflow.)  Select labs are listed below.         Labs: Results:       Chemistry Recent Labs     04/12/20  0225 04/11/20  0129 04/10/20  1149   * 157* 90  143 144   K 4.5 4.2 4.2    107 109   CO2 29 27 28   BUN 81* 75* 73*   CREA 2.66* 2.06* 2.15*   CA 8.8 8.7 8.8   AGAP 8 9 7   BUCR 30* 36* 34*   AP  --   --  316*   TP  --   --  6.9   ALB  --   --  3.1*   GLOB  --   --  3.8   AGRAT  --   --  0.8      CBC w/Diff Recent Labs     04/10/20  1149   WBC 9.0   RBC 3.31*   HGB 8.5*   HCT 27.8*      GRANS 86*   LYMPH 8*   EOS 1      Cardiac Enzymes Recent Labs     04/11/20  2230 04/11/20  0921   CPK 34 33   CKND1 CALCULATION NOT PERFORMED WHEN RESULT IS BELOW LINEAR LIMIT CALCULATION NOT PERFORMED WHEN RESULT IS BELOW LINEAR LIMIT          Lipid Panel Lab Results   Component Value Date/Time    Cholesterol, total 146 05/28/2015 02:58 AM    HDL Cholesterol 64 (H) 05/28/2015 02:58 AM    LDL, calculated 66 05/28/2015 02:58 AM    VLDL, calculated 16 05/28/2015 02:58 AM    Triglyceride 80 05/28/2015 02:58 AM    CHOL/HDL Ratio 2.3 05/28/2015 02:58 AM          Liver Enzymes Recent Labs     04/10/20  1149   TP 6.9   ALB 3.1*   *   SGOT 26      Thyroid Studies Lab Results   Component Value Date/Time    TSH 0.56 01/23/2019 01:33 AM        Procedures/imaging: see electronic medical records for all procedures/Xrays and details which were not copied into this note but were reviewed prior to creation of [place] [address], DO  Internal Medicine/Geriatrics

## 2020-04-12 NOTE — PROGRESS NOTES
DC Plan: Discharge home with MD follow up, family assistance and [facility] once medically stable    Chart reviewed as CM on call. Pt admitted to tele by hospitalist for CHF. Spoke with pt on phone regarding dc plan, not entering patient rooms due to covid policies. CM role explained. Pt states she lives alone, but has several children who live nearby. States daughter Jm Mann will drive her home at discharge [phone]. Home address confirmed per face sheet. PT recommending HH, FOC offered. Pt states she is active with [facility], referral placed. Pt has CPAP, Home oxygen thru [facility], [address] [place] [address], 3 in 1 bedside commode. Pt states her RW is borrowed and she needs a new one, aware if she has received one in last 5 years thru insurance she would get bill. Will send order to [facility] to review. Pts PCP is confirmed as MD Esperanza Ramsey. Pt  States her pulmonologist is MD Bonilla. States she has 2 cardiologists, one at [facility] and one at [place], uncertain of names. CMS will assist with needed follow up appts at time of discharge. No dc concerns identified. CM will cont to follow for transition of care needs and will be available via hospital  on weekends. Care Management Interventions  PCP Verified by CM: Yes  Mode of Transport at Discharge:  Other (see comment)(family)  Transition of Care Consult (CM Consult): Discharge Planning, [address]: No  Reason Outside [place]: Patient already serviced by other home care/hospice agency  Physical Therapy Consult: Yes  Current Support Network: Lives Alone, Family Lives Nearby  Confirm Follow Up Transport: Family  The Plan for Transition of Care is Related to the Following Treatment Goals : home with [facility]  The Patient and/or Patient Representative was Provided with a Choice of Provider and Agrees with the Discharge Plan?: Yes  Name of the Patient Representative Who was Provided with a Choice of Provider and Agrees with the Discharge Plan: PATIENT  Freedom of Choice List was Provided with Basic Dialogue that Supports the Patient's Individualized Plan of Care/Goals, Treatment Preferences and Shares the Quality Data Associated with the Providers?: Yes  Discharge Location  Discharge Placement: Home with home health

## 2020-04-12 NOTE — PROGRESS NOTES
Problem: Pressure Injury - Risk of  Goal: *Prevention of pressure injury  Description: Document Elian Scale and appropriate interventions in the flowsheet. Outcome: Progressing Towards Goal  Note: Pressure Injury Interventions:       Moisture Interventions: Absorbent underpads, Apply protective barrier, creams and emollients, Check for incontinence Q2 hours and as needed, Limit adult briefs, Minimize layers, Offer toileting Q_hr    Activity Interventions: PT/OT evaluation, Pressure redistribution bed/mattress(bed type), Assess need for specialty bed    Mobility Interventions: PT/OT evaluation, Pressure redistribution bed/mattress (bed type), HOB 30 degrees or less, Assess need for specialty bed    Nutrition Interventions: Document food/fluid/supplement intake    Friction and Shear Interventions: HOB 30 degrees or less, Lift team/patient mobility team                Problem: Patient Education: Go to Patient Education Activity  Goal: Patient/Family Education  Outcome: Progressing Towards Goal     Problem: Diabetes Self-Management  Goal: *Disease process and treatment process  Description: Define diabetes and identify own type of diabetes; list 3 options for treating diabetes. Outcome: Progressing Towards Goal  Goal: *Incorporating nutritional management into lifestyle  Description: Describe effect of type, amount and timing of food on blood glucose; list 3 methods for planning meals. Outcome: Progressing Towards Goal  Goal: *Incorporating physical activity into lifestyle  Description: State effect of exercise on blood glucose levels. Outcome: Progressing Towards Goal  Goal: *Developing strategies to promote health/change behavior  Description: Define the ABC's of diabetes; identify appropriate screenings, schedule and personal plan for screenings.   Outcome: Progressing Towards Goal  Goal: *Using medications safely  Description: State effect of diabetes medications on diabetes; name diabetes medication taking, action and side effects. Outcome: Progressing Towards Goal  Goal: *Monitoring blood glucose, interpreting and using results  Description: Identify recommended blood glucose targets  and personal targets. Outcome: Progressing Towards Goal  Goal: *Prevention, detection, treatment of acute complications  Description: List symptoms of hyper- and hypoglycemia; describe how to treat low blood sugar and actions for lowering  high blood glucose level. Outcome: Progressing Towards Goal  Goal: *Prevention, detection and treatment of chronic complications  Description: Define the natural course of diabetes and describe the relationship of blood glucose levels to long term complications of diabetes. Outcome: Progressing Towards Goal  Goal: *Developing strategies to address psychosocial issues  Description: Describe feelings about living with diabetes; identify support needed and support network  Outcome: Progressing Towards Goal  Goal: *Insulin pump training  Outcome: Progressing Towards Goal  Goal: *Sick day guidelines  Outcome: Progressing Towards Goal  Goal: *Patient Specific Goal (EDIT GOAL, INSERT TEXT)  Outcome: Progressing Towards Goal     Problem: Patient Education: Go to Patient Education Activity  Goal: Patient/Family Education  Outcome: Progressing Towards Goal     Problem: Falls - Risk of  Goal: *Absence of Falls  Description: Document Mayito Fall Risk and appropriate interventions in the flowsheet.   Outcome: Progressing Towards Goal  Note: Fall Risk Interventions:  Mobility Interventions: Assess mobility with egress test, OT consult for ADLs, Patient to call before getting OOB, PT Consult for mobility concerns, PT Consult for assist device competence, Utilize walker, cane, or other assistive device         Medication Interventions: Patient to call before getting OOB, Teach patient to arise slowly, Bed/chair exit alarm    Elimination Interventions: Bed/chair exit alarm, Call light in reach, Patient to call for help with toileting needs, Toileting schedule/hourly rounds    History of Falls Interventions: Bed/chair exit alarm, Door open when patient unattended, Investigate reason for fall, Room close to nurse's station         Problem: Patient Education: Go to Patient Education Activity  Goal: Patient/Family Education  Outcome: Progressing Towards Goal     Problem: Patient Education: Go to Patient Education Activity  Goal: Patient/Family Education  Outcome: Progressing Towards Goal

## 2020-04-12 NOTE — PROGRESS NOTES
Pt resting comfortably on hospital-issued auto-titrating cpap at this time. O2 bled-in at 5 lpm. No distress noted. Will continue to monitor.

## 2020-04-12 NOTE — PROGRESS NOTES
0730: Bedside and Verbal shift change report given to [address] (oncoming nurse) by Liza LOUISE (offgoing nurse). Report included the following information Kardex and Cardiac Rhythm NSR.     0845: MD Chandni Price communicates with this nurse what pt condition over night, MD given update and in to see pt  Fawn Duke RN    9051: MD Chandni Price at the bedside to assess and discuss tx with pt, pt acknowledges and agrees to tx  AHetal Strong RN    6029: Bedside and Verbal shift change report given to Liza LOUISE (oncoming nurse) by [address] (offgoing nurse). Report included the following information Kardex and Cardiac Rhythm NSR.

## 2020-04-12 NOTE — PROGRESS NOTES
Problem: Pressure Injury - Risk of  Goal: *Prevention of pressure injury  Description: Document Elian Scale and appropriate interventions in the flowsheet. Outcome: Progressing Towards Goal  Note: Pressure Injury Interventions:       Moisture Interventions: Absorbent underpads, Assess need for specialty bed, Check for incontinence Q2 hours and as needed, Internal/External urinary devices, Limit adult briefs, Maintain skin hydration (lotion/cream), Offer toileting Q_hr, Minimize layers    Activity Interventions: Assess need for specialty bed, Chair cushion, Increase time out of bed, Pressure redistribution bed/mattress(bed type), PT/OT evaluation    Mobility Interventions: Assess need for specialty bed, Chair cushion, Float heels, Pressure redistribution bed/mattress (bed type), PT/OT evaluation, Turn and reposition approx. every two hours(pillow and wedges)    Nutrition Interventions: Document food/fluid/supplement intake, Discuss nutritional consult with provider, Offer support with meals,snacks and hydration    Friction and Shear Interventions: Feet elevated on foot rest, Foam dressings/transparent film/skin sealants, Lift sheet, Lift team/patient mobility team, Minimize layers, Transferring/repositioning devices                Problem: Patient Education: Go to Patient Education Activity  Goal: Patient/Family Education  Outcome: Progressing Towards Goal     Problem: Diabetes Self-Management  Goal: *Disease process and treatment process  Description: Define diabetes and identify own type of diabetes; list 3 options for treating diabetes. Outcome: Progressing Towards Goal  Goal: *Incorporating nutritional management into lifestyle  Description: Describe effect of type, amount and timing of food on blood glucose; list 3 methods for planning meals. Outcome: Progressing Towards Goal  Goal: *Incorporating physical activity into lifestyle  Description: State effect of exercise on blood glucose levels.   Outcome: Progressing Towards Goal  Goal: *Developing strategies to promote health/change behavior  Description: Define the ABC's of diabetes; identify appropriate screenings, schedule and personal plan for screenings. Outcome: Progressing Towards Goal  Goal: *Using medications safely  Description: State effect of diabetes medications on diabetes; name diabetes medication taking, action and side effects. Outcome: Progressing Towards Goal  Goal: *Monitoring blood glucose, interpreting and using results  Description: Identify recommended blood glucose targets  and personal targets. Outcome: Progressing Towards Goal  Goal: *Prevention, detection, treatment of acute complications  Description: List symptoms of hyper- and hypoglycemia; describe how to treat low blood sugar and actions for lowering  high blood glucose level. Outcome: Progressing Towards Goal  Goal: *Prevention, detection and treatment of chronic complications  Description: Define the natural course of diabetes and describe the relationship of blood glucose levels to long term complications of diabetes. Outcome: Progressing Towards Goal  Goal: *Developing strategies to address psychosocial issues  Description: Describe feelings about living with diabetes; identify support needed and support network  Outcome: Progressing Towards Goal  Goal: *Insulin pump training  Outcome: Progressing Towards Goal  Goal: *Sick day guidelines  Outcome: Progressing Towards Goal  Goal: *Patient Specific Goal (EDIT GOAL, INSERT TEXT)  Outcome: Progressing Towards Goal     Problem: Patient Education: Go to Patient Education Activity  Goal: Patient/Family Education  Outcome: Progressing Towards Goal     Problem: Falls - Risk of  Goal: *Absence of Falls  Description: Document Mayito Fall Risk and appropriate interventions in the flowsheet.   Outcome: Progressing Towards Goal  Note: Fall Risk Interventions:  Mobility Interventions: Bed/chair exit alarm, Communicate number of staff needed for ambulation/transfer, Mechanical lift, OT consult for ADLs, Patient to call before getting OOB, PT Consult for mobility concerns, PT Consult for assist device competence, Strengthening exercises (ROM-active/passive), Utilize walker, cane, or other assistive device         Medication Interventions: Bed/chair exit alarm, Evaluate medications/consider consulting pharmacy, Patient to call before getting OOB, Teach patient to arise slowly    Elimination Interventions: Bed/chair exit alarm, Call light in reach, Elevated toilet seat, Patient to call for help with toileting needs, Stay With Me (per policy), Toilet paper/wipes in reach, Toileting schedule/hourly rounds    History of Falls Interventions: Bed/chair exit alarm, Consult care management for discharge planning, Door open when patient unattended, Evaluate medications/consider consulting pharmacy, Investigate reason for fall, Room close to nurse's station, Utilize gait belt for transfer/ambulation, Assess for delayed presentation/identification of injury for 48 hrs (comment for end date), Vital signs minimum Q4HRs X 24 hrs (comment for end date)         Problem: Patient Education: Go to Patient Education Activity  Goal: Patient/Family Education  Outcome: Progressing Towards Goal     Problem: Patient Education: Go to Patient Education Activity  Goal: Patient/Family Education  Outcome: Progressing Towards Goal

## 2020-04-12 NOTE — PROGRESS NOTES
Problem: Mobility Impaired (Adult and Pediatric)  Goal: *Acute Goals and Plan of Care (Insert Text)  Description: Physical Therapy Goals   Initiated 4/11/2020 and to be accomplished within 7 day(s)  1. Patient will move from supine <> sit with S in prep for out of bed activity and change of position. 2.  Patient will perform sit<> stand with S/RW in prep for transfers/ambulation. 3.  Patient will ambulate 50 feet with S/RW for improved functional mobility/safe discharge. 4/12/2020  PT treatment not completed due to:  [] Off Unit for testing/procedure  [] Pain  [] Eating  [] Patient too lethargic  [] Nausea/vomiting  [] Dialysis treatment in progress   [x] Other: pt with CPAP in place and sleeping soundly at this time. Will f/u at later time as pt schedule allows. Thank you. Shellie Field, PT    2nd attempt: pt recently vomiting during meal. Declines PT at this time. Will f/u tomorrow.   Shellie Field, PT

## 2020-04-13 ENCOUNTER — APPOINTMENT (OUTPATIENT)
Dept: GENERAL RADIOLOGY | Age: 55
DRG: 194 | End: 2020-04-13
Attending: HOSPITALIST
Payer: COMMERCIAL

## 2020-04-13 ENCOUNTER — APPOINTMENT (OUTPATIENT)
Dept: NON INVASIVE DIAGNOSTICS | Age: 55
DRG: 194 | End: 2020-04-13
Attending: INTERNAL MEDICINE
Payer: COMMERCIAL

## 2020-04-13 LAB
ANION GAP SERPL CALC-SCNC: 7 MMOL/L (ref 3–18)
BUN SERPL-MCNC: 86 MG/DL (ref 7–18)
BUN/CREAT SERPL: 32 (ref 12–20)
CALCIUM SERPL-MCNC: 8.8 MG/DL (ref 8.5–10.1)
CHLORIDE SERPL-SCNC: 100 MMOL/L (ref 100–111)
CO2 SERPL-SCNC: 29 MMOL/L (ref 21–32)
CREAT SERPL-MCNC: 2.65 MG/DL (ref 0.6–1.3)
ECHO EST RA PRESSURE: 10 MMHG
ECHO LV EDV A4C: 25.8 ML
ECHO LV EDV INDEX A4C: 11.4 ML/M2
ECHO LV EJECTION FRACTION A4C: 69 %
ECHO LV ESV A4C: 7.9 ML
ECHO LV ESV INDEX A4C: 3.5 ML/M2
ECHO MV A VELOCITY: 93.75 CM/S
ECHO MV AREA PHT: 5.9 CM2
ECHO MV E DECELERATION TIME (DT): 128.2 MS
ECHO MV E VELOCITY: 95.72 CM/S
ECHO MV E/A RATIO: 1.02
ECHO MV PRESSURE HALF TIME (PHT): 37.2 MS
ECHO PULMONARY ARTERY SYSTOLIC PRESSURE (PASP): 47.9 MMHG
ECHO RIGHT VENTRICULAR SYSTOLIC PRESSURE (RVSP): 47.9 MMHG
ECHO TV REGURGITANT MAX VELOCITY: 307.81 CM/S
ECHO TV REGURGITANT PEAK GRADIENT: 37.9 MMHG
GLUCOSE BLD STRIP.AUTO-MCNC: 180 MG/DL (ref 70–110)
GLUCOSE BLD STRIP.AUTO-MCNC: 220 MG/DL (ref 70–110)
GLUCOSE BLD STRIP.AUTO-MCNC: 233 MG/DL (ref 70–110)
GLUCOSE BLD STRIP.AUTO-MCNC: 247 MG/DL (ref 70–110)
GLUCOSE SERPL-MCNC: 177 MG/DL (ref 74–99)
LVSV (MOD SINGLE 4C): 7.33 ML
MV DEC SLOPE: 7.46
POTASSIUM SERPL-SCNC: 4.5 MMOL/L (ref 3.5–5.5)
SODIUM SERPL-SCNC: 136 MMOL/L (ref 136–145)

## 2020-04-13 PROCEDURE — 82962 GLUCOSE BLOOD TEST: CPT

## 2020-04-13 PROCEDURE — 74011636637 HC RX REV CODE- 636/637: Performed by: HOSPITALIST

## 2020-04-13 PROCEDURE — 74011250637 HC RX REV CODE- 250/637: Performed by: FAMILY MEDICINE

## 2020-04-13 PROCEDURE — 65660000000 HC RM CCU STEPDOWN

## 2020-04-13 PROCEDURE — 74011250637 HC RX REV CODE- 250/637: Performed by: HOSPITALIST

## 2020-04-13 PROCEDURE — 74011250637 HC RX REV CODE- 250/637: Performed by: INTERNAL MEDICINE

## 2020-04-13 PROCEDURE — C8923 2D TTE W OR W/O FOL W/CON,CO: HCPCS

## 2020-04-13 PROCEDURE — 94660 CPAP INITIATION&MGMT: CPT

## 2020-04-13 PROCEDURE — 71046 X-RAY EXAM CHEST 2 VIEWS: CPT

## 2020-04-13 PROCEDURE — 74011250636 HC RX REV CODE- 250/636: Performed by: HOSPITALIST

## 2020-04-13 PROCEDURE — 36415 COLL VENOUS BLD VENIPUNCTURE: CPT

## 2020-04-13 PROCEDURE — 74011000258 HC RX REV CODE- 258: Performed by: INTERNAL MEDICINE

## 2020-04-13 PROCEDURE — 74011250636 HC RX REV CODE- 250/636: Performed by: INTERNAL MEDICINE

## 2020-04-13 PROCEDURE — 77010033678 HC OXYGEN DAILY

## 2020-04-13 PROCEDURE — 80048 BASIC METABOLIC PNL TOTAL CA: CPT

## 2020-04-13 RX ORDER — INSULIN GLARGINE 100 [IU]/ML
15 INJECTION, SOLUTION SUBCUTANEOUS
Status: DISCONTINUED | OUTPATIENT
Start: 2020-04-13 | End: 2020-04-17

## 2020-04-13 RX ORDER — NYSTATIN 100000 [USP'U]/G
POWDER TOPICAL 2 TIMES DAILY
Status: DISCONTINUED | OUTPATIENT
Start: 2020-04-13 | End: 2020-04-24 | Stop reason: HOSPADM

## 2020-04-13 RX ORDER — FUROSEMIDE 10 MG/ML
80 INJECTION INTRAMUSCULAR; INTRAVENOUS 2 TIMES DAILY
Status: DISCONTINUED | OUTPATIENT
Start: 2020-04-13 | End: 2020-04-13

## 2020-04-13 RX ORDER — METOLAZONE 5 MG/1
10 TABLET ORAL DAILY
Status: DISCONTINUED | OUTPATIENT
Start: 2020-04-13 | End: 2020-04-18

## 2020-04-13 RX ORDER — INSULIN LISPRO 100 [IU]/ML
5 INJECTION, SOLUTION INTRAVENOUS; SUBCUTANEOUS
Status: DISCONTINUED | OUTPATIENT
Start: 2020-04-13 | End: 2020-04-16

## 2020-04-13 RX ORDER — LEVOFLOXACIN 5 MG/ML
250 INJECTION, SOLUTION INTRAVENOUS EVERY 24 HOURS
Status: COMPLETED | OUTPATIENT
Start: 2020-04-13 | End: 2020-04-17

## 2020-04-13 RX ADMIN — INSULIN LISPRO 5 UNITS: 100 INJECTION, SOLUTION INTRAVENOUS; SUBCUTANEOUS at 13:31

## 2020-04-13 RX ADMIN — CALCIUM ACETATE 667 MG: 667 CAPSULE ORAL at 08:18

## 2020-04-13 RX ADMIN — INSULIN LISPRO 5 UNITS: 100 INJECTION, SOLUTION INTRAVENOUS; SUBCUTANEOUS at 17:15

## 2020-04-13 RX ADMIN — GABAPENTIN 300 MG: 300 CAPSULE ORAL at 08:18

## 2020-04-13 RX ADMIN — NYSTATIN: 100000 POWDER TOPICAL at 22:27

## 2020-04-13 RX ADMIN — INSULIN GLARGINE 15 UNITS: 100 INJECTION, SOLUTION SUBCUTANEOUS at 22:00

## 2020-04-13 RX ADMIN — METOLAZONE 10 MG: 5 TABLET ORAL at 18:34

## 2020-04-13 RX ADMIN — INSULIN LISPRO 3 UNITS: 100 INJECTION, SOLUTION INTRAVENOUS; SUBCUTANEOUS at 06:59

## 2020-04-13 RX ADMIN — LEVOFLOXACIN 250 MG: 5 INJECTION, SOLUTION INTRAVENOUS at 13:31

## 2020-04-13 RX ADMIN — HYDRALAZINE HYDROCHLORIDE 100 MG: 50 TABLET, FILM COATED ORAL at 22:22

## 2020-04-13 RX ADMIN — FUROSEMIDE 40 MG: 10 INJECTION, SOLUTION INTRAMUSCULAR; INTRAVENOUS at 08:17

## 2020-04-13 RX ADMIN — CALCIUM ACETATE 667 MG: 667 CAPSULE ORAL at 13:32

## 2020-04-13 RX ADMIN — CALCIUM ACETATE 667 MG: 667 CAPSULE ORAL at 17:16

## 2020-04-13 RX ADMIN — ASPIRIN 81 MG: 81 TABLET, COATED ORAL at 08:18

## 2020-04-13 RX ADMIN — GABAPENTIN 300 MG: 300 CAPSULE ORAL at 17:16

## 2020-04-13 RX ADMIN — PERFLUTREN 1 ML: 6.52 INJECTION, SUSPENSION INTRAVENOUS at 12:44

## 2020-04-13 RX ADMIN — INSULIN LISPRO 6 UNITS: 100 INJECTION, SOLUTION INTRAVENOUS; SUBCUTANEOUS at 22:28

## 2020-04-13 RX ADMIN — FUROSEMIDE 10 MG/HR: 10 INJECTION, SOLUTION INTRAMUSCULAR; INTRAVENOUS at 18:35

## 2020-04-13 RX ADMIN — HEPARIN SODIUM 5000 UNITS: 5000 INJECTION INTRAVENOUS; SUBCUTANEOUS at 17:16

## 2020-04-13 RX ADMIN — Medication 1 TABLET: at 08:18

## 2020-04-13 RX ADMIN — CARVEDILOL 6.25 MG: 3.12 TABLET, FILM COATED ORAL at 17:16

## 2020-04-13 RX ADMIN — HYDROCODONE BITARTRATE AND ACETAMINOPHEN 1 TABLET: 5; 325 TABLET ORAL at 22:27

## 2020-04-13 RX ADMIN — GABAPENTIN 300 MG: 300 CAPSULE ORAL at 22:22

## 2020-04-13 RX ADMIN — INSULIN LISPRO 6 UNITS: 100 INJECTION, SOLUTION INTRAVENOUS; SUBCUTANEOUS at 17:15

## 2020-04-13 RX ADMIN — HYDRALAZINE HYDROCHLORIDE 100 MG: 50 TABLET, FILM COATED ORAL at 08:18

## 2020-04-13 RX ADMIN — HEPARIN SODIUM 5000 UNITS: 5000 INJECTION INTRAVENOUS; SUBCUTANEOUS at 06:59

## 2020-04-13 RX ADMIN — INSULIN LISPRO 5 UNITS: 100 INJECTION, SOLUTION INTRAVENOUS; SUBCUTANEOUS at 08:18

## 2020-04-13 RX ADMIN — ATORVASTATIN CALCIUM 40 MG: 20 TABLET, FILM COATED ORAL at 08:18

## 2020-04-13 RX ADMIN — HYDRALAZINE HYDROCHLORIDE 100 MG: 50 TABLET, FILM COATED ORAL at 17:16

## 2020-04-13 RX ADMIN — HEPARIN SODIUM 5000 UNITS: 5000 INJECTION INTRAVENOUS; SUBCUTANEOUS at 22:23

## 2020-04-13 RX ADMIN — DULOXETINE HYDROCHLORIDE 60 MG: 60 CAPSULE, DELAYED RELEASE ORAL at 08:18

## 2020-04-13 RX ADMIN — AMLODIPINE BESYLATE 5 MG: 5 TABLET ORAL at 08:18

## 2020-04-13 RX ADMIN — CARVEDILOL 6.25 MG: 3.12 TABLET, FILM COATED ORAL at 08:18

## 2020-04-13 NOTE — ROUTINE PROCESS
Bedside and Verbal shift change report given to Shara Fuentes RN (oncoming nurse) by Char Her RN (offgoing nurse). Report included the following information SBAR, Kardex, Intake/Output, MAR and Recent Results.

## 2020-04-13 NOTE — PROGRESS NOTES
Transition of care: anticipate home when medically cleared  Patient has been arranged for personal touch to include tele healht for chf. Patient has asked for a RW. First choice will need MD order. Sent MD order and she has no co-pay. Patient lives alone but her family lives next door. She has Dr. Myer Sacks for pcp. She has [employer] medicaid for [place]. Consulted with Dr. Larisa Kelsey no plans for d/c today will continue to follow. No other needs a this time  Care Management Interventions  PCP Verified by CM: Yes  Mode of Transport at Discharge:  Other (see comment)(family)  Transition of Care Consult (CM Consult): Discharge Planning, [address]: No  Reason Outside [place]: Patient already serviced by other home care/hospice agency  Physical Therapy Consult: Yes  Current Support Network: Lives Alone, Family Lives Nearby  Confirm Follow Up Transport: Family  The Plan for Transition of Care is Related to the Following Treatment Goals : home with [facility]  The Patient and/or Patient Representative was Provided with a Choice of Provider and Agrees with the Discharge Plan?: Yes  Name of the Patient Representative Who was Provided with a Choice of Provider and Agrees with the Discharge Plan: PATIENT  Freedom of Choice List was Provided with Basic Dialogue that Supports the Patient's Individualized Plan of Care/Goals, Treatment Preferences and Shares the Quality Data Associated with the Providers?: Yes  Discharge Location  Discharge Placement: Home with home health

## 2020-04-13 NOTE — PROGRESS NOTES
Pt refused PT due to:  []  Nausea/vomiting  []  Eating  []  Pain  []  Pt lethargic  [x]  Other, Had a very length discussion with pt as she is irritable while secondary to not having any information as to why she has been placed on NPO. Pt requesting to see the Cardiologist, nurse made aware. Pt also reports B/L LE edema and does not feel like she can get oob at this time. Will f/u later as schedule allows. Thank you.   Paxton Colni, PT

## 2020-04-13 NOTE — ROUTINE PROCESS
Bedside and Verbal shift change report given to Alyssa Angelo RN  (oncoming nurse) by Davide Thompson RN  (offgoing nurse). Report given with SBAR, Kardex, Intake/Output and Recent Results.

## 2020-04-13 NOTE — PROGRESS NOTES
0700 Assumed patient care from off-going nurse Joyce Sommers RN. Whiteboard updated, bed wheels locked, bed in lowest position, and call bell within reach. 0800 Assessment complete. Patient resting comfortably in bed. No complaint of pain or SOB at this time. Call bell within reach. 1200 Reassessment complete. Patient resting comfortably in bed. No complaint of pain or SOB at this time. Call bell within reach. 1600 Reassessment complete. Patient resting comfortably in bed. No complaint of pain or SOB at this time. Call bell within reach.

## 2020-04-13 NOTE — PROGRESS NOTES
Problem: Pressure Injury - Risk of  Goal: *Prevention of pressure injury  Description: Document Elian Scale and appropriate interventions in the flowsheet. Outcome: Progressing Towards Goal  Note: Pressure Injury Interventions:       Moisture Interventions: Absorbent underpads, Assess need for specialty bed, Check for incontinence Q2 hours and as needed, Internal/External urinary devices, Limit adult briefs, Maintain skin hydration (lotion/cream), Offer toileting Q_hr, Minimize layers    Activity Interventions: Assess need for specialty bed, Chair cushion, Increase time out of bed, Pressure redistribution bed/mattress(bed type), PT/OT evaluation    Mobility Interventions: Assess need for specialty bed, Chair cushion, Float heels, Pressure redistribution bed/mattress (bed type), PT/OT evaluation, Turn and reposition approx. every two hours(pillow and wedges)    Nutrition Interventions: Document food/fluid/supplement intake, Discuss nutritional consult with provider, Offer support with meals,snacks and hydration    Friction and Shear Interventions: Feet elevated on foot rest, Foam dressings/transparent film/skin sealants, Lift sheet, Lift team/patient mobility team, Minimize layers, Transferring/repositioning devices                Problem: Patient Education: Go to Patient Education Activity  Goal: Patient/Family Education  Outcome: Progressing Towards Goal     Problem: Diabetes Self-Management  Goal: *Disease process and treatment process  Description: Define diabetes and identify own type of diabetes; list 3 options for treating diabetes. Outcome: Progressing Towards Goal  Goal: *Incorporating nutritional management into lifestyle  Description: Describe effect of type, amount and timing of food on blood glucose; list 3 methods for planning meals. Outcome: Progressing Towards Goal  Goal: *Incorporating physical activity into lifestyle  Description: State effect of exercise on blood glucose levels.   Outcome: Progressing Towards Goal  Goal: *Developing strategies to promote health/change behavior  Description: Define the ABC's of diabetes; identify appropriate screenings, schedule and personal plan for screenings. Outcome: Progressing Towards Goal  Goal: *Using medications safely  Description: State effect of diabetes medications on diabetes; name diabetes medication taking, action and side effects. Outcome: Progressing Towards Goal  Goal: *Monitoring blood glucose, interpreting and using results  Description: Identify recommended blood glucose targets  and personal targets. Outcome: Progressing Towards Goal  Goal: *Prevention, detection, treatment of acute complications  Description: List symptoms of hyper- and hypoglycemia; describe how to treat low blood sugar and actions for lowering  high blood glucose level. Outcome: Progressing Towards Goal  Goal: *Prevention, detection and treatment of chronic complications  Description: Define the natural course of diabetes and describe the relationship of blood glucose levels to long term complications of diabetes. Outcome: Progressing Towards Goal  Goal: *Developing strategies to address psychosocial issues  Description: Describe feelings about living with diabetes; identify support needed and support network  Outcome: Progressing Towards Goal  Goal: *Insulin pump training  Outcome: Progressing Towards Goal  Goal: *Sick day guidelines  Outcome: Progressing Towards Goal  Goal: *Patient Specific Goal (EDIT GOAL, INSERT TEXT)  Outcome: Progressing Towards Goal     Problem: Falls - Risk of  Goal: *Absence of Falls  Description: Document Mayito Fall Risk and appropriate interventions in the flowsheet.   Outcome: Progressing Towards Goal  Note: Fall Risk Interventions:  Mobility Interventions: Bed/chair exit alarm, Communicate number of staff needed for ambulation/transfer, Mechanical lift, OT consult for ADLs, Patient to call before getting OOB, PT Consult for mobility concerns, PT Consult for assist device competence, Strengthening exercises (ROM-active/passive), Utilize walker, cane, or other assistive device         Medication Interventions: Bed/chair exit alarm, Evaluate medications/consider consulting pharmacy, Patient to call before getting OOB, Teach patient to arise slowly    Elimination Interventions: Bed/chair exit alarm, Call light in reach, Elevated toilet seat, Patient to call for help with toileting needs, Stay With Me (per policy), Toilet paper/wipes in reach, Toileting schedule/hourly rounds    History of Falls Interventions: Bed/chair exit alarm, Consult care management for discharge planning, Door open when patient unattended, Evaluate medications/consider consulting pharmacy, Investigate reason for fall, Room close to nurse's station, Utilize gait belt for transfer/ambulation, Assess for delayed presentation/identification of injury for 48 hrs (comment for end date), Vital signs minimum Q4HRs X 24 hrs (comment for end date)

## 2020-04-13 NOTE — CONSULTS
[facility] Consult Note      Patient: Jessica Miller MRN: [id number]  SSN: [id number]    YOB: 1965  Age: 53 y.o. Sex: female    Date of Consultation: 04/13/2020  Referring Physician: Dr. Harrison Andrade  Reason for Consultation: SOB and CHF    HPI:  I was asked by Dr. Harrison Andrade to see this pleasant patient for SOB and CHF- diastolic heart failure. Jessica Miller is a 53years old female with DM from last 28 years, HTN, Obesity, sleep apnea, PAD/RHETT came to hospital for SOB and she is complaining of pressure and bubble under chest worsened with activity and associated with SOB and chronic marked bilateral leg edema. Pt have lexiscan nuclear stress test in 12/2019 was negative for ischemia. VQ scan low probability.     Past Medical History:   Diagnosis Date    Asthma     Note: As child had asthma    Diabetes mellitus ([facility] [address]     Type 2    Heart palpitations     Hypertension     Ill-defined condition     fibromyalgia     Morbid obesity ([facility] [address]     MRSA infection 8/2014     Past Surgical History:   Procedure Laterality Date    BREAST SURGERY PROCEDURE UNLISTED      cyst removed    HX CATARACT REMOVAL      HX CHOLECYSTECTOMY      HX TUBAL LIGATION       Current Facility-Administered Medications   Medication Dose Route Frequency    insulin glargine (LANTUS) injection 15 Units  15 Units SubCUTAneous QHS    insulin lispro (HUMALOG) injection 5 Units  5 Units SubCUTAneous TIDAC    levoFLOXacin (LEVAQUIN) 250 mg in D5W IVPB  250 mg IntraVENous Q24H    furosemide (LASIX) injection 80 mg  80 mg IntraVENous BID    albuterol-ipratropium (DUO-NEB) 2.5 MG-0.5 MG/3 ML  3 mL Nebulization Q4H PRN    atorvastatin (LIPITOR) tablet 40 mg  40 mg Oral DAILY    calcium acetate(phosphat bind) (PHOSLO) capsule 667 mg  1 Cap Oral TID WITH MEALS    DULoxetine (CYMBALTA) capsule 60 mg  60 mg Oral DAILY    gabapentin (NEURONTIN) capsule 300 mg  300 mg Oral TID    hydrALAZINE (APRESOLINE) tablet 100 mg  100 mg Oral TID    insulin lispro (HUMALOG) injection   SubCUTAneous AC&HS    glucose chewable tablet 16 g  4 Tab Oral PRN    glucagon (GLUCAGEN) injection 1 mg  1 mg IntraMUSCular PRN    heparin (porcine) injection 5,000 Units  5,000 Units SubCUTAneous Q8H    acetaminophen (TYLENOL) tablet 650 mg  650 mg Oral Q6H PRN    ondansetron (ZOFRAN) injection 4 mg  4 mg IntraVENous Q6H PRN    amLODIPine (NORVASC) tablet 5 mg  5 mg Oral DAILY    aspirin delayed-release tablet 81 mg  81 mg Oral DAILY    carvediloL (COREG) tablet 6.25 mg  6.25 mg Oral BID WITH MEALS    cholecalciferol (VITAMIN D3) (1000 Units /25 mcg) tablet 1 Tab  1,000 Units Oral DAILY    HYDROcodone-acetaminophen (NORCO) 5-325 mg per tablet 1 Tab  1 Tab Oral Q4H PRN       Allergies and Intolerances: Allergies   Allergen Reactions    Bees [Sting, Bee] Swelling    Penicillins Hives    Mary D Hives       Family History:   Family History   Problem Relation Age of Onset    Heart Attack Mother     Heart Attack Maternal Grandmother        Social History:   She  reports that she has never smoked. She has never used smokeless tobacco.  She  reports no history of alcohol use. Review of Systems:     Review of Systems  Gen: No fever, chills, malaise, weight loss/gain. Heent: No headache, rhinorrhea, epistaxis, ear pain, hearing loss, sinus pain, neck pain/stiffness, sore throat. Heart: No chest pain, palpitations,  ++HORAN, pnd, or orthopnea. Resp: No cough, hemoptysis, wheezing and +shortness of breath. GI: No nausea, vomiting, diarrhea, constipation, melena or hematochezia. : No urinary obstruction, dysuria or hematuria. Derm: No rash, new skin lesion or pruritis. Musc/skeletal: no bone or joint complains. Vasc: ++ edema, cyanosis or claudication. Endo: No heat/cold intolerance, no polyuria,polydipsia or polyphagia. Neuro: No unilateral weakness, numbness, tingling. No seizures. Heme: No easy bruising or bleeding.         Physical:   Patient Vitals for the past 6 hrs:   Temp Pulse Resp SpO2   04/13/20 0718 97.7 °F (36.5 °C) 75 20 100 %         Exam:   General Appearance: Comfortable, not using accessory muscles of respiration. HEENT: STEPHANIE. HEAD: Atraumatic  NECK: No JVD, no thyroidomeglay. CAROTIDS:  LUNGS: Clear bilaterally. HEART: S1+S2     ABD: Non-tender, BS Audible    EXT: ++ edema, and no cysnosis. VASCULAR EXAM: Pulses are intact. PSYCHIATRIC EXAM: Mood is appropriate. MUSCULOSKELETAL: Grossly no joint deformity. NEUROLOGICAL: Motor and sensory sytem intact and Cranial nerves II-XII intact.     Review of Data:   LABS:   Lab Results   Component Value Date/Time    WBC 9.0 04/10/2020 11:49 AM    HGB 8.5 (L) 04/10/2020 11:49 AM    HCT 27.8 (L) 04/10/2020 11:49 AM    PLATELET 905 60/53/3531 11:49 AM     Lab Results   Component Value Date/Time    Sodium 136 04/13/2020 04:02 AM    Potassium 4.5 04/13/2020 04:02 AM    Chloride 100 04/13/2020 04:02 AM    CO2 29 04/13/2020 04:02 AM    Glucose 177 (H) 04/13/2020 04:02 AM    BUN 86 (H) 04/13/2020 04:02 AM    Creatinine 2.65 (H) 04/13/2020 04:02 AM     Lab Results   Component Value Date/Time    Cholesterol, total 146 05/28/2015 02:58 AM    HDL Cholesterol 64 (H) 05/28/2015 02:58 AM    LDL, calculated 66 05/28/2015 02:58 AM    Triglyceride 80 05/28/2015 02:58 AM     No results found for: GPT  Lab Results   Component Value Date/Time    Hemoglobin A1c 7.4 (H) 04/10/2020 11:49 AM         Cardiology Procedures:   Results for orders placed or performed during the hospital encounter of 04/10/20   EKG, 12 LEAD, INITIAL   Result Value Ref Range    Ventricular Rate 78 BPM    Atrial Rate 78 BPM    P-R Interval 122 ms    QRS Duration 86 ms    Q-T Interval 408 ms    QTC Calculation (Bezet) 465 ms    Calculated P Axis 41 degrees    Calculated R Axis 46 degrees    Calculated T Axis 33 degrees    Diagnosis       Normal sinus rhythm  Low voltage QRS  Borderline ECG  When compared with ECG of 05-MAR-2020 11:56,  No significant change was found  Confirmed by Edwin Thompson MD. ([id number]) on 4/10/2020 3:18:18 PM             Impression / Plan:    Patient Active Problem List   Diagnosis Code    HTN (hypertension) I10    Type II or unspecified type diabetes mellitus with renal manifestations, uncontrolled(250.42) ([facility] [address] E11.29, E11.65    Morbid obesity ([facility] [address] E66.01    Respiratory distress R06.03    АНДРЕЙ (obstructive sleep apnea) I64.25    Diastolic CHF, acute on chronic ([facility]) I50.33    CHF (congestive heart failure) ([facility]) I50.9    CKD (chronic kidney disease) stage 4, GFR 15-29 ml/min ([facility]) N18.4    Elevated troponin R79.89     Stress test was negative for ischemia 12/2019      A/P  1- SOB and mild trop elevation and low probability VQ scan- symptoms are concerning for angina equivalent with multiple risk factors   Including prolonged history of DM, HTN, sleep apnea, PAD/carotid artery disease. 2-Acute on chronic diastolic heart failure. 3- Pulmonary HTN. Plan:    Continue with lasix and antihypertensive meds. I have suggested RHC/LHC and diagnostic coronary angiogram.  Discussed with patient about contrast induced nephropathy risk. Will consider nephrology consult and clearance for cath procedure  Check lipid profile.   Signed By: Maria Del Carmen Lloyd MD     April 13, 2020

## 2020-04-13 NOTE — PROGRESS NOTES
Hospitalist Progress Note    Patient: Jose Raul Romero MRN: [id number]  CSN: [id number]    YOB: 1965  Age: 53 y.o.   Sex: female    DOA: 4/10/2020 LOS:  LOS: 3 days          Chief Complaint:    Acute CHF      Assessment/Plan     Acute on chronic decompensated diastolic CHF  Pulmonary HTN  IDDM type 2 with renal disease uncontrolled-increase lantus and add premeal lispro TID today  HTN-stable control on current regimen  CKD stage 4-follow daily BMP asael with increased diuretic dosing  АНДРЕЙ-CPAP at night and during day as needed  Morbid obesity  Elevated troponin, no chest pain  Anemia of CKD    Continue lasix IV and increase dosing  Daily BMP  CPAP as needed    Consult cardiology  Consult nephrology    Discussed with patient    Repeated CXR-shows edema, with cough, treat empirically with renal dosed levaquin X 5 days    Of course this is a challenging balance, as her renal function will likely worsen with more aggressive diuresis, and she has been told she may need to go on dialysis in near future    Total time: 40 min  Counseling / coordination time: with specialists, discussion with patient  > 50% counseling / coordination        Disposition :  Patient Active Problem List   Diagnosis Code    HTN (hypertension) I10    Type II or unspecified type diabetes mellitus with renal manifestations, uncontrolled(250.42) ([facility] [address] E11.29, E11.65    Morbid obesity ([facility] [address] E66.01    Respiratory distress R06.03    АНДРЕЙ (obstructive sleep apnea) C32.04    Diastolic CHF, acute on chronic (HCC) I50.33    CHF (congestive heart failure) ([facility]) I50.9    CKD (chronic kidney disease) stage 4, GFR 15-29 ml/min ([facility]) N18.4    Elevated troponin R79.89       Subjective:  Still feeling SOB and needs to wear her cpap  Denies chest pain  worried her kidneys will decline further  Has a lot of questions that I was able to answer to best of my ability this am    Has prod cough  Review of systems:    Constitutional: denies fevers, chills  Cardiovascular: denies chest pain, palpitations  Gastrointestinal: denies nausea, vomiting, diarrhea      Vital signs/Intake and Output:  Visit Vitals  /64 (BP 1 Location: Left arm, BP Patient Position: At rest)   Pulse 75   Temp 97.7 °F (36.5 °C)   Resp 20   Wt 136.1 kg (300 lb 0.7 oz)   SpO2 100%   BMI 54.88 kg/m²     Current Shift:  04/13 0701 - 04/13 1900  In: -   Out: 800 [Urine:800]  Last three shifts:  04/11 1901 - 04/13 0700  In: 620 [P.O.:620]  Out: 1050 [Urine:1050]    Exam:    General:obese WF, alert, NAD, OX3  Head/Neck: NCAT, supple, No masses, No lymphadenopathy  CVS:Regular rate and rhythm, no M/R/G, S1/S2 heard, no thrill  Lungs:dec BS bases, no wheezes, diminioshed BS overall  Abdomen: Soft, Nontender, No distention, Normal Bowel sounds, No hepatomegaly  Extremities: 1 plus edema LE BL  Neuro:grossly normal , follows commands  Psych:appropriate                Labs: Results:       Chemistry Recent Labs     04/13/20  0402 04/12/20  0225 04/11/20  0129 04/10/20  1149   * 281* 157* 90    138 143 144   K 4.5 4.5 4.2 4.2    101 107 109   CO2 29 29 27 28   BUN 86* 81* 75* 73*   CREA 2.65* 2.66* 2.06* 2.15*   CA 8.8 8.8 8.7 8.8   AGAP 7 8 9 7   BUCR 32* 30* 36* 34*   AP  --   --   --  316*   TP  --   --   --  6.9   ALB  --   --   --  3.1*   GLOB  --   --   --  3.8   AGRAT  --   --   --  0.8      CBC w/Diff Recent Labs     04/10/20  1149   WBC 9.0   RBC 3.31*   HGB 8.5*   HCT 27.8*      GRANS 86*   LYMPH 8*   EOS 1      Cardiac Enzymes Recent Labs     04/11/20  2230 04/11/20  0921   CPK 34 33   CKND1 CALCULATION NOT PERFORMED WHEN RESULT IS BELOW LINEAR LIMIT CALCULATION NOT PERFORMED WHEN RESULT IS BELOW LINEAR LIMIT      Coagulation No results for input(s): PTP, INR, APTT, INREXT in the last 72 hours.     Lipid Panel Lab Results   Component Value Date/Time    Cholesterol, total 146 05/28/2015 02:58 AM    HDL Cholesterol 64 (H) 05/28/2015 02:58 AM    LDL, calculated 66 05/28/2015 02:58 AM    VLDL, calculated 16 05/28/2015 02:58 AM    Triglyceride 80 05/28/2015 02:58 AM    CHOL/HDL Ratio 2.3 05/28/2015 02:58 AM      BNP No results for input(s): BNPP in the last 72 hours.    Liver Enzymes Recent Labs     04/10/20  1149   TP 6.9   ALB 3.1*   *   SGOT 26      Thyroid Studies Lab Results   Component Value Date/Time    TSH 0.56 01/23/2019 01:33 AM        Procedures/imaging: see electronic medical records for all procedures/Xrays and details which were not copied into this note but were reviewed prior to creation of Delgado Sandoval MD

## 2020-04-13 NOTE — CONSULTS
Nephrology Consult    Patient: Dakotah Yan  Requesting physician: Dr. Az Ontiveros  Reason for consult: Renal Failure        History of Present Illness: Dakotah Yan is a 53 y.o. female with a past medical history significant for T2DM, HTN, TIA, CKD IV, HLD, АНДРЕЙ, Obesity, Chronic Anemia, Diastolic CHF who presented with c/o SOB. Her CXR showed Pulmonary edema and small bilateral effusions consistent with CHF. S Cr was 2.6 on admission and remains unchanged today. Patient is followed at our office by Dr Viola Lane, most recent S Cr 2.6 on 2/26/20. She was last seen on 3/4/20 and maintained on diuretics, oral Furosemide + Metolazone. Since admission she has been placed on intermittent  IV Lasix. Remains orthopneic.      Past Medical History:  Patient Active Problem List   Diagnosis Code    HTN (hypertension) I10    Type II or unspecified type diabetes mellitus with renal manifestations, uncontrolled(250.42) ([facility] [address] E11.29, E11.65    Morbid obesity ([facility] [address] E66.01    Respiratory distress R06.03    АНДРЕЙ (obstructive sleep apnea) P34.22    Diastolic CHF, acute on chronic (HCC) I50.33    CHF (congestive heart failure) ([facility]) I50.9    CKD (chronic kidney disease) stage 4, GFR 15-29 ml/min ([facility]) N18.4    Elevated troponin R79.89         Social History:  Social History     Socioeconomic History    Marital status: SINGLE     Spouse name: Not on file    Number of children: Not on file    Years of education: Not on file    Highest education level: Not on file   Occupational History    Not on file   Social Needs    Financial resource strain: Not on file    Food insecurity     Worry: Not on file     Inability: Not on file   [employer] needs     Medical: Not on file     Non-medical: Not on file   Tobacco Use    Smoking status: Never Smoker    Smokeless tobacco: Never Used   Substance and Sexual Activity    Alcohol use: No    Drug use: No    Sexual activity: Never     Partners: Male   Lifestyle    Physical activity Days per week: Not on file     Minutes per session: Not on file    Stress: Not on file   Relationships    Social connections     Talks on phone: Not on file     Gets together: Not on file     Attends [employer] service: Not on file     Active member of club or organization: Not on file     Attends meetings of clubs or organizations: Not on file     Relationship status: Not on file    Intimate partner violence     Fear of current or ex partner: Not on file     Emotionally abused: Not on file     Physically abused: Not on file     Forced sexual activity: Not on file   Other Topics Concern    Not on file   Social History Narrative    Not on file       Family History:  Family History   Problem Relation Age of Onset    Heart Attack Mother     Heart Attack Maternal Grandmother        Allergy:  Allergies   Allergen Reactions    Bees [Sting, Bee] Swelling    Penicillins Hives    Strawberry Hives        Medication:  Home meds: reviewed    Inpatient meds:  Current Facility-Administered Medications   Medication Dose Route Frequency    insulin glargine (LANTUS) injection 15 Units  15 Units SubCUTAneous QHS    insulin lispro (HUMALOG) injection 5 Units  5 Units SubCUTAneous TIDAC    levoFLOXacin (LEVAQUIN) 250 mg in D5W IVPB  250 mg IntraVENous Q24H    furosemide (LASIX) injection 80 mg  80 mg IntraVENous BID    albuterol-ipratropium (DUO-NEB) 2.5 MG-0.5 MG/3 ML  3 mL Nebulization Q4H PRN    atorvastatin (LIPITOR) tablet 40 mg  40 mg Oral DAILY    calcium acetate(phosphat bind) (PHOSLO) capsule 667 mg  1 Cap Oral TID WITH MEALS    DULoxetine (CYMBALTA) capsule 60 mg  60 mg Oral DAILY    gabapentin (NEURONTIN) capsule 300 mg  300 mg Oral TID    hydrALAZINE (APRESOLINE) tablet 100 mg  100 mg Oral TID    insulin lispro (HUMALOG) injection   SubCUTAneous AC&HS    glucose chewable tablet 16 g  4 Tab Oral PRN    glucagon (GLUCAGEN) injection 1 mg  1 mg IntraMUSCular PRN    heparin (porcine) injection 5,000 Units 5,000 Units SubCUTAneous Q8H    acetaminophen (TYLENOL) tablet 650 mg  650 mg Oral Q6H PRN    ondansetron (ZOFRAN) injection 4 mg  4 mg IntraVENous Q6H PRN    amLODIPine (NORVASC) tablet 5 mg  5 mg Oral DAILY    aspirin delayed-release tablet 81 mg  81 mg Oral DAILY    carvediloL (COREG) tablet 6.25 mg  6.25 mg Oral BID WITH MEALS    cholecalciferol (VITAMIN D3) (1000 Units /25 mcg) tablet 1 Tab  1,000 Units Oral DAILY    HYDROcodone-acetaminophen (NORCO) 5-325 mg per tablet 1 Tab  1 Tab Oral Q4H PRN                        Review of Systems:  Gen: no fever, chills  or malaise  Head: no headache   Eyes: no change in vision  Throat/Neck: no sore throat or dysphagia  CV: + SOB, +  Orthopnea. No palpitations  Pulm: no cough or hemoptysis  GI: no nausea, vomiting or diarrhea  : no dysuria, urgency or hesitancy  Skin: no new rash or lesions  Endocrine: no heatt or cold intolerance  Psych: no anxiety or depression    Vital signs:   Visit Vitals  /53   Pulse 78   Temp 98.5 °F (36.9 °C)   Resp 20   Ht 5' 2\" (1.575 m)   Wt 136.1 kg (300 lb)   SpO2 100%   BMI 54.87 kg/m²         Intake/Output Summary (Last 24 hours) at 4/13/2020 1636  Last data filed at 4/13/2020 1207  Gross per 24 hour   Intake 240 ml   Output 800 ml   Net -560 ml       Physical Exam:    General: Obese, on CPAP    HENT: Atraumatic and normocephalic   Eyes: Normal conjunctiva   Neck: Supple. Cardiovascular: Normal S1 & S2, no m/r/g   Pulmonary/Chest Wall: Decreased BS both bases   Abdominal: Soft and non-tender   Musculoskeletal: +++ Bilat pedal  Edema. 2+ Bilat. uppper ext edema   Neurological:  AA and O X 3, No focal deficits        Data Review:  Recent Labs     04/13/20  0402 04/12/20  0225    138   K 4.5 4.5    101   CO2 29 29   BUN 86* 81*   CREA 2.65* 2.66*   * 281*   CA 8.8 8.8     No results for input(s): WBC, HGB, HCT, PLT, HGBEXT, HCTEXT, PLTEXT in the last 72 hours.   No results for input(s): SGOT, GPT, AP, TBIL, TP, ALB, GLOB, GGT in the last 72 hours. No results for input(s): INR, PTP, APTT, INREXT in the last 72 hours. No results for input(s): IRON, FE, TIBC, IBCT, PSAT, FERR in the last 72 hours. Urinalysis  No results found for: UGLU       CXR:      IMPRESSION:     Cardiomegaly and pulmonary edema. Impression:     1. CKD IV likely sec to diabetic Nephropathy/Hypertensive Nephrosclerosis. S Cr 2.6, unchanged from recent baseline   2. Diastolic CHF LVEF 60 to 09%  3. АНДРЕЙ  4. Anemia of CKD + Iron deficiency  5. Pulm HTN, Mild to moderate  6. T2DM  7. HTN  8. Morbid Obesity    Plan:     1. Continue  Loop diuretic, will switch to Lasix drip at 10mg/hr and titrate as neccessary, add oral Metolazone for  synergy  2. Monitor I/O's  3. Monitor Serum  Chemistry  4. Check TSAT, Ferritin, PTH, Phos, TSH.  5. Patient may need JUAN C +/- Iron repletion, will f/u on results of above labs  6. U/A  7. Dietary Sodium restriction  8. Would avoid IV radiocontrast or NSAIDs  9. No indication for initiating HD presently, however discussed possible need for HD/UF if no satisfactory response to diuretics or worsening of Renal function. Thank you for the consultation.       Arelis Davidson MD, MPH [facility]  [phone]

## 2020-04-14 LAB
ALBUMIN SERPL-MCNC: 3.1 G/DL (ref 3.4–5)
ANION GAP SERPL CALC-SCNC: 9 MMOL/L (ref 3–18)
BUN SERPL-MCNC: 98 MG/DL (ref 7–18)
BUN/CREAT SERPL: 36 (ref 12–20)
CALCIUM SERPL-MCNC: 9 MG/DL (ref 8.5–10.1)
CALCIUM SERPL-MCNC: 9.1 MG/DL (ref 8.5–10.1)
CHLORIDE SERPL-SCNC: 97 MMOL/L (ref 100–111)
CHOLEST SERPL-MCNC: 103 MG/DL
CO2 SERPL-SCNC: 28 MMOL/L (ref 21–32)
CREAT SERPL-MCNC: 2.75 MG/DL (ref 0.6–1.3)
ERYTHROCYTE [DISTWIDTH] IN BLOOD BY AUTOMATED COUNT: 16 % (ref 11.6–14.5)
FERRITIN SERPL-MCNC: 418 NG/ML (ref 8–388)
GLUCOSE BLD STRIP.AUTO-MCNC: 166 MG/DL (ref 70–110)
GLUCOSE BLD STRIP.AUTO-MCNC: 177 MG/DL (ref 70–110)
GLUCOSE BLD STRIP.AUTO-MCNC: 213 MG/DL (ref 70–110)
GLUCOSE BLD STRIP.AUTO-MCNC: 244 MG/DL (ref 70–110)
GLUCOSE SERPL-MCNC: 196 MG/DL (ref 74–99)
HCT VFR BLD AUTO: 24.3 % (ref 35–45)
HDLC SERPL-MCNC: 46 MG/DL (ref 40–60)
HDLC SERPL: 2.2 {RATIO} (ref 0–5)
HGB BLD-MCNC: 7.4 G/DL (ref 12–16)
IRON SATN MFR SERPL: 11 % (ref 20–50)
IRON SATN MFR SERPL: 11 % (ref 20–50)
IRON SERPL-MCNC: 32 UG/DL (ref 50–175)
IRON SERPL-MCNC: 33 UG/DL (ref 50–175)
LDLC SERPL CALC-MCNC: 36.2 MG/DL (ref 0–100)
LIPID PROFILE,FLP: NORMAL
MCH RBC QN AUTO: 25.5 PG (ref 24–34)
MCHC RBC AUTO-ENTMCNC: 30.5 G/DL (ref 31–37)
MCV RBC AUTO: 83.8 FL (ref 74–97)
PHOSPHATE SERPL-MCNC: 4.7 MG/DL (ref 2.5–4.9)
PLATELET # BLD AUTO: 207 K/UL (ref 135–420)
PMV BLD AUTO: 9.4 FL (ref 9.2–11.8)
POTASSIUM SERPL-SCNC: 4.8 MMOL/L (ref 3.5–5.5)
PTH-INTACT SERPL-MCNC: 198.3 PG/ML (ref 18.4–88)
RBC # BLD AUTO: 2.9 M/UL (ref 4.2–5.3)
SODIUM SERPL-SCNC: 134 MMOL/L (ref 136–145)
TIBC SERPL-MCNC: 303 UG/DL (ref 250–450)
TIBC SERPL-MCNC: 309 UG/DL (ref 250–450)
TRIGL SERPL-MCNC: 104 MG/DL (ref ?–150)
TSH SERPL DL<=0.05 MIU/L-ACNC: 2.18 UIU/ML (ref 0.36–3.74)
VLDLC SERPL CALC-MCNC: 20.8 MG/DL
WBC # BLD AUTO: 6.4 K/UL (ref 4.6–13.2)

## 2020-04-14 PROCEDURE — 74011000258 HC RX REV CODE- 258: Performed by: INTERNAL MEDICINE

## 2020-04-14 PROCEDURE — 74011250636 HC RX REV CODE- 250/636: Performed by: INTERNAL MEDICINE

## 2020-04-14 PROCEDURE — 84443 ASSAY THYROID STIM HORMONE: CPT

## 2020-04-14 PROCEDURE — 65660000000 HC RM CCU STEPDOWN

## 2020-04-14 PROCEDURE — 80061 LIPID PANEL: CPT

## 2020-04-14 PROCEDURE — 80069 RENAL FUNCTION PANEL: CPT

## 2020-04-14 PROCEDURE — 83970 ASSAY OF PARATHORMONE: CPT

## 2020-04-14 PROCEDURE — 74011250637 HC RX REV CODE- 250/637: Performed by: HOSPITALIST

## 2020-04-14 PROCEDURE — 82962 GLUCOSE BLOOD TEST: CPT

## 2020-04-14 PROCEDURE — 83540 ASSAY OF IRON: CPT

## 2020-04-14 PROCEDURE — 36415 COLL VENOUS BLD VENIPUNCTURE: CPT

## 2020-04-14 PROCEDURE — 74011250637 HC RX REV CODE- 250/637: Performed by: INTERNAL MEDICINE

## 2020-04-14 PROCEDURE — 94660 CPAP INITIATION&MGMT: CPT

## 2020-04-14 PROCEDURE — 74011250636 HC RX REV CODE- 250/636: Performed by: HOSPITALIST

## 2020-04-14 PROCEDURE — 77010033678 HC OXYGEN DAILY

## 2020-04-14 PROCEDURE — 97530 THERAPEUTIC ACTIVITIES: CPT

## 2020-04-14 PROCEDURE — 74011636637 HC RX REV CODE- 636/637: Performed by: HOSPITALIST

## 2020-04-14 PROCEDURE — 82728 ASSAY OF FERRITIN: CPT

## 2020-04-14 PROCEDURE — 85027 COMPLETE CBC AUTOMATED: CPT

## 2020-04-14 PROCEDURE — 74011250637 HC RX REV CODE- 250/637: Performed by: FAMILY MEDICINE

## 2020-04-14 RX ADMIN — METOLAZONE 10 MG: 5 TABLET ORAL at 08:16

## 2020-04-14 RX ADMIN — HEPARIN SODIUM 5000 UNITS: 5000 INJECTION INTRAVENOUS; SUBCUTANEOUS at 07:12

## 2020-04-14 RX ADMIN — GABAPENTIN 300 MG: 300 CAPSULE ORAL at 22:52

## 2020-04-14 RX ADMIN — FUROSEMIDE 10 MG/HR: 10 INJECTION, SOLUTION INTRAMUSCULAR; INTRAVENOUS at 11:19

## 2020-04-14 RX ADMIN — INSULIN LISPRO 6 UNITS: 100 INJECTION, SOLUTION INTRAVENOUS; SUBCUTANEOUS at 22:53

## 2020-04-14 RX ADMIN — LEVOFLOXACIN 250 MG: 5 INJECTION, SOLUTION INTRAVENOUS at 11:19

## 2020-04-14 RX ADMIN — INSULIN LISPRO 5 UNITS: 100 INJECTION, SOLUTION INTRAVENOUS; SUBCUTANEOUS at 07:13

## 2020-04-14 RX ADMIN — INSULIN LISPRO 3 UNITS: 100 INJECTION, SOLUTION INTRAVENOUS; SUBCUTANEOUS at 11:20

## 2020-04-14 RX ADMIN — CALCIUM ACETATE 667 MG: 667 CAPSULE ORAL at 17:25

## 2020-04-14 RX ADMIN — HYDRALAZINE HYDROCHLORIDE 100 MG: 50 TABLET, FILM COATED ORAL at 08:16

## 2020-04-14 RX ADMIN — INSULIN LISPRO 5 UNITS: 100 INJECTION, SOLUTION INTRAVENOUS; SUBCUTANEOUS at 11:19

## 2020-04-14 RX ADMIN — GABAPENTIN 300 MG: 300 CAPSULE ORAL at 17:25

## 2020-04-14 RX ADMIN — FUROSEMIDE 10 MG/HR: 10 INJECTION, SOLUTION INTRAMUSCULAR; INTRAVENOUS at 20:47

## 2020-04-14 RX ADMIN — CALCIUM ACETATE 667 MG: 667 CAPSULE ORAL at 11:18

## 2020-04-14 RX ADMIN — GABAPENTIN 300 MG: 300 CAPSULE ORAL at 08:15

## 2020-04-14 RX ADMIN — HYDRALAZINE HYDROCHLORIDE 100 MG: 50 TABLET, FILM COATED ORAL at 22:52

## 2020-04-14 RX ADMIN — INSULIN LISPRO 3 UNITS: 100 INJECTION, SOLUTION INTRAVENOUS; SUBCUTANEOUS at 17:24

## 2020-04-14 RX ADMIN — Medication 1 TABLET: at 08:16

## 2020-04-14 RX ADMIN — NYSTATIN: 100000 POWDER TOPICAL at 22:59

## 2020-04-14 RX ADMIN — CALCIUM ACETATE 667 MG: 667 CAPSULE ORAL at 08:15

## 2020-04-14 RX ADMIN — HYDROCODONE BITARTRATE AND ACETAMINOPHEN 1 TABLET: 5; 325 TABLET ORAL at 22:52

## 2020-04-14 RX ADMIN — INSULIN LISPRO 6 UNITS: 100 INJECTION, SOLUTION INTRAVENOUS; SUBCUTANEOUS at 07:13

## 2020-04-14 RX ADMIN — CARVEDILOL 6.25 MG: 3.12 TABLET, FILM COATED ORAL at 08:16

## 2020-04-14 RX ADMIN — INSULIN LISPRO 5 UNITS: 100 INJECTION, SOLUTION INTRAVENOUS; SUBCUTANEOUS at 17:24

## 2020-04-14 RX ADMIN — HEPARIN SODIUM 5000 UNITS: 5000 INJECTION INTRAVENOUS; SUBCUTANEOUS at 17:24

## 2020-04-14 RX ADMIN — CARVEDILOL 6.25 MG: 3.12 TABLET, FILM COATED ORAL at 17:24

## 2020-04-14 RX ADMIN — HYDRALAZINE HYDROCHLORIDE 100 MG: 50 TABLET, FILM COATED ORAL at 17:24

## 2020-04-14 RX ADMIN — DULOXETINE HYDROCHLORIDE 60 MG: 60 CAPSULE, DELAYED RELEASE ORAL at 08:16

## 2020-04-14 RX ADMIN — ATORVASTATIN CALCIUM 40 MG: 20 TABLET, FILM COATED ORAL at 08:15

## 2020-04-14 RX ADMIN — HEPARIN SODIUM 5000 UNITS: 5000 INJECTION INTRAVENOUS; SUBCUTANEOUS at 22:53

## 2020-04-14 RX ADMIN — AMLODIPINE BESYLATE 5 MG: 5 TABLET ORAL at 08:15

## 2020-04-14 RX ADMIN — INSULIN GLARGINE 15 UNITS: 100 INJECTION, SOLUTION SUBCUTANEOUS at 22:00

## 2020-04-14 RX ADMIN — ASPIRIN 81 MG: 81 TABLET, COATED ORAL at 08:16

## 2020-04-14 RX ADMIN — NYSTATIN: 100000 POWDER TOPICAL at 09:00

## 2020-04-14 RX ADMIN — FUROSEMIDE 10 MG/HR: 10 INJECTION, SOLUTION INTRAMUSCULAR; INTRAVENOUS at 03:59

## 2020-04-14 NOTE — PROGRESS NOTES
Bedside and Verbal shift change report given to FAUSTINA Carrillo RN (oncoming nurse) by LETICIA Duenas RN (offgoing nurse). Report included the following information SBAR, Kardex, Intake/Output, MAR and Recent Results.

## 2020-04-14 NOTE — DIABETES MGMT
GLYCEMIC CONTROL PROGRESS NOTE:    - known h/o T2DM with four admissions to [facility] this year, HbA1C trending down, TID mixed insulin home regimen  - BG out of target range non-ICU: < 180 mg/dl  - TDD = 50 (15 Lantus + 15 (5) + 20 - Humalog Very Insulin Resistant Corrective Coverage)  - both FBG & PPG out of target range recommend adjustment to IP regimen   *Humalog 8 units qac    Recent Glucose Results:   Lab Results   Component Value Date/Time     (H) 04/14/2020 02:35 AM    GLUCPOC 177 (H) 04/14/2020 10:53 AM    GLUCPOC 244 (H) 04/14/2020 06:46 AM    GLUCPOC 247 (H) 04/13/2020 09:25 PM         Brandee Gardner MS, RN, CDE  Glycemic Control Team  [phone]  Pager [phone] (M-TH 8:00-4:30P)  *After Hours pager [phone]

## 2020-04-14 NOTE — PROGRESS NOTES
0700 Assumed patient care from [address], RN. Whiteboard updated, bed wheels locked, bed in lowest position, Gave pt. Nurses direct phone to give to family for updates, and call bell within reach. 0800 Assessment complete. Patient resting comfortably in bed. No complaint of pain or SOB at this time. Call bell within reach. 1200 Reassessment complete. Patient resting comfortably in bed. No complaint of pain or SOB at this time. Call bell within reach. 1600 Reassessment complete. Patient resting comfortably in bed. No complaint of pain or SOB at this time. Call bell within reach.

## 2020-04-14 NOTE — PROGRESS NOTES
Problem: Pressure Injury - Risk of  Goal: *Prevention of pressure injury  Description: Document Elian Scale and appropriate interventions in the flowsheet. Outcome: Progressing Towards Goal  Note: Pressure Injury Interventions:       Moisture Interventions: Absorbent underpads, Apply protective barrier, creams and emollients, Check for incontinence Q2 hours and as needed, Internal/External urinary devices, Maintain skin hydration (lotion/cream), Minimize layers, Moisture barrier    Activity Interventions: Increase time out of bed, Pressure redistribution bed/mattress(bed type), PT/OT evaluation    Mobility Interventions: Float heels, HOB 30 degrees or less, Pressure redistribution bed/mattress (bed type), PT/OT evaluation, Turn and reposition approx. every two hours(pillow and wedges)    Nutrition Interventions: Document food/fluid/supplement intake, Offer support with meals,snacks and hydration    Friction and Shear Interventions: HOB 30 degrees or less, Lift sheet, Lift team/patient mobility team, Minimize layers                Problem: Patient Education: Go to Patient Education Activity  Goal: Patient/Family Education  Outcome: Progressing Towards Goal     Problem: Falls - Risk of  Goal: *Absence of Falls  Description: Document Mayito Fall Risk and appropriate interventions in the flowsheet.   Outcome: Progressing Towards Goal  Note: Fall Risk Interventions:  Mobility Interventions: Assess mobility with egress test, Communicate number of staff needed for ambulation/transfer, Mechanical lift, OT consult for ADLs, Patient to call before getting OOB, PT Consult for mobility concerns, PT Consult for assist device competence, Strengthening exercises (ROM-active/passive), Utilize walker, cane, or other assistive device         Medication Interventions: Evaluate medications/consider consulting pharmacy, Patient to call before getting OOB, Teach patient to arise slowly    Elimination Interventions: Call light in reach, Patient to call for help with toileting needs, Stay With Me (per policy), Toilet paper/wipes in reach, Toileting schedule/hourly rounds    History of Falls Interventions: Consult care management for discharge planning, Door open when patient unattended, Evaluate medications/consider consulting pharmacy, Investigate reason for fall, Room close to nurse's station, Utilize gait belt for transfer/ambulation         Problem: Patient Education: Go to Patient Education Activity  Goal: Patient/Family Education  Outcome: Progressing Towards Goal     Problem: Heart Failure: Discharge Outcomes  Goal: *Verbalizes understanding/describes prescribed medications  Outcome: Progressing Towards Goal     Problem: Fluid Volume - Risk of, Imbalanced  Goal: *Balanced intake and output  Outcome: Progressing Towards Goal     Problem: Patient Education: Go to Patient Education Activity  Goal: Patient/Family Education  Outcome: Progressing Towards Goal     Problem: Pain  Goal: *Control of Pain  Outcome: Progressing Towards Goal     Problem: Patient Education: Go to Patient Education Activity  Goal: Patient/Family Education  Outcome: Progressing Towards Goal

## 2020-04-14 NOTE — PROGRESS NOTES
Problem: Pressure Injury - Risk of  Goal: *Prevention of pressure injury  Description: Document Elian Scale and appropriate interventions in the flowsheet. Outcome: Progressing Towards Goal  Note: Pressure Injury Interventions:       Moisture Interventions: Absorbent underpads, Apply protective barrier, creams and emollients, Check for incontinence Q2 hours and as needed, Internal/External urinary devices, Maintain skin hydration (lotion/cream), Minimize layers, Moisture barrier    Activity Interventions: Increase time out of bed, Pressure redistribution bed/mattress(bed type), PT/OT evaluation    Mobility Interventions: Float heels, HOB 30 degrees or less, Pressure redistribution bed/mattress (bed type), PT/OT evaluation, Turn and reposition approx. every two hours(pillow and wedges)    Nutrition Interventions: Document food/fluid/supplement intake, Offer support with meals,snacks and hydration    Friction and Shear Interventions: HOB 30 degrees or less, Lift sheet, Lift team/patient mobility team, Minimize layers                Problem: Diabetes Self-Management  Goal: *Disease process and treatment process  Description: Define diabetes and identify own type of diabetes; list 3 options for treating diabetes. Outcome: Progressing Towards Goal  Goal: *Incorporating nutritional management into lifestyle  Description: Describe effect of type, amount and timing of food on blood glucose; list 3 methods for planning meals. Outcome: Progressing Towards Goal  Goal: *Incorporating physical activity into lifestyle  Description: State effect of exercise on blood glucose levels. Outcome: Progressing Towards Goal  Goal: *Developing strategies to promote health/change behavior  Description: Define the ABC's of diabetes; identify appropriate screenings, schedule and personal plan for screenings.   Outcome: Progressing Towards Goal  Goal: *Using medications safely  Description: State effect of diabetes medications on diabetes; name diabetes medication taking, action and side effects. Outcome: Progressing Towards Goal  Goal: *Monitoring blood glucose, interpreting and using results  Description: Identify recommended blood glucose targets  and personal targets. Outcome: Progressing Towards Goal  Goal: *Prevention, detection, treatment of acute complications  Description: List symptoms of hyper- and hypoglycemia; describe how to treat low blood sugar and actions for lowering  high blood glucose level. Outcome: Progressing Towards Goal  Goal: *Prevention, detection and treatment of chronic complications  Description: Define the natural course of diabetes and describe the relationship of blood glucose levels to long term complications of diabetes. Outcome: Progressing Towards Goal  Goal: *Developing strategies to address psychosocial issues  Description: Describe feelings about living with diabetes; identify support needed and support network  Outcome: Progressing Towards Goal  Goal: *Insulin pump training  Outcome: Progressing Towards Goal  Goal: *Sick day guidelines  Outcome: Progressing Towards Goal  Goal: *Patient Specific Goal (EDIT GOAL, INSERT TEXT)  Outcome: Progressing Towards Goal     Problem: Falls - Risk of  Goal: *Absence of Falls  Description: Document Mayito Fall Risk and appropriate interventions in the flowsheet.   Outcome: Progressing Towards Goal  Note: Fall Risk Interventions:  Mobility Interventions: Assess mobility with egress test, Communicate number of staff needed for ambulation/transfer, Mechanical lift, OT consult for ADLs, Patient to call before getting OOB, PT Consult for mobility concerns, PT Consult for assist device competence, Strengthening exercises (ROM-active/passive), Utilize walker, cane, or other assistive device         Medication Interventions: Evaluate medications/consider consulting pharmacy, Patient to call before getting OOB, Teach patient to arise slowly    Elimination Interventions: Call light in reach, Patient to call for help with toileting needs, Stay With Me (per policy), Toilet paper/wipes in reach, Toileting schedule/hourly rounds    History of Falls Interventions: Consult care management for discharge planning, Door open when patient unattended, Evaluate medications/consider consulting pharmacy, Investigate reason for fall, Room close to nurse's station, Utilize gait belt for transfer/ambulation

## 2020-04-14 NOTE — PROGRESS NOTES
Nephrology Progress Note    Subjective: Simon Lloyd is a 53 y.o. female with a past medical history significant for T2DM, HTN, TIA, CKD IV, HLD, АНДРЕЙ, Obesity, Chronic Anemia, Diastolic CHF who presented with c/o SOB. Her CXR showed Pulmonary edema and small bilateral effusions consistent with CHF. S Cr was 2.6 on admission and remains unchanged today. Patient is followed at our office by Dr Yelena Lindsay, most recent S Cr 2.6 on 2/26/20. She was last seen on 3/4/20 and maintained on diuretics, oral Furosemide + Metolazone. Since admission she has been placed on intermittent  IV Lasix. Switched to lasix gtt and po metolazone.   Reports less SOB this AM.       Admit Date: 4/10/2020  Patient Active Problem List   Diagnosis Code    HTN (hypertension) I10    Type II or unspecified type diabetes mellitus with renal manifestations, uncontrolled(250.42) ([facility] [address] E11.29, E11.65    Morbid obesity ([facility] [address] E66.01    Respiratory distress R06.03    АНДРЕЙ (obstructive sleep apnea) H90.79    Diastolic CHF, acute on chronic (HCC) I50.33    CHF (congestive heart failure) ([facility]) I50.9    CKD (chronic kidney disease) stage 4, GFR 15-29 ml/min ([facility]) N18.4    Elevated troponin R79.89     Current Facility-Administered Medications   Medication Dose Route Frequency    insulin glargine (LANTUS) injection 15 Units  15 Units SubCUTAneous QHS    insulin lispro (HUMALOG) injection 5 Units  5 Units SubCUTAneous TIDAC    levoFLOXacin (LEVAQUIN) 250 mg in D5W IVPB  250 mg IntraVENous Q24H    furosemide (LASIX) 100 mg in 0.9% sodium chloride 100 mL infusion  10 mg/hr IntraVENous CONTINUOUS    metOLazone (ZAROXOLYN) tablet 10 mg  10 mg Oral DAILY    nystatin (MYCOSTATIN) 100,000 unit/gram powder   Topical BID    albuterol-ipratropium (DUO-NEB) 2.5 MG-0.5 MG/3 ML  3 mL Nebulization Q4H PRN    atorvastatin (LIPITOR) tablet 40 mg  40 mg Oral DAILY    calcium acetate(phosphat bind) (PHOSLO) capsule 667 mg  1 Cap Oral TID WITH MEALS    DULoxetine (CYMBALTA) capsule 60 mg  60 mg Oral DAILY    gabapentin (NEURONTIN) capsule 300 mg  300 mg Oral TID    hydrALAZINE (APRESOLINE) tablet 100 mg  100 mg Oral TID    insulin lispro (HUMALOG) injection   SubCUTAneous AC&HS    glucose chewable tablet 16 g  4 Tab Oral PRN    glucagon (GLUCAGEN) injection 1 mg  1 mg IntraMUSCular PRN    heparin (porcine) injection 5,000 Units  5,000 Units SubCUTAneous Q8H    acetaminophen (TYLENOL) tablet 650 mg  650 mg Oral Q6H PRN    ondansetron (ZOFRAN) injection 4 mg  4 mg IntraVENous Q6H PRN    amLODIPine (NORVASC) tablet 5 mg  5 mg Oral DAILY    aspirin delayed-release tablet 81 mg  81 mg Oral DAILY    carvediloL (COREG) tablet 6.25 mg  6.25 mg Oral BID WITH MEALS    cholecalciferol (VITAMIN D3) (1000 Units /25 mcg) tablet 1 Tab  1,000 Units Oral DAILY    HYDROcodone-acetaminophen (NORCO) 5-325 mg per tablet 1 Tab  1 Tab Oral Q4H PRN       Allergy:   Allergies   Allergen Reactions    Bees [Sting, Bee] Swelling    Penicillins Hives    Strawberry Hives        Objective:     Visit Vitals  /62 (BP 1 Location: Left arm, BP Patient Position: At rest)   Pulse 80   Temp 98.9 °F (37.2 °C)   Resp 20   Ht 5' 2\" (1.575 m)   Wt 154.6 kg (340 lb 14.4 oz)   SpO2 100%   BMI 62.35 kg/m²       Intake/Output Summary (Last 24 hours) at 4/14/2020 0704  Last data filed at 4/14/2020 0400  Gross per 24 hour   Intake 1054.17 ml   Output 500 ml   Net 554.17 ml       Physical Exam:     General: Obese, on CPAP    HENT: Atraumatic and normocephalic   Eyes: Normal conjunctiva   Neck: Supple. No JVD   Cardiovascular: Normal S1 & S2, no m/r/g   Pulmonary/Chest Wall: Decreased BS both bases   Abdominal: Soft and non-tender   Musculoskeletal: +++ Bilat pedal  Edema. 2+ Bilat.  uppper ext edema   Neurological:  AA and O X 3, no change today        Data Review:  Recent Labs     04/14/20  0235 04/13/20  0402   * 136   K 4.8 4.5   CL 97* 100   CO2 28 29   BUN 98* 86*   CREA 2.75* 2.65* * 177*   PHOS 4.7  --    CA 9.0 8.8     Recent Labs     04/14/20  0235   WBC 6.4   HGB 7.4*   HCT 24.3*        Recent Labs     04/14/20  0235   ALB 3.1*     No results for input(s): INR, PTP, APTT, INREXT in the last 72 hours. No results for input(s): IRON, FE, TIBC, IBCT, PSAT, FERR in the last 72 hours. Most recent labs: reviewed.       Impression:      1. CKD stage IV likely sec to diabetic Nephropathy/Hypertensive Nephrosclerosis. S Cr 2.6 to 2.7 today, unchanged  2. Diastolic CHF LVEF 60 to 84%  3. АНДРЕЙ  4. Anemia of CKD + Iron deficiency  5. Pulm HTN, Mild to moderate  6. T2DM  7. HTN  8. Morbid Obesity     Plan:      1. Continue Lasix drip at 10mg/hr and oral Metolazone for synergy  2. Strict I/Os  3. Monitor Serum  Chemistry  4. Check TSAT, Ferritin, PTH, Phos, TSH.  5. Patient may need JUAN C +/- Iron repletion, will f/u on results of above labs  6. Dietary Sodium restriction  7. Would avoid IV radiocontrast or NSAIDs  8.  No indication for initiating HD presently, discussed with pt    MD Juan Trotter  [phone]

## 2020-04-14 NOTE — PROGRESS NOTES
Transition of care: home when medically cleared  [facility]. Cm has got her a rw for home use. Patient lives alone but has her dis who live nearby  Patient is not ready for d/c today. She has Dr Anna Vital for pcp. She has medicaid for insurance. Cm will continue to follow. Care Management Interventions  PCP Verified by CM: Yes  Mode of Transport at Discharge:  Other (see comment)(family)  Transition of Care Consult (CM Consult): Discharge Planning, [address]: No  Reason Outside [place]: Patient already serviced by other home care/hospice agency  Physical Therapy Consult: Yes  Current Support Network: Lives Alone, Family Lives Nearby  Confirm Follow Up Transport: Family  The Plan for Transition of Care is Related to the Following Treatment Goals : home with [facility]  The Patient and/or Patient Representative was Provided with a Choice of Provider and Agrees with the Discharge Plan?: Yes  Name of the Patient Representative Who was Provided with a Choice of Provider and Agrees with the Discharge Plan: PATIENT  Freedom of Choice List was Provided with Basic Dialogue that Supports the Patient's Individualized Plan of Care/Goals, Treatment Preferences and Shares the Quality Data Associated with the Providers?: Yes  Discharge Location  Discharge Placement: Home with home health

## 2020-04-14 NOTE — CDMP QUERY
Patient admitted with  SOB, noted to be morbidly  obese . If possible, please document in progress notes and d/c summary if you are evaluating and/or treating any of the following: 
 
=> Obesity Hypoventilation Syndrome Acute respiratory failure ( specify type) Both OHS and acute respiratory failure Acute respiratory failure ruled out Obesity Hypoventilation Syndrome.  
=> Other Explanation of clinical findings 
=> Clinically Undetermined (no explanation for clinical findings) The medical record reflects the following: 
   Risk Factors: Obese Clinical Indicators: Patient c/o sob, difficulty breathing , patient is morbidly obese weight  340 , BMI >50. Patient with increase work of breathing. ABG's PCO2 51.7 Treatment: cpap, bipap REFERENCE: 
Obesity hypoventilation syndrome (OHS) is a condition in which severely overweight people fail to breathe rapidly enough or deeply enough, resulting in low blood oxygen levels and high blood carbon dioxide levels. OHS is defined as a combination of obesity, BMI > or equal to 30kg/m2 with awake chronic hypercapnea (PaC02 >45mm Hg) and sleep disordered breathing. Approximately 90% of patients with OHS also have obstructive sleep apnea. Please clarify and document your clinical opinion in the progress notes and discharge summary including the definitive and/or presumptive diagnosis, (suspected or probable), related to the above clinical findings. Please include clinical findings supporting your Thank- You Tresa Camarillo RN [facility] [phone]

## 2020-04-14 NOTE — PROGRESS NOTES
Hospitalist Progress Note    Patient: Simon Lloyd MRN: [id number]  CSN: [id number]    YOB: 1965  Age: 53 y.o.   Sex: female    DOA: 4/10/2020 LOS:  LOS: 4 days          Chief Complaint:    CHF      Assessment/Plan     Acute on chronic decompensated diastolic CHF-appreciate cardiology and nephrology help  Now on zaroxolyn and lasix IV Gtt, consideration for cath per cardiology    Pulmonary HTN    IDDM type 2 with renal disease uncontrolled-lantus plus premeal insulin    HTN-stable control on current regimen    CKD stage 4-follow daily BMP asael with increased diuretic dosing    АНДРЕЙ-likely also OHS-CPAP at night and during day as needed    Morbid obesity    Elevated troponin, no chest pain-neg stress in December 2019    Anemia of CKD-check iron    Continue lasix and zaroxolyn  Monitor daily BMP for lytes and renal function  Follow BP and for arrhythmias on tele  PT as tolerated    Daily BMP    Continue diabetic monitoring as well as basal and premeal insulins    DVT prophylaxis-heparin SQ       Disposition :  Patient Active Problem List   Diagnosis Code    HTN (hypertension) I10    Type II or unspecified type diabetes mellitus with renal manifestations, uncontrolled(250.42) ([facility]) E11.29, E11.65    Morbid obesity ([facility] [address] E66.01    Respiratory distress R06.03    АНДРЕЙ (obstructive sleep apnea) L12.05    Diastolic CHF, acute on chronic ([facility]) I50.33    CHF (congestive heart failure) ([facility]) I50.9    CKD (chronic kidney disease) stage 4, GFR 15-29 ml/min ([facility]) N18.4    Elevated troponin R79.89       Subjective:    She denies new issue  States she is getting rid of a lot of fluid as urine  No chest pain  Wearing CPAP and feels breathing may be a little better    Review of systems:    Constitutional: denies fevers, chills  Respiratory: denies SOB, cough  Cardiovascular: denies chest pain, palpitations  Gastrointestinal: denies nausea, vomiting, diarrhea      Vital signs/Intake and Output:  Visit Vitals  /62 (BP 1 Location: Left arm, BP Patient Position: At rest)   Pulse 80   Temp 98.9 °F (37.2 °C)   Resp 20   Ht 5' 2\" (1.575 m)   Wt 154.6 kg (340 lb 14.4 oz)   SpO2 100%   BMI 62.35 kg/m²     Current Shift:  No intake/output data recorded. Last three shifts:  04/12 1901 - 04/14 0700  In: 1054.2 [P.O.:960; I.V.:94.2]  Out: 2200 [Urine:2200]    Exam:    General: obese WF, alert, NAD, OX3  Head/Neck: NCAT, supple, No masses, No lymphadenopathy  CVS:Regular rate and rhythm, no M/R/G, S1/S2 heard, no thrill  Lungs:Clear to auscultation bilaterally, no wheezes, rhonchi, or rales  Abdomen: Soft, Nontender, No distention, Normal Bowel sounds, No hepatomegaly  Extremities: 2 plus LE edema  Neuro:grossly normal , follows commands  Psych:appropriate                Labs: Results:       Chemistry Recent Labs     04/14/20  0235 04/13/20  0402 04/12/20  0225   * 177* 281*   * 136 138   K 4.8 4.5 4.5   CL 97* 100 101   CO2 28 29 29   BUN 98* 86* 81*   CREA 2.75* 2.65* 2.66*   CA 9.0 8.8 8.8   AGAP 9 7 8   BUCR 36* 32* 30*   ALB 3.1*  --   --       CBC w/Diff Recent Labs     04/14/20  0235   WBC 6.4   RBC 2.90*   HGB 7.4*   HCT 24.3*         Cardiac Enzymes Recent Labs     04/11/20  2230 04/11/20  0921   CPK 34 33   CKND1 CALCULATION NOT PERFORMED WHEN RESULT IS BELOW LINEAR LIMIT CALCULATION NOT PERFORMED WHEN RESULT IS BELOW LINEAR LIMIT      Coagulation No results for input(s): PTP, INR, APTT, INREXT in the last 72 hours. Lipid Panel Lab Results   Component Value Date/Time    Cholesterol, total 146 05/28/2015 02:58 AM    HDL Cholesterol 64 (H) 05/28/2015 02:58 AM    LDL, calculated 66 05/28/2015 02:58 AM    VLDL, calculated 16 05/28/2015 02:58 AM    Triglyceride 80 05/28/2015 02:58 AM    CHOL/HDL Ratio 2.3 05/28/2015 02:58 AM      BNP No results for input(s): BNPP in the last 72 hours.    Liver Enzymes Recent Labs     04/14/20  0235   ALB 3.1*      Thyroid Studies Lab Results   Component Value Date/Time    TSH 0.56 01/23/2019 01:33 AM        Procedures/imaging: see electronic medical records for all procedures/Xrays and details which were not copied into this note but were reviewed prior to creation of Chucky Chandra MD

## 2020-04-14 NOTE — PROGRESS NOTES
Cardiology Progress Note        Patient: Raffi Reed        Sex: female          DOA: 4/10/2020  YOB: 1965      Age:  53 y.o.        LOS:  LOS: 4 days   Assessment/Plan     Principal Problem:    Diastolic CHF, acute on chronic ([facility] [address] (2/16/2020)    Active Problems:    HTN (hypertension) (8/16/2014)      Type II or unspecified type diabetes mellitus with renal manifestations, uncontrolled(250.42) ([address] (9/4/2014)      Morbid obesity (HCC) ()      Respiratory distress (1/17/2019)      CHF (congestive heart failure) ([facility] [address] (4/10/2020)      CKD (chronic kidney disease) stage 4, GFR 15-29 ml/min ([facility]) (4/10/2020)      Elevated troponin (4/10/2020)        Plan: cardiac tele: sinsus rhythm      SOB  Ankle swelling  CHF  Pulmonary HTN    Started on Lasix drip and metolazone by nephrology  Nephrology suggested to avoid contrast.  Discussed with patient, at this point no evidence of ACS, continue diuretic treatment as per nephrology  Continue with current antihypertensive meds. Subjective:    cc:  SOB  Ankle swelling  CHF  Pulmonary HTN      REVIEW OF SYSTEMS:     General: No fevers or chills. Cardiovascular: No chest pain or pressure. No palpitations. No ankle swelling  Pulmonary: No SOB, orthopnea, PND  Gastrointestinal: No nausea, vomiting or diarrhea      Objective:      Visit Vitals  /69 (BP 1 Location: Left arm, BP Patient Position: At rest)   Pulse 77   Temp 98.4 °F (36.9 °C)   Resp 20   Ht 5' 2\" (1.575 m)   Wt 154.6 kg (340 lb 14.4 oz)   SpO2 98%   BMI 62.35 kg/m²     Body mass index is 62.35 kg/m². Physical Exam:  General Appearance: Comfortable, not using accessory muscles of respiration. NECK: No JVD, no thyroidomeglay. LUNGS: Clear bilaterally. HEART: S1+S2 audible,    ABD: Non-tender, BS Audible    EXT: ++ edema, and no cysnosis. VASCULAR EXAM: Pulses are intact. PSYCHIATRIC EXAM: Mood is appropriate.     Medication:  Current Facility-Administered Medications   Medication Dose Route Frequency    insulin glargine (LANTUS) injection 15 Units  15 Units SubCUTAneous QHS    insulin lispro (HUMALOG) injection 5 Units  5 Units SubCUTAneous TIDAC    levoFLOXacin (LEVAQUIN) 250 mg in D5W IVPB  250 mg IntraVENous Q24H    furosemide (LASIX) 100 mg in 0.9% sodium chloride 100 mL infusion  10 mg/hr IntraVENous CONTINUOUS    metOLazone (ZAROXOLYN) tablet 10 mg  10 mg Oral DAILY    nystatin (MYCOSTATIN) 100,000 unit/gram powder   Topical BID    albuterol-ipratropium (DUO-NEB) 2.5 MG-0.5 MG/3 ML  3 mL Nebulization Q4H PRN    atorvastatin (LIPITOR) tablet 40 mg  40 mg Oral DAILY    calcium acetate(phosphat bind) (PHOSLO) capsule 667 mg  1 Cap Oral TID WITH MEALS    DULoxetine (CYMBALTA) capsule 60 mg  60 mg Oral DAILY    gabapentin (NEURONTIN) capsule 300 mg  300 mg Oral TID    hydrALAZINE (APRESOLINE) tablet 100 mg  100 mg Oral TID    insulin lispro (HUMALOG) injection   SubCUTAneous AC&HS    glucose chewable tablet 16 g  4 Tab Oral PRN    glucagon (GLUCAGEN) injection 1 mg  1 mg IntraMUSCular PRN    heparin (porcine) injection 5,000 Units  5,000 Units SubCUTAneous Q8H    acetaminophen (TYLENOL) tablet 650 mg  650 mg Oral Q6H PRN    ondansetron (ZOFRAN) injection 4 mg  4 mg IntraVENous Q6H PRN    amLODIPine (NORVASC) tablet 5 mg  5 mg Oral DAILY    aspirin delayed-release tablet 81 mg  81 mg Oral DAILY    carvediloL (COREG) tablet 6.25 mg  6.25 mg Oral BID WITH MEALS    cholecalciferol (VITAMIN D3) (1000 Units /25 mcg) tablet 1 Tab  1,000 Units Oral DAILY    HYDROcodone-acetaminophen (NORCO) 5-325 mg per tablet 1 Tab  1 Tab Oral Q4H PRN               Lab/Data Reviewed:  Procedures/imaging: see electronic medical records for all procedures/Xrays   and details which were not copied into this note but were reviewed prior to creation of Plan       All lab results for the last 24 hours reviewed.      Recent Labs     04/14/20  0235   WBC 6.4   HGB 7.4*   HCT 24.3*        Recent Labs     04/14/20  0235 04/13/20  0402 04/12/20 0225   * 136 138   K 4.8 4.5 4.5   CL 97* 100 101   CO2 28 29 29   * 177* 281*   BUN 98* 86* 81*   CREA 2.75* 2.65* 2.66*   CA 9.1  9.0 8.8 8.8       Signed By: Zaina Dan MD     April 14, 2020

## 2020-04-14 NOTE — PROGRESS NOTES
Problem: Mobility Impaired (Adult and Pediatric)  Goal: *Acute Goals and Plan of Care (Insert Text)  Description: Physical Therapy Goals   Initiated 4/11/2020 and to be accomplished within 7 day(s)  1. Patient will move from supine <> sit with S in prep for out of bed activity and change of position. 2.  Patient will perform sit<> stand with S/RW in prep for transfers/ambulation. 3.  Patient will ambulate 50 feet with S/RW for improved functional mobility/safe discharge. Outcome: Progressing Towards Goal   PHYSICAL THERAPY TREATMENT    Patient: Evelin Beck (53 y.o. female)  Date: 4/14/2020  Diagnosis: CHF (congestive heart failure) ([facility]) [Y47.3]   Diastolic CHF, acute on chronic ([facility])    Precautions: Fall, Skin  PLOF: ambulatory with a walker    ASSESSMENT:  Pt agreeable to participate in PT, kept CPAP mask on throughout session. Increased time needed to sit up EOB. Reports dizziness. Retrieved a BRW and pt able to perform sit to stand with bed in lowest position. Side steps up to [facility]. Returned to sitting. 2nd sit to stand performed for bed pad change. Pt required Tanisha with LEs up on to mattress. Pur-wick changed and HOB elevated fully per pt request.  Progression toward goals:   []      Improving appropriately and progressing toward goals  [x]      Improving slowly and progressing toward goals  []      Not making progress toward goals and plan of care will be adjusted     PLAN:  Patient continues to benefit from skilled intervention to address the above impairments. Continue treatment per established plan of care. Discharge Recommendations: To Be Determined  Further Equipment Recommendations for Discharge:  N/A     SUBJECTIVE:   Patient stated I can't breath without out it.  (referring to CPAP mask vs nasal cannula)    OBJECTIVE DATA SUMMARY:   Critical Behavior:  Neurologic State: Alert  Orientation Level: Oriented X4  Cognition: Appropriate decision making, Appropriate for age attention/concentration, Appropriate safety awareness, Follows commands    Functional Mobility Training:  Bed Mobility:    Supine to Sit: Additional time;Supervision  Sit to Supine: Additional time;Contact guard assistance  Scooting: Supervision  Transfers:  Sit to Stand: Contact guard assistance; Additional time  Stand to Sit: Stand-by assistance  Balance:  Sitting: Intact  Standing: With support  Standing - Static: Fair  Standing - Dynamic : Fair   Ambulation/Gait Training:  Distance (ft): 2 Feet (ft)  Assistive Device: Gait belt;Walker, rolling(bariatric)  Ambulation - Level of Assistance: Contact guard assistance  Gait Abnormalities: Decreased step clearance;Shuffling gait    Base of Support: Widened    Speed/Arlette: Slow;Shuffled  Step Length: Right shortened;Left shortened        Pain:  Pain level pre-treatment: 0/10  Pain level post-treatment: 0/10   Pain Intervention(s): Medication (see MAR); Rest, Ice, Repositioning   Response to intervention: Nurse notified, See doc flow    Activity Tolerance:   Fair-  Please refer to the flowsheet for vital signs taken during this treatment. After treatment:   [] Patient left in no apparent distress sitting up in chair  [x] Patient left in no apparent distress in bed  [x] Call bell left within reach  [] Nursing notified  [] Caregiver present  [] Bed alarm activated  [] SCDs applied      COMMUNICATION/EDUCATION:   [x]         Role of Physical Therapy in the acute care setting. [x]         Fall prevention education was provided and the patient/caregiver indicated understanding. [x]         Patient/family have participated as able in working toward goals and plan of care. [x]         Patient/family agree to work toward stated goals and plan of care. []         Patient understands intent and goals of therapy, but is neutral about his/her participation. []         Patient is unable to participate in stated goals/plan of care: ongoing with therapy staff.   [] Other:         Heriberto Harry, PTA   Time Calculation: 23 mins

## 2020-04-15 LAB
ANION GAP SERPL CALC-SCNC: 9 MMOL/L (ref 3–18)
BUN SERPL-MCNC: 97 MG/DL (ref 7–18)
BUN/CREAT SERPL: 37 (ref 12–20)
CALCIUM SERPL-MCNC: 8.9 MG/DL (ref 8.5–10.1)
CHLORIDE SERPL-SCNC: 94 MMOL/L (ref 100–111)
CO2 SERPL-SCNC: 30 MMOL/L (ref 21–32)
CREAT SERPL-MCNC: 2.64 MG/DL (ref 0.6–1.3)
ERYTHROCYTE [DISTWIDTH] IN BLOOD BY AUTOMATED COUNT: 15.8 % (ref 11.6–14.5)
GLUCOSE BLD STRIP.AUTO-MCNC: 118 MG/DL (ref 70–110)
GLUCOSE BLD STRIP.AUTO-MCNC: 181 MG/DL (ref 70–110)
GLUCOSE BLD STRIP.AUTO-MCNC: 189 MG/DL (ref 70–110)
GLUCOSE BLD STRIP.AUTO-MCNC: 202 MG/DL (ref 70–110)
GLUCOSE SERPL-MCNC: 133 MG/DL (ref 74–99)
HCT VFR BLD AUTO: 23.7 % (ref 35–45)
HGB BLD-MCNC: 7.3 G/DL (ref 12–16)
MAGNESIUM SERPL-MCNC: 2.2 MG/DL (ref 1.6–2.6)
MCH RBC QN AUTO: 25.5 PG (ref 24–34)
MCHC RBC AUTO-ENTMCNC: 30.8 G/DL (ref 31–37)
MCV RBC AUTO: 82.9 FL (ref 74–97)
PLATELET # BLD AUTO: 218 K/UL (ref 135–420)
PMV BLD AUTO: 9.4 FL (ref 9.2–11.8)
POTASSIUM SERPL-SCNC: 3.6 MMOL/L (ref 3.5–5.5)
RBC # BLD AUTO: 2.86 M/UL (ref 4.2–5.3)
SODIUM SERPL-SCNC: 133 MMOL/L (ref 136–145)
WBC # BLD AUTO: 6.1 K/UL (ref 4.6–13.2)

## 2020-04-15 PROCEDURE — 74011636637 HC RX REV CODE- 636/637: Performed by: HOSPITALIST

## 2020-04-15 PROCEDURE — 74011250637 HC RX REV CODE- 250/637: Performed by: FAMILY MEDICINE

## 2020-04-15 PROCEDURE — 94660 CPAP INITIATION&MGMT: CPT

## 2020-04-15 PROCEDURE — 80048 BASIC METABOLIC PNL TOTAL CA: CPT

## 2020-04-15 PROCEDURE — 74011250637 HC RX REV CODE- 250/637: Performed by: INTERNAL MEDICINE

## 2020-04-15 PROCEDURE — 83735 ASSAY OF MAGNESIUM: CPT

## 2020-04-15 PROCEDURE — 74011250636 HC RX REV CODE- 250/636: Performed by: HOSPITALIST

## 2020-04-15 PROCEDURE — 74011250637 HC RX REV CODE- 250/637: Performed by: HOSPITALIST

## 2020-04-15 PROCEDURE — 65660000000 HC RM CCU STEPDOWN

## 2020-04-15 PROCEDURE — 85027 COMPLETE CBC AUTOMATED: CPT

## 2020-04-15 PROCEDURE — 82962 GLUCOSE BLOOD TEST: CPT

## 2020-04-15 PROCEDURE — 74011000258 HC RX REV CODE- 258: Performed by: INTERNAL MEDICINE

## 2020-04-15 PROCEDURE — 36415 COLL VENOUS BLD VENIPUNCTURE: CPT

## 2020-04-15 PROCEDURE — 74011250636 HC RX REV CODE- 250/636: Performed by: INTERNAL MEDICINE

## 2020-04-15 PROCEDURE — 77010033678 HC OXYGEN DAILY

## 2020-04-15 PROCEDURE — 74011000250 HC RX REV CODE- 250: Performed by: HOSPITALIST

## 2020-04-15 RX ORDER — FAMOTIDINE 20 MG/1
20 TABLET, FILM COATED ORAL 2 TIMES DAILY
Status: DISCONTINUED | OUTPATIENT
Start: 2020-04-15 | End: 2020-04-17 | Stop reason: DRUGHIGH

## 2020-04-15 RX ORDER — HYDRALAZINE HYDROCHLORIDE 20 MG/ML
20 INJECTION INTRAMUSCULAR; INTRAVENOUS ONCE
Status: COMPLETED | OUTPATIENT
Start: 2020-04-15 | End: 2020-04-15

## 2020-04-15 RX ORDER — METOPROLOL TARTRATE 5 MG/5ML
5 INJECTION INTRAVENOUS ONCE
Status: COMPLETED | OUTPATIENT
Start: 2020-04-15 | End: 2020-04-15

## 2020-04-15 RX ADMIN — AMLODIPINE BESYLATE 5 MG: 5 TABLET ORAL at 09:31

## 2020-04-15 RX ADMIN — HYDRALAZINE HYDROCHLORIDE 100 MG: 50 TABLET, FILM COATED ORAL at 09:31

## 2020-04-15 RX ADMIN — GABAPENTIN 300 MG: 300 CAPSULE ORAL at 09:31

## 2020-04-15 RX ADMIN — INSULIN GLARGINE 15 UNITS: 100 INJECTION, SOLUTION SUBCUTANEOUS at 22:00

## 2020-04-15 RX ADMIN — INSULIN LISPRO 3 UNITS: 100 INJECTION, SOLUTION INTRAVENOUS; SUBCUTANEOUS at 21:49

## 2020-04-15 RX ADMIN — INSULIN LISPRO 5 UNITS: 100 INJECTION, SOLUTION INTRAVENOUS; SUBCUTANEOUS at 12:01

## 2020-04-15 RX ADMIN — HYDRALAZINE HYDROCHLORIDE 20 MG: 20 INJECTION INTRAMUSCULAR; INTRAVENOUS at 18:32

## 2020-04-15 RX ADMIN — INSULIN LISPRO 6 UNITS: 100 INJECTION, SOLUTION INTRAVENOUS; SUBCUTANEOUS at 12:00

## 2020-04-15 RX ADMIN — CARVEDILOL 6.25 MG: 3.12 TABLET, FILM COATED ORAL at 09:30

## 2020-04-15 RX ADMIN — HYDRALAZINE HYDROCHLORIDE 100 MG: 50 TABLET, FILM COATED ORAL at 21:48

## 2020-04-15 RX ADMIN — ONDANSETRON 4 MG: 2 INJECTION INTRAMUSCULAR; INTRAVENOUS at 11:49

## 2020-04-15 RX ADMIN — ATORVASTATIN CALCIUM 40 MG: 20 TABLET, FILM COATED ORAL at 09:31

## 2020-04-15 RX ADMIN — LEVOFLOXACIN 250 MG: 5 INJECTION, SOLUTION INTRAVENOUS at 09:29

## 2020-04-15 RX ADMIN — HEPARIN SODIUM 5000 UNITS: 5000 INJECTION INTRAVENOUS; SUBCUTANEOUS at 15:25

## 2020-04-15 RX ADMIN — FAMOTIDINE 20 MG: 20 TABLET, FILM COATED ORAL at 21:48

## 2020-04-15 RX ADMIN — NYSTATIN: 100000 POWDER TOPICAL at 09:00

## 2020-04-15 RX ADMIN — Medication 1 TABLET: at 09:31

## 2020-04-15 RX ADMIN — ONDANSETRON 4 MG: 2 INJECTION INTRAMUSCULAR; INTRAVENOUS at 17:33

## 2020-04-15 RX ADMIN — ASPIRIN 81 MG: 81 TABLET, COATED ORAL at 09:32

## 2020-04-15 RX ADMIN — INSULIN LISPRO 5 UNITS: 100 INJECTION, SOLUTION INTRAVENOUS; SUBCUTANEOUS at 17:29

## 2020-04-15 RX ADMIN — GABAPENTIN 300 MG: 300 CAPSULE ORAL at 21:48

## 2020-04-15 RX ADMIN — INSULIN LISPRO 3 UNITS: 100 INJECTION, SOLUTION INTRAVENOUS; SUBCUTANEOUS at 17:29

## 2020-04-15 RX ADMIN — CALCIUM ACETATE 667 MG: 667 CAPSULE ORAL at 12:00

## 2020-04-15 RX ADMIN — HEPARIN SODIUM 5000 UNITS: 5000 INJECTION INTRAVENOUS; SUBCUTANEOUS at 07:57

## 2020-04-15 RX ADMIN — INSULIN LISPRO 5 UNITS: 100 INJECTION, SOLUTION INTRAVENOUS; SUBCUTANEOUS at 09:30

## 2020-04-15 RX ADMIN — CALCIUM ACETATE 667 MG: 667 CAPSULE ORAL at 09:31

## 2020-04-15 RX ADMIN — HYDROCODONE BITARTRATE AND ACETAMINOPHEN 1 TABLET: 5; 325 TABLET ORAL at 21:48

## 2020-04-15 RX ADMIN — NYSTATIN: 100000 POWDER TOPICAL at 21:53

## 2020-04-15 RX ADMIN — DULOXETINE HYDROCHLORIDE 60 MG: 60 CAPSULE, DELAYED RELEASE ORAL at 09:30

## 2020-04-15 RX ADMIN — METOLAZONE 10 MG: 5 TABLET ORAL at 09:30

## 2020-04-15 RX ADMIN — FUROSEMIDE 10 MG/HR: 10 INJECTION, SOLUTION INTRAMUSCULAR; INTRAVENOUS at 08:00

## 2020-04-15 RX ADMIN — HEPARIN SODIUM 5000 UNITS: 5000 INJECTION INTRAVENOUS; SUBCUTANEOUS at 21:50

## 2020-04-15 RX ADMIN — METOPROLOL TARTRATE 5 MG: 5 INJECTION INTRAVENOUS at 18:32

## 2020-04-15 NOTE — DIABETES MGMT
GLYCEMIC CONTROL PROGRESS NOTE:    - known h/o T2DM with four admissions to [facility] this year, HbA1C trending down, TID mixed insulin home regimen  - BG out of target range non-ICU: < 180 mg/dl  - TDD = 48 (15 Lantus + 15 (5) + 18 - Humalog Very Insulin Resistant Corrective Coverage)  - PPG out of target range recommend increase mealtime insulin   *Humalog 8 units qac    Recent Glucose Results:   Lab Results   Component Value Date/Time     (H) 04/15/2020 03:05 AM    GLUCPOC 202 (H) 04/15/2020 10:53 AM    GLUCPOC 118 (H) 04/15/2020 06:10 AM    GLUCPOC 213 (H) 04/14/2020 09:15 PM         Dangelo Camacho MS, RN, CDE  Glycemic Control Team  [phone]  Pager [phone] (M-TH 8:00-4:30P)  *After Hours pager [phone]

## 2020-04-15 NOTE — PROGRESS NOTES
1140: Pt c/o nausea and vomiting, notified nurse. Will follow up as schedule permits. 1300:  2nd attempt, pt reports she is still vomiting even with anti-nausea medicine. Will follow up tomorrow.

## 2020-04-15 NOTE — PROGRESS NOTES
Bedside and Verbal shift change report given to Derrick Burden RN (oncoming nurse) by LETICIA Duenas RN (offgoing nurse). Report included the following information SBAR, Kardex, Intake/Output, MAR and Recent Results.

## 2020-04-15 NOTE — PROGRESS NOTES
Cardiology Progress Note        Patient: Christian Barroso        Sex: female          DOA: 4/10/2020  YOB: 1965      Age:  53 y.o.        LOS:  LOS: 5 days   Assessment/Plan     Principal Problem:    Diastolic CHF, acute on chronic ([facility] [address] (2/16/2020)    Active Problems:    HTN (hypertension) (8/16/2014)      Type II or unspecified type diabetes mellitus with renal manifestations, uncontrolled(250.42) ([address] (9/4/2014)      Morbid obesity (HCC) ()      Respiratory distress (1/17/2019)      CHF (congestive heart failure) ([facility] [address] (4/10/2020)      CKD (chronic kidney disease) stage 4, GFR 15-29 ml/min ([facility]) (4/10/2020)      Elevated troponin (4/10/2020)        Plan: cardiac tele stable  On lasix and metolazone and urine out put is better  Complaining of GI fulness after eating  Discussed with patient about contrast nephropathy since nephrology suggested to avoid contrast, will not do cardiac cath at this point. Start Pepcid 20 mg po daily for possible GERD                    Subjective:    cc:  SOB  Diastolic heart failure        REVIEW OF SYSTEMS:     General: No fevers or chills. Cardiovascular: No chest pain or pressure. No palpitations. No ankle swelling  Pulmonary: No SOB, orthopnea, PND  Gastrointestinal: No nausea, vomiting or diarrhea      Objective:      Visit Vitals  /63 (BP 1 Location: Left arm, BP Patient Position: At rest)   Pulse 88   Temp 99.9 °F (37.7 °C)   Resp 18   Ht 5' 2\" (1.575 m)   Wt 152 kg (335 lb 3.2 oz)   SpO2 93%   BMI 61.31 kg/m²     Body mass index is 61.31 kg/m². Physical Exam:  General Appearance: Comfortable, not using accessory muscles of respiration. NECK: No JVD, no thyroidomeglay. LUNGS: Clear bilaterally. HEART: S1+S2 audible,    ABD: Non-tender, BS Audible    EXT: ++ edema improving, and no cysnosis. VASCULAR EXAM: Pulses are intact. PSYCHIATRIC EXAM: Mood is appropriate.     Medication:  Current Facility-Administered Medications Medication Dose Route Frequency    insulin glargine (LANTUS) injection 15 Units  15 Units SubCUTAneous QHS    insulin lispro (HUMALOG) injection 5 Units  5 Units SubCUTAneous TIDAC    levoFLOXacin (LEVAQUIN) 250 mg in D5W IVPB  250 mg IntraVENous Q24H    furosemide (LASIX) 100 mg in 0.9% sodium chloride 100 mL infusion  10 mg/hr IntraVENous CONTINUOUS    metOLazone (ZAROXOLYN) tablet 10 mg  10 mg Oral DAILY    nystatin (MYCOSTATIN) 100,000 unit/gram powder   Topical BID    albuterol-ipratropium (DUO-NEB) 2.5 MG-0.5 MG/3 ML  3 mL Nebulization Q4H PRN    atorvastatin (LIPITOR) tablet 40 mg  40 mg Oral DAILY    calcium acetate(phosphat bind) (PHOSLO) capsule 667 mg  1 Cap Oral TID WITH MEALS    DULoxetine (CYMBALTA) capsule 60 mg  60 mg Oral DAILY    gabapentin (NEURONTIN) capsule 300 mg  300 mg Oral TID    hydrALAZINE (APRESOLINE) tablet 100 mg  100 mg Oral TID    insulin lispro (HUMALOG) injection   SubCUTAneous AC&HS    glucose chewable tablet 16 g  4 Tab Oral PRN    glucagon (GLUCAGEN) injection 1 mg  1 mg IntraMUSCular PRN    heparin (porcine) injection 5,000 Units  5,000 Units SubCUTAneous Q8H    acetaminophen (TYLENOL) tablet 650 mg  650 mg Oral Q6H PRN    ondansetron (ZOFRAN) injection 4 mg  4 mg IntraVENous Q6H PRN    amLODIPine (NORVASC) tablet 5 mg  5 mg Oral DAILY    aspirin delayed-release tablet 81 mg  81 mg Oral DAILY    carvediloL (COREG) tablet 6.25 mg  6.25 mg Oral BID WITH MEALS    cholecalciferol (VITAMIN D3) (1000 Units /25 mcg) tablet 1 Tab  1,000 Units Oral DAILY    HYDROcodone-acetaminophen (NORCO) 5-325 mg per tablet 1 Tab  1 Tab Oral Q4H PRN               Lab/Data Reviewed:  Procedures/imaging: see electronic medical records for all procedures/Xrays   and details which were not copied into this note but were reviewed prior to creation of Plan       All lab results for the last 24 hours reviewed.      Recent Labs     04/15/20  0305 04/14/20  0235   WBC 6.1 6.4   HGB 7.3* 7.4*   HCT 23.7* 24.3*    207     Recent Labs     04/15/20  0305 04/14/20  0235 04/13/20  0402   * 134* 136   K 3.6 4.8 4.5   CL 94* 97* 100   CO2 30 28 29   * 196* 177*   BUN 97* 98* 86*   CREA 2.64* 2.75* 2.65*   CA 8.9 9.1  9.0 8.8       Signed By: Jud Aceves MD     April 15, 2020

## 2020-04-15 NOTE — PROGRESS NOTES
Problem: Pressure Injury - Risk of  Goal: *Prevention of pressure injury  Description: Document Elian Scale and appropriate interventions in the flowsheet. Outcome: Progressing Towards Goal  Note: Pressure Injury Interventions:       Moisture Interventions: Absorbent underpads, Apply protective barrier, creams and emollients, Internal/External urinary devices, Limit adult briefs, Maintain skin hydration (lotion/cream), Minimize layers, Moisture barrier    Activity Interventions: Increase time out of bed, Pressure redistribution bed/mattress(bed type), PT/OT evaluation    Mobility Interventions: Float heels, HOB 30 degrees or less, Pressure redistribution bed/mattress (bed type), PT/OT evaluation, Turn and reposition approx.  every two hours(pillow and wedges)    Nutrition Interventions: Document food/fluid/supplement intake, Offer support with meals,snacks and hydration    Friction and Shear Interventions: Apply protective barrier, creams and emollients, Foam dressings/transparent film/skin sealants, HOB 30 degrees or less, Lift sheet, Lift team/patient mobility team, Minimize layers, Transferring/repositioning devices                Problem: Patient Education: Go to Patient Education Activity  Goal: Patient/Family Education  Outcome: Progressing Towards Goal

## 2020-04-15 NOTE — PROGRESS NOTES
Assumed responsibility for patient from Jessica Valdez, [address] [facility] Patient complaining of nausea. Zofran administered    1245 Patient in bed sleeping    1645 Patient throwing up copious amounts of plegm,      1700 Dr. Francia Jamison notified and zofran administered    1800 Evening oral medications held due to vomitting and lethargic episodes    1840 Received orders from Dr. Francia Jamison to hold oral meds and give 5mg lopressor and    20mg hydralazine. Report given to Cris Oconnell RN.

## 2020-04-15 NOTE — CDMP QUERY
Patient admitted with CHF. Pt noted to have documented uncontrolled diabetes in type 2 in H&P and progress notes. Please document in progress notes and discharge summary further specificity regarding the control status of DM: 
 
? Uncontrolled DM with hyperglycemia 
? Other, please specify ? Clinically unable to determine ? The medical record reflects the following: 
 
  Risk Factors: DM 2 Clinical Indicators: , 148, 281, 177, Treatment: metformin po, lantus SQ Thank- You Luís Dougherty RN [address] [place] [address] [phone]

## 2020-04-15 NOTE — CDMP QUERY
Please document the significance of this lab Sodium 133  
 
? Other, please specify ? Unable to Determine Thank- You Josie Wan RN [facility] Cell [phone]

## 2020-04-15 NOTE — PROGRESS NOTES
Transition of care: home with [facility] when medically cleared  Telephone call with patient to room ( did not enter room due to covid restrictions)  Patient states she is still short of breath. Patient lives alone but her adult children live nearby. Patient has Dr. Tara Antoine for pcp. She has medicaid for insurance. Patient has chosen [facility] per foc. Cm did discuss acute rehabs. States she does not want to go to acute rehabs. Cm will continue to follow. Care Management Interventions  PCP Verified by CM: Yes  Mode of Transport at Discharge: Other (see comment)(family)  Transition of Care Consult (CM Consult): Discharge Planning, [address]: No  Reason Outside [place]: Patient already serviced by other home care/hospice agency  Physical Therapy Consult: Yes  Current Support Network: Lives Alone, Family Lives Nearby  Confirm Follow Up Transport: Family  The Plan for Transition of Care is Related to the Following Treatment Goals : home with [facility]  The Patient and/or Patient Representative was Provided with a Choice of Provider and Agrees with the Discharge Plan?: Yes  Name of the Patient Representative Who was Provided with a Choice of Provider and Agrees with the Discharge Plan: PATIENT  Freedom of Choice List was Provided with Basic Dialogue that Supports the Patient's Individualized Plan of Care/Goals, Treatment Preferences and Shares the Quality Data Associated with the Providers?: Yes  Discharge Location  Discharge Placement: Home with home health.

## 2020-04-15 NOTE — CDMP QUERY
Patient admitted with elevated troponins  If possible, please document in the progress notes and discharge summary if you are evaluating and/or treating any of the following: ? Demand Ischemia ? Unstable Angina 
? Other, please specify ? Undetermined The medical record reflects the following: 
Risk Factors: elevated troponin Clinical Indicators: HTN, elevated troponins -0.12, 0.08, carotid dz, no chest pain , sob, Musculoskeletal chest pain Treatment: cardiology  consult ,ekg, asa Thank- You Luís Dougherty RN [facility] Cell [phone]

## 2020-04-15 NOTE — PROGRESS NOTES
Hospitalist Progress Note    Patient: Evelin Beck MRN: [id number]  CSN: [id number]    YOB: 1965  Age: 53 y.o. Sex: female    DOA: 4/10/2020 LOS:  LOS: 5 days                Assessment/Plan     Patient Active Problem List   Diagnosis Code    HTN (hypertension) I10    Type II or unspecified type diabetes mellitus with renal manifestations, uncontrolled(250.42) ([facility] [address] E11.29, E11.65    Morbid obesity ([facility] E66.01    Respiratory distress R06.03    АНДРЕЙ (obstructive sleep apnea) O40.40    Diastolic CHF, acute on chronic (HCC) I50.33    CHF (congestive heart failure) ([facility]) I50.9    CKD (chronic kidney disease) stage 4, GFR 15-29 ml/min ([facility]) N18.4    Elevated troponin R79.89           53years old. She has a history of heart failure, obstructive sleep apnea, diabetes, chronic kidney disease, carotid disease, morbid obesity, chronic respiratory failure, on home oxygen, and she came into the emergency room with shortness of breath.          Chronic respiratory failure  Continue on home oxygen.       Acute on chronic diastolic CHF -  Continue fluid restriction, low sat. On lasix gtt, metolazone. Continue with aspirin, betablocker and statin. Elevated troponin-  Cardiology following.   Troponin peaked at 0.17  No evidence of ACS     Acute on chronic renal failure 4 -  Monitor renal function, nephrology following. Avoid contrast and nephrotoxins.     Chest Pain   No pain now. ce wnl   recent stress test in December in 2019  Case discussed with Ange oconnell-not cardiac   V/q scan no PE      Anemia -  Secondary to CKD.         DM    Lantus, pre meal and sliding scale hold metformin     Sleep Apnea -  Continue with CPAP at night, as needed during day     Pulmonary HTN -       HTN  Continue   norvasc and hydralyzine,             Disposition : 1-2 days     Review of systems  General: No fevers or chills. Cardiovascular: No chest pain or pressure. No palpitations. Pulmonary: No shortness of breath. Gastrointestinal: No nausea, vomiting.         Physical Exam:  General:         Awake, cooperative, no acute distress    HEENT:           NC, Atraumatic. PERRLA, anicteric sclerae. Lungs:            CTA Bilaterally. No Wheezing/Rhonchi/Rales. Heart:              S1 S2,  No murmur, No Rubs, No Gallops  Abdomen:      Soft, Non distended, Non tender.  +Bowel sounds,   Extremities:   Edema bilaterally  Psych:            Not anxious or agitated. Neurologic:    No acute neurological deficit.          Disposition :     Vital signs/Intake and Output:  Visit Vitals  /61 (BP 1 Location: Left arm, BP Patient Position: At rest)   Pulse 83   Temp 97.9 °F (36.6 °C)   Resp 20   Ht 5' 2\" (1.575 m)   Wt 152 kg (335 lb 3.2 oz)   SpO2 100%   BMI 61.31 kg/m²     Current Shift:  04/15 0701 - 04/15 1900  In: 360 [P.O.:360]  Out: -   Last three shifts:  04/13 1901 - 04/15 0700  In: 327.5 [I.V.:327.5]  Out: 4200 [Urine:4200]            Labs: Results:       Chemistry Recent Labs     04/15/20  0305 04/14/20  0235 04/13/20  0402   * 196* 177*   * 134* 136   K 3.6 4.8 4.5   CL 94* 97* 100   CO2 30 28 29   BUN 97* 98* 86*   CREA 2.64* 2.75* 2.65*   CA 8.9 9.1  9.0 8.8   AGAP 9 9 7   BUCR 37* 36* 32*   ALB  --  3.1*  --       CBC w/Diff Recent Labs     04/15/20  0305 04/14/20  0235   WBC 6.1 6.4   RBC 2.86* 2.90*   HGB 7.3* 7.4*   HCT 23.7* 24.3*    207      Cardiac Enzymes No results for input(s): CPK, CKND1, MARIA ISABEL in the last 72 hours. No lab exists for component: CKRMB, TROIP   Coagulation No results for input(s): PTP, INR, APTT, INREXT, INREXT in the last 72 hours.     Lipid Panel Lab Results   Component Value Date/Time    Cholesterol, total 103 04/14/2020 02:35 AM    HDL Cholesterol 46 04/14/2020 02:35 AM    LDL, calculated 36.2 04/14/2020 02:35 AM    VLDL, calculated 20.8 04/14/2020 02:35 AM    Triglyceride 104 04/14/2020 02:35 AM    CHOL/HDL Ratio 2.2 04/14/2020 02:35 AM      BNP No results for input(s): BNPP in the last 72 hours.    Liver Enzymes Recent Labs     04/14/20  0235   ALB 3.1*      Thyroid Studies Lab Results   Component Value Date/Time    TSH 2.18 04/14/2020 02:35 AM        Procedures/imaging: see electronic medical records for all procedures/Xrays and details which were not copied into this note but were reviewed prior to creation of Plan

## 2020-04-15 NOTE — PROGRESS NOTES
Nephrology Inpatient Progress Note    Subjective: Mónica Garcia is a 53 y.o. female with a past medical history significant for DM type 2, HTN, TIA, CKD IV, HLD, АНДРЕЙ, Obesity, Chronic Anemia, Diastolic CHF who presented with c/o SOB. Her CXR showed Pulmonary edema and small bilateral effusions consistent with CHF. S Cr was 2.6 on admission and remains unchanged today. Patient is followed at our office by Dr Zigmund Favre, most recent S Cr 2.6 on 2/26/20. She was last seen on 3/4/20 and maintained on diuretics, oral Furosemide + Metolazone. Since admission she has been placed on intermittent  IV Lasix. Switched to lasix gtt and po metolazone. Reports less SOB today. Cr stable at 2.6 and UO over 2.8L yesterday.     Admit Date: 4/10/2020  Patient Active Problem List   Diagnosis Code    HTN (hypertension) I10    Type II or unspecified type diabetes mellitus with renal manifestations, uncontrolled(250.42) ([facility] [address] E11.29, E11.65    Morbid obesity ([facility] [address] E66.01    Respiratory distress R06.03    АНДРЕЙ (obstructive sleep apnea) S90.80    Diastolic CHF, acute on chronic (HCC) I50.33    CHF (congestive heart failure) ([facility]) I50.9    CKD (chronic kidney disease) stage 4, GFR 15-29 ml/min ([facility]) N18.4    Elevated troponin R79.89     Current Facility-Administered Medications   Medication Dose Route Frequency    insulin glargine (LANTUS) injection 15 Units  15 Units SubCUTAneous QHS    insulin lispro (HUMALOG) injection 5 Units  5 Units SubCUTAneous TIDAC    levoFLOXacin (LEVAQUIN) 250 mg in D5W IVPB  250 mg IntraVENous Q24H    furosemide (LASIX) 100 mg in 0.9% sodium chloride 100 mL infusion  10 mg/hr IntraVENous CONTINUOUS    metOLazone (ZAROXOLYN) tablet 10 mg  10 mg Oral DAILY    nystatin (MYCOSTATIN) 100,000 unit/gram powder   Topical BID    albuterol-ipratropium (DUO-NEB) 2.5 MG-0.5 MG/3 ML  3 mL Nebulization Q4H PRN    atorvastatin (LIPITOR) tablet 40 mg  40 mg Oral DAILY    calcium acetate(phosphat bind) (PHOSLO) capsule 667 mg  1 Cap Oral TID WITH MEALS    DULoxetine (CYMBALTA) capsule 60 mg  60 mg Oral DAILY    gabapentin (NEURONTIN) capsule 300 mg  300 mg Oral TID    hydrALAZINE (APRESOLINE) tablet 100 mg  100 mg Oral TID    insulin lispro (HUMALOG) injection   SubCUTAneous AC&HS    glucose chewable tablet 16 g  4 Tab Oral PRN    glucagon (GLUCAGEN) injection 1 mg  1 mg IntraMUSCular PRN    heparin (porcine) injection 5,000 Units  5,000 Units SubCUTAneous Q8H    acetaminophen (TYLENOL) tablet 650 mg  650 mg Oral Q6H PRN    ondansetron (ZOFRAN) injection 4 mg  4 mg IntraVENous Q6H PRN    amLODIPine (NORVASC) tablet 5 mg  5 mg Oral DAILY    aspirin delayed-release tablet 81 mg  81 mg Oral DAILY    carvediloL (COREG) tablet 6.25 mg  6.25 mg Oral BID WITH MEALS    cholecalciferol (VITAMIN D3) (1000 Units /25 mcg) tablet 1 Tab  1,000 Units Oral DAILY    HYDROcodone-acetaminophen (NORCO) 5-325 mg per tablet 1 Tab  1 Tab Oral Q4H PRN       Allergy:   Allergies   Allergen Reactions    Bees [Sting, Bee] Swelling    Penicillins Hives    Strawberry Hives        Objective:     Visit Vitals  /61 (BP 1 Location: Left arm, BP Patient Position: At rest)   Pulse 83   Temp 97.9 °F (36.6 °C)   Resp 20   Ht 5' 2\" (1.575 m)   Wt 152 kg (335 lb 3.2 oz)   SpO2 100%   BMI 61.31 kg/m²       Intake/Output Summary (Last 24 hours) at 4/15/2020 0854  Last data filed at 4/15/2020 0320  Gross per 24 hour   Intake 233.33 ml   Output 1700 ml   Net -1466.67 ml       Physical Exam:     General: Obese, NAD, on CPAP    HENT: Atraumatic and normocephalic   Eyes: Normal conjunctiva   Neck: Supple. No JVD   Cardiovascular: Normal S1 & S2, no m/r/g   Pulmonary/Chest Wall: Decreased BS both bases   Abdominal: Soft and non-tender   Musculoskeletal: +++ Bilat pedal  Edema and 2+ Bilat.  uppper ext edema   Neurological: AAOx4, no change today        Data Review:  Recent Labs     04/15/20  0305 04/14/20  0235   * 134*   K 3.6 4.8   CL 94* 97*   CO2 30 28   BUN 97* 98*   CREA 2.64* 2.75*   * 196*   PHOS  --  4.7   CA 8.9 9.1  9.0     Recent Labs     04/15/20  0305 04/14/20  0235   WBC 6.1 6.4   HGB 7.3* 7.4*   HCT 23.7* 24.3*    207     Recent Labs     04/14/20  0235   ALB 3.1*     No results for input(s): INR, PTP, APTT, INREXT, INREXT in the last 72 hours. Recent Labs     04/14/20  0400 04/14/20  0235   IRON 32* 33*   TIBC 303 309   PSAT 11* 11*   FERR  --  418*        Most recent labs: reviewed.       Impression:      1. CKD stage IV likely sec to diabetic Nephropathy/Hypertensive Nephrosclerosis. S Cr has been stable around 2.6  2. Diastolic CHF LVEF 60 to 69%  3. АНДРЕЙ  4. Anemia of CKD + Iron deficiency  5. Pulm HTN, Mild to moderate  6. DM type 2  7. HTN  8. Morbid Obesity     Plan:      1. Continue Lasix drip at 10mg/hr and oral Metolazone for synergy  2. Strict I/Os and monitor AM labs  3. Start IV iron  4. Patient may need JUAN C after her Iron repletion  5. Dietary Sodium restriction  6. Avoid IV radiocontrast or NSAIDs  7.  No indication for initiating HD presently, discussed with pt again today    MD Juan Barrientos  [phone]

## 2020-04-16 LAB
ANION GAP SERPL CALC-SCNC: 11 MMOL/L (ref 3–18)
BUN SERPL-MCNC: 100 MG/DL (ref 7–18)
BUN/CREAT SERPL: 39 (ref 12–20)
CALCIUM SERPL-MCNC: 8.6 MG/DL (ref 8.5–10.1)
CHLORIDE SERPL-SCNC: 94 MMOL/L (ref 100–111)
CO2 SERPL-SCNC: 31 MMOL/L (ref 21–32)
CREAT SERPL-MCNC: 2.58 MG/DL (ref 0.6–1.3)
ERYTHROCYTE [DISTWIDTH] IN BLOOD BY AUTOMATED COUNT: 15.6 % (ref 11.6–14.5)
GLUCOSE BLD STRIP.AUTO-MCNC: 185 MG/DL (ref 70–110)
GLUCOSE BLD STRIP.AUTO-MCNC: 198 MG/DL (ref 70–110)
GLUCOSE BLD STRIP.AUTO-MCNC: 250 MG/DL (ref 70–110)
GLUCOSE BLD STRIP.AUTO-MCNC: 269 MG/DL (ref 70–110)
GLUCOSE SERPL-MCNC: 174 MG/DL (ref 74–99)
HCT VFR BLD AUTO: 23 % (ref 35–45)
HGB BLD-MCNC: 7.2 G/DL (ref 12–16)
MAGNESIUM SERPL-MCNC: 2.2 MG/DL (ref 1.6–2.6)
MCH RBC QN AUTO: 25.6 PG (ref 24–34)
MCHC RBC AUTO-ENTMCNC: 31.3 G/DL (ref 31–37)
MCV RBC AUTO: 81.9 FL (ref 74–97)
PLATELET # BLD AUTO: 208 K/UL (ref 135–420)
PMV BLD AUTO: 9.4 FL (ref 9.2–11.8)
POTASSIUM SERPL-SCNC: 3.3 MMOL/L (ref 3.5–5.5)
RBC # BLD AUTO: 2.81 M/UL (ref 4.2–5.3)
SODIUM SERPL-SCNC: 136 MMOL/L (ref 136–145)
WBC # BLD AUTO: 5.4 K/UL (ref 4.6–13.2)

## 2020-04-16 PROCEDURE — 74011250636 HC RX REV CODE- 250/636: Performed by: HOSPITALIST

## 2020-04-16 PROCEDURE — 94660 CPAP INITIATION&MGMT: CPT

## 2020-04-16 PROCEDURE — 74011636637 HC RX REV CODE- 636/637: Performed by: HOSPITALIST

## 2020-04-16 PROCEDURE — 77010033678 HC OXYGEN DAILY

## 2020-04-16 PROCEDURE — 80048 BASIC METABOLIC PNL TOTAL CA: CPT

## 2020-04-16 PROCEDURE — 82962 GLUCOSE BLOOD TEST: CPT

## 2020-04-16 PROCEDURE — 83735 ASSAY OF MAGNESIUM: CPT

## 2020-04-16 PROCEDURE — 85027 COMPLETE CBC AUTOMATED: CPT

## 2020-04-16 PROCEDURE — 74011250637 HC RX REV CODE- 250/637: Performed by: HOSPITALIST

## 2020-04-16 PROCEDURE — 74011250636 HC RX REV CODE- 250/636: Performed by: INTERNAL MEDICINE

## 2020-04-16 PROCEDURE — 74011250637 HC RX REV CODE- 250/637: Performed by: INTERNAL MEDICINE

## 2020-04-16 PROCEDURE — 36415 COLL VENOUS BLD VENIPUNCTURE: CPT

## 2020-04-16 PROCEDURE — 74011000258 HC RX REV CODE- 258: Performed by: INTERNAL MEDICINE

## 2020-04-16 PROCEDURE — 97116 GAIT TRAINING THERAPY: CPT

## 2020-04-16 PROCEDURE — 65660000000 HC RM CCU STEPDOWN

## 2020-04-16 RX ORDER — LANOLIN ALCOHOL/MO/W.PET/CERES
1 CREAM (GRAM) TOPICAL
Status: DISCONTINUED | OUTPATIENT
Start: 2020-04-16 | End: 2020-04-24 | Stop reason: HOSPADM

## 2020-04-16 RX ORDER — INSULIN LISPRO 100 [IU]/ML
8 INJECTION, SOLUTION INTRAVENOUS; SUBCUTANEOUS
Status: DISCONTINUED | OUTPATIENT
Start: 2020-04-16 | End: 2020-04-17

## 2020-04-16 RX ORDER — POTASSIUM CHLORIDE 20 MEQ/1
20 TABLET, EXTENDED RELEASE ORAL DAILY
Status: DISCONTINUED | OUTPATIENT
Start: 2020-04-16 | End: 2020-04-23

## 2020-04-16 RX ORDER — AMOXICILLIN 250 MG
1 CAPSULE ORAL DAILY
Status: DISCONTINUED | OUTPATIENT
Start: 2020-04-16 | End: 2020-04-23

## 2020-04-16 RX ADMIN — FERROUS SULFATE TAB 325 MG (65 MG ELEMENTAL FE) 325 MG: 325 (65 FE) TAB at 08:54

## 2020-04-16 RX ADMIN — CARVEDILOL 6.25 MG: 3.12 TABLET, FILM COATED ORAL at 08:53

## 2020-04-16 RX ADMIN — HEPARIN SODIUM 5000 UNITS: 5000 INJECTION INTRAVENOUS; SUBCUTANEOUS at 14:28

## 2020-04-16 RX ADMIN — IRON SUCROSE 500 MG: 20 INJECTION, SOLUTION INTRAVENOUS at 08:56

## 2020-04-16 RX ADMIN — LEVOFLOXACIN 250 MG: 5 INJECTION, SOLUTION INTRAVENOUS at 14:28

## 2020-04-16 RX ADMIN — POTASSIUM CHLORIDE 20 MEQ: 1500 TABLET, EXTENDED RELEASE ORAL at 08:55

## 2020-04-16 RX ADMIN — DULOXETINE HYDROCHLORIDE 60 MG: 60 CAPSULE, DELAYED RELEASE ORAL at 08:55

## 2020-04-16 RX ADMIN — HYDRALAZINE HYDROCHLORIDE 100 MG: 50 TABLET, FILM COATED ORAL at 22:09

## 2020-04-16 RX ADMIN — HYDRALAZINE HYDROCHLORIDE 100 MG: 50 TABLET, FILM COATED ORAL at 17:09

## 2020-04-16 RX ADMIN — INSULIN LISPRO 8 UNITS: 100 INJECTION, SOLUTION INTRAVENOUS; SUBCUTANEOUS at 17:12

## 2020-04-16 RX ADMIN — INSULIN LISPRO 3 UNITS: 100 INJECTION, SOLUTION INTRAVENOUS; SUBCUTANEOUS at 06:41

## 2020-04-16 RX ADMIN — INSULIN LISPRO 2 UNITS: 100 INJECTION, SOLUTION INTRAVENOUS; SUBCUTANEOUS at 22:10

## 2020-04-16 RX ADMIN — HEPARIN SODIUM 5000 UNITS: 5000 INJECTION INTRAVENOUS; SUBCUTANEOUS at 06:40

## 2020-04-16 RX ADMIN — INSULIN LISPRO 9 UNITS: 100 INJECTION, SOLUTION INTRAVENOUS; SUBCUTANEOUS at 11:56

## 2020-04-16 RX ADMIN — CALCIUM ACETATE 667 MG: 667 CAPSULE ORAL at 08:54

## 2020-04-16 RX ADMIN — NYSTATIN: 100000 POWDER TOPICAL at 22:13

## 2020-04-16 RX ADMIN — HYDRALAZINE HYDROCHLORIDE 100 MG: 50 TABLET, FILM COATED ORAL at 08:55

## 2020-04-16 RX ADMIN — AMLODIPINE BESYLATE 5 MG: 5 TABLET ORAL at 08:54

## 2020-04-16 RX ADMIN — INSULIN LISPRO 5 UNITS: 100 INJECTION, SOLUTION INTRAVENOUS; SUBCUTANEOUS at 08:53

## 2020-04-16 RX ADMIN — FUROSEMIDE 10 MG/HR: 10 INJECTION, SOLUTION INTRAMUSCULAR; INTRAVENOUS at 16:34

## 2020-04-16 RX ADMIN — ASPIRIN 81 MG: 81 TABLET, COATED ORAL at 08:51

## 2020-04-16 RX ADMIN — INSULIN GLARGINE 15 UNITS: 100 INJECTION, SOLUTION SUBCUTANEOUS at 22:11

## 2020-04-16 RX ADMIN — GABAPENTIN 300 MG: 300 CAPSULE ORAL at 22:09

## 2020-04-16 RX ADMIN — SENNOSIDES AND DOCUSATE SODIUM 1 TABLET: 8.6; 5 TABLET ORAL at 17:10

## 2020-04-16 RX ADMIN — HEPARIN SODIUM 5000 UNITS: 5000 INJECTION INTRAVENOUS; SUBCUTANEOUS at 22:11

## 2020-04-16 RX ADMIN — CALCIUM ACETATE 667 MG: 667 CAPSULE ORAL at 17:10

## 2020-04-16 RX ADMIN — CARVEDILOL 6.25 MG: 3.12 TABLET, FILM COATED ORAL at 17:09

## 2020-04-16 RX ADMIN — INSULIN LISPRO 6 UNITS: 100 INJECTION, SOLUTION INTRAVENOUS; SUBCUTANEOUS at 17:11

## 2020-04-16 RX ADMIN — ATORVASTATIN CALCIUM 40 MG: 20 TABLET, FILM COATED ORAL at 08:53

## 2020-04-16 RX ADMIN — ONDANSETRON 4 MG: 2 INJECTION INTRAMUSCULAR; INTRAVENOUS at 17:52

## 2020-04-16 RX ADMIN — FAMOTIDINE 20 MG: 20 TABLET, FILM COATED ORAL at 08:52

## 2020-04-16 RX ADMIN — METOLAZONE 10 MG: 5 TABLET ORAL at 08:52

## 2020-04-16 RX ADMIN — Medication 1 TABLET: at 08:55

## 2020-04-16 RX ADMIN — INSULIN LISPRO 8 UNITS: 100 INJECTION, SOLUTION INTRAVENOUS; SUBCUTANEOUS at 11:57

## 2020-04-16 RX ADMIN — GABAPENTIN 300 MG: 300 CAPSULE ORAL at 17:10

## 2020-04-16 RX ADMIN — NYSTATIN: 100000 POWDER TOPICAL at 09:00

## 2020-04-16 RX ADMIN — CALCIUM ACETATE 667 MG: 667 CAPSULE ORAL at 11:57

## 2020-04-16 RX ADMIN — FAMOTIDINE 20 MG: 20 TABLET, FILM COATED ORAL at 22:09

## 2020-04-16 RX ADMIN — GABAPENTIN 300 MG: 300 CAPSULE ORAL at 08:55

## 2020-04-16 NOTE — PROGRESS NOTES
Cardiology Progress Note        Patient: Sergio Yang        Sex: female          DOA: 4/10/2020  YOB: 1965      Age:  53 y.o.        LOS:  LOS: 6 days   Assessment/Plan     Principal Problem:    Diastolic CHF, acute on chronic ([facility] [address] (2/16/2020)    Active Problems:    HTN (hypertension) (8/16/2014)      Type II or unspecified type diabetes mellitus with renal manifestations, uncontrolled(250.42) ([address] (9/4/2014)      Morbid obesity (HCC) ()      Respiratory distress (1/17/2019)      CHF (congestive heart failure) ([facility] [address] (4/10/2020)      CKD (chronic kidney disease) stage 4, GFR 15-29 ml/min ([facility]) (4/10/2020)      Elevated troponin (4/10/2020)        Plan:  SOB  CHF  Elevated trop with CRI    Feeling better on diuretic treatment, IV lasix infusion and metolazone  Cardiac tele stable. sinus rhythm  Discussed with Dr. Marry Land nephrologist- prefer to avoid contrast dye- will continue with medical treatment. Discussed with patient- no plan for cardiac cath during this hospitalization until change in clinic status. Subjective:    cc:    SOB  CHF  Elevated trop with CRI        REVIEW OF SYSTEMS:     General: No fevers or chills. Cardiovascular: No chest pain or pressure. No palpitations. No ankle swelling  Pulmonary: + SOB, orthopnea, PND  Gastrointestinal: No nausea, vomiting or diarrhea      Objective:      Visit Vitals  /45   Pulse 76   Temp 97.7 °F (36.5 °C)   Resp 18   Ht 5' 2\" (1.575 m)   Wt 152 kg (335 lb 3.2 oz)   SpO2 100%   BMI 61.31 kg/m²     Body mass index is 61.31 kg/m². Physical Exam:  General Appearance: Comfortable, not using accessory muscles of respiration. NECK: No JVD, no thyroidomeglay. LUNGS: Clear bilaterally. HEART: S1+S2 audible,    ABD: Non-tender, BS Audible    EXT: ++ edema, and no cysnosis. VASCULAR EXAM: Pulses are intact. PSYCHIATRIC EXAM: Mood is appropriate.     Medication:  Current Facility-Administered Medications Medication Dose Route Frequency    ferrous sulfate tablet 325 mg  1 Tab Oral DAILY WITH BREAKFAST    potassium chloride (K-DUR, KLOR-CON) SR tablet 20 mEq  20 mEq Oral DAILY    insulin lispro (HUMALOG) injection 8 Units  8 Units SubCUTAneous TIDAC    famotidine (PEPCID) tablet 20 mg  20 mg Oral BID    iron sucrose (VENOFER) 500 mg in 0.9% sodium chloride 250 mL IVPB  500 mg IntraVENous DAILY    insulin glargine (LANTUS) injection 15 Units  15 Units SubCUTAneous QHS    levoFLOXacin (LEVAQUIN) 250 mg in D5W IVPB  250 mg IntraVENous Q24H    furosemide (LASIX) 100 mg in 0.9% sodium chloride 100 mL infusion  10 mg/hr IntraVENous CONTINUOUS    metOLazone (ZAROXOLYN) tablet 10 mg  10 mg Oral DAILY    nystatin (MYCOSTATIN) 100,000 unit/gram powder   Topical BID    albuterol-ipratropium (DUO-NEB) 2.5 MG-0.5 MG/3 ML  3 mL Nebulization Q4H PRN    atorvastatin (LIPITOR) tablet 40 mg  40 mg Oral DAILY    calcium acetate(phosphat bind) (PHOSLO) capsule 667 mg  1 Cap Oral TID WITH MEALS    DULoxetine (CYMBALTA) capsule 60 mg  60 mg Oral DAILY    gabapentin (NEURONTIN) capsule 300 mg  300 mg Oral TID    hydrALAZINE (APRESOLINE) tablet 100 mg  100 mg Oral TID    insulin lispro (HUMALOG) injection   SubCUTAneous AC&HS    glucose chewable tablet 16 g  4 Tab Oral PRN    glucagon (GLUCAGEN) injection 1 mg  1 mg IntraMUSCular PRN    heparin (porcine) injection 5,000 Units  5,000 Units SubCUTAneous Q8H    acetaminophen (TYLENOL) tablet 650 mg  650 mg Oral Q6H PRN    ondansetron (ZOFRAN) injection 4 mg  4 mg IntraVENous Q6H PRN    amLODIPine (NORVASC) tablet 5 mg  5 mg Oral DAILY    aspirin delayed-release tablet 81 mg  81 mg Oral DAILY    carvediloL (COREG) tablet 6.25 mg  6.25 mg Oral BID WITH MEALS    cholecalciferol (VITAMIN D3) (1000 Units /25 mcg) tablet 1 Tab  1,000 Units Oral DAILY    HYDROcodone-acetaminophen (NORCO) 5-325 mg per tablet 1 Tab  1 Tab Oral Q4H PRN               Lab/Data Reviewed:  Procedures/imaging: see electronic medical records for all procedures/Xrays   and details which were not copied into this note but were reviewed prior to creation of Plan       All lab results for the last 24 hours reviewed.      Recent Labs     04/16/20  0405 04/15/20  0305 04/14/20  0235   WBC 5.4 6.1 6.4   HGB 7.2* 7.3* 7.4*   HCT 23.0* 23.7* 24.3*    218 207     Recent Labs     04/16/20  0405 04/15/20  0305 04/14/20  0235    133* 134*   K 3.3* 3.6 4.8   CL 94* 94* 97*   CO2 31 30 28   * 133* 196*   * 97* 98*   CREA 2.58* 2.64* 2.75*   CA 8.6 8.9 9.1  9.0       Signed By: Helga Olvera MD     April 16, 2020

## 2020-04-16 NOTE — CDMP QUERY
Patient admitted with  SOB, noted to be morbidly  obese . If possible, please document in progress notes and d/c summary if you are evaluating and/or treating any of the following: 
 
=> Obesity Hypoventilation Syndrome Acute respiratory failure ( specify type) Both OHS and acute respiratory failure Acute respiratory failure ruled out Obesity Hypoventilation Syndrome.  
=> Other Explanation of clinical findings 
=> Clinically Undetermined (no explanation for clinical findings) The medical record reflects the following: 
   Risk Factors: Obese Clinical Indicators: Patient c/o sob, difficulty breathing , patient is morbidly obese weight  340 , BMI >50. Patient with increase work of breathing. ABG's PCO2 51.7 Treatment: cpap, bipap REFERENCE: 
Obesity hypoventilation syndrome (OHS) is a condition in which severely overweight people fail to breathe rapidly enough or deeply enough, resulting in low blood oxygen levels and high blood carbon dioxide levels. OHS is defined as a combination of obesity, BMI > or equal to 30kg/m2 with awake chronic hypercapnea (PaC02 >45mm Hg) and sleep disordered breathing. Approximately 90% of patients with OHS also have obstructive sleep apnea. Please clarify and document your clinical opinion in the progress notes and discharge summary including the definitive and/or presumptive diagnosis, (suspected or probable), related to the above clinical findings. Please include clinical findings supporting your Thank- You Pieter Pompa RN CD[facility] Cell [phone]

## 2020-04-16 NOTE — PROGRESS NOTES
Problem: Diabetes Self-Management  Goal: *Disease process and treatment process  Description: Define diabetes and identify own type of diabetes; list 3 options for treating diabetes.   Outcome: Progressing Towards Goal

## 2020-04-16 NOTE — PROGRESS NOTES
Problem: Pressure Injury - Risk of  Goal: *Prevention of pressure injury  Description: Document Elian Scale and appropriate interventions in the flowsheet. Outcome: Progressing Towards Goal  Note: Pressure Injury Interventions:       Moisture Interventions: Internal/External urinary devices, Apply protective barrier, creams and emollients    Activity Interventions: Pressure redistribution bed/mattress(bed type)    Mobility Interventions: Pressure redistribution bed/mattress (bed type), HOB 30 degrees or less    Nutrition Interventions: Document food/fluid/supplement intake, Offer support with meals,snacks and hydration    Friction and Shear Interventions: Apply protective barrier, creams and emollients, HOB 30 degrees or less                Problem: Falls - Risk of  Goal: *Absence of Falls  Description: Document Mayito Fall Risk and appropriate interventions in the flowsheet.   Outcome: Progressing Towards Goal  Note: Fall Risk Interventions:  Mobility Interventions: Communicate number of staff needed for ambulation/transfer, Patient to call before getting OOB, Strengthening exercises (ROM-active/passive)         Medication Interventions: Teach patient to arise slowly, Patient to call before getting OOB    Elimination Interventions: Call light in reach, Patient to call for help with toileting needs, Toileting schedule/hourly rounds    History of Falls Interventions: Door open when patient unattended         Problem: Patient Education: Go to Patient Education Activity  Goal: Patient/Family Education  Outcome: Progressing Towards Goal     Problem: Patient Education: Go to Patient Education Activity  Goal: Patient/Family Education  Outcome: Progressing Towards Goal     Problem: Pain  Goal: *Control of Pain  Outcome: Progressing Towards Goal

## 2020-04-16 NOTE — WOUND CARE
Chart reviewed due to low rylee score. No skin issues or pressure injuries noted at this time. Skin Care & Pressure Relief Recommendations Minimize layers of linen Pads under patient to optimize support surface Turn/reposition approximately every 2 hours Pillow wedges Manage incontinence Please use in bed position system Promote continence; Skin Protective lotion/cream to buttocks and sacrum daily and as needed with incontinence care Offload heels pillows

## 2020-04-16 NOTE — DIABETES MGMT
GLYCEMIC CONTROL PROGRESS NOTE:    - known h/o T2DM with four admissions to [facility] this year, HbA1C trending down, TID mixed insulin home regimen  - BG out of target range non-ICU: < 180 mg/dl  - TDD = 41 (15 Lantus + 15 (5) + 12 - Humalog Very Insulin Resistant Corrective Coverage)  - mealtime insulin increased per Hospitalist   - Humalog Normal Insulin Sensitivity Corrective Coverage (orders entered per protocol)    Recent Glucose Results:   Lab Results   Component Value Date/Time     (H) 04/16/2020 04:05 AM    GLUCPOC 269 (H) 04/16/2020 11:27 AM    GLUCPOC 198 (H) 04/16/2020 06:21 AM    GLUCPOC 181 (H) 04/15/2020 09:16 PM         Prashant Mackey MS, RN, CDE  Glycemic Control Team  [phone]  Pager [phone] (M-TH 8:00-4:30P)  *After Hours pager [phone]

## 2020-04-16 NOTE — PROGRESS NOTES
Problem: Mobility Impaired (Adult and Pediatric)  Goal: *Acute Goals and Plan of Care (Insert Text)  Description: Physical Therapy Goals   Initiated 4/11/2020 and to be accomplished within 7 day(s)  1. Patient will move from supine <> sit with S in prep for out of bed activity and change of position. 2.  Patient will perform sit<> stand with S/RW in prep for transfers/ambulation. 3.  Patient will ambulate 50 feet with S/RW for improved functional mobility/safe discharge. Outcome: Progressing Towards Goal   PHYSICAL THERAPY TREATMENT    Patient: Iglesia Coker (53 y.o. female)  Date: 4/16/2020  Diagnosis: CHF (congestive heart failure) ([facility]) [E59.5]   Diastolic CHF, acute on chronic [facility])    Precautions: Fall, Skin  PLOF: lives alone, BRW delivered to room, family lives close to assist    ASSESSMENT:  Pt utilized bed rail to assist with sitting up EOB. Sit to stand performed with bed in lowest position without assistance. Performed lateral and forward/backward step with BRW, 2 seated rest breaks. Returned to supine in in bed, pur-wick repositioned and HOB elevate fully per pt request.  Progression toward goals:   []      Improving appropriately and progressing toward goals  [x]      Improving slowly and progressing toward goals  []      Not making progress toward goals and plan of care will be adjusted     PLAN:  Patient continues to benefit from skilled intervention to address the above impairments. Continue treatment per established plan of care. Discharge Recommendations:  Home Health  Further Equipment Recommendations for Discharge:  rolling walker     SUBJECTIVE:   Patient stated Tash Kay got up last night and walked to the bathroom.     OBJECTIVE DATA SUMMARY:   Critical Behavior:  Neurologic State: Alert  Orientation Level: Oriented X4  Cognition: Appropriate decision making, Appropriate for age attention/concentration, Appropriate safety awareness, Follows commands    Functional Mobility Training:  Bed Mobility:    Supine to Sit: Additional time;Modified independent  Sit to Supine: Supervision  Scooting: Supervision    Transfers:  Sit to Stand: Stand-by assistance  Stand to Sit: Supervision  Balance:  Sitting: Intact  Standing: With support  Standing - Static: Fair  Standing - Dynamic : Fair   Ambulation/Gait Training:  Distance (ft): 8 Feet (ft)  Assistive Device: Walker, rolling  Ambulation - Level of Assistance: Stand-by assistance  Gait Abnormalities: Decreased step clearance;Shuffling gait    Base of Support: Widened    Speed/Arlette: Slow  Step Length: Right shortened;Left shortened  Pain:  Pain level pre-treatment: 0/10  Pain level post-treatment: 0/10   Pain Intervention(s): Medication (see MAR); Rest, Ice, Repositioning   Response to intervention: Nurse notified, See doc flow    Activity Tolerance:   Fair-  Please refer to the flowsheet for vital signs taken during this treatment. After treatment:   [] Patient left in no apparent distress sitting up in chair  [x] Patient left in no apparent distress in bed  [x] Call bell left within reach  [] Nursing notified  [] Caregiver present  [] Bed alarm activated  [] SCDs applied      COMMUNICATION/EDUCATION:   [x]         Role of Physical Therapy in the acute care setting. [x]         Fall prevention education was provided and the patient/caregiver indicated understanding. [x]         Patient/family have participated as able in working toward goals and plan of care. [x]         Patient/family agree to work toward stated goals and plan of care. []         Patient understands intent and goals of therapy, but is neutral about his/her participation.   []         Patient is unable to participate in stated goals/plan of care: ongoing with therapy staff.  []         Other:        Juan José Swift PTA   Time Calculation: 17 mins

## 2020-04-16 NOTE — ROUTINE PROCESS
Shift was uneventful. Bedside and Verbal shift change report given to Saman Mckay RN (oncoming nurse) by Kevan Romero RN (offgoing nurse). Report included the following information SBAR, Kardex, Intake/Output, MAR, Accordion, Recent Results and Med Rec Status.

## 2020-04-16 NOTE — PROGRESS NOTES
JUANIS HASSAN  CLINICAL NURSE SPECIALIST CONSULT      Presentation:   Alissa Singleton is a 53 y.o. female admitted with CHF, chronic respiratory failure, elevated troponin and consulted by nursing for expert-level advanced nursing assessment and care, specifically related to heart failure health promotion, risk reduction, and prevention of worsening illness. Current clinical course has been complicated by severe SOB and edema.      Past Medical History:   Diagnosis Date    Asthma     Note: As child had asthma    Diabetes mellitus ([facility] [address]     Type 2    Heart palpitations     Hypertension     Ill-defined condition     fibromyalgia     Morbid obesity ([facility] [address]     MRSA infection 8/2014       Current Facility-Administered Medications   Medication Dose Route Frequency    ferrous sulfate tablet 325 mg  1 Tab Oral DAILY WITH BREAKFAST    potassium chloride (K-DUR, KLOR-CON) SR tablet 20 mEq  20 mEq Oral DAILY    famotidine (PEPCID) tablet 20 mg  20 mg Oral BID    iron sucrose (VENOFER) 500 mg in 0.9% sodium chloride 250 mL IVPB  500 mg IntraVENous DAILY    insulin glargine (LANTUS) injection 15 Units  15 Units SubCUTAneous QHS    insulin lispro (HUMALOG) injection 5 Units  5 Units SubCUTAneous TIDAC    levoFLOXacin (LEVAQUIN) 250 mg in D5W IVPB  250 mg IntraVENous Q24H    furosemide (LASIX) 100 mg in 0.9% sodium chloride 100 mL infusion  10 mg/hr IntraVENous CONTINUOUS    metOLazone (ZAROXOLYN) tablet 10 mg  10 mg Oral DAILY    nystatin (MYCOSTATIN) 100,000 unit/gram powder   Topical BID    albuterol-ipratropium (DUO-NEB) 2.5 MG-0.5 MG/3 ML  3 mL Nebulization Q4H PRN    atorvastatin (LIPITOR) tablet 40 mg  40 mg Oral DAILY    calcium acetate(phosphat bind) (PHOSLO) capsule 667 mg  1 Cap Oral TID WITH MEALS    DULoxetine (CYMBALTA) capsule 60 mg  60 mg Oral DAILY    gabapentin (NEURONTIN) capsule 300 mg  300 mg Oral TID    hydrALAZINE (APRESOLINE) tablet 100 mg  100 mg Oral TID    insulin lispro (HUMALOG) injection   SubCUTAneous AC&HS    glucose chewable tablet 16 g  4 Tab Oral PRN    glucagon (GLUCAGEN) injection 1 mg  1 mg IntraMUSCular PRN    heparin (porcine) injection 5,000 Units  5,000 Units SubCUTAneous Q8H    acetaminophen (TYLENOL) tablet 650 mg  650 mg Oral Q6H PRN    ondansetron (ZOFRAN) injection 4 mg  4 mg IntraVENous Q6H PRN    amLODIPine (NORVASC) tablet 5 mg  5 mg Oral DAILY    aspirin delayed-release tablet 81 mg  81 mg Oral DAILY    carvediloL (COREG) tablet 6.25 mg  6.25 mg Oral BID WITH MEALS    cholecalciferol (VITAMIN D3) (1000 Units /25 mcg) tablet 1 Tab  1,000 Units Oral DAILY    HYDROcodone-acetaminophen (NORCO) 5-325 mg per tablet 1 Tab  1 Tab Oral Q4H PRN       Subjective:   Pt observed with severe shortness of breath. Using CPAP continuously  Pt states she is \"too weak\" to complete ADLs. Pt has desire to return to home after hospitalization and is receptive to  cardiac rehab. Objective: This ill-appearing female is alert, oriented and ion high flow O2. Visit Vitals  /65   Pulse 76   Temp 97.8 °F (36.6 °C)   Resp 18   Ht 5' 2\" (1.575 m)   Wt 152 kg (335 lb 3.2 oz)   SpO2 100%   BMI 61.31 kg/m²        Relevant laboratory and diagnostic data includes:     Physical examination Eyes   Ears, nose, mouth, and throat   Cardiovascular   Respiratory   Gastrointestinal   Genitourinary   Musculoskeletal   Skin   Neurologic   Psychiatric   Hematologic/lymphatic/immunologic      Nursing Assessment and Plan:     Nursing Diagnosis Respiratory distress, activity intolerance, obesity, excess fluid volume, fatigue, risk for fall, risk for skin integrity   Nursing Intervention(s) Optimize PT/OT with patient, utilized fall precautions , encourage mobility- out of bed for meals. Expected Nursing Outcome(s) Pt will self transfer from bed to chair, pt will describe reading labels to choose healthy heart food choices, pt will demonstrate/verbalize energy conservation techniques. Evaluation:    Patient with SOB as evidenced by continuous CPAP use related to obesity, CHF and chronic diabetes. Pt with out disability benefits, or other income, states she has Medicaid. Pt with need for cardiac rehab, rolling walker at home and assistance with obtaining prescriptions. Recommendations:   1. PT/OT to teach energy conservation and maximize mobility  2. Attendance to monthly Heart Failure Support Group  3. Home with home health  4.  Follow up with PCP for respiratory issues      Billing Codes:   [address], [facility], Heart Failure Coordinator   Direct phone line: [phone]

## 2020-04-16 NOTE — PROGRESS NOTES
Transition of care: anticipate home when medically cleared  (did not enter room due to covid restrictions)  Telephone call with patient states she is still having shortness of breath. Patient still wants to return home when she is cleared. Patient lives alone and adult children that live near by. Patient has medicaid for insurance. She has chosen to use personal touch per foc. She does not want to go to acute rehabs. Cm will continue to follow she has medicaid and Dr. Huma Polk for pcp. Care Management Interventions  PCP Verified by CM: Yes  Mode of Transport at Discharge:  Other (see comment)(family)  Transition of Care Consult (CM Consult): Discharge Planning, [address]: No  Reason Outside [place]: Patient already serviced by other home care/hospice agency  Physical Therapy Consult: Yes  Current Support Network: Lives Alone, Family Lives Nearby  Confirm Follow Up Transport: Family  The Plan for Transition of Care is Related to the Following Treatment Goals : home with [facility]  The Patient and/or Patient Representative was Provided with a Choice of Provider and Agrees with the Discharge Plan?: Yes  Name of the Patient Representative Who was Provided with a Choice of Provider and Agrees with the Discharge Plan: PATIENT  Freedom of Choice List was Provided with Basic Dialogue that Supports the Patient's Individualized Plan of Care/Goals, Treatment Preferences and Shares the Quality Data Associated with the Providers?: Yes  Discharge Location  Discharge Placement: Home with home health

## 2020-04-16 NOTE — PROGRESS NOTES
Hospitalist Progress Note    Patient: Alissa Singleton MRN: [id number]  CSN: [id number]    YOB: 1965  Age: 53 y.o.   Sex: female    DOA: 4/10/2020 LOS:  LOS: 6 days      CDI Query responses:    -Type 2 diabetes uncontrolled with hyperglycemia  -OHS, no acute resp failure though, she has chronic resp failure  -demand ischemia-POA  -mild hyponatremia due to diuresis

## 2020-04-16 NOTE — PROGRESS NOTES
Hospitalist Progress Note    Patient: Radha Bass MRN: [id number]  CSN: [id number]    YOB: 1965  Age: 53 y.o.   Sex: female    DOA: 4/10/2020 LOS:  LOS: 6 days          Chief Complaint:  SOB        Assessment/Plan     Acute on chronic decompensated diastolic CHF-appreciate cardiology and nephrology help  Now on zaroxolyn and lasix IV Gtt, no cath planned at this time     Pulmonary HTN     IDDM type 2 with renal disease uncontrolled-lantus plus premeal insulin    Hypokalemia-PO repletion     HTN-stable control on current regimen     CKD stage 4-follow daily BMP asael with increased diuretic dosing-stable CR     АНДРЕЙ-likely also OHS-CPAP at night and during day as needed, on chronic NC 02 also at home    Chronic resp failure at home on 02     Morbid obesity     Elevated troponin, no chest pain-neg stress in December 2019     Anemia of CKD-add iron PO and received IV also     Continue lasix and zaroxolyn  Monitor daily BMP for lytes and renal function  Follow BP and for arrhythmias on tele  PT as tolerated     Daily BMP    Nausea/vomiting resolved today     Continue diabetic monitoring as well as basal and premeal insulins     DVT prophylaxis-heparin SQ    Expect 2-3 more days inpatient  Disposition :  Patient Active Problem List   Diagnosis Code    HTN (hypertension) I10    Type II or unspecified type diabetes mellitus with renal manifestations, uncontrolled(250.42) ([facility] E11.29, E11.65    Morbid obesity ([facility] E66.01    Respiratory distress R06.03    АНДРЕЙ (obstructive sleep apnea) C61.26    Diastolic CHF, acute on chronic ([facility]) I50.33    CHF (congestive heart failure) ([facility]) I50.9    CKD (chronic kidney disease) stage 4, GFR 15-29 ml/min ([facility]) N18.4    Elevated troponin R79.89       Subjective:    Feeling a bit better today  Is eating now  Less SOB, could lie back and not feel winded  No chest pain    Review of systems:    Constitutional: denies fevers, chills  Respiratory: denies cough  Cardiovascular: denies palpitations  Gastrointestinal: denies nausea, vomiting, diarrhea      Vital signs/Intake and Output:  Visit Vitals  /57   Pulse 76   Temp 98.4 °F (36.9 °C)   Resp 18   Ht 5' 2\" (1.575 m)   Wt 152 kg (335 lb 3.2 oz)   SpO2 99%   BMI 61.31 kg/m²     Current Shift:  No intake/output data recorded. Last three shifts:  04/14 1901 - 04/16 0700  In: 1123.3 [P.O.:840; I.V.:283.3]  Out: 1300 [Urine:1300]    Exam:    General: obese WF, alert, NAD, OX3  Head/Neck: NCAT, supple, No masses, No lymphadenopathy  CVS:Regular rate and rhythm, no M/R/G, S1/S2 heard, no thrill  Lungs:Clear to auscultation bilaterally, no wheezes, rhonchi, or rales  Abdomen: Soft, Nontender, No distention, Normal Bowel sounds, No hepatomegaly  Extremities: 2-3 plus edema LE  Neuro:grossly normal , follows commands  Psych:appropriate                Labs: Results:       Chemistry Recent Labs     04/16/20  0405 04/15/20  0305 04/14/20  0235   * 133* 196*    133* 134*   K 3.3* 3.6 4.8   CL 94* 94* 97*   CO2 31 30 28   * 97* 98*   CREA 2.58* 2.64* 2.75*   CA 8.6 8.9 9.1  9.0   AGAP 11 9 9   BUCR 39* 37* 36*   ALB  --   --  3.1*      CBC w/Diff Recent Labs     04/16/20  0405 04/15/20  0305 04/14/20  0235   WBC 5.4 6.1 6.4   RBC 2.81* 2.86* 2.90*   HGB 7.2* 7.3* 7.4*   HCT 23.0* 23.7* 24.3*    218 207      Cardiac Enzymes No results for input(s): CPK, CKND1, MARIA ISABEL in the last 72 hours. No lab exists for component: CKRMB, TROIP   Coagulation No results for input(s): PTP, INR, APTT, INREXT in the last 72 hours. Lipid Panel Lab Results   Component Value Date/Time    Cholesterol, total 103 04/14/2020 02:35 AM    HDL Cholesterol 46 04/14/2020 02:35 AM    LDL, calculated 36.2 04/14/2020 02:35 AM    VLDL, calculated 20.8 04/14/2020 02:35 AM    Triglyceride 104 04/14/2020 02:35 AM    CHOL/HDL Ratio 2.2 04/14/2020 02:35 AM      BNP No results for input(s): BNPP in the last 72 hours.    Liver Enzymes Recent Labs     04/14/20  0235   ALB 3.1*      Thyroid Studies Lab Results   Component Value Date/Time    TSH 2.18 04/14/2020 02:35 AM        Procedures/imaging: see electronic medical records for all procedures/Xrays and details which were not copied into this note but were reviewed prior to creation of Julito Lind MD

## 2020-04-16 NOTE — PROGRESS NOTES
INITIAL NUTRITION ASSESSMENT     RECOMMENDATIONS/PLAN:   Encourage PO intakes >75%  Continue POC  Monitor labs/lytes, fluid status, bowel function, skin integrity. REASON FOR ASSESSMENT:     [x] LOS    NUTRITION ASSESSMENT:   Client History: 53 yrs old Female admitted with CHF, chronic respiratory failure, elevated troponin, cardiology following per MD note. PMHx: obstructive sleep apnea, morbid obesity, fibromyalgia, HTN, HF, CKD.    Cultural/Islam Food Preferences: None Identified    FOOD/NUTRITION HISTORY  Diet History: no evidence of nutritional deficits PTA per chart review  Food Allergies:  [] NKFA     [x] Yes (strawberry)   Pertinent PTA Medications: insulin, vitamin d3, phoslo    NUTRITION INTAKE   Diet Order: diabetic diet 2000kcal   Average PO Intake:       Patient Vitals for the past 100 hrs:   % Diet Eaten   04/16/20 0651 100 %   04/15/20 0901 100 %   04/13/20 1722 100 %   04/13/20 1331 100 %   04/13/20 0818 0 %   04/12/20 1317 75 %   04/12/20 1043 75 %      Pertinent Medications:  [x] Reviewed; phoslo, vitamin d3, pepcid, lantus, lispro, iron, Kcl  Electrolyte Replacement Protocol: []K  []Mg  []PO4    Insulin:  [] SSI  [x] Pre-meal   [x]  Basal   [] Drip  [] None  Pt expected to meet estimated nutrient needs through next review:          [x]  Yes    ANTHROPOMETRICS  Height: 5' 2\" (157.5 cm)       Weight: 152 kg (335 lb 3.2 oz)    BMI: 61.3 kg/m^2  -  morbidly obese (Greater than or = to 40% BMI)        Weight change: wt hx reviewed, no recent wt loss, wt fluctuations likely secondary to CHF                                    Comparison to Reference Standards:  IBW: 110 lbs      %IBW: 304%      AdjBW: 75 kg    NUTRITION-FOCUSED PHYSICAL ASSESSMENT  Skin: no pressure injuries per documentation  GI: BM 4/13    BIOCHEMICAL DATA & MEDICAL TESTS  Pertinent Labs:  [x] Reviewed; K-3.3    NUTRITION PRESCRIPTION  Calories: 1625 kcal/day based on 25kcal/kg IBW x 1.3  Protein: 75 g/day based on 1.5 g/kg IBW  CHO: 203 g/day based on 50% of total energy  Fluid: 1625 ml/day based on 1 kcal/ml      NUTRITION DIAGNOSES:   1. No nutrition dx at this time    NUTRITION INTERVENTIONS:   INTERVENTIONS:        GOALS:  1. Continue POC 1. Encourage PO intakes >75% throughout the next 7 days     LEARNING NEEDS (Diet, Supplementation, Food/Nutrient-Drug Interaction):   [x] None Identified  [] Inpatient education provided/documented    [] Identified and patient:  [] Declined     [] Was not appropriate/indicated  NUTRITION MONITORING /EVALUATION:   Follow PO intake  Monitor wt  Monitor renal labs, electrolytes, fluid status  Monitor for additional supplement needs    [] Participated in Interdisciplinary Rounds  [x] [address] [place] [address] Reviewed/Documented  DISCHARGE NUTRITION RECOMMENDATIONS ADDRESSED:        [x] To be determined closer to discharge    NUTRITION RISK:     [x]  At risk                     []  Not currently at risk     Will follow-up per policy.   Arturo Bullock 1

## 2020-04-16 NOTE — PROGRESS NOTES
Nephrology Progress note    Subjective: Jordi Tinsley a 53 y. o. female with a past medical history significant for DM type 2, HTN, TIA, CKD IV, HLD, АНДРЕЙ, Obesity, Chronic Anemia, Diastolic CHF who presented with c/o SOB. Her CXR showed Pulmonary edema and small bilateral effusions consistent with CHF. S Cr was 2.6 on admission and remains unchanged today.    Patient is followed at our office by Dr Ponce Paredes, most recent S Cr 2.6 on 2/26/20. She was last seen on 3/4/20 and maintained on diuretics, oral Furosemide + Metolazone. Since admission she has been placed on intermittent  IV Lasix. Switched to lasix gtt and po metolazone. Reports less SOB today and Less orthopneic.  Cr stable,  2.58      Admit Date: 4/10/2020  Principal Problem:    Diastolic CHF, acute on chronic ([facility] (2/16/2020)    Active Problems:    HTN (hypertension) (8/16/2014)      Type II or unspecified type diabetes mellitus with renal manifestations, uncontrolled(250.42) ([address] (9/4/2014)      Morbid obesity (HCC) ()      Respiratory distress (1/17/2019)      CHF (congestive heart failure) ([facility] (4/10/2020)      CKD (chronic kidney disease) stage 4, GFR 15-29 ml/min ([facility]) (4/10/2020)      Elevated troponin (4/10/2020)      Current Facility-Administered Medications   Medication Dose Route Frequency    ferrous sulfate tablet 325 mg  1 Tab Oral DAILY WITH BREAKFAST    potassium chloride (K-DUR, KLOR-CON) SR tablet 20 mEq  20 mEq Oral DAILY    insulin lispro (HUMALOG) injection 8 Units  8 Units SubCUTAneous TIDAC    famotidine (PEPCID) tablet 20 mg  20 mg Oral BID    iron sucrose (VENOFER) 500 mg in 0.9% sodium chloride 250 mL IVPB  500 mg IntraVENous DAILY    insulin glargine (LANTUS) injection 15 Units  15 Units SubCUTAneous QHS    levoFLOXacin (LEVAQUIN) 250 mg in D5W IVPB  250 mg IntraVENous Q24H    furosemide (LASIX) 100 mg in 0.9% sodium chloride 100 mL infusion  10 mg/hr IntraVENous CONTINUOUS    metOLazone (ZAROXOLYN) tablet 10 mg  10 mg Oral DAILY    nystatin (MYCOSTATIN) 100,000 unit/gram powder   Topical BID    albuterol-ipratropium (DUO-NEB) 2.5 MG-0.5 MG/3 ML  3 mL Nebulization Q4H PRN    atorvastatin (LIPITOR) tablet 40 mg  40 mg Oral DAILY    calcium acetate(phosphat bind) (PHOSLO) capsule 667 mg  1 Cap Oral TID WITH MEALS    DULoxetine (CYMBALTA) capsule 60 mg  60 mg Oral DAILY    gabapentin (NEURONTIN) capsule 300 mg  300 mg Oral TID    hydrALAZINE (APRESOLINE) tablet 100 mg  100 mg Oral TID    insulin lispro (HUMALOG) injection   SubCUTAneous AC&HS    glucose chewable tablet 16 g  4 Tab Oral PRN    glucagon (GLUCAGEN) injection 1 mg  1 mg IntraMUSCular PRN    heparin (porcine) injection 5,000 Units  5,000 Units SubCUTAneous Q8H    acetaminophen (TYLENOL) tablet 650 mg  650 mg Oral Q6H PRN    ondansetron (ZOFRAN) injection 4 mg  4 mg IntraVENous Q6H PRN    amLODIPine (NORVASC) tablet 5 mg  5 mg Oral DAILY    aspirin delayed-release tablet 81 mg  81 mg Oral DAILY    carvediloL (COREG) tablet 6.25 mg  6.25 mg Oral BID WITH MEALS    cholecalciferol (VITAMIN D3) (1000 Units /25 mcg) tablet 1 Tab  1,000 Units Oral DAILY    HYDROcodone-acetaminophen (NORCO) 5-325 mg per tablet 1 Tab  1 Tab Oral Q4H PRN         Allergy:   Allergies   Allergen Reactions    Bees [Sting, Bee] Swelling    Penicillins Hives    Strawberry Hives        Objective:     Visit Vitals  /45   Pulse 76   Temp 97.7 °F (36.5 °C)   Resp 18   Ht 5' 2\" (1.575 m)   Wt 152 kg (335 lb 3.2 oz)   SpO2 100%   BMI 61.31 kg/m²         Intake/Output Summary (Last 24 hours) at 4/16/2020 1516  Last data filed at 4/16/2020 0651  Gross per 24 hour   Intake 530 ml   Output 800 ml   Net -270 ml       Physical Exam:     General: Obese, NAD, on CPAP    HENT: Atraumatic and normocephalic   Eyes: Normal conjunctiva   Neck: Supple. No JVD   Cardiovascular: Normal S1 & S2, no m/r/g   Pulmonary/Chest Wall: Decreased BS both bases   Abdominal: Soft and non-tender Musculoskeletal: +++ Bilat pedal  Edema and 2+ Bilat. uppper ext edema   Neurological: AAOx4, no change today          Data Review:  Lab Results   Component Value Date/Time    Sodium 136 04/16/2020 04:05 AM    Potassium 3.3 (L) 04/16/2020 04:05 AM    Chloride 94 (L) 04/16/2020 04:05 AM    CO2 31 04/16/2020 04:05 AM    Anion gap 11 04/16/2020 04:05 AM    Glucose 174 (H) 04/16/2020 04:05 AM     (H) 04/16/2020 04:05 AM    Creatinine 2.58 (H) 04/16/2020 04:05 AM    BUN/Creatinine ratio 39 (H) 04/16/2020 04:05 AM    GFR est AA 23 (L) 04/16/2020 04:05 AM    GFR est non-AA 19 (L) 04/16/2020 04:05 AM    Calcium 8.6 04/16/2020 04:05 AM     Lab Results   Component Value Date/Time    WBC 5.4 04/16/2020 04:05 AM    HGB 7.2 (L) 04/16/2020 04:05 AM    HCT 23.0 (L) 04/16/2020 04:05 AM    PLATELET 938 34/85/6336 04:05 AM    MCV 81.9 04/16/2020 04:05 AM     Lab Results   Component Value Date/Time    Calcium 8.6 04/16/2020 04:05 AM    Phosphorus 4.7 04/14/2020 02:35 AM     Lab Results   Component Value Date/Time    Iron 32 (L) 04/14/2020 04:00 AM    TIBC 303 04/14/2020 04:00 AM    Iron % saturation 11 (L) 04/14/2020 04:00 AM    Ferritin 418 (H) 04/14/2020 02:35 AM     Lab Results   Component Value Date/Time    Ferritin 418 (H) 04/14/2020 02:35 AM         Impression:     1. CKD stage IV likely sec to diabetic Nephropathy/Hypertensive Nephrosclerosis. S Cr has been stable around 2.6, component of prerenal azotemia sec to diuresis. 2. Diastolic CHF LVEF 60 to 63%  3. АНДРЕЙ  4. Anemia of CKD + Iron deficiency  5. Pulm HTN, Mild to moderate  6. DM type 2  7. HTN  8. Morbid Obesity  9. Hypokalemia sec to diuresis  10.  Metabolic alkalosis: mild contraction alkalosis sec to diuretic    Plan:     - Continue Lasix drip at 10mg/hr and oral Metolazone for synergy  - Supplement Potassium  - Strict I/Os and monitor AM labs  - Continue IV iron  - Patient may need JUAN C after her Iron repletion  - Dietary Sodium restriction  - Avoid IV radiocontrast or NSAIDs  - No indication for initiating HD presently, we will follow closely.     Laney Arredondo MD, MPH [facility]  [phone]

## 2020-04-16 NOTE — ROUTINE PROCESS
Bedside shift change report given to Zaira Kelsey RN (oncoming nurse) by Manny Nowak RN (offgoing nurse). Report included the following information SBAR, Kardex, Intake/Output and MAR.

## 2020-04-17 LAB
ANION GAP SERPL CALC-SCNC: 8 MMOL/L (ref 3–18)
BUN SERPL-MCNC: 107 MG/DL (ref 7–18)
BUN/CREAT SERPL: 39 (ref 12–20)
CALCIUM SERPL-MCNC: 8.9 MG/DL (ref 8.5–10.1)
CHLORIDE SERPL-SCNC: 96 MMOL/L (ref 100–111)
CO2 SERPL-SCNC: 32 MMOL/L (ref 21–32)
CREAT SERPL-MCNC: 2.74 MG/DL (ref 0.6–1.3)
GLUCOSE BLD STRIP.AUTO-MCNC: 249 MG/DL (ref 70–110)
GLUCOSE BLD STRIP.AUTO-MCNC: 256 MG/DL (ref 70–110)
GLUCOSE BLD STRIP.AUTO-MCNC: 272 MG/DL (ref 70–110)
GLUCOSE BLD STRIP.AUTO-MCNC: 299 MG/DL (ref 70–110)
GLUCOSE SERPL-MCNC: 238 MG/DL (ref 74–99)
MAGNESIUM SERPL-MCNC: 2.5 MG/DL (ref 1.6–2.6)
POTASSIUM SERPL-SCNC: 4 MMOL/L (ref 3.5–5.5)
SODIUM SERPL-SCNC: 136 MMOL/L (ref 136–145)

## 2020-04-17 PROCEDURE — 74011000258 HC RX REV CODE- 258: Performed by: INTERNAL MEDICINE

## 2020-04-17 PROCEDURE — 36415 COLL VENOUS BLD VENIPUNCTURE: CPT

## 2020-04-17 PROCEDURE — 94660 CPAP INITIATION&MGMT: CPT

## 2020-04-17 PROCEDURE — 74011250636 HC RX REV CODE- 250/636: Performed by: HOSPITALIST

## 2020-04-17 PROCEDURE — 82962 GLUCOSE BLOOD TEST: CPT

## 2020-04-17 PROCEDURE — 74011250637 HC RX REV CODE- 250/637: Performed by: HOSPITALIST

## 2020-04-17 PROCEDURE — 65660000000 HC RM CCU STEPDOWN

## 2020-04-17 PROCEDURE — 80048 BASIC METABOLIC PNL TOTAL CA: CPT

## 2020-04-17 PROCEDURE — 74011636637 HC RX REV CODE- 636/637: Performed by: HOSPITALIST

## 2020-04-17 PROCEDURE — 74011250636 HC RX REV CODE- 250/636: Performed by: INTERNAL MEDICINE

## 2020-04-17 PROCEDURE — 74011250637 HC RX REV CODE- 250/637: Performed by: INTERNAL MEDICINE

## 2020-04-17 PROCEDURE — 77010033678 HC OXYGEN DAILY

## 2020-04-17 PROCEDURE — 83735 ASSAY OF MAGNESIUM: CPT

## 2020-04-17 PROCEDURE — 74011250637 HC RX REV CODE- 250/637: Performed by: FAMILY MEDICINE

## 2020-04-17 RX ORDER — INSULIN GLARGINE 100 [IU]/ML
20 INJECTION, SOLUTION SUBCUTANEOUS
Status: DISCONTINUED | OUTPATIENT
Start: 2020-04-17 | End: 2020-04-24 | Stop reason: HOSPADM

## 2020-04-17 RX ORDER — POLYETHYLENE GLYCOL 3350 17 G/17G
17 POWDER, FOR SOLUTION ORAL 2 TIMES DAILY
Status: DISCONTINUED | OUTPATIENT
Start: 2020-04-17 | End: 2020-04-19

## 2020-04-17 RX ORDER — FAMOTIDINE 20 MG/1
20 TABLET, FILM COATED ORAL DAILY
Status: DISCONTINUED | OUTPATIENT
Start: 2020-04-18 | End: 2020-04-24 | Stop reason: HOSPADM

## 2020-04-17 RX ORDER — INSULIN LISPRO 100 [IU]/ML
10 INJECTION, SOLUTION INTRAVENOUS; SUBCUTANEOUS
Status: DISCONTINUED | OUTPATIENT
Start: 2020-04-17 | End: 2020-04-18

## 2020-04-17 RX ORDER — FACIAL-BODY WIPES
10 EACH TOPICAL DAILY PRN
Status: DISCONTINUED | OUTPATIENT
Start: 2020-04-17 | End: 2020-04-24 | Stop reason: HOSPADM

## 2020-04-17 RX ADMIN — LACTULOSE 45 ML: 20 SOLUTION ORAL at 10:37

## 2020-04-17 RX ADMIN — POTASSIUM CHLORIDE 20 MEQ: 1500 TABLET, EXTENDED RELEASE ORAL at 08:26

## 2020-04-17 RX ADMIN — ATORVASTATIN CALCIUM 40 MG: 20 TABLET, FILM COATED ORAL at 08:24

## 2020-04-17 RX ADMIN — HYDRALAZINE HYDROCHLORIDE 100 MG: 50 TABLET, FILM COATED ORAL at 21:42

## 2020-04-17 RX ADMIN — SENNOSIDES AND DOCUSATE SODIUM 1 TABLET: 8.6; 5 TABLET ORAL at 08:25

## 2020-04-17 RX ADMIN — INSULIN LISPRO 6 UNITS: 100 INJECTION, SOLUTION INTRAVENOUS; SUBCUTANEOUS at 16:30

## 2020-04-17 RX ADMIN — INSULIN LISPRO 6 UNITS: 100 INJECTION, SOLUTION INTRAVENOUS; SUBCUTANEOUS at 21:41

## 2020-04-17 RX ADMIN — Medication 1 TABLET: at 08:24

## 2020-04-17 RX ADMIN — FAMOTIDINE 20 MG: 20 TABLET, FILM COATED ORAL at 08:23

## 2020-04-17 RX ADMIN — CALCIUM ACETATE 667 MG: 667 CAPSULE ORAL at 12:44

## 2020-04-17 RX ADMIN — ONDANSETRON 4 MG: 2 INJECTION INTRAMUSCULAR; INTRAVENOUS at 02:39

## 2020-04-17 RX ADMIN — CALCIUM ACETATE 667 MG: 667 CAPSULE ORAL at 08:24

## 2020-04-17 RX ADMIN — HEPARIN SODIUM 5000 UNITS: 5000 INJECTION INTRAVENOUS; SUBCUTANEOUS at 21:42

## 2020-04-17 RX ADMIN — NYSTATIN: 100000 POWDER TOPICAL at 09:00

## 2020-04-17 RX ADMIN — BISACODYL 10 MG: 10 SUPPOSITORY RECTAL at 14:38

## 2020-04-17 RX ADMIN — HYDROCODONE BITARTRATE AND ACETAMINOPHEN 1 TABLET: 5; 325 TABLET ORAL at 12:15

## 2020-04-17 RX ADMIN — HEPARIN SODIUM 5000 UNITS: 5000 INJECTION INTRAVENOUS; SUBCUTANEOUS at 14:38

## 2020-04-17 RX ADMIN — CARVEDILOL 6.25 MG: 3.12 TABLET, FILM COATED ORAL at 08:25

## 2020-04-17 RX ADMIN — GABAPENTIN 300 MG: 300 CAPSULE ORAL at 21:42

## 2020-04-17 RX ADMIN — AMLODIPINE BESYLATE 5 MG: 5 TABLET ORAL at 08:26

## 2020-04-17 RX ADMIN — ASPIRIN 81 MG: 81 TABLET, COATED ORAL at 08:23

## 2020-04-17 RX ADMIN — FUROSEMIDE 10 MG/HR: 10 INJECTION, SOLUTION INTRAMUSCULAR; INTRAVENOUS at 02:44

## 2020-04-17 RX ADMIN — IRON SUCROSE 500 MG: 20 INJECTION, SOLUTION INTRAVENOUS at 09:33

## 2020-04-17 RX ADMIN — NYSTATIN: 100000 POWDER TOPICAL at 21:45

## 2020-04-17 RX ADMIN — POLYETHYLENE GLYCOL 3350 17 G: 17 POWDER, FOR SOLUTION ORAL at 10:37

## 2020-04-17 RX ADMIN — GABAPENTIN 300 MG: 300 CAPSULE ORAL at 08:26

## 2020-04-17 RX ADMIN — HEPARIN SODIUM 5000 UNITS: 5000 INJECTION INTRAVENOUS; SUBCUTANEOUS at 06:30

## 2020-04-17 RX ADMIN — HYDRALAZINE HYDROCHLORIDE 100 MG: 50 TABLET, FILM COATED ORAL at 08:25

## 2020-04-17 RX ADMIN — POLYETHYLENE GLYCOL 3350 17 G: 17 POWDER, FOR SOLUTION ORAL at 21:42

## 2020-04-17 RX ADMIN — METOLAZONE 10 MG: 5 TABLET ORAL at 08:23

## 2020-04-17 RX ADMIN — DULOXETINE HYDROCHLORIDE 60 MG: 60 CAPSULE, DELAYED RELEASE ORAL at 08:26

## 2020-04-17 RX ADMIN — INSULIN LISPRO 4 UNITS: 100 INJECTION, SOLUTION INTRAVENOUS; SUBCUTANEOUS at 06:30

## 2020-04-17 RX ADMIN — ONDANSETRON 4 MG: 2 INJECTION INTRAMUSCULAR; INTRAVENOUS at 09:52

## 2020-04-17 RX ADMIN — INSULIN GLARGINE 20 UNITS: 100 INJECTION, SOLUTION SUBCUTANEOUS at 21:41

## 2020-04-17 RX ADMIN — INSULIN LISPRO 10 UNITS: 100 INJECTION, SOLUTION INTRAVENOUS; SUBCUTANEOUS at 12:44

## 2020-04-17 RX ADMIN — INSULIN LISPRO 6 UNITS: 100 INJECTION, SOLUTION INTRAVENOUS; SUBCUTANEOUS at 12:44

## 2020-04-17 RX ADMIN — LEVOFLOXACIN 250 MG: 5 INJECTION, SOLUTION INTRAVENOUS at 10:37

## 2020-04-17 NOTE — ROUTINE PROCESS
Bedside shift change report given to Adam Lynch RN (oncoming nurse) by Maddy Morejon RN (offgoing nurse). Report included the following information SBAR, Kardex, Intake/Output and MAR.

## 2020-04-17 NOTE — PROGRESS NOTES
1047:  Pt refusing PT at this time, stomach upset and still vomiting. Currently drinking mag citrate to facilitate a BM. Will follow up as schedule permits.

## 2020-04-17 NOTE — PROGRESS NOTES
Problem: Pressure Injury - Risk of  Goal: *Prevention of pressure injury  Description: Document Elian Scale and appropriate interventions in the flowsheet.   Outcome: Progressing Towards Goal  Note: Pressure Injury Interventions:       Moisture Interventions: Apply protective barrier, creams and emollients, Internal/External urinary devices, Maintain skin hydration (lotion/cream)    Activity Interventions: Increase time out of bed, Pressure redistribution bed/mattress(bed type), PT/OT evaluation    Mobility Interventions: Pressure redistribution bed/mattress (bed type)    Nutrition Interventions: Document food/fluid/supplement intake    Friction and Shear Interventions: Apply protective barrier, creams and emollients                Problem: Patient Education: Go to Patient Education Activity  Goal: Patient/Family Education  Outcome: Progressing Towards Goal

## 2020-04-17 NOTE — PROGRESS NOTES
Pharmacy Dosing Services: Pepcid    The following medication: Pepcid was automatically dose-adjusted per [facility] P&T Committee Protocol, with respect to renal function. Consult provided for this   53 y.o. , female , for the indication of GERD. Pt Weight:   Wt Readings from Last 1 Encounters:   04/17/20 149.6 kg (329 lb 12.8 oz)         Previous Regimen Pepcid 20 mg po BID   Serum Creatinine Lab Results   Component Value Date/Time    Creatinine 2.74 (H) 04/17/2020 02:00 AM    Creatinine, POC 0.3 (L) 08/10/2014 11:05 PM       Creatinine Clearance Estimated Creatinine Clearance: 32.9 mL/min (A) (based on SCr of 2.74 mg/dL (H)). BUN Lab Results   Component Value Date/Time     (H) 04/17/2020 02:00 AM    BUN, POC 10 08/10/2014 11:05 PM         Dosage changed to:  Pepcid 20 mg po QD    Pharmacy to continue to monitor patient daily. Will make dosage adjustments based upon changing renal function. Wyatt Diaz Pharm. D.   Clinical Pharmacist  [phone]

## 2020-04-17 NOTE — PROGRESS NOTES
Hospitalist Progress Note    Patient: Darius Cabrera MRN: [id number]  CSN: [id number]    YOB: 1965  Age: 53 y.o.   Sex: female    DOA: 4/10/2020 LOS:  LOS: 7 days          Chief Complaint:    SOB      Assessment/Plan     Acute on chronic decompensated diastolic CHF-appreciate cardiology and nephrology help  Now on zaroxolyn and lasix IV Gtt, no cath planned at this time     Pulmonary HTN     IDDM type 2 with renal disease and hyperglycemia uncontrolled-lantus plus premeal insulin both increased this am     Hypokalemia-improved     HTN-stable control on current regimen     CKD stage 4-follow daily BMP asael with increased diuretic dosing     АНДРЕЙ-likely also OHS-CPAP at night and during day as needed, on chronic NC 02 also at home     Chronic resp failure at home on 02     Morbid obesity     Elevated troponin, no chest pain-neg stress in December 2019     Anemia of CKD-add iron PO and receiving IV also     Continue lasix and zaroxolyn-may need to come off lasix gtt today due to GI issues  Monitor daily BMP for lytes and renal function  Lactulose for bowels, mag citrate if lactulose and miralax doesn't help  Follow BP and for arrhythmias on tele  PT as tolerated  Disposition :  Patient Active Problem List   Diagnosis Code    HTN (hypertension) I10    Type II or unspecified type diabetes mellitus with renal manifestations, uncontrolled(250.42) ([facility]) E11.29, E11.65    Mor[facility] ([facility] E66.01    Respiratory distress R06.03    АНДРЕЙ (obstructive sleep apnea) K07.62    Diastolic CHF, acute on chronic ([facility]) I50.33    CHF (congestive heart failure) ([facility]) I50.9    CKD (chronic kidney disease) stage 4, GFR 15-29 ml/min ([facility]) N18.4    Elevated troponin R79.89       Subjective:    Constipated  But feels leg edema and SOB are better    Wears her cpap during day but talking easily with mask and does not appear yspneic    Review of systems:    Constitutional: denies fevers, chills  Respiratory: denies SOB, cough  Cardiovascular: denies chest pain  Gastrointestinal: has nausea, no vomiting      Vital signs/Intake and Output:  Visit Vitals  /59   Pulse 67   Temp 97.9 °F (36.6 °C)   Resp 16   Ht 5' 2\" (1.575 m)   Wt 149.6 kg (329 lb 12.8 oz)   SpO2 99%   BMI 60.32 kg/m²     Current Shift:  No intake/output data recorded. Last three shifts:  04/15 1901 - 04/17 0700  In: 630 [P.O.:480; I.V.:150]  Out: 1650 [Urine:1650]    Exam:    General: obese WF, alert, NAD, OX3  Head/Neck: NCAT, supple, No masses, No lymphadenopathy  CVS:Regular rate and rhythm, no M/R/G, S1/S2 heard, no thrill  Lungs:Clear to auscultation bilaterally, no wheezes, rhonchi, or rales  Abdomen: Soft, Nontender, No distention, Normal Bowel sounds, No hepatomegaly  Extremities: 2 plus edema, improved, feet and LE  Neuro:grossly normal , follows commands  Psych:appropriate                Labs: Results:       Chemistry Recent Labs     04/17/20  0200 04/16/20  0405 04/15/20  0305   * 174* 133*    136 133*   K 4.0 3.3* 3.6   CL 96* 94* 94*   CO2 32 31 30   * 100* 97*   CREA 2.74* 2.58* 2.64*   CA 8.9 8.6 8.9   AGAP 8 11 9   BUCR 39* 39* 37*      CBC w/Diff Recent Labs     04/16/20  0405 04/15/20  0305   WBC 5.4 6.1   RBC 2.81* 2.86*   HGB 7.2* 7.3*   HCT 23.0* 23.7*    218      Cardiac Enzymes No results for input(s): CPK, CKND1, MARIA ISABEL in the last 72 hours. No lab exists for component: CKRMB, TROIP   Coagulation No results for input(s): PTP, INR, APTT, INREXT, INREXT in the last 72 hours. Lipid Panel Lab Results   Component Value Date/Time    Cholesterol, total 103 04/14/2020 02:35 AM    HDL Cholesterol 46 04/14/2020 02:35 AM    LDL, calculated 36.2 04/14/2020 02:35 AM    VLDL, calculated 20.8 04/14/2020 02:35 AM    Triglyceride 104 04/14/2020 02:35 AM    CHOL/HDL Ratio 2.2 04/14/2020 02:35 AM      BNP No results for input(s): BNPP in the last 72 hours.    Liver Enzymes No results for input(s): TP, ALB, TBIL, AP, SGOT, GPT in the last 72 hours.     No lab exists for component: DBIL   Thyroid Studies Lab Results   Component Value Date/Time    TSH 2.18 04/14/2020 02:35 AM        Procedures/imaging: see electronic medical records for all procedures/Xrays and details which were not copied into this note but were reviewed prior to creation of Vivek Valencia MD

## 2020-04-17 NOTE — PROGRESS NOTES
Problem: Mobility Impaired (Adult and Pediatric)  Goal: *Acute Goals and Plan of Care (Insert Text)  Description: Physical Therapy Goals   Initiated 4/11/2020 and to be accomplished within 7 day(s)  1. Patient will move from supine <> sit with S in prep for out of bed activity and change of position. 2.  Patient will perform sit<> stand with S/RW in prep for transfers/ambulation. 3.  Patient will ambulate 50 feet with S/RW for improved functional mobility/safe discharge. Outcome: Not Progressing Towards Goal     Pt refused PT due to:  []  Nausea/vomiting  []  Eating  [x]  Pain (Lower abdominal)  [x]  Pt lethargic  []  Off Unit  Will f/u tomorrow. Thank you.   Julia Fraction, PTA

## 2020-04-17 NOTE — PROGRESS NOTES
Cardiology Progress Note        Patient: Duglas Gonzalez        Sex: female          DOA: 4/10/2020  YOB: 1965      Age:  53 y.o.        LOS:  LOS: 7 days   Assessment/Plan     Principal Problem:    Diastolic CHF, acute on chronic ([facility] (2/16/2020)    Active Problems:    HTN (hypertension) (8/16/2014)      Type II or unspecified type diabetes mellitus with renal manifestations, uncontrolled(250.42) ([facility] (9/4/2014)      Morbid obesity (HCC) ()      Respiratory distress (1/17/2019)      CHF (congestive heart failure) ([facility] (4/10/2020)      CKD (chronic kidney disease) stage 4, GFR 15-29 ml/min ([facility]) (4/10/2020)      Elevated troponin (4/10/2020)        Plan: cardiac tele normal sinus rhythm  edema  SOB  CHF  Elevated trop with CRI     Feeling better on diuretic treatment, IV lasix infusion and metolazone  Cardiac tele stable. sinus rhythm  Discussed with Dr. Leopold Borne nephrologist- prefer to avoid contrast dye- will continue with medical treatment. Discussed with patient- no plan for cardiac cath during this hospitalization until change in clinic status. I will sign off and please call if any questions/concerned. This was also discussed again with patient. Subjective:    cc:  Edema  SOB          REVIEW OF SYSTEMS:     General: No fevers or chills. Cardiovascular: No chest pain or pressure. No palpitations. No ankle swelling  Pulmonary: No SOB, orthopnea, PND  Gastrointestinal: No nausea, vomiting or diarrhea      Objective:      Visit Vitals  /50   Pulse 72   Temp 97.5 °F (36.4 °C)   Resp 16   Ht 5' 2\" (1.575 m)   Wt 149.6 kg (329 lb 12.8 oz)   SpO2 100%   BMI 60.32 kg/m²     Body mass index is 60.32 kg/m². Physical Exam:  General Appearance: Comfortable, not using accessory muscles of respiration. NECK: No JVD, no thyroidomeglay. LUNGS: Clear bilaterally.    HEART: S1+S2 audible,    ABD: Non-tender, BS Audible    EXT: No edema, and no cysnosis. VASCULAR EXAM: Pulses are intact. PSYCHIATRIC EXAM: Mood is appropriate.     Medication:  Current Facility-Administered Medications   Medication Dose Route Frequency    insulin glargine (LANTUS) injection 20 Units  20 Units SubCUTAneous QHS    insulin lispro (HUMALOG) injection 10 Units  10 Units SubCUTAneous TIDAC    polyethylene glycol (MIRALAX) packet 17 g  17 g Oral BID    [START ON 4/18/2020] famotidine (PEPCID) tablet 20 mg  20 mg Oral DAILY    ferrous sulfate tablet 325 mg  1 Tab Oral DAILY WITH BREAKFAST    potassium chloride (K-DUR, KLOR-CON) SR tablet 20 mEq  20 mEq Oral DAILY    senna-docusate (PERICOLACE) 8.6-50 mg per tablet 1 Tab  1 Tab Oral DAILY    furosemide (LASIX) 100 mg in 0.9% sodium chloride 100 mL infusion  10 mg/hr IntraVENous CONTINUOUS    metOLazone (ZAROXOLYN) tablet 10 mg  10 mg Oral DAILY    nystatin (MYCOSTATIN) 100,000 unit/gram powder   Topical BID    albuterol-ipratropium (DUO-NEB) 2.5 MG-0.5 MG/3 ML  3 mL Nebulization Q4H PRN    atorvastatin (LIPITOR) tablet 40 mg  40 mg Oral DAILY    calcium acetate(phosphat bind) (PHOSLO) capsule 667 mg  1 Cap Oral TID WITH MEALS    DULoxetine (CYMBALTA) capsule 60 mg  60 mg Oral DAILY    gabapentin (NEURONTIN) capsule 300 mg  300 mg Oral TID    hydrALAZINE (APRESOLINE) tablet 100 mg  100 mg Oral TID    insulin lispro (HUMALOG) injection   SubCUTAneous AC&HS    glucose chewable tablet 16 g  4 Tab Oral PRN    glucagon (GLUCAGEN) injection 1 mg  1 mg IntraMUSCular PRN    heparin (porcine) injection 5,000 Units  5,000 Units SubCUTAneous Q8H    acetaminophen (TYLENOL) tablet 650 mg  650 mg Oral Q6H PRN    ondansetron (ZOFRAN) injection 4 mg  4 mg IntraVENous Q6H PRN    amLODIPine (NORVASC) tablet 5 mg  5 mg Oral DAILY    aspirin delayed-release tablet 81 mg  81 mg Oral DAILY    carvediloL (COREG) tablet 6.25 mg  6.25 mg Oral BID WITH MEALS    cholecalciferol (VITAMIN D3) (1000 Units /25 mcg) tablet 1 Tab  1,000 Units Oral DAILY    HYDROcodone-acetaminophen (NORCO) 5-325 mg per tablet 1 Tab  1 Tab Oral Q4H PRN               Lab/Data Reviewed:  Procedures/imaging: see electronic medical records for all procedures/Xrays   and details which were not copied into this note but were reviewed prior to creation of Plan       All lab results for the last 24 hours reviewed.      Recent Labs     04/16/20  0405 04/15/20  0305   WBC 5.4 6.1   HGB 7.2* 7.3*   HCT 23.0* 23.7*    218     Recent Labs     04/17/20  0200 04/16/20  0405 04/15/20  0305    136 133*   K 4.0 3.3* 3.6   CL 96* 94* 94*   CO2 32 31 30   * 174* 133*   * 100* 97*   CREA 2.74* 2.58* 2.64*   CA 8.9 8.6 8.9       Signed By: Tia Dumas MD     April 17, 2020

## 2020-04-17 NOTE — ADVANCED PRACTICE NURSE
The [facility] Heart Failure Education and Support Group is an interdisciplinary group that provides education that you will need to help manage your heart   failure. It gives you the opportunity to learn and share with others in your community. You are scheduled to attend this support group on May 28th, 2020. The meeting will be held on the campus of [facility] in the [facility] [address], \"[address]\" from 5:30-7:30 p.m. Please call  [phone] if you are unable to make this appointment to reschedule.

## 2020-04-17 NOTE — PROGRESS NOTES
0700: Assumed care of pt from [facility].  1045: Spoke wit Dr. Emerson Serra regarding pt status with being constipated. Dr. Emerson Serra stated to stop lasix gtt for today due to pt being dried out and unable to have a BM  Pt consiptated will ask provider for more medication so pt can have a BM. laxatives ordered. 1425: Spoke with Dr. Emerson Serra regarding pt still being constipated. Gave order for suppository. 1438: Gave suppository.

## 2020-04-17 NOTE — PROGRESS NOTES
1930 Pt received from offgoing nurse without any signs or symptoms of distress. Pt vitals are stable and within normal limits. Pt bed in low position with wheels locked and call bell within reach. 2052 Assessment completed and documented in flow sheet. Pt denies any further needs at this time. Pt in NAD with bed in low position, wheels locked and call bell within reach. Purposeful rounding completed. Pt resting quietly. No further needs voiced at this time. 2209 Scheduled medications administered as ordered. [id number] Reassessment completed with no changes noted. Bed locked, in lowest position, with call light within reach. Purposeful rounding completed. Pt resting quietly. No further needs voiced at this time. 0239 prn zofran administered as requested by pt  Purposeful rounding completed. Pt resting quietly. No further needs voiced at this time. 0400 Purposeful rounding completed. Pt resting quietly. No further needs voiced at this time. 4220 Reassessment completed with no changes noted. Bed locked, in lowest position, with call light within reach. Purposeful rounding completed. Pt resting quietly. No further needs voiced at this time. 0700 Bedside and Verbal shift change report given to Eloina Lehman (oncoming nurse) by PAMELA Rdz RN (offgoing nurse). Report included the following information SBAR, Procedure Summary, Intake/Output, MAR and Recent Results.

## 2020-04-17 NOTE — PROGRESS NOTES
Nephrology Progress note    Subjective: Kirti Gonzalez a 53 y. o. female with a past medical history significant for DM type 2, HTN, TIA, CKD IV, HLD, АНДРЕЙ, Obesity, Chronic Anemia, Diastolic CHF who presented with c/o SOB. Her CXR showed Pulmonary edema and small bilateral effusions consistent with CHF. S Cr was 2.6 on admission and remains unchanged today.    Patient is followed at our office by Dr Donal Redmond, most recent S Cr 2.6 on 2/26/20. She was last seen on 3/4/20 and maintained on diuretics, oral Furosemide + Metolazone. Since admission she has been placed on intermittent  IV Lasix. Switched to lasix gtt and po metolazone. Reports less SOB today and Less orthopneic. Cr stable,  2.58=>2.7  Still very swollen. Pt had attended [facility] prior to admission, scheduled for second class next week.    C/o constipation, several laxatives given      Admit Date: 4/10/2020  Principal Problem:    Diastolic CHF, acute on chronic ([facility] [address] (2/16/2020)    Active Problems:    HTN (hypertension) (8/16/2014)      Type II or unspecified type diabetes mellitus with renal manifestations, uncontrolled(250.42) ([facility] [address] (9/4/2014)      Morbid obesity (HCC) ()      Respiratory distress (1/17/2019)      CHF (congestive heart failure) ([facility] (4/10/2020)      CKD (chronic kidney disease) stage 4, GFR 15-29 ml/min ([facility]) (4/10/2020)      Elevated troponin (4/10/2020)      Current Facility-Administered Medications   Medication Dose Route Frequency    insulin glargine (LANTUS) injection 20 Units  20 Units SubCUTAneous QHS    insulin lispro (HUMALOG) injection 10 Units  10 Units SubCUTAneous TIDAC    polyethylene glycol (MIRALAX) packet 17 g  17 g Oral BID    ferrous sulfate tablet 325 mg  1 Tab Oral DAILY WITH BREAKFAST    potassium chloride (K-DUR, KLOR-CON) SR tablet 20 mEq  20 mEq Oral DAILY    senna-docusate (PERICOLACE) 8.6-50 mg per tablet 1 Tab  1 Tab Oral DAILY    famotidine (PEPCID) tablet 20 mg  20 mg Oral BID    furosemide (LASIX) 100 mg in 0.9% sodium chloride 100 mL infusion  10 mg/hr IntraVENous CONTINUOUS    metOLazone (ZAROXOLYN) tablet 10 mg  10 mg Oral DAILY    nystatin (MYCOSTATIN) 100,000 unit/gram powder   Topical BID    albuterol-ipratropium (DUO-NEB) 2.5 MG-0.5 MG/3 ML  3 mL Nebulization Q4H PRN    atorvastatin (LIPITOR) tablet 40 mg  40 mg Oral DAILY    calcium acetate(phosphat bind) (PHOSLO) capsule 667 mg  1 Cap Oral TID WITH MEALS    DULoxetine (CYMBALTA) capsule 60 mg  60 mg Oral DAILY    gabapentin (NEURONTIN) capsule 300 mg  300 mg Oral TID    hydrALAZINE (APRESOLINE) tablet 100 mg  100 mg Oral TID    insulin lispro (HUMALOG) injection   SubCUTAneous AC&HS    glucose chewable tablet 16 g  4 Tab Oral PRN    glucagon (GLUCAGEN) injection 1 mg  1 mg IntraMUSCular PRN    heparin (porcine) injection 5,000 Units  5,000 Units SubCUTAneous Q8H    acetaminophen (TYLENOL) tablet 650 mg  650 mg Oral Q6H PRN    ondansetron (ZOFRAN) injection 4 mg  4 mg IntraVENous Q6H PRN    amLODIPine (NORVASC) tablet 5 mg  5 mg Oral DAILY    aspirin delayed-release tablet 81 mg  81 mg Oral DAILY    carvediloL (COREG) tablet 6.25 mg  6.25 mg Oral BID WITH MEALS    cholecalciferol (VITAMIN D3) (1000 Units /25 mcg) tablet 1 Tab  1,000 Units Oral DAILY    HYDROcodone-acetaminophen (NORCO) 5-325 mg per tablet 1 Tab  1 Tab Oral Q4H PRN         Allergy:   Allergies   Allergen Reactions    Bees [Sting, Bee] Swelling    Penicillins Hives    Strawberry Hives        Objective:     Visit Vitals  /50   Pulse 72   Temp 97.5 °F (36.4 °C)   Resp 16   Ht 5' 2\" (1.575 m)   Wt 149.6 kg (329 lb 12.8 oz)   SpO2 100%   BMI 60.32 kg/m²         Intake/Output Summary (Last 24 hours) at 4/17/2020 1357  Last data filed at 4/17/2020 0513  Gross per 24 hour   Intake 100 ml   Output 850 ml   Net -750 ml       Physical Exam:     General: Obese, NAD, on CPAP    HENT: Atraumatic and normocephalic   Eyes: Normal conjunctiva   Neck: Supple. No JVD Cardiovascular: Normal S1 & S2, no m/r/g   Pulmonary/Chest Wall: Decreased BS both bases   Abdominal: Soft and non-tender   Musculoskeletal: +++ Bilat pedal  Edema and 2+ Bilat. uppper ext edema   Neurological: AAOx4, no change today          Data Review:  Lab Results   Component Value Date/Time    Sodium 136 04/17/2020 02:00 AM    Potassium 4.0 04/17/2020 02:00 AM    Chloride 96 (L) 04/17/2020 02:00 AM    CO2 32 04/17/2020 02:00 AM    Anion gap 8 04/17/2020 02:00 AM    Glucose 238 (H) 04/17/2020 02:00 AM     (H) 04/17/2020 02:00 AM    Creatinine 2.74 (H) 04/17/2020 02:00 AM    BUN/Creatinine ratio 39 (H) 04/17/2020 02:00 AM    GFR est AA 22 (L) 04/17/2020 02:00 AM    GFR est non-AA 18 (L) 04/17/2020 02:00 AM    Calcium 8.9 04/17/2020 02:00 AM     Lab Results   Component Value Date/Time    WBC 5.4 04/16/2020 04:05 AM    HGB 7.2 (L) 04/16/2020 04:05 AM    HCT 23.0 (L) 04/16/2020 04:05 AM    PLATELET 885 48/02/2449 04:05 AM    MCV 81.9 04/16/2020 04:05 AM     Lab Results   Component Value Date/Time    Calcium 8.9 04/17/2020 02:00 AM    Phosphorus 4.7 04/14/2020 02:35 AM     Lab Results   Component Value Date/Time    Iron 32 (L) 04/14/2020 04:00 AM    TIBC 303 04/14/2020 04:00 AM    Iron % saturation 11 (L) 04/14/2020 04:00 AM    Ferritin 418 (H) 04/14/2020 02:35 AM     Lab Results   Component Value Date/Time    Ferritin 418 (H) 04/14/2020 02:35 AM         Impression:     1. CKD stage IV likely sec to diabetic Nephropathy/Hypertensive Nephrosclerosis. S Cr has been stable around 2.6, component of prerenal azotemia sec to diuresis. 2. Diastolic CHF LVEF 60 to 12%  3. АНДРЕЙ  4. Anemia of CKD + Iron deficiency  5. Pulm HTN, Mild to moderate  6. DM type 2  7. HTN  8. Morbid Obesity  9. Hypokalemia sec to diuresis  10.  Metabolic alkalosis: mild contraction alkalosis sec to diuretic    Plan:     - Continue Lasix drip at 10mg/hr and oral Metolazone for synergy  - Supplement Potassium  - Strict I/Os and monitor AM labs  - Continue IV iron  - Patient may need JUAN C after her Iron repletion  - Dietary Sodium restriction  - Avoid IV radiocontrast or NSAIDs  - No indication for initiating HD presently, we will follow closely but may need if unable to moblilize the fluid. D/w pt.     MD Juan Fermin  [phone]

## 2020-04-18 ENCOUNTER — APPOINTMENT (OUTPATIENT)
Dept: GENERAL RADIOLOGY | Age: 55
DRG: 194 | End: 2020-04-18
Attending: HOSPITALIST
Payer: COMMERCIAL

## 2020-04-18 LAB
ANION GAP SERPL CALC-SCNC: 5 MMOL/L (ref 3–18)
BNP SERPL-MCNC: 1732 PG/ML (ref 0–900)
BUN SERPL-MCNC: 106 MG/DL (ref 7–18)
BUN/CREAT SERPL: 34 (ref 12–20)
CALCIUM SERPL-MCNC: 9.2 MG/DL (ref 8.5–10.1)
CHLORIDE SERPL-SCNC: 98 MMOL/L (ref 100–111)
CO2 SERPL-SCNC: 33 MMOL/L (ref 21–32)
CREAT SERPL-MCNC: 3.12 MG/DL (ref 0.6–1.3)
GLUCOSE BLD STRIP.AUTO-MCNC: 110 MG/DL (ref 70–110)
GLUCOSE BLD STRIP.AUTO-MCNC: 131 MG/DL (ref 70–110)
GLUCOSE BLD STRIP.AUTO-MCNC: 188 MG/DL (ref 70–110)
GLUCOSE BLD STRIP.AUTO-MCNC: 190 MG/DL (ref 70–110)
GLUCOSE SERPL-MCNC: 167 MG/DL (ref 74–99)
MAGNESIUM SERPL-MCNC: 2.4 MG/DL (ref 1.6–2.6)
POTASSIUM SERPL-SCNC: 3.6 MMOL/L (ref 3.5–5.5)
SODIUM SERPL-SCNC: 136 MMOL/L (ref 136–145)

## 2020-04-18 PROCEDURE — 83880 ASSAY OF NATRIURETIC PEPTIDE: CPT

## 2020-04-18 PROCEDURE — 74018 RADEX ABDOMEN 1 VIEW: CPT

## 2020-04-18 PROCEDURE — 74011250637 HC RX REV CODE- 250/637: Performed by: HOSPITALIST

## 2020-04-18 PROCEDURE — 83735 ASSAY OF MAGNESIUM: CPT

## 2020-04-18 PROCEDURE — 74011250636 HC RX REV CODE- 250/636: Performed by: HOSPITALIST

## 2020-04-18 PROCEDURE — 74011250636 HC RX REV CODE- 250/636: Performed by: INTERNAL MEDICINE

## 2020-04-18 PROCEDURE — 77010033678 HC OXYGEN DAILY

## 2020-04-18 PROCEDURE — 74011250637 HC RX REV CODE- 250/637: Performed by: INTERNAL MEDICINE

## 2020-04-18 PROCEDURE — 80048 BASIC METABOLIC PNL TOTAL CA: CPT

## 2020-04-18 PROCEDURE — 74011000250 HC RX REV CODE- 250: Performed by: INTERNAL MEDICINE

## 2020-04-18 PROCEDURE — 65660000000 HC RM CCU STEPDOWN

## 2020-04-18 PROCEDURE — 97530 THERAPEUTIC ACTIVITIES: CPT

## 2020-04-18 PROCEDURE — 97116 GAIT TRAINING THERAPY: CPT

## 2020-04-18 PROCEDURE — 71046 X-RAY EXAM CHEST 2 VIEWS: CPT

## 2020-04-18 PROCEDURE — 36415 COLL VENOUS BLD VENIPUNCTURE: CPT

## 2020-04-18 PROCEDURE — 82962 GLUCOSE BLOOD TEST: CPT

## 2020-04-18 PROCEDURE — 74011636637 HC RX REV CODE- 636/637: Performed by: HOSPITALIST

## 2020-04-18 RX ORDER — FUROSEMIDE 40 MG/1
80 TABLET ORAL
Status: DISCONTINUED | OUTPATIENT
Start: 2020-04-18 | End: 2020-04-20

## 2020-04-18 RX ORDER — METOLAZONE 5 MG/1
5 TABLET ORAL DAILY
Status: DISCONTINUED | OUTPATIENT
Start: 2020-04-18 | End: 2020-04-20

## 2020-04-18 RX ORDER — INSULIN LISPRO 100 [IU]/ML
6 INJECTION, SOLUTION INTRAVENOUS; SUBCUTANEOUS
Status: DISCONTINUED | OUTPATIENT
Start: 2020-04-18 | End: 2020-04-24 | Stop reason: HOSPADM

## 2020-04-18 RX ADMIN — METOLAZONE 5 MG: 5 TABLET ORAL at 09:39

## 2020-04-18 RX ADMIN — INSULIN LISPRO 2 UNITS: 100 INJECTION, SOLUTION INTRAVENOUS; SUBCUTANEOUS at 06:56

## 2020-04-18 RX ADMIN — POLYETHYLENE GLYCOL 3350 17 G: 17 POWDER, FOR SOLUTION ORAL at 21:18

## 2020-04-18 RX ADMIN — GABAPENTIN 300 MG: 300 CAPSULE ORAL at 09:42

## 2020-04-18 RX ADMIN — ATORVASTATIN CALCIUM 40 MG: 20 TABLET, FILM COATED ORAL at 09:41

## 2020-04-18 RX ADMIN — FUROSEMIDE 80 MG: 40 TABLET ORAL at 17:17

## 2020-04-18 RX ADMIN — CALCIUM ACETATE 667 MG: 667 CAPSULE ORAL at 17:17

## 2020-04-18 RX ADMIN — HEPARIN SODIUM 5000 UNITS: 5000 INJECTION INTRAVENOUS; SUBCUTANEOUS at 06:55

## 2020-04-18 RX ADMIN — LIDOCAINE HYDROCHLORIDE 40 ML: 20 SOLUTION ORAL; TOPICAL at 21:24

## 2020-04-18 RX ADMIN — INSULIN GLARGINE 20 UNITS: 100 INJECTION, SOLUTION SUBCUTANEOUS at 21:19

## 2020-04-18 RX ADMIN — HEPARIN SODIUM 5000 UNITS: 5000 INJECTION INTRAVENOUS; SUBCUTANEOUS at 14:54

## 2020-04-18 RX ADMIN — INSULIN LISPRO 2 UNITS: 100 INJECTION, SOLUTION INTRAVENOUS; SUBCUTANEOUS at 21:19

## 2020-04-18 RX ADMIN — HEPARIN SODIUM 5000 UNITS: 5000 INJECTION INTRAVENOUS; SUBCUTANEOUS at 21:18

## 2020-04-18 RX ADMIN — ONDANSETRON 4 MG: 2 INJECTION INTRAMUSCULAR; INTRAVENOUS at 23:42

## 2020-04-18 RX ADMIN — AMLODIPINE BESYLATE 5 MG: 5 TABLET ORAL at 09:42

## 2020-04-18 RX ADMIN — CALCIUM ACETATE 667 MG: 667 CAPSULE ORAL at 08:40

## 2020-04-18 RX ADMIN — ASPIRIN 81 MG: 81 TABLET, COATED ORAL at 09:39

## 2020-04-18 RX ADMIN — EPOETIN ALFA-EPBX 20000 UNITS: 10000 INJECTION, SOLUTION INTRAVENOUS; SUBCUTANEOUS at 16:26

## 2020-04-18 RX ADMIN — CARVEDILOL 6.25 MG: 3.12 TABLET, FILM COATED ORAL at 17:17

## 2020-04-18 RX ADMIN — ACETAMINOPHEN 650 MG: 325 TABLET, FILM COATED ORAL at 16:00

## 2020-04-18 RX ADMIN — NYSTATIN: 100000 POWDER TOPICAL at 12:22

## 2020-04-18 RX ADMIN — FERROUS SULFATE TAB 325 MG (65 MG ELEMENTAL FE) 325 MG: 325 (65 FE) TAB at 08:39

## 2020-04-18 RX ADMIN — HYDRALAZINE HYDROCHLORIDE 100 MG: 50 TABLET, FILM COATED ORAL at 21:18

## 2020-04-18 RX ADMIN — GABAPENTIN 300 MG: 300 CAPSULE ORAL at 17:17

## 2020-04-18 RX ADMIN — INSULIN LISPRO 6 UNITS: 100 INJECTION, SOLUTION INTRAVENOUS; SUBCUTANEOUS at 17:16

## 2020-04-18 RX ADMIN — GABAPENTIN 300 MG: 300 CAPSULE ORAL at 21:18

## 2020-04-18 RX ADMIN — SENNOSIDES AND DOCUSATE SODIUM 1 TABLET: 8.6; 5 TABLET ORAL at 09:42

## 2020-04-18 RX ADMIN — FAMOTIDINE 20 MG: 20 TABLET ORAL at 09:40

## 2020-04-18 RX ADMIN — POTASSIUM CHLORIDE 20 MEQ: 1500 TABLET, EXTENDED RELEASE ORAL at 09:42

## 2020-04-18 RX ADMIN — DULOXETINE HYDROCHLORIDE 60 MG: 60 CAPSULE, DELAYED RELEASE ORAL at 09:41

## 2020-04-18 RX ADMIN — HYDRALAZINE HYDROCHLORIDE 100 MG: 50 TABLET, FILM COATED ORAL at 17:17

## 2020-04-18 RX ADMIN — CARVEDILOL 6.25 MG: 3.12 TABLET, FILM COATED ORAL at 08:38

## 2020-04-18 RX ADMIN — HYDRALAZINE HYDROCHLORIDE 100 MG: 50 TABLET, FILM COATED ORAL at 09:43

## 2020-04-18 RX ADMIN — NYSTATIN: 100000 POWDER TOPICAL at 22:13

## 2020-04-18 RX ADMIN — Medication 1 TABLET: at 09:43

## 2020-04-18 RX ADMIN — FUROSEMIDE 80 MG: 40 TABLET ORAL at 08:39

## 2020-04-18 RX ADMIN — INSULIN LISPRO 10 UNITS: 100 INJECTION, SOLUTION INTRAVENOUS; SUBCUTANEOUS at 09:44

## 2020-04-18 NOTE — PROGRESS NOTES
DC Plan: Discharge home with MD follow up, family assistance and [facility] once medically stable. Provider, please consider palliative care consult due to high RRAT score.     Chart reviewed as CM on call. Pt noted to be unable to work with therapy yesterday. Per previous CM notes, pt states she lives alone, but has several children who live nearby. States daughter Ann-Marie Bar will drive her home at discharge [phone]. Patient arrange with [facility] per [facility]. Pt has CPAP, Home oxygen thru [facility], [address] [place] [address], 3 in 1 bedside commode at home. Stated the RW she had at home was borrowed, subsequently one was ordered thru [employer] for her and delivered to pt room. Pts PCP is confirmed as MD Cathy Bumpers. Pt  States her pulmonologist is MD Bonilla. States she has 2 cardiologists, one at [place] and one at [facility], uncertain of names. CMS will assist with needed follow up appts at time of discharge. No dc concerns identified.  CM will cont to follow for transition of care needs and will be available via hospital  on weekends or at [phone]

## 2020-04-18 NOTE — PROGRESS NOTES
1915  Assumed care of pt   1945  Shift Assessment complete pt complains of heart burn - hospitalist called for a GI cocktail. 2350  Reassessment complete   0224  Reassessment complete   0715  Bedside and Verbal shift change report given to James Sommers RN (oncoming nurse) by Alen Bradley (offgoing nurse). Report included the following information SBAR, Kardex, ED Summary, Intake/Output, MAR, Recent Results, Med Rec Status and Cardiac Rhythm NSR.

## 2020-04-18 NOTE — PROGRESS NOTES
Problem: Adult Inpatient Plan of Care  Goal: Plan of Care Review  Outcome: Ongoing (interventions implemented as appropriate)  Pt will be mindful of all fall risks at home and in the clinical setting.     Pt will be able to cite relevant skin injury risks relating to incontinence, lengthy stays in bed, and exposure to acidic fluids.    Physical therapy goals should be tailored to the patient and according to his tolerance and ability to perform.    Occupational therapy goals should be tailored to the patient and according to his tolerance and ability to perform.       Pt on home cpap with o2 @ 3 lpm bleed in & tolerating well

## 2020-04-18 NOTE — PROGRESS NOTES
Problem: Falls - Risk of  Goal: *Absence of Falls  Description: Document Andi Allen Fall Risk and appropriate interventions in the flowsheet.   Outcome: Progressing Towards Goal  Note: Fall Risk Interventions:  Mobility Interventions: Patient to call before getting OOB, Communicate number of staff needed for ambulation/transfer, Assess mobility with egress test, PT Consult for mobility concerns, PT Consult for assist device competence, Strengthening exercises (ROM-active/passive), Utilize walker, cane, or other assistive device         Medication Interventions: Bed/chair exit alarm, Patient to call before getting OOB, Teach patient to arise slowly, Evaluate medications/consider consulting pharmacy    Elimination Interventions: Call light in reach, Patient to call for help with toileting needs, Toileting schedule/hourly rounds, Toilet paper/wipes in reach    History of Falls Interventions: Room close to nurse's station, Evaluate medications/consider consulting pharmacy         Problem: Patient Education: Go to Patient Education Activity  Goal: Patient/Family Education  Outcome: Progressing Towards Goal

## 2020-04-18 NOTE — PROGRESS NOTES
Nephrology Progress note    Subjective: Gil Ventura a 53 y. o. female with a past medical history significant for DM type 2, HTN, TIA, CKD IV, HLD, АНДРЕЙ, Obesity, Chronic Anemia, Diastolic CHF who presented with c/o SOB. Her CXR showed Pulmonary edema and small bilateral effusions consistent with CHF. S Cr was 2.6 on admission and remains unchanged today.    Patient is followed at our office by Dr Ana Verduzco, most recent S Cr 2.6 on 2/26/20. She was last seen on 3/4/20 and maintained on diuretics, oral Furosemide + Metolazone. Since admission she has been placed on intermittent  IV Lasix. Switched to lasix gtt and po metolazone. Pt had attended [facility] prior to admission, scheduled for second class next week. Reports less SOB today and Less orthopneic.            Admit Date: 4/10/2020  Principal Problem:    Diastolic CHF, acute on chronic (HCC) (2/16/2020)    Active Problems:    HTN (hypertension) (8/16/2014)      Constipation (8/17/2014)      Type II or unspecified type diabetes mellitus with renal manifestations, uncontrolled(250.42) ([facility] [address] [address] (9/4/2014)      Morbid obesity ([facility]) ()      Respiratory distress (1/17/2019)      CHF (congestive [facility] failure) [facility] (4/10/2020)      CKD (chronic kidney disease) stage 4, GFR 15-29 ml/min ([facility]) (4/10/2020)      Elevated troponin (4/10/2020)      Current Facility-Administered Medications   Medication Dose Route Frequency    metOLazone (ZAROXOLYN) tablet 5 mg  5 mg Oral DAILY    furosemide (LASIX) tablet 80 mg  80 mg Oral ACB&D    insulin glargine (LANTUS) injection 20 Units  20 Units SubCUTAneous QHS    insulin lispro (HUMALOG) injection 10 Units  10 Units SubCUTAneous TIDAC    polyethylene glycol (MIRALAX) packet 17 g  17 g Oral BID    famotidine (PEPCID) tablet 20 mg  20 mg Oral DAILY    bisacodyL (DULCOLAX) suppository 10 mg  10 mg Rectal DAILY PRN    ferrous sulfate tablet 325 mg  1 Tab Oral DAILY WITH BREAKFAST    potassium chloride (K-DUR, KLOR-CON) SR tablet 20 mEq  20 mEq Oral DAILY    senna-docusate (PERICOLACE) 8.6-50 mg per tablet 1 Tab  1 Tab Oral DAILY    nystatin (MYCOSTATIN) 100,000 unit/gram powder   Topical BID    albuterol-ipratropium (DUO-NEB) 2.5 MG-0.5 MG/3 ML  3 mL Nebulization Q4H PRN    atorvastatin (LIPITOR) tablet 40 mg  40 mg Oral DAILY    calcium acetate(phosphat bind) (PHOSLO) capsule 667 mg  1 Cap Oral TID WITH MEALS    DULoxetine (CYMBALTA) capsule 60 mg  60 mg Oral DAILY    gabapentin (NEURONTIN) capsule 300 mg  300 mg Oral TID    hydrALAZINE (APRESOLINE) tablet 100 mg  100 mg Oral TID    insulin lispro (HUMALOG) injection   SubCUTAneous AC&HS    glucose chewable tablet 16 g  4 Tab Oral PRN    glucagon (GLUCAGEN) injection 1 mg  1 mg IntraMUSCular PRN    heparin (porcine) injection 5,000 Units  5,000 Units SubCUTAneous Q8H    acetaminophen (TYLENOL) tablet 650 mg  650 mg Oral Q6H PRN    ondansetron (ZOFRAN) injection 4 mg  4 mg IntraVENous Q6H PRN    amLODIPine (NORVASC) tablet 5 mg  5 mg Oral DAILY    aspirin delayed-release tablet 81 mg  81 mg Oral DAILY    carvediloL (COREG) tablet 6.25 mg  6.25 mg Oral BID WITH MEALS    cholecalciferol (VITAMIN D3) (1000 Units /25 mcg) tablet 1 Tab  1,000 Units Oral DAILY    HYDROcodone-acetaminophen (NORCO) 5-325 mg per tablet 1 Tab  1 Tab Oral Q4H PRN         Allergy:   Allergies   Allergen Reactions    Bees [Sting, Bee] Swelling    Penicillins Hives    Strawberry Hives        Objective:     Visit Vitals  /56 (BP 1 Location: Left arm, BP Patient Position: At rest)   Pulse 69   Temp 97.7 °F (36.5 °C)   Resp 16   Ht 5' 2\" (1.575 m)   Wt 151.3 kg (333 lb 8 oz)   SpO2 100%   BMI 61.00 kg/m²         Intake/Output Summary (Last 24 hours) at 4/18/2020 1231  Last data filed at 4/18/2020 0655  Gross per 24 hour   Intake    Output 750 ml   Net -750 ml       Physical Exam:     General: Obese, NAD, on CPAP    HENT: Atraumatic and normocephalic   Eyes: Normal conjunctiva   Neck: Supple. No JVD   Cardiovascular: Normal S1 & S2, no m/r/g   Pulmonary/Chest Wall: Decreased BS both bases   Abdominal: Soft and non-tender   Musculoskeletal: +++ Bilat pedal  Edema and 2+ Bilat. uppper ext edema   Neurological: AAOx4, no change today          Data Review:  Lab Results   Component Value Date/Time    Sodium 136 04/18/2020 05:09 AM    Potassium 3.6 04/18/2020 05:09 AM    Chloride 98 (L) 04/18/2020 05:09 AM    CO2 33 (H) 04/18/2020 05:09 AM    Anion gap 5 04/18/2020 05:09 AM    Glucose 167 (H) 04/18/2020 05:09 AM     (H) 04/18/2020 05:09 AM    Creatinine 3.12 (H) 04/18/2020 05:09 AM    BUN/Creatinine ratio 34 (H) 04/18/2020 05:09 AM    GFR est AA 19 (L) 04/18/2020 05:09 AM    GFR est non-AA 15 (L) 04/18/2020 05:09 AM    Calcium 9.2 04/18/2020 05:09 AM     Lab Results   Component Value Date/Time    WBC 5.4 04/16/2020 04:05 AM    HGB 7.2 (L) 04/16/2020 04:05 AM    HCT 23.0 (L) 04/16/2020 04:05 AM    PLATELET 456 11/26/4219 04:05 AM    MCV 81.9 04/16/2020 04:05 AM     Lab Results   Component Value Date/Time    Calcium 9.2 04/18/2020 05:09 AM    Phosphorus 4.7 04/14/2020 02:35 AM     Lab Results   Component Value Date/Time    Iron 32 (L) 04/14/2020 04:00 AM    TIBC 303 04/14/2020 04:00 AM    Iron % saturation 11 (L) 04/14/2020 04:00 AM    Ferritin 418 (H) 04/14/2020 02:35 AM     Lab Results   Component Value Date/Time    Ferritin 418 (H) 04/14/2020 02:35 AM         Impression:   -Acute on CKD likely prerenal sec to diuresis Cr up to 3.1 today  -CKD stage IV sec to diabetic Nephropathy/Hypertensive Nephrosclerosis. -Diastolic CHF LVEF 60 to 84%  -Anemia of CKD + Iron deficiency.  S/p IV iron  -Pulm HTN, Mild to moderate  -DM type 2  -HTN  -Metabolic alkalosis: mild contraction alkalosis sec to diuretic  -Morbid Obesity    Plan:     - Lasix switched to po  - Continue oral Metolazone for synergy  - Supplement Potassium prn  - Strict I/Os and monitor AM labs  - JUAN C weekly   - Dietary Sodium restriction  - Avoid IV radiocontrast or NSAIDs  - No indication for initiating HD presently, we will follow closely but may need if unable to moblilize the fluid. D/w pt.       MD Juan Kenney  [phone]

## 2020-04-18 NOTE — PROGRESS NOTES
Hospitalist Progress Note    Patient: Jessica Miller MRN: [id number]  CSN: [id number]    YOB: 1965  Age: 53 y.o.   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-lantus plus premeal insulin -adjusted     Hypokalemia-improved     HTN-stable control on current regimen     CKD stage 4-Cr has risen wit effects of diuresis     АНДРЕЙ-likely also OHS-CPAP at night and during day as needed, on chronic NC 02 also at home     Chronic resp failure at home on 02-recommended she try to stay on NC versus wearing her CPAP so much, I think she can tolerate this well     Morbid obesity     Elevated troponin, no chest pain-neg stress in December 2019     Anemia of CKD-added iron PO and received IV also     Continue stool softeners  I ordered KUB this am-mild ileus seen  PT recommends home health    I would anticipate d/c 24-48 hrs home with her usual 02 and CPAP    Disposition :  Patient Active Problem List   Diagnosis Code    HTN (hypertension) I10    Constipation K59.00    Type II or unspecified type diabetes mellitus with renal manifestations, uncontrolled(250.42) ([address] E11.29, E11.65    Morbid obesity ([address] E66.01    Respiratory distress R06.03    АНДРЕЙ (obstructive sleep apnea) P44.21    Diastolic CHF, acute on chronic (HCC) I50.33    CHF (congestive heart failure) (HCC) I50.9    CKD (chronic kidney disease) stage 4, GFR 15-29 ml/min ([address] N18.4    Elevated troponin R79.89       Subjective:    I fell a little better  Wearing her 02 now, off cpap, and talking easily and a lot  Had loose BM last night  Abdomen feels better    Review of systems:    Constitutional: denies fevers, chills  Respiratory: denies SOB, cough  Cardiovascular: denies chest pain  Gastrointestinal: denies nausea, vomiting      Vital signs/Intake and Output:  Visit Vitals  /62   Pulse 75   Temp 97.4 °F (36.3 °C)   Resp 16   Ht 5' 2\" (1.575 m)   Wt 151.3 kg (333 lb 8 oz)   SpO2 98%   BMI 61.00 kg/m²     Current Shift:  No intake/output data recorded. Last three shifts:  04/16 1901 - 04/18 0700  In: 100 [I.V.:100]  Out: 1600 [Urine:1600]    Exam:    General: obese WF, alert, NAD, OX3  Head/Neck: NCAT, supple, No masses, No lymphadenopathy  CVS:Regular rate and rhythm, no M/R/G, S1/S2 heard, no thrill  Lungs:Clear to auscultation bilaterally, no wheezes, rhonchi, or rales  Abdomen: Soft, Nontender, No distention, Normal Bowel sounds, No hepatomegaly  Extremities: 2 plus LE edema  Neuro:grossly normal , follows commands  Psych:appropriate                Labs: Results:       Chemistry Recent Labs     04/18/20  0509 04/17/20  0200 04/16/20  0405   * 238* 174*    136 136   K 3.6 4.0 3.3*   CL 98* 96* 94*   CO2 33* 32 31   * 107* 100*   CREA 3.12* 2.74* 2.58*   CA 9.2 8.9 8.6   AGAP 5 8 11   BUCR 34* 39* 39*      CBC w/Diff Recent Labs     04/16/20  0405   WBC 5.4   RBC 2.81*   HGB 7.2*   HCT 23.0*         Cardiac Enzymes No results for input(s): CPK, CKND1, MARIA ISABEL in the last 72 hours. No lab exists for component: CKRMB, TROIP   Coagulation No results for input(s): PTP, INR, APTT, INREXT, INREXT in the last 72 hours. Lipid Panel Lab Results   Component Value Date/Time    Cholesterol, total 103 04/14/2020 02:35 AM    HDL Cholesterol 46 04/14/2020 02:35 AM    LDL, calculated 36.2 04/14/2020 02:35 AM    VLDL, calculated 20.8 04/14/2020 02:35 AM    Triglyceride 104 04/14/2020 02:35 AM    CHOL/HDL Ratio 2.2 04/14/2020 02:35 AM      BNP No results for input(s): BNPP in the last 72 hours. Liver Enzymes No results for input(s): TP, ALB, TBIL, AP, SGOT, GPT in the last 72 hours.     No lab exists for component: DBIL   Thyroid Studies Lab Results   Component Value Date/Time    TSH 2.18 04/14/2020 02:35 AM        Procedures/imaging: see electronic medical records for all procedures/Xrays and details which were not copied into this note but were reviewed prior to creation of Ricky Vail MD

## 2020-04-18 NOTE — PROGRESS NOTES
Problem: Mobility Impaired (Adult and Pediatric)  Goal: *Acute Goals and Plan of Care (Insert Text)  Description: Physical Therapy Goals   Initiated 4/11/2020 and to be accomplished within 7 day(s)  1. Patient will move from supine <> sit with S in prep for out of bed activity and change of position. 2.  Patient will perform sit<> stand with S/RW in prep for transfers/ambulation. 3.  Patient will ambulate 50 feet with S/RW for improved functional mobility/safe discharge. Outcome: Progressing Towards Goal   PHYSICAL THERAPY TREATMENT    Patient: Brittany Mcdermott (53 y.o. female)  Date: 4/18/2020  Diagnosis: CHF (congestive heart failure) ([facility]) [X61.3]   Diastolic CHF, acute on chronic ([facility])    Precautions: Fall, Skin  PLOF:     ASSESSMENT:  No assistance needed for bed mobility. Rocking tech utilized for sit to stand. Ambulated to bathroom to void, assistance needed for hygiene care. Returned to sitting EOB. Bed linens changed. Pt ambulated again in room for a total of 50ft with BRW, decreased pace, no LOB, mild knee buckle 1x. Pt left sitting EOB per request.  SpO2 97-98% on 5L throughout session. Progression toward goals:   []      Improving appropriately and progressing toward goals  [x]      Improving slowly and progressing toward goals  []      Not making progress toward goals and plan of care will be adjusted     PLAN:  Patient continues to benefit from skilled intervention to address the above impairments. Continue treatment per established plan of care. Discharge Recommendations:  Home Health  Further Equipment Recommendations for Discharge:  rolling walker     SUBJECTIVE:   Patient stated I have to go to the bathroom but this does not reach that far.     OBJECTIVE DATA SUMMARY:   Critical Behavior:  Neurologic State: Alert  Orientation Level: Oriented X4  Cognition: Appropriate decision making    Functional Mobility Training:  Bed Mobility:    Supine to Sit: Supervision  Transfers:  Sit to Stand: Stand-by assistance  Stand to Sit: Supervision  Balance:  Sitting: Intact  Standing: With support  Standing - Static: Fair  Standing - Dynamic : Fair   Ambulation/Gait Training:  Distance (ft): 50 Feet (ft)  Assistive Device: Gait belt;Walker, rolling  Ambulation - Level of Assistance: Stand-by assistance    Gait Abnormalities: Decreased step clearance  Base of Support: Widened    Speed/Arlette: Slow  Step Length: Left shortened;Right shortened  Pain:  Pain level pre-treatment: 0/10  Pain level post-treatment: 0/10   Pain Intervention(s): Medication (see MAR); Rest, Ice, Repositioning   Response to intervention: Nurse notified, See doc flow    Activity Tolerance:   Fair  Please refer to the flowsheet for vital signs taken during this treatment. After treatment:   [] Patient left in no apparent distress sitting up in chair  [x] Patient left in no apparent distress in bed  [x] Call bell left within reach  [] Nursing notified  [] Caregiver present  [] Bed alarm activated  [] SCDs applied      COMMUNICATION/EDUCATION:   [x]         Role of Physical Therapy in the acute care setting. [x]         Fall prevention education was provided and the patient/caregiver indicated understanding. [x]         Patient/family have participated as able in working toward goals and plan of care. [x]         Patient/family agree to work toward stated goals and plan of care. []         Patient understands intent and goals of therapy, but is neutral about his/her participation.   []         Patient is unable to participate in stated goals/plan of care: ongoing with therapy staff.  []         Other:        Tomy Patel PTA   Time Calculation: 38 mins

## 2020-04-18 NOTE — PROGRESS NOTES
195 Pt received from offgoing nurse without any signs or symptoms of distress. Pt vitals are stable and within normal limits. Pt bed in low position with wheels locked and call bell within reach. 2000 Assessment completed and documented in flow sheet. Pt denies any further needs at this time. Pt in NAD with bed in low position, wheels locked and call bell within reach. 2142 Scheduled medications administered as ordered. 328 Bedside and Verbal shift change report given to Xiang Roy RN (oncoming nurse) by Roger Peres RN (offgoing nurse). Report included the following information SBAR, Intake/Output, MAR and Recent Results.

## 2020-04-18 NOTE — PROGRESS NOTES
Shift Summary:  Patient continues to have loose bowel movements x 2 overnight after receiving Miralax and several laxatives throughout the day for patient's c/o of constipation. As per PAMELA BeeRN, lasix drip as been placed on hold by Dr. Debra Mayo. Urine output overnight,  750 ml within 8 hrs. Patient continues with dyspnea at rest and exertion with O2 sats between %. Lungs clear, but diminished. Edematous BLE 2+, pitting. Rajni and rectal area with redness due to frequent loose stools, dimethicone cream applied with q loose stool. Vital signs stable, NSR.   with 2 units of Humalog sq given.     Patient Vitals for the past 8 hrs:   Temp Pulse Resp BP SpO2   04/18/20 0210 96.7 °F (35.9 °C) 72 15 122/55 99 %   04/17/20 2335 97.4 °F (36.3 °C) 71 15 129/57 100 %

## 2020-04-18 NOTE — PROGRESS NOTES
0700 Assumed responsibility for patient from [place] Ricky RN    0930 Patient ate 50 percent of breakfast with blood sugar 188. Ok to give mealtime 10 units per Dr. José Manuel Jeffers. Patient agreed to use nasal cannula more today than cpap mask. 1415 Patient refused lunch due to nausea. Held insulin per Dr. José Manuel Jeffers. 1500 Patient assisted to bathroom. 1515 Patient assisted back to bed. SBA with walker. Patient tolerated but was unsteady at times. Transport took patient to chest XRAY.

## 2020-04-19 ENCOUNTER — APPOINTMENT (OUTPATIENT)
Dept: GENERAL RADIOLOGY | Age: 55
DRG: 194 | End: 2020-04-19
Attending: INTERNAL MEDICINE
Payer: COMMERCIAL

## 2020-04-19 LAB
ANION GAP SERPL CALC-SCNC: 6 MMOL/L (ref 3–18)
BUN SERPL-MCNC: 114 MG/DL (ref 7–18)
BUN/CREAT SERPL: 30 (ref 12–20)
CALCIUM SERPL-MCNC: 9 MG/DL (ref 8.5–10.1)
CHLORIDE SERPL-SCNC: 97 MMOL/L (ref 100–111)
CO2 SERPL-SCNC: 34 MMOL/L (ref 21–32)
CREAT SERPL-MCNC: 3.86 MG/DL (ref 0.6–1.3)
GLUCOSE BLD STRIP.AUTO-MCNC: 228 MG/DL (ref 70–110)
GLUCOSE BLD STRIP.AUTO-MCNC: 228 MG/DL (ref 70–110)
GLUCOSE BLD STRIP.AUTO-MCNC: 230 MG/DL (ref 70–110)
GLUCOSE BLD STRIP.AUTO-MCNC: 247 MG/DL (ref 70–110)
GLUCOSE BLD STRIP.AUTO-MCNC: 309 MG/DL (ref 70–110)
GLUCOSE SERPL-MCNC: 213 MG/DL (ref 74–99)
MAGNESIUM SERPL-MCNC: 2.7 MG/DL (ref 1.6–2.6)
POTASSIUM SERPL-SCNC: 4.2 MMOL/L (ref 3.5–5.5)
SODIUM SERPL-SCNC: 137 MMOL/L (ref 136–145)

## 2020-04-19 PROCEDURE — 74011250636 HC RX REV CODE- 250/636: Performed by: HOSPITALIST

## 2020-04-19 PROCEDURE — 97530 THERAPEUTIC ACTIVITIES: CPT

## 2020-04-19 PROCEDURE — 82962 GLUCOSE BLOOD TEST: CPT

## 2020-04-19 PROCEDURE — 80048 BASIC METABOLIC PNL TOTAL CA: CPT

## 2020-04-19 PROCEDURE — 74011250637 HC RX REV CODE- 250/637: Performed by: HOSPITALIST

## 2020-04-19 PROCEDURE — 65660000000 HC RM CCU STEPDOWN

## 2020-04-19 PROCEDURE — 77010033678 HC OXYGEN DAILY

## 2020-04-19 PROCEDURE — C9113 INJ PANTOPRAZOLE SODIUM, VIA: HCPCS | Performed by: HOSPITALIST

## 2020-04-19 PROCEDURE — 36415 COLL VENOUS BLD VENIPUNCTURE: CPT

## 2020-04-19 PROCEDURE — 83735 ASSAY OF MAGNESIUM: CPT

## 2020-04-19 PROCEDURE — 97116 GAIT TRAINING THERAPY: CPT

## 2020-04-19 PROCEDURE — 74011000250 HC RX REV CODE- 250: Performed by: HOSPITALIST

## 2020-04-19 PROCEDURE — 71046 X-RAY EXAM CHEST 2 VIEWS: CPT

## 2020-04-19 PROCEDURE — 74011636637 HC RX REV CODE- 636/637: Performed by: HOSPITALIST

## 2020-04-19 RX ORDER — POLYETHYLENE GLYCOL 3350 17 G/17G
17 POWDER, FOR SOLUTION ORAL DAILY
Status: DISCONTINUED | OUTPATIENT
Start: 2020-04-20 | End: 2020-04-24 | Stop reason: HOSPADM

## 2020-04-19 RX ADMIN — HYDRALAZINE HYDROCHLORIDE 100 MG: 50 TABLET, FILM COATED ORAL at 21:11

## 2020-04-19 RX ADMIN — METOLAZONE 5 MG: 5 TABLET ORAL at 09:53

## 2020-04-19 RX ADMIN — Medication 1 TABLET: at 09:52

## 2020-04-19 RX ADMIN — HYDRALAZINE HYDROCHLORIDE 100 MG: 50 TABLET, FILM COATED ORAL at 15:21

## 2020-04-19 RX ADMIN — HEPARIN SODIUM 5000 UNITS: 5000 INJECTION INTRAVENOUS; SUBCUTANEOUS at 21:10

## 2020-04-19 RX ADMIN — DULOXETINE HYDROCHLORIDE 60 MG: 60 CAPSULE, DELAYED RELEASE ORAL at 09:53

## 2020-04-19 RX ADMIN — INSULIN LISPRO 6 UNITS: 100 INJECTION, SOLUTION INTRAVENOUS; SUBCUTANEOUS at 12:24

## 2020-04-19 RX ADMIN — HEPARIN SODIUM 5000 UNITS: 5000 INJECTION INTRAVENOUS; SUBCUTANEOUS at 15:21

## 2020-04-19 RX ADMIN — CALCIUM ACETATE 667 MG: 667 CAPSULE ORAL at 17:22

## 2020-04-19 RX ADMIN — HEPARIN SODIUM 5000 UNITS: 5000 INJECTION INTRAVENOUS; SUBCUTANEOUS at 05:31

## 2020-04-19 RX ADMIN — FAMOTIDINE 20 MG: 20 TABLET ORAL at 09:53

## 2020-04-19 RX ADMIN — HYDRALAZINE HYDROCHLORIDE 100 MG: 50 TABLET, FILM COATED ORAL at 09:59

## 2020-04-19 RX ADMIN — ASPIRIN 81 MG: 81 TABLET, COATED ORAL at 09:53

## 2020-04-19 RX ADMIN — INSULIN LISPRO 8 UNITS: 100 INJECTION, SOLUTION INTRAVENOUS; SUBCUTANEOUS at 12:23

## 2020-04-19 RX ADMIN — INSULIN LISPRO 4 UNITS: 100 INJECTION, SOLUTION INTRAVENOUS; SUBCUTANEOUS at 17:22

## 2020-04-19 RX ADMIN — FERROUS SULFATE TAB 325 MG (65 MG ELEMENTAL FE) 325 MG: 325 (65 FE) TAB at 09:52

## 2020-04-19 RX ADMIN — CARVEDILOL 6.25 MG: 3.12 TABLET, FILM COATED ORAL at 17:22

## 2020-04-19 RX ADMIN — CARVEDILOL 6.25 MG: 3.12 TABLET, FILM COATED ORAL at 09:53

## 2020-04-19 RX ADMIN — FUROSEMIDE 80 MG: 40 TABLET ORAL at 06:35

## 2020-04-19 RX ADMIN — SENNOSIDES AND DOCUSATE SODIUM 1 TABLET: 8.6; 5 TABLET ORAL at 09:52

## 2020-04-19 RX ADMIN — INSULIN LISPRO 6 UNITS: 100 INJECTION, SOLUTION INTRAVENOUS; SUBCUTANEOUS at 09:51

## 2020-04-19 RX ADMIN — CALCIUM ACETATE 667 MG: 667 CAPSULE ORAL at 12:23

## 2020-04-19 RX ADMIN — AMLODIPINE BESYLATE 5 MG: 5 TABLET ORAL at 09:53

## 2020-04-19 RX ADMIN — INSULIN LISPRO 4 UNITS: 100 INJECTION, SOLUTION INTRAVENOUS; SUBCUTANEOUS at 22:00

## 2020-04-19 RX ADMIN — GABAPENTIN 300 MG: 300 CAPSULE ORAL at 09:52

## 2020-04-19 RX ADMIN — POTASSIUM CHLORIDE 20 MEQ: 1500 TABLET, EXTENDED RELEASE ORAL at 09:53

## 2020-04-19 RX ADMIN — GABAPENTIN 300 MG: 300 CAPSULE ORAL at 21:11

## 2020-04-19 RX ADMIN — CALCIUM ACETATE 667 MG: 667 CAPSULE ORAL at 09:52

## 2020-04-19 RX ADMIN — ATORVASTATIN CALCIUM 40 MG: 20 TABLET, FILM COATED ORAL at 09:53

## 2020-04-19 RX ADMIN — INSULIN LISPRO 6 UNITS: 100 INJECTION, SOLUTION INTRAVENOUS; SUBCUTANEOUS at 17:22

## 2020-04-19 RX ADMIN — INSULIN LISPRO 4 UNITS: 100 INJECTION, SOLUTION INTRAVENOUS; SUBCUTANEOUS at 06:35

## 2020-04-19 RX ADMIN — INSULIN GLARGINE 20 UNITS: 100 INJECTION, SOLUTION SUBCUTANEOUS at 21:11

## 2020-04-19 RX ADMIN — SODIUM CHLORIDE 40 MG: 9 INJECTION, SOLUTION INTRAMUSCULAR; INTRAVENOUS; SUBCUTANEOUS at 15:21

## 2020-04-19 RX ADMIN — NYSTATIN: 100000 POWDER TOPICAL at 20:58

## 2020-04-19 RX ADMIN — FUROSEMIDE 80 MG: 40 TABLET ORAL at 17:22

## 2020-04-19 RX ADMIN — POLYETHYLENE GLYCOL 3350 17 G: 17 POWDER, FOR SOLUTION ORAL at 09:51

## 2020-04-19 RX ADMIN — GABAPENTIN 300 MG: 300 CAPSULE ORAL at 15:21

## 2020-04-19 NOTE — PROGRESS NOTES
Problem: Mobility Impaired (Adult and Pediatric)  Goal: *Acute Goals and Plan of Care (Insert Text)  Description: Physical Therapy Goals   Initiated 4/11/2020 and to be accomplished within 7 day(s)  1. Patient will move from supine <> sit with S in prep for out of bed activity and change of position. 2.  Patient will perform sit<> stand with S/RW in prep for transfers/ambulation. 3.  Patient will ambulate 50 feet with S/RW for improved functional mobility/safe discharge. Outcome: Progressing Towards Goal   PHYSICAL THERAPY TREATMENT    Patient: Jose Raul Romero (53 y.o. female)  Date: 4/19/2020  Diagnosis: CHF (congestive heart failure) ([facility]) [Y55.3]   Diastolic CHF, acute on chronic [facility])    Precautions: Fall, Skin   Chart, physical therapy assessment, plan of care and goals were reviewed. ASSESSMENT:  Pt in bed upon arrival, motivated to participate. Pt slightly increasing amb distance 54' with RW SBA. Moderately fatigue on exertion. Cont POC    Progression toward goals:  []      Improving appropriately and progressing toward goals  [x]      Improving slowly and progressing toward goals  []      Not making progress toward goals and plan of care will be adjusted     PLAN:  Patient continues to benefit from skilled intervention to address the above impairments. Continue treatment per established plan of care.   Discharge Recommendations:  Home Health  Further Equipment Recommendations for Discharge:  rolling walker     SUBJECTIVE:   Patient stated  I was up early waiting for therapy      OBJECTIVE DATA SUMMARY:   Critical Behavior:  Neurologic State: Alert, Appropriate for age  Orientation Level: Appropriate for age, Oriented X4  Cognition: Appropriate decision making    Functional Mobility Training:  Bed Mobility:  Rolling: Supervision  Supine to Sit: Supervision  Scooting: Supervision    Transfers:  Sit to Stand: Stand-by assistance  Stand to Sit: Supervision    Balance:  Sitting: Intact  Standing: Intact; With support  Standing - Static: Fair  Standing - Dynamic : Fair  Ambulation/Gait Training:  Distance (ft): 55 Feet (ft)  Assistive Device: Walker, rolling;Gait belt  Ambulation - Level of Assistance: Stand-by assistance  Gait Abnormalities: Decreased step clearance  Base of Support: Widened  Speed/Arlette: Slow  Step Length: Right shortened;Left shortened    Pain:  Pain Scale 1: Adult Nonverbal Pain Scale  Pain Intensity 1: 6  Pain Location 1: Generalized  Pain Intervention(s) 1: Declines;Distraction; Rest  Activity Tolerance:   Fair     After treatment:   [] Patient left in no apparent distress sitting up in chair  [x] Patient left in no apparent distress in bed(EOB)   [x] Call bell left within reach  [] Nursing notified  [] Caregiver present  [] Bed alarm activated      Tiana Pollard PTA   Time Calculation: 23 mins

## 2020-04-19 NOTE — PROGRESS NOTES
Problem: Pressure Injury - Risk of  Goal: *Prevention of pressure injury  Description: Document Elian Scale and appropriate interventions in the flowsheet. Outcome: Progressing Towards Goal  Note: Pressure Injury Interventions:       Moisture Interventions: Absorbent underpads, Offer toileting Q_hr    Activity Interventions: Pressure redistribution bed/mattress(bed type)    Mobility Interventions: Pressure redistribution bed/mattress (bed type)    Nutrition Interventions: Document food/fluid/supplement intake    Friction and Shear Interventions: Apply protective barrier, creams and emollients, Minimize layers                Problem: Patient Education: Go to Patient Education Activity  Goal: Patient/Family Education  Outcome: Progressing Towards Goal     Problem: Diabetes Self-Management  Goal: *Disease process and treatment process  Description: Define diabetes and identify own type of diabetes; list 3 options for treating diabetes. Outcome: Progressing Towards Goal  Goal: *Incorporating nutritional management into lifestyle  Description: Describe effect of type, amount and timing of food on blood glucose; list 3 methods for planning meals. Outcome: Progressing Towards Goal  Goal: *Incorporating physical activity into lifestyle  Description: State effect of exercise on blood glucose levels. Outcome: Progressing Towards Goal  Goal: *Developing strategies to promote health/change behavior  Description: Define the ABC's of diabetes; identify appropriate screenings, schedule and personal plan for screenings. Outcome: Progressing Towards Goal  Goal: *Using medications safely  Description: State effect of diabetes medications on diabetes; name diabetes medication taking, action and side effects. Outcome: Progressing Towards Goal  Goal: *Monitoring blood glucose, interpreting and using results  Description: Identify recommended blood glucose targets  and personal targets.   Outcome: Progressing Towards Goal  Goal: *Prevention, detection, treatment of acute complications  Description: List symptoms of hyper- and hypoglycemia; describe how to treat low blood sugar and actions for lowering  high blood glucose level. Outcome: Progressing Towards Goal  Goal: *Prevention, detection and treatment of chronic complications  Description: Define the natural course of diabetes and describe the relationship of blood glucose levels to long term complications of diabetes. Outcome: Progressing Towards Goal  Goal: *Developing strategies to address psychosocial issues  Description: Describe feelings about living with diabetes; identify support needed and support network  Outcome: Progressing Towards Goal  Goal: *Insulin pump training  Outcome: Progressing Towards Goal  Goal: *Sick day guidelines  Outcome: Progressing Towards Goal  Goal: *Patient Specific Goal (EDIT GOAL, INSERT TEXT)  Outcome: Progressing Towards Goal     Problem: Patient Education: Go to Patient Education Activity  Goal: Patient/Family Education  Outcome: Progressing Towards Goal     Problem: Falls - Risk of  Goal: *Absence of Falls  Description: Document Mayito Fall Risk and appropriate interventions in the flowsheet.   Outcome: Progressing Towards Goal  Note: Fall Risk Interventions:  Mobility Interventions: Patient to call before getting OOB, Utilize walker, cane, or other assistive device         Medication Interventions: Patient to call before getting OOB, Teach patient to arise slowly    Elimination Interventions: Call light in reach, Toileting schedule/hourly rounds, Patient to call for help with toileting needs    History of Falls Interventions: Vital signs minimum Q4HRs X 24 hrs (comment for end date)         Problem: Patient Education: Go to Patient Education Activity  Goal: Patient/Family Education  Outcome: Progressing Towards Goal     Problem: Patient Education: Go to Patient Education Activity  Goal: Patient/Family Education  Outcome: Progressing Towards Goal Problem: Patient Education: Go to Patient Education Activity  Goal: Patient/Family Education  Outcome: Progressing Towards Goal     Problem: Fluid Volume - Risk of, Imbalanced  Goal: *Balanced intake and output  Outcome: Progressing Towards Goal     Problem: Patient Education: Go to Patient Education Activity  Goal: Patient/Family Education  Outcome: Progressing Towards Goal     Problem: Pain  Goal: *Control of Pain  Outcome: Progressing Towards Goal     Problem: Patient Education: Go to Patient Education Activity  Goal: Patient/Family Education  Outcome: Progressing Towards Goal

## 2020-04-19 NOTE — PROGRESS NOTES
12- Verbal report received from Meera Hall 43. Report included SBAR, Kardex and Gianluca Gross- Dr. Preeti Davis informed of patient's urinary status. Patient reported that she has not voided since midnight. 1568- Patient was able to successfully void. 1415- Patient complains of heartburn and requesting medication for relief. 26- Informed Dr. Preeti Davis that patient was requesting medication for heartburn relief. Verbal orders received. 1810-Patient reported shortness of breath. Patient assessed to be dyspneic at rest. Patient's vitals assessed and oxygen saturation at 100%. Dr. Rodriguez Guillen paged to inform. [address]. Alicia Randolph informed of patient's complaints of shortness of breath as well as no urinary output since this morning. Orders received. 3525- Provided verbal report to Landen Cabezas RN. Report included SBAR, Kardex and MAR.

## 2020-04-19 NOTE — PROGRESS NOTES
Nephrology Progress note    Subjective: Marcia Curiel a 53 y. o. female with a past medical history significant for DM type 2, HTN, TIA, CKD IV, HLD, АНДРЕЙ, Obesity, Chronic Anemia, Diastolic CHF who presented with c/o SOB. Her CXR showed Pulmonary edema and small bilateral effusions consistent with CHF. S Cr was 2.6 on admission and remains unchanged today.    Patient is followed at our office by Dr Yesi Soriano, most recent S Cr 2.6 on 2/26/20. She was last seen on 3/4/20 and maintained on diuretics, oral Furosemide + Metolazone. Since admission she has been placed on intermittent  IV Lasix. Switched to lasix gtt and po metolazone. Pt had attended [facility] prior to admission, scheduled for second class next week. -Reports less SOB today and Less orthopneic.  Still has large LE edema           Admit Date: 4/10/2020  Principal Problem:    Diastolic CHF, acute on chronic (HCC) (2/16/2020)    Active Problems:    HTN (hypertension) (8/16/2014)      Constipation (8/17/2014)      Type II or unspecified type diabetes mellitus with renal manifestations, uncontrolled(250.42) ([facility] [address] (9/4/2014)      Morbid obesity (HCC) ()      Respiratory distress (1/17/2019)      CHF (congestive heart failure) ([facility] [address] (4/10/2020)      CKD (chronic kidney disease) stage 4, GFR 15-29 ml/min ([facility]) (4/10/2020)      Elevated troponin (4/10/2020)      Current Facility-Administered Medications   Medication Dose Route Frequency    metOLazone (ZAROXOLYN) tablet 5 mg  5 mg Oral DAILY    furosemide (LASIX) tablet 80 mg  80 mg Oral ACB&D    epoetin erickson-epbx (RETACRIT) injection 20,000 Units  20,000 Units SubCUTAneous Q7D    insulin lispro (HUMALOG) injection 6 Units  6 Units SubCUTAneous TIDAC    insulin glargine (LANTUS) injection 20 Units  20 Units SubCUTAneous QHS    polyethylene glycol (MIRALAX) packet 17 g  17 g Oral BID    famotidine (PEPCID) tablet 20 mg  20 mg Oral DAILY    bisacodyL (DULCOLAX) suppository 10 mg  10 mg Rectal DAILY PRN    ferrous sulfate tablet 325 mg  1 Tab Oral DAILY WITH BREAKFAST    potassium chloride (K-DUR, KLOR-CON) SR tablet 20 mEq  20 mEq Oral DAILY    senna-docusate (PERICOLACE) 8.6-50 mg per tablet 1 Tab  1 Tab Oral DAILY    nystatin (MYCOSTATIN) 100,000 unit/gram powder   Topical BID    albuterol-ipratropium (DUO-NEB) 2.5 MG-0.5 MG/3 ML  3 mL Nebulization Q4H PRN    atorvastatin (LIPITOR) tablet 40 mg  40 mg Oral DAILY    calcium acetate(phosphat bind) (PHOSLO) capsule 667 mg  1 Cap Oral TID WITH MEALS    DULoxetine (CYMBALTA) capsule 60 mg  60 mg Oral DAILY    gabapentin (NEURONTIN) capsule 300 mg  300 mg Oral TID    hydrALAZINE (APRESOLINE) tablet 100 mg  100 mg Oral TID    insulin lispro (HUMALOG) injection   SubCUTAneous AC&HS    glucose chewable tablet 16 g  4 Tab Oral PRN    glucagon (GLUCAGEN) injection 1 mg  1 mg IntraMUSCular PRN    heparin (porcine) injection 5,000 Units  5,000 Units SubCUTAneous Q8H    acetaminophen (TYLENOL) tablet 650 mg  650 mg Oral Q6H PRN    ondansetron (ZOFRAN) injection 4 mg  4 mg IntraVENous Q6H PRN    amLODIPine (NORVASC) tablet 5 mg  5 mg Oral DAILY    aspirin delayed-release tablet 81 mg  81 mg Oral DAILY    carvediloL (COREG) tablet 6.25 mg  6.25 mg Oral BID WITH MEALS    cholecalciferol (VITAMIN D3) (1000 Units /25 mcg) tablet 1 Tab  1,000 Units Oral DAILY    HYDROcodone-acetaminophen (NORCO) 5-325 mg per tablet 1 Tab  1 Tab Oral Q4H PRN         Allergy:   Allergies   Allergen Reactions    Bees [Sting, Bee] Swelling    Penicillins Hives    Strawberry Hives        Objective:     Visit Vitals  /72   Pulse 70   Temp 97.5 °F (36.4 °C)   Resp 17   Ht 5' 2\" (1.575 m)   Wt 154.7 kg (341 lb)   SpO2 100%   BMI 62.37 kg/m²         Intake/Output Summary (Last 24 hours) at 4/19/2020 1151  Last data filed at 4/19/2020 0938  Gross per 24 hour   Intake 840 ml   Output 550 ml   Net 290 ml       Physical Exam:     General: Obese, NAD, on CPAP    HENT: Atraumatic and normocephalic   Eyes: Normal conjunctiva   Neck: Supple. No JVD   Cardiovascular: Normal S1 & S2, no m/r/g   Pulmonary/Chest Wall: Decreased BS both bases   Abdominal: Soft and non-tender   Musculoskeletal: +++ Bilat pedal  Edema and 2+ Bilat. uppper ext edema   Neurological: AAOx4, no change today          Data Review:  Lab Results   Component Value Date/Time    Sodium 137 04/19/2020 05:16 AM    Potassium 4.2 04/19/2020 05:16 AM    Chloride 97 (L) 04/19/2020 05:16 AM    CO2 34 (H) 04/19/2020 05:16 AM    Anion gap 6 04/19/2020 05:16 AM    Glucose 213 (H) 04/19/2020 05:16 AM     (H) 04/19/2020 05:16 AM    Creatinine 3.86 (H) 04/19/2020 05:16 AM    BUN/Creatinine ratio 30 (H) 04/19/2020 05:16 AM    GFR est AA 15 (L) 04/19/2020 05:16 AM    GFR est non-AA 12 (L) 04/19/2020 05:16 AM    Calcium 9.0 04/19/2020 05:16 AM     Lab Results   Component Value Date/Time    WBC 5.4 04/16/2020 04:05 AM    HGB 7.2 (L) 04/16/2020 04:05 AM    HCT 23.0 (L) 04/16/2020 04:05 AM    PLATELET 073 94/13/1760 04:05 AM    MCV 81.9 04/16/2020 04:05 AM     Lab Results   Component Value Date/Time    Calcium 9.0 04/19/2020 05:16 AM    Phosphorus 4.7 04/14/2020 02:35 AM     Lab Results   Component Value Date/Time    Iron 32 (L) 04/14/2020 04:00 AM    TIBC 303 04/14/2020 04:00 AM    Iron % saturation 11 (L) 04/14/2020 04:00 AM    Ferritin 418 (H) 04/14/2020 02:35 AM     Lab Results   Component Value Date/Time    Ferritin 418 (H) 04/14/2020 02:35 AM         Impression:   -Acute on CKD likely prerenal/?ATN sec to diuresis Cr up to 3.8 today  -CKD stage IV sec to diabetic Nephropathy/Hypertensive Nephrosclerosis. -Diastolic CHF LVEF 60 to 15%  -Anemia of CKD + Iron deficiency.  S/p IV iron  -Pulm HTN, Mild to moderate  -DM type 2  -HTN  -Metabolic alkalosis: mild contraction alkalosis sec to diuretic  -Morbid Obesity        Plan:     -Hold Lasix and Metolazone for now given worsening kidney function  -Strict I/Os and monitor AM labs  -JUAN C weekly   -Renal diet  -No indication for initiating HD presently, we will follow closely but may need if unable to moblilize the fluid.  D/w pt          MD Juan Dutta  [phone]

## 2020-04-19 NOTE — PROGRESS NOTES
Hospitalist Progress Note    Patient: Simon Lloyd MRN: [id number]  CSN: [id number]    YOB: 1965  Age: 53 y.o.   Sex: female    DOA: 4/10/2020 LOS:  LOS: 9 days          Chief Complaint:    SOB      Assessment/Plan     Acute on chronic decompensated diastolic CHF-appreciate cardiology and nephrology help  Improved but continues with large LE edema, she is moving towards dialysis but still may be a bit darci from it  Changed to PO lasix as worsening renal function and constipation      IDDM type 2 with renal disease and hyperglycemia uncontrolled-lantus plus premeal insulin -adjusted     Hypokalemia-improved     HTN-stable control on current regimen    Pulmonary HTN     CKD stage 4-with FARHAN-follow daily BMP     АНДРЕЙ-CPAP at night, daily 02 for chronic resp failure      Super Morbid obesity/OHS     Elevated troponin, no chest pain-neg stress in December 2019     Anemia of CKD-treating with iron, no bleeding    Constipation-relievd    PT as tolerated  Will see what nephrology thinks as far as working towards dialysis or holding off for now, Cr is higher today, and she still has quite a bit of edema    Discussed with patient  Disposition :  Patient Active Problem List   Diagnosis Code    HTN (hypertension) I10    Constipation K59.00    Type II or unspecified type diabetes mellitus with renal manifestations, uncontrolled(250.42) ([facility] [address] E11.29, E11.65    Morbid obesity ([facility] [address] E66.01    Respiratory distress R06.03    АНДРЕЙ (obstructive sleep apnea) H92.52    Diastolic CHF, acute on chronic (HCC) I50.33    CHF (congestive heart failure) ([facility]) I50.9    CKD (chronic kidney disease) stage 4, GFR 15-29 ml/min ([facility]) N18.4    Elevated troponin R79.89       Subjective:    I am still swollen  Breathing better  Constipation resolved    Review of systems:    Constitutional: denies fevers, chills  Respiratory: denies SOB, cough  Cardiovascular: denies chest pain  Gastrointestinal: denies nausea, vomiting, diarrhea      Vital signs/Intake and Output:  Visit Vitals  /67   Pulse 75   Temp 97.5 °F (36.4 °C)   Resp 16   Ht 5' 2\" (1.575 m)   Wt 154.7 kg (341 lb)   SpO2 99%   BMI 62.37 kg/m²     Current Shift:  04/19 0701 - 04/19 1900  In: 840 [P.O.:840]  Out: 550 [Urine:550]  Last three shifts:  04/17 1901 - 04/19 0700  In: -   Out: 750 [Urine:750]    Exam:    General: morbidly obese WF alert, NAD, OX3  Head/Neck: NCAT, supple, No masses, No lymphadenopathy  CVS:Regular rate and rhythm, no M/R/G, S1/S2 heard, no thrill  Lungs:Clear to auscultation bilaterally, no wheezes, rhonchi, or rales  Abdomen: Soft, Nontender, No distention, Normal Bowel sounds, No hepatomegaly  Extremities: 2-3 plus edema LE BL  Neuro:grossly normal , follows commands  Psych:appropriate                Labs: Results:       Chemistry Recent Labs     04/19/20  0516 04/18/20  0509 04/17/20  0200   * 167* 238*    136 136   K 4.2 3.6 4.0   CL 97* 98* 96*   CO2 34* 33* 32   * 106* 107*   CREA 3.86* 3.12* 2.74*   CA 9.0 9.2 8.9   AGAP 6 5 8   BUCR 30* 34* 39*      CBC w/Diff No results for input(s): WBC, RBC, HGB, HCT, PLT, GRANS, LYMPH, EOS, HGBEXT, HCTEXT, PLTEXT in the last 72 hours. Cardiac Enzymes No results for input(s): CPK, CKND1, MARIA ISABEL in the last 72 hours. No lab exists for component: CKRMB, TROIP   Coagulation No results for input(s): PTP, INR, APTT, INREXT in the last 72 hours. Lipid Panel Lab Results   Component Value Date/Time    Cholesterol, total 103 04/14/2020 02:35 AM    HDL Cholesterol 46 04/14/2020 02:35 AM    LDL, calculated 36.2 04/14/2020 02:35 AM    VLDL, calculated 20.8 04/14/2020 02:35 AM    Triglyceride 104 04/14/2020 02:35 AM    CHOL/HDL Ratio 2.2 04/14/2020 02:35 AM      BNP No results for input(s): BNPP in the last 72 hours. Liver Enzymes No results for input(s): TP, ALB, TBIL, AP, SGOT, GPT in the last 72 hours.     No lab exists for component: DBIL   Thyroid Studies Lab Results   Component Value Date/Time    TSH 2.18 04/14/2020 02:35 AM        Procedures/imaging: see electronic medical records for all procedures/Xrays and details which were not copied into this note but were reviewed prior to creation of James Ayala MD

## 2020-04-20 ENCOUNTER — APPOINTMENT (OUTPATIENT)
Dept: GENERAL RADIOLOGY | Age: 55
DRG: 194 | End: 2020-04-20
Attending: INTERNAL MEDICINE
Payer: COMMERCIAL

## 2020-04-20 LAB
AMORPH CRY URNS QL MICRO: ABNORMAL
ANION GAP SERPL CALC-SCNC: 6 MMOL/L (ref 3–18)
APPEARANCE UR: ABNORMAL
BACTERIA URNS QL MICRO: ABNORMAL /HPF
BILIRUB UR QL: NEGATIVE
BUN SERPL-MCNC: 120 MG/DL (ref 7–18)
BUN/CREAT SERPL: 27 (ref 12–20)
CALCIUM SERPL-MCNC: 8.9 MG/DL (ref 8.5–10.1)
CHLORIDE SERPL-SCNC: 96 MMOL/L (ref 100–111)
CO2 SERPL-SCNC: 34 MMOL/L (ref 21–32)
COLOR UR: YELLOW
CREAT SERPL-MCNC: 4.4 MG/DL (ref 0.6–1.3)
EPITH CASTS URNS QL MICRO: ABNORMAL /LPF (ref 0–5)
GLUCOSE BLD STRIP.AUTO-MCNC: 196 MG/DL (ref 70–110)
GLUCOSE BLD STRIP.AUTO-MCNC: 200 MG/DL (ref 70–110)
GLUCOSE BLD STRIP.AUTO-MCNC: 202 MG/DL (ref 70–110)
GLUCOSE BLD STRIP.AUTO-MCNC: 206 MG/DL (ref 70–110)
GLUCOSE BLD STRIP.AUTO-MCNC: 273 MG/DL (ref 70–110)
GLUCOSE SERPL-MCNC: 194 MG/DL (ref 74–99)
GLUCOSE UR STRIP.AUTO-MCNC: 100 MG/DL
GRAN CASTS URNS QL MICRO: ABNORMAL /LPF
HGB UR QL STRIP: NEGATIVE
KETONES UR QL STRIP.AUTO: NEGATIVE MG/DL
LEUKOCYTE ESTERASE UR QL STRIP.AUTO: ABNORMAL
MAGNESIUM SERPL-MCNC: 2.8 MG/DL (ref 1.6–2.6)
MUCOUS THREADS URNS QL MICRO: ABNORMAL /LPF
NITRITE UR QL STRIP.AUTO: NEGATIVE
PH UR STRIP: 5 [PH] (ref 5–8)
POTASSIUM SERPL-SCNC: 4.7 MMOL/L (ref 3.5–5.5)
PROT UR STRIP-MCNC: 100 MG/DL
RBC #/AREA URNS HPF: ABNORMAL /HPF (ref 0–5)
SODIUM SERPL-SCNC: 136 MMOL/L (ref 136–145)
SP GR UR REFRACTOMETRY: 1.01 (ref 1–1.03)
UROBILINOGEN UR QL STRIP.AUTO: 0.2 EU/DL (ref 0.2–1)
WBC URNS QL MICRO: ABNORMAL /HPF (ref 0–5)

## 2020-04-20 PROCEDURE — 86704 HEP B CORE ANTIBODY TOTAL: CPT

## 2020-04-20 PROCEDURE — 72100 X-RAY EXAM L-S SPINE 2/3 VWS: CPT

## 2020-04-20 PROCEDURE — 83735 ASSAY OF MAGNESIUM: CPT

## 2020-04-20 PROCEDURE — 74011636637 HC RX REV CODE- 636/637: Performed by: HOSPITALIST

## 2020-04-20 PROCEDURE — 86706 HEP B SURFACE ANTIBODY: CPT

## 2020-04-20 PROCEDURE — 86705 HEP B CORE ANTIBODY IGM: CPT

## 2020-04-20 PROCEDURE — 74011250637 HC RX REV CODE- 250/637: Performed by: FAMILY MEDICINE

## 2020-04-20 PROCEDURE — 81001 URINALYSIS AUTO W/SCOPE: CPT

## 2020-04-20 PROCEDURE — 87086 URINE CULTURE/COLONY COUNT: CPT

## 2020-04-20 PROCEDURE — 65660000000 HC RM CCU STEPDOWN

## 2020-04-20 PROCEDURE — 74011250637 HC RX REV CODE- 250/637: Performed by: HOSPITALIST

## 2020-04-20 PROCEDURE — 72220 X-RAY EXAM SACRUM TAILBONE: CPT

## 2020-04-20 PROCEDURE — 86803 HEPATITIS C AB TEST: CPT

## 2020-04-20 PROCEDURE — 97116 GAIT TRAINING THERAPY: CPT

## 2020-04-20 PROCEDURE — 74011250636 HC RX REV CODE- 250/636: Performed by: HOSPITALIST

## 2020-04-20 PROCEDURE — 97110 THERAPEUTIC EXERCISES: CPT

## 2020-04-20 PROCEDURE — 97530 THERAPEUTIC ACTIVITIES: CPT

## 2020-04-20 PROCEDURE — 77030018846 HC SOL IRR STRL H20 ICUM -A

## 2020-04-20 PROCEDURE — 94660 CPAP INITIATION&MGMT: CPT

## 2020-04-20 PROCEDURE — 77010033678 HC OXYGEN DAILY

## 2020-04-20 PROCEDURE — 82962 GLUCOSE BLOOD TEST: CPT

## 2020-04-20 PROCEDURE — 36415 COLL VENOUS BLD VENIPUNCTURE: CPT

## 2020-04-20 PROCEDURE — 87340 HEPATITIS B SURFACE AG IA: CPT

## 2020-04-20 PROCEDURE — 80048 BASIC METABOLIC PNL TOTAL CA: CPT

## 2020-04-20 RX ORDER — SODIUM CHLORIDE 9 MG/ML
100 INJECTION, SOLUTION INTRAVENOUS
Status: DISCONTINUED | OUTPATIENT
Start: 2020-04-20 | End: 2020-04-24 | Stop reason: HOSPADM

## 2020-04-20 RX ADMIN — INSULIN LISPRO 6 UNITS: 100 INJECTION, SOLUTION INTRAVENOUS; SUBCUTANEOUS at 17:35

## 2020-04-20 RX ADMIN — NYSTATIN: 100000 POWDER TOPICAL at 10:31

## 2020-04-20 RX ADMIN — FAMOTIDINE 20 MG: 20 TABLET ORAL at 10:30

## 2020-04-20 RX ADMIN — DULOXETINE HYDROCHLORIDE 60 MG: 60 CAPSULE, DELAYED RELEASE ORAL at 10:29

## 2020-04-20 RX ADMIN — INSULIN LISPRO 4 UNITS: 100 INJECTION, SOLUTION INTRAVENOUS; SUBCUTANEOUS at 07:30

## 2020-04-20 RX ADMIN — CARVEDILOL 6.25 MG: 3.12 TABLET, FILM COATED ORAL at 17:36

## 2020-04-20 RX ADMIN — SENNOSIDES AND DOCUSATE SODIUM 1 TABLET: 8.6; 5 TABLET ORAL at 10:30

## 2020-04-20 RX ADMIN — INSULIN LISPRO 6 UNITS: 100 INJECTION, SOLUTION INTRAVENOUS; SUBCUTANEOUS at 22:10

## 2020-04-20 RX ADMIN — CALCIUM ACETATE 667 MG: 667 CAPSULE ORAL at 17:36

## 2020-04-20 RX ADMIN — FERROUS SULFATE TAB 325 MG (65 MG ELEMENTAL FE) 325 MG: 325 (65 FE) TAB at 10:30

## 2020-04-20 RX ADMIN — INSULIN LISPRO 6 UNITS: 100 INJECTION, SOLUTION INTRAVENOUS; SUBCUTANEOUS at 07:30

## 2020-04-20 RX ADMIN — GABAPENTIN 300 MG: 300 CAPSULE ORAL at 22:10

## 2020-04-20 RX ADMIN — ACETAMINOPHEN 650 MG: 325 TABLET, FILM COATED ORAL at 10:29

## 2020-04-20 RX ADMIN — HEPARIN SODIUM 5000 UNITS: 5000 INJECTION INTRAVENOUS; SUBCUTANEOUS at 14:36

## 2020-04-20 RX ADMIN — HYDROCODONE BITARTRATE AND ACETAMINOPHEN 1 TABLET: 5; 325 TABLET ORAL at 14:36

## 2020-04-20 RX ADMIN — HYDRALAZINE HYDROCHLORIDE 100 MG: 50 TABLET, FILM COATED ORAL at 22:09

## 2020-04-20 RX ADMIN — HYDROCODONE BITARTRATE AND ACETAMINOPHEN 1 TABLET: 5; 325 TABLET ORAL at 23:05

## 2020-04-20 RX ADMIN — AMLODIPINE BESYLATE 5 MG: 5 TABLET ORAL at 10:30

## 2020-04-20 RX ADMIN — Medication 1 TABLET: at 09:00

## 2020-04-20 RX ADMIN — GABAPENTIN 300 MG: 300 CAPSULE ORAL at 10:32

## 2020-04-20 RX ADMIN — CALCIUM ACETATE 667 MG: 667 CAPSULE ORAL at 10:38

## 2020-04-20 RX ADMIN — HYDRALAZINE HYDROCHLORIDE 100 MG: 50 TABLET, FILM COATED ORAL at 10:30

## 2020-04-20 RX ADMIN — HYDROCODONE BITARTRATE AND ACETAMINOPHEN 1 TABLET: 5; 325 TABLET ORAL at 06:30

## 2020-04-20 RX ADMIN — NYSTATIN: 100000 POWDER TOPICAL at 22:11

## 2020-04-20 RX ADMIN — GABAPENTIN 300 MG: 300 CAPSULE ORAL at 17:36

## 2020-04-20 RX ADMIN — CARVEDILOL 6.25 MG: 3.12 TABLET, FILM COATED ORAL at 10:29

## 2020-04-20 RX ADMIN — INSULIN LISPRO 6 UNITS: 100 INJECTION, SOLUTION INTRAVENOUS; SUBCUTANEOUS at 12:33

## 2020-04-20 RX ADMIN — ATORVASTATIN CALCIUM 40 MG: 20 TABLET, FILM COATED ORAL at 10:30

## 2020-04-20 RX ADMIN — INSULIN GLARGINE 20 UNITS: 100 INJECTION, SOLUTION SUBCUTANEOUS at 22:10

## 2020-04-20 RX ADMIN — POLYETHYLENE GLYCOL 3350 17 G: 17 POWDER, FOR SOLUTION ORAL at 10:32

## 2020-04-20 RX ADMIN — HEPARIN SODIUM 5000 UNITS: 5000 INJECTION INTRAVENOUS; SUBCUTANEOUS at 06:00

## 2020-04-20 RX ADMIN — CALCIUM ACETATE 667 MG: 667 CAPSULE ORAL at 12:32

## 2020-04-20 RX ADMIN — HYDRALAZINE HYDROCHLORIDE 100 MG: 50 TABLET, FILM COATED ORAL at 17:36

## 2020-04-20 RX ADMIN — HEPARIN SODIUM 5000 UNITS: 5000 INJECTION INTRAVENOUS; SUBCUTANEOUS at 22:10

## 2020-04-20 RX ADMIN — ASPIRIN 81 MG: 81 TABLET, COATED ORAL at 10:29

## 2020-04-20 RX ADMIN — POTASSIUM CHLORIDE 20 MEQ: 1500 TABLET, EXTENDED RELEASE ORAL at 10:30

## 2020-04-20 RX ADMIN — INSULIN LISPRO 4 UNITS: 100 INJECTION, SOLUTION INTRAVENOUS; SUBCUTANEOUS at 17:35

## 2020-04-20 NOTE — PROGRESS NOTES
Pt placed on hospital-issued auto-titrating cpap for HS at this time after pt arrived back in room after a trip to [facility]. O2 bled-in at 5 lpm. No distress noted. Will continue to monitor.

## 2020-04-20 NOTE — PROGRESS NOTES
Nephrology Progress note    Subjective: Rohan Drake a 53 y. o. female with a past medical history significant for DM type 2, HTN, TIA, CKD IV, HLD, АНДРЕЙ, Obesity, Chronic Anemia, Diastolic CHF who presented with c/o SOB. Her CXR showed Pulmonary edema and small bilateral effusions consistent with CHF. S Cr was 2.6 on admission and remains unchanged today.    Patient is followed at our office by Dr Krysten Rees, most recent S Cr 2.6 on 2/26/20. She was last seen on 3/4/20 and maintained on diuretics, oral Furosemide + Metolazone. Since admission she has been placed on intermittent  IV Lasix. Switched to lasix gtt and po metolazone. Pt had attended [facility] prior to admission, scheduled for second class next week. -Still admits to some SOB with exertion and orthopnea today, though some improvement since admission. Still has  LE edema. Off Lasix due to worsening Renal function on diuretics.    , Cr 4.4 today        Admit Date: 4/10/2020  Principal Problem:    Diastolic CHF, acute on chronic (HCC) (2/16/2020)    Active Problems:    HTN (hypertension) (8/16/2014)      Constipation (8/17/2014)      Type II or unspecified type diabetes mellitus with renal manifestations, uncontrolled(250.42) ([facility] [address] (9/4/2014)      Morbid obesity ([facility]) ()      Respiratory distress (1/17/2019)      CHF (congestive heart failure) ([facility] [address] (4/10/2020)      CKD (chronic kidney disease) stage 4, GFR 15-29 ml/min ([facility]) (4/10/2020)      Elevated troponin (4/10/2020)      Current Facility-Administered Medications   Medication Dose Route Frequency    polyethylene glycol (MIRALAX) packet 17 g  17 g Oral DAILY    epoetin erickson-epbx (RETACRIT) injection 20,000 Units  20,000 Units SubCUTAneous Q7D    insulin lispro (HUMALOG) injection 6 Units  6 Units SubCUTAneous TIDAC    insulin glargine (LANTUS) injection 20 Units  20 Units SubCUTAneous QHS    famotidine (PEPCID) tablet 20 mg  20 mg Oral DAILY    bisacodyL (DULCOLAX) suppository 10 mg 10 mg Rectal DAILY PRN    ferrous sulfate tablet 325 mg  1 Tab Oral DAILY WITH BREAKFAST    potassium chloride (K-DUR, KLOR-CON) SR tablet 20 mEq  20 mEq Oral DAILY    senna-docusate (PERICOLACE) 8.6-50 mg per tablet 1 Tab  1 Tab Oral DAILY    nystatin (MYCOSTATIN) 100,000 unit/gram powder   Topical BID    albuterol-ipratropium (DUO-NEB) 2.5 MG-0.5 MG/3 ML  3 mL Nebulization Q4H PRN    atorvastatin (LIPITOR) tablet 40 mg  40 mg Oral DAILY    calcium acetate(phosphat bind) (PHOSLO) capsule 667 mg  1 Cap Oral TID WITH MEALS    DULoxetine (CYMBALTA) capsule 60 mg  60 mg Oral DAILY    gabapentin (NEURONTIN) capsule 300 mg  300 mg Oral TID    hydrALAZINE (APRESOLINE) tablet 100 mg  100 mg Oral TID    insulin lispro (HUMALOG) injection   SubCUTAneous AC&HS    glucose chewable tablet 16 g  4 Tab Oral PRN    glucagon (GLUCAGEN) injection 1 mg  1 mg IntraMUSCular PRN    heparin (porcine) injection 5,000 Units  5,000 Units SubCUTAneous Q8H    acetaminophen (TYLENOL) tablet 650 mg  650 mg Oral Q6H PRN    ondansetron (ZOFRAN) injection 4 mg  4 mg IntraVENous Q6H PRN    amLODIPine (NORVASC) tablet 5 mg  5 mg Oral DAILY    aspirin delayed-release tablet 81 mg  81 mg Oral DAILY    carvediloL (COREG) tablet 6.25 mg  6.25 mg Oral BID WITH MEALS    cholecalciferol (VITAMIN D3) (1000 Units /25 mcg) tablet 1 Tab  1,000 Units Oral DAILY    HYDROcodone-acetaminophen (NORCO) 5-325 mg per tablet 1 Tab  1 Tab Oral Q4H PRN         Allergy:   Allergies   Allergen Reactions    Bees [Sting, Bee] Swelling    Penicillins Hives    Strawberry Hives        Objective:     Visit Vitals  /59   Pulse 76   Temp 97.4 °F (36.3 °C)   Resp 17   Ht 5' 2\" (1.575 m)   Wt 150.5 kg (331 lb 12.8 oz)   SpO2 100%   BMI 60.69 kg/m²         Intake/Output Summary (Last 24 hours) at 4/20/2020 1127  Last data filed at 4/20/2020 0150  Gross per 24 hour   Intake 110 ml   Output 475 ml   Net -365 ml       Physical Exam:     General: Obese, NAD HENT: Atraumatic and normocephalic   Eyes: Normal conjunctiva   Neck: Supple. No JVD   Cardiovascular: Normal S1 & S2, no m/r/g   Pulmonary/Chest Wall: Decreased BS both bases   Abdominal: Soft and non-tender   Musculoskeletal: +++ Bilat pedal  Edema and 2+ Bilat. uppper ext edema   Neurological: AAOx4, no change today          Data Review:  Lab Results   Component Value Date/Time    Sodium 136 04/20/2020 02:25 AM    Potassium 4.7 04/20/2020 02:25 AM    Chloride 96 (L) 04/20/2020 02:25 AM    CO2 34 (H) 04/20/2020 02:25 AM    Anion gap 6 04/20/2020 02:25 AM    Glucose 194 (H) 04/20/2020 02:25 AM     (H) 04/20/2020 02:25 AM    Creatinine 4.40 (H) 04/20/2020 02:25 AM    BUN/Creatinine ratio 27 (H) 04/20/2020 02:25 AM    GFR est AA 13 (L) 04/20/2020 02:25 AM    GFR est non-AA 10 (L) 04/20/2020 02:25 AM    Calcium 8.9 04/20/2020 02:25 AM     Lab Results   Component Value Date/Time    WBC 5.4 04/16/2020 04:05 AM    HGB 7.2 (L) 04/16/2020 04:05 AM    HCT 23.0 (L) 04/16/2020 04:05 AM    PLATELET 325 69/76/6246 04:05 AM    MCV 81.9 04/16/2020 04:05 AM     Lab Results   Component Value Date/Time    Calcium 8.9 04/20/2020 02:25 AM    Phosphorus 4.7 04/14/2020 02:35 AM     Lab Results   Component Value Date/Time    Iron 32 (L) 04/14/2020 04:00 AM    TIBC 303 04/14/2020 04:00 AM    Iron % saturation 11 (L) 04/14/2020 04:00 AM    Ferritin 418 (H) 04/14/2020 02:35 AM     Lab Results   Component Value Date/Time    Ferritin 418 (H) 04/14/2020 02:35 AM         Impression:   -Acute on CKD likely prerenal/?ATN sec to diuresis Cr up to 3.8=> 4.4,  today off diuretic  -CKD stage IV sec to diabetic Nephropathy/Hypertensive Nephrosclerosis. -Diastolic CHF LVEF 60 to 07%  -Anemia of CKD + Iron deficiency.  S/p IV iron  -Pulm HTN, Mild to moderate  -DM type 2  -HTN  -Metabolic alkalosis: mild contraction alkalosis sec to diuretic  -Morbid Obesity        Plan:     - Hold Lasix and Metolazone for now given worsening kidney function  - Recommend initiation of HD for control of CHF and Renal Failure. Discussed with patient and Attending Hospitalist, agreeable. - Consult Vascular surgery for HD catheter placement ? TDC if able to tolerate otherwise U[place]all catheter for now.   - Continue JUAN C weekly   - Renal diet            MD Juan Heaton  [phone]

## 2020-04-20 NOTE — ROUTINE PROCESS
1933 Bedside and Verbal shift change report given to Caesar Severance RN (oncoming nurse) by Carlos Alberto Beck RN (offgoing nurse). Report included the following information SBAR, Kardex, Intake/Output, MAR and Recent Results. 2320 P t reported to not voided since earlier on morning shift. Pt denies any pain or distress when accessing abd, bladder scan performed and revealed 540 + retention. Dr Gianluca Villavicencio notified new order given to straight cath x1. Upon entering the room pt states that she is fine and she doesn't need a cath. Pt was educated on the importance of relieving the pressure off her bladder and agreed to the procedure. 375 cc of clear, yellow urine was removed before pt asked to remove catheter. Catheter removed as pt requested. 0 Pt was assisted to the restroom w/ walker and writer. Pt voided another 100 cc into hat in toilet and preceded back to bed via walker and writer. 0200 Pt's legs gave way and she fell onto her sacrum with legs straight out at end of bed. Pt was assisted back to bed by writer, 3 other RNs and the supervisor using the hovermat while daughter was on the phone. Once back in bed she was assessed to have no visible injuries, VS 97.8, 81, 18, 144/58 and 96% via NC on 5 L O2. Pt C/O 8/10 sacrum pain, MD was notified. New orders from Dr Gianluca Villavicencio for STAT sacrum and lumbar spine Xray to rule out fracture. 0630 Pt medicated w/ PRN pain med R/T 8/10 sacrum pain , will follow up on effectiveness. Xrays returned showing no evidence of fx. Bedside and Verbal shift change report given to Amanda RN (oncoming nurse) by Caesar Severance RN (offgoing nurse). Report included the following information SBAR, Kardex, Intake/Output, MAR and Recent Results.

## 2020-04-20 NOTE — PROGRESS NOTES
1130 Verbal report received from ASPEN Valadez. Patient in satisfactory condition. VS stable, MEWS 1. Denies pain at this time. IS educated and encouraged. Shift POC reviewed with patient. Menu/meal times explained. Call/bell phone within reach. Will monitor for changes. 1232 Scheduled medications given. No needs at this time. Will monitor for changes. 1436 Pain voiced at 9/10 to posterior hips and lower back. Medicated per [facility]     6615 Scheduled medications given. Denies pain at this time. Will monitor for changes. 1915 Bedside and Verbal shift change report given to Areli Francis RN (oncoming nurse) by Isrrael Villanueva RN (offgoing nurse). Report included the following information SBAR, Kardex, MAR, Recent Results and Med Rec Status. Uneventful shift. I/O's documented. Pain under control. No signs of distress noted by patient.

## 2020-04-20 NOTE — PROGRESS NOTES
Transition of care: home with [facility] versus acute rehab  (Did not enter room due to covid restrictions)  Telephone call with patient states she was upset. States the doctor told her her kidneys are failing. Patient started crying states she is upset she fell but the nurse helped her. patient reports she has become weaker. Cm did discuss acute rehabs. Cm will sent to acute rehabs. Patient is arranged with personal touch if she goes home   Patient lives alone but has family that live nearby. She has home oxygen and a nebulizer. She has medicaid for insurance and has Dr Mau Lester for pcp. Cm will continue to follow  Care Management Interventions  PCP Verified by CM: Yes  Mode of Transport at Discharge:  Other (see comment)(family)  Transition of Care Consult (CM Consult): Discharge Planning, [address]: No  Reason Outside [place]: Patient already serviced by other home care/hospice agency  Physical Therapy Consult: Yes  Current Support Network: Lives Alone, Family Lives Nearby  Confirm Follow Up Transport: Family  The Plan for Transition of Care is Related to the Following Treatment Goals : home with [facility]  The Patient and/or Patient Representative was Provided with a Choice of Provider and Agrees with the Discharge Plan?: Yes  Name of the Patient Representative Who was Provided with a Choice of Provider and Agrees with the Discharge Plan: PATIENT  Freedom of Choice List was Provided with Basic Dialogue that Supports the Patient's Individualized Plan of Care/Goals, Treatment Preferences and Shares the Quality Data Associated with the Providers?: Yes  Discharge Location  Discharge Placement: Home with home health

## 2020-04-20 NOTE — PROGRESS NOTES
Hospitalist Progress Note    Patient: Mónica Garcia MRN: [id number]  CSN: [id number]    YOB: 1965  Age: 53 y.o.   Sex: female    DOA: 4/10/2020 LOS:  LOS: 10 days          Chief Complaint:    ARF      Assessment/Plan      45 yo morbidly obese female on 02 at home with renal disease and diabetes, diastolic CHF came with edema and SOB  hosp day #11    Worsening renal failure  Lasix gtt stopped 2 days ago  Lasix held  Continues with edema of CHRISTY Edmondson on her bottom trying to get back to bed last evening, xrays unremarkable    Acute on chronic decompensated diastolic CHF-appreciate cardiology and nephrology help  Improved but continues with large LE edema, she is moving towards dialysis but still may be a bit away from it  Holding lasix and zaroxolyn due to worsened renal failure      IDDM type 2 with renal disease and hyperglycemia uncontrolled-lantus plus premeal insulin -adjusted     Hypokalemia-improved     HTN-stable control on current regimen     Pulmonary HTN     CKD stage 4-with worsening renal failure-Cr over 4 today, baseline in 2 range     АНДРЕЙ-CPAP at night, daily 02 for chronic resp failure      Super Morbid obesity/OHS     Elevated troponin, no chest pain-neg stress in December 2019     Anemia of CKD-treating with iron, no bleeding     Constipation-relieved    Updated her daughter [place] by phone        Not sure when she will discharge -need renal function to plateau, or otherwise she may be moving to dialysis  Disposition :  Patient Active Problem List   Diagnosis Code    HTN (hypertension) I10    Constipation K59.00    Type II or unspecified type diabetes mellitus with renal manifestations, uncontrolled(250.42) ([facility]) E11.29, E11.65    Morbid obesity ([facility] [address] E66.01    Respiratory distress R06.03    АНДРЕЙ (obstructive sleep apnea) F96.87    Diastolic CHF, acute on chronic ([facility]) I50.33    CHF (congestive heart failure) ([facility]) I50.9    CKD (chronic kidney disease) stage 4, GFR 15-29 ml/min ([facility] [address] N18.4    Elevated troponin R79.89       Subjective:    She is crying and upset about her embarassment about fall last evening and uncertain how much longer she has to stay in hospital    Review of systems:    Constitutional: denies fevers, chills  Respiratory: denies SOB, cough  Cardiovascular: denies chest pain, palpitations  Gastrointestinal: denies nausea, vomiting, diarrhea  Still with leg and abd swelling      Vital signs/Intake and Output:  Visit Vitals  /53 (BP 1 Location: Left arm, BP Patient Position: At rest)   Pulse 80   Temp 97.4 °F (36.3 °C)   Resp 18   Ht 5' 2\" (1.575 m)   Wt 150.5 kg (331 lb 12.8 oz)   SpO2 99%   BMI 60.69 kg/m²     Current Shift:  No intake/output data recorded. Last three shifts:  04/18 1901 - 04/20 0700  In: 1 [P.O.:970]  Out: 1025 [Urine:1025]    Exam:    General: obese WF alert, NAD, OX3  Head/Neck: NCAT, supple, No masses, No lymphadenopathy  CVS:Regular rate and rhythm, no M/R/G, S1/S2 heard, no thrill  Lungs:Clear to auscultation bilaterally, no wheezes, rhonchi, or rales  Abdomen: Soft, Nontender, No distention, Normal Bowel sounds, No hepatomegaly  Extremities: 3 plus edema LE BL  Neuro:grossly normal , follows commands  Psych:appropriate                Labs: Results:       Chemistry Recent Labs     04/20/20  0225 04/19/20  0516 04/18/20  0509   * 213* 167*    137 136   K 4.7 4.2 3.6   CL 96* 97* 98*   CO2 34* 34* 33*   * 114* 106*   CREA 4.40* 3.86* 3.12*   CA 8.9 9.0 9.2   AGAP 6 6 5   BUCR 27* 30* 34*      CBC w/Diff No results for input(s): WBC, RBC, HGB, HCT, PLT, GRANS, LYMPH, EOS, HGBEXT, HCTEXT, PLTEXT in the last 72 hours. Cardiac Enzymes No results for input(s): CPK, CKND1, MARIA ISABEL in the last 72 hours. No lab exists for component: CKRMB, TROIP   Coagulation No results for input(s): PTP, INR, APTT, INREXT in the last 72 hours.     Lipid Panel Lab Results   Component Value Date/Time    Cholesterol, total 103 04/14/2020 02:35 AM    HDL Cholesterol 46 04/14/2020 02:35 AM    LDL, calculated 36.2 04/14/2020 02:35 AM    VLDL, calculated 20.8 04/14/2020 02:35 AM    Triglyceride 104 04/14/2020 02:35 AM    CHOL/HDL Ratio 2.2 04/14/2020 02:35 AM      BNP No results for input(s): BNPP in the last 72 hours. Liver Enzymes No results for input(s): TP, ALB, TBIL, AP, SGOT, GPT in the last 72 hours.     No lab exists for component: DBIL   Thyroid Studies Lab Results   Component Value Date/Time    TSH 2.18 04/14/2020 02:35 AM        Procedures/imaging: see electronic medical records for all procedures/Xrays and details which were not copied into this note but were reviewed prior to creation of Min Miranda MD

## 2020-04-20 NOTE — ROUTINE PROCESS
Bedside and Verbal shift change report given to DONAVAN Bridges R.N. (oncoming nurse) by DONAVAN Earl R.N. (offgoing nurse). Report included the following information SBAR, Kardex, Intake/Output, MAR, Accordion, Recent Results and Med Rec Status.

## 2020-04-20 NOTE — PROGRESS NOTES
0730:Received verbal bedside report from off going nurse GRANT Carpio Patient care received. Patient alert and oriented x 4. Patient resting in bed. Patient stable. Call light with in reach bed in lowest position.

## 2020-04-20 NOTE — PROGRESS NOTES
Problem: Mobility Impaired (Adult and Pediatric)  Goal: *Acute Goals and Plan of Care (Insert Text)  Description: Physical Therapy Goals   Initiated 4/11/2020 and to be accomplished within 7 day(s)  1. Patient will move from supine <> sit with S in prep for out of bed activity and change of position. 2.  Patient will perform sit<> stand with S/RW in prep for transfers/ambulation. 3.  Patient will ambulate 50 feet with S/RW for improved functional mobility/safe discharge. Outcome: Progressing Towards Goal   PHYSICAL THERAPY TREATMENT    Patient: Iglesia Coker (53 y.o. female)  Date: 4/20/2020  Diagnosis: CHF (congestive heart failure) ([facility]) [G67.2]   Diastolic CHF, acute on chronic [facility])       Precautions: Fall, Skin   Chart, physical therapy assessment, plan of care and goals were reviewed. ASSESSMENT:  Pt is talkative, and mentions falling last night. \" I did not have grippy socks on\" Pt cleared to continue PT. Pt c/o increase pain to lower back and bottom. Pt continues with S for bed mobility. Standing balance is intact with RW however due to pain and fatigue, ambulation distance limited. Pt may require rehab placement to maximize functional independence before returning home. Cont POC     Progression toward goals:  []      Improving appropriately and progressing toward goals  [x]      Improving slowly and progressing toward goals  []      Not making progress toward goals and plan of care will be adjusted     PLAN:  Patient continues to benefit from skilled intervention to address the above impairments. Continue treatment per established plan of care. Discharge Recommendations:  possible Rehab or Home Health pending progress. Further Equipment Recommendations for Discharge:  rolling walker     SUBJECTIVE:   Patient stated  I had a long night.  I have to get Dialysis     OBJECTIVE DATA SUMMARY:   Critical Behavior:  Neurologic State: Alert  Orientation Level: Oriented X4  Cognition: Appropriate decision making, Appropriate for age attention/concentration, Appropriate safety awareness, Follows commands     Functional Mobility Training:  Bed Mobility:  Rolling: Supervision  Supine to Sit: Supervision  Sit to Supine: Supervision  Scooting: Supervision    Transfers:  Sit to Stand: Stand-by assistance  Stand to Sit: Supervision    Balance:  Sitting: Intact  Standing: Intact; With support  Standing - Static: Fair  Standing - Dynamic : Fair  Ambulation/Gait Training:  Distance (ft): 5 Feet (ft)  Assistive Device: Walker, rolling;Gait belt  Ambulation - Level of Assistance: Stand-by assistance  Gait Abnormalities: Decreased step clearance  Base of Support: Widened  Speed/Arlette: Slow  Step Length: Right shortened;Left shortened    Pain:  Pain Scale 1: Numeric (0 - 10)  Pain Intensity 1: 9  Pain Location 1: Back; Hip  Pain Orientation 1: Posterior  Pain Description 1: Aching; Intermittent  Pain Intervention(s) 1: Medication (see MAR)  Activity Tolerance:   Fair     After treatment:   [] Patient left in no apparent distress sitting up in chair  [x] Patient left in no apparent distress in bed  [x] Call bell left within reach  [] Nursing notified  [] Caregiver present  [] Bed alarm activated      Francisca Villafana PTA   Time Calculation: 44 mins

## 2020-04-20 NOTE — DIABETES MGMT
GLYCEMIC CONTROL PROGRESS NOTE:    - known h/o T2DM with four admissions to THE [facility] this year, HbA1C trending down, TID mixed insulin home regimen  - BG out of target range non-ICU: < 180 mg/dl  - TDD = 58 (20 Lantus + 18 (6) + 20 Humalog Very Insulin Resistant Corrective Coverage)  - both FBG & PPG out of target range    *Lantus 22 daily   *Humalog 10 units qac    Recent Glucose Results:   Lab Results   Component Value Date/Time     (H) 04/20/2020 02:25 AM    GLUCPOC 273 (H) 04/20/2020 11:01 AM    GLUCPOC 206 (H) 04/20/2020 06:02 AM    GLUCPOC 230 (H) 04/19/2020 09:03 PM         Meño Canales MS, RN, CDE  Glycemic Control Team  [phone]  Pager [phone] (M-TH 8:00-4:30P)  *After Hours pager [phone]

## 2020-04-21 ENCOUNTER — APPOINTMENT (OUTPATIENT)
Dept: INTERVENTIONAL RADIOLOGY/VASCULAR | Age: 55
DRG: 194 | End: 2020-04-21
Attending: SURGERY
Payer: COMMERCIAL

## 2020-04-21 ENCOUNTER — PATIENT OUTREACH (OUTPATIENT)
Dept: CASE MANAGEMENT | Age: 55
End: 2020-04-21

## 2020-04-21 ENCOUNTER — APPOINTMENT (OUTPATIENT)
Dept: GENERAL RADIOLOGY | Age: 55
DRG: 194 | End: 2020-04-21
Attending: SURGERY
Payer: COMMERCIAL

## 2020-04-21 PROBLEM — N18.9 ACUTE KIDNEY INJURY SUPERIMPOSED ON CKD (HCC): Status: ACTIVE | Noted: 2020-02-13

## 2020-04-21 LAB
ANION GAP SERPL CALC-SCNC: 7 MMOL/L (ref 3–18)
BACTERIA SPEC CULT: NORMAL
BUN SERPL-MCNC: 127 MG/DL (ref 7–18)
BUN/CREAT SERPL: 29 (ref 12–20)
CALCIUM SERPL-MCNC: 9.1 MG/DL (ref 8.5–10.1)
CHLORIDE SERPL-SCNC: 97 MMOL/L (ref 100–111)
CO2 SERPL-SCNC: 34 MMOL/L (ref 21–32)
CREAT SERPL-MCNC: 4.35 MG/DL (ref 0.6–1.3)
GLUCOSE BLD STRIP.AUTO-MCNC: 141 MG/DL (ref 70–110)
GLUCOSE BLD STRIP.AUTO-MCNC: 180 MG/DL (ref 70–110)
GLUCOSE BLD STRIP.AUTO-MCNC: 215 MG/DL (ref 70–110)
GLUCOSE BLD STRIP.AUTO-MCNC: 264 MG/DL (ref 70–110)
GLUCOSE SERPL-MCNC: 222 MG/DL (ref 74–99)
HBV CORE AB SERPL QL IA: NEGATIVE
HBV CORE IGM SER QL: NEGATIVE
HBV CORE IGM SER QL: NEGATIVE
HBV SURFACE AB SER QL IA: NEGATIVE
HBV SURFACE AB SER QL IA: NEGATIVE
HBV SURFACE AB SERPL IA-ACNC: <3.1 MIU/ML
HBV SURFACE AB SERPL IA-ACNC: <3.1 MIU/ML
HBV SURFACE AG SER QL: <0.1 INDEX
HBV SURFACE AG SER QL: <0.1 INDEX
HBV SURFACE AG SER QL: NEGATIVE
HBV SURFACE AG SER QL: NEGATIVE
HCV AB SER IA-ACNC: <0.02 INDEX
HCV AB SERPL QL IA: NEGATIVE
HCV COMMENT,HCGAC: NORMAL
HEP BS AB COMMENT,HBSAC: ABNORMAL
HEP BS AB COMMENT,HBSAC: ABNORMAL
MAGNESIUM SERPL-MCNC: 2.8 MG/DL (ref 1.6–2.6)
POTASSIUM SERPL-SCNC: 5.3 MMOL/L (ref 3.5–5.5)
SERVICE CMNT-IMP: NORMAL
SODIUM SERPL-SCNC: 138 MMOL/L (ref 136–145)

## 2020-04-21 PROCEDURE — 74011250637 HC RX REV CODE- 250/637: Performed by: HOSPITALIST

## 2020-04-21 PROCEDURE — 74011250636 HC RX REV CODE- 250/636

## 2020-04-21 PROCEDURE — 74011000250 HC RX REV CODE- 250: Performed by: SURGERY

## 2020-04-21 PROCEDURE — 5A1D70Z PERFORMANCE OF URINARY FILTRATION, INTERMITTENT, LESS THAN 6 HOURS PER DAY: ICD-10-PCS | Performed by: HOSPITALIST

## 2020-04-21 PROCEDURE — 71045 X-RAY EXAM CHEST 1 VIEW: CPT

## 2020-04-21 PROCEDURE — 0JH63XZ INSERTION OF TUNNELED VASCULAR ACCESS DEVICE INTO CHEST SUBCUTANEOUS TISSUE AND FASCIA, PERCUTANEOUS APPROACH: ICD-10-PCS | Performed by: HOSPITALIST

## 2020-04-21 PROCEDURE — 87340 HEPATITIS B SURFACE AG IA: CPT

## 2020-04-21 PROCEDURE — 77010033678 HC OXYGEN DAILY

## 2020-04-21 PROCEDURE — 82962 GLUCOSE BLOOD TEST: CPT

## 2020-04-21 PROCEDURE — 83735 ASSAY OF MAGNESIUM: CPT

## 2020-04-21 PROCEDURE — 80048 BASIC METABOLIC PNL TOTAL CA: CPT

## 2020-04-21 PROCEDURE — 86705 HEP B CORE ANTIBODY IGM: CPT

## 2020-04-21 PROCEDURE — 74011250637 HC RX REV CODE- 250/637: Performed by: FAMILY MEDICINE

## 2020-04-21 PROCEDURE — 94660 CPAP INITIATION&MGMT: CPT

## 2020-04-21 PROCEDURE — 86706 HEP B SURFACE ANTIBODY: CPT

## 2020-04-21 PROCEDURE — 74011250636 HC RX REV CODE- 250/636: Performed by: HOSPITALIST

## 2020-04-21 PROCEDURE — 74011636637 HC RX REV CODE- 636/637: Performed by: HOSPITALIST

## 2020-04-21 PROCEDURE — 36415 COLL VENOUS BLD VENIPUNCTURE: CPT

## 2020-04-21 PROCEDURE — 74011250636 HC RX REV CODE- 250/636: Performed by: INTERNAL MEDICINE

## 2020-04-21 PROCEDURE — 77001 FLUOROGUIDE FOR VEIN DEVICE: CPT

## 2020-04-21 PROCEDURE — 65660000000 HC RM CCU STEPDOWN

## 2020-04-21 PROCEDURE — 90935 HEMODIALYSIS ONE EVALUATION: CPT

## 2020-04-21 PROCEDURE — 74011250636 HC RX REV CODE- 250/636: Performed by: SURGERY

## 2020-04-21 RX ORDER — HEPARIN SODIUM 1000 [USP'U]/ML
INJECTION, SOLUTION INTRAVENOUS; SUBCUTANEOUS
Status: COMPLETED
Start: 2020-04-21 | End: 2020-04-21

## 2020-04-21 RX ORDER — HEPARIN SODIUM 200 [USP'U]/100ML
INJECTION, SOLUTION INTRAVENOUS
Status: COMPLETED
Start: 2020-04-21 | End: 2020-04-21

## 2020-04-21 RX ORDER — HEPARIN SODIUM 1000 [USP'U]/ML
3200 INJECTION, SOLUTION INTRAVENOUS; SUBCUTANEOUS
Status: DISCONTINUED | OUTPATIENT
Start: 2020-04-21 | End: 2020-04-24 | Stop reason: HOSPADM

## 2020-04-21 RX ORDER — FENTANYL CITRATE 50 UG/ML
25-200 INJECTION, SOLUTION INTRAMUSCULAR; INTRAVENOUS
Status: DISCONTINUED | OUTPATIENT
Start: 2020-04-21 | End: 2020-04-21 | Stop reason: ALTCHOICE

## 2020-04-21 RX ORDER — CLINDAMYCIN PHOSPHATE 900 MG/50ML
INJECTION, SOLUTION INTRAVENOUS
Status: COMPLETED
Start: 2020-04-21 | End: 2020-04-21

## 2020-04-21 RX ORDER — LIDOCAINE HYDROCHLORIDE 10 MG/ML
1-20 INJECTION INFILTRATION; PERINEURAL
Status: DISCONTINUED | OUTPATIENT
Start: 2020-04-21 | End: 2020-04-21 | Stop reason: ALTCHOICE

## 2020-04-21 RX ORDER — FLUMAZENIL 0.1 MG/ML
INJECTION INTRAVENOUS
Status: DISCONTINUED
Start: 2020-04-21 | End: 2020-04-21 | Stop reason: WASHOUT

## 2020-04-21 RX ORDER — CLINDAMYCIN PHOSPHATE 900 MG/50ML
900 INJECTION, SOLUTION INTRAVENOUS
Status: DISCONTINUED | OUTPATIENT
Start: 2020-04-21 | End: 2020-04-21 | Stop reason: ALTCHOICE

## 2020-04-21 RX ORDER — NALOXONE HYDROCHLORIDE 0.4 MG/ML
INJECTION, SOLUTION INTRAMUSCULAR; INTRAVENOUS; SUBCUTANEOUS
Status: DISCONTINUED
Start: 2020-04-21 | End: 2020-04-21 | Stop reason: WASHOUT

## 2020-04-21 RX ORDER — MIDAZOLAM HYDROCHLORIDE 1 MG/ML
.5-4 INJECTION, SOLUTION INTRAMUSCULAR; INTRAVENOUS
Status: DISCONTINUED | OUTPATIENT
Start: 2020-04-21 | End: 2020-04-21 | Stop reason: ALTCHOICE

## 2020-04-21 RX ORDER — NALOXONE HYDROCHLORIDE 0.4 MG/ML
0.4 INJECTION, SOLUTION INTRAMUSCULAR; INTRAVENOUS; SUBCUTANEOUS AS NEEDED
Status: DISCONTINUED | OUTPATIENT
Start: 2020-04-21 | End: 2020-04-21 | Stop reason: ALTCHOICE

## 2020-04-21 RX ORDER — SODIUM CHLORIDE 9 MG/ML
25 INJECTION, SOLUTION INTRAVENOUS CONTINUOUS
Status: DISCONTINUED | OUTPATIENT
Start: 2020-04-21 | End: 2020-04-21 | Stop reason: ALTCHOICE

## 2020-04-21 RX ORDER — HEPARIN SODIUM 200 [USP'U]/100ML
500 INJECTION, SOLUTION INTRAVENOUS
Status: COMPLETED | OUTPATIENT
Start: 2020-04-21 | End: 2020-04-21

## 2020-04-21 RX ORDER — FLUMAZENIL 0.1 MG/ML
0.2 INJECTION INTRAVENOUS
Status: DISCONTINUED | OUTPATIENT
Start: 2020-04-21 | End: 2020-04-21 | Stop reason: ALTCHOICE

## 2020-04-21 RX ORDER — FENTANYL CITRATE 50 UG/ML
INJECTION, SOLUTION INTRAMUSCULAR; INTRAVENOUS
Status: DISCONTINUED
Start: 2020-04-21 | End: 2020-04-21 | Stop reason: WASHOUT

## 2020-04-21 RX ORDER — HEPARIN SODIUM 1000 [USP'U]/ML
10000 INJECTION, SOLUTION INTRAVENOUS; SUBCUTANEOUS ONCE
Status: COMPLETED | OUTPATIENT
Start: 2020-04-21 | End: 2020-04-21

## 2020-04-21 RX ORDER — MIDAZOLAM HYDROCHLORIDE 1 MG/ML
INJECTION, SOLUTION INTRAMUSCULAR; INTRAVENOUS
Status: DISCONTINUED
Start: 2020-04-21 | End: 2020-04-21 | Stop reason: WASHOUT

## 2020-04-21 RX ADMIN — HEPARIN SODIUM 6000 UNITS: 1000 INJECTION, SOLUTION INTRAVENOUS; SUBCUTANEOUS at 08:13

## 2020-04-21 RX ADMIN — AMLODIPINE BESYLATE 5 MG: 5 TABLET ORAL at 09:02

## 2020-04-21 RX ADMIN — FAMOTIDINE 20 MG: 20 TABLET ORAL at 09:01

## 2020-04-21 RX ADMIN — CALCIUM ACETATE 667 MG: 667 CAPSULE ORAL at 17:56

## 2020-04-21 RX ADMIN — HEPARIN SODIUM 6000 UNITS: 1000 INJECTION INTRAVENOUS; SUBCUTANEOUS at 08:13

## 2020-04-21 RX ADMIN — HYDRALAZINE HYDROCHLORIDE 100 MG: 50 TABLET, FILM COATED ORAL at 17:56

## 2020-04-21 RX ADMIN — LIDOCAINE HYDROCHLORIDE 40 ML: 10 INJECTION, SOLUTION INFILTRATION; PERINEURAL at 08:15

## 2020-04-21 RX ADMIN — HYDROCODONE BITARTRATE AND ACETAMINOPHEN 1 TABLET: 5; 325 TABLET ORAL at 18:31

## 2020-04-21 RX ADMIN — SENNOSIDES AND DOCUSATE SODIUM 1 TABLET: 8.6; 5 TABLET ORAL at 09:03

## 2020-04-21 RX ADMIN — HEPARIN SODIUM IN SODIUM CHLORIDE 1000 UNITS: 200 INJECTION INTRAVENOUS at 08:14

## 2020-04-21 RX ADMIN — HEPARIN SODIUM 5000 UNITS: 5000 INJECTION INTRAVENOUS; SUBCUTANEOUS at 22:05

## 2020-04-21 RX ADMIN — GABAPENTIN 300 MG: 300 CAPSULE ORAL at 22:05

## 2020-04-21 RX ADMIN — INSULIN GLARGINE 20 UNITS: 100 INJECTION, SOLUTION SUBCUTANEOUS at 22:00

## 2020-04-21 RX ADMIN — INSULIN LISPRO 3 UNITS: 100 INJECTION, SOLUTION INTRAVENOUS; SUBCUTANEOUS at 12:11

## 2020-04-21 RX ADMIN — GABAPENTIN 300 MG: 300 CAPSULE ORAL at 17:56

## 2020-04-21 RX ADMIN — INSULIN LISPRO 6 UNITS: 100 INJECTION, SOLUTION INTRAVENOUS; SUBCUTANEOUS at 22:06

## 2020-04-21 RX ADMIN — NYSTATIN: 100000 POWDER TOPICAL at 09:06

## 2020-04-21 RX ADMIN — POLYETHYLENE GLYCOL 3350 17 G: 17 POWDER, FOR SOLUTION ORAL at 09:01

## 2020-04-21 RX ADMIN — CARVEDILOL 6.25 MG: 3.12 TABLET, FILM COATED ORAL at 17:56

## 2020-04-21 RX ADMIN — INSULIN LISPRO 9 UNITS: 100 INJECTION, SOLUTION INTRAVENOUS; SUBCUTANEOUS at 06:25

## 2020-04-21 RX ADMIN — FERROUS SULFATE TAB 325 MG (65 MG ELEMENTAL FE) 325 MG: 325 (65 FE) TAB at 09:02

## 2020-04-21 RX ADMIN — CLINDAMYCIN PHOSPHATE 900 MG: 900 INJECTION, SOLUTION INTRAVENOUS at 07:58

## 2020-04-21 RX ADMIN — ASPIRIN 81 MG: 81 TABLET, COATED ORAL at 09:01

## 2020-04-21 RX ADMIN — INSULIN LISPRO 6 UNITS: 100 INJECTION, SOLUTION INTRAVENOUS; SUBCUTANEOUS at 17:55

## 2020-04-21 RX ADMIN — CALCIUM ACETATE 667 MG: 667 CAPSULE ORAL at 12:11

## 2020-04-21 RX ADMIN — SODIUM CHLORIDE 25 ML/HR: 900 INJECTION, SOLUTION INTRAVENOUS at 08:04

## 2020-04-21 RX ADMIN — ATORVASTATIN CALCIUM 40 MG: 20 TABLET, FILM COATED ORAL at 09:01

## 2020-04-21 RX ADMIN — HYDRALAZINE HYDROCHLORIDE 100 MG: 50 TABLET, FILM COATED ORAL at 22:05

## 2020-04-21 RX ADMIN — HYDRALAZINE HYDROCHLORIDE 100 MG: 50 TABLET, FILM COATED ORAL at 09:02

## 2020-04-21 RX ADMIN — HEPARIN SODIUM 3200 UNITS: 1000 INJECTION INTRAVENOUS; SUBCUTANEOUS at 17:26

## 2020-04-21 RX ADMIN — HEPARIN SODIUM 1000 UNITS: 200 INJECTION, SOLUTION INTRAVENOUS at 08:14

## 2020-04-21 RX ADMIN — DULOXETINE HYDROCHLORIDE 60 MG: 60 CAPSULE, DELAYED RELEASE ORAL at 09:02

## 2020-04-21 RX ADMIN — Medication 1 TABLET: at 09:02

## 2020-04-21 RX ADMIN — INSULIN LISPRO 6 UNITS: 100 INJECTION, SOLUTION INTRAVENOUS; SUBCUTANEOUS at 12:12

## 2020-04-21 RX ADMIN — POTASSIUM CHLORIDE 20 MEQ: 1500 TABLET, EXTENDED RELEASE ORAL at 09:03

## 2020-04-21 RX ADMIN — GABAPENTIN 300 MG: 300 CAPSULE ORAL at 09:02

## 2020-04-21 RX ADMIN — CARVEDILOL 6.25 MG: 3.12 TABLET, FILM COATED ORAL at 09:02

## 2020-04-21 NOTE — PROGRESS NOTES
Hospitalist Progress Note    Patient: Joon Méndez MRN: [id number]  CSN: [id number]    YOB: 1965  Age: 53 y.o.   Sex: female    DOA: 4/10/2020 LOS:  LOS: 11 days          Chief Complaint:    ARF    Assessment/Plan     54 yo morbidly obese female on 02 at home with renal disease and diabetes, diastolic CHF came with edema and SOB  hosp day #12    Acute on chronic renal failure -  Creatinine remains well over 4, when baseline is approximately 2  Lasix gtt stopped 2 days ago  Lasix and metolazone held   Continues with edema of LE  Status post TDI done today   Nephrology following -plans initiation of hemodialysis control of CHF and renal failure    Acute on chronic decompensated diastolic CHF-  appreciate cardiology and nephrology assistance  Improved but continues with large LE edema  TDI obtained today  HD planned soon  Holding lasix and zaroxolyn due to worsened renal failure      IDDM type 2 with renal disease and hyperglycemia uncontrolled-  lantus plus premeal insulin      Hypokalemia-improved     HTN-stable control on current regimen     Pulmonary HTN     АНДРЕЙ-CPAP at night, daily 02 for chronic resp failure      Super Morbid obesity/OHS     Elevated troponin, no chest pain-neg stress in December 2019     Anemia of CKD-treating with iron, no bleeding     Constipation-relieved    Disposition : TBD  Patient Active Problem List   Diagnosis Code    HTN (hypertension) I10    Constipation K59.00    Type II or unspecified type diabetes mellitus with renal manifestations, uncontrolled(250.42) ([facility] [address] E11.29, E11.65    Morbid obesity ([facility] [address] E66.01    Respiratory distress R06.03    АНДРЕЙ (obstructive sleep apnea) Y38.31    Diastolic CHF, acute on chronic ([facility]) I50.33    CHF (congestive heart failure) ([facility]) I50.9    CKD (chronic kidney disease) stage 4, GFR 15-29 ml/min ([facility]) N18.4    Elevated troponin R79.89       Subjective:    Has accepted that she will start HD today  Recently returned from getting TDI     Review of systems:    Constitutional: denies fevers, chills  Respiratory: denies SOB, cough  Cardiovascular: denies chest pain, palpitations  Gastrointestinal: denies nausea, vomiting, diarrhea  Still with leg and abd swelling      Vital signs/Intake and Output:  Visit Vitals  /58 (BP 1 Location: Right arm, BP Patient Position: At rest;Supine)   Pulse 69   Temp 97.5 °F (36.4 °C)   Resp 16   Ht 5' 2\" (1.575 m)   Wt 153 kg (337 lb 6.4 oz)   SpO2 90%   BMI 61.71 kg/m²     Current Shift:  04/21 0701 - 04/21 1900  In: -   Out: 700 [Urine:700]  Last three shifts:  04/19 1901 - 04/21 0700  In: 680 [P.O.:680]  Out: 1225 [Urine:1225]    Exam:    General: obese WF alert, NAD, OX3  Head/Neck: NCAT, supple, No masses, No lymphadenopathy  CVS:Regular rate and rhythm, no M/R/G, S1/S2 heard, no thrill  Lungs:Clear to auscultation bilaterally, no wheezes, rhonchi, or rales  Abdomen: Soft, Nontender, No distention, Normal Bowel sounds, No hepatomegaly  Extremities: 3 plus edema LE BL  Neuro:grossly normal , follows commands  Psych:appropriate                Labs: Results:       Chemistry Recent Labs     04/21/20  0345 04/20/20  0225 04/19/20  0516   * 194* 213*    136 137   K 5.3 4.7 4.2   CL 97* 96* 97*   CO2 34* 34* 34*   * 120* 114*   CREA 4.35* 4.40* 3.86*   CA 9.1 8.9 9.0   AGAP 7 6 6   BUCR 29* 27* 30*      CBC w/Diff No results for input(s): WBC, RBC, HGB, HCT, PLT, GRANS, LYMPH, EOS, HGBEXT, HCTEXT, PLTEXT, HGBEXT, HCTEXT, PLTEXT in the last 72 hours. Cardiac Enzymes No results for input(s): CPK, CKND1, MARIA ISABEL in the last 72 hours. No lab exists for component: CKRMB, TROIP   Coagulation No results for input(s): PTP, INR, APTT, INREXT, INREXT in the last 72 hours.     Lipid Panel Lab Results   Component Value Date/Time    Cholesterol, total 103 04/14/2020 02:35 AM    HDL Cholesterol 46 04/14/2020 02:35 AM    LDL, calculated 36.2 04/14/2020 02:35 AM    VLDL, calculated 20.8 04/14/2020 02:35 AM    Triglyceride 104 04/14/2020 02:35 AM    CHOL/HDL Ratio 2.2 04/14/2020 02:35 AM      BNP No results for input(s): BNPP in the last 72 hours. Liver Enzymes No results for input(s): TP, ALB, TBIL, AP, SGOT, GPT in the last 72 hours.     No lab exists for component: DBIL   Thyroid Studies Lab Results   Component Value Date/Time    TSH 2.18 04/14/2020 02:35 AM        Procedures/imaging: see electronic medical records for all procedures/Xrays and details which were not copied into this note but were reviewed prior to creation of Tylor Segura MD

## 2020-04-21 NOTE — PROGRESS NOTES
Transition of care: acute rehab versus home with [facility]  Did not enter room due to covid restrictions)  telephone call with patient states she is upset that she has to have dialysis. Patient had [facility] placed yesterday and anticipate HD for her  Patient does not want to have Long term HD done states she hopes it will be short term  Please inform cm if patient will need HD when d/c. Will need to get a chair time. Patient has hepatitis profile completed. Patient lives alone but her adult children live nearby. Patient states she wants to go home but is willing to go to acute rehab. Patient has a nebulizer and ahome oxygen. She has Dr. Huma Polk for pcp. She has medicaid for insurance. Referral for [facility] has been placed if she does return home  Cm will continue to follow  Care Management Interventions  PCP Verified by CM: Yes  Mode of Transport at Discharge:  Other (see comment)(family)  Transition of Care Consult (CM Consult): Discharge Planning, [address]: No  Reason Outside [place]: Patient already serviced by other home care/hospice agency  Physical Therapy Consult: Yes  Current Support Network: Lives Alone, Family Lives Nearby  Confirm Follow Up Transport: Family  The Plan for Transition of Care is Related to the Following Treatment Goals : home with [facility]  The Patient and/or Patient Representative was Provided with a Choice of Provider and Agrees with the Discharge Plan?: Yes  Name of the Patient Representative Who was Provided with a Choice of Provider and Agrees with the Discharge Plan: PATIENT  Freedom of Choice List was Provided with Basic Dialogue that Supports the Patient's Individualized Plan of Care/Goals, Treatment Preferences and Shares the Quality Data Associated with the Providers?: Yes  Discharge Location  Discharge Placement: Home with home health

## 2020-04-21 NOTE — PROCEDURES
Tunneled Dialysis Catheter Op Note  [facility]  [address]    Date of Surgery:   Surgeon(s):  Lola Rodriges  Assistant(s):{None  Pre-operative Diagnosis: ESRD requiring initial vascular access  Post-operative Diagnosis: same as preop diagnosis  Procedure(s) Performed: Tunneled Dialysis Catheter with ultrasound guidance  Anesthesia:  Local with lidocaine  Findings: no unusual findings  Complications:  None  Estimated Blood Loss:  minimal <100  Tubes and Drains:  none  Specimens: none  Disposition: PACU, return to room     Ultrasound was used to image the  internal jugular vein. The right internal jugular vein was shown to be patent. A hard copy image was stored on PACS documenting this service. Using real time ultrasound guidance, the needle was placed into the right internal jugular vein. The patient was placed in the supine position. The  chest and neck was prepped with chlorhexidine and draped in a sterile manner. B-mode ultrasound was utilized to identify the neck venous anatomy. 1% Lidocaine  was injected into the skin for anesthesia. The right jugular  vein was punctured with an 18 gauge needle , then a guide wire was place into the vein. C-arm fluoroscopy was employed to demonstrate the position of the guide wire within the right atrium . The skin of the pectoral area  was incised and the intended tunnel track anesthetized. The 19 cm catheter was tunneled under the skin and the Dacron cuff positioned in the subcutaneous tissue 2 cm from the skin incision. The skin guidewire was enlarged with a small incision. The #12 Nauruan sheath was inserted over the wire. The wire was removed. Through the sheath, the catheter tip was inserted, and the sheath was peeled out. The catheter tip position within the right atrium was verified by fluoroscopy. The images were saved and transferred to PACS.  The catheter aspirated blood easily, was flushed with saline, and each port locked with 2mL of 1000 units/mL Heparin. The catheter was sutured to the skin with4-0 nylon . The neck incision was closed with 4-0 monocryl. The incision was covered with Dermabond. Biopatch was applied to the catheter exit site. The surgical sites were covered with gauze and Op-Site. A portable chest X-ray was. obtained  The patient tolerated the procedure well without complications.        Key Lubin MD PhD FACS

## 2020-04-21 NOTE — DIABETES MGMT
GLYCEMIC CONTROL PROGRESS NOTE:    - known h/o T2DM with four admissions to [facility] this year, HbA1C trending down, TID mixed insulin home regimen  - BG out of target range non-ICU: < 180 mg/dl  - TDD = 58 (20 Lantus + 18 (6) + 20 Humalog Very Insulin Resistant Corrective Coverage)  - both FBG & PPG out of target range    *Lantus 24 daily   *Humalog 8 units qac    Recent Glucose Results:   Lab Results   Component Value Date/Time     (H) 04/21/2020 03:45 AM    GLUCPOC 264 (H) 04/21/2020 06:05 AM    GLUCPOC 202 (H) 04/20/2020 09:53 PM    GLUCPOC 196 (H) 04/20/2020 09:21 PM         Leelee Wilburn MS, RN, CDE  Glycemic Control Team  [phone]  Pager [phone] (M-TH 8:00-4:30P)  *After Hours pager [phone]

## 2020-04-21 NOTE — PROGRESS NOTES
Need for [facility]    Name:  Stephy Deal  MR#:   [id number]  :  1965  ACCOUNT #:  [de-identified]  ADMIT DATE:  04/10/2020     ADMITTING DIAGNOSES:  1. Acute diastolic congestive heart failure exacerbation. 2.  Acute hypoxia due to that. 3.  Chronic kidney disease, stage IV. 4.  Insulin-dependent diabetes mellitus with renal complications, uncontrolled. 5.  Morbid obesity. 6.  Obstructive sleep apnea, on CPAP.     HOSPITAL SUMMARY:  The patient is 53years old. She has a history of heart failure, obstructive sleep apnea, diabetes, chronic kidney disease, carotid disease, morbid obesity, chronic respiratory failure, on home oxygen, and she came into the emergency room with shortness of breath. She got up this morning about 06:30 in the morning, was waiting for her physical therapist to come over. Around 09:30, she felt that she had a mild cough, was feeling very weak, somewhat short of breath. Her nurse/therapist came over. They checked her O2 sat. They found it to be low. Her blood sugar was only 44, and they advised her to go via EMS to the ER. The patient has been compliant with her medication. She is taking Lasix twice daily. She states she has been checking her blood sugar and using her insulin as she has been instructed to do so. She was recently in the emergency department 6 days ago after she had a fall at home. She was discharged with no acute severe injury noted. She is feeling okay right now. She is on 5 L of nasal cannula oxygen. She was texting on a phone when I entered the room. She is wearing a mask. No COVID risk factors as she has been isolated at home, except for the ER visit, she had a few days ago. Same caretaker coming into the home and her daughter intermittently as well, all of whom have had no respiratory symptoms as far as she knows.   Her PCP is Dr. Huma Polk.     MEDICATIONS:  Her home meds include Coreg, Zaroxolyn, Norvasc, hydralazine, DuoNeb, albuterol inhaler, Cymbalta, Lipitor, Neurontin, Lasix, aspirin, calcium acetate and NPH/regular insulin.     PAST MEDICAL HISTORY:  Obstructive sleep apnea, morbid obesity, fibromyalgia, hypertension, diastolic heart failure, chronic kidney disease.     PREVIOUS SURGERIES:  Breast cyst removal, cataract surgery, cholecystectomy, tubal ligation.     FAMILY HISTORY:  Mother and grandmother, both had heart disease.     SOCIAL HISTORY:  Nonsmoker. No alcohol use. No drugs. Lives independently. Uses a walker to get around, has help from her daughter as needed for various chores.     ALLERGIES:  BEE STING, PENICILLINS AND STRAWBERRIES.     REVIEW OF SYSTEMS:  GENERAL:  She denies any fevers or chills. RESPIRATORY:  She has had chest tightness and shortness of breath starting this morning. CARDIOVASCULAR:  She has had no chest pain, palpitations or leg swelling. GI:  No nausea, vomiting, diarrhea or abdominal pain. :  No dysuria or hematuria. NEUROLOGICAL:  No syncopal events, seizures or notable headaches. No change in vision. No seizure activity. HEMATOLOGIC:  No bleeding or bruisability.     PHYSICAL EXAMINATION:  GENERAL:  On examination, this is a morbidly obese  female, awake and alert, in no acute distress. She has a slow speech sury which is chronic for her. VITAL SIGNS:  98% O2 sat on 5 L nasal cannula, respiratory rate 20, pulse is 68, temperature 97.4, blood pressure 134/73. LUNGS:  Diminished breath sounds at the bases bilaterally. CARDIAC:  Regular rate and rhythm. No murmur, rub or gallop. ABDOMEN:  Obese, soft, nontender. LOWER EXTREMITIES:  A 2 to 3+ pitting edema to the knees bilaterally that is tender with no evidence for redness or acute cellulitis of the legs.     LABORATORY DATA:  BUN is 73, creatinine 2.15. Sodium, potassium, bicarb and chloride are within normal limits. Troponin of 0.06. BNP is 998. H and H are 8.5 and 27.8. Her platelets are 541. White count is 9.   No urinalysis was obtained, although it looks like she has had various urinary tract infections on prior admissions. So because of her risk factors, I want to get an urinalysis. Her blood sugar on her metabolic panel is 90.     DIAGNOSTIC DATA:  Her chest x-ray is consistent with interstitial edema and pulmonary edema, small bilateral effusions. Her last stress test was 12/20/2019 with no evidence for ischemia. Her last echo was February of this year, which showed an EF of 60%-65% and EKG today showed normal sinus rhythm. No significant change from 03/05 EKG.     ASSESSMENT:  1. Acute-on-chronic diastolic heart failure. 2.  Obstructive sleep apnea. 3.  Mildly elevated troponin. 4.  Insulin-dependent diabetes mellitus with renal complications. 5.  Chronic kidney disease, stage IV. 6.  Fibromyalgia. 7.  Morbid obesity.        Plan: TDC placement.

## 2020-04-21 NOTE — ROUTINE PROCESS
0700: received bedside shift report from Edvin Cano RN (offgoing nurse) and assumed care of the pt. Updated white board, pt resting quietly, left bed in lowest position with wheels locked and call light within reach. 0730: angio here to take pt for [facility] placement this morning. 1400: pt currently being dialyzed. 1730: dialysis complete, dialysis nurse let me know that he pulled 3.5 L. 
 
1900: Shift summary, shift uneventful, no complaints of chest pain or shortness of breath.   
 
Cuong Main RN

## 2020-04-21 NOTE — PROGRESS NOTES
Problem: Mobility Impaired (Adult and Pediatric)  Goal: *Acute Goals and Plan of Care (Insert Text)  Description: Physical Therapy Goals   Initiated 4/11/2020 and to be accomplished within 7 day(s)  1. Patient will move from supine <> sit with S in prep for out of bed activity and change of position. 2.  Patient will perform sit<> stand with S/RW in prep for transfers/ambulation. 3.  Patient will ambulate 50 feet with S/RW for improved functional mobility/safe discharge. Note: Attempted PT re-eval however pt just beginning dialysis. Pt cont to have preference to go home however is open to  area rehab. Will re-assess tomorrow as pt schedule allows.

## 2020-04-21 NOTE — ROUTINE PROCESS
Bedside shift change report given to Hever Slaughter RN (oncoming nurse) by Juan Sibley RN (offgoing nurse). Report included the following information SBAR, Kardex, Intake/Output, MAR and Cardiac Rhythm SR. Visit Vitals /62 Pulse 74 Temp 97 °F (36.1 °C) (Oral) Resp 18 Ht 5' 2\" (1.575 m) Wt 153 kg (337 lb 6.4 oz) SpO2 100% BMI 61.71 kg/m² Juan Sibley, RN

## 2020-04-21 NOTE — PROGRESS NOTES
TRANSFER - OUT REPORT:    Verbal report given to ASPEN Amaya(name) on [facility]  being transferred to room 331(unit) for routine progression of care       Report consisted of patients Situation, Background, Assessment and   Recommendations(SBAR). Information from the following report(s) SBAR, Kardex and MAR was reviewed with the receiving nurse. Lines:   Peripheral IV 04/16/20 Right; Lower; Posterior Arm (Active)   Site Assessment Clean, dry, & intact 4/21/2020  4:02 AM   Phlebitis Assessment 0 4/21/2020  4:02 AM   Infiltration Assessment 0 4/21/2020  4:02 AM   Dressing Status Clean, dry, & intact 4/21/2020  4:02 AM   Dressing Type Transparent 4/21/2020  4:02 AM   Hub Color/Line Status Pink 4/21/2020  4:02 AM   Action Taken Open ports on tubing capped 4/20/2020  8:05 AM   Alcohol Cap Used Yes 4/21/2020  4:02 AM       Peripheral IV 04/17/20 Right Antecubital (Active)   Site Assessment Clean, dry, & intact 4/21/2020  4:02 AM   Phlebitis Assessment 0 4/21/2020  4:02 AM   Infiltration Assessment 0 4/21/2020  4:02 AM   Dressing Status Clean, dry, & intact 4/21/2020  4:02 AM   Dressing Type Transparent 4/21/2020  4:02 AM   Hub Color/Line Status Blue 4/21/2020  4:02 AM   Action Taken Open ports on tubing capped 4/20/2020  8:05 AM   Alcohol Cap Used Yes 4/21/2020  4:02 AM        Opportunity for questions and clarification was  provided. TDC in place and ready for use; TDC and Dressing are clean, dry, intact.     Patient transported with:   Registered Nurse

## 2020-04-21 NOTE — PROGRESS NOTES
Was going to F/U with the patient to see how she was feeling from her fall a couple weeks ago and realized that she has been an inpatient at [facility] since 4/10/2020. Will continue to follow her and call to check on her once she is discharged.

## 2020-04-21 NOTE — DIALYSIS
TREATMENT SUMMARY   Patient dialyzed in room 331  Tolerated treatment well without complaint or complications.    RIJ TDC functioning well without complication of BFR or accessing        3500 ml  removed via UF with a with a net removal 3000 ml  Report given to Benita Mayer RN with all questions answered     TREATMENT  NOTES                                                                                                     ACUTE HEMODIALYSIS FLOW SHEET     PATIENT INFORMATION   Blanca 39, M    [x]1st Time Acute  []Stat[] Routine []Urgent []Chronic Unit   []Acute Room [x]Bedside  []ICU/CCU []ER   Isolation Precautions: [x]Dialysis[] Airborne []Contact []Droplet []Reverse    Special Considerations:_______  [] Blood Consent Verified  []N/A   Allergies:[] NKA   Reaction Severity Reaction Type Noted Valid Until           Allergies   Bees [Sting, Bee] Swelling High Systemic 5/2/2012    Penicillins Hives High Systemic 5/2/2012    Strawberry Hives Medium  7/12/2012     Code Status []Full Code [] DNR  [] Other_____   Diet: [] Renal [] NPO [x] Diabetic   [] Enteral Feeding [] Cardiac Diabetic: [x]Yes []No     [x]Signed Treatment Consent Verified   [x] Time Out/ Safety Check   PRIMARY NURSE REPORT: FIRST INITIAL/ LAST NAME/TITLE  PRE DIALYSIS:  Benita Mayer RN                                       TIME: 1400 [facility]   ACCESS   CATHETER ACCESS: [] N/A  [x] RIGHT  [] LEFT  [x] IJ  [] SUBCL [] FEM                    [x] First use X-ray  [x] Tunnel     [] Non-Tunneled      [] No S/S infection  [] Redness [] Drainage  [] Cultured [] Swelling [] Pain                    [x] Medical Aseptic [] Prep Dressing Changed                  [] Clotted [x] Patent []      Flows: [x] Good [] Poor [] Reversed                 If Access Problem Dr. Blanca Garcia: [] Yes [] No    Date:_____  [] N/A[]   GRAFT/FISTULA ACCESS:  [x] N/A  [] RIGHT  [] LEFT  [] UE   [] LE       [] AVG  [] AVF [] BUTTONHOLE    [] +BRUIT/THRILL [] MEDICAL ASEPTIC PREP     [] No S/S infection  [] Redness [] Drainage  [] Cultured [] Swelling [] Pain              If Access Problem Dr. Apple Fus: [] Yes [] No    Date:______ [] N/A   GENERAL ASSESSMENT   LUNGS:  SaO2% _100___ [] Clear [x] Coarse [] Crackles [] Wheezing                                         [] Diminished   Location: [] RLL [] LLL [] RUL [] RML [] THALIA    COUGH:  [] Productive  [] Loose[] Dry [x] N/A   RESPIRATIONS: [x] Easy []Labored    THERAPY: [x] RA   [] NC __6___. L/min    Mask: [] NRB [] Venti  _____O2%                  [] Ventilator [] Intubated [] Trach [] BiPap [] CPap [] HI Flow   CARDIAC: [x] Regular [] Irregular [] Pericardial Rub [] JVD               Monitored Rhythm:______ [] N/A   EDEMA: [] None [x] Generalized [] Facial [] Pedal [] UE [] LE             [] Pitting [] 1 [] 2 [] 3 [] 4    [] Right [] Left [] Bilateral   SKIN:    [x] Warm [] Hot [] Cold  [x] Dry [] Pale [] Diaphoretic              [] Flushed [] Jaundiced [] Cyanotic [] Rash [] Weeping     LOC:    [x] Alert  Oriented to: [x] Person [x] Place [x] Time             [] Confused [] Lethargic [] Medicated [] Non-responsive    GI/ABDOMEN: [x] Flat [] Distended [x] Soft [] Firm [] Diarrhea [x] Bowel Sounds Present [] Nausea [] Vomiting    PAIN: [x] 0 [] 1 [] 2 [] 3 [] 4 [] 5 [] 6 [] 7 [] 8 [] 9 [] 10          Scale 1-10 Action/Follow Up_____   MOBILITY: [] Amb [] Amb/Assist [x] Bed  [] Wheelchair    CURRENT LABS   HBsAg ONLY: Date Drawn: 4/20/2020            [x] Negative [] Positive [] Unknown.      HBsAb: Date Drawn:  4/21/2020            [x] Susceptible <10 [] Immune ?10 [] Unknown   Date of Current Labs:  ATTACHED     EDUCATION   Person Educated: [x] Patient [] Other_________   Knowledge base: [] None [x] Minimal [] Substantial    Barriers to learning  [x] None _______________   Preferred method of learning: [] Written [x] Oral [x] Visual [] Hands on    Topic: [x] Access Care [] S&S of infection [x] Fluid Management   [] K+  [x] Procedural  [] Albumin [] Medications [] Tx Options   [] Transplant [] Diet [] Other    Teaching Tools: [x] Explain [x] Demonstration [] Handout_____ [] Video______  CARE PLAN    [x] Renal Failure (Adult)  Interdisciplinary  · Fluid and electrolytes stabilized  ? Interventions  · Dehydration signs and symptoms (eg: Weight/lab monitoring; vomiting/diarrhea/urine; tenting; mucous membranes; dizziness/lethargy/irritability/confusion; weak pulse; tachycardia; blood pressure; I&O)  · Fluid overload signs and symptoms assessment (eg: Body weight increased; dyspnea; edema; hypertension; respiratory crackles/wheezing; JVD; lab monitoring; mental status changes; I&O)  · Monitor appropriate lab values  · COMPLIANCE WITH PRESCRIBED THERAPY  · ARTERIAL ACCESS SITE ASSESSMENT  · NUTRITION SCREENING  · Vital signs monitoring per assessed patient condition or unit standard  · Cardiac monitoring  · Hydration management  · Intake and output measurement  · Body weight monitoring  · Skin care  · DIALYSIS  · Nutrition Care Process per nutrition screen  · Oral hygiene care every 2 hours  · Pain management     · Outcome   ? [x] Progressing Towards Goal  ? [] Not Progressing Towards Goals  ? [] Goals Met/Resolved  ? [] Goals Not Met/ Resolved        · Patient/ Family Education  ?  Progressing Towards Goals          RO/HEMODIAYLSIS MACHINE SAFETY CHECKS- BEFORE EACH TREATMENT          [x] [facility]: Machine Serial #1:  [id number]   RO Serial #[id number]                       [] [facility]: Machine Serial #2:  [id number]   RO Serial #[id number]    Alarm Test: [x] Pass  Time__1400______  [x] RO/Machine Log Complete    [x] Extracorporeal circuit Tested for integrity   Dialyzer_[id number]_________   Tubing_19K19-9___________    Dialysate: pH__7.4_  Temp.__37___Conductivity: Meter __14__ HD Machine__13.8_   CHLORINE TESTING- BEFORE EACH TREATMENT AND EVERY 4 HOURS   Total Chlorine: [x] Less than 0.1 ppm Time:_1400___2nd Check Time:_NA_____  (If greater than 0.1 ppm from Primary then every 30 minutes from Secondary)   TREATMENT INIATION-WITH DIALYSIS PRECAUTIONS   [x] All Connections Secured   [x] Saline Line Double Clamped    [x] Venous Parameters Set [x] Arterial Parameters Set    [x] Prime Given 250 ml     [x] Air Foam Detector Engaged   PRE-TREATMENT   UF Calculations: Wt to lose:____ml(+) Oral:_0_ml(+)IV Meds/Fluids/Blood prods_0__ml(+) Prime/Rinse__500_ml(=)Total UF Goal____mL   Scale Type:[x] Bed scale [] Sling Scale [] Wheel Chair Scale []  Not Ordered [] [] Unable to obtain pt on stretcher/ bed scale malfunctioning   Tx Initiation Note: RECEIVED REPORT. PATIENT ALERT AND ORIENTED. VITAL SIGNS WNL. NAD. ALL QUESTIONS ANSWERED WITH PATIENT  SAFETY CHECKS COMPLETE. TIME OUT COMPLETE. TREATMENT INITIATED VIA _TDC____. NO CONCERNS NOTED. [x] Time Out/Safety Check  Time:_1404____   INTRADIALYTIC MONITORING  (SEE ATTACHED FLOWSHEET)     POST TREATMENT    Time Medication Dose Volume Route    Initials   NA                                        [facility] Signatures Title Initials Time   DEON SCHWARTZ RN PAP [id number]               Dialyzer cleared: [x] Good [] Fair [] Poor     Blood Volume Processed ____L   Net UF Removed _3000___mL  Post Tx Access:                  AVF/AVG: Bleeding Stop Time      Art. NA_min Josef.__NA_min []+bruit/thrill                              Catheter: Locking Solution  [x] Heparin 1 ml/1000 units                                                             [] Normal Saline                                                                      Art._1.6___ ml Josef.__1.6__ml                                                           [x] New caps placed  Post Assessment:              Skin: [x]Warm [x]Dry []Diaphoretic []Flushed [] Pale []Cyanotic            Lungs: [x]Clear []Coarse []Crackles []Wheezing             Cardiac: [x]Regular []Irregular  []Monitored rhythm____ []N/A            Edema: []None []General []Facial []Pedal  []UE [x]LE [x]RIGHT []LEFT            Pain: [x]0 []1 []2 []3 []4 []5 []6 []7 []8 []9 []10   POST Tx Note:TREATMENT COMPLETED. ALL POSSIBLE BLOOD RETURNED. PT TOLERATED WELL. VITAL SIGNS WNL  FOR PATIENT. NAD NOTED. DIALYSIS CATHETER DE ACCESSED, FLUSHED AND LOCKED. STERILE CAPS APPLIED. REPORT GIVEN WITH OPPORTUNITY TO ADDRESS ANY CONCERNS OR QUESTTIONS AND PATIENT STAYS ROOM IN BED WITHOUT DISTRESS OR ACUTE DISCOMFORT. BED LOWED, CALL BELL WITHIN REACH .      Primary Nurse Report: First initial/Last name/Title    Post Dialysis:__Monique Brenner RN___        Time:___1745_____________    Abbreviations: AVG-arterial venous graft, AVF-arterial venous fistula, IJ-Internal Jugular,  Subcl-Subclavian, Fem-Femoral, Tx-treatment, AP/HR-apical heart rate, DFR-dialysate flow rate, BFR-blood flow rate, AP-arterial pressure, -venous pressure, UF-ultrafiltrate, TMP-transmembrane pressure, Josef-Venous, Art-Arterial, RO-Reverse Osmosis

## 2020-04-21 NOTE — PROGRESS NOTES
[id number] Shift Summary: Pt rested well overnight with no complaints. No new clinical concerns noted.

## 2020-04-21 NOTE — PROGRESS NOTES
Nephrology Progress note    Subjective: Allison Cap a 53 y. o. female with a past medical history significant for DM type 2, HTN, TIA, CKD IV, HLD, АНДРЕЙ, Obesity, Chronic Anemia, Diastolic CHF who presented with c/o SOB. Her CXR showed Pulmonary edema and small bilateral effusions consistent with CHF. S Cr was 2.6 on admission and remains unchanged today.    Patient is followed at our office by Dr Lili Meyer, most recent S Cr 2.6 on 2/26/20. She was last seen on 3/4/20 and maintained on diuretics, oral Furosemide + Metolazone. Since admission she has been placed on intermittent  IV Lasix. Switched to lasix gtt and po metolazone. Pt had attended [facility] prior to admission, scheduled for second class next week. -Still admits to some SOB with exertion and orthopnea today, though some improvement since admission. Still has  LE edema.  Off Lasix due to worsening Renal function on diuretics.   -Pt just had TDC placed today, ready for first HD           Admit Date: 4/10/2020  Principal Problem:    Diastolic CHF, acute on chronic ([facility] [address] (2/16/2020)    Active Problems:    HTN (hypertension) (8/16/2014)      Constipation (8/17/2014)      Type II or unspecified type diabetes mellitus with renal manifestations, uncontrolled(250.42) ([address] (9/4/2014)      Morbid obesity ([facility]) ()      Respiratory distress (1/17/2019)      CHF (congestive heart failure) ([facility] [address] (4/10/2020)      CKD (chronic kidney disease) stage 4, GFR 15-29 ml/min ([facility]) (4/10/2020)      Elevated troponin (4/10/2020)      Current Facility-Administered Medications   Medication Dose Route Frequency    0.9% sodium chloride infusion  100 mL/hr IntraVENous DIALYSIS PRN    polyethylene glycol (MIRALAX) packet 17 g  17 g Oral DAILY    epoetin erickson-epbx (RETACRIT) injection 20,000 Units  20,000 Units SubCUTAneous Q7D    insulin lispro (HUMALOG) injection 6 Units  6 Units SubCUTAneous TIDAC    insulin glargine (LANTUS) injection 20 Units  20 Units SubCUTAneous QHS    famotidine (PEPCID) tablet 20 mg  20 mg Oral DAILY    bisacodyL (DULCOLAX) suppository 10 mg  10 mg Rectal DAILY PRN    ferrous sulfate tablet 325 mg  1 Tab Oral DAILY WITH BREAKFAST    potassium chloride (K-DUR, KLOR-CON) SR tablet 20 mEq  20 mEq Oral DAILY    senna-docusate (PERICOLACE) 8.6-50 mg per tablet 1 Tab  1 Tab Oral DAILY    nystatin (MYCOSTATIN) 100,000 unit/gram powder   Topical BID    albuterol-ipratropium (DUO-NEB) 2.5 MG-0.5 MG/3 ML  3 mL Nebulization Q4H PRN    atorvastatin (LIPITOR) tablet 40 mg  40 mg Oral DAILY    calcium acetate(phosphat bind) (PHOSLO) capsule 667 mg  1 Cap Oral TID WITH MEALS    DULoxetine (CYMBALTA) capsule 60 mg  60 mg Oral DAILY    gabapentin (NEURONTIN) capsule 300 mg  300 mg Oral TID    hydrALAZINE (APRESOLINE) tablet 100 mg  100 mg Oral TID    insulin lispro (HUMALOG) injection   SubCUTAneous AC&HS    glucose chewable tablet 16 g  4 Tab Oral PRN    glucagon (GLUCAGEN) injection 1 mg  1 mg IntraMUSCular PRN    heparin (porcine) injection 5,000 Units  5,000 Units SubCUTAneous Q8H    acetaminophen (TYLENOL) tablet 650 mg  650 mg Oral Q6H PRN    ondansetron (ZOFRAN) injection 4 mg  4 mg IntraVENous Q6H PRN    amLODIPine (NORVASC) tablet 5 mg  5 mg Oral DAILY    aspirin delayed-release tablet 81 mg  81 mg Oral DAILY    carvediloL (COREG) tablet 6.25 mg  6.25 mg Oral BID WITH MEALS    cholecalciferol (VITAMIN D3) (1000 Units /25 mcg) tablet 1 Tab  1,000 Units Oral DAILY    HYDROcodone-acetaminophen (NORCO) 5-325 mg per tablet 1 Tab  1 Tab Oral Q4H PRN       Allergy:   Allergies   Allergen Reactions    Bees [Sting, Bee] Swelling    Penicillins Hives    Strawberry Hives        Objective:     Visit Vitals  /58 (BP 1 Location: Right arm, BP Patient Position: At rest;Supine)   Pulse 69   Temp 97.5 °F (36.4 °C)   Resp 16   Ht 5' 2\" (1.575 m)   Wt 153 kg (337 lb 6.4 oz)   SpO2 90%   BMI 61.71 kg/m²         Intake/Output Summary (Last 24 hours) at 4/21/2020 0953  Last data filed at 4/21/2020 7140  Gross per 24 hour   Intake 680 ml   Output 1450 ml   Net -770 ml       Physical Exam:     General: Obese, NAD   HENT: Atraumatic and normocephalic   Eyes: Normal conjunctiva   Neck: Supple. No JVD   Cardiovascular: Normal S1 & S2, no m/r/g   Pulmonary/Chest Wall: Decreased BS both bases   Abdominal: Soft and non-tender   Musculoskeletal: +++ Bilat pedal  Edema and 2+ Bilat. uppper ext edema   Neurological: AAOx4,           Data Review:  Lab Results   Component Value Date/Time    Sodium 138 04/21/2020 03:45 AM    Potassium 5.3 04/21/2020 03:45 AM    Chloride 97 (L) 04/21/2020 03:45 AM    CO2 34 (H) 04/21/2020 03:45 AM    Anion gap 7 04/21/2020 03:45 AM    Glucose 222 (H) 04/21/2020 03:45 AM     (H) 04/21/2020 03:45 AM    Creatinine 4.35 (H) 04/21/2020 03:45 AM    BUN/Creatinine ratio 29 (H) 04/21/2020 03:45 AM    GFR est AA 13 (L) 04/21/2020 03:45 AM    GFR est non-AA 11 (L) 04/21/2020 03:45 AM    Calcium 9.1 04/21/2020 03:45 AM     Lab Results   Component Value Date/Time    WBC 5.4 04/16/2020 04:05 AM    HGB 7.2 (L) 04/16/2020 04:05 AM    HCT 23.0 (L) 04/16/2020 04:05 AM    PLATELET 987 85/47/6479 04:05 AM    MCV 81.9 04/16/2020 04:05 AM     Lab Results   Component Value Date/Time    Calcium 9.1 04/21/2020 03:45 AM    Phosphorus 4.7 04/14/2020 02:35 AM     Lab Results   Component Value Date/Time    Iron 32 (L) 04/14/2020 04:00 AM    TIBC 303 04/14/2020 04:00 AM    Iron % saturation 11 (L) 04/14/2020 04:00 AM    Ferritin 418 (H) 04/14/2020 02:35 AM     Lab Results   Component Value Date/Time    Ferritin 418 (H) 04/14/2020 02:35 AM         Impression:   -Acute on CKD likely ATN sec to diuresis, now with worsening kidney function and Anasarca. -CKD stage IV at baseline sec to diabetic Nephropathy/Hypertensive Nephrosclerosis. -Anasarca sec to worsening CKD and CHF  -Anemia of CKD + Iron deficiency.  S/p IV iron  -Diastolic CMP LVEF 60 to 13%  -Pulm HTN, Mild to moderate  -DM type 2  -HTN  -Metabolic alkalosis: mild contraction alkalosis sec to diuretic  -Morbid Obesity        Plan:   -Firs HD today for 3 hrs  -Will continue daily HD for more UF x4 days  -Avoid Nephrotoxins  -Continue JUAN C weekly   -Continue Renal diet  -Hep B panel ordered   -Arrangement for out pt HD unit in progress         Roselyn Bundy MD  [facility]

## 2020-04-21 NOTE — CDMP QUERY
Patient admitted with CHF   Noted documentation of  CKD stage 4 By hospitalist and Renal and Vascular ESRD If possible, please document in progress notes and d/c summary if you are evaluating and /or treating any of the following: 
 
 ESRD  confirmed and  CKD Stage 4, ruled out CKD Stage 4  Confirmed  and  ESRD , ruled out The medical record reflects the following: 
  Risk Factors: CKD Clinical Indicators: Creat on admission 2.06 GFR 25, 4/21- Creat 4.35, GFR 11 Treatment: Tunnel dialysis catheter for HD Thank- You Alyssia Hatch RN [facility] Cell [phone]

## 2020-04-21 NOTE — ROUTINE PROCESS
Bedside and Verbal shift change report given to [address]  (oncoming nurse) by Heber Fisher RN  (offgoing nurse). Report given with SBAR, Kardex, Intake/Output and Recent Results.

## 2020-04-21 NOTE — PROGRESS NOTES
Pt resting comfortably on hospital-issued resmed cpap in auto-titrating mode at this time. O2 bled-in at 5 lpm. No distress noted. Will continue to monitor.

## 2020-04-21 NOTE — PROGRESS NOTES
Problem: Pressure Injury - Risk of  Goal: *Prevention of pressure injury  Description: Document Elian Scale and appropriate interventions in the flowsheet. Outcome: Progressing Towards Goal  Note: Pressure Injury Interventions:       Moisture Interventions: Apply protective barrier, creams and emollients, Absorbent underpads, Minimize layers    Activity Interventions: Pressure redistribution bed/mattress(bed type), PT/OT evaluation    Mobility Interventions: PT/OT evaluation, Pressure redistribution bed/mattress (bed type)    Nutrition Interventions: Document food/fluid/supplement intake    Friction and Shear Interventions: Apply protective barrier, creams and emollients, Minimize layers                Problem: Patient Education: Go to Patient Education Activity  Goal: Patient/Family Education  Outcome: Progressing Towards Goal     Problem: Diabetes Self-Management  Goal: *Disease process and treatment process  Description: Define diabetes and identify own type of diabetes; list 3 options for treating diabetes. Outcome: Progressing Towards Goal  Goal: *Incorporating nutritional management into lifestyle  Description: Describe effect of type, amount and timing of food on blood glucose; list 3 methods for planning meals. Outcome: Progressing Towards Goal  Goal: *Incorporating physical activity into lifestyle  Description: State effect of exercise on blood glucose levels. Outcome: Progressing Towards Goal  Goal: *Developing strategies to promote health/change behavior  Description: Define the ABC's of diabetes; identify appropriate screenings, schedule and personal plan for screenings. Outcome: Progressing Towards Goal  Goal: *Using medications safely  Description: State effect of diabetes medications on diabetes; name diabetes medication taking, action and side effects.   Outcome: Progressing Towards Goal  Goal: *Monitoring blood glucose, interpreting and using results  Description: Identify recommended blood glucose targets  and personal targets. Outcome: Progressing Towards Goal  Goal: *Prevention, detection, treatment of acute complications  Description: List symptoms of hyper- and hypoglycemia; describe how to treat low blood sugar and actions for lowering  high blood glucose level. Outcome: Progressing Towards Goal  Goal: *Prevention, detection and treatment of chronic complications  Description: Define the natural course of diabetes and describe the relationship of blood glucose levels to long term complications of diabetes. Outcome: Progressing Towards Goal  Goal: *Developing strategies to address psychosocial issues  Description: Describe feelings about living with diabetes; identify support needed and support network  Outcome: Progressing Towards Goal  Goal: *Insulin pump training  Outcome: Progressing Towards Goal  Goal: *Sick day guidelines  Outcome: Progressing Towards Goal  Goal: *Patient Specific Goal (EDIT GOAL, INSERT TEXT)  Outcome: Progressing Towards Goal     Problem: Patient Education: Go to Patient Education Activity  Goal: Patient/Family Education  Outcome: Progressing Towards Goal     Problem: Falls - Risk of  Goal: *Absence of Falls  Description: Document Mayito Fall Risk and appropriate interventions in the flowsheet.   Outcome: Progressing Towards Goal  Note: Fall Risk Interventions:  Mobility Interventions: Communicate number of staff needed for ambulation/transfer, Patient to call before getting OOB, OT consult for ADLs, PT Consult for mobility concerns, PT Consult for assist device competence         Medication Interventions: Patient to call before getting OOB, Teach patient to arise slowly    Elimination Interventions: Call light in reach, Patient to call for help with toileting needs    History of Falls Interventions: Room close to nurse's station, Bed/chair exit alarm         Problem: Patient Education: Go to Patient Education Activity  Goal: Patient/Family Education  Outcome: Progressing Towards Goal     Problem: Patient Education: Go to Patient Education Activity  Goal: Patient/Family Education  Outcome: Progressing Towards Goal     Problem: Patient Education: Go to Patient Education Activity  Goal: Patient/Family Education  Outcome: Progressing Towards Goal     Problem: Heart Failure: Day 1  Goal: Off Pathway (Use only if patient is Off Pathway)  Outcome: Progressing Towards Goal     Problem: Heart Failure: Day 2  Goal: Off Pathway (Use only if patient is Off Pathway)  Outcome: Progressing Towards Goal     Problem: Heart Failure: Day 3  Goal: Off Pathway (Use only if patient is Off Pathway)  Outcome: Progressing Towards Goal     Problem: Heart Failure: Day 4  Goal: Off Pathway (Use only if patient is Off Pathway)  Outcome: Progressing Towards Goal     Problem: Heart Failure: Day 5  Goal: Off Pathway (Use only if patient is Off Pathway)  Outcome: Progressing Towards Goal     Problem: Heart Failure: Discharge Outcomes  Goal: *Demonstrates ability to perform prescribed activity without shortness of breath or discomfort  Outcome: Progressing Towards Goal  Goal: *Left ventricular function assessment completed prior to or during stay, or planned for post-discharge  Outcome: Progressing Towards Goal  Goal: *ACEI prescribed if LVEF less than 40% and no contraindications or ARB prescribed  Outcome: Progressing Towards Goal  Goal: *Verbalizes understanding and describes prescribed diet  Outcome: Progressing Towards Goal  Goal: *Verbalizes understanding/describes prescribed medications  Outcome: Progressing Towards Goal  Goal: *Describes available resources and support systems  Description: (eg: Home Health, Palliative Care, Advanced Medical Directive)  Outcome: Progressing Towards Goal  Goal: *Describes smoking cessation resources  Outcome: Progressing Towards Goal  Goal: *Understands and describes signs and symptoms to report to providers(Stroke Metric)  Outcome: Progressing Towards Goal  Goal: *Describes/verbalizes understanding of follow-up/return appt  Description: (eg: to physicians, diabetes treatment coordinator, and other resources  Outcome: Progressing Towards Goal  Goal: *Describes importance of continuing daily weights and changes to report to physician  Outcome: Progressing Towards Goal     Problem: Fluid Volume - Risk of, Imbalanced  Goal: *Balanced intake and output  Outcome: Progressing Towards Goal     Problem: Patient Education: Go to Patient Education Activity  Goal: Patient/Family Education  Outcome: Progressing Towards Goal     Problem: Pain  Goal: *Control of Pain  Outcome: Progressing Towards Goal     Problem: Patient Education: Go to Patient Education Activity  Goal: Patient/Family Education  Outcome: Progressing Towards Goal

## 2020-04-22 LAB
ANION GAP SERPL CALC-SCNC: 6 MMOL/L (ref 3–18)
BUN SERPL-MCNC: 74 MG/DL (ref 7–18)
BUN/CREAT SERPL: 26 (ref 12–20)
CALCIUM SERPL-MCNC: 8.8 MG/DL (ref 8.5–10.1)
CHLORIDE SERPL-SCNC: 103 MMOL/L (ref 100–111)
CO2 SERPL-SCNC: 30 MMOL/L (ref 21–32)
CREAT SERPL-MCNC: 2.81 MG/DL (ref 0.6–1.3)
GLUCOSE BLD STRIP.AUTO-MCNC: 112 MG/DL (ref 70–110)
GLUCOSE BLD STRIP.AUTO-MCNC: 138 MG/DL (ref 70–110)
GLUCOSE BLD STRIP.AUTO-MCNC: 157 MG/DL (ref 70–110)
GLUCOSE BLD STRIP.AUTO-MCNC: 180 MG/DL (ref 70–110)
GLUCOSE BLD STRIP.AUTO-MCNC: 182 MG/DL (ref 70–110)
GLUCOSE BLD STRIP.AUTO-MCNC: 188 MG/DL (ref 70–110)
GLUCOSE SERPL-MCNC: 181 MG/DL (ref 74–99)
MAGNESIUM SERPL-MCNC: 2.4 MG/DL (ref 1.6–2.6)
POTASSIUM SERPL-SCNC: 5.4 MMOL/L (ref 3.5–5.5)
SODIUM SERPL-SCNC: 139 MMOL/L (ref 136–145)

## 2020-04-22 PROCEDURE — 77010033678 HC OXYGEN DAILY

## 2020-04-22 PROCEDURE — 97116 GAIT TRAINING THERAPY: CPT

## 2020-04-22 PROCEDURE — 74011636637 HC RX REV CODE- 636/637: Performed by: HOSPITALIST

## 2020-04-22 PROCEDURE — 80048 BASIC METABOLIC PNL TOTAL CA: CPT

## 2020-04-22 PROCEDURE — 74011250636 HC RX REV CODE- 250/636: Performed by: HOSPITALIST

## 2020-04-22 PROCEDURE — 74011250637 HC RX REV CODE- 250/637: Performed by: HOSPITALIST

## 2020-04-22 PROCEDURE — 94660 CPAP INITIATION&MGMT: CPT

## 2020-04-22 PROCEDURE — 82962 GLUCOSE BLOOD TEST: CPT

## 2020-04-22 PROCEDURE — 97530 THERAPEUTIC ACTIVITIES: CPT

## 2020-04-22 PROCEDURE — 36415 COLL VENOUS BLD VENIPUNCTURE: CPT

## 2020-04-22 PROCEDURE — 90935 HEMODIALYSIS ONE EVALUATION: CPT

## 2020-04-22 PROCEDURE — 74011250637 HC RX REV CODE- 250/637: Performed by: FAMILY MEDICINE

## 2020-04-22 PROCEDURE — 83735 ASSAY OF MAGNESIUM: CPT

## 2020-04-22 PROCEDURE — 65660000000 HC RM CCU STEPDOWN

## 2020-04-22 RX ADMIN — HYDRALAZINE HYDROCHLORIDE 100 MG: 50 TABLET, FILM COATED ORAL at 22:28

## 2020-04-22 RX ADMIN — HEPARIN SODIUM 5000 UNITS: 5000 INJECTION INTRAVENOUS; SUBCUTANEOUS at 05:49

## 2020-04-22 RX ADMIN — SENNOSIDES AND DOCUSATE SODIUM 1 TABLET: 8.6; 5 TABLET ORAL at 08:54

## 2020-04-22 RX ADMIN — CALCIUM ACETATE 667 MG: 667 CAPSULE ORAL at 14:38

## 2020-04-22 RX ADMIN — INSULIN LISPRO 3 UNITS: 100 INJECTION, SOLUTION INTRAVENOUS; SUBCUTANEOUS at 12:10

## 2020-04-22 RX ADMIN — DULOXETINE HYDROCHLORIDE 60 MG: 60 CAPSULE, DELAYED RELEASE ORAL at 08:58

## 2020-04-22 RX ADMIN — HEPARIN SODIUM 5000 UNITS: 5000 INJECTION INTRAVENOUS; SUBCUTANEOUS at 15:21

## 2020-04-22 RX ADMIN — POTASSIUM CHLORIDE 20 MEQ: 1500 TABLET, EXTENDED RELEASE ORAL at 08:53

## 2020-04-22 RX ADMIN — INSULIN GLARGINE 20 UNITS: 100 INJECTION, SOLUTION SUBCUTANEOUS at 22:28

## 2020-04-22 RX ADMIN — GABAPENTIN 300 MG: 300 CAPSULE ORAL at 08:54

## 2020-04-22 RX ADMIN — FERROUS SULFATE TAB 325 MG (65 MG ELEMENTAL FE) 325 MG: 325 (65 FE) TAB at 08:54

## 2020-04-22 RX ADMIN — AMLODIPINE BESYLATE 5 MG: 5 TABLET ORAL at 08:54

## 2020-04-22 RX ADMIN — GABAPENTIN 300 MG: 300 CAPSULE ORAL at 22:28

## 2020-04-22 RX ADMIN — NYSTATIN: 100000 POWDER TOPICAL at 00:56

## 2020-04-22 RX ADMIN — INSULIN LISPRO 6 UNITS: 100 INJECTION, SOLUTION INTRAVENOUS; SUBCUTANEOUS at 14:38

## 2020-04-22 RX ADMIN — NYSTATIN: 100000 POWDER TOPICAL at 10:00

## 2020-04-22 RX ADMIN — CALCIUM ACETATE 667 MG: 667 CAPSULE ORAL at 08:54

## 2020-04-22 RX ADMIN — NYSTATIN: 100000 POWDER TOPICAL at 21:00

## 2020-04-22 RX ADMIN — HYDROCODONE BITARTRATE AND ACETAMINOPHEN 1 TABLET: 5; 325 TABLET ORAL at 00:55

## 2020-04-22 RX ADMIN — HYDROCODONE BITARTRATE AND ACETAMINOPHEN 1 TABLET: 5; 325 TABLET ORAL at 17:23

## 2020-04-22 RX ADMIN — ASPIRIN 81 MG: 81 TABLET, COATED ORAL at 08:53

## 2020-04-22 RX ADMIN — CARVEDILOL 6.25 MG: 3.12 TABLET, FILM COATED ORAL at 08:54

## 2020-04-22 RX ADMIN — INSULIN LISPRO 3 UNITS: 100 INJECTION, SOLUTION INTRAVENOUS; SUBCUTANEOUS at 06:35

## 2020-04-22 RX ADMIN — FAMOTIDINE 20 MG: 20 TABLET ORAL at 08:54

## 2020-04-22 RX ADMIN — GABAPENTIN 300 MG: 300 CAPSULE ORAL at 17:16

## 2020-04-22 RX ADMIN — INSULIN LISPRO 6 UNITS: 100 INJECTION, SOLUTION INTRAVENOUS; SUBCUTANEOUS at 17:26

## 2020-04-22 RX ADMIN — Medication 1 TABLET: at 08:53

## 2020-04-22 RX ADMIN — INSULIN LISPRO 3 UNITS: 100 INJECTION, SOLUTION INTRAVENOUS; SUBCUTANEOUS at 22:28

## 2020-04-22 RX ADMIN — INSULIN LISPRO 6 UNITS: 100 INJECTION, SOLUTION INTRAVENOUS; SUBCUTANEOUS at 08:50

## 2020-04-22 RX ADMIN — HYDRALAZINE HYDROCHLORIDE 100 MG: 50 TABLET, FILM COATED ORAL at 08:55

## 2020-04-22 RX ADMIN — ATORVASTATIN CALCIUM 40 MG: 20 TABLET, FILM COATED ORAL at 08:54

## 2020-04-22 RX ADMIN — POLYETHYLENE GLYCOL 3350 17 G: 17 POWDER, FOR SOLUTION ORAL at 08:55

## 2020-04-22 RX ADMIN — CALCIUM ACETATE 667 MG: 667 CAPSULE ORAL at 17:16

## 2020-04-22 RX ADMIN — HYDRALAZINE HYDROCHLORIDE 100 MG: 50 TABLET, FILM COATED ORAL at 17:16

## 2020-04-22 RX ADMIN — HEPARIN SODIUM 5000 UNITS: 5000 INJECTION INTRAVENOUS; SUBCUTANEOUS at 22:28

## 2020-04-22 RX ADMIN — CARVEDILOL 6.25 MG: 3.12 TABLET, FILM COATED ORAL at 17:16

## 2020-04-22 NOTE — PROGRESS NOTES
Hospitalist Progress Note    Patient: Mónica Garcia MRN: [id number]  CSN: [id number]    YOB: 1965  Age: 53 y.o. Sex: female    DOA: 4/10/2020 LOS:  LOS: 12 days          Chief Complaint:    ARF    Assessment/Plan     54 yo morbidly obese female on 02 at home with renal disease and diabetes, diastolic CHF came with edema and SOB  hosp day #13    Acute on chronic renal failure -  S/P TDC and first HD yesterday  Will continue daily HD for more UF for a total of 4 days  Due to avoid nephrotoxins  Continue JUAN C weekly   Continue Renal diet  Arrangement for out pt HD unit in progress     Acute on chronic decompensated diastolic CHF-  appreciate cardiology and nephrology assistance  Improved but continues with large LE edema  Symptomatic improvement with the start of hemodialysis      IDDM type 2 with renal disease and hyperglycemia uncontrolled-  lantus plus premeal insulin      Hypokalemia-improved     HTN-stable control on current regimen     Pulmonary HTN     АНДРЕЙ-CPAP at night, daily 02 for chronic resp failure      Super Morbid obesity/OHS     Elevated troponin, no chest pain-neg stress in December 2019     Anemia of CKD-treating with iron, no bleeding     Constipation-relieved    Disposition : TBD  Patient Active Problem List   Diagnosis Code    HTN (hypertension) I10    Constipation K59.00    Type II or unspecified type diabetes mellitus with renal manifestations, uncontrolled(250.42) ([facility]) E11.29, E11.65    Morbid obesity ([facility] E66.01    Respiratory distress R06.03    АНДРЕЙ (obstructive sleep apnea) G47.33    Acute kidney injury superimposed on CKD ([facility] N17.9, J13.4    Diastolic CHF, acute on chronic ([facility]) I50.33    CHF (congestive heart failure) ([facility]) I50.9    CKD (chronic kidney disease) stage 4, GFR 15-29 ml/min ([facility]) N18.4    Elevated troponin R79.89       Subjective:    Pleased that 3 liters of fluid was removed yesterday. Feeling less SOB this AM.   Preparing for second HD today. Review of systems:    Constitutional: denies fevers, chills  Respiratory: denies SOB, cough  Cardiovascular: denies chest pain, palpitations  Gastrointestinal: denies nausea, vomiting, diarrhea  Still with leg and abd swelling      Vital signs/Intake and Output:  Visit Vitals  /54 (BP 1 Location: Left arm, BP Patient Position: At rest)   Pulse 69   Temp 97.4 °F (36.3 °C)   Resp 18   Ht 5' 2\" (1.575 m)   Wt 150.4 kg (331 lb 9.6 oz)   SpO2 100%   BMI 60.65 kg/m²     Current Shift:  No intake/output data recorded. Last three shifts:  04/20 1901 - 04/22 0700  In: -   Out: 7999 [Urine:1275]    Exam:    General: obese WF alert, NAD, OX3  Head/Neck: NCAT, supple, No masses, No lymphadenopathy  CVS:Regular rate and rhythm, no M/R/G, S1/S2 heard, no thrill  Lungs:Clear to auscultation bilaterally, no wheezes, rhonchi, or rales  Abdomen: Soft, Nontender, No distention, Normal Bowel sounds, No hepatomegaly  Extremities: 3 plus edema LE BL  Neuro:grossly normal , follows commands  Psych:appropriate                Labs: Results:       Chemistry Recent Labs     04/22/20  0310 04/21/20  0345 04/20/20  0225   * 222* 194*    138 136   K 5.4 5.3 4.7    97* 96*   CO2 30 34* 34*   BUN 74* 127* 120*   CREA 2.81* 4.35* 4.40*   CA 8.8 9.1 8.9   AGAP 6 7 6   BUCR 26* 29* 27*      CBC w/Diff No results for input(s): WBC, RBC, HGB, HCT, PLT, GRANS, LYMPH, EOS, HGBEXT, HCTEXT, PLTEXT, HGBEXT, HCTEXT, PLTEXT in the last 72 hours. Cardiac Enzymes No results for input(s): CPK, CKND1, MARIA ISABEL in the last 72 hours. No lab exists for component: CKRMB, TROIP   Coagulation No results for input(s): PTP, INR, APTT, INREXT, INREXT in the last 72 hours.     Lipid Panel Lab Results   Component Value Date/Time    Cholesterol, total 103 04/14/2020 02:35 AM    HDL Cholesterol 46 04/14/2020 02:35 AM    LDL, calculated 36.2 04/14/2020 02:35 AM    VLDL, calculated 20.8 04/14/2020 02:35 AM    Triglyceride 104 04/14/2020 02:35 AM CHOL/HDL Ratio 2.2 04/14/2020 02:35 AM      BNP No results for input(s): BNPP in the last 72 hours. Liver Enzymes No results for input(s): TP, ALB, TBIL, AP, SGOT, GPT in the last 72 hours.     No lab exists for component: DBIL   Thyroid Studies Lab Results   Component Value Date/Time    TSH 2.18 04/14/2020 02:35 AM        Procedures/imaging: see electronic medical records for all procedures/Xrays and details which were not copied into this note but were reviewed prior to creation of Maverick Hutchins MD

## 2020-04-22 NOTE — PROGRESS NOTES
Problem: Mobility Impaired (Adult and Pediatric)  Goal: *Acute Goals and Plan of Care (Insert Text)  Description: Physical Therapy Goals   Initiated 4/11/2020 and to be accomplished within 7 day(s)  1. Patient will move from supine <> sit with S in prep for out of bed activity and change of position. 2.  Patient will perform sit<> stand with S/RW in prep for transfers/ambulation. 3.  Patient will ambulate 50 feet with S/RW for improved functional mobility/safe discharge. Physical Therapy Goals   Initiated 4/22/2020 and to be accomplished within 5-7 day(s)  1. Patient will move from supine <> sit with Mod I in prep for out of bed activity and change of position. 2.  Patient will perform sit<> stand with Mod I-S with LRAD in prep for transfers/ambulation. 3.  Patient will transfer from bed <> chair with Mod I- S with LRAD for time up in chair for completion of ADL activity. 4.  Patient will ambulate >100 feet with LRAD/S for improved functional mobility/safe discharge. Outcome: Progressing Towards Goal   PHYSICAL THERAPY RE-EVALUATION AND TREATMENT    Patient: Brittany Mcdermott (53 y.o. female)  Date: 4/22/2020  Diagnosis: CHF (congestive heart failure) ([facility]) [A31.4]   Diastolic CHF, acute on chronic [facility])       Precautions: Fall, Skin    ASSESSMENT:  Pt seen in supine prior to session w/ supplemental O2 donned, telemonitor, and terrell catheter donned. Pt reported 8/10 pain neck pain at this time secondary to IV insert, per pt nurse is already aware. Pt very motivated to participate in session. Pt able to increase gait distance w/ RW/GB no LOB noted, and no seated rest break needed. Pt transferred back to room where pt's O2 levels were assessed once in sitting at 91 w/ supplemental O2 donned. Pt declined sitting upright and was transferred back to supine in bed, call bell and tray in reach, nurse notified after session.    Patient's progression toward goals since last assessment: Please see note above and flow sheet     PLAN:  Goals have been updated based on progression since last assessment. Patient continues to benefit from skilled intervention to address the above impairments. Continue to follow the patient 1-2 times per day/4-7 days per week to address goals. Planned Interventions:  [x]     Bed Mobility Training          [x]     Neuromuscular Re-Education  [x]     Transfer Training                []    Orthotic/Prosthetic Training  [x]     Gait Training                       []     Modalities  [x]     Therapeutic Exercises       []     Edema Management/Control  [x]     Therapeutic Activities         [x]     Patient and Family Training/Education  []     Other (comment):  Discharge Recommendations: Rehab vs Home Health  Further Equipment Recommendations for Discharge: N/A     SUBJECTIVE:   Patient stated All I need to do is walk the distance, similar to walking to my car for home right?     OBJECTIVE DATA SUMMARY:   Critical Behavior:  Neurologic State: Alert  Orientation Level: Oriented X4  Cognition: Appropriate for age attention/concentration, Appropriate decision making, Appropriate safety awareness, Follows commands  Functional Mobility Training:  Bed Mobility:  Rolling: Supervision  Supine to Sit: Supervision  Sit to Supine: Supervision  Scooting: Supervision  Transfers:  Sit to Stand: Supervision;Stand-by assistance  Stand to Sit: Supervision  Balance:  Sitting: Intact  Standing: Intact; With support  Standing - Static: Fair  Standing - Dynamic : Fair  Ambulation/Gait Training:  Distance (ft): 80 Feet (ft)  Assistive Device: Gait belt;Walker, rolling  Ambulation - Level of Assistance: Supervision;Stand-by assistance  Gait Abnormalities: Decreased step clearance  Base of Support: Widened  Speed/Arlette: Slow  Step Length: Left shortened;Right shortened  Pain:  Pain Scale 1: Numeric (0 - 10)  Pain Intensity 1: 0  Activity Tolerance:   Fair  Please refer to the flowsheet for vital signs taken during this treatment.   After treatment:   []  Patient left in no apparent distress sitting up in chair  [x]  Patient left in no apparent distress in bed  [x]  Call bell left within reach  [x]  Nursing notified  []  Caregiver present  []  Bed alarm activated    Silvia Gaitan, PT   Time Calculation: 24 mins

## 2020-04-22 NOTE — PROGRESS NOTES
Problem: Pressure Injury - Risk of  Goal: *Prevention of pressure injury  Description: Document Elian Scale and appropriate interventions in the flowsheet. Outcome: Progressing Towards Goal  Note: Pressure Injury Interventions:       Moisture Interventions: Absorbent underpads, Apply protective barrier, creams and emollients, Check for incontinence Q2 hours and as needed    Activity Interventions: Pressure redistribution bed/mattress(bed type)    Mobility Interventions: Pressure redistribution bed/mattress (bed type)    Nutrition Interventions: Document food/fluid/supplement intake    Friction and Shear Interventions: Apply protective barrier, creams and emollients, Lift team/patient mobility team                Problem: Patient Education: Go to Patient Education Activity  Goal: Patient/Family Education  Outcome: Progressing Towards Goal     Problem: Diabetes Self-Management  Goal: *Disease process and treatment process  Description: Define diabetes and identify own type of diabetes; list 3 options for treating diabetes. Outcome: Progressing Towards Goal  Goal: *Incorporating nutritional management into lifestyle  Description: Describe effect of type, amount and timing of food on blood glucose; list 3 methods for planning meals. Outcome: Progressing Towards Goal  Goal: *Incorporating physical activity into lifestyle  Description: State effect of exercise on blood glucose levels. Outcome: Progressing Towards Goal  Goal: *Developing strategies to promote health/change behavior  Description: Define the ABC's of diabetes; identify appropriate screenings, schedule and personal plan for screenings. Outcome: Progressing Towards Goal  Goal: *Using medications safely  Description: State effect of diabetes medications on diabetes; name diabetes medication taking, action and side effects.   Outcome: Progressing Towards Goal  Goal: *Monitoring blood glucose, interpreting and using results  Description: Identify recommended blood glucose targets  and personal targets. Outcome: Progressing Towards Goal  Goal: *Prevention, detection, treatment of acute complications  Description: List symptoms of hyper- and hypoglycemia; describe how to treat low blood sugar and actions for lowering  high blood glucose level. Outcome: Progressing Towards Goal  Goal: *Prevention, detection and treatment of chronic complications  Description: Define the natural course of diabetes and describe the relationship of blood glucose levels to long term complications of diabetes. Outcome: Progressing Towards Goal  Goal: *Developing strategies to address psychosocial issues  Description: Describe feelings about living with diabetes; identify support needed and support network  Outcome: Progressing Towards Goal  Goal: *Insulin pump training  Outcome: Progressing Towards Goal  Goal: *Sick day guidelines  Outcome: Progressing Towards Goal  Goal: *Patient Specific Goal (EDIT GOAL, INSERT TEXT)  Outcome: Progressing Towards Goal     Problem: Patient Education: Go to Patient Education Activity  Goal: Patient/Family Education  Outcome: Progressing Towards Goal     Problem: Falls - Risk of  Goal: *Absence of Falls  Description: Document Mayito Fall Risk and appropriate interventions in the flowsheet.   Outcome: Progressing Towards Goal  Note: Fall Risk Interventions:  Mobility Interventions: Assess mobility with egress test, Communicate number of staff needed for ambulation/transfer, Patient to call before getting OOB, Utilize walker, cane, or other assistive device         Medication Interventions: Patient to call before getting OOB, Teach patient to arise slowly    Elimination Interventions: Call light in reach, Patient to call for help with toileting needs    History of Falls Interventions: Vital signs minimum Q4HRs X 24 hrs (comment for end date)         Problem: Patient Education: Go to Patient Education Activity  Goal: Patient/Family Education  Outcome: Progressing Towards Goal     Problem: Patient Education: Go to Patient Education Activity  Goal: Patient/Family Education  Outcome: Progressing Towards Goal     Problem: Patient Education: Go to Patient Education Activity  Goal: Patient/Family Education  Outcome: Progressing Towards Goal     Problem: Heart Failure: Discharge Outcomes  Goal: *Demonstrates ability to perform prescribed activity without shortness of breath or discomfort  Outcome: Progressing Towards Goal  Goal: *Left ventricular function assessment completed prior to or during stay, or planned for post-discharge  Outcome: Progressing Towards Goal  Goal: *ACEI prescribed if LVEF less than 40% and no contraindications or ARB prescribed  Outcome: Progressing Towards Goal  Goal: *Verbalizes understanding and describes prescribed diet  Outcome: Progressing Towards Goal  Goal: *Verbalizes understanding/describes prescribed medications  Outcome: Progressing Towards Goal  Goal: *Describes available resources and support systems  Description: (eg: Home Health, Palliative Care, Advanced Medical Directive)  Outcome: Progressing Towards Goal  Goal: *Describes smoking cessation resources  Outcome: Progressing Towards Goal  Goal: *Understands and describes signs and symptoms to report to providers(Stroke Metric)  Outcome: Progressing Towards Goal  Goal: *Describes/verbalizes understanding of follow-up/return appt  Description: (eg: to physicians, diabetes treatment coordinator, and other resources  Outcome: Progressing Towards Goal  Goal: *Describes importance of continuing daily weights and changes to report to physician  Outcome: Progressing Towards Goal     Problem: Fluid Volume - Risk of, Imbalanced  Goal: *Balanced intake and output  Outcome: Progressing Towards Goal     Problem: Patient Education: Go to Patient Education Activity  Goal: Patient/Family Education  Outcome: Progressing Towards Goal     Problem: Pain  Goal: *Control of Pain  Outcome: Progressing Towards Goal     Problem: Patient Education: Go to Patient Education Activity  Goal: Patient/Family Education  Outcome: Progressing Towards Goal     Problem: Chronic Renal Failure  Goal: *Fluid and electrolytes stabilized  Outcome: Progressing Towards Goal     Problem: Patient Education: Go to Patient Education Activity  Goal: Patient/Family Education  Outcome: Progressing Towards Goal

## 2020-04-22 NOTE — PROGRESS NOTES
Rounded on pt today as follow-up for Heart Failure consult. Pt aware of changes in diet upon discharge to diabetic and low sodium meals. PT states decreasing work of breathing and she is eager to go home. Remains in bed with dialysis in progress. Plans for home health at discharge.     Reyna Ackerman [facility]  Heart Failure Coordinator  [phone]

## 2020-04-22 NOTE — PROGRESS NOTES
Transition of care: home with [facility] versus acute rehab  Did not enter room due to covid restrictions. Telephone call with patient. States she is feeling a little  Less short of breath. States she got her dialysis yesterday and states she is suppose to get again today and tomorrow. Patient asked what is the plan. Discussed optons home and go to HD or maybe go to acute rehab then home with cotinued HD. Cm working on getting chair time  Cm asked how she would be getting to HD states her daughter will drive her. Cms has spoken to lou they have all the information they need to start working o getting a chair. Patient has home oxygen and a nebulizer  She has chosen to use [facility] for hhc referral was previously sent. Cm has also sent out to acute rehabs. ptient currenlty not on file  Care Management Interventions  PCP Verified by CM: Yes  Mode of Transport at Discharge:  Other (see comment)(family)  Transition of Care Consult (CM Consult): Discharge Planning, [address]: No  Reason Outside [place]: Patient already serviced by other home care/hospice agency  Physical Therapy Consult: Yes  Current Support Network: Lives Alone, Family Lives Nearby  Confirm Follow Up Transport: Family  The Plan for Transition of Care is Related to the Following Treatment Goals : home with [place]  The Patient and/or Patient Representative was Provided with a Choice of Provider and Agrees with the Discharge Plan?: Yes  Name of the Patient Representative Who was Provided with a Choice of Provider and Agrees with the Discharge Plan: PATIENT  Freedom of Choice List was Provided with Basic Dialogue that Supports the Patient's Individualized Plan of Care/Goals, Treatment Preferences and Shares the Quality Data Associated with the Providers?: Yes  Discharge Location  Discharge Placement: Home with home health  Versus acute rehab

## 2020-04-22 NOTE — DIABETES MGMT
GLYCEMIC CONTROL PROGRESS NOTE:    - known h/o T2DM with four admissions to [facility] this year, HbA1C trending down, TID mixed insulin home regimen  - BG out of target range non-ICU: < 180 mg/dl  - TDD = 50 (20 Lantus + 12 (6) + 18 Humalog Very Insulin Resistant Corrective Coverage)  - BG trending down, pt responding well to current IP regimen     Recent Glucose Results:   Lab Results   Component Value Date/Time     (H) 04/22/2020 03:10 AM    GLUCPOC 157 (H) 04/22/2020 11:26 AM    GLUCPOC 188 (H) 04/22/2020 08:17 AM    GLUCPOC 180 (H) 04/22/2020 06:14 AM         Sandy Wilson MS, RN, CDE  Glycemic Control Team  [phone]  Pager [phone] (M-TH 8:00-4:30P)  *After Hours pager [phone]

## 2020-04-22 NOTE — PROGRESS NOTES
Pt sore from dialysis port insertion. Pt states it is too painful to wear the cpap tonight for HS due to the head straps being too close to dialysis port site. Pt on continuous pulse ox monitoring. No distress noted. Will continue to monitor.

## 2020-04-22 NOTE — PROGRESS NOTES
Nephrology Progress note    Subjective: Saurabh Borrero a 53 y. o. female with a past medical history significant for DM type 2, HTN, TIA, CKD IV, HLD, АНДРЕЙ, Obesity, Chronic Anemia, Diastolic CHF who presented with c/o SOB. Her CXR showed Pulmonary edema and small bilateral effusions consistent with CHF. S Cr was 2.6 on admission and remains unchanged today.    Patient is followed at our office by Dr Jessica Hardy, most recent S Cr 2.6 on 2/26/20. She was last seen on 3/4/20 and maintained on diuretics, oral Furosemide + Metolazone. Since admission she has been placed on intermittent  IV Lasix. Switched to lasix gtt and po metolazone. Pt had attended [facility] prior to admission, scheduled for second class next week. -S/P TDC and first HD yesterday (4/21) and tolerated UF of 3L. And had UOP 1.2L.  Currently w/o SOB at rest.        Admit Date: 4/10/2020  Principal Problem:    Diastolic CHF, acute on chronic ([facility] (2/16/2020)    Active Problems:    HTN (hypertension) (8/16/2014)      Constipation (8/17/2014)      Type II or unspecified type diabetes mellitus with renal manifestations, uncontrolled(250.42) ([address] (9/4/2014)      Morbid obesity (HCC) ()      Respiratory distress (1/17/2019)      Acute kidney injury superimposed on CKD ([address] (2/13/2020)      CHF (congestive heart failure) ([facility] (4/10/2020)      CKD (chronic kidney disease) stage 4, GFR 15-29 ml/min (HCC) (4/10/2020)      Elevated troponin (4/10/2020)      Current Facility-Administered Medications   Medication Dose Route Frequency    heparin (porcine) 1,000 unit/mL injection 3,200 Units  3,200 Units InterCATHeter DIALYSIS PRN    0.9% sodium chloride infusion  100 mL/hr IntraVENous DIALYSIS PRN    polyethylene glycol (MIRALAX) packet 17 g  17 g Oral DAILY    epoetin erickson-epbx (RETACRIT) injection 20,000 Units  20,000 Units SubCUTAneous Q7D    insulin lispro (HUMALOG) injection 6 Units  6 Units SubCUTAneous TIDAC    insulin glargine (LANTUS) injection 20 Units  20 Units SubCUTAneous QHS    famotidine (PEPCID) tablet 20 mg  20 mg Oral DAILY    bisacodyL (DULCOLAX) suppository 10 mg  10 mg Rectal DAILY PRN    ferrous sulfate tablet 325 mg  1 Tab Oral DAILY WITH BREAKFAST    potassium chloride (K-DUR, KLOR-CON) SR tablet 20 mEq  20 mEq Oral DAILY    senna-docusate (PERICOLACE) 8.6-50 mg per tablet 1 Tab  1 Tab Oral DAILY    nystatin (MYCOSTATIN) 100,000 unit/gram powder   Topical BID    albuterol-ipratropium (DUO-NEB) 2.5 MG-0.5 MG/3 ML  3 mL Nebulization Q4H PRN    atorvastatin (LIPITOR) tablet 40 mg  40 mg Oral DAILY    calcium acetate(phosphat bind) (PHOSLO) capsule 667 mg  1 Cap Oral TID WITH MEALS    DULoxetine (CYMBALTA) capsule 60 mg  60 mg Oral DAILY    gabapentin (NEURONTIN) capsule 300 mg  300 mg Oral TID    hydrALAZINE (APRESOLINE) tablet 100 mg  100 mg Oral TID    insulin lispro (HUMALOG) injection   SubCUTAneous AC&HS    glucose chewable tablet 16 g  4 Tab Oral PRN    glucagon (GLUCAGEN) injection 1 mg  1 mg IntraMUSCular PRN    heparin (porcine) injection 5,000 Units  5,000 Units SubCUTAneous Q8H    acetaminophen (TYLENOL) tablet 650 mg  650 mg Oral Q6H PRN    ondansetron (ZOFRAN) injection 4 mg  4 mg IntraVENous Q6H PRN    amLODIPine (NORVASC) tablet 5 mg  5 mg Oral DAILY    aspirin delayed-release tablet 81 mg  81 mg Oral DAILY    carvediloL (COREG) tablet 6.25 mg  6.25 mg Oral BID WITH MEALS    cholecalciferol (VITAMIN D3) (1000 Units /25 mcg) tablet 1 Tab  1,000 Units Oral DAILY    HYDROcodone-acetaminophen (NORCO) 5-325 mg per tablet 1 Tab  1 Tab Oral Q4H PRN       Allergy:   Allergies   Allergen Reactions    Bees [Sting, Bee] Swelling    Penicillins Hives    Strawberry Hives        Objective:     Visit Vitals  /54 (BP 1 Location: Left arm, BP Patient Position: At rest)   Pulse 69   Temp 97.4 °F (36.3 °C)   Resp 18   Ht 5' 2\" (1.575 m)   Wt 150.4 kg (331 lb 9.6 oz)   SpO2 100%   BMI 60.65 kg/m² Intake/Output Summary (Last 24 hours) at 4/22/2020 5572  Last data filed at 4/22/2020 0554  Gross per 24 hour   Intake    Output 4275 ml   Net -4275 ml       Physical Exam:     General: Obese, NAD   HENT: Atraumatic and normocephalic   Eyes: Normal conjunctiva   Neck: Supple. No JVD   Cardiovascular: Normal S1 & S2, no m/r/g   Pulmonary/Chest Wall: Decreased BS both bases   Abdominal: Soft and non-tender   Musculoskeletal: +++ Bilat pedal  Edema and 2+ Bilat. uppper ext edema   Neurological: AAOx4,     HD access: Rt IJ TDC      Data Review:  Lab Results   Component Value Date/Time    Sodium 139 04/22/2020 03:10 AM    Potassium 5.4 04/22/2020 03:10 AM    Chloride 103 04/22/2020 03:10 AM    CO2 30 04/22/2020 03:10 AM    Anion gap 6 04/22/2020 03:10 AM    Glucose 181 (H) 04/22/2020 03:10 AM    BUN 74 (H) 04/22/2020 03:10 AM    Creatinine 2.81 (H) 04/22/2020 03:10 AM    BUN/Creatinine ratio 26 (H) 04/22/2020 03:10 AM    GFR est AA 21 (L) 04/22/2020 03:10 AM    GFR est non-AA 17 (L) 04/22/2020 03:10 AM    Calcium 8.8 04/22/2020 03:10 AM     Lab Results   Component Value Date/Time    WBC 5.4 04/16/2020 04:05 AM    HGB 7.2 (L) 04/16/2020 04:05 AM    HCT 23.0 (L) 04/16/2020 04:05 AM    PLATELET 265 96/39/3942 04:05 AM    MCV 81.9 04/16/2020 04:05 AM     Lab Results   Component Value Date/Time    Calcium 8.8 04/22/2020 03:10 AM    Phosphorus 4.7 04/14/2020 02:35 AM     Lab Results   Component Value Date/Time    Iron 32 (L) 04/14/2020 04:00 AM    TIBC 303 04/14/2020 04:00 AM    Iron % saturation 11 (L) 04/14/2020 04:00 AM    Ferritin 418 (H) 04/14/2020 02:35 AM     Lab Results   Component Value Date/Time    Ferritin 418 (H) 04/14/2020 02:35 AM         Impression:   -Acute on CKD likely ATN sec to diuresis, now with worsening kidney function and Anasarca. Started on HD 4/22  -CKD stage IV at baseline sec to diabetic Nephropathy/Hypertensive Nephrosclerosis.    -Anasarca sec to worsening CKD and CHF  -Anemia of CKD + Iron deficiency.  S/p IV iron  -Diastolic CMP LVEF 60 to 64%  -Pulm HTN, Mild to moderate  -DM type 2  -HTN  -Metabolic alkalosis: mild contraction alkalosis sec to diuretic  -Morbid Obesity        Plan:   -Will continue daily HD for more UF for a total of 4 days  -Avoid Nephrotoxins  -Continue JUAN C weekly   -Continue Renal diet  -Arrangement for out pt HD unit in progress         Dickey Nissen, MD  [facility]

## 2020-04-22 NOTE — DIALYSIS
TREATMENT SUMMARY   Patient dialyzed in room 331  Tolerated treatment well without complaint or complications.    RIJ TDC functioning well without complication of BFR or accessing        3500 ml  removed via UF with a with a net removal 3000 ml  Report given to Alma Olvera RN with all questions answered     TREATMENT  NOTES                                                                                                     ACUTE HEMODIALYSIS FLOW SHEET     PATIENT INFORMATION   Blanca 39, M    []1st Time Acute  []Stat[] Routine []Urgent []Chronic Unit   []Acute Room [x]Bedside  []ICU/CCU []ER   Isolation Precautions: [x]Dialysis[] Airborne []Contact []Droplet []Reverse    Special Considerations:_______  [] Blood Consent Verified  []N/A   Allergies:[] NKA   Reaction Severity Reaction Type Noted Valid Until           Allergies   Bees [Sting, Bee] Swelling High Systemic 5/2/2012    Penicillins Hives High Systemic 5/2/2012    Strawberry Hives Medium  7/12/2012     Code Status [x]Full Code [] DNR  [] Other_____   Diet: [] Renal [] NPO [x] Diabetic   [] Enteral Feeding [] Cardiac Diabetic: [x]Yes []No     [x]Signed Treatment Consent Verified   [x] Time Out/ Safety Check   PRIMARY NURSE REPORT: FIRST INITIAL/ LAST NAME/TITLE  PRE DIALYSIS:  Inocencia Thompson RN                                     TIME: [address] [place]: [] N/A  [x] RIGHT  [] LEFT  [x] IJ  [] SUBCL [] FEM                    [x] First use X-ray  [x] Tunnel     [] Non-Tunneled      [] No S/S infection  [] Redness [] Drainage  [] Cultured [] Swelling [] Pain                    [x] Medical Aseptic [] Prep Dressing Changed                  [] Clotted [x] Patent []      Flows: [x] Good [] Poor [] Reversed                 If Access Problem Dr. Warner Samples: [] Yes [] No    Date:_____  [] N/A[]   GRAFT/FISTULA ACCESS:  [x] N/A  [] RIGHT  [] LEFT  [] UE   [] LE       [] AVG  [] AVF [] BUTTONHOLE    [] +BRUIT/THRILL [] MEDICAL ASEPTIC PREP     [] No S/S infection  [] Redness [] Drainage  [] Cultured [] Swelling [] Pain              If Access Problem Dr. Joyce Gibson: [] Yes [] No    Date:______ [] N/A   GENERAL ASSESSMENT   LUNGS:  SaO2% _100___ [x] Clear [] Coarse [] Crackles [] Wheezing                                         [x] Diminished   Location: [] RLL [] LLL [] RUL [] RML [] THALIA    COUGH:  [] Productive  [] Loose[] Dry [x] N/A   RESPIRATIONS: [x] Easy []Labored    THERAPY: [x] RA   [] NC __5___. L/min    Mask: [] NRB [] Venti  _____O2%                  [] Ventilator [] Intubated [] Trach [] BiPap [] CPap [] HI Flow   CARDIAC: [x] Regular [] Irregular [] Pericardial Rub [] JVD               Monitored Rhythm:_NSR_____ [] N/A   EDEMA: [] None [x] Generalized [] Facial [] Pedal [x] UE [x] LE             [] Pitting [] 1 [] 2 [x] 3 [] 4    [] Right [] Left [] Bilateral   SKIN:    [x] Warm [] Hot [] Cold  [x] Dry [] Pale [] Diaphoretic              [] Flushed [] Jaundiced [] Cyanotic [] Rash [] Weeping     LOC:    [x] Alert  Oriented to: [x] Person [x] Place [x] Time             [] Confused [] Lethargic [] Medicated [] Non-responsive    GI/ABDOMEN: [] Flat [] Distended [x] Soft [] Firm [] Diarrhea [x] Bowel Sounds Present [] Nausea [] Vomiting    PAIN: [x] 0 [] 1 [] 2 [] 3 [] 4 [] 5 [] 6 [] 7 [] 8 [] 9 [] 10          Scale 1-10 Action/Follow Up_____   MOBILITY: [] Amb [] Amb/Assist [x] Bed  [] Wheelchair    CURRENT LABS   HBsAg ONLY: Date Drawn: 4/20/2020            [x] Negative [] Positive [] Unknown.      HBsAb: Date Drawn:  4/21/2020            [x] Susceptible <10 [] Immune ?10 [] Unknown   Date of Current Labs:  ATTACHED     EDUCATION   Person Educated: [x] Patient [] Other_________   Knowledge base: [] None [x] Minimal [] Substantial    Barriers to learning  [x] None _______________   Preferred method of learning: [] Written [x] Oral [x] Visual [] Hands on    Topic: [x] Access Care [] S&S of infection [x] Fluid Management   [] K+  [x] Procedural  [] Albumin [] Medications [] Tx Options   [] Transplant [x] Diet [] Other    Teaching Tools: [x] Explain [x] Demonstration [] Handout_____ [] Video______  CARE PLAN    [x] Renal Failure (Adult)  Interdisciplinary  · Fluid and electrolytes stabilized  ? Interventions  · Dehydration signs and symptoms (eg: Weight/lab monitoring; vomiting/diarrhea/urine; tenting; mucous membranes; dizziness/lethargy/irritability/confusion; weak pulse; tachycardia; blood pressure; I&O)  · Fluid overload signs and symptoms assessment (eg: Body weight increased; dyspnea; edema; hypertension; respiratory crackles/wheezing; JVD; lab monitoring; mental status changes; I&O)  · Monitor appropriate lab values  · COMPLIANCE WITH PRESCRIBED THERAPY  · ARTERIAL ACCESS SITE ASSESSMENT  · NUTRITION SCREENING  · Vital signs monitoring per assessed patient condition or unit standard  · Cardiac monitoring  · Hydration management  · Intake and output measurement  · Body weight monitoring  · Skin care  · DIALYSIS  · Nutrition Care Process per nutrition screen  · Oral hygiene care every 2 hours  · Pain management     · Outcome   ? [x] Progressing Towards Goal  ? [] Not Progressing Towards Goals  ? [] Goals Met/Resolved  ? [] Goals Not Met/ Resolved        · Patient/ Family Education  ?  Progressing Towards Goals          RO/HEMODIAYLSIS MACHINE SAFETY CHECKS- BEFORE EACH TREATMENT          [x] [facility]: Machine Serial #1:  [id number]   RO Serial #[id number]                       [] [facility]: Machine Serial #2:  [id number]   RO Serial #[id number]    Alarm Test: [x] Pass  Time_1045______  [x] RO/Machine Log Complete    [x] Extracorporeal circuit Tested for integrity   Dialyzer_[id number]_________   Tubing_19K19-9___________    Dialysate: pH__7.4_  Temp.__37___Conductivity: Meter __14.2__ HD Machine__14_   CHLORINE TESTING- BEFORE EACH TREATMENT AND EVERY 4 HOURS   Total Chlorine: [x] Less than 0.1 ppm Time:_1055___2nd Check Time:_NA_____  (If greater than 0.1 ppm from Primary then every 30 minutes from Secondary)   TREATMENT INIATION-WITH DIALYSIS PRECAUTIONS   [x] All Connections Secured   [x] Saline Line Double Clamped    [x] Venous Parameters Set [x] Arterial Parameters Set    [x] Prime Given 250 ml     [x] Air Foam Detector Engaged   PRE-TREATMENT   UF Calculations: Wt to lose:__3000__ml(+) Oral:_0_ml(+)IV Meds/Fluids/Blood prods_0__ml(+) Prime/Rinse__500_ml(=)Total UF Goal__3500__mL   Scale Type:[x] Bed scale [] Sling Scale [] Wheel Chair Scale []  Not Ordered [] [] Unable to obtain pt on stretcher/ bed scale malfunctioning   Tx Initiation Note: RECEIVED REPORT. PATIENT ALERT AND ORIENTED. VITAL SIGNS WNL. NAD. ALL QUESTIONS ANSWERED WITH PATIENT  SAFETY CHECKS COMPLETE. TIME OUT COMPLETE. TREATMENT INITIATED VIA _TDC____. NO CONCERNS NOTED. [x] Time Out/Safety Check  Time:_1057___   INTRADIALYTIC MONITORING  (SEE ATTACHED FLOWSHEET)     POST TREATMENT    Time Medication Dose Volume Route    Initials   NA                                        [facility] Signatures Title Initials Time   DEON SCHWARTZ RN PAP 1445               Dialyzer cleared: [x] Good [] Fair [] Poor     Blood Volume Processed 63____L   Net UF Removed _3000___mL  Post Tx Access:                  AVF/AVG: Bleeding Stop Time      Art. NA_min Josef.__NA_min []+bruit/thrill                              Catheter: Locking Solution  [x] Heparin 1 ml/1000 units                                                             [] Normal Saline                                                                      Art._1.6___ ml Josef.__1.6__ml                                                           [x] New caps placed  Post Assessment:              Skin: [x]Warm [x]Dry []Diaphoretic []Flushed [] Pale []Cyanotic            Lungs: [x]Clear []Coarse []Crackles []Wheezing             Cardiac: [x]Regular []Irregular  []Monitored rhythm____ []N/A            Edema: []None []General []Facial []Pedal  []UE [x]LE [x]RIGHT []LEFT            Pain: [x]0 []1 []2 []3 []4 []5 []6 []7 []8 []9 []10   POST Tx Note:TREATMENT COMPLETED. ALL POSSIBLE BLOOD RETURNED. PT TOLERATED WELL. VITAL SIGNS WNL  FOR PATIENT. NAD NOTED. DIALYSIS CATHETER DE ACCESSED, FLUSHED AND LOCKED. STERILE CAPS APPLIED. REPORT GIVEN WITH OPPORTUNITY TO ADDRESS ANY CONCERNS OR QUESTTIONS AND PATIENT STAYS ROOM IN BED WITHOUT DISTRESS OR ACUTE DISCOMFORT. BED LOWED, CALL BELL WITHIN REACH .      Primary Nurse Report: First initial/Last name/Title    Post Dialysis:__Inocencia Thompson RN___        Time:___1430_____________    Abbreviations: AVG-arterial venous graft, AVF-arterial venous fistula, IJ-Internal Jugular,  Subcl-Subclavian, Fem-Femoral, Tx-treatment, AP/HR-apical heart rate, DFR-dialysate flow rate, BFR-blood flow rate, AP-arterial pressure, -venous pressure, UF-ultrafiltrate, TMP-transmembrane pressure, Josef-Venous, Art-Arterial, RO-Reverse Osmosis

## 2020-04-22 NOTE — PROGRESS NOTES
Problem: Fluid Volume - Risk of, Imbalanced  Goal: *Balanced intake and output  Outcome: Progressing Towards Goal     Problem: Chronic Renal Failure  Goal: *Fluid and electrolytes stabilized  Outcome: Progressing Towards Goal     Problem: Patient Education: Go to Patient Education Activity  Goal: Patient/Family Education  Outcome: Progressing Towards Goal

## 2020-04-23 ENCOUNTER — APPOINTMENT (OUTPATIENT)
Dept: VASCULAR SURGERY | Age: 55
DRG: 194 | End: 2020-04-23
Attending: SURGERY
Payer: COMMERCIAL

## 2020-04-23 LAB
ANION GAP SERPL CALC-SCNC: 4 MMOL/L (ref 3–18)
BUN SERPL-MCNC: 50 MG/DL (ref 7–18)
BUN/CREAT SERPL: 23 (ref 12–20)
CALCIUM SERPL-MCNC: 8.9 MG/DL (ref 8.5–10.1)
CHLORIDE SERPL-SCNC: 107 MMOL/L (ref 100–111)
CO2 SERPL-SCNC: 30 MMOL/L (ref 21–32)
CREAT SERPL-MCNC: 2.18 MG/DL (ref 0.6–1.3)
GLUCOSE BLD STRIP.AUTO-MCNC: 154 MG/DL (ref 70–110)
GLUCOSE BLD STRIP.AUTO-MCNC: 159 MG/DL (ref 70–110)
GLUCOSE BLD STRIP.AUTO-MCNC: 185 MG/DL (ref 70–110)
GLUCOSE BLD STRIP.AUTO-MCNC: 286 MG/DL (ref 70–110)
GLUCOSE SERPL-MCNC: 181 MG/DL (ref 74–99)
LEFT ANTECUBITAL VEIN DEPTH: 0.81 CM
LEFT ANTECUBITAL VEIN DIAM: 0.39 CM
LEFT BRACHIAL ARTERY DIAM: 0.44 CM
LEFT BRACHIAL VEIN 1 DIAM: 0.42 CM
LEFT CEPHALIC VEIN UPPER ARM DIST DIAM: 0.24 CM
LEFT CEPHALIC VEIN UPPER ARM MID DIAM: 0.36 CM
LEFT CEPHALIC VEIN UPPER ARM PROX DIAM: 0.48 CM
LEFT CEPHALIC VEIN UPPER DIST DEPTH: 0.91 CM
LEFT CEPHALIC VEIN UPPER MID DEPTH: 1.72 CM
LEFT CEPHALIC VEIN UPPER PROX DEPTH: 2.39 CM
LEFT DIST BRACHIAL A PSV: 97.8 CM/S
Lab: 0.8 CM
MAGNESIUM SERPL-MCNC: 2.3 MG/DL (ref 1.6–2.6)
POTASSIUM SERPL-SCNC: 5.4 MMOL/L (ref 3.5–5.5)
RIGHT ANTECUBITAL VEIN DIAM: 0.39 CM
RIGHT BASILIC VEIN UPPER ARM DIST DIAM: 0.14 CM
RIGHT BASILIC VEIN UPPER ARM MID DIAM: 0.3 CM
RIGHT BASILIC VEIN UPPER ARM PROX DIAM: 0.47 CM
RIGHT BASILIC VEIN UPPER MID DEPTH: 1.66 CM
RIGHT BASILIC VEIN UPPER PROX DEPTH: 1.73 CM
RIGHT BRACHIAL ARTERY DIAM: 0.44 CM
RIGHT BRACHIAL VEIN 1 DIAM: 0.37 CM
RIGHT BRACHIAL VEIN 2 DIAM: 0.32 CM
RIGHT CEPHALIC VEIN LOWER ARM DIST DIAM: 0.28 CM
RIGHT CEPHALIC VEIN LOWER ARM MID DIAM: 0.24 CM
RIGHT CEPHALIC VEIN LOWER ARM PROX DIAM: 0.25 CM
RIGHT CEPHALIC VEIN LOWER DIST DEPTH: 0.41 CM
RIGHT CEPHALIC VEIN LOWER MID DEPTH: 0.77 CM
RIGHT CEPHALIC VEIN LOWER PROX DEPTH: 0.85 CM
RIGHT CEPHALIC VEIN UPPER ARM DIST DIAM: 0.3 CM
RIGHT CEPHALIC VEIN UPPER ARM MID DIAM: 0.41 CM
RIGHT CEPHALIC VEIN UPPER ARM PROX DIAM: 0.42 CM
RIGHT CEPHALIC VEIN UPPER DIST DEPTH: 0.96 CM
RIGHT CEPHALIC VEIN UPPER MID DEPTH: 1.37 CM
RIGHT CEPHALIC VEIN UPPER PROX DEPTH: 2.29 CM
RIGHT DIST RADIAL A PSV: 101.7 CM/S
RIGHT RADIAL ARTERY DIAM: 0.24 CM
SODIUM SERPL-SCNC: 141 MMOL/L (ref 136–145)

## 2020-04-23 PROCEDURE — 83735 ASSAY OF MAGNESIUM: CPT

## 2020-04-23 PROCEDURE — 77010033678 HC OXYGEN DAILY

## 2020-04-23 PROCEDURE — 74011636637 HC RX REV CODE- 636/637: Performed by: HOSPITALIST

## 2020-04-23 PROCEDURE — 74011250637 HC RX REV CODE- 250/637: Performed by: HOSPITALIST

## 2020-04-23 PROCEDURE — 65660000000 HC RM CCU STEPDOWN

## 2020-04-23 PROCEDURE — 36415 COLL VENOUS BLD VENIPUNCTURE: CPT

## 2020-04-23 PROCEDURE — 93970 EXTREMITY STUDY: CPT

## 2020-04-23 PROCEDURE — 94660 CPAP INITIATION&MGMT: CPT

## 2020-04-23 PROCEDURE — 90935 HEMODIALYSIS ONE EVALUATION: CPT

## 2020-04-23 PROCEDURE — 82962 GLUCOSE BLOOD TEST: CPT

## 2020-04-23 PROCEDURE — 74011250637 HC RX REV CODE- 250/637: Performed by: FAMILY MEDICINE

## 2020-04-23 PROCEDURE — 74011250636 HC RX REV CODE- 250/636: Performed by: HOSPITALIST

## 2020-04-23 PROCEDURE — 80048 BASIC METABOLIC PNL TOTAL CA: CPT

## 2020-04-23 RX ORDER — HYDRALAZINE HYDROCHLORIDE 50 MG/1
50 TABLET, FILM COATED ORAL 3 TIMES DAILY
Status: DISCONTINUED | OUTPATIENT
Start: 2020-04-23 | End: 2020-04-24 | Stop reason: HOSPADM

## 2020-04-23 RX ADMIN — HYDRALAZINE HYDROCHLORIDE 50 MG: 50 TABLET, FILM COATED ORAL at 02:04

## 2020-04-23 RX ADMIN — INSULIN LISPRO 2 UNITS: 100 INJECTION, SOLUTION INTRAVENOUS; SUBCUTANEOUS at 16:50

## 2020-04-23 RX ADMIN — FERROUS SULFATE TAB 325 MG (65 MG ELEMENTAL FE) 325 MG: 325 (65 FE) TAB at 08:56

## 2020-04-23 RX ADMIN — ASPIRIN 81 MG: 81 TABLET, COATED ORAL at 08:57

## 2020-04-23 RX ADMIN — INSULIN GLARGINE 20 UNITS: 100 INJECTION, SOLUTION SUBCUTANEOUS at 02:04

## 2020-04-23 RX ADMIN — AMLODIPINE BESYLATE 5 MG: 5 TABLET ORAL at 08:57

## 2020-04-23 RX ADMIN — ATORVASTATIN CALCIUM 40 MG: 20 TABLET, FILM COATED ORAL at 08:58

## 2020-04-23 RX ADMIN — NYSTATIN: 100000 POWDER TOPICAL at 22:32

## 2020-04-23 RX ADMIN — GABAPENTIN 300 MG: 300 CAPSULE ORAL at 02:04

## 2020-04-23 RX ADMIN — INSULIN LISPRO 3 UNITS: 100 INJECTION, SOLUTION INTRAVENOUS; SUBCUTANEOUS at 06:41

## 2020-04-23 RX ADMIN — HYDROCODONE BITARTRATE AND ACETAMINOPHEN 1 TABLET: 5; 325 TABLET ORAL at 02:04

## 2020-04-23 RX ADMIN — INSULIN LISPRO 6 UNITS: 100 INJECTION, SOLUTION INTRAVENOUS; SUBCUTANEOUS at 02:04

## 2020-04-23 RX ADMIN — CALCIUM ACETATE 667 MG: 667 CAPSULE ORAL at 08:57

## 2020-04-23 RX ADMIN — INSULIN LISPRO 6 UNITS: 100 INJECTION, SOLUTION INTRAVENOUS; SUBCUTANEOUS at 08:55

## 2020-04-23 RX ADMIN — HYDRALAZINE HYDROCHLORIDE 100 MG: 50 TABLET, FILM COATED ORAL at 08:57

## 2020-04-23 RX ADMIN — CARVEDILOL 6.25 MG: 3.12 TABLET, FILM COATED ORAL at 16:51

## 2020-04-23 RX ADMIN — HEPARIN SODIUM 5000 UNITS: 5000 INJECTION INTRAVENOUS; SUBCUTANEOUS at 02:04

## 2020-04-23 RX ADMIN — HYDROCODONE BITARTRATE AND ACETAMINOPHEN 1 TABLET: 5; 325 TABLET ORAL at 04:36

## 2020-04-23 RX ADMIN — Medication 1 TABLET: at 08:57

## 2020-04-23 RX ADMIN — HEPARIN SODIUM 5000 UNITS: 5000 INJECTION INTRAVENOUS; SUBCUTANEOUS at 05:31

## 2020-04-23 RX ADMIN — CARVEDILOL 6.25 MG: 3.12 TABLET, FILM COATED ORAL at 08:58

## 2020-04-23 RX ADMIN — INSULIN LISPRO 2 UNITS: 100 INJECTION, SOLUTION INTRAVENOUS; SUBCUTANEOUS at 12:13

## 2020-04-23 RX ADMIN — DULOXETINE HYDROCHLORIDE 60 MG: 60 CAPSULE, DELAYED RELEASE ORAL at 08:56

## 2020-04-23 RX ADMIN — FAMOTIDINE 20 MG: 20 TABLET ORAL at 08:56

## 2020-04-23 RX ADMIN — GABAPENTIN 300 MG: 300 CAPSULE ORAL at 08:56

## 2020-04-23 RX ADMIN — HYDRALAZINE HYDROCHLORIDE 50 MG: 50 TABLET, FILM COATED ORAL at 16:51

## 2020-04-23 RX ADMIN — NYSTATIN: 100000 POWDER TOPICAL at 09:04

## 2020-04-23 RX ADMIN — CALCIUM ACETATE 667 MG: 667 CAPSULE ORAL at 12:14

## 2020-04-23 RX ADMIN — CALCIUM ACETATE 667 MG: 667 CAPSULE ORAL at 16:51

## 2020-04-23 RX ADMIN — GABAPENTIN 300 MG: 300 CAPSULE ORAL at 16:51

## 2020-04-23 RX ADMIN — POTASSIUM CHLORIDE 20 MEQ: 1500 TABLET, EXTENDED RELEASE ORAL at 08:56

## 2020-04-23 RX ADMIN — HYDROCODONE BITARTRATE AND ACETAMINOPHEN 1 TABLET: 5; 325 TABLET ORAL at 16:51

## 2020-04-23 NOTE — PROGRESS NOTES
Problem: Pressure Injury - Risk of  Goal: *Prevention of pressure injury  Description: Document Elian Scale and appropriate interventions in the flowsheet. Outcome: Progressing Towards Goal  Note: Pressure Injury Interventions:       Moisture Interventions: Absorbent underpads, Minimize layers    Activity Interventions: Increase time out of bed, Pressure redistribution bed/mattress(bed type)    Mobility Interventions: HOB 30 degrees or less, Pressure redistribution bed/mattress (bed type), PT/OT evaluation    Nutrition Interventions: Document food/fluid/supplement intake    Friction and Shear Interventions: Minimize layers     No pressure injury at this time. Skin intact. Problem: Falls - Risk of  Goal: *Absence of Falls  Description: Document Vladab Duff Fall Risk and appropriate interventions in the flowsheet. Outcome: Progressing Towards Goal  Note: Fall Risk Interventions:  Mobility Interventions: Patient to call before getting OOB, Strengthening exercises (ROM-active/passive), Utilize walker, cane, or other assistive device         Medication Interventions: Patient to call before getting OOB, Teach patient to arise slowly    Elimination Interventions: Call light in reach, Patient to call for help with toileting needs    History of Falls Interventions: Consult care management for discharge planning, Investigate reason for fall    No falls at this time. Problem: Pain  Goal: *Control of Pain  Outcome: Progressing Towards Goal  No pain at this time.

## 2020-04-23 NOTE — PROGRESS NOTES
Problem: Mobility Impaired (Adult and Pediatric)  Goal: *Acute Goals and Plan of Care (Insert Text)  Description: Physical Therapy Goals   Initiated 4/11/2020 and to be accomplished within 7 day(s)  1. Patient will move from supine <> sit with S in prep for out of bed activity and change of position. 2.  Patient will perform sit<> stand with S/RW in prep for transfers/ambulation. 3.  Patient will ambulate 50 feet with S/RW for improved functional mobility/safe discharge. Physical Therapy Goals   Initiated 4/22/2020 and to be accomplished within 5-7 day(s)  1. Patient will move from supine <> sit with Mod I in prep for out of bed activity and change of position. 2.  Patient will perform sit<> stand with Mod I-S with LRAD in prep for transfers/ambulation. 3.  Patient will transfer from bed <> chair with Mod I- S with LRAD for time up in chair for completion of ADL activity. 4.  Patient will ambulate >100 feet with LRAD/S for improved functional mobility/safe discharge. 4/23/2020  PT treatment not completed due to:  [] Off Unit for testing/procedure  [] Pain  [] Eating  [] Patient too lethargic  [] Nausea/vomiting  [x] Dialysis treatment in progress   [x] Other: HD nurse reports treatment will be done at . Will f/u at later time as pt schedule allows.     Armani Villagomez, PT

## 2020-04-23 NOTE — PROGRESS NOTES
NUTRITION FOLLOW-UP    RECOMMENDATIONS/PLAN:   Encourage PO intakes >75%  Continue POC  Monitor labs/lytes, fluid status, bowel function, skin integrity.     NUTRITION ASSESSMENT:   Client Update: 53 yrs old Female with CHF, edema and SOB. Pt s/p TDC and first HD yesterday per MD note. Pt continued to eat well and meet estimated needs. Wt down slight, expected with fluid shifts. FOOD/NUTRITION INTAKE   Diet Order: CCD, low fat /low chol  Supplements: N/A  Food Allergies: strawberry  Average PO Intake:      Patient Vitals for the past 100 hrs:   % Diet Eaten   04/20/20 1830 80 %   04/20/20 1230 75 %   04/19/20 1754 75 %   04/19/20 1400 100 %   04/19/20 0951 100 %   04/19/20 0938 100 %      Pertinent Medications:  [x] Reviewed; phoslo, vitamin d3, pepcid, lispro, lantus, miralax, ferrous sulfate, pericolace, KCL  Electrolyte Replacement Protocol: []K []Mg []PO4  Insulin:  []SSI  []Pre-meal   []Basal    []Drip  []None  Cultural/Shinto Food Preferences: None Identified    BIOCHEMICAL DATA & MEDICAL TESTS  Pertinent Labs:  [x] Reviewed; [id number] ANTHROPOMETRICS  Height: 5' 2\" (157.5 cm)       Weight: 150.4 kg (331 lb 9.6 oz)         BMI: 60.6 kg/m^2 morbidly obese (Greater than or = to 40% BMI)   Adm Weight: 335 lbs                Weight change: -4lbs  Adjusted Body Weight: 75     NUTRITION-FOCUSED PHYSICAL ASSESSMENT  Skin: no pressure area per documentation   GI: BM 4/22    NUTRITION PRESCRIPTION  Calories: 1625 kcal/day based on 25kcal/kg IBW x 1.3  Protein: 75 g/day based on 1.5 g/kg IBW  CHO: 203 g/day based on 50% of total energy  Fluid: 1625 ml/day based on 1 kcal/ml         NUTRITION DIAGNOSES:   1. No nutrition dx at this time      INTERVENTIONS:                                                                                         GOALS:  1. Continue POC 1. Encourage PO intakes >75% throughout the next 7 days      LEARNING NEEDS (Diet, Supplementation, Food/Nutrient-Drug Interaction):   [x]?  None Identified  []? Inpatient education provided/documented    []? Identified and patient:  []? Declined     []? Was not appropriate/indicated  NUTRITION MONITORING /EVALUATION:   Follow PO intake  Monitor wt  Monitor renal labs, electrolytes, fluid status  Monitor for additional supplement needs     []? Participated in Interdisciplinary Rounds  [x]? Interdisciplinary Care Plan Reviewed/Documented  DISCHARGE NUTRITION RECOMMENDATIONS ADDRESSED:                              [x]? To be determined closer to discharge       NUTRITION RISK:           [] At risk                        [x] Not currently at risk        Will follow-up per policy.   Doyle Franks 9

## 2020-04-23 NOTE — DIABETES MGMT
GLYCEMIC CONTROL PROGRESS NOTE:    - known h/o T2DM with four admissions to THE [facility] this year, HbA1C trending down, TID mixed insulin home regimen  - BG out of target range non-ICU: < 180 mg/dl  - TDD = 50 (20 Lantus + 18 (6) + 17 Humalog Very Insulin Resistant Corrective Coverage)  - BG trending down, pt responding well to current IP regimen  - recommend - Humalog Normal Insulin Sensitivity Corrective Coverage (orders entered per protocol)     Recent Glucose Results:   Lab Results   Component Value Date/Time     (H) 04/23/2020 02:55 AM    GLUCPOC 185 (H) 04/23/2020 06:22 AM    GLUCPOC 182 (H) 04/22/2020 09:21 PM    GLUCPOC 138 (H) 04/22/2020 04:39 PM         Kamron Abarca MS, RN, CDE  Glycemic Control Team  [phone]  Pager [phone] (M-TH 8:00-4:30P)  *After Hours pager [phone]

## 2020-04-23 NOTE — PROGRESS NOTES
Problem: Pressure Injury - Risk of  Goal: *Prevention of pressure injury  Description: Document Elian Scale and appropriate interventions in the flowsheet. Outcome: Progressing Towards Goal  Note: Pressure Injury Interventions:       Moisture Interventions: Absorbent underpads, Check for incontinence Q2 hours and as needed, Minimize layers    Activity Interventions: Pressure redistribution bed/mattress(bed type)    Mobility Interventions: Pressure redistribution bed/mattress (bed type)    Nutrition Interventions: Document food/fluid/supplement intake    Friction and Shear Interventions: Apply protective barrier, creams and emollients, Minimize layers                Problem: Patient Education: Go to Patient Education Activity  Goal: Patient/Family Education  Outcome: Progressing Towards Goal     Problem: Diabetes Self-Management  Goal: *Disease process and treatment process  Description: Define diabetes and identify own type of diabetes; list 3 options for treating diabetes. Outcome: Progressing Towards Goal  Goal: *Incorporating nutritional management into lifestyle  Description: Describe effect of type, amount and timing of food on blood glucose; list 3 methods for planning meals. Outcome: Progressing Towards Goal  Goal: *Incorporating physical activity into lifestyle  Description: State effect of exercise on blood glucose levels. Outcome: Progressing Towards Goal  Goal: *Developing strategies to promote health/change behavior  Description: Define the ABC's of diabetes; identify appropriate screenings, schedule and personal plan for screenings. Outcome: Progressing Towards Goal  Goal: *Using medications safely  Description: State effect of diabetes medications on diabetes; name diabetes medication taking, action and side effects.   Outcome: Progressing Towards Goal  Goal: *Monitoring blood glucose, interpreting and using results  Description: Identify recommended blood glucose targets  and personal targets. Outcome: Progressing Towards Goal  Goal: *Prevention, detection, treatment of acute complications  Description: List symptoms of hyper- and hypoglycemia; describe how to treat low blood sugar and actions for lowering  high blood glucose level. Outcome: Progressing Towards Goal  Goal: *Prevention, detection and treatment of chronic complications  Description: Define the natural course of diabetes and describe the relationship of blood glucose levels to long term complications of diabetes. Outcome: Progressing Towards Goal  Goal: *Developing strategies to address psychosocial issues  Description: Describe feelings about living with diabetes; identify support needed and support network  Outcome: Progressing Towards Goal  Goal: *Insulin pump training  Outcome: Progressing Towards Goal  Goal: *Sick day guidelines  Outcome: Progressing Towards Goal  Goal: *Patient Specific Goal (EDIT GOAL, INSERT TEXT)  Outcome: Progressing Towards Goal     Problem: Patient Education: Go to Patient Education Activity  Goal: Patient/Family Education  Outcome: Progressing Towards Goal     Problem: Falls - Risk of  Goal: *Absence of Falls  Description: Document Mayito Fall Risk and appropriate interventions in the flowsheet.   Outcome: Progressing Towards Goal  Note: Fall Risk Interventions:  Mobility Interventions: Assess mobility with egress test, Patient to call before getting OOB, Utilize walker, cane, or other assistive device, Communicate number of staff needed for ambulation/transfer         Medication Interventions: Patient to call before getting OOB, Teach patient to arise slowly    Elimination Interventions: Call light in reach, Patient to call for help with toileting needs, Toileting schedule/hourly rounds    History of Falls Interventions: Vital signs minimum Q4HRs X 24 hrs (comment for end date)         Problem: Patient Education: Go to Patient Education Activity  Goal: Patient/Family Education  Outcome: Progressing Towards Goal     Problem: Patient Education: Go to Patient Education Activity  Goal: Patient/Family Education  Outcome: Progressing Towards Goal     Problem: Patient Education: Go to Patient Education Activity  Goal: Patient/Family Education  Outcome: Progressing Towards Goal     Problem: Heart Failure: Discharge Outcomes  Goal: *Demonstrates ability to perform prescribed activity without shortness of breath or discomfort  Outcome: Progressing Towards Goal  Goal: *Left ventricular function assessment completed prior to or during stay, or planned for post-discharge  Outcome: Progressing Towards Goal  Goal: *ACEI prescribed if LVEF less than 40% and no contraindications or ARB prescribed  Outcome: Progressing Towards Goal  Goal: *Verbalizes understanding and describes prescribed diet  Outcome: Progressing Towards Goal  Goal: *Verbalizes understanding/describes prescribed medications  Outcome: Progressing Towards Goal  Goal: *Describes available resources and support systems  Description: (eg: Home Health, Palliative Care, Advanced Medical Directive)  Outcome: Progressing Towards Goal  Goal: *Describes smoking cessation resources  Outcome: Progressing Towards Goal  Goal: *Understands and describes signs and symptoms to report to providers(Stroke Metric)  Outcome: Progressing Towards Goal  Goal: *Describes/verbalizes understanding of follow-up/return appt  Description: (eg: to physicians, diabetes treatment coordinator, and other resources  Outcome: Progressing Towards Goal  Goal: *Describes importance of continuing daily weights and changes to report to physician  Outcome: Progressing Towards Goal     Problem: Fluid Volume - Risk of, Imbalanced  Goal: *Balanced intake and output  Outcome: Progressing Towards Goal     Problem: Patient Education: Go to Patient Education Activity  Goal: Patient/Family Education  Outcome: Progressing Towards Goal     Problem: Pain  Goal: *Control of Pain  Outcome: Progressing Towards Goal     Problem: Patient Education: Go to Patient Education Activity  Goal: Patient/Family Education  Outcome: Progressing Towards Goal     Problem: Chronic Renal Failure  Goal: *Fluid and electrolytes stabilized  Outcome: Progressing Towards Goal     Problem: Patient Education: Go to Patient Education Activity  Goal: Patient/Family Education  Outcome: Progressing Towards Goal

## 2020-04-23 NOTE — PROGRESS NOTES
Telephone call with Mark cabrera spoke with Martha. Martha is aware and has the documentation and working on getting patient a chair time at the HD center when she is ready for d/c.   [facility] has decline dpatient and patient does not want to go to other acute rehabs. Cm has discussed LTC. Patient is not interested in going not going to LTC,   Patient continues insisting she wants to go ome. Cm explained to her that cm is working with the dialysis center to get a chair time and with hospitalist.  Patient states she wants to go home. She wants to go to her dialysis. She wants to use personal touch referral has been sent. She has home oxygen and nebulizer. She denies other needs. Cm will continue to follow  Care Management Interventions  PCP Verified by CM: Yes  Mode of Transport at Discharge:  Other (see comment)(family)  Transition of Care Consult (CM Consult): Discharge Planning, [address]: No  Reason Outside [place]: Patient already serviced by other home care/hospice agency  Physical Therapy Consult: Yes  Current Support Network: Lives Alone, Family Lives Nearby  Confirm Follow Up Transport: Family  The Plan for Transition of Care is Related to the Following Treatment Goals : home with [facility]  The Patient and/or Patient Representative was Provided with a Choice of Provider and Agrees with the Discharge Plan?: Yes  Name of the Patient Representative Who was Provided with a Choice of Provider and Agrees with the Discharge Plan: PATIENT  Freedom of Choice List was Provided with Basic Dialogue that Supports the Patient's Individualized Plan of Care/Goals, Treatment Preferences and Shares the Quality Data Associated with the Providers?: Yes  Discharge Location  Discharge Placement: Home with home health

## 2020-04-23 NOTE — PROGRESS NOTES
Hospitalist Progress Note    Patient: Rene Rothman MRN: [id number]  CSN: [id number]    YOB: 1965  Age: 53 y.o.   Sex: female    DOA: 4/10/2020 LOS:  LOS: 13 days          Chief Complaint:    SOB      Assessment/Plan     52 yo morbidly obese female on 02 at home with renal disease and diabetes, diastolic CHF came with edema and SOB  Failed treatment woth diuretics, lasix gtt, and continued aggressive mgmt, thus needed to be placed on dialysis for ultrafiltration and volume removal    CKD stage 4 progressed to ESRD now dependent on dialysis    Has successfully undergone 2 sessions with net volume removal at least 3 liters each session    Session 3/4 in a row today    Await outpatient dialysis chair    hosp day #14     Acute on chronic renal failure -  S/P TDC and first HD 4/21    Arrangement for out pt HD unit in progress      Acute on chronic decompensated diastolic CHF-  Symptomatic improvement with the start of hemodialysis      IDDM type 2 with renal disease and hyperglycemia uncontrolled-  lantus plus premeal insulin      Hypokalemia-improved     HTN-stable control on current regimen     Pulmonary HTN     АНДРЕЙ-CPAP at night, daily 02 for chronic resp failure      Super Morbid obesity/OHS     Anemia of CKD-treating with iron, no bleeding    Discharge planning for outpatient dialysis and home with home health 24-48 hrs     Disposition :  Patient Active Problem List   Diagnosis Code    HTN (hypertension) I10    Constipation K59.00    Type II or unspecified type diabetes mellitus with renal manifestations, uncontrolled(250.42) ([facility]) E11.29, E11.65    Morbid obesity ([facility] [address] E66.01    Respiratory distress R06.03    АНДРЕЙ (obstructive sleep apnea) G47.33    Acute kidney injury superimposed on CKD ([facility] [address] N17.9, G17.4    Diastolic CHF, acute on chronic ([facility]) I50.33    CHF (congestive heart failure) ([facility]) I50.9    CKD (chronic kidney disease) stage 4, GFR 15-29 ml/min ([facility]) N18.4    Elevated troponin R79.89       Subjective:    Denies new issue  Feels fluid is going down  Not SOB  No cough    Review of systems:    Constitutional: denies fevers, chills  Respiratory: denies SOB, cough  Cardiovascular: denies chest pain  Gastrointestinal: denies nausea      Vital signs/Intake and Output:  Visit Vitals  /58   Pulse 75   Temp 97.9 °F (36.6 °C)   Resp 18   Ht 5' 2\" (1.575 m)   Wt 150.4 kg (331 lb 9.6 oz)   SpO2 100%   BMI 60.65 kg/m²     Current Shift:  No intake/output data recorded. Last three shifts:  04/21 1901 - 04/23 0700  In: -   Out: 1729 [Urine:1125]    Exam:    General: Well developed, alert, NAD, OX3  Facial edema down alot  CVS:Regular rate and rhythm, no M/R/G, S1/S2 heard, no thrill  Lungs:Clear to auscultation bilaterally, no wheezes, rhonchi, or rales  Abdomen: Soft, Nontender, No distention, Normal Bowel sounds, No hepatomegaly  Extremities:2-3 plus LE edema  Skin:normal texture and turgor, no rashes, no lesions  Neuro:grossly normal , follows commands  Psych:appropriate                Labs: Results:       Chemistry Recent Labs     04/23/20  0255 04/22/20  0310 04/21/20  0345   * 181* 222*    139 138   K 5.4 5.4 5.3    103 97*   CO2 30 30 34*   BUN 50* 74* 127*   CREA 2.18* 2.81* 4.35*   CA 8.9 8.8 9.1   AGAP 4 6 7   BUCR 23* 26* 29*      CBC w/Diff No results for input(s): WBC, RBC, HGB, HCT, PLT, GRANS, LYMPH, EOS, HGBEXT, HCTEXT, PLTEXT in the last 72 hours. Cardiac Enzymes No results for input(s): CPK, CKND1, MARIA ISABEL in the last 72 hours. No lab exists for component: CKRMB, TROIP   Coagulation No results for input(s): PTP, INR, APTT, INREXT in the last 72 hours.     Lipid Panel Lab Results   Component Value Date/Time    Cholesterol, total 103 04/14/2020 02:35 AM    HDL Cholesterol 46 04/14/2020 02:35 AM    LDL, calculated 36.2 04/14/2020 02:35 AM    VLDL, calculated 20.8 04/14/2020 02:35 AM    Triglyceride 104 04/14/2020 02:35 AM    CHOL/HDL Ratio 2.2 04/14/2020 02:35 AM BNP No results for input(s): BNPP in the last 72 hours. Liver Enzymes No results for input(s): TP, ALB, TBIL, AP, SGOT, GPT in the last 72 hours.     No lab exists for component: DBIL   Thyroid Studies Lab Results   Component Value Date/Time    TSH 2.18 04/14/2020 02:35 AM        Procedures/imaging: see electronic medical records for all procedures/Xrays and details which were not copied into this note but were reviewed prior to creation of Roddy Ramirez MD

## 2020-04-23 NOTE — PROGRESS NOTES
Problem: Chronic Renal Failure  Goal: *Fluid and electrolytes stabilized  Outcome: Progressing Towards Goal     Problem: Patient Education: Go to Patient Education Activity  Goal: Patient/Family Education  Outcome: Progressing Towards Goal     PROBLEM: Hemodialysis Adult    INTERVENTION: Hemodynamic stabilization  Maintain BP WNL while on HD    INTERVENTION: Fluid Management  Will attempt 3000 ml fluid removal     INTERVENTION: Metabolic / Electrolyte Imbalance Management  3.0 K+ dialysate used ;     INTERVENTION: Hemodialysis Access Site Management  Right TDC  accessed using aseptic technique    GOAL: Signs and symptoms of listed potential problems will be absent or manageable    OUTCOME: Progressing    HD planned for 3  hours today as ordered by Dr. Lili Meyer

## 2020-04-23 NOTE — PROGRESS NOTES
Will order vein mapping of the upper extremities in preparation for eventual upper extremity arterial venous access for hemodialysis.

## 2020-04-23 NOTE — PROGRESS NOTES
Day 13.  4/23/2020: Assessed patient for worsening/recurrent symptoms or chest pain. Pt denies all. No further complaints. Reiterated to patient benefits of HF support group and mobility.     Lissy Reyes, CNS  Heart Failure Coordinator  [phone]

## 2020-04-23 NOTE — PROGRESS NOTES
Pt placed on hospital-issued resmed cpap in auto-titrating mode for HS at this time. O2 bled-in at 5 lpm. Pt tolerates well. No distress noted. Will continue to monitor.

## 2020-04-23 NOTE — PROGRESS NOTES
Nephrology Progress note    Subjective: Alejandra Anderson a 53 y. o. female with a past medical history significant for DM type 2, HTN, TIA, CKD IV, HLD, АНДРЕЙ, Obesity, Chronic Anemia, Diastolic CHF who presented with c/o SOB. Her CXR showed Pulmonary edema and small bilateral effusions consistent with CHF. S Cr was 2.6 on admission and remains unchanged today.    Patient is followed at our office by Dr Stacey Nix, most recent S Cr 2.6 on 2/26/20. She was last seen on 3/4/20 and maintained on diuretics, oral Furosemide + Metolazone. Since admission she has been placed on intermittent  IV Lasix. Switched to lasix gtt and po metolazone. Pt had attended [facility] prior to admission, scheduled for second class next week. -S/P TDC and first HD yesterday (4/21) and today for third tx.  No SOB        Admit Date: 4/10/2020  Principal Problem:    Diastolic CHF, acute on chronic ([facility] [address] (2/16/2020)    Active Problems:    HTN (hypertension) (8/16/2014)      Constipation (8/17/2014)      Type II or unspecified type diabetes mellitus with renal manifestations, uncontrolled(250.42) ([facility] [address] (9/4/2014)      Morbid obesity ([facility]) ()      Respiratory distress (1/17/2019)      Acute kidney injury superimposed on CKD ([facility] [address] (2/13/2020)      CHF (congestive heart failure) ([facility]) (4/10/2020)      CKD (chronic kidney disease) stage 4, GFR 15-29 ml/min ([facility]) (4/10/2020)      Elevated troponin (4/10/2020)      Current Facility-Administered Medications   Medication Dose Route Frequency    hydrALAZINE (APRESOLINE) tablet 50 mg  50 mg Oral TID    heparin (porcine) 1,000 unit/mL injection 3,200 Units  3,200 Units InterCATHeter DIALYSIS PRN    0.9% sodium chloride infusion  100 mL/hr IntraVENous DIALYSIS PRN    polyethylene glycol (MIRALAX) packet 17 g  17 g Oral DAILY    epoetin erickson-epbx (RETACRIT) injection 20,000 Units  20,000 Units SubCUTAneous Q7D    insulin lispro (HUMALOG) injection 6 Units  6 Units SubCUTAneous TIDAC    insulin glargine (LANTUS) injection 20 Units  20 Units SubCUTAneous QHS    famotidine (PEPCID) tablet 20 mg  20 mg Oral DAILY    bisacodyL (DULCOLAX) suppository 10 mg  10 mg Rectal DAILY PRN    ferrous sulfate tablet 325 mg  1 Tab Oral DAILY WITH BREAKFAST    nystatin (MYCOSTATIN) 100,000 unit/gram powder   Topical BID    albuterol-ipratropium (DUO-NEB) 2.5 MG-0.5 MG/3 ML  3 mL Nebulization Q4H PRN    atorvastatin (LIPITOR) tablet 40 mg  40 mg Oral DAILY    calcium acetate(phosphat bind) (PHOSLO) capsule 667 mg  1 Cap Oral TID WITH MEALS    DULoxetine (CYMBALTA) capsule 60 mg  60 mg Oral DAILY    gabapentin (NEURONTIN) capsule 300 mg  300 mg Oral TID    insulin lispro (HUMALOG) injection   SubCUTAneous AC&HS    glucose chewable tablet 16 g  4 Tab Oral PRN    glucagon (GLUCAGEN) injection 1 mg  1 mg IntraMUSCular PRN    heparin (porcine) injection 5,000 Units  5,000 Units SubCUTAneous Q8H    acetaminophen (TYLENOL) tablet 650 mg  650 mg Oral Q6H PRN    ondansetron (ZOFRAN) injection 4 mg  4 mg IntraVENous Q6H PRN    amLODIPine (NORVASC) tablet 5 mg  5 mg Oral DAILY    aspirin delayed-release tablet 81 mg  81 mg Oral DAILY    carvediloL (COREG) tablet 6.25 mg  6.25 mg Oral BID WITH MEALS    cholecalciferol (VITAMIN D3) (1000 Units /25 mcg) tablet 1 Tab  1,000 Units Oral DAILY    HYDROcodone-acetaminophen (NORCO) 5-325 mg per tablet 1 Tab  1 Tab Oral Q4H PRN       Allergy:   Allergies   Allergen Reactions    Bees [Sting, Bee] Swelling    Penicillins Hives    Strawberry Hives        Objective:     Visit Vitals  /64   Pulse 74   Temp 97.4 °F (36.3 °C) (Oral)   Resp 18   Ht 5' 2\" (1.575 m)   Wt 150.4 kg (331 lb 9.6 oz)   SpO2 100%   BMI 60.65 kg/m²         Intake/Output Summary (Last 24 hours) at 4/23/2020 1255  Last data filed at 4/23/2020 1232  Gross per 24 hour   Intake 240 ml   Output 3550 ml   Net -3310 ml       Physical Exam:     General: Obese, NAD   HENT: Atraumatic and normocephalic   Eyes: Normal conjunctiva   Neck: Supple. No JVD   Cardiovascular: Normal S1 & S2, no m/r/g   Pulmonary/Chest Wall: Decreased BS both bases   Abdominal: Soft and non-tender   Musculoskeletal: +++ Bilat pedal  Edema and 2+ Bilat. uppper ext edema   Neurological: AAOx4,     HD access: Rt IJ TDC      Data Review:  Lab Results   Component Value Date/Time    Sodium 141 04/23/2020 02:55 AM    Potassium 5.4 04/23/2020 02:55 AM    Chloride 107 04/23/2020 02:55 AM    CO2 30 04/23/2020 02:55 AM    Anion gap 4 04/23/2020 02:55 AM    Glucose 181 (H) 04/23/2020 02:55 AM    BUN 50 (H) 04/23/2020 02:55 AM    Creatinine 2.18 (H) 04/23/2020 02:55 AM    BUN/Creatinine ratio 23 (H) 04/23/2020 02:55 AM    GFR est AA 28 (L) 04/23/2020 02:55 AM    GFR est non-AA 23 (L) 04/23/2020 02:55 AM    Calcium 8.9 04/23/2020 02:55 AM     Lab Results   Component Value Date/Time    WBC 5.4 04/16/2020 04:05 AM    HGB 7.2 (L) 04/16/2020 04:05 AM    HCT 23.0 (L) 04/16/2020 04:05 AM    PLATELET 067 44/24/4087 04:05 AM    MCV 81.9 04/16/2020 04:05 AM     Lab Results   Component Value Date/Time    Calcium 8.9 04/23/2020 02:55 AM    Phosphorus 4.7 04/14/2020 02:35 AM     Lab Results   Component Value Date/Time    Iron 32 (L) 04/14/2020 04:00 AM    TIBC 303 04/14/2020 04:00 AM    Iron % saturation 11 (L) 04/14/2020 04:00 AM    Ferritin 418 (H) 04/14/2020 02:35 AM     Lab Results   Component Value Date/Time    Ferritin 418 (H) 04/14/2020 02:35 AM         Impression:   -Acute on CKD likely ATN sec to diuresis, now with worsening kidney function and Anasarca. Started on HD 4/22  -CKD stage IV at baseline sec to diabetic Nephropathy/Hypertensive Nephrosclerosis. -Anasarca sec to worsening CKD and CHF  -Anemia of CKD + Iron deficiency.  S/p IV iron  -Diastolic CMP LVEF 60 to 74%  -Pulm HTN, Mild to moderate  -DM type 2  -HTN  -Metabolic alkalosis: mild contraction alkalosis sec to diuretic  -Morbid Obesity        Plan:   -Will continue daily HD for more UF for a total of 4 days  -Avoid Nephrotoxins  -Continue JUAN C weekly   -Continue Renal diet  -Arrangement for out pt HD unit in progress         MD Juan Llanos  [phone]

## 2020-04-23 NOTE — PROGRESS NOTES
1915  Assumed care of pt   2020  Shift assessment complete  2340  Reassessment complete   0330  Reassessment complete     Shift uneventful     Bedside and Verbal shift change report given to Caesar Delatorre (oncoming nurse) by Chuckie Carpenter (offgoing nurse).  Report included the following information SBAR, Kardex, ED Summary, Procedure Summary, Intake/Output, MAR, Recent Results, Med Rec Status and Cardiac Rhythm SR.

## 2020-04-23 NOTE — PROGRESS NOTES
HEMODIALYSIS TODAY FOR 3 HOURS. PRIOR TO START OF TREATMENT TIME OUT COMPLETED AND CONSENT VERIFIED. PATIENT TOLERATED TREATMENT WELL. VSS. NAD. NO COMPLAINTS DURING TREATMENT. REMOVED 3000 ML. NO MEDICATION GIVEN DURING TREATMENT. ALL POSSIBLE BLOOD RETURNED TO PATIENT. CATHETER HEP LOCKED WITH ASEPTIC TECHNIQUE . PATIENT VSS AND IN NAD UPON WRITER LEAVING BEDSIDE.      [facility]:              [facility]                                   DR. Raquel Wilson    []1st Time Acute  []Stat[] Routine []Urgent []Chronic Unit   []Acute Room [x]Bedside  []ICU/CCU []ER   Isolation Precautions: [x]Dialysis[] Airborne []Contact []Droplet []Reverse    Special Considerations:_______  [x] Blood Consent Verified  []N/A   Allergies:[] NKA                 [x] ____  Bees [Sting, Bee] Swelling High Systemic 5/2/2012    Penicillins          _________ Code Status [x]Full Code [] DNR  [] Other_____   Diet: [] Renal [] NPO [x] Diabetic   [] Enteral Feeding Diabetic: [x]Yes []No     [x]Signed Treatment Consent Verified   [x] Time Out/ Safety Check   PRIMARY NURSE REPORT: FIRST INITIAL/ LAST NAME/TITLE  PRE DIALYSIS:    Marlene Carrillo                                       TIME:1000   ACCESS   CATHETER ACCESS: [] N/A  [x] RIGHT  [] LEFT  [x] IJ  [] SUBCL [] FEM                    [] First use X-ray  [x] Tunnel     [] Non-Tunneled      [x] No S/S infection  [x] Redness [x] Drainage  [] Cultured [] Swelling [] Pain                    [x] Medical Aseptic [x] Prep Dressing Changed                  [] Clotted [] Patent []      Flows: [x] Good [] Poor [] Reversed                 If Access Problem Dr. Vannessa Fuentes: [] Yes [] No    Date:_____  [] N/A[x]   GRAFT/FISTULA ACCESS:  [] N/A  [] RIGHT  [] LEFT  [] UE   [] LE       [] AVG  [] AVF [] BUTTONHOLE    [] +BRUIT/THRILL [] MEDICAL ASEPTIC PREP     [] No S/S infection  [] Redness [] Drainage  [] Cultured [] Swelling [] Pain If Access Problem Dr. Chavez Friends: [] Yes [] No    Date:______ [] N/A   GENERAL ASSESSMENT   LUNGS:  SaO2% __100__ [] Clear [] Coarse [] Crackles [] Wheezing                                         [x] Diminished   Location: [x] RLL [x] LLL [x] RUL [x] RML [x] THALIA    COUGH:  [] Productive [x] Dry [] N/A  RESPIRATIONS: [x] Easy [] Labored    THERAPY: [] RA   [x] NC __5___. L/min    Mask: [] NRB [] Venti  _____O2%                  [] Ventilator [] Intubated [] Trach [] BiPap [] CPap [] HI Flow   CARDIAC: [x] Regular [] Irregular [] Pericardial Rub [] JVD               Monitored Rhythm:______ [] N/A   EDEMA: [] None [] Generalized [] Facial [] Pedal [] UE [x] LE             [] Pitting [] 1 [] 2 [x] 3 [] 4    [] Right [] Left [] Bilateral   SKIN:    [x] Warm [] Hot [] Cold  [x] Dry [] Pale [] Diaphoretic              [] Flushed [] Jaundiced [] Cyanotic [] Rash [] Weeping     LOC:    [x] Alert  Oriented to: [x] Person [x] Place [x] Time             [] Confused [] Lethargic [] Medicated [] Non-responsive    GI/ABDOMEN: [] Flat [x] Distended [x] Soft [] Firm [] Diarrhea [] Bowel Sounds Present [] Nausea [] Vomiting    PAIN: [x] 0 [] 1 [] 2 [] 3 [] 4 [] 5 [] 6 [] 7 [] 8 [] 9 [] 10          Scale 1-10 Action/Follow Up_____   MOBILITY: [] Amb [] Amb/Assist [x] Bed  [] Wheelchair    CURRENT LABS   HBsAg ONLY: Date Drawn:      04/21/20       [x] Negative [] Positive [] Unknown.      HBsAb: Date Drawn:    04/21/20          [x] Susceptible <10 [] Immune ?10 [] Unknown   Date of Current Labs:  04/23/20     EDUCATION   Person Educated: [x] Patient [] Other_________   Knowledge base: [] None [x] Minimal [] Substantial    Barriers to learning  [x] None _______________   Preferred method of learning: [] Written [x] Oral [] Visual [] Hands on    Topic: [x] Access Care [] S&S of infection [x] Fluid Management   [] K+  [] Procedural  [] Albumin [] Medications [] Tx Options   [] Transplant [] Diet [] Other    Teaching Tools: [x] Explain [] Demonstration [] Handout_____ [] Video______   RO/HEMODIAYLSIS MACHINE SAFETY CHECKS- BEFORE EACH TREATMENT          [x] [facility]: Machine Serial #1:  [id number]   RO Serial #[id number]                       [] [facility]: Machine Serial #2:  [id number]   RO Serial #[id number]    Alarm Test: [x] Pass  Time___1214_____  [x] RO/Machine Log Complete    [x] Extracorporeal circuit Tested for integrity           Dialyzer_[id number]_________   Tubing___19K19-9_________    Dialysate: pH ___7__  Temp.____36__ Conductivity: Meter __14.2___ HD Machine_14.2____   CHLORINE TESTING- BEFORE EACH TREATMENT AND EVERY 4 HOURS   Total Chlorine: [x] Less than 0.1 ppm Time:_1205____2nd Check Time:______  (If greater than 0.1 ppm from Primary then every 30 minutes from Secondary)   TREATMENT INIATION-WITH DIALYSIS PRECAUTIONS   [x] All Connections Secured   [x] Saline Line Double Clamped    [x] Venous Parameters Set [x] Arterial Parameters Set    [x] Prime Given 250 ml     [x] Air Foam Detector Engaged   PRE-TREATMENT   UF Calculations: Wt to lose:__3000__ml(+) Oral:_0_ml(+)IV Meds/Fluids/Blood prods____ml(+) Prime/Rinse__500__ml(=)Total UF Goal___3500_mL   Scale Type:[x] Bed scale [] Sling Scale [] Wheel Chair Scale []  Not Ordered [] [] Unable to obtain pt on stretcher/ bed scale malfunctioning  _____[] kg [] lbs   Tx Initiation Note: Tx started w/o difficulty. Both lumens aspirated and flushed w/o difficulty. Soiled dressing changed. Time out complete. Pt no c/o.         [x] Time Out/Safety Check  Time:__1220___   INTRADIALYTIC MONITORING  (SEE ATTACHED FLOWSHEET)     POST TREATMENT    Time Medication Dose Volume Route    Initials                                           [facility] Signatures Title Initials Time   Chyrl Salon RN SL 1230               Dialyzer cleared: [x] Good [] Fair [] Poor     Blood Volume Processed __60.3__L   Net UF Removed _3000___mL  Post Tx Access:                  AVF/AVG: Bleeding Stop Time      Art.___min Josef.____min []+bruit/thrill                  Catheter: Locking Solution  [x] Heparin 1 ml/1000 units                                                    [] Normal Saline                                                                        [] New caps applied                                               Art.__1.6___ ml Josef.__1.6___ml  Post Assessment:              Skin: [x]Warm [x]Dry []Diaphoretic []Flushed [] Pale []Cyanotic            Lungs: []Clear []Coarse []Crackles []Wheezing             Cardiac: [x]Regular []Irregular  []Monitored rhythm____ []N/A            Edema: []None []General []Facial []Pedal  []UE [x]LE [x]RIGHT [x]LEFT            Pain: [x]0 []1 []2 []3 []4 []5 []6 []7 []8 []9 []10    Primary Nurse Report: First initial/Last name/Title    Post Dialysis:____Priyanka Carrillo ______________________         Time:_____1630___________    Abbreviations: AVG-arterial venous graft, AVF-arterial venous fistula, IJ-Internal Jugular,  Subcl-Subclavian, Fem-Femoral, Tx-treatment, AP/HR-apical heart rate, DFR-dialysate flow rate, BFR-blood flow rate, AP-arterial pressure, -venous pressure, UF-ultrafiltrate, TMP-transmembrane pressure, Josef-Venous, Art-Arterial, RO-Reverse Osmosis

## 2020-04-23 NOTE — PROGRESS NOTES
0700 Assumed patient care from Nishant Coello RN. Whiteboard updated, bed wheels locked, bed in lowest position, and call bell within reach. 0800 Assessment complete. Patient resting comfortably in bed. No complaint of pain or SOB at this time. Call bell within reach. 1200 Reassessment complete. Patient resting comfortably in bed. No complaint of pain or SOB at this time. Call bell within reach. 1600 Reassessment complete. Patient resting comfortably in bed. No complaint of SOB at this time. Complaint of neck pain 8 out of 10. Gave PRN [place]. Call bell within reach. Dialysis complete. Patient tolerated well. Vital signs are stable. 3L removed.

## 2020-04-24 VITALS
SYSTOLIC BLOOD PRESSURE: 152 MMHG | TEMPERATURE: 98 F | BODY MASS INDEX: 53.92 KG/M2 | HEART RATE: 84 BPM | DIASTOLIC BLOOD PRESSURE: 61 MMHG | HEIGHT: 62 IN | WEIGHT: 293 LBS | RESPIRATION RATE: 18 BRPM | OXYGEN SATURATION: 99 %

## 2020-04-24 LAB
GLUCOSE BLD STRIP.AUTO-MCNC: 111 MG/DL (ref 70–110)
GLUCOSE BLD STRIP.AUTO-MCNC: 203 MG/DL (ref 70–110)
GLUCOSE BLD STRIP.AUTO-MCNC: 229 MG/DL (ref 70–110)
MAGNESIUM SERPL-MCNC: 2.2 MG/DL (ref 1.6–2.6)

## 2020-04-24 PROCEDURE — 74011250637 HC RX REV CODE- 250/637: Performed by: FAMILY MEDICINE

## 2020-04-24 PROCEDURE — 82962 GLUCOSE BLOOD TEST: CPT

## 2020-04-24 PROCEDURE — 83735 ASSAY OF MAGNESIUM: CPT

## 2020-04-24 PROCEDURE — 74011636637 HC RX REV CODE- 636/637: Performed by: HOSPITALIST

## 2020-04-24 PROCEDURE — 97116 GAIT TRAINING THERAPY: CPT

## 2020-04-24 PROCEDURE — 90935 HEMODIALYSIS ONE EVALUATION: CPT

## 2020-04-24 PROCEDURE — 74011250637 HC RX REV CODE- 250/637: Performed by: HOSPITALIST

## 2020-04-24 PROCEDURE — 36415 COLL VENOUS BLD VENIPUNCTURE: CPT

## 2020-04-24 PROCEDURE — 74011250636 HC RX REV CODE- 250/636: Performed by: HOSPITALIST

## 2020-04-24 PROCEDURE — 77010033678 HC OXYGEN DAILY

## 2020-04-24 RX ORDER — HYDRALAZINE HYDROCHLORIDE 50 MG/1
50 TABLET, FILM COATED ORAL 3 TIMES DAILY
Qty: 90 TAB | Refills: 1 | Status: SHIPPED | OUTPATIENT
Start: 2020-04-24

## 2020-04-24 RX ORDER — INSULIN LISPRO 100 [IU]/ML
6 INJECTION, SOLUTION INTRAVENOUS; SUBCUTANEOUS
Qty: 1 VIAL | Refills: 0 | Status: SHIPPED | OUTPATIENT
Start: 2020-04-24

## 2020-04-24 RX ORDER — INSULIN GLARGINE 100 [IU]/ML
20 INJECTION, SOLUTION SUBCUTANEOUS DAILY
Qty: 1 VIAL | Refills: 0 | Status: SHIPPED | OUTPATIENT
Start: 2020-04-24

## 2020-04-24 RX ORDER — GABAPENTIN 300 MG/1
300 CAPSULE ORAL 3 TIMES DAILY
Qty: 90 CAP | Refills: 1 | Status: SHIPPED | OUTPATIENT
Start: 2020-04-24

## 2020-04-24 RX ORDER — LANOLIN ALCOHOL/MO/W.PET/CERES
325 CREAM (GRAM) TOPICAL
Qty: 30 TAB | Refills: 2 | Status: SHIPPED | OUTPATIENT
Start: 2020-04-25

## 2020-04-24 RX ADMIN — CALCIUM ACETATE 667 MG: 667 CAPSULE ORAL at 09:01

## 2020-04-24 RX ADMIN — Medication 1 TABLET: at 09:09

## 2020-04-24 RX ADMIN — ATORVASTATIN CALCIUM 40 MG: 20 TABLET, FILM COATED ORAL at 09:01

## 2020-04-24 RX ADMIN — INSULIN LISPRO 4 UNITS: 100 INJECTION, SOLUTION INTRAVENOUS; SUBCUTANEOUS at 08:56

## 2020-04-24 RX ADMIN — DULOXETINE HYDROCHLORIDE 60 MG: 60 CAPSULE, DELAYED RELEASE ORAL at 09:02

## 2020-04-24 RX ADMIN — HYDROCODONE BITARTRATE AND ACETAMINOPHEN 1 TABLET: 5; 325 TABLET ORAL at 09:02

## 2020-04-24 RX ADMIN — FAMOTIDINE 20 MG: 20 TABLET ORAL at 09:02

## 2020-04-24 RX ADMIN — INSULIN LISPRO 6 UNITS: 100 INJECTION, SOLUTION INTRAVENOUS; SUBCUTANEOUS at 10:13

## 2020-04-24 RX ADMIN — HEPARIN SODIUM 5000 UNITS: 5000 INJECTION INTRAVENOUS; SUBCUTANEOUS at 08:57

## 2020-04-24 RX ADMIN — FERROUS SULFATE TAB 325 MG (65 MG ELEMENTAL FE) 325 MG: 325 (65 FE) TAB at 09:02

## 2020-04-24 RX ADMIN — GABAPENTIN 300 MG: 300 CAPSULE ORAL at 09:02

## 2020-04-24 RX ADMIN — ASPIRIN 81 MG: 81 TABLET, COATED ORAL at 09:02

## 2020-04-24 NOTE — PROGRESS NOTES
Transition of care; anticipate d/c home today after she reiceved HD  Cm did not enter room due to covid restrictions  Cm spoke with patient on the phone. States she wants to go home. Informed her if she is cleared by MD. Cm has arranged her for HD. Patient reports she is to have her HD today before she goes home. Follow up   with Andry Nguyen MD    Specialty: Internal Medicine    [id number]   [address]   [phone]      Follow up with Compa Hammond MD    Specialty: Nephrology    [facility],[address] [id number] [place] [address]   [phone]    Patient will see physician while on dialysis. [facility]    Next steps: Follow up    [phone]    Chosen to continue managing your healthcare needs. [address] [facility]    Next steps: Follow up    [facility], [address] [place] [address]   (M) [phone]    Dialysis schedule:  [facility]  arrive 11:00 a.m. for 11:30 a.m. chair.  Patient will see physician while on dialysis. Follow up with Kavita Diaz MD on 5/21/2020    Specialty: Cardiology    [id number] [facility]   [phone]    Follow up appointment scheduled for May 21, 2020 at 10:45 a.m. Patient reports her daughter will transport her to and from [place] and will drive her home today  She has oxygen and nebulizer at home  Patient has pcp  And she has medicaid for insurance. She denies other needs  Care Management Interventions  PCP Verified by CM: Yes  Mode of Transport at Discharge:  Other (see comment)(family)  Transition of Care Consult (CM Consult): Discharge Planning, [address]: No  Reason Outside [place]: Patient already serviced by other home care/hospice agency  Physical Therapy Consult: Yes  Current Support Network: Lives Alone, Family Lives Nearby  Confirm Follow Up Transport: Family  The Plan for Transition of Care is Related to the Following Treatment Goals : home with [place]  The Patient and/or Patient Representative was Provided with a Choice of Provider and Agrees with the Discharge Plan?: Yes  Name of the Patient Representative Who was Provided with a Choice of Provider and Agrees with the Discharge Plan: PATIENT  Freedom of Choice List was Provided with Basic Dialogue that Supports the Patient's Individualized Plan of Care/Goals, Treatment Preferences and Shares the Quality Data Associated with the Providers?: Yes  Discharge Location  Discharge Placement: Home with home health

## 2020-04-24 NOTE — PROGRESS NOTES
Nephrology Progress note    Subjective: Edwin Lawrence a 53 y. o. female with a past medical history significant for DM type 2, HTN, TIA, CKD IV, HLD, АНДРЕЙ, Obesity, Chronic Anemia, Diastolic CHF who presented with c/o SOB. Her CXR showed Pulmonary edema and small bilateral effusions consistent with CHF. S Cr was 2.6 on admission and remains unchanged today.    Patient is followed at our office by Dr Jannet Ndiaye, most recent S Cr 2.6 on 2/26/20. She was last seen on 3/4/20 and maintained on diuretics, oral Furosemide + Metolazone. Since admission she has been placed on intermittent  IV Lasix. Switched to lasix gtt and po metolazone. Pt had attended [facility] prior to admission, scheduled for second class next week. -S/P TDC and first HD on  4/21  See today for 4th consecutive HD  tx.    Denies SOB          Admit Date: 4/10/2020  Principal Problem:    Diastolic CHF, acute on chronic (HCC) (2/16/2020)    Active Problems:    HTN (hypertension) (8/16/2014)      Constipation (8/17/2014)      Type II or unspecified type diabetes mellitus with renal manifestations, uncontrolled(250.42) ([facility] [address] (9/4/2014)      Morbid obesity ([facility]) ()      Respiratory distress (1/17/2019)      Acute kidney injury superimposed on CKD ([facility] [address] (2/13/2020)      CHF (congestive heart failure) ([facility]) (4/10/2020)      CKD (chronic kidney disease) stage 4, GFR 15-29 ml/min ([facility]) (4/10/2020)      Elevated troponin (4/10/2020)      Current Facility-Administered Medications   Medication Dose Route Frequency    hydrALAZINE (APRESOLINE) tablet 50 mg  50 mg Oral TID    heparin (porcine) 1,000 unit/mL injection 3,200 Units  3,200 Units InterCATHeter DIALYSIS PRN    0.9% sodium chloride infusion  100 mL/hr IntraVENous DIALYSIS PRN    polyethylene glycol (MIRALAX) packet 17 g  17 g Oral DAILY    epoetin erickson-epbx (RETACRIT) injection 20,000 Units  20,000 Units SubCUTAneous Q7D    insulin lispro (HUMALOG) injection 6 Units  6 Units SubCUTAneous TIDAC  insulin glargine (LANTUS) injection 20 Units  20 Units SubCUTAneous QHS    famotidine (PEPCID) tablet 20 mg  20 mg Oral DAILY    bisacodyL (DULCOLAX) suppository 10 mg  10 mg Rectal DAILY PRN    ferrous sulfate tablet 325 mg  1 Tab Oral DAILY WITH BREAKFAST    nystatin (MYCOSTATIN) 100,000 unit/gram powder   Topical BID    albuterol-ipratropium (DUO-NEB) 2.5 MG-0.5 MG/3 ML  3 mL Nebulization Q4H PRN    atorvastatin (LIPITOR) tablet 40 mg  40 mg Oral DAILY    calcium acetate(phosphat bind) (PHOSLO) capsule 667 mg  1 Cap Oral TID WITH MEALS    DULoxetine (CYMBALTA) capsule 60 mg  60 mg Oral DAILY    gabapentin (NEURONTIN) capsule 300 mg  300 mg Oral TID    insulin lispro (HUMALOG) injection   SubCUTAneous AC&HS    glucose chewable tablet 16 g  4 Tab Oral PRN    glucagon (GLUCAGEN) injection 1 mg  1 mg IntraMUSCular PRN    heparin (porcine) injection 5,000 Units  5,000 Units SubCUTAneous Q8H    acetaminophen (TYLENOL) tablet 650 mg  650 mg Oral Q6H PRN    ondansetron (ZOFRAN) injection 4 mg  4 mg IntraVENous Q6H PRN    amLODIPine (NORVASC) tablet 5 mg  5 mg Oral DAILY    aspirin delayed-release tablet 81 mg  81 mg Oral DAILY    carvediloL (COREG) tablet 6.25 mg  6.25 mg Oral BID WITH MEALS    cholecalciferol (VITAMIN D3) (1000 Units /25 mcg) tablet 1 Tab  1,000 Units Oral DAILY    HYDROcodone-acetaminophen (NORCO) 5-325 mg per tablet 1 Tab  1 Tab Oral Q4H PRN       Allergy:   Allergies   Allergen Reactions    Bees [Sting, Bee] Swelling    Penicillins Hives    Strawberry Hives        Objective:     Visit Vitals  /61   Pulse 84   Temp 98 °F (36.7 °C)   Resp 18   Ht 5' 2\" (1.575 m)   Wt 150.4 kg (331 lb 9.6 oz)   SpO2 99%   BMI 60.65 kg/m²         Intake/Output Summary (Last 24 hours) at 4/24/2020 1610  Last data filed at 4/24/2020 1430  Gross per 24 hour   Intake 960 ml   Output 4750 ml   Net -3790 ml       Physical Exam:     General: Obese, NAD   HENT: Atraumatic and normocephalic Eyes: Normal conjunctiva   Neck: Supple. No JVD   Cardiovascular: Normal S1 & S2, no m/r/g   Pulmonary/Chest Wall: Decreased BS both bases   Abdominal: Soft and non-tender   Musculoskeletal: ++ Bilat pedal  Edema. No uppper ext edema   Neurological: AAOx4,     HD access: Rt IJ TDC      Data Review:  Lab Results   Component Value Date/Time    Sodium 141 04/23/2020 02:55 AM    Potassium 5.4 04/23/2020 02:55 AM    Chloride 107 04/23/2020 02:55 AM    CO2 30 04/23/2020 02:55 AM    Anion gap 4 04/23/2020 02:55 AM    Glucose 181 (H) 04/23/2020 02:55 AM    BUN 50 (H) 04/23/2020 02:55 AM    Creatinine 2.18 (H) 04/23/2020 02:55 AM    BUN/Creatinine ratio 23 (H) 04/23/2020 02:55 AM    GFR est AA 28 (L) 04/23/2020 02:55 AM    GFR est non-AA 23 (L) 04/23/2020 02:55 AM    Calcium 8.9 04/23/2020 02:55 AM     Lab Results   Component Value Date/Time    WBC 5.4 04/16/2020 04:05 AM    HGB 7.2 (L) 04/16/2020 04:05 AM    HCT 23.0 (L) 04/16/2020 04:05 AM    PLATELET 100 48/02/5520 04:05 AM    MCV 81.9 04/16/2020 04:05 AM     Lab Results   Component Value Date/Time    Calcium 8.9 04/23/2020 02:55 AM    Phosphorus 4.7 04/14/2020 02:35 AM     Lab Results   Component Value Date/Time    Iron 32 (L) 04/14/2020 04:00 AM    TIBC 303 04/14/2020 04:00 AM    Iron % saturation 11 (L) 04/14/2020 04:00 AM    Ferritin 418 (H) 04/14/2020 02:35 AM     Lab Results   Component Value Date/Time    Ferritin 418 (H) 04/14/2020 02:35 AM         Impression:   -Acute on CKD likely ATN sec to diuresis, now with worsening kidney function and Anasarca. Started on HD 4/22  -CKD stage IV at baseline sec to diabetic Nephropathy/Hypertensive Nephrosclerosis. -Anasarca sec to worsening CKD and CHF, improved  -Anemia of CKD + Iron deficiency.  S/p IV iron  -Diastolic CMP LVEF 60 to 07%  -Pulm HTN, Mild to moderate  -DM type 2  -HTN  -Metabolic alkalosis: mild contraction alkalosis sec to diuretic  -Morbid Obesity        Plan:   -HD today  -Aniticipate discharge today after HD.  -Out patient HD at [facility] MWF starting next week MD Juan Purvis  [phone]

## 2020-04-24 NOTE — DISCHARGE SUMMARY
[facility] SUMMARY    Name:  David Clancy  MR#:   [id number]  :  1965  ACCOUNT #:  [de-identified]  ADMIT DATE:  04/10/2020  DISCHARGE DATE:  2020    DISCHARGE DIAGNOSES:  1. Acute kidney injury. 2.  Chronic kidney disease, stage IV. 3.  Progression to end-stage renal disease, dependent on dialysis. 4. Acute-on-chronic diastolic congestive heart failure. 5.  Morbid obesity. 6.  Obstructive sleep apnea. 7.  Diabetes mellitus with renal disease, uncontrolled. 8.  Hypertension. CONSULTANTS:  1.  Tia Dumas MD, Cardiology. 2.  SAMARA Mancilla, Vascular Surgery. 3.  Yuli Payton MD, Nephrology. HOSPITAL COURSE:  The patient is 41-year-old. She is a  female with a past medical history notable for insulin-dependent diabetes, hypertension, previous TIA, chronic kidney disease stage IV, hyperlipidemia, obstructive sleep apnea on CPAP, morbid obesity, chronic anemia, diastolic heart failure. She came in with worsening pulmonary edema, shortness of breath, small bilateral effusions. She follows with Dr. Jackie Sanchez and there had been discussion about her progressing to dialysis requirements and she has started attending kidney classes prior to this admission. When she came in, she had significant peripheral edema, pulmonary edema. It caused worsening shortness of breath for her. She required diuretic therapy. Ultimately, she was placed on a Lasix drip. She had some minimal improvement with that. Her kidney function continued to worsen during her hospitalization, and ultimately, due to the continued volume issues, shortness of breath and clinical issues she was having from her worsening kidney failure and congestive heart failure, it was deemed appropriate to start her on dialysis. She agreed to this transition.   She had a dialysis catheter placed by Vascular Surgery and she has tolerated now four sessions of dialysis after today's session and she has been set up for outpatient dialysis at [facility] on Monday, Wednesday, Friday. She is doing well. She is feeling better. Shortness of breath is improved. Peripheral edema is markedly better. She is up and ambulatory with PT. Deutsch catheter can be removed prior to discharge. Her anemia of chronic disease is stable. Last H and H are 7.2 and 23.0. She has had iron orally as well as iron transfusions. No symptomatology of anemia at this point. She is a diabetic and we have made adjustments in her basal and premeal insulin. She has her CPAP machine as she is using at night. She is using that less now during the day and is on nasal cannula oxygen which is what she is on at home. At this point, the patient is awake and alert in better spirits, feeling improved. Lungs are clear. Cardiac exam, regular rate and rhythm. Abdomen, obese, soft, nontender. Lower extremities, 3+ edema, down to 1+ now bilaterally. Cardiology has signed off. She had a negative stress test in 11/2019, so they recommended no further invasive testing at this point. Blood pressure is 129/77, pulse 93, temp 98, respiratory rate 16, SaO2 is 100% on 3 L. PLAN:  Transition to home today after her fourth dialysis session and then outpatient dialysis Monday, Wednesday, Friday. DISCHARGE MEDICATIONS:  1. Tylenol 650 every four hours as needed for pain. 2.  Advair one puff twice daily. 3.  Albuterol two puffs every 4 hours as needed. 4.  DuoNeb nebs twice daily as needed. 5.  Aspirin 81 mg daily. 6.  Lipitor 40 mg at night. 7.  PhosLo 667 one cap three times daily with meals. 8.  Coreg 6.25 mg twice daily. 9.  Cymbalta 60 mg daily. 10.  Ferrous sulfate 325 mg daily. 11.  Gabapentin 300 mg three times daily. 12.  Hydralazine 50 mg three times daily. 13.  Lantus 20 units subcu at night. 14.  Lispro 6 units before breakfast, lunch and dinner. 15.  Norvasc 5 mg daily. 16. She has Novolin for sliding scale.   17.  Vitamin D one tablet daily. FOLLOWUP:  With Dr. Zavala Dies in one week. Start her dialysis on Monday at [address] DISCHARGE DIET:  Follow a diabetic diet, Accu-Cheks a.c. and at bedtime. DISCHARGE INSTRUCTIONS:  She has been instructed on care of her [facility] catheter and for dialysis, things to avoid as far as diet and lifestyle. 45 minutes on discharge time today. She is a full code status.       Lyndsey Aguilera MD      [id number]  D:  04/24/2020 10:44  T:  04/24/2020 11:28  JOB #:  [id number]  CC:  Aron Tristan MD

## 2020-04-24 NOTE — PROGRESS NOTES
2230:  Attempted to awaken patient for HS medications. Patient appeared to be startled and accidentally scratched this RN on the arm. I called the patient's name when this occurred and she immediately began yelling. She removed her CPAP mask and slung it across the room and told me to leave her alone. I explained that I had her bedtime medications and she stated, \"Just get the hell out of here and leave me alone. \"  The patient mentioned that she was going to call her daughter to let he know that we were not doing anything for her. I explained that I had her medication right there and could give it to her. She then yelled at me; \"I said get out. \"  Patient is loudly yelling and slinging nearby items such as cups and tissues. This RN told patient to not speak to staff in that manner and to lower her voice. Patient very angry. I stated to the patient \"I am leaving your room now. \"  Patient left in bed with 3 rails up, call bell is in patient's hand. Patient's cell phone resting on her abdomen. This RN left room, notified RN Supervisor Summer as well as security of patient's behavior, and placed telephone call to patient's daughter Jovani Armijo to inform her of patient's behaviors and ask if she could call her mother to try to calm her down. 2330:  Received telephone call from patient's daughter, Jayden Givens [phone]. Above events recounted for Jazlyn and she states she will call her mother and attempt to calm her. 0145:  Received telephone call from Jayden Givens who states that the patient is requesting this RN to give her her medications that she refused earlier. This RN to room, patient now calm, compliant and apologetic. Jazlyn called the patient while this RN was at bedside to make sure mother's behavior was appropriate.   No further incidents noted and patient without issues

## 2020-04-24 NOTE — PROGRESS NOTES
Problem: Mobility Impaired (Adult and Pediatric)  Goal: *Acute Goals and Plan of Care (Insert Text)  Description: Physical Therapy Goals   Initiated 4/11/2020 and to be accomplished within 7 day(s)  1. Patient will move from supine <> sit with S in prep for out of bed activity and change of position. 2.  Patient will perform sit<> stand with S/RW in prep for transfers/ambulation. 3.  Patient will ambulate 50 feet with S/RW for improved functional mobility/safe discharge. Physical Therapy Goals   Initiated 4/22/2020 and to be accomplished within 5-7 day(s)  1. Patient will move from supine <> sit with Mod I in prep for out of bed activity and change of position. 2.  Patient will perform sit<> stand with Mod I-S with LRAD in prep for transfers/ambulation. 3.  Patient will transfer from bed <> chair with Mod I- S with LRAD for time up in chair for completion of ADL activity. 4.  Patient will ambulate >100 feet with LRAD/S for improved functional mobility/safe discharge. Outcome: Progressing Towards Goal   PHYSICAL THERAPY TREATMENT    Patient: Simon Lloyd (53 y.o. female)  Date: 4/24/2020  Diagnosis: CHF (congestive heart failure) ([facility]) [P17.4]   Diastolic CHF, acute on chronic [facility])    Precautions: Fall, Skin  PLOF: Bariatric RW delivered, O2 24/7 and CPAP    ASSESSMENT:  Upon beginning session nurse and then Dr arrived. Retrieved portable O2 tank and gown for ambulation in [place]. No assistance needed to sit up EOB. Sit to stand performed with bed in lowest position without assistance. Ambulated with BRW and portable O2 on 6L. Reciprocal gt pattern, wide ALEAH due to body habitus, decreased pace, 3 standing rest breaks. Pt returned to bed.   Progression toward goals:   []      Improving appropriately and progressing toward goals  [x]      Improving slowly and progressing toward goals  []      Not making progress toward goals and plan of care will be adjusted     PLAN:  Patient continues to benefit from skilled intervention to address the above impairments. Continue treatment per established plan of care. Discharge Recommendations:  Home Health  Further Equipment Recommendations for Discharge:  rolling walker     SUBJECTIVE:   Patient stated I need socks and a gown.     OBJECTIVE DATA SUMMARY:   Critical Behavior:  Neurologic State: Alert, Appropriate for age  Orientation Level: Oriented X4  Cognition: Appropriate decision making, Appropriate for age attention/concentration, Appropriate safety awareness, Follows commands    Functional Mobility Training:  Bed Mobility:    Supine to Sit: Supervision  Sit to Supine: Supervision  Scooting: Supervision  Transfers:  Sit to Stand: Supervision  Stand to Sit: Supervision  Balance:  Sitting: Intact  Standing: Intact; With support   Ambulation/Gait Training:  Distance (ft): 160 Feet (ft)(3 standing rest breaks)  Assistive Device: Gait belt;Walker, rolling(portable O2 tank)  Ambulation - Level of Assistance: Modified independent  Base of Support: Widened  Speed/Arlette: Slow    Pain:  Pain level pre-treatment: 0/10  Pain level post-treatment: 0/10   Pain Intervention(s): Medication (see MAR); Rest, Ice, Repositioning   Response to intervention: Nurse notified, See doc flow    Activity Tolerance:   Fair  Please refer to the flowsheet for vital signs taken during this treatment. After treatment:   [] Patient left in no apparent distress sitting up in chair  [x] Patient left in no apparent distress in bed  [x] Call bell left within reach  [] Nursing notified  [] Caregiver present  [] Bed alarm activated  [] SCDs applied      COMMUNICATION/EDUCATION:   [x]         Role of Physical Therapy in the acute care setting. [x]         Fall prevention education was provided and the patient/caregiver indicated understanding. [x]         Patient/family have participated as able in working toward goals and plan of care.   [x]         Patient/family agree to work toward stated goals and plan of care. []         Patient understands intent and goals of therapy, but is neutral about his/her participation.   []         Patient is unable to participate in stated goals/plan of care: ongoing with therapy staff.  []         Other:        Yelitza Chapman, PTA   Time Calculation: 37 mins

## 2020-04-24 NOTE — PROGRESS NOTES
Problem: Fluid Volume - Risk of, Imbalanced  Goal: *Balanced intake and output  Outcome: Progressing Towards Goal     Problem: Patient Education: Go to Patient Education Activity  Goal: Patient/Family Education  Outcome: Progressing Towards Goal     Problem: Chronic Renal Failure  Goal: *Fluid and electrolytes stabilized  Outcome: Progressing Towards Goal     Problem: Patient Education: Go to Patient Education Activity  Goal: Patient/Family Education  Outcome: Progressing Towards Goal

## 2020-04-24 NOTE — DIALYSIS
TREATMENT SUMMARY   Patient dialyzed in room 331  Tolerated treatment well without complaint or complications.    RIJ TDC functioning well without complication of BFR or accessing        3500 ml  removed via UF with a with a net removal 3000 ml  Report given to Brianne Fleming RN with all questions answered     TREATMENT  NOTES                                                                                                     ACUTE HEMODIALYSIS FLOW SHEET     PATIENT INFORMATION   [address]    []1st Time Acute  []Stat[] Routine []Urgent []Chronic Unit   []Acute Room [x]Bedside  []ICU/CCU []ER   Isolation Precautions: [x]Dialysis[] Airborne []Contact []Droplet []Reverse    Special Considerations:_______  [] Blood Consent Verified  []N/A   Allergies:[] NKA   Reaction Severity Reaction Type Noted Valid Until           Allergies   Bees [Sting, Bee] Swelling High Systemic 5/2/2012    Penicillins Hives High Systemic 5/2/2012    Strawberry Hives Medium  7/12/2012     Code Status [x]Full Code [] DNR  [] Other_____   Diet: [] Renal [] NPO [x] Diabetic   [] Enteral Feeding [] Cardiac Diabetic: [x]Yes []No     [x]Signed Treatment Consent Verified   [x] Time Out/ Safety Check   PRIMARY NURSE REPORT: FIRST INITIAL/ LAST NAME/TITLE  PRE DIALYSIS:  Candi Razo RN                                    TIME: 56   ACCESS   CATHETER ACCESS: [] N/A  [x] RIGHT  [] LEFT  [x] IJ  [] SUBCL [] FEM                    [x] First use X-ray  [x] Tunnel     [] Non-Tunneled      [x] No S/S infection  [x] Redness [] Drainage  [] Cultured [] Swelling [] Pain                    [x] Medical Aseptic [] Prep Dressing Changed                  [] Clotted [x] Patent []      Flows: [x] Good [] Poor [] Reversed                 If Access Problem Dr. Frank Sam: [] Yes [] No    Date:_____  [] N/A[]   GRAFT/FISTULA ACCESS:  [x] N/A  [] RIGHT  [] LEFT  [] UE   [] LE       [] AVG  [] AVF [] BUTTONHOLE [] +BRUIT/THRILL [] MEDICAL ASEPTIC PREP     [] No S/S infection  [] Redness [] Drainage  [] Cultured [] Swelling [] Pain              If Access Problem Dr. Natty Snider: [] Yes [] No    Date:______ [] N/A   GENERAL ASSESSMENT   LUNGS:  SaO2% _100___ [x] Clear [] Coarse [] Crackles [] Wheezing                                         [x] Diminished   Location: [] RLL [] LLL [] RUL [] RML [] THALIA    COUGH:  [] Productive  [] Loose[] Dry [x] N/A   RESPIRATIONS: [x] Easy []Labored    THERAPY: [x] RA   [] NC __5___. L/min    Mask: [] NRB [] Venti  _____O2%                  [] Ventilator [] Intubated [] Trach [] BiPap [] CPap [] HI Flow   CARDIAC: [x] Regular [] Irregular [] Pericardial Rub [] JVD               Monitored Rhythm:_NSR_____ [] N/A   EDEMA: [] None [x] Generalized [] Facial [] Pedal [x] UE [x] LE             [] Pitting [] 1 [] 2 [x] 3 [] 4    [] Right [] Left [] Bilateral   SKIN:    [x] Warm [] Hot [] Cold  [x] Dry [] Pale [] Diaphoretic              [] Flushed [] Jaundiced [] Cyanotic [] Rash [] Weeping     LOC:    [x] Alert  Oriented to: [x] Person [x] Place [x] Time             [] Confused [] Lethargic [] Medicated [] Non-responsive    GI/ABDOMEN: [] Flat [] Distended [x] Soft [] Firm [] Diarrhea [x] Bowel Sounds Present [] Nausea [] Vomiting    PAIN: [x] 0 [] 1 [] 2 [] 3 [] 4 [] 5 [] 6 [] 7 [] 8 [] 9 [] 10          Scale 1-10 Action/Follow Up_____   MOBILITY: [] Amb [] Amb/Assist [x] Bed  [] Wheelchair    CURRENT LABS   HBsAg ONLY: Date Drawn: 4/20/2020            [x] Negative [] Positive [] Unknown.      HBsAb: Date Drawn:  4/21/2020            [x] Susceptible <10 [] Immune ?10 [] Unknown   Date of Current Labs:  ATTACHED     EDUCATION   Person Educated: [x] Patient [] Other_________   Knowledge base: [] None [x] Minimal [] Substantial    Barriers to learning  [x] None _______________   Preferred method of learning: [] Written [x] Oral [x] Visual [] Hands on    Topic: [x] Access Care [] S&S of infection [x] Fluid Management   [] K+  [x] Procedural  [] Albumin [] Medications [] Tx Options   [] Transplant [x] Diet [] Other    Teaching Tools: [x] Explain [x] Demonstration [] Handout_____ [] Video______  CARE PLAN    [x] Renal Failure (Adult)  Interdisciplinary  · Fluid and electrolytes stabilized  ? Interventions  · Dehydration signs and symptoms (eg: Weight/lab monitoring; vomiting/diarrhea/urine; tenting; mucous membranes; dizziness/lethargy/irritability/confusion; weak pulse; tachycardia; blood pressure; I&O)  · Fluid overload signs and symptoms assessment (eg: Body weight increased; dyspnea; edema; hypertension; respiratory crackles/wheezing; JVD; lab monitoring; mental status changes; I&O)  · Monitor appropriate lab values  · COMPLIANCE WITH PRESCRIBED THERAPY  · ARTERIAL ACCESS SITE ASSESSMENT  · NUTRITION SCREENING  · Vital signs monitoring per assessed patient condition or unit standard  · Cardiac monitoring  · Hydration management  · Intake and output measurement  · Body weight monitoring  · Skin care  · DIALYSIS  · Nutrition Care Process per nutrition screen  · Oral hygiene care every 2 hours  · Pain management     · Outcome   ? [x] Progressing Towards Goal  ? [] Not Progressing Towards Goals  ? [] Goals Met/Resolved  ? [] Goals Not Met/ Resolved        · Patient/ Family Education  ?  Progressing Towards Goals          RO/HEMODIAYLSIS MACHINE SAFETY CHECKS- BEFORE EACH TREATMENT          [x] THE [facility]: Machine Serial #1:  [id number]   RO Serial #[id number]                       [] [facility]: Machine Serial #2:  [id number]   RO Serial #[id number]    Alarm Test: [x] Pass  Time_1103____  [x] RO/Machine Log Complete    [x] Extracorporeal circuit Tested for integrity   Dialyzer_[id number]_________   Tubing_19K19-9___________    Dialysate: pH__7.4_  Temp.__37___Conductivity: Meter __14.2__ HD Machine__14_   CHLORINE TESTING- BEFORE EACH TREATMENT AND EVERY 4 HOURS   Total Chlorine: [x] Less than 0.1 ppm Time:_1100___2nd Check Time:_NA_____  (If greater than 0.1 ppm from Primary then every 30 minutes from Secondary)   TREATMENT INIATION-WITH DIALYSIS PRECAUTIONS   [x] All Connections Secured   [x] Saline Line Double Clamped    [x] Venous Parameters Set [x] Arterial Parameters Set    [x] Prime Given 250 ml     [x] Air Foam Detector Engaged   PRE-TREATMENT   UF Calculations: Wt to lose:__3000__ml(+) Oral:_0_ml(+)IV Meds/Fluids/Blood prods_0__ml(+) Prime/Rinse__500_ml(=)Total UF Goal__3500__mL   Scale Type:[x] Bed scale [] Sling Scale [] Wheel Chair Scale []  Not Ordered [] [] Unable to obtain pt on stretcher/ bed scale malfunctioning   Tx Initiation Note: RECEIVED REPORT. PATIENT ALERT AND ORIENTED. VITAL SIGNS WNL. NAD. ALL QUESTIONS ANSWERED WITH PATIENT  SAFETY CHECKS COMPLETE. TIME OUT COMPLETE. TREATMENT INITIATED VIA _TDC____. NO CONCERNS NOTED. [x] Time Out/Safety Check  Time:_1111___   INTRADIALYTIC MONITORING  (SEE ATTACHED FLOWSHEET)     POST TREATMENT    Time Medication Dose Volume Route    Initials   NA                                        [facility] Signatures Title Initials Time   DEON SCHWARTZ RN PAP 1530               Dialyzer cleared: [x] Good [] Fair [] Poor     Blood Volume Processed 63____L   Net UF Removed _3000___mL  Post Tx Access:                  AVF/AVG: Bleeding Stop Time      Art. NA_min Josef.__NA_min []+bruit/thrill                              Catheter: Locking Solution  [x] Heparin 1 ml/1000 units                                                             [] Normal Saline                                                                      Art._1.6___ ml Josef.__1.6__ml                                                           [x] New caps placed  Post Assessment:              Skin: [x]Warm [x]Dry []Diaphoretic []Flushed [] Pale []Cyanotic            Lungs: [x]Clear []Coarse []Crackles []Wheezing             Cardiac: [x]Regular []Irregular  []Monitored rhythm____ []N/A            Edema: []None []General []Facial []Pedal  []UE [x]LE [x]RIGHT []LEFT            Pain: [x]0 []1 []2 []3 []4 []5 []6 []7 []8 []9 []10   POST Tx Note:TREATMENT COMPLETED. ALL POSSIBLE BLOOD RETURNED. PT TOLERATED WELL. VITAL SIGNS WNL  FOR PATIENT. NAD NOTED. DIALYSIS CATHETER DE ACCESSED, FLUSHED AND LOCKED. STERILE CAPS APPLIED. REPORT GIVEN WITH OPPORTUNITY TO ADDRESS ANY CONCERNS OR QUESTTIONS AND PATIENT STAYS ROOM IN BED WITHOUT DISTRESS OR ACUTE DISCOMFORT. BED LOWED, CALL BELL WITHIN REACH .      Primary Nurse Report: First initial/Last name/Title    Post Dialysis:_ Joi Lima RN___        Time:___1530_____________    Abbreviations: AVG-arterial venous graft, AVF-arterial venous fistula, IJ-Internal Jugular,  Subcl-Subclavian, Fem-Femoral, Tx-treatment, AP/HR-apical heart rate, DFR-dialysate flow rate, BFR-blood flow rate, AP-arterial pressure, -venous pressure, UF-ultrafiltrate, TMP-transmembrane pressure, Josef-Venous, Art-Arterial, RO-Reverse Osmosis

## 2020-04-24 NOTE — PROGRESS NOTES
Patient report given by Zaida Black RN. Patient on dialysis. 1545 Dialysis completed. 3.5L. out. No complication reported or observed. Patient discharge instruction provided. Patient apologized to this nurse about her behavior last night. Patient provided with opportunity for questions or concerns. No issue voiced. Patient wheeled out of the unit by nursing assistant.

## 2020-04-24 NOTE — PROGRESS NOTES
9272 Verbal shift change report given to ABDIRASHID Lang (oncoming nurse) by Ashley (offgoing nurse). Report included the following information SBAR. Patient discharging today after dialysis.

## 2020-04-24 NOTE — PROGRESS NOTES
0754 Assumed care of patient from Bradley Lua [facility]. Claudette Vázquez.     0920 Oxygen sats 100% on 5L/min NC, decreased oxygen to 3L/min NC. Will continue monitor. 1015 O2 sats 96% on 3L/min NC. Patient is on 3 L/min at home, no complaints, no signs of distress. 1300 Deutsch catheter discontinue per md request. Patient discharging today.

## 2020-04-27 NOTE — PROGRESS NOTES
Telephone call from. Patients daughter states the concentrator for her to travel to the HD centr is not working  Telephone call to [employer] at [address] [place] [address]. She will call daughter. Cm called patient reports that the portable oxygen is fully charged. But then it cuts off. Informed her cm has called [facility] and they have tried ot call her back but she did not answer the phone. States she is going to call them back right now. Informed he rshe needs not miss her Dialysis today. States she is going but not sure about the oxyen.  Informed wagner to call [facility] back asap

## 2020-04-28 ENCOUNTER — PATIENT OUTREACH (OUTPATIENT)
Dept: CASE MANAGEMENT | Age: 55
End: 2020-04-28

## 2020-05-11 ENCOUNTER — PATIENT OUTREACH (OUTPATIENT)
Dept: CASE MANAGEMENT | Age: 55
End: 2020-05-11

## 2020-05-11 NOTE — PROGRESS NOTES
Patient resolved from Transition of Care episode on 5/11/2020  Patient/family has been provided the following resources and education related to COVID-19:                         Signs, symptoms and red flags related to COVID-19            CDC exposure and quarantine guidelines            [facility] exposure contact - [phone]            Contact for their local Department of Health                 Patient currently reports that the following risks are under control: CHF, Acute kidney injury- transitioned to dialysis, DM and HTN    No further outreach scheduled with this CTN/ACM. Episode of Care resolved. Patient has this CTN/ACM contact information if future needs arise.

## 2020-06-01 LAB
STRESS BASELINE HR: 57 BPM
STRESS ESTIMATED WORKLOAD: 1 METS
STRESS EXERCISE DUR MIN: NORMAL
STRESS PEAK DIAS BP: 95 MMHG
STRESS PEAK SYS BP: 176 MMHG
STRESS PERCENT HR ACHIEVED: 42 %
STRESS POST PEAK HR: 70 BPM
STRESS RATE PRESSURE PRODUCT: NORMAL BPM*MMHG
STRESS ST DEPRESSION: 0 MM
STRESS ST ELEVATION: 0 MM
STRESS TARGET HR: 165 BPM

## 2020-12-23 NOTE — PROGRESS NOTES
Problem: Falls - Risk of 
Goal: *Absence of Falls Document Janna Bautista Fall Risk and appropriate interventions in the flowsheet. Outcome: Progressing Towards Goal 
Fall Risk Interventions: 
Mobility Interventions: Communicate number of staff needed for ambulation/transfer, Patient to call before getting OOB, PT Consult for mobility concerns, PT Consult for assist device competence, Strengthening exercises (ROM-active/passive), Utilize walker, cane, or other assistive device Medication Interventions: Patient to call before getting OOB, Teach patient to arise slowly admission

## 2021-07-01 ENCOUNTER — APPOINTMENT (OUTPATIENT)
Dept: GENERAL RADIOLOGY | Age: 56
End: 2021-07-01
Attending: EMERGENCY MEDICINE
Payer: MEDICAID

## 2021-07-01 ENCOUNTER — HOME HEALTH ADMISSION (OUTPATIENT)
Dept: HOME HEALTH SERVICES | Facility: HOME HEALTH | Age: 56
End: 2021-07-01

## 2021-07-01 ENCOUNTER — HOSPITAL ENCOUNTER (EMERGENCY)
Age: 56
Discharge: HOME | End: 2021-07-01
Attending: EMERGENCY MEDICINE
Payer: MEDICAID

## 2021-07-01 VITALS
HEIGHT: 62 IN | DIASTOLIC BLOOD PRESSURE: 84 MMHG | HEART RATE: 72 BPM | BODY MASS INDEX: 53.92 KG/M2 | OXYGEN SATURATION: 99 % | WEIGHT: 293 LBS | TEMPERATURE: 98.1 F | SYSTOLIC BLOOD PRESSURE: 106 MMHG | RESPIRATION RATE: 21 BRPM

## 2021-07-01 DIAGNOSIS — Z51.5 END OF LIFE CARE: ICD-10-CM

## 2021-07-01 DIAGNOSIS — R07.9 CHEST PAIN, UNSPECIFIED TYPE: Primary | ICD-10-CM

## 2021-07-01 DIAGNOSIS — N18.6 END STAGE RENAL DISEASE (HCC): ICD-10-CM

## 2021-07-01 DIAGNOSIS — R11.2 NAUSEA AND VOMITING, INTRACTABILITY OF VOMITING NOT SPECIFIED, UNSPECIFIED VOMITING TYPE: ICD-10-CM

## 2021-07-01 LAB
ANION GAP SERPL CALC-SCNC: 7 MMOL/L (ref 3–18)
ATRIAL RATE: 81 BPM
BASOPHILS # BLD: 0 K/UL (ref 0–0.1)
BASOPHILS NFR BLD: 1 % (ref 0–2)
BNP SERPL-MCNC: 698 PG/ML (ref 0–900)
BUN SERPL-MCNC: 45 MG/DL (ref 7–18)
BUN/CREAT SERPL: 11 (ref 12–20)
CALCIUM SERPL-MCNC: 8.2 MG/DL (ref 8.5–10.1)
CALCULATED P AXIS, ECG09: 44 DEGREES
CALCULATED R AXIS, ECG10: 63 DEGREES
CALCULATED T AXIS, ECG11: 70 DEGREES
CHLORIDE SERPL-SCNC: 100 MMOL/L (ref 100–111)
CO2 SERPL-SCNC: 29 MMOL/L (ref 21–32)
CREAT SERPL-MCNC: 4.03 MG/DL (ref 0.6–1.3)
DIAGNOSIS, 93000: NORMAL
DIFFERENTIAL METHOD BLD: ABNORMAL
EOSINOPHIL # BLD: 0.3 K/UL (ref 0–0.4)
EOSINOPHIL NFR BLD: 4 % (ref 0–5)
ERYTHROCYTE [DISTWIDTH] IN BLOOD BY AUTOMATED COUNT: 15.2 % (ref 11.6–14.5)
GLUCOSE BLD STRIP.AUTO-MCNC: 239 MG/DL (ref 70–110)
GLUCOSE SERPL-MCNC: 336 MG/DL (ref 74–99)
HBV SURFACE AB SER QL IA: POSITIVE
HBV SURFACE AB SERPL IA-ACNC: 64.78 MIU/ML
HBV SURFACE AG SER QL: <0.1 INDEX
HBV SURFACE AG SER QL: NEGATIVE
HCT VFR BLD AUTO: 38.1 % (ref 35–45)
HEP BS AB COMMENT,HBSAC: NORMAL
HGB BLD-MCNC: 11.5 G/DL (ref 12–16)
LYMPHOCYTES # BLD: 0.9 K/UL (ref 0.9–3.6)
LYMPHOCYTES NFR BLD: 12 % (ref 21–52)
MCH RBC QN AUTO: 28 PG (ref 24–34)
MCHC RBC AUTO-ENTMCNC: 30.2 G/DL (ref 31–37)
MCV RBC AUTO: 92.7 FL (ref 74–97)
MONOCYTES # BLD: 0.6 K/UL (ref 0.05–1.2)
MONOCYTES NFR BLD: 8 % (ref 3–10)
NEUTS SEG # BLD: 5.6 K/UL (ref 1.8–8)
NEUTS SEG NFR BLD: 75 % (ref 40–73)
P-R INTERVAL, ECG05: 172 MS
PLATELET # BLD AUTO: 201 K/UL (ref 135–420)
PMV BLD AUTO: 9.7 FL (ref 9.2–11.8)
POTASSIUM SERPL-SCNC: 4.9 MMOL/L (ref 3.5–5.5)
Q-T INTERVAL, ECG07: 380 MS
QRS DURATION, ECG06: 62 MS
QTC CALCULATION (BEZET), ECG08: 441 MS
RBC # BLD AUTO: 4.11 M/UL (ref 4.2–5.3)
SODIUM SERPL-SCNC: 136 MMOL/L (ref 136–145)
TROPONIN I SERPL-MCNC: <0.02 NG/ML (ref 0–0.04)
VENTRICULAR RATE, ECG03: 81 BPM
WBC # BLD AUTO: 7.5 K/UL (ref 4.6–13.2)

## 2021-07-01 PROCEDURE — 99285 EMERGENCY DEPT VISIT HI MDM: CPT

## 2021-07-01 PROCEDURE — 83880 ASSAY OF NATRIURETIC PEPTIDE: CPT

## 2021-07-01 PROCEDURE — 71045 X-RAY EXAM CHEST 1 VIEW: CPT

## 2021-07-01 PROCEDURE — 74011250636 HC RX REV CODE- 250/636: Performed by: EMERGENCY MEDICINE

## 2021-07-01 PROCEDURE — 82962 GLUCOSE BLOOD TEST: CPT

## 2021-07-01 PROCEDURE — 96374 THER/PROPH/DIAG INJ IV PUSH: CPT

## 2021-07-01 PROCEDURE — 93005 ELECTROCARDIOGRAM TRACING: CPT

## 2021-07-01 PROCEDURE — 76450000000

## 2021-07-01 PROCEDURE — 87340 HEPATITIS B SURFACE AG IA: CPT

## 2021-07-01 PROCEDURE — 84484 ASSAY OF TROPONIN QUANT: CPT

## 2021-07-01 PROCEDURE — 99215 OFFICE O/P EST HI 40 MIN: CPT | Performed by: NURSE PRACTITIONER

## 2021-07-01 PROCEDURE — 74011250637 HC RX REV CODE- 250/637: Performed by: EMERGENCY MEDICINE

## 2021-07-01 PROCEDURE — 86706 HEP B SURFACE ANTIBODY: CPT

## 2021-07-01 PROCEDURE — 80048 BASIC METABOLIC PNL TOTAL CA: CPT

## 2021-07-01 PROCEDURE — 90935 HEMODIALYSIS ONE EVALUATION: CPT

## 2021-07-01 PROCEDURE — 85025 COMPLETE CBC W/AUTO DIFF WBC: CPT

## 2021-07-01 RX ORDER — METOCLOPRAMIDE HYDROCHLORIDE 5 MG/5ML
5 SOLUTION ORAL
Status: DISCONTINUED | OUTPATIENT
Start: 2021-07-01 | End: 2021-07-01

## 2021-07-01 RX ORDER — METOCLOPRAMIDE HYDROCHLORIDE 5 MG/5ML
5 SOLUTION ORAL
Status: DISCONTINUED | OUTPATIENT
Start: 2021-07-01 | End: 2021-07-01 | Stop reason: HOSPADM

## 2021-07-01 RX ORDER — MORPHINE SULFATE ORAL SOLUTION 10 MG/5ML
5 SOLUTION ORAL
Status: DISCONTINUED | OUTPATIENT
Start: 2021-07-01 | End: 2021-07-01 | Stop reason: HOSPADM

## 2021-07-01 RX ORDER — METOCLOPRAMIDE HYDROCHLORIDE 5 MG/5ML
5 SOLUTION ORAL
Status: COMPLETED | OUTPATIENT
Start: 2021-07-01 | End: 2021-07-01

## 2021-07-01 RX ORDER — METOCLOPRAMIDE HYDROCHLORIDE 5 MG/1
5 TABLET, ORALLY DISINTEGRATING ORAL
Qty: 40 TABLET | Refills: 0 | Status: SHIPPED | OUTPATIENT
Start: 2021-07-01 | End: 2021-07-11

## 2021-07-01 RX ORDER — MORPHINE SULFATE 4 MG/ML
4 INJECTION INTRAVENOUS
Status: COMPLETED | OUTPATIENT
Start: 2021-07-01 | End: 2021-07-01

## 2021-07-01 RX ADMIN — MORPHINE SULFATE 4 MG: 4 INJECTION INTRAVENOUS at 08:36

## 2021-07-01 RX ADMIN — METOCLOPRAMIDE HYDROCHLORIDE 5 MG: 5 SOLUTION ORAL at 08:33

## 2021-07-01 RX ADMIN — METOCLOPRAMIDE HYDROCHLORIDE 5 MG: 5 SOLUTION ORAL at 12:54

## 2021-07-01 RX ADMIN — MORPHINE SULFATE 5 MG: 10 SOLUTION ORAL at 11:42

## 2021-07-01 NOTE — PROGRESS NOTES
Cm notified by ED for home health services, cm called patients room to offer home health services after introducing self and explaining cm role as d/c planner, pt states she lives alone but her 3 adult children all have house keys to check on her often,pt states she noted she has been falling a lot lately, pt does have a home rolling walker and w/c for longer walking distances, pt uses medicaid transportation to dialysis on W-TH and fridays, FOC explained and offered pt selected [facility], referral placed, cm verified patients address and contact number on facesheet.

## 2021-07-01 NOTE — CONSULTS
[id number] [facility] 54: [phone]  [facility]: [address] [place] [address] Way: [phone]    Patient Name: Meliton Vila  YOB: 1965    Date of Initial Consult: 7/1/2021   Reason for Consult: care decisions   Requesting Provider: Dr Neville Harada  Primary Care Physician: Stacie Dos Santos MD      SUMMARY:   Meliton Vila is a 63y.o. year old with a past history of COPD/asthma, daibetes, diastolic CHF, ESRD on HD , who presented to the ER on 7/1/2021 from home with a diagnosis of dizziness and nausea . Current medical issues leading to Palliative Medicine involvement include: 63year old female who has several life limiting illnesses. Palliative medicine is consulted for goals of care discussions. PALLIATIVE DIAGNOSES:   1. Goals of care   2. Dizziness   3. Nausea   4. ESRD on HD       PLAN:   1. Goals of care Ms Mariah Braden seen in the ER along with Ms Connie Iniguez RN. Arline Roberts is alert, states she is feeling better. Her daughter Gaby Gonzalez is at bedside. Both calm and able to engage in conversation. Arline Roberts is alert and oriented x 4. Tells us she has falls at home, sometimes with associated fatigue after HD. She was clear she does not wish to stop HD. There is no AMD on file and she was agreeable to completing one today. Naming her daughter Gaby Gonzalez as MPOA and daughter Levar Garner as secondary MPOA. Goals of care discussion ensued. In the event of cardiopulmonary arrest patient would not wish for CPR or intubation for any reason. Drea Bradley was present for conversation and supportive of her goals of care. POST form introduced and discussed, completed for DNR/DNI limited interventions, feeding tubes to be discussed if / when the time came. POST signed by patient. Appreciate CM involvement as patient may need more help in the home for safety. ER RN and ER physician aware for goals of care change.  Goals of care DNR/DNI limited interventions, feeding tubes to be discussed at a later date if the needed. 2. Dizziness improved per patient but still dizzy   3. Nausea received Reglan for nausea, other then dry mouth nausea was improved   4. ESRD on HD has been on for a little more then one year, per patient usually can complete sessions although due to cramping at times sessions will have to be halted. She has been told dialysis is permanent. Shares her wishes to continue dialysis. 5. Initial consult note routed to primary continuity provider  6. Communicated plan of care with: Palliative IDT, patient, daughter Cisco Marie RN, ER physician       Patient/Health Care Proxy Stated Goals: Prolong life      TREATMENT PREFERENCES:   Code Status: DNR/ DNI     Advance Care Planning:  [] The [facility] Interdisciplinary Team has updated the ACP Navigator with [address] and Patient Capacity    Primary Decision Maker ([address]):   AMD completed today   Naming her daughter Ele Naidu as MPOA   Daughter Suman Rodriguez as secondary MPOA    Medical Interventions: Limited additional interventions         Other:  As far as possible, the palliative care team has discussed with patient / health care proxy about goals of care / treatment preferences for patient.      HISTORY:     History obtained from: chart and patient     CHIEF COMPLAINT: nausea     HPI/SUBJECTIVE:    The patient is:   [x] Verbal and participatory  [] Non-participatory due to:   Please see summary     Clinical Pain Assessment (nonverbal scale for nonverbal patients): Clinical Pain Assessment  Severity: 0          Duration: for how long has pt been experiencing pain (e.g., 2 days, 1 month, years)  Frequency: how often pain is an issue (e.g., several times per day, once every few days, constant)     FUNCTIONAL ASSESSMENT:     Palliative Performance Scale (PPS):  PPS: 60    ECOG  ECOG Status : Ambulatory, but unable to carry out work activities     PSYCHOSOCIAL/SPIRITUAL SCREENING:      Any spiritual / Spiritism concerns:  [] Yes /  [x] No    Caregiver Burnout:  [] Yes /  [x] No /  [] No Caregiver Present      Anticipatory grief assessment:   [x] Normal  / [] Maladaptive        REVIEW OF SYSTEMS:     Positive and pertinent negative findings in ROS are noted above in HPI. The following systems were [x] reviewed / [] unable to be reviewed as noted in HPI  Other findings are noted below. Systems: constitutional, ears/nose/mouth/throat, respiratory, gastrointestinal, genitourinary, musculoskeletal, integumentary, neurologic, psychiatric, endocrine. Positive findings noted below. Modified ESAS Completed by: provider   Fatigue: 2 Drowsiness: 0     Pain: 0   Anxiety: 0 Nausea: 3     Dyspnea: 0                    PHYSICAL EXAM:     Wt Readings from Last 3 Encounters:   07/01/21 137 kg (302 lb)   04/22/20 150.4 kg (331 lb 9.6 oz)   04/06/20 136.1 kg (300 lb)     Blood pressure 100/84, pulse 70, temperature 98.1 °F (36.7 °C), resp. rate 19, height 5' 2\" (1.575 m), weight 137 kg (302 lb), SpO2 99 %. Pain:  Pain Scale 1: Numeric (0 - 10)  Pain Intensity 1: 8  Pain Onset 1: 1 hour ago  Pain Location 1: Chest     Pain Description 1: Heaviness  Pain Intervention(s) 1: Relaxation technique, Repositioned, MD notified (comment)  Last bowel movement: none movement     Constitutional: alert oriented able to participate in conversation in NAD   Eyes: pupils equal   ENMT: moist MM  Cardiovascular: RRR  Respiratory breathing not labored on nasal cannula  Gastrointestinal: soft   Skin: warm, dry  Neurologic: alert and oriented x 4          HISTORY:     Active Problems:    * No active hospital problems.  *    Past Medical History:   Diagnosis Date    Asthma     Note: As child had asthma    Diabetes mellitus ([facility] [address]     Type 2    Heart palpitations     Hypertension     Ill-defined condition     fibromyalgia     Morbid obesity ([facility] [address]     MRSA infection 8/2014      Past Surgical History:   Procedure Laterality Date    HX CATARACT REMOVAL      HX CHOLECYSTECTOMY      HX TUBAL LIGATION      IR INSERT TUNL CVC W/O PORT OVER 5 YR  4/21/2020    AR BREAST SURGERY PROCEDURE UNLISTED      cyst removed      Family History   Problem Relation Age of Onset    Heart Attack Mother     Heart Attack Maternal Grandmother      History reviewed, no pertinent family history. Social History     Tobacco Use    Smoking status: Never Smoker    Smokeless tobacco: Never Used   Substance Use Topics    Alcohol use: No     Allergies   Allergen Reactions    Bees [Sting, Bee] Swelling    Penicillins Hives    Strawberry Hives      Current Facility-Administered Medications   Medication Dose Route Frequency    morphine (10 mg/5 mL) 10 mg/5 mL oral solution 5 mg  5 mg Oral Q2H PRN     Current Outpatient Medications   Medication Sig    ferrous sulfate 325 mg (65 mg iron) tablet Take 1 Tab by mouth daily (with breakfast).  hydrALAZINE (APRESOLINE) 50 mg tablet Take 1 Tab by mouth three (3) times daily.  gabapentin (NEURONTIN) 300 mg capsule Take 1 Cap by mouth three (3) times daily. Max Daily Amount: 900 mg.    insulin glargine (LANTUS) 100 unit/mL injection 20 Units by SubCUTAneous route daily.  insulin lispro (HUMALOG) 100 unit/mL injection 6 Units by SubCUTAneous route Before breakfast, lunch, and dinner.  carvediloL (Coreg) 6.25 mg tablet Take 6.25 mg by mouth two (2) times daily (with meals).  amLODIPine (Norvasc) 5 mg tablet Take 5 mg by mouth daily.  insulin NPH/insulin regular (NovoLIN 70/30 U-100 Insulin) 100 unit/mL (70-30) injection by SubCUTAneous route Before breakfast, lunch, and dinner. Sliding scale not to exceed 40 units per dose.  fluticasone propion-salmeteroL (Advair Diskus) 100-50 mcg/dose diskus inhaler Take 1 Puff by inhalation every twelve (12) hours.  aspirin delayed-release 81 mg tablet Take 81 mg by mouth daily.  acetaminophen (TYLENOL) 325 mg tablet Take 650 mg by mouth every four (4) hours as needed for Pain.     cholecalciferol (Vitamin D3) (1000 Units /25 mcg) tablet Take  by mouth daily.  albuterol-ipratropium (DUO-NEB) 2.5 mg-0.5 mg/3 ml nebu Take 3 mL by inhalation two (2) times a day.  calcium acetate,phosphat bind, (PHOSLO) 667 mg cap Take 1 Cap by mouth three (3) times daily (with meals).  albuterol sulfate 90 mcg/actuation aepb Take 1-2 Puffs by inhalation every four (4) hours as needed.  atorvastatin (LIPITOR) 40 mg tablet Take 40 mg by mouth daily. LAB AND IMAGING FINDINGS:     Lab Results   Component Value Date/Time    WBC 7.5 07/01/2021 07:15 AM    HGB 11.5 (L) 07/01/2021 07:15 AM    PLATELET 576 88/72/6366 07:15 AM     Lab Results   Component Value Date/Time    Sodium 136 07/01/2021 07:15 AM    Potassium 4.9 07/01/2021 07:15 AM    Chloride 100 07/01/2021 07:15 AM    CO2 29 07/01/2021 07:15 AM    BUN 45 (H) 07/01/2021 07:15 AM    Creatinine 4.03 (H) 07/01/2021 07:15 AM    Calcium 8.2 (L) 07/01/2021 07:15 AM    Magnesium 2.2 04/24/2020 04:46 AM    Phosphorus 4.7 04/14/2020 02:35 AM      Lab Results   Component Value Date/Time    Alk.  phosphatase 316 (H) 04/10/2020 11:49 AM    Protein, total 6.9 04/10/2020 11:49 AM    Albumin 3.1 (L) 04/14/2020 02:35 AM    Globulin 3.8 04/10/2020 11:49 AM     Lab Results   Component Value Date/Time    INR 0.9 01/17/2019 04:25 AM    Prothrombin time 12.1 01/17/2019 04:25 AM    aPTT 83.4 (H) 12/16/2019 03:45 AM      Lab Results   Component Value Date/Time    Iron 32 (L) 04/14/2020 04:00 AM    TIBC 303 04/14/2020 04:00 AM    Iron % saturation 11 (L) 04/14/2020 04:00 AM    Ferritin 418 (H) 04/14/2020 02:35 AM      No results found for: PH, PCO2, PO2  No components found for: [place]   Lab Results   Component Value Date/Time    CK 34 04/11/2020 10:30 PM    CK - MB <1.0 04/11/2020 10:30 PM              Total time: 70 minutes   Counseling / coordination time, spent as noted above:   > 50% counseling / coordination: yes with patient     Prolonged service was provided for  []30 min   []75 min in face to face time in the presence of the patient, spent as noted above.   Time Start:   Time End:

## 2021-07-01 NOTE — ED PROVIDER NOTES
Patient has had dizziness and falls, the first 1 was 3 weeks ago. Over the last 2 days she feels as though her dizziness has worsened, when she goes to dialysis she gets chest pain dizziness and feels nauseous. Prior to dialysis this morning she did vomit twice. States her vomitus was yellow. The history is provided by the patient. Chest Pain (Angina)   This is a recurrent problem. The current episode started 3 to 5 hours ago. Associated symptoms include nausea and vomiting. Past Medical History:   Diagnosis Date    Asthma     Note: As child had asthma    Diabetes mellitus ([address]     Type 2    Heart palpitations     Hypertension     Ill-defined condition     fibromyalgia     Morbid obesity ([facility] [address]     MRSA infection 8/2014       Past Surgical History:   Procedure Laterality Date    HX CATARACT REMOVAL      HX CHOLECYSTECTOMY      HX TUBAL LIGATION      IR INSERT TUNL CVC W/O PORT OVER 5 YR  4/21/2020    UT BREAST SURGERY PROCEDURE UNLISTED      cyst removed         Family History:   Problem Relation Age of Onset    Heart Attack Mother     Heart Attack Maternal Grandmother        Social History     Socioeconomic History    Marital status: SINGLE     Spouse name: Not on file    Number of children: Not on file    Years of education: Not on file    Highest education level: Not on file   Occupational History    Not on file   Tobacco Use    Smoking status: Never Smoker    Smokeless tobacco: Never Used   Substance and Sexual Activity    Alcohol use: No    Drug use: No    Sexual activity: Never     Partners: Male   Other Topics Concern    Not on file   Social History Narrative    Not on file     Social Determinants of Health     Financial Resource Strain:     Difficulty of Paying Living Expenses:    Food Insecurity:     Worried About Running Out of Food in the Last Year:     Ran Out of Food in the Last Year:    Transportation Needs:     Lack of Transportation (Medical):      Lack of Transportation (Non-Medical):    Physical Activity:     Days of Exercise per Week:     Minutes of Exercise per Session:    Stress:     Feeling of Stress :    Social Connections:     Frequency of Communication with Friends and Family:     Frequency of Social Gatherings with Friends and Family:     Attends Yarsani Services:     Active Member of Clubs or Organizations:     Attends Club or Organization Meetings:     Marital Status:    Intimate Partner Violence:     Fear of Current or Ex-Partner:     Emotionally Abused:     Physically Abused:     Sexually Abused: ALLERGIES: Bees [sting, bee]; Penicillins; and [place]    Review of Systems   Constitutional:        All ROS per HPI   Cardiovascular: Positive for chest pain. Gastrointestinal: Positive for nausea and vomiting. All other systems reviewed and are negative. Vitals:    07/01/21 1330 07/01/21 1345 07/01/21 1400 07/01/21 1415   BP: (!) 123/59 127/61 127/62 (!) 103/49   Pulse: 67 67 68 70   Resp: 17 16 16 18   Temp:       SpO2: 98% 98% 98% 100%   Weight:       Height:                Physical Exam  Vitals reviewed. Nursing note reviewed: Exam limited as family member/visitor actively [address] [employer]. Constitutional:       General: She is not in acute distress. Appearance: She is well-developed. She is obese. She is not ill-appearing, toxic-appearing or diaphoretic. HENT:      Head: Normocephalic and atraumatic. Nose: Nose normal.   Eyes:      Conjunctiva/sclera: Conjunctivae normal.   Neck:      Trachea: No tracheal deviation. Cardiovascular:      Heart sounds: Normal heart sounds. Pulmonary:      Effort: Pulmonary effort is normal. No respiratory distress. Breath sounds: Examination of the right-lower field reveals decreased breath sounds. Examination of the left-lower field reveals decreased breath sounds. Decreased breath sounds present. Abdominal:      General: There is no distension. Tenderness:  There is no abdominal tenderness. Musculoskeletal:      Cervical back: Neck supple. Comments: Palpable thrill left upper extremity dialysis site   Lymphadenopathy:      Cervical: No cervical adenopathy. Skin:     Capillary Refill: Capillary refill takes less than 2 seconds. Findings: No erythema. Neurological:      Mental Status: She is alert and oriented to person, place, and time. Cranial Nerves: No cranial nerve deficit. Comments: Grossly intact          [facility]  ED Course as of Jul 01 1433   Thu Jul 01, 2021   0719 EKG sinus rhythm, poor baseline limiting evaluation. [BT]   0725 Excused myself from the room as patient has a family member that has walked and is actively [employer] [employer]. When she was asked to stop she would not. At that point as the patient looked well and seemed more interested in talking to her visitor of left. I will continue my valuation after her visitor is gone    [BT]   0803 Lengthy discussion with patient and family member. They are now much more receptive and calm. Patient does not wish to continue to get admitted to the hospital.  States she has had several prior admissions but her symptoms remain the same. Evaluate time as a family together. Patient states she does need a living will. Brief discussion about DNR/DNI, awaiting laboratory testing    [BT]   3317 Palliative team at the bedside, patient states her symptoms are well controlled. [BT]   [id number] Discussed with nephrology will do a gentle run of dialysis for symptom management. Palliative care has been DNI DNR paperwork. They recommend case management for home assistance. [BT]   1241 Patient getting short run of hemodialysis, anxious for discharge to home. Has a had a small amount of emesis.   Will redose Reglan    [BT]      ED Course User Index  [BT] Chester Lee MD       Critical Care  Performed by: Chester Lee MD  Authorized by: Chester Lee MD     Critical care provider statement:     Critical care time (minutes):  30

## 2021-07-01 NOTE — PROGRESS NOTES
TREATMENT SUMMARY   0140 unable to rinse back patient dialyzer clotted. Dr Lady Clark made aware. Patient in room ER 3 dialyzed for 3 hours. Tolerated tx well with without complaint or complications. LUE AVF functioning without complication accessing or BFR      2000 ml UF removed with a net UF removal of 1500 ml. Report given to ELSA Campbell with all questions answered.              TREATMENT NOTES                                                                                                ACUTE HEMODIALYSIS FLOW SHEET       PATIENT INFORMATION   [facility] Lady Salvage    []1st Time Acute  []Stat[x] Routine []Urgent []Chronic Unit   []Acute Room []Bedside  []ICU/CCU [x]ER   Isolation Precautions: [x]Dialysis[] Airborne []Contact []Droplet []Reverse    Special Considerations:_______  [] Blood Consent Verified  [x]N/A   Allergies:[] NKA                 [x] _  Bees [sting, Bee] High Swelling    Penicillins High Hives    Strawberry Medium Hives      ____________ Code Status []Full Code [] DNR  [x] Other_____   Diet: [x] Renal [] NPO [x] Diabetic   [] Enteral Feeding [] Cardiac Diabetic: [x]Yes []No     [x]Signed Treatment Consent Verified   [x] Time Out/ Safety Check   PRIMARY NURSE REPORT: FIRST INITIAL/ LAST NAME/TITLE  PRE DIALYSIS:      Stella                                     TIME:   ACCESS   CATHETER ACCESS: [x] N/A  [] RIGHT  [] LEFT  [] IJ  [] SUBCL [] FEM                    [] First use X-ray  [] Tunnel     [] Non-Tunneled      [] No S/S infection  [] Redness [] Drainage  [] Cultured [] Swelling [] Pain                    [] Medical Aseptic [] Prep Dressing Changed                  [] Clotted [] Patent []      Flows: [] Good [] Poor [] Reversed                 If Access Problem Dr. Lalitha Fatima: [] Yes [] No    Date:_____  [] N/A[]   GRAFT/FISTULA ACCESS:  [] N/A  [] RIGHT  [x] LEFT  [x] UE   [] LE       [] AVG  [x] AVF [] BUTTONHOLE    [x] +BRUIT/THRILL [x] MEDICAL ASEPTIC PREP     [x] No S/S infection  [] Redness [] Drainage  [] Cultured [] Swelling [] Pain              If Access Problem Dr. Mary Graham: [] Yes [] No    Date:______ [x] N/A   GENERAL ASSESSMENT   LUNGS:  SaO2% 100____ [x] Clear [] Coarse [] Crackles [] Wheezing               [] Diminished Location: [] RLL [] LLL [] RUL [] THALIA    COUGH:  [] Productive  [] Loose[x] Dry [] N/A  RESPIRATIONS: [x] Easy [] Labored    THERAPY: [x] RA   [] NC _____. L/min    Mask: [] NRB [] Venti  _____O2%                  [] Ventilator [] Intubated [] Trach [] BiPap [] CPap [] HI Flow   CARDIAC: [x] Regular [] Irregular [] Pericardial Rub [] JVD               Monitored Rhythm:______ [] N/A   EDEMA: [] None [x] Generalized [] Facial [] Pedal [] UE [x] LE             [x] Pitting [x] 1 [] 2 [] 3 [] 4    [] Right [] Left [] Bilateral   SKIN:    [x] Warm [] Hot [] Cold  [x] Dry [] Pale [] Diaphoretic              [] Flushed [] Jaundiced [] Cyanotic [] Rash [] Weeping     LOC:    [x] Alert  Oriented to: [x] Person [x] Place [x] Time             [] Confused [] Lethargic [] Medicated [] Non-responsive    GI/ABDOMEN: [] Flat [x] Distended [x] Soft [] Firm [] Diarrhea [] Bowel Sounds Present [] Nausea [] Vomiting    PAIN: [x] 0 [] 1 [] 2 [] 3 [] 4 [] 5 [] 6 [] 7 [] 8 [] 9 [] 10          Scale 1-10 Action/Follow Up_____   MOBILITY: [] Amb [] Amb/Assist [] Bed  [] Wheelchair    CURRENT LABS   HBsAg ONLY: Date Drawn:     07/01/2021        [x] Negative [] Positive [] Unknown.      HBsAb: Date Drawn:  07/01/2021          [] Susceptible <10 [x] Immune ?10 [] Unknown   Date of Current Labs:  ATTACHED     EDUCATION   Person Educated: [x] Patient [] Other_________   Knowledge base: [] None [x] Minimal [] Substantial    Barriers to learning  [x] None _______________   Preferred method of learning: [] Written [x] Oral [x] Visual [] Hands on    Topic: [x] Access Care [x] S&S of infection [x] Fluid Management   [x] K+  [x] Procedural  [] Albumin [] Medications [] Tx Options   [] Transplant [x] Diet [] Other    Teaching Tools: [x] Explain [] Demonstration [] Handout_____ [] Video______  CARE PLAN    [x] Renal Failure (Adult)  Interdisciplinary  · Fluid and electrolytes stabilized  ? Interventions  · Dehydration signs and symptoms (eg: Weight/lab monitoring; vomiting/diarrhea/urine; tenting; mucous membranes; dizziness/lethargy/irritability/confusion; weak pulse; tachycardia; blood pressure; I&O)  · Fluid overload signs and symptoms assessment (eg: Body weight increased; dyspnea; edema; hypertension; respiratory crackles/wheezing; JVD; lab monitoring; mental status changes; I&O)  · Monitor appropriate lab values  · COMPLIANCE WITH PRESCRIBED THERAPY  · ARTERIAL ACCESS SITE ASSESSMENT  · NUTRITION SCREENING  · Vital signs monitoring per assessed patient condition or unit standard  · Cardiac monitoring  · Hydration management  · Intake and output measurement  · Body weight monitoring  · Skin care  · DIALYSIS  · Nutrition Care Process per nutrition screen  · Oral hygiene care every 2 hours  · Pain management     · Outcome   ? [x] Progressing Towards Goal  ? [] Not Progressing Towards Goals  ? [] Goals Met/Resolved  ? [] Goals Not Met/ Resolved        · Patient/ Family Education  ? Progressing Towards Goals          RO/HEMODIAYLSIS MACHINE SAFETY CHECKS- BEFORE EACH TREATMENT          [] [facility]: Machine Serial #1:  [id number]   RO Serial #1: [id number]                       [] [facility]: Machine Serial #2:  [id number]   RO Serial #2: [id number]        [x] [facility]: Machine Serial #3:  [id number]    Serial #[id number]    Alarm Test: [x] Pass  Time_1215_______  [] RO/Machine Log Complete    [] Extracorporeal circuit Tested for integrity           Dialyzer__[id number]________   Tubing___20J15-10_________    Dialysate: pH_7.4__  Temp. _36___Conductivity: Meter _14.0___ HD Machine__14.1_   CHLORINE TESTING- BEFORE EACH TREATMENT AND EVERY 4 HOURS   Total Chlorine: [x] Less than 0.1 ppm Time:_1210____2nd Check Time:______  (If greater than 0.1 ppm from Primary then every 30 minutes from Secondary)   TREATMENT INIATION-WITH DIALYSIS PRECAUTIONS   [x] All Connections Secured   [x] Saline Line Double Clamped    [x] Venous Parameters Set [x] Arterial Parameters Set    [x] Prime Given 250 ml     [x] Air Foam Detector Engaged   PRE-TREATMENT   UF Calculations: Wt to lose:_2000___ml(+) Oral:__ml(+)IV Meds/Fluids/Blood prods___ml(+) Prime/Rinse_500__ml(=)Total UF Goal_2500___mL   Scale Type:[x] Bed scale [] Sling Scale [] Wheel Chair Scale []  Not Ordered [] [] Unable to obtain pt on stretcher/ bed scale malfunctioning   Tx Initiation Note:   Received patient in bed semi fowlers position. Leftt upper arm AVF positive bruit and thrill present. No s/s of infection noted. Same prepped aseptically and cannulated x 2 #15 gauge needles. Treatment initiated as per MD order without incident. Plan to remove 2 liters for 1.5 hours while monitoring patient's well-being and vital signs.    [x] Time Out/Safety Check  Time:__1230___   INTRADIALYTIC MONITORING  (SEE ATTACHED FLOWSHEET)     POST TREATMENT    Time Medication Dose Volume Route    Initials                                           [facility] Signatures Title Initials Time                     Dialyzer cleared: [] Good [] Fair [x] Poor     Blood Processed __68.6_Liters    Net UF Removed _1500___mL  Post Tx Access:                  AVF/AVG: Bleeding Stop       Art._5__min Josef._5___min [x]+bruit/thrill                Catheter: Locking Solution [] Heparin 1 ml/1000 units  [] Normal Saline                                                                               Art._____ ml Josef._____ml  Post Assessment:              Skin: [x]Warm [x]Dry []Diaphoretic []Flushed [] Pale []Cyanotic            Lungs: [x]Clear []Coarse []Crackles []Wheezing             Cardiac: [x]Regular []Irregular  []Monitored rhythm____ []N/A            Edema: []None [x]General []Facial []Pedal [x]UE [x]LE [x]RIGHT [x]LEFT            Pain: []0 [x]1 []2 []3 []4 []5 []6 []7 []8 []9 []10   POST Tx Note:    HD treatment completed and tolerated well. Unable to rinse back patient due to dialyzer clotting. Dr. Mary Renee made aware. Fistula needles removed, post bleeding WNL. Gauze/Tape dressing applied to puncture site. No active bleeding noted. Pt. remains in bed ER 3in stable condition. 2L of O2 via nasal cannula in place. No acute complaints or distress noted. Primary Nurse Report: First initial/Last name/Title    Post Dialysis:___R.  Sera Ramachandran RN_______________________         Time:___1615_____________    Abbreviations: AVG-arterial venous graft, AVF-arterial venous fistula, IJ-Internal Jugular,  Subcl-Subclavian, Fem-Femoral, Tx-treatment, AP/HR-apical heart rate, DFR-dialysate flow rate, BFR-blood flow rate, AP-arterial pressure, -venous pressure, UF-ultrafiltrate, TMP-transmembrane pressure, Josef-Venous, Art-Arterial, RO-Reverse Osmosis

## 2021-07-01 NOTE — ED NOTES
I have reviewed discharge instructions with the patient. The patient verbalized understanding. Pt left ED in stable condition with via wheel chair. Pt left ED in stable condition .

## 2021-07-01 NOTE — PROGRESS NOTES
Intervention: Monitor/Manage Fluid Electrolyte/Acid Base Balance    PROBLEM: HEMODIALYSIS ADULT    INTERVENTION: Hemodynamic stabilization  Maintain BP WNL for this patient while on hemodialysis    INTERVENTION: Fluid Management  Will attempt 1500 ml total fluid removal as tolerated    INTERVENTION: Metabolic / Electrolyte Imbalance Management  2 K+ potassium, 3 Ca dialysate bath today with dialysis    GOAL: Signs and symptoms of listed potential problems will be absent or manageable  (reference Hemodialysis ADULT CPG)    OUTCOME: Progressing  HD planned for 3 hours today      Problem: Chronic Renal Failure  Goal: *Fluid and electrolytes stabilized  Outcome: Progressing Towards Goal     Problem: Patient Education: Go to Patient Education Activity  Goal: Patient/Family Education  Outcome: Progressing Towards Goal

## 2021-07-01 NOTE — ED TRIAGE NOTES
Pt was at HD this AM when she started having chest pain. HD discontinued after 10-15 minutes. Pt had similar chest pain Monday that resolved on its own. Pt has cardiac hx. EMS ekg showed NSR.

## 2021-07-01 NOTE — DISCHARGE INSTRUCTIONS
Please call your doctor today. Review with them adding extra sessions of dialysis as your session today was cut short. Please return for increasing pain, weakness, shortness of breath or any other concerning symptoms.

## 2021-07-01 NOTE — ACP (ADVANCE CARE PLANNING)
Advance Care Planning     Advance Care Planning (ACP) Conversation    Date of Conversation: 7/1/2021  Conducted with: Patient with Decision Making Capacity; Latisha Davidson (daughter); Carlos De La Paz NP    Healthcare Decision Maker: Today we discussed Healthcare Decision Makers. The patient completed AMD paperwork naming her daughters Leticia Santillan and Bhavna Gerber as her surrogate decision makers    Care Preferences:  Hospitalization: \"If your health worsens and it becomes clear that your chance of recovery is unlikely, what would be your preference regarding hospitalization? \"  The patient would prefer hospitalization. Ventilation: \"If you were unable to breathe on your own and your chance of recovery was unlikely, what would be your preference about the use of a ventilator (breathing machine) if it was available to you? \"   The patient would NOT desire the use of a ventilator. Resuscitation: \"In the event your heart stopped as a result of an underlying serious health condition, would you want attempts to be made to restart your heart, or would you prefer a natural death? \"   No, do NOT attempt to resuscitate. Additional topics discussed: treatment goals, benefit/burden of treatment options, ventilation preferences, hospitalization preferences and resuscitation preferences    Advanced Steps [facility] (Physician Orders for Scope of Treatment)    Participants:   [x] Patient    [] Healthcare agent (already designated in existing ACP document)    Name: Latisha Davidson  Relationship to Patient: daughter    Phone number: [phone]  Other persons present:   Name: Carlos De La Paz NP    Conversation Topics   Understanding of Medical Condition/s AND Potential Complications: Ms Alexandru Vela has a good understanding of her co-morbidities. She understands that many of her issues impact each other.  She is clear that she wants to continue all current medical treatment, including dialysis, as she wants to be involved in the lives of her children and grandchildren. Cardiopulmonary Resuscitation    Order Elected for CPR:  []  Attempt Resuscitation [x]  Do Not Attempt Resuscitation  When NOT in Cardiopulmonary Arrest, Order Elected:    [] Comfort Measures  [x] Limited Additional Interventions  [] Full Interventions  Artificially Administered Nutrition, Order Elected:  [x] No Feeding Tube   [] Feeding Tube for a defined trial period  [] Feeding Tube long-term if indicated  The following was provided (check all that apply):    [x] Review of existing Advance Directive  [x] Assistance with Completion of New Advance Directive   [x] Review of [place] Form     Meeting Outcomes:   [x] ACP discussion completed   [x] [place] form completed  [x] [place] prepared for Provider review and signature   [x] Original placed on Chart, if in facility (form to be sent with patient at discharge)  [x] Copy given to healthcare agent    [x] Copy scanned to electronic medical record    7/1/2021 Seen today in room ED 5. Awake, alert. Oriented x 4. Engaged in conversation. Her daughter Wolfgang Simon was in the room. Respirations unlabored. Oxygen on per NC . Lives in single floor home. She lives alone but has family in and out frequently. She has a dog who brings her vane. She uses a walker for ambulation and admits to having multiple falls recently. She does a lot of her own cooking but needs to pace herself as she tires easily, particularly after dialysis. She wears oxygen at all times at home and uses CPAP at night. Introduced the role of palliative medicine for the hospitalized patient. Asked if she had ever completed and AMD for decision making and she said she had not. Offered her the ability to do so and she agreed. Discussion about code status also held. Described the four components of cardiac resuscitation: compressions, medications, electric shucks, and intubation/ventilation.  Reviewed benefits and burdens to include fractured ribs, punctured lungs, hypoxic brain injury, and long-term ventilation with need for discussion of tracheostomy or compassionate extubation. She said that she wanted to keep on her current path of medical care but did not want to be resuscitated. POST completed. Her daughter assented. CODE STATUS:  DNR/DNI; limited interventions; no decision about feeding tube    Disposition plan: Anticipate that she will go home. ED attending asked to consult care management to see if home care assistance was feasible.     Palliative Medicine remains available for any questions or concerns    Leisa Pinedo RN, MSN  P: [phone]

## 2021-10-04 ENCOUNTER — APPOINTMENT (OUTPATIENT)
Dept: GENERAL RADIOLOGY | Age: 56
End: 2021-10-04
Attending: PHYSICIAN ASSISTANT
Payer: MEDICAID

## 2021-10-04 ENCOUNTER — APPOINTMENT (OUTPATIENT)
Dept: CT IMAGING | Age: 56
End: 2021-10-04
Attending: PHYSICIAN ASSISTANT
Payer: MEDICAID

## 2021-10-04 ENCOUNTER — HOSPITAL ENCOUNTER (EMERGENCY)
Age: 56
Discharge: HOME | End: 2021-10-05
Attending: EMERGENCY MEDICINE
Payer: MEDICAID

## 2021-10-04 VITALS
BODY MASS INDEX: 53.92 KG/M2 | SYSTOLIC BLOOD PRESSURE: 128 MMHG | RESPIRATION RATE: 13 BRPM | WEIGHT: 293 LBS | TEMPERATURE: 98.2 F | HEART RATE: 76 BPM | DIASTOLIC BLOOD PRESSURE: 68 MMHG | OXYGEN SATURATION: 99 % | HEIGHT: 62 IN

## 2021-10-04 DIAGNOSIS — R51.9 NONINTRACTABLE HEADACHE, UNSPECIFIED CHRONICITY PATTERN, UNSPECIFIED HEADACHE TYPE: ICD-10-CM

## 2021-10-04 DIAGNOSIS — R42 DIZZINESS: Primary | ICD-10-CM

## 2021-10-04 DIAGNOSIS — R53.83 FATIGUE, UNSPECIFIED TYPE: ICD-10-CM

## 2021-10-04 DIAGNOSIS — R07.89 ATYPICAL CHEST PAIN: ICD-10-CM

## 2021-10-04 LAB
ALBUMIN SERPL-MCNC: 3.4 G/DL (ref 3.4–5)
ALBUMIN/GLOB SERPL: 0.8 {RATIO} (ref 0.8–1.7)
ALP SERPL-CCNC: 127 U/L (ref 45–117)
ALT SERPL-CCNC: 17 U/L (ref 13–56)
ANION GAP SERPL CALC-SCNC: 8 MMOL/L (ref 3–18)
AST SERPL-CCNC: 26 U/L (ref 10–38)
ATRIAL RATE: 67 BPM
BASOPHILS # BLD: 0.1 K/UL (ref 0–0.1)
BASOPHILS NFR BLD: 1 % (ref 0–2)
BILIRUB SERPL-MCNC: 0.3 MG/DL (ref 0.2–1)
BNP SERPL-MCNC: 789 PG/ML (ref 0–900)
BUN SERPL-MCNC: 40 MG/DL (ref 7–18)
BUN/CREAT SERPL: 11 (ref 12–20)
CALCIUM SERPL-MCNC: 9.2 MG/DL (ref 8.5–10.1)
CALCULATED P AXIS, ECG09: 45 DEGREES
CALCULATED R AXIS, ECG10: 42 DEGREES
CALCULATED T AXIS, ECG11: 45 DEGREES
CHLORIDE SERPL-SCNC: 101 MMOL/L (ref 100–111)
CK MB CFR SERPL CALC: NORMAL % (ref 0–4)
CK MB SERPL-MCNC: <1 NG/ML (ref 5–25)
CK SERPL-CCNC: 80 U/L (ref 26–192)
CO2 SERPL-SCNC: 28 MMOL/L (ref 21–32)
CREAT SERPL-MCNC: 3.72 MG/DL (ref 0.6–1.3)
DIAGNOSIS, 93000: NORMAL
DIFFERENTIAL METHOD BLD: ABNORMAL
EOSINOPHIL # BLD: 0.3 K/UL (ref 0–0.4)
EOSINOPHIL NFR BLD: 3 % (ref 0–5)
ERYTHROCYTE [DISTWIDTH] IN BLOOD BY AUTOMATED COUNT: 14.6 % (ref 11.6–14.5)
GLOBULIN SER CALC-MCNC: 4.4 G/DL (ref 2–4)
GLUCOSE SERPL-MCNC: 99 MG/DL (ref 74–99)
HCT VFR BLD AUTO: 38.6 % (ref 35–45)
HGB BLD-MCNC: 12.1 G/DL (ref 12–16)
LIPASE SERPL-CCNC: 129 U/L (ref 73–393)
LYMPHOCYTES # BLD: 1.1 K/UL (ref 0.9–3.6)
LYMPHOCYTES NFR BLD: 11 % (ref 21–52)
MAGNESIUM SERPL-MCNC: 2.2 MG/DL (ref 1.6–2.6)
MCH RBC QN AUTO: 27.3 PG (ref 24–34)
MCHC RBC AUTO-ENTMCNC: 31.3 G/DL (ref 31–37)
MCV RBC AUTO: 87.1 FL (ref 78–100)
MONOCYTES # BLD: 0.8 K/UL (ref 0.05–1.2)
MONOCYTES NFR BLD: 8 % (ref 3–10)
NEUTS SEG # BLD: 7.4 K/UL (ref 1.8–8)
NEUTS SEG NFR BLD: 77 % (ref 40–73)
P-R INTERVAL, ECG05: 144 MS
PLATELET # BLD AUTO: 221 K/UL (ref 135–420)
PMV BLD AUTO: 9.4 FL (ref 9.2–11.8)
POTASSIUM SERPL-SCNC: 4.3 MMOL/L (ref 3.5–5.5)
PROT SERPL-MCNC: 7.8 G/DL (ref 6.4–8.2)
Q-T INTERVAL, ECG07: 428 MS
QRS DURATION, ECG06: 86 MS
QTC CALCULATION (BEZET), ECG08: 452 MS
RBC # BLD AUTO: 4.43 M/UL (ref 4.2–5.3)
SODIUM SERPL-SCNC: 137 MMOL/L (ref 136–145)
TROPONIN I SERPL-MCNC: <0.02 NG/ML (ref 0–0.04)
VENTRICULAR RATE, ECG03: 67 BPM
WBC # BLD AUTO: 9.7 K/UL (ref 4.6–13.2)

## 2021-10-04 PROCEDURE — 83690 ASSAY OF LIPASE: CPT

## 2021-10-04 PROCEDURE — 99284 EMERGENCY DEPT VISIT MOD MDM: CPT

## 2021-10-04 PROCEDURE — 85025 COMPLETE CBC W/AUTO DIFF WBC: CPT

## 2021-10-04 PROCEDURE — 71045 X-RAY EXAM CHEST 1 VIEW: CPT

## 2021-10-04 PROCEDURE — 80053 COMPREHEN METABOLIC PANEL: CPT

## 2021-10-04 PROCEDURE — 82553 CREATINE MB FRACTION: CPT

## 2021-10-04 PROCEDURE — 96360 HYDRATION IV INFUSION INIT: CPT

## 2021-10-04 PROCEDURE — 83880 ASSAY OF NATRIURETIC PEPTIDE: CPT

## 2021-10-04 PROCEDURE — 83735 ASSAY OF MAGNESIUM: CPT

## 2021-10-04 PROCEDURE — 70450 CT HEAD/BRAIN W/O DYE: CPT

## 2021-10-04 PROCEDURE — 93005 ELECTROCARDIOGRAM TRACING: CPT

## 2021-10-05 PROCEDURE — 96360 HYDRATION IV INFUSION INIT: CPT

## 2021-10-05 PROCEDURE — 74011250636 HC RX REV CODE- 250/636: Performed by: PHYSICIAN ASSISTANT

## 2021-10-05 RX ADMIN — SODIUM CHLORIDE 250 ML: 900 INJECTION, SOLUTION INTRAVENOUS at 00:26

## 2021-10-05 NOTE — ED TRIAGE NOTES
Patient arrives via wheelchair with c/c of feeling weak and lightheaded, onset today. She reports she went to dialysis this morning and normally she feels wiped out on Mondays after dialysis but today her symptoms were much  worse. Her dialysis days are MWF. She also reports some nausea and vomiting earlier today but none now.

## 2021-10-05 NOTE — ED PROVIDER NOTES
EMERGENCY DEPARTMENT HISTORY AND PHYSICAL EXAM    Date: 10/4/2021  Patient Name: Sarah Gibbs    History of Presenting Illness     Chief Complaint   Patient presents with    Dizziness    Fatigue         History Provided By: Patient    Chief Complaint: dizziness       Additional History (Context):   9:53 PM  Sarah Gibbs is a 61 y.o. female with PMHX diabetes, palpitations, hypertension, asthma, COPD, ESRD on dialysis, stroke presents to the emergency department C/O dizziness and fatigue that started earlier today around 11:30 PM after she finished her dialysis. Patient states she does have a headache and dizziness and shortness of breath with chest pain it has been going on since that time. No new leg swelling. PCP: Sandy Saravia MD    Current Outpatient Medications   Medication Sig Dispense Refill    ferrous sulfate 325 mg (65 mg iron) tablet Take 1 Tab by mouth daily (with breakfast). 30 Tab 2    hydrALAZINE (APRESOLINE) 50 mg tablet Take 1 Tab by mouth three (3) times daily. 90 Tab 1    gabapentin (NEURONTIN) 300 mg capsule Take 1 Cap by mouth three (3) times daily. Max Daily Amount: 900 mg. 90 Cap 1    insulin glargine (LANTUS) 100 unit/mL injection 20 Units by SubCUTAneous route daily. 1 Vial 0    insulin lispro (HUMALOG) 100 unit/mL injection 6 Units by SubCUTAneous route Before breakfast, lunch, and dinner. 1 Vial 0    carvediloL (Coreg) 6.25 mg tablet Take 6.25 mg by mouth two (2) times daily (with meals).  amLODIPine (Norvasc) 5 mg tablet Take 5 mg by mouth daily.  insulin NPH/insulin regular (NovoLIN 70/30 U-100 Insulin) 100 unit/mL (70-30) injection by SubCUTAneous route Before breakfast, lunch, and dinner. Sliding scale not to exceed 40 units per dose.  fluticasone propion-salmeteroL (Advair Diskus) 100-50 mcg/dose diskus inhaler Take 1 Puff by inhalation every twelve (12) hours.  aspirin delayed-release 81 mg tablet Take 81 mg by mouth daily.       acetaminophen (TYLENOL) 325 mg tablet Take 650 mg by mouth every four (4) hours as needed for Pain.  cholecalciferol (Vitamin D3) (1000 Units /25 mcg) tablet Take  by mouth daily.  albuterol-ipratropium (DUO-NEB) 2.5 mg-0.5 mg/3 ml nebu Take 3 mL by inhalation two (2) times a day.  calcium acetate,phosphat bind, (PHOSLO) 667 mg cap Take 1 Cap by mouth three (3) times daily (with meals). 90 Cap 0    albuterol sulfate 90 mcg/actuation aepb Take 1-2 Puffs by inhalation every four (4) hours as needed. 1 Inhaler 0    atorvastatin (LIPITOR) 40 mg tablet Take 40 mg by mouth daily. Past History     Past Medical History:  Past Medical History:   Diagnosis Date    Asthma     Note: As child had asthma    [facility]     Diabetes mellitus ([facility]     Type 2    ESRD on dialysis ([facility]     mwf    Heart palpitations     Hypertension     Ill-defined condition     fibromyalgia     Morbid obesity ([facility]     MRSA infection 8/2014    Stroke ([facility] 06/02/2018       Past Surgical History:  Past Surgical History:   Procedure Laterality Date    HX CATARACT REMOVAL      HX CHOLECYSTECTOMY      HX TUBAL LIGATION      IR INSERT TUNL CVC W/O PORT OVER 5 YR  4/21/2020    HI BREAST SURGERY PROCEDURE UNLISTED      cyst removed       Family History:  Family History   Problem Relation Age of Onset    Heart Attack Mother     Heart Attack Maternal Grandmother        Social History:  Social History     Tobacco Use    Smoking status: Never Smoker    Smokeless tobacco: Never Used   Substance Use Topics    Alcohol use: No    Drug use: No       Allergies: Allergies   Allergen Reactions    Bees [Sting, Bee] Swelling    Penicillins Hives    [place] Hives       Review of Systems   Review of Systems   Constitutional: Positive for fatigue. Negative for chills and fever. HENT: Negative for congestion. Eyes: Negative for visual disturbance. Respiratory: Positive for shortness of breath. Cardiovascular: Positive for chest pain and palpitations. Negative for leg swelling. Gastrointestinal: Positive for nausea. Negative for abdominal pain, diarrhea and vomiting. Musculoskeletal: Negative for back pain. Neurological: Positive for dizziness, weakness, light-headedness and headaches. Negative for syncope, speech difficulty and numbness. All other systems reviewed and are negative. Physical Exam     Vitals:    10/04/21 2136 10/04/21 2316 10/04/21 2318 10/04/21 2321   BP: (!) 117/90 134/69 124/60 128/68   Pulse: 68   76   Resp: 18   13   Temp: 98.2 °F (36.8 °C)      SpO2: 94% 100% 100% 99%   Weight: 136.1 kg (300 lb)      Height: 5' 2\" (1.575 m)        Physical Exam  Vitals and nursing note reviewed. Constitutional:       Appearance: She is well-developed. Comments: [place] obese female lying on stretcher nontoxic no acute distress   HENT:      Head: Normocephalic and atraumatic. Cardiovascular:      Rate and Rhythm: Normal rate and regular rhythm. Heart sounds: Normal heart sounds. No murmur heard. Pulmonary:      Effort: Pulmonary effort is normal. No respiratory distress. Breath sounds: Normal breath sounds. No wheezing or rales. Comments: Lungs are clear to auscultation bilaterally  Chest:      Chest wall: No tenderness. Abdominal:      General: Bowel sounds are normal.      Palpations: Abdomen is soft. Tenderness: There is no abdominal tenderness. Musculoskeletal:      Cervical back: Normal range of motion and neck supple. Comments: 1+ pitting edema bilaterally no calf tenderness redness or increased warmth   Neurological:      General: No focal deficit present. Mental Status: She is alert and oriented to person, place, and time. Cranial Nerves: No cranial nerve deficit. Sensory: No sensory deficit. Motor: No weakness.       Coordination: Coordination normal.   Psychiatric:         Judgment: Judgment normal. Diagnostic Study Results     Labs:     Recent Results (from the past 12 hour(s))   EKG, 12 LEAD, INITIAL    Collection Time: 10/04/21  9:53 PM   Result Value Ref Range    Ventricular Rate 67 BPM    Atrial Rate 67 BPM    P-R Interval 144 ms    QRS Duration 86 ms    Q-T Interval 428 ms    QTC Calculation (Bezet) 452 ms    Calculated P Axis 45 degrees    Calculated R Axis 42 degrees    Calculated T Axis 45 degrees    Diagnosis       Normal sinus rhythm  Cannot rule out Anterior infarct , age undetermined  Abnormal ECG  When compared with ECG of 01-JUL-2021 07:05,  No significant change was found  Confirmed by Sarah Pompa MD. (2014) on 10/4/2021 10:52:52 PM     CBC WITH AUTOMATED DIFF    Collection Time: 10/04/21  9:57 PM   Result Value Ref Range    WBC 9.7 4.6 - 13.2 K/uL    RBC 4.43 4.20 - 5.30 M/uL    HGB 12.1 12.0 - 16.0 g/dL    HCT 38.6 35.0 - 45.0 %    MCV 87.1 78.0 - 100.0 FL    MCH 27.3 24.0 - 34.0 PG    MCHC 31.3 31.0 - 37.0 g/dL    RDW 14.6 (H) 11.6 - 14.5 %    PLATELET 780 487 - 254 K/uL    MPV 9.4 9.2 - 11.8 FL    NEUTROPHILS 77 (H) 40 - 73 %    LYMPHOCYTES 11 (L) 21 - 52 %    MONOCYTES 8 3 - 10 %    EOSINOPHILS 3 0 - 5 %    BASOPHILS 1 0 - 2 %    ABS. NEUTROPHILS 7.4 1.8 - 8.0 K/UL    ABS. LYMPHOCYTES 1.1 0.9 - 3.6 K/UL    ABS. MONOCYTES 0.8 0.05 - 1.2 K/UL    ABS. EOSINOPHILS 0.3 0.0 - 0.4 K/UL    ABS.  BASOPHILS 0.1 0.0 - 0.1 K/UL    DF AUTOMATED     METABOLIC PANEL, COMPREHENSIVE    Collection Time: 10/04/21  9:57 PM   Result Value Ref Range    Sodium 137 136 - 145 mmol/L    Potassium 4.3 3.5 - 5.5 mmol/L    Chloride 101 100 - 111 mmol/L    CO2 28 21 - 32 mmol/L    Anion gap 8 3.0 - 18 mmol/L    Glucose 99 74 - 99 mg/dL    BUN 40 (H) 7.0 - 18 MG/DL    Creatinine 3.72 (H) 0.6 - 1.3 MG/DL    BUN/Creatinine ratio 11 (L) 12 - 20      GFR est AA 15 (L) >60 ml/min/1.73m2    GFR est non-AA 13 (L) >60 ml/min/1.73m2    Calcium 9.2 8.5 - 10.1 MG/DL    Bilirubin, total 0.3 0.2 - 1.0 MG/DL    ALT (SGPT) 17 13 - 56 U/L    AST (SGOT) 26 10 - 38 U/L    Alk. phosphatase 127 (H) 45 - 117 U/L    Protein, total 7.8 6.4 - 8.2 g/dL    Albumin 3.4 3.4 - 5.0 g/dL    Globulin 4.4 (H) 2.0 - 4.0 g/dL    A-G Ratio 0.8 0.8 - 1.7     MAGNESIUM    Collection Time: 10/04/21  9:57 PM   Result Value Ref Range    Magnesium 2.2 1.6 - 2.6 mg/dL   CARDIAC PANEL,(CK, CKMB & TROPONIN)    Collection Time: 10/04/21  9:57 PM   Result Value Ref Range    CK - MB <1.0 <3.6 ng/ml    CK-MB Index  0.0 - 4.0 %     CALCULATION NOT PERFORMED WHEN RESULT IS BELOW LINEAR LIMIT    CK 80 26 - 192 U/L    Troponin-I, QT <0.02 0.0 - 0.045 NG/ML   NT-PRO BNP    Collection Time: 10/04/21  9:57 PM   Result Value Ref Range    NT pro- 0 - 900 PG/ML   LIPASE    Collection Time: 10/04/21  9:57 PM   Result Value Ref Range    Lipase 129 73 - 393 U/L       Radiologic Studies:   CT HEAD WO CONT   Final Result      1. No acute intracranial abnormalities. Specifically, no hemorrhage, mass effect   or CT evident acute cortical infarction. 2. Mild sequelae of chronic microvascular ischemia. XR CHEST PORT   Final Result      No acute findings in the chest.         CT Results  (Last 48 hours)               10/04/21 2256  CT HEAD WO CONT Final result    Impression:      1. No acute intracranial abnormalities. Specifically, no hemorrhage, mass effect   or CT evident acute cortical infarction. 2. Mild sequelae of chronic microvascular ischemia. Narrative:  EXAM: CT head without contrast       CLINICAL INDICATION/HISTORY: Dizziness      --Additional history: None. COMPARISON: 03/06/2020       TECHNIQUE: Axial CT imaging of the head was performed without intravenous   contrast. One or more dose reduction techniques were used on this CT: automated   exposure control, adjustment of the mAs and/or kVp according to patient size,   and iterative reconstruction techniques.   The specific techniques used on this   CT exam have been documented in the patient's electronic medical record. [employer] in [address] [place] [address] (DICOM) format image data are   available to nonaffiliated external healthcare facilities or entities on a   secure, media free, reciprocally searchable basis with patient authorization for   at least a 12 month period after this study. _______________       FINDINGS:       CALVARIUM: Intact. SOFT TISSUES/SCALP: Normal.       SINUSES: Clear. BRAIN AND POSTERIOR FOSSA: The sulci, folia, ventricles and basal cisterns are   within normal limits for the patient's age. There is no intracranial   hemorrhage, mass effect, or midline shift. There are scattered periventricular   and subcortical white matter hypodensities present consistent with nonspecific   gliosis. This is most commonly associated with sequelae of chronic microvascular   ischemia. EXTRA-AXIAL SPACES AND MENINGES: There are no abnormal extra-axial fluid   collections. OTHER: There are atherosclerotic calcifications of the carotid siphons and   intradural vertebral arteries. _______________               CXR Results  (Last 48 hours)               10/04/21 2208  XR CHEST PORT Final result    Impression:      No acute findings in the chest.        Narrative:  EXAM: FRONTAL CHEST RADIOGRAPH       CLINICAL INDICATION/HISTORY: Fatigue       COMPARISON: 7/1/2021       TECHNIQUE: Frontal view of the chest       _______________       FINDINGS:       HEART AND MEDIASTINUM: Stable cardiomediastinal silhouette. LUNGS AND PLEURAL SPACES: No focal pulmonary opacities. No visible pleural   abnormalities. BONY THORAX AND SOFT TISSUES: Unremarkable.       _______________                 Medical Decision Making   I am the first provider for this patient. I reviewed the vital signs, available nursing notes, past medical history, past surgical history, family history and social history.     Vital Signs: Reviewed the patient's vital signs.    Pulse Oximetry Analysis: 99% on RA     Cardiac Monitor:  Rate: 76 bpm  Rhythm: NSR    EKG interpretation: (Preliminary)  9:53 PM   Normal sinus rhythm rate 67 bpm  EKG read by Lilia Field at 2157     Records Reviewed: Nursing Notes and Old Medical Records    Procedures:  Procedures    ED Course:   9:53 PM Initial assessment performed. The patients presenting problems have been discussed, and they are in agreement with the care plan formulated and outlined with them. I have encouraged them to ask questions as they arise throughout their visit. Case discussed with Lilia Field, ED attending, agrees with w/u    Discussion:  Pt presents with dizziness lightheadedness fatigue chest pain shortness of breath and headache that started after her dialysis session. She is overall benign exam with stable vital signs. Work-up unremarkable. Head CT shows microvascular changes. Not orthostatic. Patient feeling better after 250 L IV saline and requesting discharge. Feel she is stable discharge home strict return precautions given, pt offering no questions or complaints. Diagnosis and Disposition     DISCHARGE NOTE:  Frances Reyblanca  results have been reviewed with her. She has been counseled regarding her diagnosis, treatment, and plan. She verbally conveys understanding and agreement of the signs, symptoms, diagnosis, treatment and prognosis and additionally agrees to follow up as discussed. She also agrees with the care-plan and conveys that all of her questions have been answered. I have also provided discharge instructions for her that include: educational information regarding their diagnosis and treatment, and list of reasons why they would want to return to the ED prior to their follow-up appointment, should her condition change. She has been provided with education for proper emergency department utilization. CLINICAL IMPRESSION:    1. Dizziness    2. Fatigue, unspecified type    3. Atypical chest pain    4. Nonintractable headache, unspecified chronicity pattern, unspecified headache type        PLAN:  1. D/C Home  2. Discharge Medication List as of 10/5/2021  1:22 AM        3. Follow-up Information     Follow up With Specialties Details Why Contact Info    Puja Brooks MD Internal Medicine Schedule an appointment as soon as possible for a visit   [address] [place] [address]  [phone]      [facility] EMERGENCY DEPT Emergency Medicine  If symptoms worsen [address] [place] [address]  [phone]            Please note that this dictation was completed with [employer], the computer voice recognition software. Quite often unanticipated grammatical, syntax, homophones, and other interpretive errors are inadvertently transcribed by the computer software. Please disregard these errors. Please excuse any errors that have escaped final proofreading.

## 2021-10-28 ENCOUNTER — PATIENT OUTREACH (OUTPATIENT)
Dept: CASE MANAGEMENT | Age: 56
End: 2021-10-28

## 2022-01-28 ENCOUNTER — HOSPITAL ENCOUNTER (EMERGENCY)
Age: 57
Discharge: HOME | End: 2022-01-28
Attending: EMERGENCY MEDICINE
Payer: MEDICARE

## 2022-01-28 VITALS
WEIGHT: 293 LBS | SYSTOLIC BLOOD PRESSURE: 151 MMHG | DIASTOLIC BLOOD PRESSURE: 73 MMHG | BODY MASS INDEX: 54.87 KG/M2 | HEART RATE: 79 BPM | RESPIRATION RATE: 16 BRPM | OXYGEN SATURATION: 96 %

## 2022-01-28 DIAGNOSIS — R19.7 DIARRHEA, UNSPECIFIED TYPE: ICD-10-CM

## 2022-01-28 DIAGNOSIS — E16.2 HYPOGLYCEMIA: Primary | ICD-10-CM

## 2022-01-28 LAB
ALBUMIN SERPL-MCNC: 3.5 G/DL (ref 3.4–5)
ALBUMIN/GLOB SERPL: 0.7 {RATIO} (ref 0.8–1.7)
ALP SERPL-CCNC: 129 U/L (ref 45–117)
ALT SERPL-CCNC: 18 U/L (ref 13–56)
ANION GAP SERPL CALC-SCNC: 7 MMOL/L (ref 3–18)
AST SERPL-CCNC: 15 U/L (ref 10–38)
BASOPHILS # BLD: 0.1 K/UL (ref 0–0.1)
BASOPHILS NFR BLD: 1 % (ref 0–2)
BILIRUB SERPL-MCNC: 0.2 MG/DL (ref 0.2–1)
BUN SERPL-MCNC: 49 MG/DL (ref 7–18)
BUN/CREAT SERPL: 12 (ref 12–20)
CALCIUM SERPL-MCNC: 9.2 MG/DL (ref 8.5–10.1)
CHLORIDE SERPL-SCNC: 104 MMOL/L (ref 100–111)
CO2 SERPL-SCNC: 27 MMOL/L (ref 21–32)
CREAT SERPL-MCNC: 3.99 MG/DL (ref 0.6–1.3)
DIFFERENTIAL METHOD BLD: ABNORMAL
EOSINOPHIL # BLD: 0.4 K/UL (ref 0–0.4)
EOSINOPHIL NFR BLD: 7 % (ref 0–5)
ERYTHROCYTE [DISTWIDTH] IN BLOOD BY AUTOMATED COUNT: 15.2 % (ref 11.6–14.5)
GLOBULIN SER CALC-MCNC: 4.7 G/DL (ref 2–4)
GLUCOSE BLD STRIP.AUTO-MCNC: 128 MG/DL (ref 70–110)
GLUCOSE SERPL-MCNC: 98 MG/DL (ref 74–99)
HCT VFR BLD AUTO: 39.2 % (ref 35–45)
HGB BLD-MCNC: 11.8 G/DL (ref 12–16)
IMM GRANULOCYTES # BLD AUTO: 0 K/UL (ref 0–0.04)
IMM GRANULOCYTES NFR BLD AUTO: 1 % (ref 0–0.5)
LYMPHOCYTES # BLD: 0.8 K/UL (ref 0.9–3.6)
LYMPHOCYTES NFR BLD: 14 % (ref 21–52)
MCH RBC QN AUTO: 27.2 PG (ref 24–34)
MCHC RBC AUTO-ENTMCNC: 30.1 G/DL (ref 31–37)
MCV RBC AUTO: 90.3 FL (ref 78–100)
MONOCYTES # BLD: 0.5 K/UL (ref 0.05–1.2)
MONOCYTES NFR BLD: 8 % (ref 3–10)
NEUTS SEG # BLD: 4.2 K/UL (ref 1.8–8)
NEUTS SEG NFR BLD: 69 % (ref 40–73)
NRBC # BLD: 0 K/UL (ref 0–0.01)
NRBC BLD-RTO: 0 PER 100 WBC
PLATELET # BLD AUTO: 165 K/UL (ref 135–420)
PMV BLD AUTO: 9.2 FL (ref 9.2–11.8)
POTASSIUM SERPL-SCNC: 3.6 MMOL/L (ref 3.5–5.5)
PROT SERPL-MCNC: 8.2 G/DL (ref 6.4–8.2)
RBC # BLD AUTO: 4.34 M/UL (ref 4.2–5.3)
SARS-COV-2, COV2: NORMAL
SODIUM SERPL-SCNC: 138 MMOL/L (ref 136–145)
WBC # BLD AUTO: 6.1 K/UL (ref 4.6–13.2)

## 2022-01-28 PROCEDURE — 82962 GLUCOSE BLOOD TEST: CPT

## 2022-01-28 PROCEDURE — 93005 ELECTROCARDIOGRAM TRACING: CPT

## 2022-01-28 PROCEDURE — 80053 COMPREHEN METABOLIC PANEL: CPT

## 2022-01-28 PROCEDURE — 87324 CLOSTRIDIUM AG IA: CPT

## 2022-01-28 PROCEDURE — 85025 COMPLETE CBC W/AUTO DIFF WBC: CPT

## 2022-01-28 PROCEDURE — U0003 INFECTIOUS AGENT DETECTION BY NUCLEIC ACID (DNA OR RNA); SEVERE ACUTE RESPIRATORY SYNDROME CORONAVIRUS 2 (SARS-COV-2) (CORONAVIRUS DISEASE [COVID-19]), AMPLIFIED PROBE TECHNIQUE, MAKING USE OF HIGH THROUGHPUT TECHNOLOGIES AS DESCRIBED BY CMS-2020-01-R: HCPCS

## 2022-01-28 PROCEDURE — 99285 EMERGENCY DEPT VISIT HI MDM: CPT

## 2022-01-28 NOTE — ED NOTES
Pt has been having multiple episodes of diarrhea. MD made aware and order C-Diff stool sample. Stool sample sent to lab.

## 2022-01-28 NOTE — ED PROVIDER NOTES
18-year-old female ESRD on dialysis diabetes morbid obesity prior history of stroke presents to the emergency department with complaint of hypoglycemia at her dialysis. Patient was brought to the emergency department via 911 ambulance in route they gave her 2 tubes of oral glucose which did not raise her blood sugar above 36 she was given IM glucagon started to feel better. Arrived to the emergency department with a blood glucose of 126.   Patient also complaining of 2 days of cough and diarrhea unrelated to the reason 911 brought her to the hospital.           Past Medical History:   Diagnosis Date    Asthma     Note: As child had asthma    [facility]     Diabetes mellitus ([facility]     Type 2    ESRD on dialysis ([facility]     mwf    Heart palpitations     Hypertension     Ill-defined condition     fibromyalgia     Morbid obesity ([facility]     MRSA infection 8/2014    Stroke ([facility] 06/02/2018       Past Surgical History:   Procedure Laterality Date    HX CATARACT REMOVAL      HX CHOLECYSTECTOMY      HX TUBAL LIGATION      IR INSERT TUNL CVC W/O PORT OVER 5 YR  4/21/2020    ID BREAST SURGERY PROCEDURE UNLISTED      cyst removed         Family History:   Problem Relation Age of Onset    Heart Attack Mother     Heart Attack Maternal Grandmother        Social History     Socioeconomic History    Marital status: SINGLE     Spouse name: Not on file    Number of children: Not on file    Years of education: Not on file    Highest education level: Not on file   Occupational History    Not on file   Tobacco Use    Smoking status: Never Smoker    Smokeless tobacco: Never Used   Substance and Sexual Activity    Alcohol use: No    Drug use: No    Sexual activity: Never     Partners: Male   Other Topics Concern    Not on file   Social History Narrative    Not on file     Social Determinants of Health     Financial Resource Strain:     Difficulty of Paying Living Expenses: Not on file   Food Insecurity:     Worried About Running Out of Food in the Last Year: Not on file    Yaniv of Food in the Last Year: Not on file   Transportation Needs:     Lack of Transportation (Medical): Not on file    Lack of Transportation (Non-Medical): Not on file   Physical Activity:     Days of Exercise per Week: Not on file    Minutes of Exercise per Session: Not on file   Stress:     Feeling of Stress : Not on file   Social Connections:     Frequency of Communication with Friends and Family: Not on file    Frequency of Social Gatherings with Friends and Family: Not on file    Attends Faith Services: Not on file    Active Member of [address] [place] [address] [employer] or Organizations: Not on file    Attends Club or Organization Meetings: Not on file    Marital Status: Not on file   Intimate Partner Violence:     Fear of Current or Ex-Partner: Not on file    Emotionally Abused: Not on file    Physically Abused: Not on file    Sexually Abused: Not on file   Housing Stability:     Unable to Pay for Housing in the Last Year: Not on file    Number of [place] in the Last Year: Not on file    Unstable Housing in the Last Year: Not on file         ALLERGIES: Bees [sting, bee]; Penicillins; and [place]    Review of Systems   Constitutional: Positive for activity change and appetite change. Negative for chills, diaphoresis, fatigue and fever. HENT: Positive for congestion, postnasal drip and rhinorrhea. Negative for drooling, ear discharge, ear pain, facial swelling, hearing loss, mouth sores, nosebleeds, sinus pressure, sinus pain, sneezing, sore throat, tinnitus and trouble swallowing. Eyes: Negative. Respiratory: Positive for cough. Negative for choking, chest tightness, shortness of breath and stridor. Cardiovascular: Negative for chest pain and leg swelling. Gastrointestinal: Positive for diarrhea.  Negative for abdominal distention, abdominal pain, anal bleeding, blood in stool, constipation, nausea, rectal pain and vomiting. Endocrine: Negative. Genitourinary: Negative. Musculoskeletal: Negative. Neurological: Positive for light-headedness. Hematological: Negative. Psychiatric/Behavioral: Negative. Vitals:    01/28/22 1318   BP: (!) 163/70   Pulse: 79   Resp: 16   SpO2: 99%   Weight: 136.1 kg (300 lb)            Physical Exam  Vitals and nursing note reviewed. Constitutional:       General: She is not in acute distress. Appearance: Normal appearance. She is not ill-appearing, toxic-appearing or diaphoretic. HENT:      Head: Normocephalic and atraumatic. Right Ear: Tympanic membrane, ear canal and external ear normal.      Left Ear: Tympanic membrane, ear canal and external ear normal.      Nose: Congestion present. Mouth/Throat:      Mouth: Mucous membranes are moist.      Pharynx: Oropharynx is clear. No oropharyngeal exudate or posterior oropharyngeal erythema. Eyes:      General: No scleral icterus. Right eye: No discharge. Left eye: No discharge. Extraocular Movements: Extraocular movements intact. Conjunctiva/sclera: Conjunctivae normal.      Pupils: Pupils are equal, round, and reactive to light. Neck:      Vascular: No carotid bruit. Cardiovascular:      Rate and Rhythm: Normal rate and regular rhythm. Pulses: Normal pulses. Heart sounds: Murmur heard. No friction rub. No gallop. Pulmonary:      Effort: Pulmonary effort is normal. No respiratory distress. Breath sounds: No stridor. No wheezing, rhonchi or rales. Chest:      Chest wall: No tenderness. Abdominal:      General: Abdomen is flat. There is no distension. Palpations: Abdomen is soft. There is no mass. Tenderness: There is no abdominal tenderness. There is no guarding or rebound. Hernia: No hernia is present. Musculoskeletal:         General: No swelling, tenderness, deformity or signs of injury. Normal range of motion.       Cervical back: Normal range of motion and neck supple. No rigidity or tenderness. Lymphadenopathy:      Cervical: No cervical adenopathy. Skin:     General: Skin is warm and dry. Capillary Refill: Capillary refill takes less than 2 seconds. Coloration: Skin is pale. Findings: No bruising, lesion or rash. Neurological:      General: No focal deficit present. Mental Status: She is alert and oriented to person, place, and time. Mental status is at baseline. Psychiatric:         Mood and Affect: Mood normal.         Behavior: Behavior normal.         Thought Content: Thought content normal.          MDM  Number of Diagnoses or Management Options  Diarrhea, unspecified type  Hypoglycemia  Diagnosis management comments: 60-year-old female presents emergency department from dialysis with complaint of hypoglycemia. Patient also states she has had a mild cough as well as moderate diarrhea for the last 2 days. In 3 of her sugar was 37 in route EMS gave oral glucose and IM glucagon she arrived to the emergency department with a blood glucose of 110. IV was started basic labs were sent. EKG was done shows no ST or T wave elevation consistent with an acute myocardial infarction compared to patient's previous largely unchanged. No emergent anemia no emergent electrolyte abnormality. Due to cough patient was tested for COVID-19 due to copious diarrhea C. difficile was sent. No emergent condition identified after correction of hypoglycemia. Patient was given instructions as a person under investigation for COVID-19.   She is to follow-up with her dialysis center pending her test results and arrangements with nephrology         Procedures

## 2022-01-28 NOTE — ED TRIAGE NOTES
Pt went to [facility] this morning and was on for 15 minutes and started to feel funny. Pt was sent home and her sugars went lower and lower. This morning it was taken and was 455 when ems got there is was 26. Pt remembers taken short acting humalog but doesn't remember much else pt only had an egg this morning.

## 2022-01-29 LAB
ATRIAL RATE: 82 BPM
C DIFF GDH STL QL: NEGATIVE
C DIFF TOX A+B STL QL IA: NEGATIVE
CALCULATED P AXIS, ECG09: 47 DEGREES
CALCULATED R AXIS, ECG10: 52 DEGREES
CALCULATED T AXIS, ECG11: 42 DEGREES
DIAGNOSIS, 93000: NORMAL
INTERPRETATION: NORMAL
P-R INTERVAL, ECG05: 134 MS
Q-T INTERVAL, ECG07: 380 MS
QRS DURATION, ECG06: 90 MS
QTC CALCULATION (BEZET), ECG08: 443 MS
SARS-COV-2, COV2NT: DETECTED
VENTRICULAR RATE, ECG03: 82 BPM

## 2022-03-18 PROBLEM — N18.4 CKD (CHRONIC KIDNEY DISEASE) STAGE 4, GFR 15-29 ML/MIN (HCC): Status: ACTIVE | Noted: 2020-04-10

## 2022-03-19 PROBLEM — G47.33 OSA (OBSTRUCTIVE SLEEP APNEA): Status: ACTIVE | Noted: 2019-12-11

## 2022-03-19 PROBLEM — N18.9 ACUTE KIDNEY INJURY SUPERIMPOSED ON CKD (HCC): Status: ACTIVE | Noted: 2020-02-13

## 2022-03-19 PROBLEM — R06.03 RESPIRATORY DISTRESS: Status: ACTIVE | Noted: 2019-01-17

## 2022-03-19 PROBLEM — N17.9 ACUTE KIDNEY INJURY SUPERIMPOSED ON CKD (HCC): Status: ACTIVE | Noted: 2020-02-13

## 2022-03-20 PROBLEM — I50.33 DIASTOLIC CHF, ACUTE ON CHRONIC (HCC): Status: ACTIVE | Noted: 2020-02-16

## 2022-03-20 PROBLEM — R77.8 ELEVATED TROPONIN: Status: ACTIVE | Noted: 2020-04-10

## 2022-03-20 PROBLEM — R79.89 ELEVATED TROPONIN: Status: ACTIVE | Noted: 2020-04-10

## 2022-03-20 PROBLEM — I50.9 CHF (CONGESTIVE HEART FAILURE) (HCC): Status: ACTIVE | Noted: 2020-04-10

## 2022-11-26 NOTE — CDMP QUERY
Pt admit to room 8b38, oriented to signal light and surroundings, discussed poc, patient verbalizes understanding, denies pain at this time, will cont to monitor Pt admitted with hyperkalemia. Pt noted by Herbie Kanner, MD 2/13 /2020 and 2/14/2020 to have anemia. If possible, please document in the progress notes and d/c summary if you are evaluating and / or treating any of the following: ? Anemia due to acute blood loss ? Anemia due to chronic blood loss ? Anemia due to iron deficiency ? Anemia due to chronic disease, please specify disease ? Dilutional anemia 
? Other, please specify ? Clinically unable to determine The medical record reflects the following: 
 
   Risk Factors: PMH CKD 3, DM Clinical Indicators: HGB 10.2, 9.6, 8.9, 8.7 Treatment: Receiving serial CBC Thank you, Dilan Berry RN, BSN, [address] [place] [address] 
[phone]

## 2023-02-05 NOTE — PROGRESS NOTES
Problem: Pressure Injury - Risk of  Goal: *Prevention of pressure injury  Description: Document Elian Scale and appropriate interventions in the flowsheet. Outcome: Progressing Towards Goal  Note: Pressure Injury Interventions:       Moisture Interventions: Absorbent underpads    Activity Interventions: Pressure redistribution bed/mattress(bed type), Increase time out of bed    Mobility Interventions: Pressure redistribution bed/mattress (bed type), HOB 30 degrees or less    Nutrition Interventions: Document food/fluid/supplement intake    Friction and Shear Interventions: Minimize layers, HOB 30 degrees or less                Problem: Patient Education: Go to Patient Education Activity  Goal: Patient/Family Education  Outcome: Progressing Towards Goal     Problem: Falls - Risk of  Goal: *Absence of Falls  Description: Document Mayito Fall Risk and appropriate interventions in the flowsheet.   Outcome: Progressing Towards Goal  Note: Fall Risk Interventions:  Mobility Interventions: Patient to call before getting OOB, Utilize walker, cane, or other assistive device         Medication Interventions: Patient to call before getting OOB, Teach patient to arise slowly    Elimination Interventions: Call light in reach, Patient to call for help with toileting needs, Toilet paper/wipes in reach    History of Falls Interventions: Room close to nurse's station, Door open when patient unattended, Investigate reason for fall         Problem: Patient Education: Go to Patient Education Activity  Goal: Patient/Family Education  Outcome: Progressing Towards Goal     Problem: Pain  Goal: *Control of Pain  Outcome: Progressing Towards Goal     Problem: Patient Education: Go to Patient Education Activity  Goal: Patient/Family Education  Outcome: Progressing Towards Goal The patient is a 3y Male complaining of fall.

## 2023-05-12 RX ORDER — CARVEDILOL 6.25 MG/1
TABLET ORAL 2 TIMES DAILY WITH MEALS
COMMUNITY

## 2023-05-12 RX ORDER — HYDRALAZINE HYDROCHLORIDE 50 MG/1
TABLET, FILM COATED ORAL 3 TIMES DAILY
COMMUNITY
Start: 2020-04-24

## 2023-05-12 RX ORDER — FERROUS SULFATE 325(65) MG
TABLET ORAL
COMMUNITY
Start: 2020-04-25

## 2023-05-12 RX ORDER — INSULIN GLARGINE 100 [IU]/ML
INJECTION, SOLUTION SUBCUTANEOUS DAILY
COMMUNITY
Start: 2020-04-24

## 2023-05-12 RX ORDER — CALCIUM ACETATE 667 MG/1
CAPSULE ORAL
COMMUNITY
Start: 2020-02-25

## 2023-05-12 RX ORDER — ACETAMINOPHEN 325 MG/1
TABLET ORAL EVERY 4 HOURS PRN
COMMUNITY

## 2023-05-12 RX ORDER — FLUTICASONE PROPIONATE AND SALMETEROL 100; 50 UG/1; UG/1
1 POWDER RESPIRATORY (INHALATION) EVERY 12 HOURS
COMMUNITY

## 2023-05-12 RX ORDER — IPRATROPIUM BROMIDE AND ALBUTEROL SULFATE 2.5; .5 MG/3ML; MG/3ML
SOLUTION RESPIRATORY (INHALATION) 2 TIMES DAILY
COMMUNITY
Start: 2019-12-16

## 2023-05-12 RX ORDER — ATORVASTATIN CALCIUM 40 MG/1
40 TABLET, FILM COATED ORAL DAILY
COMMUNITY

## 2023-05-12 RX ORDER — ASPIRIN 81 MG/1
81 TABLET ORAL DAILY
COMMUNITY

## 2023-05-12 RX ORDER — GABAPENTIN 300 MG/1
CAPSULE ORAL 3 TIMES DAILY
COMMUNITY
Start: 2020-04-24

## 2023-05-12 RX ORDER — INSULIN LISPRO 100 [IU]/ML
INJECTION, SOLUTION INTRAVENOUS; SUBCUTANEOUS
COMMUNITY
Start: 2020-04-24

## 2023-05-12 RX ORDER — AMLODIPINE BESYLATE 5 MG/1
5 TABLET ORAL DAILY
COMMUNITY

## 2023-06-26 NOTE — PROGRESS NOTES
0757 Bedside and verbal shift change report given to Frieda Hannah RN (on coming nurse) by Nishant Coello RN (off going nurse). Report included the following information SBAR, Kardex, OR Summary, Intake/Output and MAR. Shift summary: no new events. Pt hemodialysis site, CDI. Deutsch draining and intact. 1940 Bedside and verbal shift change report given by Frieda Hannah RN (off going nurse) to Nishant Coello RN(on coming nurse). Report included the following information SBAR, Kardex, OR Summary, Intake/Output and MAR. Rinvoq Pregnancy And Lactation Text: Based on animal studies, Rinvoq may cause embryo-fetal harm when administered to pregnant women.  The medication should not be used in pregnancy.  Breastfeeding is not recommended during treatment and for 6 days after the last dose.

## 2023-12-05 NOTE — PROGRESS NOTES
1915  Assumed care of pt   2030  Assessment complete   2340  Reassessment complete  0400  Reassessment complete     Shift uneventful     Bedside and Verbal shift change report given to Ana María Cordova RN (oncoming nurse) by Bhavya Martin (offgoing nurse). Report included the following information SBAR, Kardex, ED Summary, Procedure Summary, Intake/Output, MAR, Recent Results, Med Rec Status and Cardiac Rhythm NSR. yes

## 2024-01-30 ENCOUNTER — APPOINTMENT (OUTPATIENT)
Facility: HOSPITAL | Age: 59
End: 2024-01-30
Payer: MEDICARE

## 2024-01-30 ENCOUNTER — HOSPITAL ENCOUNTER (EMERGENCY)
Facility: HOSPITAL | Age: 59
Discharge: HOME | End: 2024-01-30
Payer: MEDICARE

## 2024-01-30 VITALS
DIASTOLIC BLOOD PRESSURE: 59 MMHG | BODY MASS INDEX: 44.16 KG/M2 | HEIGHT: 62 IN | WEIGHT: 240 LBS | TEMPERATURE: 97.5 F | OXYGEN SATURATION: 100 % | SYSTOLIC BLOOD PRESSURE: 158 MMHG | RESPIRATION RATE: 18 BRPM | HEART RATE: 72 BPM

## 2024-01-30 DIAGNOSIS — G62.9 NEUROPATHY: ICD-10-CM

## 2024-01-30 DIAGNOSIS — J44.1 COPD WITH ACUTE EXACERBATION (HCC): ICD-10-CM

## 2024-01-30 DIAGNOSIS — R06.02 SHORTNESS OF BREATH: Primary | ICD-10-CM

## 2024-01-30 DIAGNOSIS — N18.6 ESRD (END STAGE RENAL DISEASE) (HCC): ICD-10-CM

## 2024-01-30 DIAGNOSIS — I10 ESSENTIAL HYPERTENSION: ICD-10-CM

## 2024-01-30 LAB
ALBUMIN SERPL-MCNC: 3.4 G/DL (ref 3.4–5)
ALBUMIN/GLOB SERPL: 0.9 (ref 0.8–1.7)
ALP SERPL-CCNC: 151 U/L (ref 45–117)
ALT SERPL-CCNC: 18 U/L (ref 13–56)
ANION GAP SERPL CALC-SCNC: 6 MMOL/L (ref 3–18)
AST SERPL-CCNC: 11 U/L (ref 10–38)
BASOPHILS # BLD: 0.1 K/UL (ref 0–0.1)
BASOPHILS NFR BLD: 1 % (ref 0–2)
BILIRUB SERPL-MCNC: 0.5 MG/DL (ref 0.2–1)
BUN SERPL-MCNC: 39 MG/DL (ref 7–18)
BUN/CREAT SERPL: 7 (ref 12–20)
CALCIUM SERPL-MCNC: 8.8 MG/DL (ref 8.5–10.1)
CHLORIDE SERPL-SCNC: 101 MMOL/L (ref 100–111)
CHP ED QC CHECK: YES
CO2 SERPL-SCNC: 31 MMOL/L (ref 21–32)
CREAT SERPL-MCNC: 5.31 MG/DL (ref 0.6–1.3)
D DIMER PPP FEU-MCNC: 0.78 UG/ML(FEU)
DIFFERENTIAL METHOD BLD: ABNORMAL
EKG ATRIAL RATE: 72 BPM
EKG ATRIAL RATE: 73 BPM
EKG DIAGNOSIS: NORMAL
EKG DIAGNOSIS: NORMAL
EKG P AXIS: 30 DEGREES
EKG P AXIS: 41 DEGREES
EKG P-R INTERVAL: 134 MS
EKG P-R INTERVAL: 140 MS
EKG Q-T INTERVAL: 414 MS
EKG Q-T INTERVAL: 430 MS
EKG QRS DURATION: 84 MS
EKG QRS DURATION: 84 MS
EKG QTC CALCULATION (BAZETT): 456 MS
EKG QTC CALCULATION (BAZETT): 470 MS
EKG R AXIS: 46 DEGREES
EKG R AXIS: 54 DEGREES
EKG T AXIS: 39 DEGREES
EKG T AXIS: 43 DEGREES
EKG VENTRICULAR RATE: 72 BPM
EKG VENTRICULAR RATE: 73 BPM
EOSINOPHIL # BLD: 0.4 K/UL (ref 0–0.4)
EOSINOPHIL NFR BLD: 6 % (ref 0–5)
ERYTHROCYTE [DISTWIDTH] IN BLOOD BY AUTOMATED COUNT: 14.7 % (ref 11.6–14.5)
FLUAV RNA SPEC QL NAA+PROBE: NOT DETECTED
FLUBV RNA SPEC QL NAA+PROBE: NOT DETECTED
GLOBULIN SER CALC-MCNC: 3.9 G/DL (ref 2–4)
GLUCOSE BLD STRIP.AUTO-MCNC: 292 MG/DL (ref 70–110)
GLUCOSE BLD-MCNC: 292 MG/DL
GLUCOSE SERPL-MCNC: 346 MG/DL (ref 74–99)
HCT VFR BLD AUTO: 32.8 % (ref 35–45)
HGB BLD-MCNC: 10 G/DL (ref 12–16)
IMM GRANULOCYTES # BLD AUTO: 0.1 K/UL (ref 0–0.04)
IMM GRANULOCYTES NFR BLD AUTO: 1 % (ref 0–0.5)
LYMPHOCYTES # BLD: 1.2 K/UL (ref 0.9–3.6)
LYMPHOCYTES NFR BLD: 18 % (ref 21–52)
MAGNESIUM SERPL-MCNC: 2.2 MG/DL (ref 1.6–2.6)
MCH RBC QN AUTO: 27.1 PG (ref 24–34)
MCHC RBC AUTO-ENTMCNC: 30.5 G/DL (ref 31–37)
MCV RBC AUTO: 88.9 FL (ref 78–100)
MONOCYTES # BLD: 0.5 K/UL (ref 0.05–1.2)
MONOCYTES NFR BLD: 7 % (ref 3–10)
NEUTS SEG # BLD: 4.5 K/UL (ref 1.8–8)
NEUTS SEG NFR BLD: 67 % (ref 40–73)
NRBC # BLD: 0 K/UL (ref 0–0.01)
NRBC BLD-RTO: 0 PER 100 WBC
NT PRO BNP: 3370 PG/ML (ref 0–900)
PLATELET # BLD AUTO: 169 K/UL (ref 135–420)
PMV BLD AUTO: 10.2 FL (ref 9.2–11.8)
POTASSIUM SERPL-SCNC: 4.4 MMOL/L (ref 3.5–5.5)
PROT SERPL-MCNC: 7.3 G/DL (ref 6.4–8.2)
RBC # BLD AUTO: 3.69 M/UL (ref 4.2–5.3)
SARS-COV-2 RNA RESP QL NAA+PROBE: NOT DETECTED
SODIUM SERPL-SCNC: 138 MMOL/L (ref 136–145)
TROPONIN I SERPL HS-MCNC: 14 NG/L (ref 0–54)
TROPONIN I SERPL HS-MCNC: 16 NG/L (ref 0–54)
WBC # BLD AUTO: 6.8 K/UL (ref 4.6–13.2)

## 2024-01-30 PROCEDURE — 87636 SARSCOV2 & INF A&B AMP PRB: CPT

## 2024-01-30 PROCEDURE — 96375 TX/PRO/DX INJ NEW DRUG ADDON: CPT

## 2024-01-30 PROCEDURE — 85025 COMPLETE CBC W/AUTO DIFF WBC: CPT

## 2024-01-30 PROCEDURE — 84484 ASSAY OF TROPONIN QUANT: CPT

## 2024-01-30 PROCEDURE — 83880 ASSAY OF NATRIURETIC PEPTIDE: CPT

## 2024-01-30 PROCEDURE — 6370000000 HC RX 637 (ALT 250 FOR IP): Performed by: NURSE PRACTITIONER

## 2024-01-30 PROCEDURE — 6360000004 HC RX CONTRAST MEDICATION: Performed by: NURSE PRACTITIONER

## 2024-01-30 PROCEDURE — 80053 COMPREHEN METABOLIC PANEL: CPT

## 2024-01-30 PROCEDURE — 71275 CT ANGIOGRAPHY CHEST: CPT

## 2024-01-30 PROCEDURE — 99285 EMERGENCY DEPT VISIT HI MDM: CPT

## 2024-01-30 PROCEDURE — 6360000002 HC RX W HCPCS: Performed by: NURSE PRACTITIONER

## 2024-01-30 PROCEDURE — 83735 ASSAY OF MAGNESIUM: CPT

## 2024-01-30 PROCEDURE — 2580000003 HC RX 258: Performed by: NURSE PRACTITIONER

## 2024-01-30 PROCEDURE — 94762 N-INVAS EAR/PLS OXIMTRY CONT: CPT

## 2024-01-30 PROCEDURE — 71045 X-RAY EXAM CHEST 1 VIEW: CPT

## 2024-01-30 PROCEDURE — 82962 GLUCOSE BLOOD TEST: CPT

## 2024-01-30 PROCEDURE — 85379 FIBRIN DEGRADATION QUANT: CPT

## 2024-01-30 PROCEDURE — 96374 THER/PROPH/DIAG INJ IV PUSH: CPT

## 2024-01-30 RX ORDER — CEFUROXIME AXETIL 500 MG/1
500 TABLET ORAL DAILY
Qty: 5 TABLET | Refills: 0 | Status: SHIPPED | OUTPATIENT
Start: 2024-01-31 | End: 2024-02-05

## 2024-01-30 RX ORDER — 0.9 % SODIUM CHLORIDE 0.9 %
250 INTRAVENOUS SOLUTION INTRAVENOUS ONCE
Status: COMPLETED | OUTPATIENT
Start: 2024-01-30 | End: 2024-01-30

## 2024-01-30 RX ORDER — ONDANSETRON 2 MG/ML
4 INJECTION INTRAMUSCULAR; INTRAVENOUS ONCE
Status: COMPLETED | OUTPATIENT
Start: 2024-01-30 | End: 2024-01-30

## 2024-01-30 RX ORDER — GABAPENTIN 300 MG/1
300 CAPSULE ORAL
Status: COMPLETED | OUTPATIENT
Start: 2024-01-30 | End: 2024-01-30

## 2024-01-30 RX ORDER — IPRATROPIUM BROMIDE AND ALBUTEROL SULFATE 2.5; .5 MG/3ML; MG/3ML
1 SOLUTION RESPIRATORY (INHALATION)
Status: COMPLETED | OUTPATIENT
Start: 2024-01-30 | End: 2024-01-30

## 2024-01-30 RX ORDER — CEFUROXIME AXETIL 250 MG/1
500 TABLET ORAL
Status: DISCONTINUED | OUTPATIENT
Start: 2024-01-30 | End: 2024-01-30

## 2024-01-30 RX ORDER — GABAPENTIN 100 MG/1
300 CAPSULE ORAL 3 TIMES DAILY
Qty: 126 CAPSULE | Refills: 0 | Status: SHIPPED | OUTPATIENT
Start: 2024-01-30 | End: 2024-02-13

## 2024-01-30 RX ORDER — MORPHINE SULFATE 4 MG/ML
4 INJECTION, SOLUTION INTRAMUSCULAR; INTRAVENOUS
Status: COMPLETED | OUTPATIENT
Start: 2024-01-30 | End: 2024-01-30

## 2024-01-30 RX ADMIN — IOPAMIDOL 100 ML: 755 INJECTION, SOLUTION INTRAVENOUS at 15:05

## 2024-01-30 RX ADMIN — IPRATROPIUM BROMIDE AND ALBUTEROL SULFATE 1 DOSE: 2.5; .5 SOLUTION RESPIRATORY (INHALATION) at 11:48

## 2024-01-30 RX ADMIN — GABAPENTIN 300 MG: 300 CAPSULE ORAL at 11:47

## 2024-01-30 RX ADMIN — SODIUM CHLORIDE 250 ML: 9 INJECTION, SOLUTION INTRAVENOUS at 15:13

## 2024-01-30 RX ADMIN — ONDANSETRON 4 MG: 2 INJECTION INTRAMUSCULAR; INTRAVENOUS at 12:40

## 2024-01-30 RX ADMIN — MORPHINE SULFATE 4 MG: 4 INJECTION, SOLUTION INTRAMUSCULAR; INTRAVENOUS at 12:40

## 2024-01-30 RX ADMIN — INSULIN HUMAN 6 UNITS: 100 INJECTION, SOLUTION PARENTERAL at 15:14

## 2024-01-30 ASSESSMENT — HEART SCORE: ECG: 0

## 2024-01-30 NOTE — ED TRIAGE NOTES
Patient ambulatory with walker c/o generalized nerve pain; states she feels pins and needles everywhere. Needs refill on gabapentin, took last pill yesterday.     Patient is on oxygen 3LPM at home. Has not needed to titrate to 4LPM.     Hx COPD, asthma, neuropathy, bells palsy, stage 4 kidney failure, HF, diabetes.     During triage questions, patient states that she has thoughts of suicide \"for years\" but does not have plan at this time. Triggering low risk.

## 2024-01-30 NOTE — DISCHARGE INSTRUCTIONS
Continue all of your current medications and inhalers  Antibiotics as prescribed  Gabapentin as prescribed, will need to call PCM for refill beyond 2 weeks  Return to care for new or worsening symptoms to include increasing chest pain, worsening shortness of breath or other concerning symptoms

## 2024-01-30 NOTE — ED NOTES
Assumed care of patient, patient resting on stretcher, meds given per MAR, call light within reach, no other needs at this time.

## 2024-01-30 NOTE — ED PROVIDER NOTES
NOTD      Rapid Influenza B By PCR Not detected NOTD     Brain Natriuretic Peptide    Collection Time: 01/30/24 12:14 PM   Result Value Ref Range    NT Pro-BNP 3,370 (H) 0 - 900 PG/ML   EKG 12 Lead (Chest Pain)    Collection Time: 01/30/24  1:22 PM   Result Value Ref Range    Ventricular Rate 73 BPM    Atrial Rate 73 BPM    P-R Interval 140 ms    QRS Duration 84 ms    Q-T Interval 414 ms    QTc Calculation (Bazett) 456 ms    P Axis 41 degrees    R Axis 46 degrees    T Axis 43 degrees    Diagnosis       Normal sinus rhythm  Normal ECG  When compared with ECG of 30-JAN-2024 11:46,  No significant change was found     Troponin    Collection Time: 01/30/24  1:25 PM   Result Value Ref Range    Troponin, High Sensitivity 14 0 - 54 ng/L   POCT Glucose    Collection Time: 01/30/24  4:19 PM   Result Value Ref Range    POC Glucose 292 (H) 70 - 110 mg/dL   POCT Glucose    Collection Time: 01/30/24  4:20 PM   Result Value Ref Range    Glucose 292 mg/dL    QC OK? yes        Labs Reviewed   COMPREHENSIVE METABOLIC PANEL - Abnormal; Notable for the following components:       Result Value    Glucose 346 (*)     BUN 39 (*)     Creatinine 5.31 (*)     Bun/Cre Ratio 7 (*)     Est, Glom Filt Rate 9 (*)     Alk Phosphatase 151 (*)     All other components within normal limits   CBC WITH AUTO DIFFERENTIAL - Abnormal; Notable for the following components:    RBC 3.69 (*)     Hemoglobin 10.0 (*)     Hematocrit 32.8 (*)     MCHC 30.5 (*)     RDW 14.7 (*)     Lymphocytes % 18 (*)     Eosinophils % 6 (*)     Immature Granulocytes 1 (*)     Absolute Immature Granulocyte 0.1 (*)     All other components within normal limits   D-DIMER, QUANTITATIVE - Abnormal; Notable for the following components:    D-Dimer, Quant 0.78 (*)     All other components within normal limits   BRAIN NATRIURETIC PEPTIDE - Abnormal; Notable for the following components:    NT Pro-BNP 3,370 (*)     All other components within normal limits   POCT GLUCOSE - Abnormal;

## 2024-02-05 LAB
EKG ATRIAL RATE: 72 BPM
EKG ATRIAL RATE: 73 BPM
EKG DIAGNOSIS: NORMAL
EKG DIAGNOSIS: NORMAL
EKG P AXIS: 30 DEGREES
EKG P AXIS: 41 DEGREES
EKG P-R INTERVAL: 134 MS
EKG P-R INTERVAL: 140 MS
EKG Q-T INTERVAL: 414 MS
EKG Q-T INTERVAL: 430 MS
EKG QRS DURATION: 84 MS
EKG QRS DURATION: 84 MS
EKG QTC CALCULATION (BAZETT): 456 MS
EKG QTC CALCULATION (BAZETT): 470 MS
EKG R AXIS: 46 DEGREES
EKG R AXIS: 54 DEGREES
EKG T AXIS: 39 DEGREES
EKG T AXIS: 43 DEGREES
EKG VENTRICULAR RATE: 72 BPM
EKG VENTRICULAR RATE: 73 BPM

## 2024-06-24 ENCOUNTER — APPOINTMENT (OUTPATIENT)
Facility: HOSPITAL | Age: 59
End: 2024-06-24
Payer: MEDICAID

## 2024-06-24 ENCOUNTER — HOSPITAL ENCOUNTER (INPATIENT)
Facility: HOSPITAL | Age: 59
LOS: 3 days | Discharge: NURSING, REHAB OR LONG-TERM CARE | End: 2024-06-27
Attending: EMERGENCY MEDICINE | Admitting: INTERNAL MEDICINE
Payer: MEDICAID
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
## 2024-06-24 DIAGNOSIS — R06.02 SHORTNESS OF BREATH: ICD-10-CM

## 2024-06-24 DIAGNOSIS — J81.0 ACUTE PULMONARY EDEMA (HCC): ICD-10-CM

## 2024-06-24 DIAGNOSIS — E16.2 HYPOGLYCEMIA: ICD-10-CM

## 2024-06-24 DIAGNOSIS — S90.30XA CONTUSION OF FOOT, UNSPECIFIED LATERALITY, INITIAL ENCOUNTER: ICD-10-CM

## 2024-06-24 DIAGNOSIS — G62.9 NEUROPATHY: ICD-10-CM

## 2024-06-24 DIAGNOSIS — R41.82 ALTERED MENTAL STATUS, UNSPECIFIED ALTERED MENTAL STATUS TYPE: Primary | ICD-10-CM

## 2024-06-24 DIAGNOSIS — J81.1 PULMONARY EDEMA WITHOUT HEART FAILURE: ICD-10-CM

## 2024-06-24 DIAGNOSIS — N39.0 URINARY TRACT INFECTION WITHOUT HEMATURIA, SITE UNSPECIFIED: ICD-10-CM

## 2024-06-24 LAB
ALBUMIN SERPL-MCNC: 3.4 G/DL (ref 3.4–5)
ALBUMIN/GLOB SERPL: 0.8 (ref 0.8–1.7)
ALP SERPL-CCNC: 147 U/L (ref 45–117)
ALT SERPL-CCNC: 22 U/L (ref 13–56)
ANION GAP BLD CALC-SCNC: ABNORMAL MMOL/L (ref 10–20)
ANION GAP SERPL CALC-SCNC: 6 MMOL/L (ref 3–18)
APPEARANCE UR: ABNORMAL
ARTERIAL PATENCY WRIST A: POSITIVE
AST SERPL-CCNC: 28 U/L (ref 10–38)
BACTERIA URNS QL MICRO: ABNORMAL /HPF
BASE DEFICIT BLDV-SCNC: 3 MMOL/L
BASE EXCESS BLD CALC-SCNC: 2 MMOL/L
BASE EXCESS BLD CALC-SCNC: 3 MMOL/L
BASOPHILS # BLD: 0.1 K/UL (ref 0–0.1)
BASOPHILS NFR BLD: 1 % (ref 0–2)
BDY SITE: ABNORMAL
BDY SITE: ABNORMAL
BILIRUB SERPL-MCNC: 0.4 MG/DL (ref 0.2–1)
BILIRUB UR QL: NEGATIVE
BODY TEMPERATURE: 98
BUN SERPL-MCNC: 35 MG/DL (ref 7–18)
BUN/CREAT SERPL: 9 (ref 12–20)
CA-I BLD-MCNC: 1.14 MMOL/L (ref 1.12–1.32)
CALCIUM SERPL-MCNC: 9 MG/DL (ref 8.5–10.1)
CHLORIDE BLD-SCNC: 100 MMOL/L (ref 98–107)
CHLORIDE SERPL-SCNC: 100 MMOL/L (ref 100–111)
CO2 BLD-SCNC: 29 MMOL/L (ref 19–24)
CO2 SERPL-SCNC: 32 MMOL/L (ref 21–32)
COLOR UR: YELLOW
CREAT BLD-MCNC: 5.06 MG/DL (ref 0.6–1.3)
CREAT SERPL-MCNC: 3.96 MG/DL (ref 0.6–1.3)
DIFFERENTIAL METHOD BLD: ABNORMAL
EOSINOPHIL # BLD: 0.4 K/UL (ref 0–0.4)
EOSINOPHIL NFR BLD: 5 % (ref 0–5)
EPITH CASTS URNS QL MICRO: ABNORMAL /LPF (ref 0–5)
ERYTHROCYTE [DISTWIDTH] IN BLOOD BY AUTOMATED COUNT: 16.9 % (ref 11.6–14.5)
FIO2 ON VENT: 100 %
GAS FLOW.O2 O2 DELIVERY SYS: ABNORMAL
GAS FLOW.O2 O2 DELIVERY SYS: ABNORMAL
GLOBULIN SER CALC-MCNC: 4.3 G/DL (ref 2–4)
GLUCOSE BLD STRIP.AUTO-MCNC: 42 MG/DL (ref 70–110)
GLUCOSE BLD-MCNC: 190 MG/DL (ref 70–110)
GLUCOSE SERPL-MCNC: 76 MG/DL (ref 74–99)
GLUCOSE UR STRIP.AUTO-MCNC: 500 MG/DL
HCO3 BLD-SCNC: 28.6 MMOL/L (ref 22–26)
HCO3 BLD-SCNC: 29.5 MMOL/L (ref 22–26)
HCO3 BLDV-SCNC: 22.7 MMOL/L (ref 23–28)
HCT VFR BLD AUTO: 38.1 % (ref 35–45)
HGB BLD-MCNC: 11.9 G/DL (ref 12–16)
HGB UR QL STRIP: ABNORMAL
IMM GRANULOCYTES # BLD AUTO: 0.1 K/UL (ref 0–0.04)
IMM GRANULOCYTES NFR BLD AUTO: 1 % (ref 0–0.5)
INR PPP: 1 (ref 0.9–1.1)
KETONES UR QL STRIP.AUTO: NEGATIVE MG/DL
LACTATE BLD-SCNC: <0.4 MMOL/L (ref 0.4–2)
LEUKOCYTE ESTERASE UR QL STRIP.AUTO: ABNORMAL
LYMPHOCYTES # BLD: 1.1 K/UL (ref 0.9–3.6)
LYMPHOCYTES NFR BLD: 14 % (ref 21–52)
MAGNESIUM SERPL-MCNC: 2.1 MG/DL (ref 1.6–2.6)
MCH RBC QN AUTO: 27.9 PG (ref 24–34)
MCHC RBC AUTO-ENTMCNC: 31.2 G/DL (ref 31–37)
MCV RBC AUTO: 89.2 FL (ref 78–100)
MONOCYTES # BLD: 0.6 K/UL (ref 0.05–1.2)
MONOCYTES NFR BLD: 7 % (ref 3–10)
NEUTS SEG # BLD: 5.7 K/UL (ref 1.8–8)
NEUTS SEG NFR BLD: 72 % (ref 40–73)
NITRITE UR QL STRIP.AUTO: NEGATIVE
NRBC # BLD: 0 K/UL (ref 0–0.01)
NRBC BLD-RTO: 0 PER 100 WBC
NT PRO BNP: 1801 PG/ML (ref 0–900)
O2/TOTAL GAS SETTING VFR VENT: 32 %
PCO2 BLD: 51.4 MMHG (ref 35–45)
PCO2 BLD: 53.5 MMHG (ref 35–45)
PCO2 BLDV: 42.4 MMHG (ref 41–51)
PEEP RESPIRATORY: 5
PH BLD: 7.35 (ref 7.35–7.45)
PH BLD: 7.35 (ref 7.35–7.45)
PH BLDV: 7.34 (ref 7.32–7.42)
PH UR STRIP: 6 (ref 5–8)
PLATELET # BLD AUTO: 151 K/UL (ref 135–420)
PMV BLD AUTO: 9.8 FL (ref 9.2–11.8)
PO2 BLD: 523 MMHG (ref 80–100)
PO2 BLD: 97 MMHG (ref 80–100)
PO2 BLDV: 55 MMHG (ref 25–40)
POTASSIUM BLD-SCNC: 3.7 MMOL/L (ref 3.5–5.1)
POTASSIUM SERPL-SCNC: 3.5 MMOL/L (ref 3.5–5.5)
PRESSURE SUPPORT SETTING VENT: 7
PROT SERPL-MCNC: 7.7 G/DL (ref 6.4–8.2)
PROT UR STRIP-MCNC: 300 MG/DL
PROTHROMBIN TIME: 12.9 SEC (ref 11.9–14.9)
RBC # BLD AUTO: 4.27 M/UL (ref 4.2–5.3)
RBC #/AREA URNS HPF: ABNORMAL /HPF (ref 0–5)
SAO2 % BLD: 100 %
SAO2 % BLD: 97 % (ref 92–97)
SAO2 % BLDV: 86.2 % (ref 65–88)
SERVICE CMNT-IMP: ABNORMAL
SODIUM BLD-SCNC: 136 MMOL/L (ref 136–145)
SODIUM SERPL-SCNC: 138 MMOL/L (ref 136–145)
SP GR UR REFRACTOMETRY: 1.02 (ref 1–1.03)
SPECIMEN SITE: ABNORMAL
SPECIMEN TYPE: ABNORMAL
SPECIMEN TYPE: ABNORMAL
TROPONIN I SERPL HS-MCNC: 18 NG/L (ref 0–54)
TROPONIN I SERPL HS-MCNC: 31 NG/L (ref 0–54)
UROBILINOGEN UR QL STRIP.AUTO: 0.2 EU/DL (ref 0.2–1)
VENTILATION MODE VENT: ABNORMAL
WBC # BLD AUTO: 7.9 K/UL (ref 4.6–13.2)
WBC URNS QL MICRO: ABNORMAL /HPF (ref 0–5)

## 2024-06-24 PROCEDURE — 6360000002 HC RX W HCPCS: Performed by: EMERGENCY MEDICINE

## 2024-06-24 PROCEDURE — 94660 CPAP INITIATION&MGMT: CPT

## 2024-06-24 PROCEDURE — 73630 X-RAY EXAM OF FOOT: CPT

## 2024-06-24 PROCEDURE — 6370000000 HC RX 637 (ALT 250 FOR IP): Performed by: EMERGENCY MEDICINE

## 2024-06-24 PROCEDURE — 96365 THER/PROPH/DIAG IV INF INIT: CPT

## 2024-06-24 PROCEDURE — 85014 HEMATOCRIT: CPT

## 2024-06-24 PROCEDURE — 71045 X-RAY EXAM CHEST 1 VIEW: CPT

## 2024-06-24 PROCEDURE — 82947 ASSAY GLUCOSE BLOOD QUANT: CPT

## 2024-06-24 PROCEDURE — 81001 URINALYSIS AUTO W/SCOPE: CPT

## 2024-06-24 PROCEDURE — 84295 ASSAY OF SERUM SODIUM: CPT

## 2024-06-24 PROCEDURE — 82962 GLUCOSE BLOOD TEST: CPT

## 2024-06-24 PROCEDURE — 5A09357 ASSISTANCE WITH RESPIRATORY VENTILATION, LESS THAN 24 CONSECUTIVE HOURS, CONTINUOUS POSITIVE AIRWAY PRESSURE: ICD-10-PCS | Performed by: INTERNAL MEDICINE

## 2024-06-24 PROCEDURE — 83880 ASSAY OF NATRIURETIC PEPTIDE: CPT

## 2024-06-24 PROCEDURE — 99285 EMERGENCY DEPT VISIT HI MDM: CPT

## 2024-06-24 PROCEDURE — 1100000000 HC RM PRIVATE

## 2024-06-24 PROCEDURE — 80053 COMPREHEN METABOLIC PANEL: CPT

## 2024-06-24 PROCEDURE — 83605 ASSAY OF LACTIC ACID: CPT

## 2024-06-24 PROCEDURE — 93005 ELECTROCARDIOGRAM TRACING: CPT | Performed by: EMERGENCY MEDICINE

## 2024-06-24 PROCEDURE — 84132 ASSAY OF SERUM POTASSIUM: CPT

## 2024-06-24 PROCEDURE — 83735 ASSAY OF MAGNESIUM: CPT

## 2024-06-24 PROCEDURE — 82330 ASSAY OF CALCIUM: CPT

## 2024-06-24 PROCEDURE — 2580000003 HC RX 258: Performed by: EMERGENCY MEDICINE

## 2024-06-24 PROCEDURE — 85025 COMPLETE CBC W/AUTO DIFF WBC: CPT

## 2024-06-24 PROCEDURE — 70551 MRI BRAIN STEM W/O DYE: CPT

## 2024-06-24 PROCEDURE — 85610 PROTHROMBIN TIME: CPT

## 2024-06-24 PROCEDURE — 94640 AIRWAY INHALATION TREATMENT: CPT

## 2024-06-24 PROCEDURE — 36600 WITHDRAWAL OF ARTERIAL BLOOD: CPT

## 2024-06-24 PROCEDURE — 82803 BLOOD GASES ANY COMBINATION: CPT

## 2024-06-24 PROCEDURE — 96375 TX/PRO/DX INJ NEW DRUG ADDON: CPT

## 2024-06-24 PROCEDURE — 96366 THER/PROPH/DIAG IV INF ADDON: CPT

## 2024-06-24 PROCEDURE — 84484 ASSAY OF TROPONIN QUANT: CPT

## 2024-06-24 RX ORDER — POLYETHYLENE GLYCOL 3350 17 G/17G
17 POWDER, FOR SOLUTION ORAL DAILY PRN
Status: DISCONTINUED | OUTPATIENT
Start: 2024-06-24 | End: 2024-06-27 | Stop reason: HOSPADM

## 2024-06-24 RX ORDER — ONDANSETRON 2 MG/ML
4 INJECTION INTRAMUSCULAR; INTRAVENOUS EVERY 6 HOURS PRN
Status: DISCONTINUED | OUTPATIENT
Start: 2024-06-24 | End: 2024-06-27 | Stop reason: HOSPADM

## 2024-06-24 RX ORDER — SODIUM CHLORIDE 0.9 % (FLUSH) 0.9 %
5-40 SYRINGE (ML) INJECTION EVERY 12 HOURS SCHEDULED
Status: DISCONTINUED | OUTPATIENT
Start: 2024-06-24 | End: 2024-06-27 | Stop reason: HOSPADM

## 2024-06-24 RX ORDER — LORAZEPAM 2 MG/ML
1 INJECTION INTRAMUSCULAR ONCE
Status: COMPLETED | OUTPATIENT
Start: 2024-06-24 | End: 2024-06-24

## 2024-06-24 RX ORDER — SODIUM CHLORIDE 9 MG/ML
INJECTION, SOLUTION INTRAVENOUS PRN
Status: DISCONTINUED | OUTPATIENT
Start: 2024-06-24 | End: 2024-06-27 | Stop reason: HOSPADM

## 2024-06-24 RX ORDER — AMLODIPINE BESYLATE 5 MG/1
10 TABLET ORAL
Status: COMPLETED | OUTPATIENT
Start: 2024-06-24 | End: 2024-06-24

## 2024-06-24 RX ORDER — ENOXAPARIN SODIUM 100 MG/ML
30 INJECTION SUBCUTANEOUS DAILY
Status: DISCONTINUED | OUTPATIENT
Start: 2024-06-25 | End: 2024-06-27 | Stop reason: HOSPADM

## 2024-06-24 RX ORDER — IPRATROPIUM BROMIDE AND ALBUTEROL SULFATE 2.5; .5 MG/3ML; MG/3ML
1 SOLUTION RESPIRATORY (INHALATION) ONCE
Status: COMPLETED | OUTPATIENT
Start: 2024-06-24 | End: 2024-06-24

## 2024-06-24 RX ORDER — ACETAMINOPHEN 650 MG/1
650 SUPPOSITORY RECTAL EVERY 6 HOURS PRN
Status: DISCONTINUED | OUTPATIENT
Start: 2024-06-24 | End: 2024-06-27 | Stop reason: HOSPADM

## 2024-06-24 RX ORDER — FUROSEMIDE 10 MG/ML
80 INJECTION INTRAMUSCULAR; INTRAVENOUS ONCE
Status: COMPLETED | OUTPATIENT
Start: 2024-06-24 | End: 2024-06-24

## 2024-06-24 RX ORDER — SODIUM CHLORIDE 0.9 % (FLUSH) 0.9 %
5-40 SYRINGE (ML) INJECTION PRN
Status: DISCONTINUED | OUTPATIENT
Start: 2024-06-24 | End: 2024-06-27 | Stop reason: HOSPADM

## 2024-06-24 RX ORDER — ACETAMINOPHEN 325 MG/1
650 TABLET ORAL EVERY 6 HOURS PRN
Status: DISCONTINUED | OUTPATIENT
Start: 2024-06-24 | End: 2024-06-27 | Stop reason: HOSPADM

## 2024-06-24 RX ORDER — ONDANSETRON 4 MG/1
4 TABLET, ORALLY DISINTEGRATING ORAL EVERY 8 HOURS PRN
Status: DISCONTINUED | OUTPATIENT
Start: 2024-06-24 | End: 2024-06-27 | Stop reason: HOSPADM

## 2024-06-24 RX ORDER — ONDANSETRON 2 MG/ML
4 INJECTION INTRAMUSCULAR; INTRAVENOUS ONCE
Status: COMPLETED | OUTPATIENT
Start: 2024-06-24 | End: 2024-06-24

## 2024-06-24 RX ORDER — CARVEDILOL 12.5 MG/1
12.5 TABLET ORAL
Status: COMPLETED | OUTPATIENT
Start: 2024-06-24 | End: 2024-06-24

## 2024-06-24 RX ADMIN — WATER 1000 MG: 1 INJECTION INTRAMUSCULAR; INTRAVENOUS; SUBCUTANEOUS at 19:09

## 2024-06-24 RX ADMIN — LORAZEPAM 1 MG: 2 INJECTION, SOLUTION INTRAMUSCULAR; INTRAVENOUS at 10:34

## 2024-06-24 RX ADMIN — AMLODIPINE BESYLATE 10 MG: 5 TABLET ORAL at 20:48

## 2024-06-24 RX ADMIN — CARVEDILOL 12.5 MG: 12.5 TABLET, FILM COATED ORAL at 20:48

## 2024-06-24 RX ADMIN — IPRATROPIUM BROMIDE AND ALBUTEROL SULFATE 1 DOSE: .5; 3 SOLUTION RESPIRATORY (INHALATION) at 19:22

## 2024-06-24 RX ADMIN — DEXTROSE MONOHYDRATE 250 ML: 100 INJECTION, SOLUTION INTRAVENOUS at 17:50

## 2024-06-24 RX ADMIN — ONDANSETRON 4 MG: 2 INJECTION INTRAMUSCULAR; INTRAVENOUS at 19:21

## 2024-06-24 RX ADMIN — FUROSEMIDE 80 MG: 10 INJECTION, SOLUTION INTRAMUSCULAR; INTRAVENOUS at 20:27

## 2024-06-24 NOTE — ED PROVIDER NOTES
EMERGENCY DEPARTMENT HISTORY AND PHYSICAL EXAM      Date: 6/24/2024  Patient Name: Layla Ellison    History of Presenting Illness     Chief Complaint   Patient presents with    Altered Mental Status    Dizziness       History Provided By: Patient    HPI: Layla Ellison, 58 y.o. female with PMHx as noted below presents the emergency department for altered mental status.  Patient was noted to be confused during dialysis so sent here for evaluation.  Per patient's daughter has had some confusion over the weekend and slurred speech since a fall on Friday.  Patient also reports some left knee and bilateral foot pain.  Was seen at outside hospital where she had CTs that were negative and discharged home.  Today patient states the dialysis nurse noted her slurred speech so recommended she come to the ED for evaluation.    Pt denies any other alleviating or exacerbating factors. Additionally, pt specifically denies any recent fever, chills, , nausea, vomiting, abdominal pain, CP, SOB, , numbness, weakness, BLE swelling, heart palpitations, urinary sxs, diarrhea, constipation, melena, hematochezia, cough, or congestion.    PCP: Katia Brizuela MD    No current facility-administered medications for this encounter.     Current Outpatient Medications   Medication Sig Dispense Refill    arformoterol tartrate (BROVANA) 15 MCG/2ML NEBU Take 2 mLs by nebulization in the morning and 2 mLs in the evening. 120 mL 3    budesonide (PULMICORT) 0.5 MG/2ML nebulizer suspension Take 2 mLs by nebulization in the morning and 2 mLs in the evening. 60 each 3    gabapentin (NEURONTIN) 100 MG capsule Take 3 capsules by mouth 3 times daily for 1 day. Max Daily Amount: 900 mg 9 capsule 0    pantoprazole (PROTONIX) 40 MG tablet Take 1 tablet by mouth every morning (before breakfast) 30 tablet 0    insulin glargine (LANTUS) 100 UNIT/ML injection vial Inject 25 Units into the skin nightly 10 mL 0    insulin lispro (HUMALOG,ADMELOG) 100 UNIT/ML SOLN  time of this note:  Vascular duplex lower extremity venous bilateral   Final Result      XR CHEST 1 VIEW   Final Result      Moderate edema pattern.         Electronically signed by STEFAN ARENAS      MRI BRAIN WO CONTRAST   Final Result      1. Evaluation is limited by motion. No acute intracranial abnormality.   2. Mild chronic microvascular ischemic disease.         Electronically signed by Gutierrez Lewis      XR FOOT RIGHT (MIN 3 VIEWS)   Final Result   No acute abnormality.      Electronically signed by Mauricio Christine      XR FOOT LEFT (MIN 3 VIEWS)   Final Result   No acute abnormality.      Electronically signed by Mauricio Christine              Medical Decision Making       I reviewed the vital signs, available nursing notes, past medical history, past surgical history, family history and social history.    Vital Signs-Reviewed the patient's vital signs.  No data found.      EKG interpretation: (Preliminary)  Rhythm: Sinus rhythm. Rate (approx.): 87; Axis: normal; P wave: normal; QRS interval: normal ; ST/T wave: normal;  was interpreted by Celestine Braga MD,ED Provider.      Provider Notes (Medical Decision Making):   On presentation, the patient is well appearing, in no acute distress but is hypertensive on arrival. based on my history and exam the differential diagnosis for this patient includes stroke, seizure, metabolic abnormality, electrolyte abnormality, sepsis, other CNS pathology, arrhythmia.    Workup in the emergency department initially nondiagnostic.  MRI negative for stroke, patient required Ativan for MRI, was very sleepy on return, patient care signed over pending reassessment and observation until she returns to baseline.        ED Course:   Initial assessment performed. The patients presenting problems have been discussed, and they are in agreement with the care plan formulated and outlined with them.  I have encouraged them to ask questions as they arise throughout their visit.    Patient

## 2024-06-24 NOTE — ED TRIAGE NOTES
Pt had just finished dialysis. Staff at dialysis stated she was altered. Pt arrived from ems awake alert and oriented. Sts she feels dizzy at this time. Sts nothing abnormal regarding this treatment.

## 2024-06-24 NOTE — ED PROVIDER NOTES
Patient signed out to me by Dr. Braga.  Please refer to his dictation for a more complete history.  In summary the patient presents with confusion without any focal neurologic deficits.  She presented after receiving a full run of dialysis this morning vital signs and labs including CBC CMP blood gas, troponin were all normal.  MRI of the brain showed microvascular changes but no signs of stroke.  Patient signed out to me awaiting urinalysis with plan of care of discharge home.    On my initial evaluation the patient she was somewhat sleepy.  She was also very quickly but easily arousable and woke up with verbal stimulation.    Patient's mental status did worsen.  Point-of-care glucose showed hyperglycemia.  Glucose level and mental status are did respond well to IV dextrose.    However after some time later she developed hypoxia difficulty breathing and nausea.  She was placed on BiPAP and did respond very well to this.  Chest x-ray showed mild pulmonary edema.  BNP is elevated at 1800.  Repeat blood gas showed only very mild CO2 retention which appears to be chronic.    I suspect this developed due to the bolus of fluid from the dextrose.  We currently have a shortage of D50 so she received 250 mL of D10.    A 12 lead EKG was performed, interpreted by me.  Rate 80, sinus rhythm, normal axis, no segment ST elevation depressions or T wave inversion, QTc 456    Urinalysis shows urinary tract infection.    Consult Note:  Time: 8:05 PM EDT   Physician: Dr. Catherine   Specialty: Nephrology  Discussion: I discussed the patient's presentation, vitals, labs and imaging if available.  He will evaluate the patient and place dialysis orders for tomorrow morning.    Consult Note:  Time: 8:20 PM EDT   Physician: Dr. Rahman  Specialty: Hospitalist  Discussion: I discussed the patient's presentation, vitals, labs and imaging if available. She accepts admission.  Patient will go to ICU due to BiPAP requirements.    Physical  Exam  Constitutional: Well nourished, well developed, appears stated age. Alert and oriented.  HEENT: Neck supple without meningismus. PERRLA, no conjunctival injection. EOM intact  Cardiovascular: RRR, Warm, well-perfused extremities  Respiratory: CTAB, Unlabored respiratory effort  Abdominal: Soft, nontender, nondistended, no CVA tenderness  Musculoskeletal: Full range of motion all extremities. No deformities. No peripheral edema.  Skin: Warm, dry. No rashes  Vascular: Pulses equal in all 4 extremities.  Neuro: CNs II-XII grossly intact. Sensation and motor function of extremities grossly intact.  Psych: Appropriate mood and affect.    Core Measures:  For Hospitalized Patients:    Hospitalization Decision Time:  The decision to hospitalize the patient was made by Lavelle Peraza MD @ at 2000 on 6/24/2024    Patient is being admitted to the hospital by Dr. Rahman. The results of their tests and reasons for their admission have been discussed with them and/or available family. They convey agreement and understanding for the need to be admitted and for their admission diagnosis.      CONDITIONS ON ADMISSION:  Sepsis is not present at the time of admission. Deep Vein Thrombosis is not present at the time of admission. Thrombosis is not present at the time of admission. Urinary Tract Infection is not present at the time of admission. Pneumonia is not present at the time of admission. MRSA is not present at the time of admission. Wound infection is not present at the time of admission. Pressure Ulcer is not present at the time of admission.      CLINICAL IMPRESSION:  1. Altered mental status, unspecified altered mental status type    2. Fall, initial encounter    3. Contusion of foot, unspecified laterality, initial encounter    4. Hypoglycemia    5. Urinary tract infection without hematuria, site unspecified    6. Acute pulmonary edema (HCC)      PLAN:  Admit to ICU    Critical Care Time:   I have spent 33

## 2024-06-24 NOTE — PROGRESS NOTES
Patient at this time remains drowsy from ativan being administered earlier unable to complete NIHSS and swallow screen at this time. Care ongoing.

## 2024-06-24 NOTE — PROGRESS NOTES
Patient is a dialysis patient and has urine orders placed. Pt states that she does make little urine. Pt states that she doesn't have to go right now. Patient advised that orders are placed to catheterize her to retrieve urine. Pt declines, stating that she will let us know when she has to go.    Valtrex Pregnancy And Lactation Text: this medication is Pregnancy Category B and is considered safe during pregnancy. This medication is not directly found in breast milk but it's metabolite acyclovir is present.

## 2024-06-25 ENCOUNTER — APPOINTMENT (OUTPATIENT)
Facility: HOSPITAL | Age: 59
End: 2024-06-25
Attending: INTERNAL MEDICINE
Payer: MEDICAID

## 2024-06-25 PROBLEM — J96.22 ACUTE ON CHRONIC RESPIRATORY FAILURE WITH HYPOXIA AND HYPERCAPNIA (HCC): Status: ACTIVE | Noted: 2024-06-25

## 2024-06-25 PROBLEM — E16.2 HYPOGLYCEMIA: Status: ACTIVE | Noted: 2024-06-25

## 2024-06-25 PROBLEM — J44.9 COPD (CHRONIC OBSTRUCTIVE PULMONARY DISEASE) (HCC): Status: ACTIVE | Noted: 2024-06-25

## 2024-06-25 PROBLEM — J96.11 CHRONIC RESPIRATORY FAILURE WITH HYPOXIA (HCC): Status: ACTIVE | Noted: 2024-06-25

## 2024-06-25 PROBLEM — R41.82 AMS (ALTERED MENTAL STATUS): Status: ACTIVE | Noted: 2024-06-25

## 2024-06-25 PROBLEM — J96.21 ACUTE ON CHRONIC RESPIRATORY FAILURE WITH HYPOXIA AND HYPERCAPNIA (HCC): Status: ACTIVE | Noted: 2024-06-25

## 2024-06-25 LAB
ALBUMIN SERPL-MCNC: 2.6 G/DL (ref 3.4–5)
ALBUMIN/GLOB SERPL: 0.6 (ref 0.8–1.7)
ALP SERPL-CCNC: 114 U/L (ref 45–117)
ALT SERPL-CCNC: 17 U/L (ref 13–56)
ANION GAP SERPL CALC-SCNC: 8 MMOL/L (ref 3–18)
APPEARANCE UR: ABNORMAL
AST SERPL-CCNC: 19 U/L (ref 10–38)
BACTERIA URNS QL MICRO: ABNORMAL /HPF
BILIRUB SERPL-MCNC: 0.4 MG/DL (ref 0.2–1)
BILIRUB UR QL: NEGATIVE
BUN SERPL-MCNC: 47 MG/DL (ref 7–18)
BUN/CREAT SERPL: 9 (ref 12–20)
CALCIUM SERPL-MCNC: 8.4 MG/DL (ref 8.5–10.1)
CHLORIDE SERPL-SCNC: 100 MMOL/L (ref 100–111)
CO2 SERPL-SCNC: 28 MMOL/L (ref 21–32)
COLOR UR: YELLOW
CREAT SERPL-MCNC: 5.48 MG/DL (ref 0.6–1.3)
ECHO BSA: 2.26 M2
EPITH CASTS URNS QL MICRO: ABNORMAL /LPF (ref 0–5)
ERYTHROCYTE [DISTWIDTH] IN BLOOD BY AUTOMATED COUNT: 17.4 % (ref 11.6–14.5)
EST. AVERAGE GLUCOSE BLD GHB EST-MCNC: 200 MG/DL
GLOBULIN SER CALC-MCNC: 4.1 G/DL (ref 2–4)
GLUCOSE BLD STRIP.AUTO-MCNC: 101 MG/DL (ref 70–110)
GLUCOSE BLD STRIP.AUTO-MCNC: 112 MG/DL (ref 70–110)
GLUCOSE BLD STRIP.AUTO-MCNC: 135 MG/DL (ref 70–110)
GLUCOSE BLD STRIP.AUTO-MCNC: 164 MG/DL (ref 70–110)
GLUCOSE BLD STRIP.AUTO-MCNC: 228 MG/DL (ref 70–110)
GLUCOSE BLD STRIP.AUTO-MCNC: 71 MG/DL (ref 70–110)
GLUCOSE BLD STRIP.AUTO-MCNC: 78 MG/DL (ref 70–110)
GLUCOSE SERPL-MCNC: 171 MG/DL (ref 74–99)
GLUCOSE UR STRIP.AUTO-MCNC: 250 MG/DL
HBA1C MFR BLD: 8.6 % (ref 4.2–5.6)
HCT VFR BLD AUTO: 33.6 % (ref 35–45)
HGB BLD-MCNC: 10.5 G/DL (ref 12–16)
HGB UR QL STRIP: ABNORMAL
KETONES UR QL STRIP.AUTO: NEGATIVE MG/DL
LEUKOCYTE ESTERASE UR QL STRIP.AUTO: ABNORMAL
MAGNESIUM SERPL-MCNC: 2.2 MG/DL (ref 1.6–2.6)
MCH RBC QN AUTO: 28.2 PG (ref 24–34)
MCHC RBC AUTO-ENTMCNC: 31.3 G/DL (ref 31–37)
MCV RBC AUTO: 90.1 FL (ref 78–100)
NITRITE UR QL STRIP.AUTO: NEGATIVE
NRBC # BLD: 0.02 K/UL (ref 0–0.01)
NRBC BLD-RTO: 0.2 PER 100 WBC
PH UR STRIP: 6.5 (ref 5–8)
PLATELET # BLD AUTO: 144 K/UL (ref 135–420)
PMV BLD AUTO: 9.9 FL (ref 9.2–11.8)
POTASSIUM SERPL-SCNC: 4.4 MMOL/L (ref 3.5–5.5)
PROT SERPL-MCNC: 6.7 G/DL (ref 6.4–8.2)
PROT UR STRIP-MCNC: 300 MG/DL
RBC # BLD AUTO: 3.73 M/UL (ref 4.2–5.3)
RBC #/AREA URNS HPF: ABNORMAL /HPF (ref 0–5)
SODIUM SERPL-SCNC: 136 MMOL/L (ref 136–145)
SP GR UR REFRACTOMETRY: 1.02 (ref 1–1.03)
TROPONIN I SERPL HS-MCNC: 156 NG/L (ref 0–54)
TROPONIN I SERPL HS-MCNC: 284 NG/L (ref 0–54)
TROPONIN I SERPL HS-MCNC: 95 NG/L (ref 0–54)
TSH SERPL DL<=0.05 MIU/L-ACNC: 1.07 UIU/ML (ref 0.36–3.74)
UROBILINOGEN UR QL STRIP.AUTO: 0.2 EU/DL (ref 0.2–1)
WBC # BLD AUTO: 10.8 K/UL (ref 4.6–13.2)
WBC URNS QL MICRO: ABNORMAL /HPF (ref 0–5)

## 2024-06-25 PROCEDURE — C8929 TTE W OR WO FOL WCON,DOPPLER: HCPCS

## 2024-06-25 PROCEDURE — C9113 INJ PANTOPRAZOLE SODIUM, VIA: HCPCS | Performed by: INTERNAL MEDICINE

## 2024-06-25 PROCEDURE — 5A1D70Z PERFORMANCE OF URINARY FILTRATION, INTERMITTENT, LESS THAN 6 HOURS PER DAY: ICD-10-PCS | Performed by: INTERNAL MEDICINE

## 2024-06-25 PROCEDURE — 2580000003 HC RX 258: Performed by: EMERGENCY MEDICINE

## 2024-06-25 PROCEDURE — 36415 COLL VENOUS BLD VENIPUNCTURE: CPT

## 2024-06-25 PROCEDURE — 6370000000 HC RX 637 (ALT 250 FOR IP): Performed by: INTERNAL MEDICINE

## 2024-06-25 PROCEDURE — 6360000002 HC RX W HCPCS: Performed by: INTERNAL MEDICINE

## 2024-06-25 PROCEDURE — 87086 URINE CULTURE/COLONY COUNT: CPT

## 2024-06-25 PROCEDURE — 83735 ASSAY OF MAGNESIUM: CPT

## 2024-06-25 PROCEDURE — 94660 CPAP INITIATION&MGMT: CPT

## 2024-06-25 PROCEDURE — 6360000004 HC RX CONTRAST MEDICATION: Performed by: INTERNAL MEDICINE

## 2024-06-25 PROCEDURE — 2580000003 HC RX 258: Performed by: INTERNAL MEDICINE

## 2024-06-25 PROCEDURE — 2500000003 HC RX 250 WO HCPCS: Performed by: INTERNAL MEDICINE

## 2024-06-25 PROCEDURE — 87186 SC STD MICRODIL/AGAR DIL: CPT

## 2024-06-25 PROCEDURE — 94640 AIRWAY INHALATION TREATMENT: CPT

## 2024-06-25 PROCEDURE — 97165 OT EVAL LOW COMPLEX 30 MIN: CPT

## 2024-06-25 PROCEDURE — 83036 HEMOGLOBIN GLYCOSYLATED A1C: CPT

## 2024-06-25 PROCEDURE — 87088 URINE BACTERIA CULTURE: CPT

## 2024-06-25 PROCEDURE — 1100000003 HC PRIVATE W/ TELEMETRY

## 2024-06-25 PROCEDURE — 97535 SELF CARE MNGMENT TRAINING: CPT

## 2024-06-25 PROCEDURE — 80053 COMPREHEN METABOLIC PANEL: CPT

## 2024-06-25 PROCEDURE — 84484 ASSAY OF TROPONIN QUANT: CPT

## 2024-06-25 PROCEDURE — 6360000002 HC RX W HCPCS: Performed by: EMERGENCY MEDICINE

## 2024-06-25 PROCEDURE — 93970 EXTREMITY STUDY: CPT

## 2024-06-25 PROCEDURE — 84443 ASSAY THYROID STIM HORMONE: CPT

## 2024-06-25 PROCEDURE — 81001 URINALYSIS AUTO W/SCOPE: CPT

## 2024-06-25 PROCEDURE — 90935 HEMODIALYSIS ONE EVALUATION: CPT

## 2024-06-25 PROCEDURE — 85027 COMPLETE CBC AUTOMATED: CPT

## 2024-06-25 PROCEDURE — 2700000000 HC OXYGEN THERAPY PER DAY

## 2024-06-25 RX ORDER — GLUCAGON 1 MG/ML
1 KIT INJECTION PRN
Status: DISCONTINUED | OUTPATIENT
Start: 2024-06-25 | End: 2024-06-27 | Stop reason: HOSPADM

## 2024-06-25 RX ORDER — ASPIRIN 81 MG/1
81 TABLET ORAL DAILY
Status: DISCONTINUED | OUTPATIENT
Start: 2024-06-25 | End: 2024-06-27 | Stop reason: HOSPADM

## 2024-06-25 RX ORDER — ARFORMOTEROL TARTRATE 15 UG/2ML
15 SOLUTION RESPIRATORY (INHALATION)
Status: DISCONTINUED | OUTPATIENT
Start: 2024-06-25 | End: 2024-06-27 | Stop reason: HOSPADM

## 2024-06-25 RX ORDER — CARVEDILOL 3.12 MG/1
6.25 TABLET ORAL 2 TIMES DAILY WITH MEALS
Status: DISCONTINUED | OUTPATIENT
Start: 2024-06-25 | End: 2024-06-27 | Stop reason: HOSPADM

## 2024-06-25 RX ORDER — IPRATROPIUM BROMIDE AND ALBUTEROL SULFATE 2.5; .5 MG/3ML; MG/3ML
1 SOLUTION RESPIRATORY (INHALATION)
Status: DISCONTINUED | OUTPATIENT
Start: 2024-06-25 | End: 2024-06-26

## 2024-06-25 RX ORDER — FERROUS SULFATE 325(65) MG
325 TABLET ORAL
Status: DISCONTINUED | OUTPATIENT
Start: 2024-06-25 | End: 2024-06-27 | Stop reason: HOSPADM

## 2024-06-25 RX ORDER — BUDESONIDE 0.5 MG/2ML
0.5 INHALANT ORAL
Status: DISCONTINUED | OUTPATIENT
Start: 2024-06-25 | End: 2024-06-27 | Stop reason: HOSPADM

## 2024-06-25 RX ORDER — INSULIN LISPRO 100 [IU]/ML
0.05 INJECTION, SOLUTION INTRAVENOUS; SUBCUTANEOUS
Status: DISCONTINUED | OUTPATIENT
Start: 2024-06-25 | End: 2024-06-27

## 2024-06-25 RX ORDER — CALCIUM ACETATE 667 MG/1
1 CAPSULE ORAL
Status: DISCONTINUED | OUTPATIENT
Start: 2024-06-25 | End: 2024-06-27 | Stop reason: HOSPADM

## 2024-06-25 RX ORDER — BUPROPION HYDROCHLORIDE 150 MG/1
150 TABLET ORAL EVERY MORNING
COMMUNITY

## 2024-06-25 RX ORDER — HYDRALAZINE HYDROCHLORIDE 50 MG/1
50 TABLET, FILM COATED ORAL 3 TIMES DAILY
Status: DISCONTINUED | OUTPATIENT
Start: 2024-06-25 | End: 2024-06-27 | Stop reason: HOSPADM

## 2024-06-25 RX ORDER — INSULIN GLARGINE 100 [IU]/ML
0.15 INJECTION, SOLUTION SUBCUTANEOUS NIGHTLY
Status: DISCONTINUED | OUTPATIENT
Start: 2024-06-25 | End: 2024-06-27

## 2024-06-25 RX ORDER — GABAPENTIN 300 MG/1
300 CAPSULE ORAL 3 TIMES DAILY
Status: DISCONTINUED | OUTPATIENT
Start: 2024-06-25 | End: 2024-06-27 | Stop reason: HOSPADM

## 2024-06-25 RX ORDER — AMLODIPINE BESYLATE 5 MG/1
5 TABLET ORAL DAILY
Status: DISCONTINUED | OUTPATIENT
Start: 2024-06-25 | End: 2024-06-27 | Stop reason: HOSPADM

## 2024-06-25 RX ORDER — DULOXETIN HYDROCHLORIDE 60 MG/1
60 CAPSULE, DELAYED RELEASE ORAL DAILY
COMMUNITY

## 2024-06-25 RX ORDER — ATORVASTATIN CALCIUM 20 MG/1
40 TABLET, FILM COATED ORAL NIGHTLY
Status: DISCONTINUED | OUTPATIENT
Start: 2024-06-25 | End: 2024-06-27 | Stop reason: HOSPADM

## 2024-06-25 RX ORDER — ALBUTEROL SULFATE 2.5 MG/3ML
2.5 SOLUTION RESPIRATORY (INHALATION) EVERY 6 HOURS PRN
Status: DISCONTINUED | OUTPATIENT
Start: 2024-06-25 | End: 2024-06-27 | Stop reason: HOSPADM

## 2024-06-25 RX ORDER — DEXTROSE MONOHYDRATE 100 MG/ML
INJECTION, SOLUTION INTRAVENOUS CONTINUOUS PRN
Status: DISCONTINUED | OUTPATIENT
Start: 2024-06-25 | End: 2024-06-27 | Stop reason: HOSPADM

## 2024-06-25 RX ADMIN — CALCIUM ACETATE 667 MG: 667 CAPSULE ORAL at 12:23

## 2024-06-25 RX ADMIN — SODIUM CHLORIDE, PRESERVATIVE FREE 10 ML: 5 INJECTION INTRAVENOUS at 08:58

## 2024-06-25 RX ADMIN — BUDESONIDE 500 MCG: 0.5 INHALANT RESPIRATORY (INHALATION) at 22:45

## 2024-06-25 RX ADMIN — CARVEDILOL 6.25 MG: 3.12 TABLET, FILM COATED ORAL at 08:51

## 2024-06-25 RX ADMIN — INSULIN GLARGINE 18 UNITS: 100 INJECTION, SOLUTION SUBCUTANEOUS at 20:54

## 2024-06-25 RX ADMIN — ACETAMINOPHEN 650 MG: 325 TABLET ORAL at 13:25

## 2024-06-25 RX ADMIN — GABAPENTIN 300 MG: 300 CAPSULE ORAL at 20:43

## 2024-06-25 RX ADMIN — GABAPENTIN 300 MG: 300 CAPSULE ORAL at 09:04

## 2024-06-25 RX ADMIN — IPRATROPIUM BROMIDE AND ALBUTEROL SULFATE 1 DOSE: .5; 3 SOLUTION RESPIRATORY (INHALATION) at 07:05

## 2024-06-25 RX ADMIN — BUDESONIDE 500 MCG: 0.5 INHALANT RESPIRATORY (INHALATION) at 07:05

## 2024-06-25 RX ADMIN — HYDRALAZINE HYDROCHLORIDE 50 MG: 50 TABLET ORAL at 09:03

## 2024-06-25 RX ADMIN — ENOXAPARIN SODIUM 30 MG: 100 INJECTION SUBCUTANEOUS at 09:03

## 2024-06-25 RX ADMIN — FERROUS SULFATE TAB 325 MG (65 MG ELEMENTAL FE) 325 MG: 325 (65 FE) TAB at 08:50

## 2024-06-25 RX ADMIN — SODIUM CHLORIDE, PRESERVATIVE FREE 10 ML: 5 INJECTION INTRAVENOUS at 01:11

## 2024-06-25 RX ADMIN — IPRATROPIUM BROMIDE AND ALBUTEROL SULFATE 1 DOSE: .5; 3 SOLUTION RESPIRATORY (INHALATION) at 10:40

## 2024-06-25 RX ADMIN — SODIUM CHLORIDE, PRESERVATIVE FREE 40 MG: 5 INJECTION INTRAVENOUS at 08:59

## 2024-06-25 RX ADMIN — PERFLUTREN 1.5 ML: 6.52 INJECTION, SUSPENSION INTRAVENOUS at 10:06

## 2024-06-25 RX ADMIN — ASPIRIN 81 MG: 81 TABLET, COATED ORAL at 08:52

## 2024-06-25 RX ADMIN — HYDRALAZINE HYDROCHLORIDE 50 MG: 50 TABLET ORAL at 14:48

## 2024-06-25 RX ADMIN — IPRATROPIUM BROMIDE AND ALBUTEROL SULFATE 1 DOSE: .5; 3 SOLUTION RESPIRATORY (INHALATION) at 22:45

## 2024-06-25 RX ADMIN — ATORVASTATIN CALCIUM 40 MG: 20 TABLET, FILM COATED ORAL at 20:43

## 2024-06-25 RX ADMIN — CALCIUM ACETATE 667 MG: 667 CAPSULE ORAL at 17:12

## 2024-06-25 RX ADMIN — GABAPENTIN 300 MG: 300 CAPSULE ORAL at 14:48

## 2024-06-25 RX ADMIN — AMLODIPINE BESYLATE 5 MG: 5 TABLET ORAL at 08:54

## 2024-06-25 RX ADMIN — CALCIUM ACETATE 667 MG: 667 CAPSULE ORAL at 08:53

## 2024-06-25 RX ADMIN — CARVEDILOL 6.25 MG: 3.12 TABLET, FILM COATED ORAL at 17:12

## 2024-06-25 RX ADMIN — SODIUM CHLORIDE, PRESERVATIVE FREE 10 ML: 5 INJECTION INTRAVENOUS at 20:47

## 2024-06-25 RX ADMIN — ARFORMOTEROL TARTRATE 15 MCG: 15 SOLUTION RESPIRATORY (INHALATION) at 22:45

## 2024-06-25 RX ADMIN — ARFORMOTEROL TARTRATE 15 MCG: 15 SOLUTION RESPIRATORY (INHALATION) at 07:05

## 2024-06-25 RX ADMIN — WATER 1000 MG: 1 INJECTION INTRAMUSCULAR; INTRAVENOUS; SUBCUTANEOUS at 17:11

## 2024-06-25 ASSESSMENT — PAIN SCALES - GENERAL
PAINLEVEL_OUTOF10: 0
PAINLEVEL_OUTOF10: 8
PAINLEVEL_OUTOF10: 8
PAINLEVEL_OUTOF10: 0
PAINLEVEL_OUTOF10: 0
PAINLEVEL_OUTOF10: 7
PAINLEVEL_OUTOF10: 0
PAINLEVEL_OUTOF10: 7

## 2024-06-25 ASSESSMENT — PAIN DESCRIPTION - PAIN TYPE: TYPE: CHRONIC PAIN;NEUROPATHIC PAIN

## 2024-06-25 ASSESSMENT — PAIN DESCRIPTION - LOCATION
LOCATION: GENERALIZED
LOCATION: GENERALIZED

## 2024-06-25 ASSESSMENT — PAIN DESCRIPTION - ONSET: ONSET: ON-GOING

## 2024-06-25 NOTE — ED NOTES
Pt instructed that she is NPO and stressed importance of this due to being on bipap. Pt became aggravated and insisted on keeping [employer] within arms reach. This Rn move food away from pt. Pt preceded rant via voice to text on her phone.

## 2024-06-25 NOTE — CONSULTS
[facility]  Pulmonary, Critical Care, and Sleep Medicine    Name: Layla Ellison MRN: [id number]   : 1965 Hospital: [facility]    Date: 2024  Room: [address] [place] [address] Note                                              Consult requesting physician: Dr. Malik Maxwell  Reason for Consult: Hypoglycemia, altered mental status    IMPRESSION:         Active Hospital Problems    Diagnosis Date Noted    AMS (altered mental status) [R41.82] 2024    Hypoglycemia [E16.2] 2024    COPD (chronic obstructive pulmonary disease) ([facility]) [J44.9] 2024    Chronic respiratory failure with hypoxia ([facility]) [J96.11] 2024    Pulmonary edema without heart failure [J81.1] 2024    ESRD (end stage renal disease) on dialysis ([facility]) [N18.6, Z99.2]     Morbid obesity ([facility]) [E66.01]     Diastolic CHF, acute on chronic ([facility]) [I50.33] 2020    RYAN (obstructive sleep apnea) [G47.33] 2019    DM (diabetes mellitus) ([facility]) [E11.9] 2014    HTN (hypertension) [I10] 2014        Code status: Full Code       RECOMMENDATIONS:     Respiratory: History of asthma-COPD, chronic hypoxic respiratory failure on home O2 obesity, RYAN not on CPAP.  CXR 2024: Moderate pulmonary edema.  ABG 2024: pH 7.35, pCO2 51.4, pO2 423, SpO2 100%.  Optimize fluid removal during dialysis per nephrologist.  Bronchodilators: Continue DuoNeb 4 times daily, Brovana nebs twice daily, Pulmicort nebs twice daily.  Start albuterol nebs as needed.    Protecting airway.   Keep SPO2 >=92%. HOB 30 degree elevation all the time. Aggressive pulmonary toileting. Aspiration precautions. Incentive spirometry.    CVS: History of HTN.  Troponin elevated; but no chest pain or anginal symptoms.  Echo 2020: LVEF 60 to 65%.  Diastolic dysfunction.  Estimated PASP 48 mmHg.  Continue Norvasc, Coreg, aspirin, Lipitor.  Status post Lasix 80 mg x 1 for pulmonary edema.  Follow serial cardiac  108.9 kg (240 lb)   01/30/24 108.9 kg (240 lb)          Physical Exam:     General/Neurology: Alert, Awake, NAD.  Obese.  O2 NC.  Head:   Normocephalic, without obvious abnormality, atraumatic.  Eye:   EOM intact. No scleral icterus, no pallor, no cyanosis.  Nose:   No sinus tenderness  Throat:  Lips, mucosa, and tongue normal. No oral thrush.  Neck:   Supple, symmetric. No lymphadenopathy. Trachea midline.  Lung:   Moderate air entry bilateral equal. No rales. No rhonchi. No wheezing. No stridors. No prolongded expiration. No accessory muscle use.  Heart:   Regular rate & rhythm. S1 S2 present. No murmur.  No JVD.  Abdomen:  Soft. NT. ND. +BS. No masses.  Extremities:  Very mild bilateral leg edema. No cyanosis. No clubbing.  Pulses: 2+ and symmetric in DP. Capillary refill: normal.   Lymphatic:  No cervical or supraclavicular palpable lymphadenopathy.  Musculoskeletal: No joint swelling. No tenderness.   Skin:   Color, texture, turgor normal. No rashes or lesions.     Data:       Recent Results (from the past 24 hour(s))   EKG 12 Lead    Collection Time: 06/24/24  9:37 AM   Result Value Ref Range    Ventricular Rate 87 BPM    Atrial Rate 87 BPM    P-R Interval 152 ms    QRS Duration 90 ms    Q-T Interval 398 ms    QTc Calculation (Bazett) 478 ms    P Axis 26 degrees    R Axis 53 degrees    T Axis 52 degrees    Diagnosis       Sinus rhythm with marked sinus arrhythmia  Otherwise normal ECG  When compared with ECG of 30-JAN-2024 13:22,  No significant change was found     CBC with Auto Differential    Collection Time: 06/24/24  9:50 AM   Result Value Ref Range    WBC 7.9 4.6 - 13.2 K/uL    RBC 4.27 4.20 - 5.30 M/uL    Hemoglobin 11.9 (L) 12.0 - 16.0 g/dL    Hematocrit 38.1 35.0 - 45.0 %    MCV 89.2 78.0 - 100.0 FL    MCH 27.9 24.0 - 34.0 PG    MCHC 31.2 31.0 - 37.0 g/dL    RDW 16.9 (H) 11.6 - 14.5 %    Platelets 151 135 - 420 K/uL    MPV 9.8 9.2 - 11.8 FL    Nucleated RBCs 0.0 0  WBC    nRBC 0.00 0.00 - 0.01  K/uL    Neutrophils % 72 40 - 73 %    Lymphocytes % 14 (L) 21 - 52 %    Monocytes % 7 3 - 10 %    Eosinophils % 5 0 - 5 %    Basophils % 1 0 - 2 %    Immature Granulocytes % 1 (H) 0.0 - 0.5 %    Neutrophils Absolute 5.7 1.8 - 8.0 K/UL    Lymphocytes Absolute 1.1 0.9 - 3.6 K/UL    Monocytes Absolute 0.6 0.05 - 1.2 K/UL    Eosinophils Absolute 0.4 0.0 - 0.4 K/UL    Basophils Absolute 0.1 0.0 - 0.1 K/UL    Immature Granulocytes Absolute 0.1 (H) 0.00 - 0.04 K/UL    Differential Type AUTOMATED     Comprehensive Metabolic Panel    Collection Time: 06/24/24  9:50 AM   Result Value Ref Range    Sodium 138 136 - 145 mmol/L    Potassium 3.5 3.5 - 5.5 mmol/L    Chloride 100 100 - 111 mmol/L    CO2 32 21 - 32 mmol/L    Anion Gap 6 3.0 - 18 mmol/L    Glucose 76 74 - 99 mg/dL    BUN 35 (H) 7.0 - 18 MG/DL    Creatinine 3.96 (H) 0.6 - 1.3 MG/DL    BUN/Creatinine Ratio 9 (L) 12 - 20      Est, Glom Filt Rate 12 (L) >60 ml/min/1.73m2    Calcium 9.0 8.5 - 10.1 MG/DL    Total Bilirubin 0.4 0.2 - 1.0 MG/DL    ALT 22 13 - 56 U/L    AST 28 10 - 38 U/L    Alk Phosphatase 147 (H) 45 - 117 U/L    Total Protein 7.7 6.4 - 8.2 g/dL    Albumin 3.4 3.4 - 5.0 g/dL    Globulin 4.3 (H) 2.0 - 4.0 g/dL    Albumin/Globulin Ratio 0.8 0.8 - 1.7     Troponin    Collection Time: 06/24/24  9:50 AM   Result Value Ref Range    Troponin, High Sensitivity 18 0 - 54 ng/L   Magnesium    Collection Time: 06/24/24  9:50 AM   Result Value Ref Range    Magnesium 2.1 1.6 - 2.6 mg/dL   Protime-INR    Collection Time: 06/24/24 11:40 AM   Result Value Ref Range    Protime 12.9 11.9 - 14.9 sec    INR 1.0 0.9 - 1.1     Venous Blood Gas, POC    Collection Time: 06/24/24  3:24 PM   Result Value Ref Range    PH, VENOUS (POC) 7.34 7.32 - 7.42      PCO2, Saira, POC 42.4 41 - 51 MMHG    PO2, VENOUS (POC) 55 (H) 25 - 40 mmHg    HCO3, Venous 22.7 (L) 23.0 - 28.0 MMOL/L    SO2, VENOUS (POC) 86.2 65 - 88 %    Base Deficit, Venous 3.0 mmol/L    Specimen type: VENOUS BLOOD

## 2024-06-25 NOTE — ED NOTES
TRANSFER - OUT REPORT:    Verbal report given to ICU RN on Layal Ellison  being transferred to ICU for routine progression of patient care       Report consisted of patient's Situation, Background, Assessment and   Recommendations(SBAR).     Information from the following report(s) Nurse Handoff Report, Adult Overview, Surgery Report, Intake/Output, MAR, and Neuro Assessment was reviewed with the receiving nurse.    [place] Fall Assessment:    Presents to emergency department  because of falls (Syncope, seizure, or loss of consciousness): No  Age > 70: No  Altered Mental Status, Intoxication with alcohol or substance confusion (Disorientation, impaired judgment, poor safety awaremess, or inability to follow instructions): No  Impaired Mobility: Ambulates or transfers with assistive devices or assistance; Unable to ambulate or transer.: No  Nursing Judgement: No          Lines:   Peripheral IV 06/24/24 Posterior;Right Hand (Active)   Site Assessment Clean, dry & intact 06/24/24 2025   Line Status Blood return noted;Brisk blood return;Flushed;Specimen collected;Normal saline locked 06/24/24 2025   Phlebitis Assessment No symptoms 06/24/24 2025   Dressing Status New dressing applied;Clean, dry & intact 06/24/24 2025   Dressing Type Transparent 06/24/24 2025        Opportunity for questions and clarification was provided.      Patient transported with:  Monitor, O2 @ 15lpm, and Registered Nurse

## 2024-06-25 NOTE — CARE COORDINATION
Tentative Plan: HH vs SNF      CM NW met with patient at bedside. Pt lives alone and has her 2 daughters for support. Pt needs assistance with ADLS due to limited endurance. Pt uses mobility aides rollator, walker and wheelchair. Pt has had multiple falls most recently on 6/21. Pt has oxygen with [employer] and nebulizer. Pt has Dialysis MWF at [facility]. Pt uses [facility] transportation for dialysis and her daughter Radha provide transportation for appointments. Pt has no barriers to getting medications and pts PCP has been verified. CM will continue to follow for discharge planning.        06/25/24 1047   Service Assessment   Patient Orientation Alert and Oriented   Cognition Alert   History Provided By Patient   Primary Caregiver Self   Support Systems Children   Patient's Healthcare Decision Maker is: Legal Next of Kin   PCP Verified by CM Yes   Last Visit to PCP Within last 6 months   Prior Functional Level Mobility;Shopping;Housework;Assistance with the following:   Current Functional Level Assistance with the following:;Mobility;Shopping;Housework;Cooking;Dressing;Bathing;Toileting   Can patient return to prior living arrangement Unknown at present   Ability to make needs known: Good   Family able to assist with home care needs: Yes   Would you like for me to discuss the discharge plan with any other family members/significant others, and if so, who? Yes  (daughters Marlys and Radha)   Financial Resources Medicaid;Medicare   Community Resources Transportation   Social/Functional History   Lives With Alone   Type of Home House   ADL Assistance Needs assistance   Toileting Independent   Homemaking Assistance Needs assistance   Homemaking Responsibilities Yes   Ambulation Assistance Needs assistance   Transfer Assistance Independent   Active  No   Discharge Planning   Type of Residence Skilled Nursing Facility;House   Living Arrangements Alone   Current Services Prior To Admission Durable Medical  Equipment;Oxygen Therapy   Current DME Prior to Arrival Walker;Oxygen Therapy (Comment)   DME Ordered? No   Potential Assistance Purchasing Medications No   Patient expects to be discharged to: Unknown   Follow Up Appointment: Best Day/Time  Monday AM   History of falls? 1   Services At/After Discharge   Services At/After Discharge Home Health;Skilled Nursing Facility (SNF)    Resource Information Provided? No   Mode of Transport at Discharge Self   Confirm Follow Up Transport Family   Condition of Participation: Discharge Planning   The Plan for Transition of Care is related to the following treatment goals: Home with support   The Patient and/or Patient Representative was provided with a Choice of Provider? Patient   The Patient and/Or Patient Representative agree with the Discharge Plan? Yes   Freedom of Choice list was provided with basic dialogue that supports the patient's individualized plan of care/goals, treatment preferences, and shares the quality data associated with the providers?  Yes

## 2024-06-25 NOTE — PROGRESS NOTES
Hospitalist Progress Note-critical care note     Patient: Layla Ellison MRN: [id number]  CSN: [id number]    YOB: 1965  Age: 58 y.o.  Sex: female    DOA: 6/24/2024 LOS:  LOS: 1 day            Chief complaint: pulm edema , esrd  on hd , chf ,  tess     Assessment/Plan         Active Hospital Problems    Diagnosis Date Noted    Pulmonary edema without heart failure [J81.1] 06/24/2024    ESRD (end stage renal disease) on dialysis (HCC) [N18.6, Z99.2]     Morbid obesity (HCC) [E66.01]     Diastolic CHF, acute on chronic (HCC) [I50.33] 02/16/2020    Acute kidney injury superimposed on CKD (HCC) [N17.9, N18.9] 02/13/2020    TESS (obstructive sleep apnea) [G47.33] 12/11/2019        Pulmonary edema  Off BiPAP AM , received hd on admission   Pulmonologist consulted   Current on 3  L oxygen   Continue hd per nephrologist     Obesity hypoventilation syndrome with sleep apnea   No compliant with her CPAP due to recent moving ABG shows hypercapnia   Continue breathing tx and educate pt   On rocephine for empiric treatment      Hypoglycemia resolved  Continue  Lantus and mealtime insulin when she is eating     End-stage renal disease on hd   Nephrologist consulted and continue HD         Dermatology  Will place a wound care consult for her nails -foot x-ray no fracture      Pvl negative for dvt       Subjective I feel better, I use oxygen at home     Like needs hd per nephrologist     Will have pt.ot   TIME: E/M Time spent with patient and patient care issues: [] 31-40 mins  [] 41-49 mins  [x] 50 mins or more.      Disposition :1-2 days   Review of systems:    General: No fevers or chills.  Cardiovascular: No chest pain or pressure. No palpitations.   Pulmonary: +shortness of breath better   Gastrointestinal: No nausea, vomiting.     Vital signs/Intake and Output:  Visit Vitals  BP (!) 122/57   Pulse 67   Temp 97.8 °F (36.6 °C) (Oral)   Resp 19   Ht 1.575 m (5' 2\")   Wt 117 kg (257 lb 15 oz)   SpO2 97%   BMI 47.18 kg/m²  Rianna Smiley MD on 5/28/2024 2:06 PM      LILY TYLER MD

## 2024-06-25 NOTE — PLAN OF CARE
Problem: Chronic Conditions and Co-morbidities  Goal: Patient's chronic conditions and co-morbidity symptoms are monitored and maintained or improved  6/25/2024 1941 by Claire Aguilar, RN  Outcome: Progressing  6/25/2024 0814 by Ariana Maza, RN  Outcome: Progressing  Flowsheets (Taken 6/25/2024 0800)  Care Plan - Patient's Chronic Conditions and Co-Morbidity Symptoms are Monitored and Maintained or Improved:   Monitor and assess patient's chronic conditions and comorbid symptoms for stability, deterioration, or improvement   Collaborate with multidisciplinary team to address chronic and comorbid conditions and prevent exacerbation or deterioration   Update acute care plan with appropriate goals if chronic or comorbid symptoms are exacerbated and prevent overall improvement and discharge   Receiving extra treatment today to help remove fluid

## 2024-06-25 NOTE — H&P
History & Physical    Patient: Layla Ellison MRN: [id number]  CSN: [id number]    YOB: 1965  Age: 58 y.o.  Sex: female      DOA: 6/24/2024    Chief Complaint:   Chief Complaint   Patient presents with    Altered Mental Status    Dizziness       Active Hospital Problems    Diagnosis Date Noted    Pulmonary edema without heart failure [J81.1] 06/24/2024    ESRD (end stage renal disease) on dialysis ([facility]) [N18.6, Z99.2]     Morbid obesity ([facility]) [E66.01]     Diastolic CHF, acute on chronic (HCC) [I50.33] 02/16/2020    Acute kidney injury superimposed on CKD (HCC) [N17.9, N18.9] 02/13/2020    RYAN (obstructive sleep apnea) [G47.33] 12/11/2019          HPI:     Layla Ellison is a 58 y.o.  female who has history of end-stage renal disease on dialysis Monday Wednesday Friday, obstructive sleep apnea currently not using her CPAP morbid obesity asthma and COPD presents to the emergency room with complaints of slurring of her speech and generally just not feeling herself she fell a week ago and had her toenails removed from both her foot and has been having left knee pain.In the emergency she had an MRI of her brain done that showed no acute stroke however when she was about to be discharged she had an episode of confusion and was found to be hypoglycemic she was given 2 doses of D10 and then unfortunately went into pulmonary edema and required BiPAP  I am asked to admit for dyspnea she is also found to have a UTI    Past Medical History:   Diagnosis Date    Asthma     Note: As child had asthma    Chronic obstructive pulmonary disease (HCC)     Diabetes mellitus (HCC)     Type 2    ESRD on dialysis (HCC)     mwf    Heart palpitations     Hypertension     Ill-defined condition     fibromyalgia     Morbid obesity (HCC)     MRSA infection 8/2014    Stroke ([facility]) 06/02/2018       Past Surgical History:   Procedure Laterality Date    BREAST SURGERY      cyst removed    CATARACT REMOVAL      CHOLECYSTECTOMY      IR TUNNELED  TAP//Report ID: [id number] Creation Date: 06/21/24 5:12 am    CT Head WO Contrast    Result Date: 6/21/2024  CT HEAD: No evidence of acute intracranial traumatic injury. CT CERVICAL SPINE: No fractures or malalignments.  If significant symptomology persists, consider imaging. Digital Imaging and Communications in Medicine (DICOM) format image data are available to nonaffiliated external healthcare facilities or entities on a secure, media free, reciprocally searchable basis with patient authorization for at least a 12-month period after this study. INTERPRETING PHYSICIAN: Jayden Salas MD TAP//Report ID: [id number] Creation Date: 06/21/24 5:12 am     XR CHEST 1 VIEW   Final Result      Moderate edema pattern.         Electronically signed by STEFAN ARENAS      MRI BRAIN WO CONTRAST   Final Result      1. Evaluation is limited by motion. No acute intracranial abnormality.   2. Mild chronic microvascular ischemic disease.         Electronically signed by Gutierrez Lewis      XR FOOT RIGHT (MIN 3 VIEWS)   Final Result   No acute abnormality.      Electronically signed by Mauricio Christine      XR FOOT LEFT (MIN 3 VIEWS)   Final Result   No acute abnormality.      Electronically signed by Mauricio Christine          LAST EKG  Results for orders placed or performed during the hospital encounter of 06/24/24   EKG 12 Lead   Result Value Ref Range    Ventricular Rate 80 BPM    Atrial Rate 80 BPM    P-R Interval 126 ms    QRS Duration 88 ms    Q-T Interval 396 ms    QTc Calculation (Bazett) 456 ms    P Axis 6 degrees    R Axis 51 degrees    T Axis 47 degrees    Diagnosis       Normal sinus rhythm  Normal ECG  When compared with ECG of 24-JUN-2024 09:37,  No significant change was found           Procedures/imaging: see electronic medical records for all procedures/Xrays and details which were not copied into this note but were reviewed prior to creation of Plan      Assessment/Plan     Principal Problem:    Pulmonary edema without heart  Admission No

## 2024-06-25 NOTE — PROGRESS NOTES
Called to give report,RN in medical emergency    [id number]:  Report called to receiving RN      [phone]:  Pt transferred with this RN, cardiac monitor, 3L NC, and belongings.

## 2024-06-25 NOTE — CONSULTS
Nephrology Consult    Patient: Layla Ellison  Requesting physician: Dr. Qing Rahman  Reason for consult: ESRD management    CC: Confusion    History of Present Illness:  Layla Ellison is a 58 y.o.  female with a past medical history significant for HTN, DM, CHF, COPD, ESRD on HD who completed her outpt HD on Monday 6/24, presented to our hospital with confusion and some SOB.  Found to have hypoglycemia and pulm edema on CXR.  Head MRI neg.  Pt found to have UTI and admitted to medicine.  Nephrology was consulted for further evaluation and management of her ESRD.  Off bipap and placed on 3L oxygen via NC this AM.    Past Medical History:  Patient Active Problem List   Diagnosis    CKD (chronic kidney disease) stage 4, GFR 15-29 ml/min ([facility])    Nausea    Morbid obesity ([facility])    Respiratory distress    Acute kidney injury superimposed on CKD ([facility])    Goals of care, counseling/discussion    Dizziness    ESRD (end stage renal disease) on dialysis ([facility])    Constipation    RYAN (obstructive sleep apnea)    HTN (hypertension)    DM (diabetes mellitus) ([facility])    CHF (congestive heart failure) ([facility])    Diastolic CHF, acute on chronic ([facility])    Elevated troponin    Pulmonary edema without heart failure    AMS (altered mental status)    Hypoglycemia    COPD (chronic obstructive pulmonary disease) ([facility])    Chronic respiratory failure with hypoxia ([facility])       Social History:  Social History     Socioeconomic History    Marital status: Single   Tobacco Use    Smoking status: Never    Smokeless tobacco: Never   Substance and Sexual Activity    Alcohol use: No    Drug use: No       Family History:  Family History   Problem Relation Age of Onset    Heart Attack Mother     Heart Attack Maternal Grandmother      +kidney disease in the family.    Allergy:  Allergies   Allergen Reactions    Bee Venom Swelling    Penicillins Hives    Strawberry Hives        Medication:  Home meds: reviewed    Inpatient meds:  Current

## 2024-06-25 NOTE — PROGRESS NOTES
Occupational Therapy Goals:  Initiated 6/25/2024 to be met within 7-10 days.  Short Term Goals  Time Frame for Short Term Goals: 7 days  Short Term Goal 1: Patient will complete toilet transfer with mod I  Short Term Goal 2: Patient will complete bathing with mod I  Short Term Goal 3: Patient will complete grooming in standing at sink with mod I  Short Term Goal 4: Patient will complete dressing with mod I  Short Term Goal 5: Patient will complete toileting with mod I    OCCUPATIONAL THERAPY EVALUATION    Patient: Layla Ellison (58 y.o. female)  Date: 6/25/2024  Primary Diagnosis: Acute pulmonary edema (HCC) [J81.0]  Hypoglycemia [E16.2]  Pulmonary edema without heart failure [J81.1]  Fall, initial encounter [W19.XXXA]  Contusion of foot, unspecified laterality, initial encounter [S90.30XA]  Urinary tract infection without hematuria, site unspecified [N39.0]  Altered mental status, unspecified altered mental status type [R41.82]       Precautions:  ,  ,  ,  ,  ,  ,  ,    PLOF: Patient completed ADLs at the modified independent level , reports having prior falls in tub d/t poor placement of grab bars in tub and high edge of tub. Uses a cane in her apartment and a rollator when going out.    ASSESSMENT :  Patient presented supine in bed with O2 via NC, [place], and cardiac monitor. Patient with no visitors present for entire session. Patient completed assessment of ADLs and BUE strength, ROM (see details below). Patient completed lower body dressing setup assist for gripper sock don with additional time to complete. Patient using rollator to walk to bathroom and back to EOB, spO2 was 92% after.  Patient reports decreased endurance for IADLs, such as grocery shopping, Due to deficits (listed below) patient has decreased independence with ADLs, IADLs, and functional mobility and would benefit from home health occupational therapy  ,      DEFICITS/IMPAIRMENTS:  Performance deficits / Impairments: Decreased functional

## 2024-06-25 NOTE — FLOWSHEET NOTE
6/25/24 Dialysis  Primary RN SBAR: Farrah Taylor RN  Patient Education provided: yes  Incapacitated Nurse edu. provided: yes  Preferred Education method and Primary language: English  Hospital associated wait time; reason: none  Consent obtained for Dialysis  Total removed-2kg    Hepatitis B information from [facility]  6/10/24 AG-NEG, AB-Succ  Hepatitis B Surface Ag   Date/Time Value Ref Range Status   07/01/2021 07:15 AM <0.10  Negative   <1.00 Index Final     Hep B S Ab   Date/Time Value Ref Range Status   07/01/2021 07:15 AM 64.78 >10.0 mIU/mL Final     Hep B S Ab Interp   Date/Time Value Ref Range Status   07/01/2021 07:15 AM Positive POS   Final        06/25/24 1929   Vital Signs   BP (!) 102/57   Temp 97.3 °F (36.3 °C)   Pulse 73   Respirations 20   Pain Assessment   Pain Assessment None - Denies Pain   Treatment   Time On 1922   Treatment Goal 2000   Observations & Evaluations   Level of Consciousness 0   Oriented X 4   Heart Rhythm Regular   Respiratory Quality/Effort Unlabored   O2 Device Nasal cannula   Bilateral Breath Sounds Diminished   Skin Color Pale   Skin Condition/Temp Cool   Appetite Fair   Edema Right lower extremity;Left lower extremity   RLE Edema +1   LLE Edema +1   Technical Checks   Dialysis Machine No. 2   RO Machine Number 2   Dialyzer Lot No. [id number]   Tubing Lot Number [id number]   All Connections Secure Yes   NS Bag Yes   Saline Line Double Clamped Yes   Dialyzer Revaclear 300   Prime Volume (mL) 200 mL   ICEBOAT I;C;E;B;O;A;T   RO Machine Log Sheet Completed Yes   Machine Alarm Self Test Completed;Passed   Air Foam Detector Tested;Proper Function;pH Reading   Extracorporeal Circuit Tested for Integrity Yes   Machine Conductivity 14.1   Manual Ph 7.2   Bleach Test (Neg) Yes   Bath Temperature 96.8 °F (36 °C)   Dialysis Bath   K+ (Potassium) 2   Ca+ (Calcium) 2.5   Na+ (Sodium) 138   HCO3 (Bicarb) 37   Bicarbonate Concentrate Lot No. [id number]   Acid Concentrate Lot No. [id number]   Treatment

## 2024-06-26 LAB
ALBUMIN SERPL-MCNC: 3 G/DL (ref 3.4–5)
ALBUMIN/GLOB SERPL: 0.7 (ref 0.8–1.7)
ALP SERPL-CCNC: 120 U/L (ref 45–117)
ALT SERPL-CCNC: 17 U/L (ref 13–56)
ANION GAP SERPL CALC-SCNC: 6 MMOL/L (ref 3–18)
AST SERPL-CCNC: 17 U/L (ref 10–38)
BILIRUB SERPL-MCNC: 0.4 MG/DL (ref 0.2–1)
BUN SERPL-MCNC: 28 MG/DL (ref 7–18)
BUN/CREAT SERPL: 6 (ref 12–20)
CALCIUM SERPL-MCNC: 8.6 MG/DL (ref 8.5–10.1)
CHLORIDE SERPL-SCNC: 98 MMOL/L (ref 100–111)
CO2 SERPL-SCNC: 34 MMOL/L (ref 21–32)
CREAT SERPL-MCNC: 4.62 MG/DL (ref 0.6–1.3)
ECHO AO ROOT DIAM: 2.6 CM
ECHO AO ROOT INDEX: 1.22 CM/M2
ECHO AV MEAN GRADIENT: 5 MMHG
ECHO AV MEAN VELOCITY: 1.1 M/S
ECHO AV PEAK GRADIENT: 12 MMHG
ECHO AV PEAK VELOCITY: 1.7 M/S
ECHO AV VTI: 34.1 CM
ECHO BSA: 2.26 M2
ECHO EST RA PRESSURE: 3 MMHG
ECHO LA DIAMETER INDEX: 1.97 CM/M2
ECHO LA DIAMETER: 4.2 CM
ECHO LA TO AORTIC ROOT RATIO: 1.62
ECHO LV E' LATERAL VELOCITY: 8 CM/S
ECHO LV E' SEPTAL VELOCITY: 76 CM/S
ECHO LV EDV A4C: 126 ML
ECHO LV EDV INDEX A4C: 59 ML/M2
ECHO LV EJECTION FRACTION A4C: 65 %
ECHO LV ESV A4C: 44 ML
ECHO LV ESV INDEX A4C: 21 ML/M2
ECHO LV FRACTIONAL SHORTENING: 32 % (ref 28–44)
ECHO LV INTERNAL DIMENSION DIASTOLE INDEX: 2.21 CM/M2
ECHO LV INTERNAL DIMENSION DIASTOLIC: 4.7 CM (ref 3.9–5.3)
ECHO LV INTERNAL DIMENSION SYSTOLIC INDEX: 1.5 CM/M2
ECHO LV INTERNAL DIMENSION SYSTOLIC: 3.2 CM
ECHO LV IVSD: 1.2 CM (ref 0.6–0.9)
ECHO LV MASS 2D: 212 G (ref 67–162)
ECHO LV MASS INDEX 2D: 99.5 G/M2 (ref 43–95)
ECHO LV POSTERIOR WALL DIASTOLIC: 1.2 CM (ref 0.6–0.9)
ECHO LV RELATIVE WALL THICKNESS RATIO: 0.51
ECHO LVOT AREA: 3.1 CM2
ECHO LVOT DIAM: 2 CM
ECHO MV A VELOCITY: 0.75 M/S
ECHO MV E DECELERATION TIME (DT): 171.1 MS
ECHO MV E VELOCITY: 0.83 M/S
ECHO MV E/A RATIO: 1.11
ECHO MV E/E' LATERAL: 10.38
ECHO MV E/E' RATIO (AVERAGED): 5.73
ECHO MV E/E' SEPTAL: 1.09
ECHO RA END SYSTOLIC VOLUME APICAL 4 CHAMBER INDEX BSA: 15 ML/M2
ECHO RA VOLUME: 33 ML
ECHO RIGHT VENTRICULAR SYSTOLIC PRESSURE (RVSP): 34 MMHG
ECHO RV FREE WALL PEAK S': 14 CM/S
ECHO RV INTERNAL DIMENSION: 3.3 CM
ECHO RV TAPSE: 2.4 CM (ref 1.7–?)
ECHO TV REGURGITANT MAX VELOCITY: 2.8 M/S
ECHO TV REGURGITANT PEAK GRADIENT: 31 MMHG
ERYTHROCYTE [DISTWIDTH] IN BLOOD BY AUTOMATED COUNT: 17.4 % (ref 11.6–14.5)
GLOBULIN SER CALC-MCNC: 4.2 G/DL (ref 2–4)
GLUCOSE BLD STRIP.AUTO-MCNC: 137 MG/DL (ref 70–110)
GLUCOSE BLD STRIP.AUTO-MCNC: 226 MG/DL (ref 70–110)
GLUCOSE BLD STRIP.AUTO-MCNC: 234 MG/DL (ref 70–110)
GLUCOSE BLD STRIP.AUTO-MCNC: 86 MG/DL (ref 70–110)
GLUCOSE BLD STRIP.AUTO-MCNC: 89 MG/DL (ref 70–110)
GLUCOSE SERPL-MCNC: 137 MG/DL (ref 74–99)
HBV SURFACE AG SER QL: <0.1 INDEX
HBV SURFACE AG SER QL: NEGATIVE
HCT VFR BLD AUTO: 36.2 % (ref 35–45)
HGB BLD-MCNC: 11.2 G/DL (ref 12–16)
MAGNESIUM SERPL-MCNC: 2.2 MG/DL (ref 1.6–2.6)
MCH RBC QN AUTO: 28.4 PG (ref 24–34)
MCHC RBC AUTO-ENTMCNC: 30.9 G/DL (ref 31–37)
MCV RBC AUTO: 91.9 FL (ref 78–100)
NRBC # BLD: 0 K/UL (ref 0–0.01)
NRBC BLD-RTO: 0 PER 100 WBC
PLATELET # BLD AUTO: 140 K/UL (ref 135–420)
PMV BLD AUTO: 9.8 FL (ref 9.2–11.8)
POTASSIUM SERPL-SCNC: 3.6 MMOL/L (ref 3.5–5.5)
PROT SERPL-MCNC: 7.2 G/DL (ref 6.4–8.2)
RBC # BLD AUTO: 3.94 M/UL (ref 4.2–5.3)
SODIUM SERPL-SCNC: 138 MMOL/L (ref 136–145)
WBC # BLD AUTO: 7.9 K/UL (ref 4.6–13.2)

## 2024-06-26 PROCEDURE — 2580000003 HC RX 258: Performed by: EMERGENCY MEDICINE

## 2024-06-26 PROCEDURE — 97116 GAIT TRAINING THERAPY: CPT

## 2024-06-26 PROCEDURE — 97602 WOUND(S) CARE NON-SELECTIVE: CPT

## 2024-06-26 PROCEDURE — 97162 PT EVAL MOD COMPLEX 30 MIN: CPT

## 2024-06-26 PROCEDURE — 82962 GLUCOSE BLOOD TEST: CPT

## 2024-06-26 PROCEDURE — 6360000002 HC RX W HCPCS: Performed by: INTERNAL MEDICINE

## 2024-06-26 PROCEDURE — 80053 COMPREHEN METABOLIC PANEL: CPT

## 2024-06-26 PROCEDURE — 6370000000 HC RX 637 (ALT 250 FOR IP): Performed by: INTERNAL MEDICINE

## 2024-06-26 PROCEDURE — 2500000003 HC RX 250 WO HCPCS: Performed by: INTERNAL MEDICINE

## 2024-06-26 PROCEDURE — 90935 HEMODIALYSIS ONE EVALUATION: CPT

## 2024-06-26 PROCEDURE — 94640 AIRWAY INHALATION TREATMENT: CPT

## 2024-06-26 PROCEDURE — 83735 ASSAY OF MAGNESIUM: CPT

## 2024-06-26 PROCEDURE — C9113 INJ PANTOPRAZOLE SODIUM, VIA: HCPCS | Performed by: INTERNAL MEDICINE

## 2024-06-26 PROCEDURE — 85027 COMPLETE CBC AUTOMATED: CPT

## 2024-06-26 PROCEDURE — 6360000002 HC RX W HCPCS: Performed by: EMERGENCY MEDICINE

## 2024-06-26 PROCEDURE — 87340 HEPATITIS B SURFACE AG IA: CPT

## 2024-06-26 PROCEDURE — 2700000000 HC OXYGEN THERAPY PER DAY

## 2024-06-26 PROCEDURE — 1100000003 HC PRIVATE W/ TELEMETRY

## 2024-06-26 PROCEDURE — 36415 COLL VENOUS BLD VENIPUNCTURE: CPT

## 2024-06-26 PROCEDURE — 2580000003 HC RX 258: Performed by: INTERNAL MEDICINE

## 2024-06-26 PROCEDURE — 86706 HEP B SURFACE ANTIBODY: CPT

## 2024-06-26 RX ORDER — PANTOPRAZOLE SODIUM 40 MG/1
40 TABLET, DELAYED RELEASE ORAL
Status: DISCONTINUED | OUTPATIENT
Start: 2024-06-27 | End: 2024-06-27 | Stop reason: HOSPADM

## 2024-06-26 RX ORDER — IPRATROPIUM BROMIDE AND ALBUTEROL SULFATE 2.5; .5 MG/3ML; MG/3ML
1 SOLUTION RESPIRATORY (INHALATION) EVERY 4 HOURS PRN
Status: DISCONTINUED | OUTPATIENT
Start: 2024-06-26 | End: 2024-06-27 | Stop reason: HOSPADM

## 2024-06-26 RX ADMIN — HYDRALAZINE HYDROCHLORIDE 50 MG: 50 TABLET ORAL at 22:33

## 2024-06-26 RX ADMIN — IPRATROPIUM BROMIDE AND ALBUTEROL SULFATE 1 DOSE: .5; 3 SOLUTION RESPIRATORY (INHALATION) at 07:25

## 2024-06-26 RX ADMIN — HYDRALAZINE HYDROCHLORIDE 50 MG: 50 TABLET ORAL at 08:19

## 2024-06-26 RX ADMIN — INSULIN LISPRO 6 UNITS: 100 INJECTION, SOLUTION INTRAVENOUS; SUBCUTANEOUS at 17:09

## 2024-06-26 RX ADMIN — ARFORMOTEROL TARTRATE 15 MCG: 15 SOLUTION RESPIRATORY (INHALATION) at 07:25

## 2024-06-26 RX ADMIN — FERROUS SULFATE TAB 325 MG (65 MG ELEMENTAL FE) 325 MG: 325 (65 FE) TAB at 08:19

## 2024-06-26 RX ADMIN — ATORVASTATIN CALCIUM 40 MG: 20 TABLET, FILM COATED ORAL at 22:33

## 2024-06-26 RX ADMIN — SODIUM CHLORIDE, PRESERVATIVE FREE 10 ML: 5 INJECTION INTRAVENOUS at 22:35

## 2024-06-26 RX ADMIN — SODIUM CHLORIDE, PRESERVATIVE FREE 10 ML: 5 INJECTION INTRAVENOUS at 08:20

## 2024-06-26 RX ADMIN — HYDRALAZINE HYDROCHLORIDE 50 MG: 50 TABLET ORAL at 14:20

## 2024-06-26 RX ADMIN — ENOXAPARIN SODIUM 30 MG: 100 INJECTION SUBCUTANEOUS at 08:18

## 2024-06-26 RX ADMIN — GABAPENTIN 300 MG: 300 CAPSULE ORAL at 08:19

## 2024-06-26 RX ADMIN — BUDESONIDE 500 MCG: 0.5 INHALANT RESPIRATORY (INHALATION) at 23:27

## 2024-06-26 RX ADMIN — CALCIUM ACETATE 667 MG: 667 CAPSULE ORAL at 17:09

## 2024-06-26 RX ADMIN — CALCIUM ACETATE 667 MG: 667 CAPSULE ORAL at 12:26

## 2024-06-26 RX ADMIN — ASPIRIN 81 MG: 81 TABLET, COATED ORAL at 08:19

## 2024-06-26 RX ADMIN — CARVEDILOL 6.25 MG: 3.12 TABLET, FILM COATED ORAL at 08:19

## 2024-06-26 RX ADMIN — GABAPENTIN 300 MG: 300 CAPSULE ORAL at 14:20

## 2024-06-26 RX ADMIN — CARVEDILOL 6.25 MG: 3.12 TABLET, FILM COATED ORAL at 17:09

## 2024-06-26 RX ADMIN — INSULIN GLARGINE 18 UNITS: 100 INJECTION, SOLUTION SUBCUTANEOUS at 22:34

## 2024-06-26 RX ADMIN — GABAPENTIN 300 MG: 300 CAPSULE ORAL at 22:33

## 2024-06-26 RX ADMIN — INSULIN LISPRO 6 UNITS: 100 INJECTION, SOLUTION INTRAVENOUS; SUBCUTANEOUS at 08:19

## 2024-06-26 RX ADMIN — SODIUM CHLORIDE, PRESERVATIVE FREE 40 MG: 5 INJECTION INTRAVENOUS at 08:19

## 2024-06-26 RX ADMIN — CALCIUM ACETATE 667 MG: 667 CAPSULE ORAL at 08:20

## 2024-06-26 RX ADMIN — BUDESONIDE 500 MCG: 0.5 INHALANT RESPIRATORY (INHALATION) at 07:25

## 2024-06-26 RX ADMIN — WATER 1000 MG: 1 INJECTION INTRAMUSCULAR; INTRAVENOUS; SUBCUTANEOUS at 22:33

## 2024-06-26 RX ADMIN — ARFORMOTEROL TARTRATE 15 MCG: 15 SOLUTION RESPIRATORY (INHALATION) at 23:26

## 2024-06-26 RX ADMIN — AMLODIPINE BESYLATE 5 MG: 5 TABLET ORAL at 08:19

## 2024-06-26 ASSESSMENT — PAIN SCALES - GENERAL
PAINLEVEL_OUTOF10: 0

## 2024-06-26 NOTE — PROGRESS NOTES
Patient has been accepted to [facility] 06/27. Patient agreeable with DC to facility for short term rehab. Patient will need 02 at facility and SW to follow with [facility] for home c-pap use after rehab.  Nursing and Rehab liaison spoke with family at bedside regarding facility. All in agreement.

## 2024-06-26 NOTE — PROGRESS NOTES
Hospitalist Progress Note-critical care note     Patient: Layla Ellison MRN: [id number]  [facility]: [id number]    YOB: 1965  Age: 58 y.o.  Sex: female    DOA: 6/24/2024 LOS:  LOS: 2 days            Chief complaint: pulm edema , esrd  on hd , chf ,  tess     Assessment/Plan         Active Hospital Problems    Diagnosis Date Noted    AMS (altered mental status) [R41.82] 06/25/2024    Hypoglycemia [E16.2] 06/25/2024    COPD (chronic obstructive pulmonary disease) ([facility]) [J44.9] 06/25/2024    Chronic respiratory failure with hypoxia ([facility]) [J96.11] 06/25/2024    Acute on chronic respiratory failure with hypoxia and hypercapnia ([facility]) [J96.21, J96.22] 06/25/2024    Pulmonary edema without heart failure [J81.1] 06/24/2024    ESRD (end stage renal disease) on dialysis ([facility]) [N18.6, Z99.2]     Morbid obesity ([facility]) [E66.01]     Diastolic CHF, acute on chronic ([facility]) [I50.33] 02/16/2020    TESS (obstructive sleep apnea) [G47.33] 12/11/2019    DM (diabetes mellitus) ([facility]) [E11.9] 09/04/2014    HTN (hypertension) [I10] 08/16/2014        Pulmonary edema  Off BiPAP AM , received hd on admission   Pulmonologist on board   Current on 3  L oxygen -home oxygen level   Continue hd per nephrologist   Her co2 mild elevated from base line like due to lasix    Will defer to pulm for possible diamox       Obesity hypoventilation syndrome with sleep apnea   No compliant with her CPAP due to recent moving ABG shows hypercapnia, not using cpap last night    Continue breathing tx and educate pt     Possible uti   On rocephine for empiric treatment      Hypoglycemia resolved  Continue  Lantus and mealtime insulin when she is eating     End-stage renal disease on hd   Nephrologist consulted and continue HD -will have hd today         Fall fall precaution   Will place a wound care consult for her nails -foot x-ray no fracture      Pvl negative for dvt       Subjective I feel better, I use oxygen at home  I lives by myself and wants to go to

## 2024-06-26 NOTE — PROGRESS NOTES
Nephrology Progress Note    Patient: Layla Ellison  Requesting physician: Dr. Qing Rahman  Reason for consult: ESRD management    CC: Confusion    Subjectives:  Layla Ellison is a 58 y.o.  female with a past medical history significant for HTN, DM, CHF, COPD, ESRD on HD who completed her outpt HD on Monday 6/24, presented to our hospital with confusion and some SOB.  Found to have hypoglycemia and pulm edema on CXR.  Head MRI neg.  Pt found to have UTI and admitted to medicine.  Nephrology was consulted for further evaluation and management of her ESRD.  Off bipap and placed on 3L oxygen.  Pt tolerated HD yesterday, transferred to medical floor.    Past Medical History:  Patient Active Problem List   Diagnosis    CKD (chronic kidney disease) stage 4, GFR 15-29 ml/min ([facility])    Nausea    Morbid obesity ([facility])    Respiratory distress    Acute kidney injury superimposed on CKD ([facility])    Goals of care, counseling/discussion    Dizziness    ESRD (end stage renal disease) on dialysis ([facility])    Constipation    RYAN (obstructive sleep apnea)    HTN (hypertension)    DM (diabetes mellitus) ([facility])    CHF (congestive heart failure) ([facility])    Diastolic CHF, acute on chronic ([facility])    Elevated troponin    Pulmonary edema without heart failure    AMS (altered mental status)    Hypoglycemia    COPD (chronic obstructive pulmonary disease) ([facility])    Chronic respiratory failure with hypoxia (HCC)    Acute on chronic respiratory failure with hypoxia and hypercapnia ([facility])       Social History:  Social History     Socioeconomic History    Marital status: Single   Tobacco Use    Smoking status: Never    Smokeless tobacco: Never   Substance and Sexual Activity    Alcohol use: No    Drug use: No       Family History:  Family History   Problem Relation Age of Onset    Heart Attack Mother     Heart Attack Maternal Grandmother        Allergy:  Allergies   Allergen Reactions    Bee Venom Swelling    Penicillins Hives    Strawberry Hives         Medication:  Inpatient meds:  Current Facility-Administered Medications   Medication Dose Route Frequency    ipratropium 0.5 mg-albuterol 2.5 mg (DUONEB) nebulizer solution 1 Dose  1 Dose Inhalation Q4H PRN    [START ON 6/27/2024] pantoprazole (PROTONIX) tablet 40 mg  40 mg Oral QAM AC    glucose chewable tablet 16 g  4 tablet Oral PRN    dextrose bolus 10% 125 mL  125 mL IntraVENous PRN    Or    dextrose bolus 10% 250 mL  250 mL IntraVENous PRN    glucagon injection 1 mg  1 mg SubCUTAneous PRN    dextrose 10 % infusion   IntraVENous Continuous PRN    insulin glargine (LANTUS) injection vial 18 Units  0.15 Units/kg SubCUTAneous Nightly    insulin lispro (HUMALOG,ADMELOG) injection vial 6 Units  0.05 Units/kg SubCUTAneous TID WC    hydrALAZINE (APRESOLINE) tablet 50 mg  50 mg Oral TID    gabapentin (NEURONTIN) capsule 300 mg  300 mg Oral TID    ferrous sulfate (IRON 325) tablet 325 mg  325 mg Oral Daily with breakfast    carvedilol (COREG) tablet 6.25 mg  6.25 mg Oral BID WC    calcium acetate (PHOSLO) capsule 667 mg  1 capsule Oral TID WC    atorvastatin (LIPITOR) tablet 40 mg  40 mg Oral Nightly    aspirin EC tablet 81 mg  81 mg Oral Daily    amLODIPine (NORVASC) tablet 5 mg  5 mg Oral Daily    arformoterol tartrate (BROVANA) nebulizer solution 15 mcg  15 mcg Nebulization BID RT    budesonide (PULMICORT) nebulizer suspension 500 mcg  0.5 mg Nebulization BID RT    albuterol (PROVENTIL) (2.5 MG/3ML) 0.083% nebulizer solution 2.5 mg  2.5 mg Nebulization Q6H PRN    epoetin stiven-epbx (RETACRIT) injection 10,000 Units  10,000 Units SubCUTAneous Once per day on Mon Wed Fri    cefTRIAXone (ROCEPHIN) 1,000 mg in sterile water 10 mL IV syringe  1,000 mg IntraVENous Q24H    sodium chloride flush 0.9 % injection 5-40 mL  5-40 mL IntraVENous 2 times per day    sodium chloride flush 0.9 % injection 5-40 mL  5-40 mL IntraVENous PRN    0.9 % sodium chloride infusion   IntraVENous PRN    enoxaparin Sodium (LOVENOX) injection 30  MD  [facility]  [phone]

## 2024-06-26 NOTE — DIALYSIS
Treatment summary  Received report from Claudia SALGADO Rn    Received pt. In bed asleep easily aroused, a/o x3 VSS, LUE AVF functioning  Well accessed without complication, treatment initiated.      Dialyzed patient in  room 337  for 3.5 hours.     Total Uf 1830  ml  Net UF 1330 ml     Treatment note:            Uf held briefly and later reduced due to low blood pressure.    Patient completed treatment, remain in stable condition.    Offered assistance with repositioning every 2 hours.      Vascular access visible at all times and line connected at all   times during treatment. Access LUE AVF Functioning well de access  Without complications.       Report given to Maverick FRANKLIN RN with all questions answered.

## 2024-06-26 NOTE — PROGRESS NOTES
Physical Therapy Goals:  Initiated 6/26/2024 to be met within 7-10 days.  Short Term Goals  Short Term Goal 1: Patient will perform sit to/from stand with SBA/RW in prep for ambulation activity.  Short Term Goal 2: Patient will perform ambulation 50 ft with SBA/RW for increased functional mobility.  Short Term Goal 3: Patient will perform BLE's strengthening TE to facilitate achievement of above goals.    PHYSICAL THERAPY EVALUATION    Patient: Layla Ellison (58 y.o. female)  Date: 6/26/2024  Primary Diagnosis: Acute pulmonary edema (HCC) [J81.0]  Hypoglycemia [E16.2]  Pulmonary edema without heart failure [J81.1]  Fall, initial encounter [W19.XXXA]  Contusion of foot, unspecified laterality, initial encounter [S90.30XA]  Urinary tract infection without hematuria, site unspecified [N39.0]  Altered mental status, unspecified altered mental status type [R41.82]     Precautions: Fall Risk  PLOF: Pt lives alone in first floor apartment, no NAV.  Ambulates with rollator; has had multiple falls.    ASSESSMENT   Pt presents with BLE's decreased ROM/motor performance grossly 3+/5,and with limitations in functional mobility with regard to transfers, decreased gait quality and standing balance, as well as decr'd overall activity tolerance.  Patient seen on telemetry unit; found seated EOB and pt sister present in room on PT arrival. O2 in place at 2.5Lnc (increased to 3L nc prior to GT).  Pt reports 8/10 pain pre/post activity.  Demonstrates sit to stand CGA/vc for safe hand placement.  Pt able to participate with GT/RW/CG/min A.  Distance ~10 Feet (5 ft lateral steps; 5 ft forward/backward).   Slow mario with decr'd foot clearance and step length.  Standing balance fair with path deviations noted during GT.  Pt returned to sit EOB.  Pt O2 desat from 95% to 88%. Required 45 sec seated rest to increase to 96%   Pt left seated EOB with all needs in reach, and sister present and supportive.   Pt may benefit from IPPT to address

## 2024-06-26 NOTE — CONSULTS
IP WOUND CONSULT    Layla Ellison  MEDICAL RECORD NUMBER:  [id number]  AGE: 58 y.o.   GENDER: female  : 1965  TODAY'S DATE:  2024    GENERAL     [] Follow-up   [] New Consult    [] Present on Admission  [] Hospital Acquired    Layla Ellison is a 58 y.o. female referred by:   [] Physician  [] Nursing  [] Other:         PAST MEDICAL HISTORY    Past Medical History:   Diagnosis Date    Asthma     Note: As child had asthma    Chronic obstructive pulmonary disease (HCC)     Diabetes mellitus (HCC)     Type 2    ESRD on dialysis (HCC)     mwf    Heart palpitations     Hypertension     Ill-defined condition     fibromyalgia     Morbid obesity (HCC)     MRSA infection 2014    Stroke (HCC) 2018        PAST SURGICAL HISTORY    Past Surgical History:   Procedure Laterality Date    BREAST SURGERY      cyst removed    CATARACT REMOVAL      CHOLECYSTECTOMY      IR TUNNELED CATHETER PLACEMENT GREATER THAN 5 YEARS  2020    IR TUNNELED CATHETER PLACEMENT GREATER THAN 5 YEARS 2020 [facility] IR    IR TUNNELED CATHETER PLACEMENT GREATER THAN 5 YEARS  2020    TUBAL LIGATION         FAMILY HISTORY    Family History   Problem Relation Age of Onset    Heart Attack Mother     Heart Attack Maternal Grandmother        SOCIAL HISTORY    Social History     Tobacco Use    Smoking status: Never    Smokeless tobacco: Never   Substance Use Topics    Alcohol use: No    Drug use: No       ALLERGIES    Allergies   Allergen Reactions    Bee Venom Swelling    Penicillins Hives    Strawberry Hives       MEDICATIONS    No current facility-administered medications on file prior to encounter.     Current Outpatient Medications on File Prior to Encounter   Medication Sig Dispense Refill    buPROPion (WELLBUTRIN XL) 150 MG extended release tablet Take 1 tablet by mouth every morning      DULoxetine (CYMBALTA) 60 MG extended release capsule Take 1 capsule by mouth daily      gabapentin (NEURONTIN) 100 MG capsule Take 3

## 2024-06-26 NOTE — PROGRESS NOTES
SW spoke with patient at bedside regarding DC plan. Patient stated that she is interested in rehab services upon DC. SW spoke with patient regarding preferences and provided list. Patient does want to stay in [facility] and stated that she will not go out of town due to her family being local. SW reviewed options with patient and [facility] able to help patient. Patient will need transport to and from dialysis from SNF. Patient stated that she will coordinate that when needed. Awaiting PT eval. SW to follow.       [id number]- Medicare number

## 2024-06-26 NOTE — PLAN OF CARE
Problem: Discharge Planning  Goal: Discharge to home or other facility with appropriate resources  Outcome: Progressing     Problem: Pain  Goal: Verbalizes/displays adequate comfort level or baseline comfort level  Outcome: Progressing     Problem: Skin/Tissue Integrity  Goal: Absence of new skin breakdown  Description: 1.  Monitor for areas of redness and/or skin breakdown  2.  Assess vascular access sites hourly  3.  Every 4-6 hours minimum:  Change oxygen saturation probe site  4.  Every 4-6 hours:  If on nasal continuous positive airway pressure, respiratory therapy assess nares and determine need for appliance change or resting period.  Outcome: Progressing     Problem: Safety - Adult  Goal: Free from fall injury  Outcome: Progressing     Problem: Chronic Conditions and Co-morbidities  Goal: Patient's chronic conditions and co-morbidity symptoms are monitored and maintained or improved  6/26/2024 0014 by Kelly Resendiz, RN  Outcome: Progressing  6/25/2024 1941 by Claire Aguilar, RN  Outcome: Progressing

## 2024-06-26 NOTE — PROGRESS NOTES
IV to PO Conversion Recommendation     Patient: Layla Ellison   MRN#: [id number]   Admission Date: 062424     Criteria for IV to PO switch - Nonantibiotics   Functioning GI tract   Taking scheduled oral medications  Tolerating diet more advanced than clear liquids  2. No signs/symptoms of shock  No vasopressor support        3.   No seizures for >72 hours (antiepileptic medications only)    Exclusion Criteria   Patient is being treated for an infection that requires IV therapy such as: endocarditis, osteomyelitis, meningitis, pancreatitis (antibiotics only)   NG tube with continous suction   Nausea and/or vomiting or severe diarrhea within past 24 hours  Malabsorption syndromes, partial or total gastrectomy, ileus, gastric outlet or bowel obstruction  Active GI bleeding   Significant painful oral ulceration  Unable to swallow  On total parenteral nutrition with a NPO order  NPO  Febrile in last 24 hours (antibiotics only)  Clinically deteriorating or unstable (antibiotics only)      Assessment/Recommendation:    Protonix IV to PO Conversion    This IV to PO conversion is based on the [facility] [employer] approved automatic conversion policy for eligible patients.      Please call with questions.    BOB SERVIN RPH, BCPS  Clinical Pharmacist  [phone]

## 2024-06-27 VITALS
WEIGHT: 257 LBS | DIASTOLIC BLOOD PRESSURE: 53 MMHG | BODY MASS INDEX: 47.29 KG/M2 | SYSTOLIC BLOOD PRESSURE: 130 MMHG | RESPIRATION RATE: 18 BRPM | HEIGHT: 62 IN | HEART RATE: 76 BPM | TEMPERATURE: 97.7 F | OXYGEN SATURATION: 98 %

## 2024-06-27 LAB
ALBUMIN SERPL-MCNC: 2.7 G/DL (ref 3.4–5)
ALBUMIN/GLOB SERPL: 0.8 (ref 0.8–1.7)
ALP SERPL-CCNC: 116 U/L (ref 45–117)
ALT SERPL-CCNC: 14 U/L (ref 13–56)
ANION GAP SERPL CALC-SCNC: 8 MMOL/L (ref 3–18)
AST SERPL-CCNC: 13 U/L (ref 10–38)
BACTERIA SPEC CULT: ABNORMAL
BILIRUB SERPL-MCNC: 0.3 MG/DL (ref 0.2–1)
BUN SERPL-MCNC: 22 MG/DL (ref 7–18)
BUN/CREAT SERPL: 6 (ref 12–20)
CALCIUM SERPL-MCNC: 8.3 MG/DL (ref 8.5–10.1)
CC UR VC: ABNORMAL
CHLORIDE SERPL-SCNC: 97 MMOL/L (ref 100–111)
CO2 SERPL-SCNC: 30 MMOL/L (ref 21–32)
CREAT SERPL-MCNC: 3.67 MG/DL (ref 0.6–1.3)
EKG ATRIAL RATE: 80 BPM
EKG ATRIAL RATE: 87 BPM
EKG DIAGNOSIS: NORMAL
EKG DIAGNOSIS: NORMAL
EKG P AXIS: 26 DEGREES
EKG P AXIS: 6 DEGREES
EKG P-R INTERVAL: 126 MS
EKG P-R INTERVAL: 152 MS
EKG Q-T INTERVAL: 396 MS
EKG Q-T INTERVAL: 398 MS
EKG QRS DURATION: 88 MS
EKG QRS DURATION: 90 MS
EKG QTC CALCULATION (BAZETT): 456 MS
EKG QTC CALCULATION (BAZETT): 478 MS
EKG R AXIS: 51 DEGREES
EKG R AXIS: 53 DEGREES
EKG T AXIS: 47 DEGREES
EKG T AXIS: 52 DEGREES
EKG VENTRICULAR RATE: 80 BPM
EKG VENTRICULAR RATE: 87 BPM
ERYTHROCYTE [DISTWIDTH] IN BLOOD BY AUTOMATED COUNT: 17.5 % (ref 11.6–14.5)
GLOBULIN SER CALC-MCNC: 3.5 G/DL (ref 2–4)
GLUCOSE BLD STRIP.AUTO-MCNC: 166 MG/DL (ref 70–110)
GLUCOSE BLD STRIP.AUTO-MCNC: 247 MG/DL (ref 70–110)
GLUCOSE SERPL-MCNC: 316 MG/DL (ref 74–99)
HBV SURFACE AB SER QL IA: NEGATIVE
HBV SURFACE AB SERPL IA-ACNC: <3.1 MIU/ML
HCT VFR BLD AUTO: 36.6 % (ref 35–45)
HEP BS AB COMMENT: ABNORMAL
HGB BLD-MCNC: 11 G/DL (ref 12–16)
MAGNESIUM SERPL-MCNC: 2 MG/DL (ref 1.6–2.6)
MCH RBC QN AUTO: 28.2 PG (ref 24–34)
MCHC RBC AUTO-ENTMCNC: 30.1 G/DL (ref 31–37)
MCV RBC AUTO: 93.8 FL (ref 78–100)
NRBC # BLD: 0 K/UL (ref 0–0.01)
NRBC BLD-RTO: 0 PER 100 WBC
PLATELET # BLD AUTO: 122 K/UL (ref 135–420)
PMV BLD AUTO: 10.6 FL (ref 9.2–11.8)
POTASSIUM SERPL-SCNC: 3.9 MMOL/L (ref 3.5–5.5)
PROT SERPL-MCNC: 6.2 G/DL (ref 6.4–8.2)
RBC # BLD AUTO: 3.9 M/UL (ref 4.2–5.3)
SERVICE CMNT-IMP: ABNORMAL
SODIUM SERPL-SCNC: 135 MMOL/L (ref 136–145)
WBC # BLD AUTO: 7.4 K/UL (ref 4.6–13.2)

## 2024-06-27 PROCEDURE — 6360000002 HC RX W HCPCS: Performed by: INTERNAL MEDICINE

## 2024-06-27 PROCEDURE — 2700000000 HC OXYGEN THERAPY PER DAY

## 2024-06-27 PROCEDURE — 94640 AIRWAY INHALATION TREATMENT: CPT

## 2024-06-27 PROCEDURE — 97535 SELF CARE MNGMENT TRAINING: CPT

## 2024-06-27 PROCEDURE — 83735 ASSAY OF MAGNESIUM: CPT

## 2024-06-27 PROCEDURE — 82962 GLUCOSE BLOOD TEST: CPT

## 2024-06-27 PROCEDURE — 2580000003 HC RX 258: Performed by: HOSPITALIST

## 2024-06-27 PROCEDURE — 6360000002 HC RX W HCPCS: Performed by: HOSPITALIST

## 2024-06-27 PROCEDURE — 36415 COLL VENOUS BLD VENIPUNCTURE: CPT

## 2024-06-27 PROCEDURE — 2500000003 HC RX 250 WO HCPCS: Performed by: INTERNAL MEDICINE

## 2024-06-27 PROCEDURE — 2580000003 HC RX 258: Performed by: INTERNAL MEDICINE

## 2024-06-27 PROCEDURE — 6370000000 HC RX 637 (ALT 250 FOR IP): Performed by: HOSPITALIST

## 2024-06-27 PROCEDURE — 85027 COMPLETE CBC AUTOMATED: CPT

## 2024-06-27 PROCEDURE — 97110 THERAPEUTIC EXERCISES: CPT

## 2024-06-27 PROCEDURE — 80053 COMPREHEN METABOLIC PANEL: CPT

## 2024-06-27 PROCEDURE — 6370000000 HC RX 637 (ALT 250 FOR IP): Performed by: INTERNAL MEDICINE

## 2024-06-27 PROCEDURE — 97116 GAIT TRAINING THERAPY: CPT

## 2024-06-27 RX ORDER — INSULIN LISPRO 100 [IU]/ML
8 INJECTION, SOLUTION INTRAVENOUS; SUBCUTANEOUS
Qty: 10 ML | Refills: 0
Start: 2024-06-27

## 2024-06-27 RX ORDER — INSULIN LISPRO 100 [IU]/ML
0-4 INJECTION, SOLUTION INTRAVENOUS; SUBCUTANEOUS NIGHTLY
Qty: 10 ML | Refills: 0
Start: 2024-06-27

## 2024-06-27 RX ORDER — INSULIN GLARGINE 100 [IU]/ML
25 INJECTION, SOLUTION SUBCUTANEOUS NIGHTLY
Qty: 10 ML | Refills: 0
Start: 2024-06-27

## 2024-06-27 RX ORDER — LEVOFLOXACIN 250 MG/1
250 TABLET, FILM COATED ORAL
Qty: 3 TABLET | Refills: 0
Start: 2024-06-27 | End: 2024-07-03

## 2024-06-27 RX ORDER — INSULIN LISPRO 100 [IU]/ML
0-4 INJECTION, SOLUTION INTRAVENOUS; SUBCUTANEOUS NIGHTLY
Status: DISCONTINUED | OUTPATIENT
Start: 2024-06-27 | End: 2024-06-27 | Stop reason: HOSPADM

## 2024-06-27 RX ORDER — GABAPENTIN 100 MG/1
300 CAPSULE ORAL 3 TIMES DAILY
Qty: 9 CAPSULE | Refills: 0 | Status: SHIPPED | OUTPATIENT
Start: 2024-06-27 | End: 2024-06-28

## 2024-06-27 RX ORDER — PANTOPRAZOLE SODIUM 40 MG/1
40 TABLET, DELAYED RELEASE ORAL
Qty: 30 TABLET | Refills: 0
Start: 2024-06-28

## 2024-06-27 RX ORDER — BUDESONIDE 0.5 MG/2ML
0.5 INHALANT ORAL
Qty: 60 EACH | Refills: 3
Start: 2024-06-27

## 2024-06-27 RX ORDER — ARFORMOTEROL TARTRATE 15 UG/2ML
15 SOLUTION RESPIRATORY (INHALATION)
Qty: 120 ML | Refills: 3
Start: 2024-06-27

## 2024-06-27 RX ORDER — INSULIN LISPRO 100 [IU]/ML
8 INJECTION, SOLUTION INTRAVENOUS; SUBCUTANEOUS
Status: DISCONTINUED | OUTPATIENT
Start: 2024-06-27 | End: 2024-06-27 | Stop reason: HOSPADM

## 2024-06-27 RX ORDER — INSULIN GLARGINE 100 [IU]/ML
25 INJECTION, SOLUTION SUBCUTANEOUS NIGHTLY
Status: DISCONTINUED | OUTPATIENT
Start: 2024-06-27 | End: 2024-06-27 | Stop reason: HOSPADM

## 2024-06-27 RX ORDER — INSULIN LISPRO 100 [IU]/ML
0-8 INJECTION, SOLUTION INTRAVENOUS; SUBCUTANEOUS
Status: DISCONTINUED | OUTPATIENT
Start: 2024-06-27 | End: 2024-06-27 | Stop reason: SDUPTHER

## 2024-06-27 RX ORDER — INSULIN GLARGINE 100 [IU]/ML
5 INJECTION, SOLUTION SUBCUTANEOUS ONCE
Status: COMPLETED | OUTPATIENT
Start: 2024-06-27 | End: 2024-06-27

## 2024-06-27 RX ADMIN — CALCIUM ACETATE 667 MG: 667 CAPSULE ORAL at 08:13

## 2024-06-27 RX ADMIN — ASPIRIN 81 MG: 81 TABLET, COATED ORAL at 08:13

## 2024-06-27 RX ADMIN — ENOXAPARIN SODIUM 30 MG: 100 INJECTION SUBCUTANEOUS at 08:13

## 2024-06-27 RX ADMIN — INSULIN LISPRO 6 UNITS: 100 INJECTION, SOLUTION INTRAVENOUS; SUBCUTANEOUS at 08:19

## 2024-06-27 RX ADMIN — EPOETIN ALFA-EPBX 10000 UNITS: 10000 INJECTION, SOLUTION INTRAVENOUS; SUBCUTANEOUS at 00:09

## 2024-06-27 RX ADMIN — AMLODIPINE BESYLATE 5 MG: 5 TABLET ORAL at 08:13

## 2024-06-27 RX ADMIN — GABAPENTIN 300 MG: 300 CAPSULE ORAL at 12:21

## 2024-06-27 RX ADMIN — PANTOPRAZOLE SODIUM 40 MG: 40 TABLET, DELAYED RELEASE ORAL at 05:54

## 2024-06-27 RX ADMIN — GABAPENTIN 300 MG: 300 CAPSULE ORAL at 08:13

## 2024-06-27 RX ADMIN — ACETAZOLAMIDE SODIUM 250 MG: 500 INJECTION, POWDER, LYOPHILIZED, FOR SOLUTION INTRAVENOUS at 00:10

## 2024-06-27 RX ADMIN — ARFORMOTEROL TARTRATE 15 MCG: 15 SOLUTION RESPIRATORY (INHALATION) at 07:24

## 2024-06-27 RX ADMIN — CALCIUM ACETATE 667 MG: 667 CAPSULE ORAL at 12:21

## 2024-06-27 RX ADMIN — CARVEDILOL 6.25 MG: 3.12 TABLET, FILM COATED ORAL at 08:13

## 2024-06-27 RX ADMIN — SODIUM CHLORIDE, PRESERVATIVE FREE 10 ML: 5 INJECTION INTRAVENOUS at 08:13

## 2024-06-27 RX ADMIN — HYDRALAZINE HYDROCHLORIDE 50 MG: 50 TABLET ORAL at 08:13

## 2024-06-27 RX ADMIN — INSULIN GLARGINE 5 UNITS: 100 INJECTION, SOLUTION SUBCUTANEOUS at 09:07

## 2024-06-27 RX ADMIN — BUDESONIDE 500 MCG: 0.5 INHALANT RESPIRATORY (INHALATION) at 07:24

## 2024-06-27 RX ADMIN — HYDRALAZINE HYDROCHLORIDE 50 MG: 50 TABLET ORAL at 15:08

## 2024-06-27 RX ADMIN — FERROUS SULFATE TAB 325 MG (65 MG ELEMENTAL FE) 325 MG: 325 (65 FE) TAB at 08:12

## 2024-06-27 ASSESSMENT — PAIN SCALES - GENERAL: PAINLEVEL_OUTOF10: 0

## 2024-06-27 NOTE — PROGRESS NOTES
Called report to Roxie at [facility]. [phone]. Verbalized an understanding. No further needs. Patient discharged to facility with daughter as her ride. IV hep lock removed, cath tip intact.

## 2024-06-27 NOTE — PLAN OF CARE
Problem: Discharge Planning  Goal: Discharge to home or other facility with appropriate resources  6/27/2024 1059 by Shruti Ochoa RN  Outcome: Progressing  6/27/2024 0104 by Shruti Temple RN  Outcome: Progressing  Flowsheets (Taken 6/26/2024 2250)  Discharge to home or other facility with appropriate resources:   Arrange for needed discharge resources and transportation as appropriate   Identify barriers to discharge with patient and caregiver   Identify discharge learning needs (meds, wound care, etc)     Problem: Pain  Goal: Verbalizes/displays adequate comfort level or baseline comfort level  6/27/2024 1059 by Shruti Ochoa RN  Outcome: Progressing  6/27/2024 0104 by Shruti Temple RN  Outcome: Progressing     Problem: Skin/Tissue Integrity  Goal: Absence of new skin breakdown  Description: 1.  Monitor for areas of redness and/or skin breakdown  2.  Assess vascular access sites hourly  3.  Every 4-6 hours minimum:  Change oxygen saturation probe site  4.  Every 4-6 hours:  If on nasal continuous positive airway pressure, respiratory therapy assess nares and determine need for appliance change or resting period.  6/27/2024 1059 by Shruti Ochoa RN  Outcome: Progressing  6/27/2024 0104 by Shruti Temple RN  Outcome: Progressing     Problem: Safety - Adult  Goal: Free from fall injury  6/27/2024 1059 by Shruti Ochoa RN  Outcome: Progressing  6/27/2024 0104 by Shruti Temple RN  Outcome: Progressing  Flowsheets (Taken 6/26/2024 2220)  Free From Fall Injury:   Instruct family/caregiver on patient safety   Based on caregiver fall risk screen, instruct family/caregiver to ask for assistance with transferring infant if caregiver noted to have fall risk factors     Problem: Chronic Conditions and Co-morbidities  Goal: Patient's chronic conditions and co-morbidity symptoms are monitored and maintained or improved  6/27/2024 1059 by Shruti Ochoa RN  Outcome: Progressing  6/27/2024 0104 by Shruti Temple

## 2024-06-27 NOTE — PROGRESS NOTES
Occupational Therapy Goals:  Initiated 6/27/2024 to be met within 7-10 days.  Short Term Goals  Time Frame for Short Term Goals: 7 days  Short Term Goal 1: Patient will complete toilet transfer with mod I  Short Term Goal 2: Patient will complete bathing with mod I  Short Term Goal 3: Patient will complete grooming in standing at sink with mod I  Short Term Goal 4: Patient will complete dressing with mod I  Short Term Goal 5: Patient will complete toileting with mod I      OCCUPATIONAL THERAPY TREATMENT    Patient: Layla Ellison (58 y.o. female)  Date: 6/27/2024  Diagnosis: Acute pulmonary edema (HCC) [J81.0]  Hypoglycemia [E16.2]  Pulmonary edema without heart failure [J81.1]  Fall, initial encounter [W19.XXXA]  Contusion of foot, unspecified laterality, initial encounter [S90.30XA]  Urinary tract infection without hematuria, site unspecified [N39.0]  Altered mental status, unspecified altered mental status type [R41.82] Pulmonary edema without heart failure      Precautions: Fall Risk,  ,  ,  ,  ,  ,  ,    PLOF: See evaluation    Chart, occupational therapy assessment, plan of care, and goals were reviewed.  ASSESSMENT:  Patient presented supine in bed with gripper socks and cardiac monitor. Patient with no visitors present for entire session.  Patient completed bathing supervision, upper body dressing supervision, lower body dressing supervision, toileting supervision, and toilet transfer standby assist. Patient sitting in recliner at end of session. Seated rest breaks utilized between ADLs. Patient has decreased endurance and requires more time during and rest beaks between ADLs in order to complete. Patient wearing 3L O2 via NC throughout. spO2 94% in sitting after ADLs.  Due to deficits in strength and endurance patient has decreased independence with ADLs, IADLs, and functional mobility, continue to recommend home health occupational therapy at discharge     Progression toward goals:  [x]          Improving  Supervision      Pain:  Pain level pre-treatment: 0/10   Pain level post-treatment: 0/10  Pain Intervention(s): Rest, Ice, Repositioning   Response to intervention: Nurse notified    Activity Tolerance:    Activity Tolerance: Patient tolerated evaluation without incident;Patient limited by endurance (Supplemental O2 at 3L nc t/o sesson.)  Please refer to the flowsheet for vital signs taken during this treatment.  After treatment:   Safety Devices  Type of Devices: Left in chair     COMMUNICATION/EDUCATION:   Patient Education  Education Given To: Patient  Education Provided: Role of Therapy;Transfer Training;Plan of Care;ADL Adaptive Strategies;Mobility Training;Fall Prevention Strategies;Equipment  Education Method: Verbal  Barriers to Learning: None  Education Outcome: Verbalized understanding;Demonstrated understanding      Thank you for this referral.  SYED Maldonado, OTR/L  Minutes: 26

## 2024-06-27 NOTE — PROGRESS NOTES
Assisted patient to and from the restroom, patient up with assist and walker. Patient wobbly and this nurse had to hold on to patient extra as she turned in the bathroom.

## 2024-06-27 NOTE — PROGRESS NOTES
Nephrology Progress Note    Patient: Layla Ellison      Subjectives:  Layla Ellison is a 58 y.o.  female with a past medical history significant for HTN, DM, CHF, COPD, ESRD on HD who completed her outpt HD on Monday 6/24, presented to our hospital with confusion and some SOB.  Found to have hypoglycemia and pulm edema on CXR.  Head MRI neg.  Pt found to have UTI and admitted to medicine.  Nephrology was consulted for further evaluation and management of her ESRD.  Off bipap and placed on 3L oxygen.      Pt tolerated HD Tues and Wed  SOB improved.    Past Medical History:  Patient Active Problem List   Diagnosis    CKD (chronic kidney disease) stage 4, GFR 15-29 ml/min (formerly [facility])    Nausea    Morbid obesity (formerly [facility])    Respiratory distress    Acute kidney injury superimposed on CKD (formerly [facility])    Goals of care, counseling/discussion    Dizziness    ESRD (end stage renal disease) on dialysis (formerly [facility])    Constipation    RYAN (obstructive sleep apnea)    HTN (hypertension)    DM (diabetes mellitus) (formerly [facility])    CHF (congestive heart failure) (formerly [facility])    Diastolic CHF, acute on chronic (formerly [facility])    Elevated troponin    Pulmonary edema without heart failure    AMS (altered mental status)    Hypoglycemia    COPD (chronic obstructive pulmonary disease) (formerly [facility])    Chronic respiratory failure with hypoxia (formerly [facility])    Acute on chronic respiratory failure with hypoxia and hypercapnia (formerly [facility])       Allergy:  Allergies   Allergen Reactions    Bee Venom Swelling    Penicillins Hives    Strawberry Hives        Medication:  Inpatient meds:  Current Facility-Administered Medications   Medication Dose Route Frequency    insulin lispro (HUMALOG,ADMELOG) injection vial 8 Units  8 Units SubCUTAneous TID WC    insulin glargine (LANTUS) injection vial 25 Units  25 Units SubCUTAneous Nightly    insulin lispro (HUMALOG,ADMELOG) injection vial 0-4 Units  0-4 Units SubCUTAneous Nightly    ipratropium 0.5 mg-albuterol 2.5 mg (DUONEB) nebulizer solution 1 Dose  1 Dose Inhalation      Vital signs:   BP (!) 130/53   Pulse 76   Temp 97.7 °F (36.5 °C) (Temporal)   Resp 18   Ht 1.575 m (5' 2\")   Wt 116.6 kg (257 lb)   SpO2 98%   BMI 47.01 kg/m²     Intake/Output Summary (Last 24 hours) at 6/27/2024 1226  Last data filed at 6/26/2024 2147  Gross per 24 hour   Intake 500 ml   Output 1830 ml   Net -1330 ml         Physical Exam:    General: No acute distress   HENT: Atraumatic and normocephalic   Eyes: Normal conjunctiva, EOMI   Neck: Supple with no mass or JVD   Cardiovascular: Normal S1 & S2, no m/r/g   Pulmonary/Chest Wall: Clear to auscultation bilaterally, no w/c   Abdominal: Soft and non-tender, normal active bowel sound   Musculoskeletal: + edema    Skin: No new rash   Neurological: Alert and communicating. No focal neurological deficits       Data Review:    Recent Labs     06/25/24  0450 06/26/24  0351 06/27/24  0400    138 135*   K 4.4 3.6 3.9    98* 97*   CO2 28 34* 30   BUN 47* 28* 22*   GLOB 4.1* 4.2* 3.5         Recent Labs     06/25/24  0450 06/26/24  0351 06/27/24  0400   WBC 10.8 7.9 7.4   RBC 3.73* 3.94* 3.90*   HGB 10.5* 11.2* 11.0*   HCT 33.6* 36.2 36.6    140 122*           Radiology:    CXR:        Moderate edema pattern.       Impression:     ESRD on HD MWF  SOB required bipap, now back on 3L NC  Pulm edema on CXR  AMS with no acute findings on MRI, in the setting of hypoglycemia, resolved  Hypoglycemia with h.o. DM  HTN  Anemia in CKD    Plan:     Next HD tomorrow a.m.,  MWF schedule at [facility]  Anticipate Transfer to Rehab        LEONIE LIZAMA MD  [facility]  [phone]

## 2024-06-27 NOTE — PROGRESS NOTES
SHERITA reached out to Daughter regarding DC plan. Daughter, Marlys, able to transport patient to facility this afternoon after 3pm. Patient has own portable 02 tank at bedside that she will use via transport. SHERITA talked with patient regarding transport to and from dialysis from SNF. Family agreeable with transporting, facility aware of transport method. UAI completed and sent to SNF.  DC Summary to follow.    [facility] @ 3pm      Rehabilitation center in [place], [place]       Address: [address], [place] [address]    Phone: [phone]

## 2024-06-27 NOTE — PLAN OF CARE
Problem: Discharge Planning  Goal: Discharge to home or other facility with appropriate resources  Outcome: Progressing  Flowsheets (Taken 6/26/2024 2250)  Discharge to home or other facility with appropriate resources:   Arrange for needed discharge resources and transportation as appropriate   Identify barriers to discharge with patient and caregiver   Identify discharge learning needs (meds, wound care, etc)     Problem: Pain  Goal: Verbalizes/displays adequate comfort level or baseline comfort level  Outcome: Progressing     Problem: Skin/Tissue Integrity  Goal: Absence of new skin breakdown  Description: 1.  Monitor for areas of redness and/or skin breakdown  2.  Assess vascular access sites hourly  3.  Every 4-6 hours minimum:  Change oxygen saturation probe site  4.  Every 4-6 hours:  If on nasal continuous positive airway pressure, respiratory therapy assess nares and determine need for appliance change or resting period.  Outcome: Progressing     Problem: Safety - Adult  Goal: Free from fall injury  Outcome: Progressing  Flowsheets (Taken 6/26/2024 2220)  Free From Fall Injury:   Instruct family/caregiver on patient safety   Based on caregiver fall risk screen, instruct family/caregiver to ask for assistance with transferring infant if caregiver noted to have fall risk factors     Problem: Chronic Conditions and Co-morbidities  Goal: Patient's chronic conditions and co-morbidity symptoms are monitored and maintained or improved  Outcome: Progressing  Flowsheets (Taken 6/26/2024 2250)  Care Plan - Patient's Chronic Conditions and Co-Morbidity Symptoms are Monitored and Maintained or Improved:   Monitor and assess patient's chronic conditions and comorbid symptoms for stability, deterioration, or improvement   Collaborate with multidisciplinary team to address chronic and comorbid conditions and prevent exacerbation or deterioration

## 2024-06-27 NOTE — DISCHARGE SUMMARY
Discharge Summary    Patient: Layla Ellison MRN: [id number]  [facility]: [id number]    YOB: 1965  Age: 58 y.o.  Sex: female    DOA: 6/24/2024 LOS:  LOS: 3 days   Discharge Date: 6/27/2024, 11:08 AM      Primary Care Provider:  Katia Brizuela MD    Admission Diagnoses: Acute pulmonary edema (HCC) [J81.0]  Hypoglycemia [E16.2]  Pulmonary edema without heart failure [J81.1]  Fall, initial encounter [W19.XXXA]  Contusion of foot, unspecified laterality, initial encounter [S90.30XA]  Urinary tract infection without hematuria, site unspecified [N39.0]  Altered mental status, unspecified altered mental status type [R41.82]    Discharge Diagnoses:    Active Hospital Problems    Diagnosis Date Noted    AMS (altered mental status) [R41.82] 06/25/2024    Hypoglycemia [E16.2] 06/25/2024    COPD (chronic obstructive pulmonary disease) ([facility]) [J44.9] 06/25/2024    Chronic respiratory failure with hypoxia ([facility]) [J96.11] 06/25/2024    Acute on chronic respiratory failure with hypoxia and hypercapnia ([facility]) [J96.21, J96.22] 06/25/2024    Pulmonary edema without heart failure [J81.1] 06/24/2024    ESRD (end stage renal disease) on dialysis ([facility]) [N18.6, Z99.2]     Morbid obesity ([facility]) [E66.01]     Diastolic CHF, acute on chronic ([facility]) [I50.33] 02/16/2020    RYAN (obstructive sleep apnea) [G47.33] 12/11/2019    DM (diabetes mellitus) ([facility]) [E11.9] 09/04/2014    HTN (hypertension) [I10] 08/16/2014       Discharge Condition: stable     Discharge Medications:        Medication List        START taking these medications      arformoterol tartrate 15 MCG/2ML Nebu  Commonly known as: BROVANA  Take 2 mLs by nebulization in the morning and 2 mLs in the evening.     budesonide 0.5 MG/2ML nebulizer suspension  Commonly known as: PULMICORT  Take 2 mLs by nebulization in the morning and 2 mLs in the evening.     levoFLOXacin 250 MG tablet  Commonly known as: Levaquin  Take 1 tablet by mouth every 48 hours for 6 days     pantoprazole 40 MG

## 2024-06-27 NOTE — PROGRESS NOTES
Physical Therapy Goals:  Initiated 6/27/2024 to be met within 7-10 days.  Short Term Goals  Short Term Goal 1: Patient will perform sit to/from stand with SBA/RW in prep for ambulation activity.  Short Term Goal 2: Patient will perform ambulation 50 ft with SBA/RW for increased functional mobility.  Short Term Goal 3: Patient will perform BLE's strengthening TE to facilitate achievement of above goals.        PHYSICAL THERAPY TREATMENT    Patient: Layla Ellison (58 y.o. female)  Date: 6/27/2024  Diagnosis: Acute pulmonary edema (HCC) [J81.0]  Hypoglycemia [E16.2]  Pulmonary edema without heart failure [J81.1]  Fall, initial encounter [W19.XXXA]  Contusion of foot, unspecified laterality, initial encounter [S90.30XA]  Urinary tract infection without hematuria, site unspecified [N39.0]  Altered mental status, unspecified altered mental status type [R41.82] Pulmonary edema without heart failure  Precautions: Fall Risk  PLOF: amb with rollator    ASSESSMENT:  Pt found seated EOB upon PT arrival. Pain 7-8/10pre/post session primarily L knee and chronic sciatic N pain bilat.  Progressed to SBA with transfers with SBA.  Able to progress gt distance to 24ft x 2 with seated rest between with improved balance and activity tolerance (O2 sat 100% pre, 95% post GT on 3 Lnc; increased to 99% within 1 min.  Completed BLE TE as noted below.  C/o pain L knee from recent fall; no crepitus noted, very slight bruise medial tibial plateau region. Left seated EOB in NAD as completion of session.      Assessment  Activity Tolerance: Patient tolerated evaluation without incident;Patient limited by endurance (Supplemental O2 at 3L nc t/o sesson.)    Progression toward goals:   []      Improving appropriately and progressing toward goals  [x]      Improving slowly and progressing toward goals  []      Not making progress toward goals and plan of care will be adjusted     PLAN:  Patient continues to benefit from skilled intervention to  address the above impairments.  Continue treatment per established plan of care.    Further Equipment Recommendations for Discharge: n/a  Discharge Recommendation:   SNF      AMPAC: AM-PAC Inpatient Mobility without Stair Climbing Raw Score : 16    This AMPA score should be considered in conjunction with interdisciplinary team recommendations to determine the most appropriate discharge setting. Patient's social support, diagnosis, medical stability, and prior level of function should also be taken into consideration.     SUBJECTIVE:   Patient stated Subjective: I guess okay; nerves are shot.    OBJECTIVE DATA SUMMARY:   Critical Behavior:   WFL    Functional Mobility Training:  Bed Mobility:  Bed Mobility Training  Bed Mobility Training: No  Transfers:  Transfer Training  Transfer Training: Yes  Overall Level of Assistance: Stand-by assistance  Interventions: Safety awareness training;Verbal cues  Sit to Stand: Stand-by assistance  Stand to Sit: Stand-by assistance  Bed to Chair: Supervision  Toilet Transfer: Stand-by assistance;Supervision  Balance:  Balance  Sitting: Intact  Standing: Impaired;With support  Standing - Static: Constant support;Fair  Standing - Dynamic: Constant support;Fair;Occasional   Ambulation/Gait Training:  Gait  Gait Training: Yes  Overall Level of Assistance: Stand-by assistance;Contact-guard assistance  Distance (ft): 24 Feet (x2 with seated rest between)  Assistive Device: Gait belt;Walker, rolling  Interventions: Verbal cues  Speed/Rita: Slow  Step Length: Left shortened;Right shortened  Gait Abnormalities: Decreased step clearance  Therapeutic Exercises:       EXERCISE   Sets   Reps   Active Active Assist   Passive Self ROM   Comments   Ankle Pumps 1 20  [x] [] [] []    Long Arc Quads 1 10 [x] [] [] []    Seated Marching 1 10 [x] [] [] []        Pain:  Pain level pre-treatment: 7-8/10  Pain level post-treatment: 7-8/10   Pain Intervention(s): Rest, Ice, Repositioning   Response to

## 2025-02-20 ENCOUNTER — HOSPITAL ENCOUNTER (EMERGENCY)
Facility: HOSPITAL | Age: 60
Discharge: HOME | End: 2025-02-20
Attending: EMERGENCY MEDICINE
Payer: MEDICARE

## 2025-02-20 VITALS
RESPIRATION RATE: 16 BRPM | OXYGEN SATURATION: 100 % | HEART RATE: 73 BPM | DIASTOLIC BLOOD PRESSURE: 55 MMHG | SYSTOLIC BLOOD PRESSURE: 149 MMHG | TEMPERATURE: 97.3 F | WEIGHT: 242 LBS | BODY MASS INDEX: 44.26 KG/M2

## 2025-02-20 DIAGNOSIS — E16.2 HYPOGLYCEMIA: Primary | ICD-10-CM

## 2025-02-20 LAB
ALBUMIN SERPL-MCNC: 3.2 G/DL (ref 3.4–5)
ALBUMIN/GLOB SERPL: 0.8 (ref 0.8–1.7)
ALP SERPL-CCNC: 202 U/L (ref 45–117)
ALT SERPL-CCNC: 13 U/L (ref 13–56)
ANION GAP SERPL CALC-SCNC: 8 MMOL/L (ref 3–18)
AST SERPL-CCNC: 12 U/L (ref 10–38)
BASOPHILS # BLD: 0.08 K/UL (ref 0–0.1)
BASOPHILS NFR BLD: 0.8 % (ref 0–2)
BILIRUB SERPL-MCNC: 0.4 MG/DL (ref 0.2–1)
BUN SERPL-MCNC: 54 MG/DL (ref 7–18)
BUN/CREAT SERPL: 8 (ref 12–20)
CALCIUM SERPL-MCNC: 8 MG/DL (ref 8.5–10.1)
CHLORIDE SERPL-SCNC: 100 MMOL/L (ref 100–111)
CHP ED QC CHECK: NORMAL
CO2 SERPL-SCNC: 30 MMOL/L (ref 21–32)
CREAT SERPL-MCNC: 6.92 MG/DL (ref 0.6–1.3)
DIFFERENTIAL METHOD BLD: ABNORMAL
EOSINOPHIL # BLD: 0.49 K/UL (ref 0–0.4)
EOSINOPHIL NFR BLD: 4.8 % (ref 0–5)
ERYTHROCYTE [DISTWIDTH] IN BLOOD BY AUTOMATED COUNT: 14.6 % (ref 11.6–14.5)
GLOBULIN SER CALC-MCNC: 3.9 G/DL (ref 2–4)
GLUCOSE BLD STRIP.AUTO-MCNC: 100 MG/DL (ref 70–110)
GLUCOSE BLD STRIP.AUTO-MCNC: 112 MG/DL (ref 70–110)
GLUCOSE BLD STRIP.AUTO-MCNC: 131 MG/DL (ref 70–110)
GLUCOSE BLD-MCNC: 100 MG/DL
GLUCOSE BLD-MCNC: 112 MG/DL
GLUCOSE BLD-MCNC: 131 MG/DL
GLUCOSE SERPL-MCNC: 121 MG/DL (ref 74–99)
HCT VFR BLD AUTO: 38 % (ref 35–45)
HGB BLD-MCNC: 11.9 G/DL (ref 12–16)
IMM GRANULOCYTES # BLD AUTO: 0.11 K/UL (ref 0–0.04)
IMM GRANULOCYTES NFR BLD AUTO: 1.1 % (ref 0–0.5)
LYMPHOCYTES # BLD: 1.44 K/UL (ref 0.9–3.3)
LYMPHOCYTES NFR BLD: 14 % (ref 21–52)
MAGNESIUM SERPL-MCNC: 2.4 MG/DL (ref 1.6–2.6)
MCH RBC QN AUTO: 29.2 PG (ref 24–34)
MCHC RBC AUTO-ENTMCNC: 31.3 G/DL (ref 31–37)
MCV RBC AUTO: 93.4 FL (ref 78–100)
MONOCYTES # BLD: 0.88 K/UL (ref 0.05–1.2)
MONOCYTES NFR BLD: 8.6 % (ref 3–10)
NEUTS SEG # BLD: 7.28 K/UL (ref 1.8–8)
NEUTS SEG NFR BLD: 70.7 % (ref 40–73)
NRBC # BLD: 0 K/UL (ref 0–0.01)
NRBC BLD-RTO: 0 PER 100 WBC
PLATELET # BLD AUTO: 164 K/UL (ref 135–420)
PMV BLD AUTO: 10.2 FL (ref 9.2–11.8)
POTASSIUM SERPL-SCNC: 4.3 MMOL/L (ref 3.5–5.5)
PROT SERPL-MCNC: 7.1 G/DL (ref 6.4–8.2)
RBC # BLD AUTO: 4.07 M/UL (ref 4.2–5.3)
SODIUM SERPL-SCNC: 138 MMOL/L (ref 136–145)
TROPONIN I SERPL HS-MCNC: 16 NG/L (ref 0–54)
TSH SERPL DL<=0.05 MIU/L-ACNC: 2.54 UIU/ML (ref 0.36–3.74)
WBC # BLD AUTO: 10.3 K/UL (ref 4.6–13.2)

## 2025-02-20 PROCEDURE — 80053 COMPREHEN METABOLIC PANEL: CPT

## 2025-02-20 PROCEDURE — 83735 ASSAY OF MAGNESIUM: CPT

## 2025-02-20 PROCEDURE — 84484 ASSAY OF TROPONIN QUANT: CPT

## 2025-02-20 PROCEDURE — 93005 ELECTROCARDIOGRAM TRACING: CPT | Performed by: EMERGENCY MEDICINE

## 2025-02-20 PROCEDURE — 84443 ASSAY THYROID STIM HORMONE: CPT

## 2025-02-20 PROCEDURE — 82962 GLUCOSE BLOOD TEST: CPT

## 2025-02-20 PROCEDURE — 85025 COMPLETE CBC W/AUTO DIFF WBC: CPT

## 2025-02-20 PROCEDURE — 99284 EMERGENCY DEPT VISIT MOD MDM: CPT

## 2025-02-20 NOTE — ED TRIAGE NOTES
Patient arrived via EMS from home c/o hypoglycemia. Patient states that she gave herself of 3 units of insulin, ate some chocolate covered strawberries and went to sleep. Patient then called her daughter who called 911 as she sounded confused over the phone. Upon arrival patient was 54, EMS gave 2 tubes of oral glucose, patient glucose was 51. Patient given D10 and blood sugar came up to 101.     3LPM on home O2

## 2025-02-21 LAB
EKG ATRIAL RATE: 70 BPM
EKG DIAGNOSIS: NORMAL
EKG P AXIS: 39 DEGREES
EKG P-R INTERVAL: 134 MS
EKG Q-T INTERVAL: 428 MS
EKG QRS DURATION: 84 MS
EKG QTC CALCULATION (BAZETT): 462 MS
EKG R AXIS: 47 DEGREES
EKG T AXIS: 41 DEGREES
EKG VENTRICULAR RATE: 70 BPM

## 2025-02-21 PROCEDURE — 93010 ELECTROCARDIOGRAM REPORT: CPT | Performed by: INTERNAL MEDICINE

## 2025-02-22 NOTE — ED PROVIDER NOTES
EMERGENCY DEPARTMENT HISTORY AND PHYSICAL EXAM      Date: 2/20/2025  Patient Name: Layla Ellison    History of Presenting Illness     Chief Complaint   Patient presents with    Hypoglycemia       History Provided By: Patient    HPI: Layla Ellison, 59 y.o. female with PMHx as noted below presents the emergency department for evaluation of hypoglycemia.  Patient states that she woke up this morning feeling confused, lethargic and lightheaded.  She did attempt to eat something prior to calling EMS when she noticed her blood sugar in the 50s.  When EMS arrived they noted that her glucose was 54 so given initial oral glucose however subsequently was 57 so initiated D10 infusion.  They noted significant improvement in patient's mental status and patient now reporting that she feels at baseline.  Only change patient noted was that she took her Lantus last night and did not have a snack before bedtime as she usually would.    Pt denies any other alleviating or exacerbating factors. Additionally, pt specifically denies any recent fever, chills, headache, nausea, vomiting, abdominal pain, CP, SOB, dizziness, numbness, weakness, BLE swelling, heart palpitations, urinary sxs, diarrhea, constipation, melena, hematochezia, cough, or congestion.    PCP: Katia Brizuela MD    No current facility-administered medications for this encounter.     Current Outpatient Medications   Medication Sig Dispense Refill    arformoterol tartrate (BROVANA) 15 MCG/2ML NEBU Take 2 mLs by nebulization in the morning and 2 mLs in the evening. 120 mL 3    budesonide (PULMICORT) 0.5 MG/2ML nebulizer suspension Take 2 mLs by nebulization in the morning and 2 mLs in the evening. 60 each 3    gabapentin (NEURONTIN) 100 MG capsule Take 3 capsules by mouth 3 times daily for 1 day. Max Daily Amount: 900 mg 9 capsule 0    pantoprazole (PROTONIX) 40 MG tablet Take 1 tablet by mouth every morning (before breakfast) 30 tablet 0    insulin glargine (LANTUS) 100  ([facility])     mwf    Heart palpitations     Hypertension     Ill-defined condition     fibromyalgia     Morbid obesity     MRSA infection 8/2014    Stroke (HCC) 06/02/2018       Past Surgical History:  Past Surgical History:   Procedure Laterality Date    BREAST SURGERY      cyst removed    CATARACT REMOVAL      CHOLECYSTECTOMY      IR TUNNELED CVC PLACE WO SQ PORT/PUMP > 5 YEARS  4/21/2020    IR TUNNELED CATHETER PLACEMENT GREATER THAN 5 YEARS 4/21/2020 [facility] ANGIO IR    IR TUNNELED CVC PLACE WO SQ PORT/PUMP > 5 YEARS  4/21/2020    TUBAL LIGATION         Family History:  Family History   Problem Relation Age of Onset    Heart Attack Mother     Heart Attack Maternal Grandmother        Social History:  Social History     Tobacco Use    Smoking status: Never    Smokeless tobacco: Never   Substance Use Topics    Alcohol use: No    Drug use: No       Allergies:  Allergies   Allergen Reactions    Bee Venom Swelling    Penicillins Hives    Mammoth Hives         Review of Systems   Review of Systems    Physical Exam   Physical Exam    GENERAL: alert and oriented, no acute distress  EYES: PEERL, No injection, discharge or icterus.  ENT: Mucous membranes pink and moist.  NECK: Supple  LUNGS: Airway patent. Non-labored respirations. Breath sounds clear with good air entry bilaterally.  HEART: Regular rate and rhythm. No peripheral edema  ABDOMEN: Non-distended and non-tender, without guarding or rebound.  SKIN:  warm, dry  MSK/EXTREMITIES: Without swelling, tenderness or deformity, symmetric with normal ROM  NEUROLOGICAL: Alert, oriented      Diagnostic Study Results     Labs -   No results found for this or any previous visit (from the past 12 hour(s)).    Radiologic Studies -   Non-plain film images such as CT, Ultrasound and MRI are read by the radiologist. Plain radiographic images are visualized and preliminarily interpreted by me with the below findings:        Interpretation per the Radiologist below, if available at

## 2025-04-25 ENCOUNTER — HOSPITAL ENCOUNTER (OUTPATIENT)
Facility: HOSPITAL | Age: 60
Setting detail: SPECIMEN
Discharge: HOME | End: 2025-04-28

## 2025-04-25 PROCEDURE — 84132 ASSAY OF SERUM POTASSIUM: CPT

## 2025-04-26 LAB — POTASSIUM SERPL-SCNC: 5.2 MMOL/L (ref 3.5–5.5)

## 2025-07-12 ENCOUNTER — APPOINTMENT (OUTPATIENT)
Facility: HOSPITAL | Age: 60
End: 2025-07-12
Payer: MEDICARE

## 2025-07-12 ENCOUNTER — HOSPITAL ENCOUNTER (EMERGENCY)
Facility: HOSPITAL | Age: 60
Discharge: HOME | End: 2025-07-12
Attending: STUDENT IN AN ORGANIZED HEALTH CARE EDUCATION/TRAINING PROGRAM
Payer: MEDICARE

## 2025-07-12 VITALS
OXYGEN SATURATION: 100 % | HEIGHT: 62 IN | BODY MASS INDEX: 45.27 KG/M2 | RESPIRATION RATE: 19 BRPM | WEIGHT: 246 LBS | HEART RATE: 73 BPM | DIASTOLIC BLOOD PRESSURE: 72 MMHG | SYSTOLIC BLOOD PRESSURE: 124 MMHG

## 2025-07-12 DIAGNOSIS — E83.51 HYPOCALCEMIA: Primary | ICD-10-CM

## 2025-07-12 DIAGNOSIS — R73.9 HYPERGLYCEMIA: ICD-10-CM

## 2025-07-12 LAB
ALBUMIN SERPL-MCNC: 3.4 G/DL (ref 3.4–5)
ALBUMIN/GLOB SERPL: 0.9 (ref 0.8–1.7)
ALP SERPL-CCNC: 150 U/L (ref 45–117)
ALT SERPL-CCNC: 12 U/L (ref 10–35)
ANION GAP SERPL CALC-SCNC: 17 MMOL/L (ref 3–18)
AST SERPL-CCNC: 16 U/L (ref 10–38)
BASOPHILS # BLD: 0.05 K/UL (ref 0–0.1)
BASOPHILS NFR BLD: 0.7 % (ref 0–2)
BILIRUB SERPL-MCNC: 0.2 MG/DL (ref 0.2–1)
BUN SERPL-MCNC: 52 MG/DL (ref 6–23)
BUN/CREAT SERPL: 8 (ref 12–20)
CALCIUM SERPL-MCNC: 5.9 MG/DL (ref 8.5–10.1)
CHLORIDE SERPL-SCNC: 99 MMOL/L (ref 98–107)
CO2 SERPL-SCNC: 25 MMOL/L (ref 21–32)
CREAT SERPL-MCNC: 6.39 MG/DL (ref 0.6–1.3)
DIFFERENTIAL METHOD BLD: ABNORMAL
EKG ATRIAL RATE: 78 BPM
EKG DIAGNOSIS: NORMAL
EKG P AXIS: -4 DEGREES
EKG P-R INTERVAL: 124 MS
EKG Q-T INTERVAL: 440 MS
EKG QRS DURATION: 76 MS
EKG QTC CALCULATION (BAZETT): 501 MS
EKG R AXIS: 60 DEGREES
EKG T AXIS: 59 DEGREES
EKG VENTRICULAR RATE: 78 BPM
EOSINOPHIL # BLD: 0.25 K/UL (ref 0–0.4)
EOSINOPHIL NFR BLD: 3.5 % (ref 0–5)
ERYTHROCYTE [DISTWIDTH] IN BLOOD BY AUTOMATED COUNT: 14.1 % (ref 11.6–14.5)
GLOBULIN SER CALC-MCNC: 3.6 G/DL (ref 2–4)
GLUCOSE BLD STRIP.AUTO-MCNC: 291 MG/DL (ref 70–110)
GLUCOSE BLD STRIP.AUTO-MCNC: 94 MG/DL (ref 70–110)
GLUCOSE SERPL-MCNC: 303 MG/DL (ref 74–108)
HCT VFR BLD AUTO: 35 % (ref 35–45)
HGB BLD-MCNC: 10.9 G/DL (ref 12–16)
IMM GRANULOCYTES # BLD AUTO: 0.03 K/UL (ref 0–0.04)
IMM GRANULOCYTES NFR BLD AUTO: 0.4 % (ref 0–0.5)
LYMPHOCYTES # BLD: 1.06 K/UL (ref 0.9–3.3)
LYMPHOCYTES NFR BLD: 14.7 % (ref 21–52)
MAGNESIUM SERPL-MCNC: 2.2 MG/DL (ref 1.6–2.6)
MCH RBC QN AUTO: 29.1 PG (ref 24–34)
MCHC RBC AUTO-ENTMCNC: 31.1 G/DL (ref 31–37)
MCV RBC AUTO: 93.3 FL (ref 78–100)
MONOCYTES # BLD: 0.4 K/UL (ref 0.05–1.2)
MONOCYTES NFR BLD: 5.5 % (ref 3–10)
NEUTS SEG # BLD: 5.43 K/UL (ref 1.8–8)
NEUTS SEG NFR BLD: 75.2 % (ref 40–73)
NRBC # BLD: 0 K/UL (ref 0–0.01)
NRBC BLD-RTO: 0 PER 100 WBC
PLATELET # BLD AUTO: 131 K/UL (ref 135–420)
PMV BLD AUTO: 10.4 FL (ref 9.2–11.8)
POTASSIUM SERPL-SCNC: 4.4 MMOL/L (ref 3.5–5.5)
PROT SERPL-MCNC: 7 G/DL (ref 6.4–8.2)
RBC # BLD AUTO: 3.75 M/UL (ref 4.2–5.3)
SODIUM SERPL-SCNC: 141 MMOL/L (ref 136–145)
TROPONIN T SERPL HS-MCNC: 101 NG/L (ref 0–14)
TROPONIN T SERPL HS-MCNC: 111 NG/L (ref 0–14)
WBC # BLD AUTO: 7.2 K/UL (ref 4.6–13.2)

## 2025-07-12 PROCEDURE — 96375 TX/PRO/DX INJ NEW DRUG ADDON: CPT

## 2025-07-12 PROCEDURE — 6360000002 HC RX W HCPCS: Performed by: STUDENT IN AN ORGANIZED HEALTH CARE EDUCATION/TRAINING PROGRAM

## 2025-07-12 PROCEDURE — 83735 ASSAY OF MAGNESIUM: CPT

## 2025-07-12 PROCEDURE — 71045 X-RAY EXAM CHEST 1 VIEW: CPT

## 2025-07-12 PROCEDURE — 80053 COMPREHEN METABOLIC PANEL: CPT

## 2025-07-12 PROCEDURE — 84484 ASSAY OF TROPONIN QUANT: CPT

## 2025-07-12 PROCEDURE — 85025 COMPLETE CBC W/AUTO DIFF WBC: CPT

## 2025-07-12 PROCEDURE — 82962 GLUCOSE BLOOD TEST: CPT

## 2025-07-12 PROCEDURE — 93005 ELECTROCARDIOGRAM TRACING: CPT | Performed by: STUDENT IN AN ORGANIZED HEALTH CARE EDUCATION/TRAINING PROGRAM

## 2025-07-12 PROCEDURE — 6370000000 HC RX 637 (ALT 250 FOR IP): Performed by: STUDENT IN AN ORGANIZED HEALTH CARE EDUCATION/TRAINING PROGRAM

## 2025-07-12 PROCEDURE — 99285 EMERGENCY DEPT VISIT HI MDM: CPT

## 2025-07-12 PROCEDURE — 96366 THER/PROPH/DIAG IV INF ADDON: CPT

## 2025-07-12 PROCEDURE — 96365 THER/PROPH/DIAG IV INF INIT: CPT

## 2025-07-12 RX ORDER — CALCIUM GLUCONATE 20 MG/ML
1000 INJECTION, SOLUTION INTRAVENOUS ONCE
Status: COMPLETED | OUTPATIENT
Start: 2025-07-12 | End: 2025-07-12

## 2025-07-12 RX ORDER — CALCIUM CHLORIDE 100 MG/ML
1000 INJECTION INTRAVENOUS; INTRAVENTRICULAR
Status: DISCONTINUED | OUTPATIENT
Start: 2025-07-12 | End: 2025-07-12

## 2025-07-12 RX ADMIN — INSULIN HUMAN 5 UNITS: 100 INJECTION, SOLUTION PARENTERAL at 20:42

## 2025-07-12 RX ADMIN — CALCIUM GLUCONATE 1000 MG: 20 INJECTION, SOLUTION INTRAVENOUS at 21:01

## 2025-07-12 NOTE — ED TRIAGE NOTES
Pt ambulated with walker to triage. C/C SOB and issues maintaining her blood sugar x 2 days. Pt was unable to complete her full dialysis yesterday because of her blood sugar dropping.     Active Ambulatory Problems     Diagnosis Date Noted    CKD (chronic kidney disease) stage 4, GFR 15-29 ml/min ([facility]) 04/10/2020    Nausea     Morbid obesity ([facility])     Respiratory distress 01/17/2019    Acute kidney injury superimposed on CKD 02/13/2020    Goals of care, counseling/discussion     Dizziness     ESRD (end stage renal disease) on dialysis ([facility])     Constipation 08/17/2014    RYAN (obstructive sleep apnea) 12/11/2019    HTN (hypertension) 08/16/2014    DM (diabetes mellitus) ([facility]) 09/04/2014    CHF (congestive heart failure) ([facility]) 04/10/2020    Diastolic CHF, acute on chronic ([facility]) 02/16/2020    Elevated troponin 04/10/2020    Pulmonary edema without heart failure 06/24/2024    AMS (altered mental status) 06/25/2024    Hypoglycemia 06/25/2024    COPD (chronic obstructive pulmonary disease) ([facility]) 06/25/2024    Chronic respiratory failure with hypoxia ([facility]) 06/25/2024    Acute on chronic respiratory failure with hypoxia and hypercapnia ([facility]) 06/25/2024     Resolved Ambulatory Problems     Diagnosis Date Noted    No Resolved Ambulatory Problems     Past Medical History:   Diagnosis Date    Asthma     Chronic obstructive pulmonary disease ([facility])     Diabetes mellitus ([facility])     ESRD on dialysis ([facility])     Heart palpitations     Hypertension     Ill-defined condition     MRSA infection 8/2014    Stroke ([facility]) 06/02/2018

## 2025-07-13 NOTE — FLOWSHEET NOTE
07/12/25 1945   Vital Signs   Pulse 74   Respirations 20   /67   MAP (Calculated) 88   MAP (mmHg) 85     Pt resting on strecther.Also onoxygen  3L via [place] home regimen

## 2025-07-13 NOTE — DISCHARGE INSTRUCTIONS
Please go to your dialysis as scheduled.  Please follow-up with your primary care doctor as well as your kidney doctor regarding your low calcium.  Return to the ER for any new or worsening symptoms.

## 2025-07-13 NOTE — FLOWSHEET NOTE
07/12/25 2300   Vital Signs   Pulse 73   Respirations 19   /72   MAP (Calculated) 89   MAP (mmHg) 82     Paperwork read with patient making note of where to  prescriptions   IV taken out   Armband removed and disposed of in shredder.     Patient has no further questions at this time.     Will discharge from system.

## 2025-07-13 NOTE — ED PROVIDER NOTES
EMERGENCY DEPARTMENT HISTORY AND PHYSICAL EXAM      Date: 7/12/2025  Patient Name: Layla Ellison    History of Presenting Illness     Chief Complaint   Patient presents with    Shortness of Breath    Blood Sugar Problem       Patient is a 59-year-old female with a history of hypertension, CHF, COPD, ESRD on hemodialysis presenting to the emergency department for evaluation of generalized weakness and blood sugar problems for the past 2 days.  Patient states that she was unable to do 2 hours of her dialysis yesterday because she was becoming hypoglycemic.  She denies fevers or chills, chest pain.  She reports some shortness of breath with exertion but is okay at rest          PCP: Katia Brizuela MD    No current facility-administered medications for this encounter.     Current Outpatient Medications   Medication Sig Dispense Refill    arformoterol tartrate (BROVANA) 15 MCG/2ML NEBU Take 2 mLs by nebulization in the morning and 2 mLs in the evening. 120 mL 3    budesonide (PULMICORT) 0.5 MG/2ML nebulizer suspension Take 2 mLs by nebulization in the morning and 2 mLs in the evening. 60 each 3    gabapentin (NEURONTIN) 100 MG capsule Take 3 capsules by mouth 3 times daily for 1 day. Max Daily Amount: 900 mg 9 capsule 0    pantoprazole (PROTONIX) 40 MG tablet Take 1 tablet by mouth every morning (before breakfast) 30 tablet 0    insulin glargine (LANTUS) 100 UNIT/ML injection vial Inject 25 Units into the skin nightly 10 mL 0    insulin lispro (HUMALOG,ADMELOG) 100 UNIT/ML SOLN injection vial Inject 8 Units into the skin 3 times daily (with meals) 10 mL 0    insulin lispro (HUMALOG,ADMELOG) 100 UNIT/ML SOLN injection vial Inject 0-4 Units into the skin nightly mlIf continuous tube feedings/TPN/NPO, give correction dose based on result, no reduction in dose.    If eating or bolus tube feeding:  **Corrective Bedtime Algorithm**  Glucose: Dose:    No Insulin  300-349 4 Units  Over 349 4 Units and notify physician 10

## 2025-07-14 LAB
EKG ATRIAL RATE: 78 BPM
EKG DIAGNOSIS: NORMAL
EKG P AXIS: -4 DEGREES
EKG P-R INTERVAL: 124 MS
EKG Q-T INTERVAL: 440 MS
EKG QRS DURATION: 76 MS
EKG QTC CALCULATION (BAZETT): 501 MS
EKG R AXIS: 60 DEGREES
EKG T AXIS: 59 DEGREES
EKG VENTRICULAR RATE: 78 BPM

## 2025-07-14 PROCEDURE — 93010 ELECTROCARDIOGRAM REPORT: CPT | Performed by: INTERNAL MEDICINE

## 2025-07-21 ENCOUNTER — HOSPITAL ENCOUNTER (EMERGENCY)
Facility: HOSPITAL | Age: 60
Discharge: HOME | DRG: 981 | End: 2025-07-21
Attending: STUDENT IN AN ORGANIZED HEALTH CARE EDUCATION/TRAINING PROGRAM
Payer: MEDICARE

## 2025-07-21 ENCOUNTER — APPOINTMENT (OUTPATIENT)
Facility: HOSPITAL | Age: 60
DRG: 981 | End: 2025-07-21
Payer: MEDICARE

## 2025-07-21 VITALS
HEIGHT: 62 IN | SYSTOLIC BLOOD PRESSURE: 177 MMHG | RESPIRATION RATE: 19 BRPM | BODY MASS INDEX: 45.27 KG/M2 | WEIGHT: 246 LBS | DIASTOLIC BLOOD PRESSURE: 62 MMHG | HEART RATE: 70 BPM | OXYGEN SATURATION: 100 % | TEMPERATURE: 97.5 F
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
## 2025-07-21 LAB
ALBUMIN SERPL-MCNC: 3.5 G/DL (ref 3.4–5)
ALBUMIN/GLOB SERPL: 0.9 (ref 0.8–1.7)
ALP SERPL-CCNC: 135 U/L (ref 45–117)
ALT SERPL-CCNC: 10 U/L (ref 10–35)
ANION GAP SERPL CALC-SCNC: 20 MMOL/L (ref 3–18)
AST SERPL-CCNC: 13 U/L (ref 10–38)
BASOPHILS # BLD: 0.05 K/UL (ref 0–0.1)
BASOPHILS NFR BLD: 0.6 % (ref 0–2)
BILIRUB SERPL-MCNC: 0.3 MG/DL (ref 0.2–1)
BUN SERPL-MCNC: 68 MG/DL (ref 6–23)
BUN/CREAT SERPL: 9 (ref 12–20)
CALCIUM SERPL-MCNC: 5.6 MG/DL (ref 8.5–10.1)
CHLORIDE SERPL-SCNC: 94 MMOL/L (ref 98–107)
CO2 SERPL-SCNC: 24 MMOL/L (ref 21–32)
CREAT SERPL-MCNC: 7.78 MG/DL (ref 0.6–1.3)
DIFFERENTIAL METHOD BLD: ABNORMAL
EKG ATRIAL RATE: 73 BPM
EKG DIAGNOSIS: NORMAL
EKG P AXIS: 7 DEGREES
EKG P-R INTERVAL: 124 MS
EKG Q-T INTERVAL: 458 MS
EKG QRS DURATION: 84 MS
EKG QTC CALCULATION (BAZETT): 504 MS
EKG R AXIS: 46 DEGREES
EKG T AXIS: 38 DEGREES
EKG VENTRICULAR RATE: 73 BPM
EOSINOPHIL # BLD: 0.28 K/UL (ref 0–0.4)
EOSINOPHIL NFR BLD: 3.6 % (ref 0–5)
ERYTHROCYTE [DISTWIDTH] IN BLOOD BY AUTOMATED COUNT: 14 % (ref 11.6–14.5)
GLOBULIN SER CALC-MCNC: 4.1 G/DL (ref 2–4)
GLUCOSE SERPL-MCNC: 274 MG/DL (ref 74–108)
HCT VFR BLD AUTO: 33.9 % (ref 35–45)
HGB BLD-MCNC: 10.7 G/DL (ref 12–16)
IMM GRANULOCYTES # BLD AUTO: 0.04 K/UL (ref 0–0.04)
IMM GRANULOCYTES NFR BLD AUTO: 0.5 % (ref 0–0.5)
LYMPHOCYTES # BLD: 0.99 K/UL (ref 0.9–3.3)
LYMPHOCYTES NFR BLD: 12.7 % (ref 21–52)
MAGNESIUM SERPL-MCNC: 2.3 MG/DL (ref 1.6–2.6)
MCH RBC QN AUTO: 28.7 PG (ref 24–34)
MCHC RBC AUTO-ENTMCNC: 31.6 G/DL (ref 31–37)
MCV RBC AUTO: 90.9 FL (ref 78–100)
MONOCYTES # BLD: 0.52 K/UL (ref 0.05–1.2)
MONOCYTES NFR BLD: 6.7 % (ref 3–10)
NEUTS SEG # BLD: 5.89 K/UL (ref 1.8–8)
NEUTS SEG NFR BLD: 75.9 % (ref 40–73)
NRBC # BLD: 0 K/UL (ref 0–0.01)
NRBC BLD-RTO: 0 PER 100 WBC
PHOSPHATE SERPL-MCNC: 7.8 MG/DL (ref 2.5–4.9)
PLATELET # BLD AUTO: 165 K/UL (ref 135–420)
PMV BLD AUTO: 10.4 FL (ref 9.2–11.8)
POTASSIUM SERPL-SCNC: 5.1 MMOL/L (ref 3.5–5.5)
PROT SERPL-MCNC: 7.6 G/DL (ref 6.4–8.2)
RBC # BLD AUTO: 3.73 M/UL (ref 4.2–5.3)
SODIUM SERPL-SCNC: 137 MMOL/L (ref 136–145)
TROPONIN T SERPL HS-MCNC: 113 NG/L (ref 0–14)
WBC # BLD AUTO: 7.8 K/UL (ref 4.6–13.2)

## 2025-07-21 PROCEDURE — 85025 COMPLETE CBC W/AUTO DIFF WBC: CPT

## 2025-07-21 PROCEDURE — 80053 COMPREHEN METABOLIC PANEL: CPT

## 2025-07-21 PROCEDURE — 99284 EMERGENCY DEPT VISIT MOD MDM: CPT

## 2025-07-21 PROCEDURE — 6370000000 HC RX 637 (ALT 250 FOR IP): Performed by: EMERGENCY MEDICINE

## 2025-07-21 PROCEDURE — 70450 CT HEAD/BRAIN W/O DYE: CPT

## 2025-07-21 PROCEDURE — 84100 ASSAY OF PHOSPHORUS: CPT

## 2025-07-21 PROCEDURE — 84484 ASSAY OF TROPONIN QUANT: CPT

## 2025-07-21 PROCEDURE — 83735 ASSAY OF MAGNESIUM: CPT

## 2025-07-21 PROCEDURE — 93005 ELECTROCARDIOGRAM TRACING: CPT | Performed by: STUDENT IN AN ORGANIZED HEALTH CARE EDUCATION/TRAINING PROGRAM

## 2025-07-21 RX ADMIN — SODIUM ZIRCONIUM CYCLOSILICATE 5 G: 5 POWDER, FOR SUSPENSION ORAL at 10:34

## 2025-07-21 ASSESSMENT — PAIN - FUNCTIONAL ASSESSMENT: PAIN_FUNCTIONAL_ASSESSMENT: NONE - DENIES PAIN

## 2025-07-21 NOTE — ED NOTES
Pt arranged for a family member to pick her up.    Paperwork read with patient making note of where to  prescriptions (if applicable).     IV taken out (if applicable).     Armband removed and disposed of in shredder.     Patient has no further questions at this time.     Will discharge from system.

## 2025-07-21 NOTE — ED TRIAGE NOTES
Presents to ED with c/o dizziness and lightheadedness. Patient said she fell twice this morning while getting ready for dialysis. Denies hitting her head. Patient said she went to dialysis but was turned away and told to come to ED to be evaluated.

## 2025-07-21 NOTE — ED PROVIDER NOTES
ED Course as of 07/21/25 0855   Mon Jul 21, 2025   0654 EKG: Sinus rhythm, rate 73, MS within normals, QTc 504, normal axis, no static ischemic change. [KS]   0706 58 y/o f w/ esrd mwf, dm, obesity, supplemental o2 at baseline, fell 2 times today before dialysis.    Pending labs and cthead  Most likely dc after, pt does not want to get dialysis here  [NF]   0853 Neg ct for ich  Will dc   Pt will go to her own HD center [NF]      ED Course User Index  [KS] Bal Arora MD  [NF] Beltran Grande MD Frith, Nikea, MD  07/21/25 0855

## 2025-07-21 NOTE — ED PROVIDER NOTES
EMERGENCY DEPARTMENT HISTORY AND PHYSICAL EXAM    6:54 PM      Date: 7/21/2025  Patient Name: Layla Ellison    History of Presenting Illness     Chief Complaint   Patient presents with    Lightheadedness    Dizziness    Fall       History From: Patient    Layla Ellison is a 59 y.o. female   HPI  59-year-old female with history of CKD, obesity, ESRD on Monday Wednesday Friday, CHF, chronic respiratory failure requiring supplemental oxygen who presents with lightheadedness, dizziness, falling.  As per patient she has a history of multiple falls.  She says that she had 2 falls today.  Denies any syncope.  Says that she did hit her head.  Denies being on any blood thinners.  Denies any change in meds, sick contacts, or medical changes.  No aggravating or alleviating factors.  When assessing ROS she has no nausea, vomiting, chest pain, abdominal pain, change in bowel movement, focal neurological deficits, or any other changes.       Nursing Notes were all reviewed and agreed with or any disagreements were addressed in the HPI.    PCP: Katia Brizuela MD    No current facility-administered medications for this encounter.     No current outpatient medications on file.     Facility-Administered Medications Ordered in Other Encounters   Medication Dose Route Frequency Provider Last Rate Last Admin    glucose chewable tablet 16 g  4 tablet Oral PRN Jeannette Kelly MD        dextrose bolus 10% 125 mL  125 mL IntraVENous PRN Jeannette Kelly MD        Or    dextrose bolus 10% 250 mL  250 mL IntraVENous PRN Jeannette Kelly MD        glucagon injection 1 mg  1 mg SubCUTAneous PRN Jeannette Kelly MD        dextrose 10 % infusion   IntraVENous Continuous PRN Jeannette Kelly MD        insulin lispro (HUMALOG,ADMELOG) injection vial 0-8 Units  0-8 Units SubCUTAneous 4x Daily AC & HS Jeannette Kelly MD   2 Units at 07/23/25 1805    sodium chloride flush 0.9 % injection 5-40 mL  5-40 mL IntraVENous 2 times per day Jeannette Kelly MD   10 mL at 07/23/25 1017    sodium chloride flush 0.9 %  MG/DL   Troponin    Collection Time: 07/21/25  6:56 AM   Result Value Ref Range    Troponin T 113.0 (HH) 0.0 - 14.0 ng/L   EKG 12 Lead    Collection Time: 07/23/25  6:07 AM   Result Value Ref Range    Ventricular Rate 74 BPM    Atrial Rate 74 BPM    P-R Interval 118 ms    QRS Duration 80 ms    Q-T Interval 474 ms    QTc Calculation (Bazett) 526 ms    P Axis 53 degrees    R Axis 49 degrees    T Axis 28 degrees    Diagnosis       Normal sinus rhythm  Prolonged QT  Abnormal ECG  Confirmed by Liz Karimi MD ([id number]) on 7/23/2025 6:49:28 AM     CBC with Auto Differential    Collection Time: 07/23/25  6:20 AM   Result Value Ref Range    WBC 8.5 4.6 - 13.2 K/uL    RBC 3.40 (L) 4.20 - 5.30 M/uL    Hemoglobin 9.7 (L) 12.0 - 16.0 g/dL    Hematocrit 30.8 (L) 35.0 - 45.0 %    MCV 90.6 78.0 - 100.0 FL    MCH 28.5 24.0 - 34.0 PG    MCHC 31.5 31.0 - 37.0 g/dL    RDW 13.9 11.6 - 14.5 %    Platelets 153 135 - 420 K/uL    MPV 10.4 9.2 - 11.8 FL    Nucleated RBCs 0.0 0  WBC    nRBC 0.00 0.00 - 0.01 K/uL    Neutrophils % 79.5 (H) 40.0 - 73.0 %    Lymphocytes % 9.7 (L) 21.0 - 52.0 %    Monocytes % 6.8 3.0 - 10.0 %    Eosinophils % 2.9 0.0 - 5.0 %    Basophils % 0.6 0.0 - 2.0 %    Immature Granulocytes % 0.5 0.0 - 0.5 %    Neutrophils Absolute 6.79 1.80 - 8.00 K/UL    Lymphocytes Absolute 0.83 (L) 0.90 - 3.30 K/UL    Monocytes Absolute 0.58 0.05 - 1.20 K/UL    Eosinophils Absolute 0.25 0.00 - 0.40 K/UL    Basophils Absolute 0.05 0.00 - 0.10 K/UL    Immature Granulocytes Absolute 0.04 0.00 - 0.04 K/UL    Differential Type AUTOMATED     CMP    Collection Time: 07/23/25  6:20 AM   Result Value Ref Range    Sodium 139 136 - 145 mmol/L    Potassium 5.5 3.5 - 5.5 mmol/L    Chloride 97 (L) 98 - 107 mmol/L    CO2 19 (L) 21 - 32 mmol/L    Anion Gap 23 (H) 3 - 18 mmol/L    Glucose 127 (H) 74 - 108 mg/dL    BUN 91 (H) 6 - 23 MG/DL    Creatinine 10.00 (H) 0.60 - 1.30 MG/DL    BUN/Creatinine Ratio 9 (L) 12 - 20      Est, Glom Filt Rate 4 (L)

## 2025-07-23 ENCOUNTER — APPOINTMENT (OUTPATIENT)
Facility: HOSPITAL | Age: 60
DRG: 981 | End: 2025-07-23
Payer: MEDICARE

## 2025-07-23 ENCOUNTER — APPOINTMENT (OUTPATIENT)
Facility: HOSPITAL | Age: 60
DRG: 981 | End: 2025-07-23
Attending: HOSPITALIST
Payer: MEDICARE

## 2025-07-23 ENCOUNTER — HOSPITAL ENCOUNTER (INPATIENT)
Facility: HOSPITAL | Age: 60
LOS: 8 days | Discharge: NURSING, REHAB OR LONG-TERM CARE | DRG: 981 | End: 2025-07-31
Attending: EMERGENCY MEDICINE | Admitting: HOSPITALIST
Payer: MEDICARE

## 2025-07-23 DIAGNOSIS — E16.2 HYPOGLYCEMIA: ICD-10-CM

## 2025-07-23 DIAGNOSIS — S92.002A CLOSED NONDISPLACED FRACTURE OF LEFT CALCANEUS, UNSPECIFIED PORTION OF CALCANEUS, INITIAL ENCOUNTER: ICD-10-CM

## 2025-07-23 DIAGNOSIS — Z99.2 ESRD NEEDING DIALYSIS (HCC): ICD-10-CM

## 2025-07-23 DIAGNOSIS — R07.9 CHEST PAIN: ICD-10-CM

## 2025-07-23 DIAGNOSIS — N18.6 ESRD NEEDING DIALYSIS (HCC): ICD-10-CM

## 2025-07-23 DIAGNOSIS — R55 PRE-SYNCOPE: ICD-10-CM

## 2025-07-23 DIAGNOSIS — G62.9 NEUROPATHY: ICD-10-CM

## 2025-07-23 DIAGNOSIS — E87.70 HYPERVOLEMIA, UNSPECIFIED HYPERVOLEMIA TYPE: Primary | ICD-10-CM

## 2025-07-23 DIAGNOSIS — R40.4 TRANSIENT ALTERATION OF AWARENESS: ICD-10-CM

## 2025-07-23 DIAGNOSIS — R79.89 ELEVATED TROPONIN: ICD-10-CM

## 2025-07-23 DIAGNOSIS — E83.51 HYPOCALCEMIA: ICD-10-CM

## 2025-07-23 DIAGNOSIS — R47.1 DYSARTHRIA: ICD-10-CM

## 2025-07-23 DIAGNOSIS — R55 SYNCOPE, UNSPECIFIED SYNCOPE TYPE: ICD-10-CM

## 2025-07-23 DIAGNOSIS — R06.02 SHORTNESS OF BREATH: ICD-10-CM

## 2025-07-23 DIAGNOSIS — R94.39 POSITIVE CARDIAC STRESS TEST: ICD-10-CM

## 2025-07-23 LAB
25(OH)D3 SERPL-MCNC: 23.8 NG/ML (ref 30–100)
ALBUMIN SERPL-MCNC: 3.4 G/DL (ref 3.4–5)
ALBUMIN/GLOB SERPL: 0.9 (ref 0.8–1.7)
ALP SERPL-CCNC: 126 U/L (ref 45–117)
ALT SERPL-CCNC: 8 U/L (ref 10–35)
ANION GAP SERPL CALC-SCNC: 23 MMOL/L (ref 3–18)
APAP SERPL-MCNC: <5 UG/ML (ref 10–30)
ARTERIAL PATENCY WRIST A: POSITIVE
AST SERPL-CCNC: 14 U/L (ref 10–38)
BASE DEFICIT BLD-SCNC: 4.5 MMOL/L
BASOPHILS # BLD: 0.05 K/UL (ref 0–0.1)
BASOPHILS NFR BLD: 0.6 % (ref 0–2)
BDY SITE: ABNORMAL
BILIRUB SERPL-MCNC: 0.3 MG/DL (ref 0.2–1)
BUN SERPL-MCNC: 91 MG/DL (ref 6–23)
BUN/CREAT SERPL: 9 (ref 12–20)
CA-I SERPL-SCNC: 0.52 MMOL/L (ref 1.12–1.32)
CALCIUM SERPL-MCNC: 4.9 MG/DL (ref 8.5–10.1)
CALCIUM SERPL-MCNC: 5 MG/DL (ref 8.5–10.1)
CALCIUM SERPL-MCNC: 5.4 MG/DL (ref 8.5–10.1)
CHLORIDE SERPL-SCNC: 97 MMOL/L (ref 98–107)
CHOLEST SERPL-MCNC: 158 MG/DL
CO2 SERPL-SCNC: 19 MMOL/L (ref 21–32)
CREAT SERPL-MCNC: 10 MG/DL (ref 0.6–1.3)
DIFFERENTIAL METHOD BLD: ABNORMAL
ECHO AO ROOT DIAM: 2.9 CM
ECHO AO ROOT INDEX: 1.39 CM/M2
ECHO AV MEAN GRADIENT: 4 MMHG
ECHO AV MEAN VELOCITY: 0.9 M/S
ECHO AV PEAK GRADIENT: 8 MMHG
ECHO AV PEAK VELOCITY: 1.5 M/S
ECHO AV VTI: 30.4 CM
ECHO BSA: 2.21 M2
ECHO EST RA PRESSURE: 15 MMHG
ECHO LA DIAMETER INDEX: 1.72 CM/M2
ECHO LA DIAMETER: 3.6 CM
ECHO LA TO AORTIC ROOT RATIO: 1.24
ECHO LV E' LATERAL VELOCITY: 7.7 CM/S
ECHO LV E' SEPTAL VELOCITY: 6.84 CM/S
ECHO LV EF PHYSICIAN: 60 %
ECHO LV FRACTIONAL SHORTENING: 32 % (ref 28–44)
ECHO LV INTERNAL DIMENSION DIASTOLE INDEX: 2.25 CM/M2
ECHO LV INTERNAL DIMENSION DIASTOLIC: 4.7 CM (ref 3.9–5.3)
ECHO LV INTERNAL DIMENSION SYSTOLIC INDEX: 1.53 CM/M2
ECHO LV INTERNAL DIMENSION SYSTOLIC: 3.2 CM
ECHO LV IVSD: 0.9 CM (ref 0.6–0.9)
ECHO LV MASS 2D: 142.7 G (ref 67–162)
ECHO LV MASS INDEX 2D: 68.3 G/M2 (ref 43–95)
ECHO LV POSTERIOR WALL DIASTOLIC: 0.9 CM (ref 0.6–0.9)
ECHO LV RELATIVE WALL THICKNESS RATIO: 0.38
ECHO LVOT AREA: 3.1 CM2
ECHO LVOT DIAM: 2 CM
ECHO MV A VELOCITY: 1.04 M/S
ECHO MV E DECELERATION TIME (DT): 202.2 MS
ECHO MV E VELOCITY: 1.34 M/S
ECHO MV E/A RATIO: 1.29
ECHO MV E/E' LATERAL: 17.4
ECHO MV E/E' RATIO (AVERAGED): 18.5
ECHO MV E/E' SEPTAL: 19.59
ECHO MV MAX VELOCITY: 1.3 M/S
ECHO MV MEAN GRADIENT: 3 MMHG
ECHO MV MEAN VELOCITY: 0.8 M/S
ECHO MV PEAK GRADIENT: 7 MMHG
ECHO MV REGURGITANT PEAK GRADIENT: 0 MMHG
ECHO MV REGURGITANT PEAK VELOCITY: 0 M/S
ECHO MV REGURGITANT VTIA: 0.1 CM
ECHO MV VTI: 41.5 CM
ECHO PULMONARY ARTERY SYSTOLIC PRESSURE (PASP): 38 MMHG
ECHO RIGHT VENTRICULAR SYSTOLIC PRESSURE (RVSP): 38 MMHG
ECHO RV FREE WALL PEAK S': 15.4 CM/S
ECHO RV INTERNAL DIMENSION: 3.9 CM
ECHO RV TAPSE: 2.8 CM (ref 1.7–?)
ECHO TV REGURGITANT MAX VELOCITY: 2.42 M/S
ECHO TV REGURGITANT PEAK GRADIENT: 23 MMHG
EKG ATRIAL RATE: 74 BPM
EKG DIAGNOSIS: NORMAL
EKG P AXIS: 53 DEGREES
EKG P-R INTERVAL: 118 MS
EKG Q-T INTERVAL: 474 MS
EKG QRS DURATION: 80 MS
EKG QTC CALCULATION (BAZETT): 526 MS
EKG R AXIS: 49 DEGREES
EKG T AXIS: 28 DEGREES
EKG VENTRICULAR RATE: 74 BPM
EOSINOPHIL # BLD: 0.25 K/UL (ref 0–0.4)
EOSINOPHIL NFR BLD: 2.9 % (ref 0–5)
ERYTHROCYTE [DISTWIDTH] IN BLOOD BY AUTOMATED COUNT: 13.9 % (ref 11.6–14.5)
ETHANOL SERPL-MCNC: <11 MG/DL (ref 0–0.08)
GAS FLOW.O2 O2 DELIVERY SYS: ABNORMAL
GLOBULIN SER CALC-MCNC: 3.7 G/DL (ref 2–4)
GLUCOSE BLD STRIP.AUTO-MCNC: 110 MG/DL (ref 70–110)
GLUCOSE BLD STRIP.AUTO-MCNC: 187 MG/DL (ref 70–110)
GLUCOSE BLD STRIP.AUTO-MCNC: 189 MG/DL (ref 70–110)
GLUCOSE BLD STRIP.AUTO-MCNC: 219 MG/DL (ref 70–110)
GLUCOSE SERPL-MCNC: 127 MG/DL (ref 74–108)
HCO3 BLD-SCNC: 21.3 MMOL/L (ref 21–28)
HCT VFR BLD AUTO: 30.8 % (ref 35–45)
HDLC SERPL-MCNC: 44 MG/DL (ref 40–60)
HDLC SERPL: 3.6 (ref 0–5)
HGB BLD-MCNC: 9.7 G/DL (ref 12–16)
IMM GRANULOCYTES # BLD AUTO: 0.04 K/UL (ref 0–0.04)
IMM GRANULOCYTES NFR BLD AUTO: 0.5 % (ref 0–0.5)
INR PPP: 1.1 (ref 0.9–1.2)
LDLC SERPL CALC-MCNC: 94 MG/DL (ref 0–100)
LIPASE SERPL-CCNC: 24 U/L (ref 13–75)
LYMPHOCYTES # BLD: 0.83 K/UL (ref 0.9–3.3)
LYMPHOCYTES NFR BLD: 9.7 % (ref 21–52)
MAGNESIUM SERPL-MCNC: 2.4 MG/DL (ref 1.6–2.6)
MCH RBC QN AUTO: 28.5 PG (ref 24–34)
MCHC RBC AUTO-ENTMCNC: 31.5 G/DL (ref 31–37)
MCV RBC AUTO: 90.6 FL (ref 78–100)
MONOCYTES # BLD: 0.58 K/UL (ref 0.05–1.2)
MONOCYTES NFR BLD: 6.8 % (ref 3–10)
NEUTS SEG # BLD: 6.79 K/UL (ref 1.8–8)
NEUTS SEG NFR BLD: 79.5 % (ref 40–73)
NRBC # BLD: 0 K/UL (ref 0–0.01)
NRBC BLD-RTO: 0 PER 100 WBC
O2/TOTAL GAS SETTING VFR VENT: 3 %
PCO2 BLD: 40.8 MMHG (ref 35–48)
PH BLD: 7.33 (ref 7.35–7.45)
PLATELET # BLD AUTO: 153 K/UL (ref 135–420)
PMV BLD AUTO: 10.4 FL (ref 9.2–11.8)
PO2 BLD: 99 MMHG (ref 83–108)
POTASSIUM SERPL-SCNC: 5.5 MMOL/L (ref 3.5–5.5)
PROT SERPL-MCNC: 7.1 G/DL (ref 6.4–8.2)
PROTHROMBIN TIME: 14.8 SEC (ref 12–15.1)
PTH-INTACT SERPL-MCNC: 3590 PG/ML (ref 15–65)
RBC # BLD AUTO: 3.4 M/UL (ref 4.2–5.3)
SALICYLATES SERPL-MCNC: <0.5 MG/DL (ref 3–10)
SAO2 % BLD: 97.2 % (ref 92–97)
SERVICE CMNT-IMP: ABNORMAL
SODIUM SERPL-SCNC: 139 MMOL/L (ref 136–145)
SPECIMEN TYPE: ABNORMAL
TRIGL SERPL-MCNC: 101 MG/DL (ref 0–150)
TROPONIN T SERPL HS-MCNC: 105 NG/L (ref 0–14)
VLDLC SERPL CALC-MCNC: 20 MG/DL
WBC # BLD AUTO: 8.5 K/UL (ref 4.6–13.2)

## 2025-07-23 PROCEDURE — 82306 VITAMIN D 25 HYDROXY: CPT

## 2025-07-23 PROCEDURE — 1100000003 HC PRIVATE W/ TELEMETRY

## 2025-07-23 PROCEDURE — 70450 CT HEAD/BRAIN W/O DYE: CPT

## 2025-07-23 PROCEDURE — 6360000002 HC RX W HCPCS: Performed by: INTERNAL MEDICINE

## 2025-07-23 PROCEDURE — 73700 CT LOWER EXTREMITY W/O DYE: CPT

## 2025-07-23 PROCEDURE — 86706 HEP B SURFACE ANTIBODY: CPT

## 2025-07-23 PROCEDURE — 71275 CT ANGIOGRAPHY CHEST: CPT

## 2025-07-23 PROCEDURE — 70551 MRI BRAIN STEM W/O DYE: CPT

## 2025-07-23 PROCEDURE — 83735 ASSAY OF MAGNESIUM: CPT

## 2025-07-23 PROCEDURE — 87340 HEPATITIS B SURFACE AG IA: CPT

## 2025-07-23 PROCEDURE — 29515 APPLICATION SHORT LEG SPLINT: CPT

## 2025-07-23 PROCEDURE — 6360000002 HC RX W HCPCS: Performed by: EMERGENCY MEDICINE

## 2025-07-23 PROCEDURE — 6360000002 HC RX W HCPCS: Performed by: HOSPITALIST

## 2025-07-23 PROCEDURE — 83690 ASSAY OF LIPASE: CPT

## 2025-07-23 PROCEDURE — 84484 ASSAY OF TROPONIN QUANT: CPT

## 2025-07-23 PROCEDURE — 82330 ASSAY OF CALCIUM: CPT

## 2025-07-23 PROCEDURE — 36600 WITHDRAWAL OF ARTERIAL BLOOD: CPT

## 2025-07-23 PROCEDURE — 6360000004 HC RX CONTRAST MEDICATION: Performed by: INTERNAL MEDICINE

## 2025-07-23 PROCEDURE — 82962 GLUCOSE BLOOD TEST: CPT

## 2025-07-23 PROCEDURE — 85025 COMPLETE CBC W/AUTO DIFF WBC: CPT

## 2025-07-23 PROCEDURE — 2700000000 HC OXYGEN THERAPY PER DAY

## 2025-07-23 PROCEDURE — 80053 COMPREHEN METABOLIC PANEL: CPT

## 2025-07-23 PROCEDURE — 80143 DRUG ASSAY ACETAMINOPHEN: CPT

## 2025-07-23 PROCEDURE — 36415 COLL VENOUS BLD VENIPUNCTURE: CPT

## 2025-07-23 PROCEDURE — 82077 ASSAY SPEC XCP UR&BREATH IA: CPT

## 2025-07-23 PROCEDURE — 85610 PROTHROMBIN TIME: CPT

## 2025-07-23 PROCEDURE — 82803 BLOOD GASES ANY COMBINATION: CPT

## 2025-07-23 PROCEDURE — 73610 X-RAY EXAM OF ANKLE: CPT

## 2025-07-23 PROCEDURE — 70498 CT ANGIOGRAPHY NECK: CPT

## 2025-07-23 PROCEDURE — 83970 ASSAY OF PARATHORMONE: CPT

## 2025-07-23 PROCEDURE — 2500000003 HC RX 250 WO HCPCS: Performed by: HOSPITALIST

## 2025-07-23 PROCEDURE — 6370000000 HC RX 637 (ALT 250 FOR IP): Performed by: HOSPITALIST

## 2025-07-23 PROCEDURE — 93005 ELECTROCARDIOGRAM TRACING: CPT | Performed by: EMERGENCY MEDICINE

## 2025-07-23 PROCEDURE — C8929 TTE W OR WO FOL WCON,DOPPLER: HCPCS

## 2025-07-23 PROCEDURE — 73120 X-RAY EXAM OF HAND: CPT

## 2025-07-23 PROCEDURE — 99285 EMERGENCY DEPT VISIT HI MDM: CPT

## 2025-07-23 PROCEDURE — 80061 LIPID PANEL: CPT

## 2025-07-23 PROCEDURE — 80179 DRUG ASSAY SALICYLATE: CPT

## 2025-07-23 PROCEDURE — 6360000004 HC RX CONTRAST MEDICATION: Performed by: HOSPITALIST

## 2025-07-23 PROCEDURE — 94640 AIRWAY INHALATION TREATMENT: CPT

## 2025-07-23 PROCEDURE — 71045 X-RAY EXAM CHEST 1 VIEW: CPT

## 2025-07-23 PROCEDURE — 82310 ASSAY OF CALCIUM: CPT

## 2025-07-23 RX ORDER — ATORVASTATIN CALCIUM 20 MG/1
40 TABLET, FILM COATED ORAL NIGHTLY
Status: DISCONTINUED | OUTPATIENT
Start: 2025-07-23 | End: 2025-07-27

## 2025-07-23 RX ORDER — CALCIUM CHLORIDE 100 MG/ML
1000 INJECTION INTRAVENOUS; INTRAVENTRICULAR
Status: DISCONTINUED | OUTPATIENT
Start: 2025-07-23 | End: 2025-07-23

## 2025-07-23 RX ORDER — SODIUM CHLORIDE 0.9 % (FLUSH) 0.9 %
5-40 SYRINGE (ML) INJECTION PRN
Status: DISCONTINUED | OUTPATIENT
Start: 2025-07-23 | End: 2025-07-27 | Stop reason: SDUPTHER

## 2025-07-23 RX ORDER — IPRATROPIUM BROMIDE AND ALBUTEROL SULFATE 2.5; .5 MG/3ML; MG/3ML
1 SOLUTION RESPIRATORY (INHALATION) 2 TIMES DAILY
Status: DISCONTINUED | OUTPATIENT
Start: 2025-07-23 | End: 2025-07-31 | Stop reason: HOSPADM

## 2025-07-23 RX ORDER — INSULIN GLARGINE 100 [IU]/ML
8 INJECTION, SOLUTION SUBCUTANEOUS NIGHTLY
Status: DISCONTINUED | OUTPATIENT
Start: 2025-07-23 | End: 2025-07-23

## 2025-07-23 RX ORDER — BUDESONIDE 0.5 MG/2ML
0.5 INHALANT ORAL
Status: DISCONTINUED | OUTPATIENT
Start: 2025-07-23 | End: 2025-07-31 | Stop reason: HOSPADM

## 2025-07-23 RX ORDER — INSULIN LISPRO 100 [IU]/ML
0-8 INJECTION, SOLUTION INTRAVENOUS; SUBCUTANEOUS
Status: DISCONTINUED | OUTPATIENT
Start: 2025-07-23 | End: 2025-07-31 | Stop reason: HOSPADM

## 2025-07-23 RX ORDER — DEXTROSE MONOHYDRATE 100 MG/ML
INJECTION, SOLUTION INTRAVENOUS CONTINUOUS PRN
Status: DISCONTINUED | OUTPATIENT
Start: 2025-07-23 | End: 2025-07-31 | Stop reason: HOSPADM

## 2025-07-23 RX ORDER — ASPIRIN 81 MG/1
81 TABLET ORAL DAILY
Status: DISCONTINUED | OUTPATIENT
Start: 2025-07-23 | End: 2025-07-31 | Stop reason: HOSPADM

## 2025-07-23 RX ORDER — SODIUM CHLORIDE 9 MG/ML
INJECTION, SOLUTION INTRAVENOUS PRN
Status: DISCONTINUED | OUTPATIENT
Start: 2025-07-23 | End: 2025-07-27 | Stop reason: SDUPTHER

## 2025-07-23 RX ORDER — CALCIUM GLUCONATE 20 MG/ML
2000 INJECTION, SOLUTION INTRAVENOUS ONCE
Status: COMPLETED | OUTPATIENT
Start: 2025-07-23 | End: 2025-07-23

## 2025-07-23 RX ORDER — ACETAMINOPHEN 325 MG/1
650 TABLET ORAL EVERY 6 HOURS PRN
Status: DISCONTINUED | OUTPATIENT
Start: 2025-07-23 | End: 2025-07-31 | Stop reason: HOSPADM

## 2025-07-23 RX ORDER — 0.9 % SODIUM CHLORIDE 0.9 %
100 INTRAVENOUS SOLUTION INTRAVENOUS PRN
Status: DISCONTINUED | OUTPATIENT
Start: 2025-07-23 | End: 2025-07-31 | Stop reason: HOSPADM

## 2025-07-23 RX ORDER — BUPROPION HYDROCHLORIDE 150 MG/1
150 TABLET ORAL EVERY MORNING
Status: DISCONTINUED | OUTPATIENT
Start: 2025-07-24 | End: 2025-07-27

## 2025-07-23 RX ORDER — ALBUMIN (HUMAN) 12.5 G/50ML
25 SOLUTION INTRAVENOUS
Status: DISCONTINUED | OUTPATIENT
Start: 2025-07-23 | End: 2025-07-31 | Stop reason: HOSPADM

## 2025-07-23 RX ORDER — ACETAMINOPHEN 650 MG/1
650 SUPPOSITORY RECTAL EVERY 6 HOURS PRN
Status: DISCONTINUED | OUTPATIENT
Start: 2025-07-23 | End: 2025-07-31 | Stop reason: HOSPADM

## 2025-07-23 RX ORDER — GLUCAGON 1 MG/ML
1 KIT INJECTION PRN
Status: DISCONTINUED | OUTPATIENT
Start: 2025-07-23 | End: 2025-07-31 | Stop reason: HOSPADM

## 2025-07-23 RX ORDER — CALCIUM ACETATE 667 MG/1
1 CAPSULE ORAL
Status: DISCONTINUED | OUTPATIENT
Start: 2025-07-23 | End: 2025-07-31 | Stop reason: HOSPADM

## 2025-07-23 RX ORDER — ARIPIPRAZOLE 5 MG/1
5 TABLET ORAL DAILY
Status: DISCONTINUED | OUTPATIENT
Start: 2025-07-23 | End: 2025-07-31 | Stop reason: HOSPADM

## 2025-07-23 RX ORDER — HYDRALAZINE HYDROCHLORIDE 25 MG/1
25 TABLET, FILM COATED ORAL 3 TIMES DAILY
Status: DISCONTINUED | OUTPATIENT
Start: 2025-07-23 | End: 2025-07-27

## 2025-07-23 RX ORDER — CARVEDILOL 3.12 MG/1
6.25 TABLET ORAL 2 TIMES DAILY WITH MEALS
Status: DISCONTINUED | OUTPATIENT
Start: 2025-07-23 | End: 2025-07-31 | Stop reason: HOSPADM

## 2025-07-23 RX ORDER — CALCIUM GLUCONATE 20 MG/ML
1000 INJECTION, SOLUTION INTRAVENOUS
Status: COMPLETED | OUTPATIENT
Start: 2025-07-23 | End: 2025-07-23

## 2025-07-23 RX ORDER — POLYETHYLENE GLYCOL 3350 17 G/17G
17 POWDER, FOR SOLUTION ORAL DAILY PRN
Status: DISCONTINUED | OUTPATIENT
Start: 2025-07-23 | End: 2025-07-31 | Stop reason: HOSPADM

## 2025-07-23 RX ORDER — HYDROMORPHONE HYDROCHLORIDE 1 MG/ML
0.25 INJECTION, SOLUTION INTRAMUSCULAR; INTRAVENOUS; SUBCUTANEOUS EVERY 4 HOURS PRN
Status: DISCONTINUED | OUTPATIENT
Start: 2025-07-23 | End: 2025-07-27

## 2025-07-23 RX ORDER — FERROUS SULFATE 325(65) MG
325 TABLET ORAL
Status: DISCONTINUED | OUTPATIENT
Start: 2025-07-24 | End: 2025-07-31 | Stop reason: HOSPADM

## 2025-07-23 RX ORDER — TRAMADOL HYDROCHLORIDE 50 MG/1
50 TABLET ORAL EVERY 6 HOURS PRN
Status: DISCONTINUED | OUTPATIENT
Start: 2025-07-23 | End: 2025-07-29

## 2025-07-23 RX ORDER — GABAPENTIN 300 MG/1
300 CAPSULE ORAL 3 TIMES DAILY
Status: DISCONTINUED | OUTPATIENT
Start: 2025-07-23 | End: 2025-07-31 | Stop reason: HOSPADM

## 2025-07-23 RX ORDER — SODIUM CHLORIDE 0.9 % (FLUSH) 0.9 %
5-40 SYRINGE (ML) INJECTION EVERY 12 HOURS SCHEDULED
Status: DISCONTINUED | OUTPATIENT
Start: 2025-07-23 | End: 2025-07-28 | Stop reason: SDUPTHER

## 2025-07-23 RX ORDER — AMLODIPINE BESYLATE 5 MG/1
5 TABLET ORAL DAILY
Status: DISCONTINUED | OUTPATIENT
Start: 2025-07-23 | End: 2025-07-31 | Stop reason: HOSPADM

## 2025-07-23 RX ORDER — ONDANSETRON 4 MG/1
4 TABLET, ORALLY DISINTEGRATING ORAL EVERY 8 HOURS PRN
Status: DISCONTINUED | OUTPATIENT
Start: 2025-07-23 | End: 2025-07-31 | Stop reason: HOSPADM

## 2025-07-23 RX ORDER — ARFORMOTEROL TARTRATE 15 UG/2ML
15 SOLUTION RESPIRATORY (INHALATION)
Status: DISCONTINUED | OUTPATIENT
Start: 2025-07-23 | End: 2025-07-31 | Stop reason: HOSPADM

## 2025-07-23 RX ORDER — IOPAMIDOL 755 MG/ML
75 INJECTION, SOLUTION INTRAVASCULAR
Status: COMPLETED | OUTPATIENT
Start: 2025-07-23 | End: 2025-07-23

## 2025-07-23 RX ORDER — ONDANSETRON 2 MG/ML
4 INJECTION INTRAMUSCULAR; INTRAVENOUS EVERY 6 HOURS PRN
Status: DISCONTINUED | OUTPATIENT
Start: 2025-07-23 | End: 2025-07-31 | Stop reason: HOSPADM

## 2025-07-23 RX ORDER — DULOXETIN HYDROCHLORIDE 60 MG/1
60 CAPSULE, DELAYED RELEASE ORAL DAILY
Status: DISCONTINUED | OUTPATIENT
Start: 2025-07-24 | End: 2025-07-31 | Stop reason: HOSPADM

## 2025-07-23 RX ORDER — IOPAMIDOL 755 MG/ML
70 INJECTION, SOLUTION INTRAVASCULAR
Status: COMPLETED | OUTPATIENT
Start: 2025-07-23 | End: 2025-07-23

## 2025-07-23 RX ORDER — INSULIN GLARGINE 100 [IU]/ML
15 INJECTION, SOLUTION SUBCUTANEOUS NIGHTLY
Status: DISCONTINUED | OUTPATIENT
Start: 2025-07-23 | End: 2025-07-27

## 2025-07-23 RX ORDER — HEPARIN SODIUM 5000 [USP'U]/ML
5000 INJECTION, SOLUTION INTRAVENOUS; SUBCUTANEOUS EVERY 8 HOURS SCHEDULED
Status: DISCONTINUED | OUTPATIENT
Start: 2025-07-23 | End: 2025-07-25

## 2025-07-23 RX ADMIN — CARVEDILOL 6.25 MG: 3.12 TABLET, FILM COATED ORAL at 18:05

## 2025-07-23 RX ADMIN — IOPAMIDOL 70 ML: 755 INJECTION, SOLUTION INTRAVENOUS at 12:17

## 2025-07-23 RX ADMIN — HEPARIN SODIUM 5000 UNITS: 5000 INJECTION INTRAVENOUS; SUBCUTANEOUS at 14:01

## 2025-07-23 RX ADMIN — SODIUM CHLORIDE, PRESERVATIVE FREE 10 ML: 5 INJECTION INTRAVENOUS at 10:17

## 2025-07-23 RX ADMIN — TRAMADOL HYDROCHLORIDE 50 MG: 50 TABLET, COATED ORAL at 18:05

## 2025-07-23 RX ADMIN — EPOETIN ALFA-EPBX 6000 UNITS: 3000 INJECTION, SOLUTION INTRAVENOUS; SUBCUTANEOUS at 18:05

## 2025-07-23 RX ADMIN — IPRATROPIUM BROMIDE AND ALBUTEROL SULFATE 1 DOSE: .5; 3 SOLUTION RESPIRATORY (INHALATION) at 14:34

## 2025-07-23 RX ADMIN — ARIPIPRAZOLE 5 MG: 5 TABLET ORAL at 15:47

## 2025-07-23 RX ADMIN — HYDROMORPHONE HYDROCHLORIDE 0.25 MG: 1 INJECTION, SOLUTION INTRAMUSCULAR; INTRAVENOUS; SUBCUTANEOUS at 14:01

## 2025-07-23 RX ADMIN — ACETAMINOPHEN 650 MG: 325 TABLET ORAL at 10:15

## 2025-07-23 RX ADMIN — INSULIN GLARGINE 15 UNITS: 100 INJECTION, SOLUTION SUBCUTANEOUS at 20:23

## 2025-07-23 RX ADMIN — AMLODIPINE BESYLATE 5 MG: 5 TABLET ORAL at 14:00

## 2025-07-23 RX ADMIN — ATORVASTATIN CALCIUM 40 MG: 20 TABLET, FILM COATED ORAL at 20:22

## 2025-07-23 RX ADMIN — ASPIRIN 81 MG: 81 TABLET, COATED ORAL at 14:00

## 2025-07-23 RX ADMIN — IPRATROPIUM BROMIDE AND ALBUTEROL SULFATE 1 DOSE: .5; 3 SOLUTION RESPIRATORY (INHALATION) at 20:37

## 2025-07-23 RX ADMIN — HEPARIN SODIUM 5000 UNITS: 5000 INJECTION INTRAVENOUS; SUBCUTANEOUS at 20:22

## 2025-07-23 RX ADMIN — CALCIUM ACETATE 667 MG: 667 CAPSULE ORAL at 14:00

## 2025-07-23 RX ADMIN — INSULIN LISPRO 2 UNITS: 100 INJECTION, SOLUTION INTRAVENOUS; SUBCUTANEOUS at 18:05

## 2025-07-23 RX ADMIN — ARFORMOTEROL TARTRATE 15 MCG: 15 SOLUTION RESPIRATORY (INHALATION) at 20:37

## 2025-07-23 RX ADMIN — CALCIUM GLUCONATE 1000 MG: 20 INJECTION, SOLUTION INTRAVENOUS at 11:28

## 2025-07-23 RX ADMIN — BUDESONIDE 500 MCG: 0.5 INHALANT RESPIRATORY (INHALATION) at 20:37

## 2025-07-23 RX ADMIN — TRAMADOL HYDROCHLORIDE 50 MG: 50 TABLET, COATED ORAL at 11:26

## 2025-07-23 RX ADMIN — CALCIUM ACETATE 667 MG: 667 CAPSULE ORAL at 18:05

## 2025-07-23 RX ADMIN — GABAPENTIN 300 MG: 300 CAPSULE ORAL at 20:24

## 2025-07-23 RX ADMIN — ACETAMINOPHEN 650 MG: 325 TABLET ORAL at 15:54

## 2025-07-23 RX ADMIN — CALCIUM GLUCONATE 2000 MG: 20 INJECTION, SOLUTION INTRAVENOUS at 15:48

## 2025-07-23 RX ADMIN — HYDRALAZINE HYDROCHLORIDE 25 MG: 25 TABLET, FILM COATED ORAL at 20:22

## 2025-07-23 RX ADMIN — SULFUR HEXAFLUORIDE 2 ML: 60.7; .19; .19 INJECTION, POWDER, LYOPHILIZED, FOR SUSPENSION INTRAVENOUS; INTRAVESICAL at 14:11

## 2025-07-23 RX ADMIN — ONDANSETRON 4 MG: 2 INJECTION, SOLUTION INTRAMUSCULAR; INTRAVENOUS at 18:05

## 2025-07-23 RX ADMIN — INSULIN LISPRO 2 UNITS: 100 INJECTION, SOLUTION INTRAVENOUS; SUBCUTANEOUS at 21:16

## 2025-07-23 RX ADMIN — IOPAMIDOL 75 ML: 755 INJECTION, SOLUTION INTRAVENOUS at 12:18

## 2025-07-23 RX ADMIN — HYDRALAZINE HYDROCHLORIDE 25 MG: 25 TABLET, FILM COATED ORAL at 14:00

## 2025-07-23 RX ADMIN — GABAPENTIN 300 MG: 300 CAPSULE ORAL at 14:00

## 2025-07-23 RX ADMIN — SODIUM CHLORIDE, PRESERVATIVE FREE 10 ML: 5 INJECTION INTRAVENOUS at 20:24

## 2025-07-23 ASSESSMENT — PAIN SCALES - GENERAL
PAINLEVEL_OUTOF10: 8
PAINLEVEL_OUTOF10: 10
PAINLEVEL_OUTOF10: 10
PAINLEVEL_OUTOF10: 8
PAINLEVEL_OUTOF10: 10
PAINLEVEL_OUTOF10: 8

## 2025-07-23 ASSESSMENT — PAIN DESCRIPTION - DESCRIPTORS
DESCRIPTORS: PATIENT UNABLE TO DESCRIBE

## 2025-07-23 ASSESSMENT — PAIN DESCRIPTION - ONSET
ONSET: ON-GOING

## 2025-07-23 ASSESSMENT — PAIN - FUNCTIONAL ASSESSMENT
PAIN_FUNCTIONAL_ASSESSMENT: PREVENTS OR INTERFERES WITH MANY ACTIVE NOT PASSIVE ACTIVITIES
PAIN_FUNCTIONAL_ASSESSMENT: PREVENTS OR INTERFERES WITH MANY ACTIVE NOT PASSIVE ACTIVITIES
PAIN_FUNCTIONAL_ASSESSMENT: PREVENTS OR INTERFERES WITH ALL ACTIVE AND SOME PASSIVE ACTIVITIES
PAIN_FUNCTIONAL_ASSESSMENT: PREVENTS OR INTERFERES WITH ALL ACTIVE AND SOME PASSIVE ACTIVITIES

## 2025-07-23 ASSESSMENT — PAIN DESCRIPTION - LOCATION
LOCATION: ANKLE

## 2025-07-23 ASSESSMENT — PAIN DESCRIPTION - ORIENTATION
ORIENTATION: LEFT

## 2025-07-23 ASSESSMENT — LIFESTYLE VARIABLES
HOW OFTEN DO YOU HAVE A DRINK CONTAINING ALCOHOL: NEVER
HOW MANY STANDARD DRINKS CONTAINING ALCOHOL DO YOU HAVE ON A TYPICAL DAY: PATIENT DOES NOT DRINK

## 2025-07-23 ASSESSMENT — PAIN DESCRIPTION - PAIN TYPE
TYPE: ACUTE PAIN

## 2025-07-23 ASSESSMENT — PAIN DESCRIPTION - FREQUENCY
FREQUENCY: INTERMITTENT
FREQUENCY: CONTINUOUS

## 2025-07-24 ENCOUNTER — APPOINTMENT (OUTPATIENT)
Facility: HOSPITAL | Age: 60
DRG: 981 | End: 2025-07-24
Payer: MEDICARE

## 2025-07-24 LAB
ALBUMIN SERPL-MCNC: 3.2 G/DL (ref 3.4–5)
ANION GAP SERPL CALC-SCNC: 17 MMOL/L (ref 3–18)
BASOPHILS # BLD: 0.04 K/UL (ref 0–0.1)
BASOPHILS NFR BLD: 0.7 % (ref 0–2)
BUN SERPL-MCNC: 49 MG/DL (ref 6–23)
BUN/CREAT SERPL: 8 (ref 12–20)
CA-I SERPL-SCNC: 0.7 MMOL/L (ref 1.12–1.32)
CALCIUM SERPL-MCNC: 6.7 MG/DL (ref 8.5–10.1)
CHLORIDE SERPL-SCNC: 98 MMOL/L (ref 98–107)
CO2 SERPL-SCNC: 23 MMOL/L (ref 21–32)
CREAT SERPL-MCNC: 6.05 MG/DL (ref 0.6–1.3)
DIFFERENTIAL METHOD BLD: ABNORMAL
ECHO BSA: 2.21 M2
EKG DIAGNOSIS: NORMAL
EOSINOPHIL # BLD: 0.15 K/UL (ref 0–0.4)
EOSINOPHIL NFR BLD: 2.5 % (ref 0–5)
ERYTHROCYTE [DISTWIDTH] IN BLOOD BY AUTOMATED COUNT: 14 % (ref 11.6–14.5)
EST. AVERAGE GLUCOSE BLD GHB EST-MCNC: 198 MG/DL
GLUCOSE BLD STRIP.AUTO-MCNC: 110 MG/DL (ref 70–110)
GLUCOSE BLD STRIP.AUTO-MCNC: 262 MG/DL (ref 70–110)
GLUCOSE BLD STRIP.AUTO-MCNC: 83 MG/DL (ref 70–110)
GLUCOSE BLD STRIP.AUTO-MCNC: 85 MG/DL (ref 70–110)
GLUCOSE BLD STRIP.AUTO-MCNC: 98 MG/DL (ref 70–110)
GLUCOSE SERPL-MCNC: 139 MG/DL (ref 74–108)
HBA1C MFR BLD: 8.5 % (ref 4.2–5.6)
HBV SURFACE AB SERPL IA-ACNC: <3.5 MIU/ML
HBV SURFACE AG SER QL: NONREACTIVE
HCT VFR BLD AUTO: 30.1 % (ref 35–45)
HGB BLD-MCNC: 9.6 G/DL (ref 12–16)
IMM GRANULOCYTES # BLD AUTO: 0.04 K/UL (ref 0–0.04)
IMM GRANULOCYTES NFR BLD AUTO: 0.7 % (ref 0–0.5)
LYMPHOCYTES # BLD: 0.51 K/UL (ref 0.9–3.3)
LYMPHOCYTES NFR BLD: 8.5 % (ref 21–52)
MAGNESIUM SERPL-MCNC: 2.3 MG/DL (ref 1.6–2.6)
MCH RBC QN AUTO: 28.8 PG (ref 24–34)
MCHC RBC AUTO-ENTMCNC: 31.9 G/DL (ref 31–37)
MCV RBC AUTO: 90.4 FL (ref 78–100)
MONOCYTES # BLD: 0.48 K/UL (ref 0.05–1.2)
MONOCYTES NFR BLD: 8 % (ref 3–10)
NEUTS SEG # BLD: 4.81 K/UL (ref 1.8–8)
NEUTS SEG NFR BLD: 79.6 % (ref 40–73)
NRBC # BLD: 0 K/UL (ref 0–0.01)
NRBC BLD-RTO: 0 PER 100 WBC
NUC STRESS EJECTION FRACTION: 70 %
PHOSPHATE SERPL-MCNC: 5.3 MG/DL (ref 2.5–4.9)
PLATELET # BLD AUTO: 131 K/UL (ref 135–420)
PMV BLD AUTO: 10.1 FL (ref 9.2–11.8)
POTASSIUM SERPL-SCNC: 4.1 MMOL/L (ref 3.5–5.5)
RBC # BLD AUTO: 3.33 M/UL (ref 4.2–5.3)
SODIUM SERPL-SCNC: 139 MMOL/L (ref 136–145)
STRESS BASELINE DIAS BP: 55 MMHG
STRESS BASELINE HR: 75 BPM
STRESS BASELINE SYS BP: 132 MMHG
STRESS ESTIMATED WORKLOAD: 1 METS
STRESS PEAK DIAS BP: 55 MMHG
STRESS PEAK SYS BP: 132 MMHG
STRESS PERCENT HR ACHIEVED: 51 %
STRESS POST PEAK HR: 81 BPM
STRESS RATE PRESSURE PRODUCT: NORMAL BPM*MMHG
STRESS TARGET HR: 160 BPM
TID: 0.98
WBC # BLD AUTO: 6 K/UL (ref 4.6–13.2)

## 2025-07-24 PROCEDURE — 6370000000 HC RX 637 (ALT 250 FOR IP): Performed by: HOSPITALIST

## 2025-07-24 PROCEDURE — 6360000002 HC RX W HCPCS: Performed by: HOSPITALIST

## 2025-07-24 PROCEDURE — 97165 OT EVAL LOW COMPLEX 30 MIN: CPT

## 2025-07-24 PROCEDURE — 3430000000 HC RX DIAGNOSTIC RADIOPHARMACEUTICAL: Performed by: INTERNAL MEDICINE

## 2025-07-24 PROCEDURE — 6360000002 HC RX W HCPCS: Performed by: INTERNAL MEDICINE

## 2025-07-24 PROCEDURE — 97116 GAIT TRAINING THERAPY: CPT

## 2025-07-24 PROCEDURE — 97530 THERAPEUTIC ACTIVITIES: CPT

## 2025-07-24 PROCEDURE — 94640 AIRWAY INHALATION TREATMENT: CPT

## 2025-07-24 PROCEDURE — 1100000003 HC PRIVATE W/ TELEMETRY

## 2025-07-24 PROCEDURE — 83735 ASSAY OF MAGNESIUM: CPT

## 2025-07-24 PROCEDURE — 82962 GLUCOSE BLOOD TEST: CPT

## 2025-07-24 PROCEDURE — 85025 COMPLETE CBC W/AUTO DIFF WBC: CPT

## 2025-07-24 PROCEDURE — 2580000003 HC RX 258: Performed by: HOSPITALIST

## 2025-07-24 PROCEDURE — 80069 RENAL FUNCTION PANEL: CPT

## 2025-07-24 PROCEDURE — 93017 CV STRESS TEST TRACING ONLY: CPT

## 2025-07-24 PROCEDURE — 2500000003 HC RX 250 WO HCPCS: Performed by: HOSPITALIST

## 2025-07-24 PROCEDURE — 97535 SELF CARE MNGMENT TRAINING: CPT

## 2025-07-24 PROCEDURE — 97161 PT EVAL LOW COMPLEX 20 MIN: CPT

## 2025-07-24 PROCEDURE — 83036 HEMOGLOBIN GLYCOSYLATED A1C: CPT

## 2025-07-24 PROCEDURE — 36415 COLL VENOUS BLD VENIPUNCTURE: CPT

## 2025-07-24 PROCEDURE — A9500 TC99M SESTAMIBI: HCPCS | Performed by: INTERNAL MEDICINE

## 2025-07-24 PROCEDURE — 82330 ASSAY OF CALCIUM: CPT

## 2025-07-24 RX ORDER — CALCIUM GLUCONATE 20 MG/ML
2000 INJECTION, SOLUTION INTRAVENOUS ONCE
Status: COMPLETED | OUTPATIENT
Start: 2025-07-24 | End: 2025-07-24

## 2025-07-24 RX ORDER — REGADENOSON 0.08 MG/ML
0.4 INJECTION, SOLUTION INTRAVENOUS
Status: COMPLETED | OUTPATIENT
Start: 2025-07-24 | End: 2025-07-24

## 2025-07-24 RX ORDER — LORAZEPAM 2 MG/ML
1 INJECTION INTRAMUSCULAR EVERY 4 HOURS PRN
Status: DISCONTINUED | OUTPATIENT
Start: 2025-07-24 | End: 2025-07-30

## 2025-07-24 RX ORDER — TETRAKIS(2-METHOXYISOBUTYLISOCYANIDE)COPPER(I) TETRAFLUOROBORATE 1 MG/ML
28.8 INJECTION, POWDER, LYOPHILIZED, FOR SOLUTION INTRAVENOUS
Status: COMPLETED | OUTPATIENT
Start: 2025-07-24 | End: 2025-07-24

## 2025-07-24 RX ORDER — DEXTROSE MONOHYDRATE AND SODIUM CHLORIDE 5; .45 G/100ML; G/100ML
INJECTION, SOLUTION INTRAVENOUS CONTINUOUS
Status: DISPENSED | OUTPATIENT
Start: 2025-07-24 | End: 2025-07-24

## 2025-07-24 RX ORDER — TETRAKIS(2-METHOXYISOBUTYLISOCYANIDE)COPPER(I) TETRAFLUOROBORATE 1 MG/ML
9.1 INJECTION, POWDER, LYOPHILIZED, FOR SOLUTION INTRAVENOUS
Status: COMPLETED | OUTPATIENT
Start: 2025-07-24 | End: 2025-07-24

## 2025-07-24 RX ADMIN — TETRAKIS(2-METHOXYISOBUTYLISOCYANIDE)COPPER(I) TETRAFLUOROBORATE 28.8 MILLICURIE: 1 INJECTION, POWDER, LYOPHILIZED, FOR SOLUTION INTRAVENOUS at 18:21

## 2025-07-24 RX ADMIN — INSULIN GLARGINE 15 UNITS: 100 INJECTION, SOLUTION SUBCUTANEOUS at 22:19

## 2025-07-24 RX ADMIN — AMLODIPINE BESYLATE 5 MG: 5 TABLET ORAL at 09:08

## 2025-07-24 RX ADMIN — CALCIUM ACETATE 667 MG: 667 CAPSULE ORAL at 18:13

## 2025-07-24 RX ADMIN — CARVEDILOL 6.25 MG: 3.12 TABLET, FILM COATED ORAL at 18:13

## 2025-07-24 RX ADMIN — ONDANSETRON 4 MG: 2 INJECTION, SOLUTION INTRAMUSCULAR; INTRAVENOUS at 11:45

## 2025-07-24 RX ADMIN — BUPROPION HYDROCHLORIDE 150 MG: 150 TABLET, EXTENDED RELEASE ORAL at 09:08

## 2025-07-24 RX ADMIN — DULOXETINE 60 MG: 30 CAPSULE, DELAYED RELEASE ORAL at 09:07

## 2025-07-24 RX ADMIN — HYDRALAZINE HYDROCHLORIDE 25 MG: 25 TABLET, FILM COATED ORAL at 09:07

## 2025-07-24 RX ADMIN — ATORVASTATIN CALCIUM 40 MG: 20 TABLET, FILM COATED ORAL at 22:10

## 2025-07-24 RX ADMIN — BUDESONIDE 500 MCG: 0.5 INHALANT RESPIRATORY (INHALATION) at 08:20

## 2025-07-24 RX ADMIN — TETRAKIS(2-METHOXYISOBUTYLISOCYANIDE)COPPER(I) TETRAFLUOROBORATE 9.1 MILLICURIE: 1 INJECTION, POWDER, LYOPHILIZED, FOR SOLUTION INTRAVENOUS at 09:38

## 2025-07-24 RX ADMIN — GABAPENTIN 300 MG: 300 CAPSULE ORAL at 22:11

## 2025-07-24 RX ADMIN — ARFORMOTEROL TARTRATE 15 MCG: 15 SOLUTION RESPIRATORY (INHALATION) at 19:07

## 2025-07-24 RX ADMIN — CALCIUM GLUCONATE 2000 MG: 20 INJECTION, SOLUTION INTRAVENOUS at 06:29

## 2025-07-24 RX ADMIN — INSULIN LISPRO 4 UNITS: 100 INJECTION, SOLUTION INTRAVENOUS; SUBCUTANEOUS at 22:18

## 2025-07-24 RX ADMIN — FERROUS SULFATE TAB 325 MG (65 MG ELEMENTAL FE) 325 MG: 325 (65 FE) TAB at 09:08

## 2025-07-24 RX ADMIN — CALCIUM ACETATE 667 MG: 667 CAPSULE ORAL at 14:26

## 2025-07-24 RX ADMIN — GABAPENTIN 300 MG: 300 CAPSULE ORAL at 14:27

## 2025-07-24 RX ADMIN — REGADENOSON 0.4 MG: 0.08 INJECTION, SOLUTION INTRAVENOUS at 18:21

## 2025-07-24 RX ADMIN — HEPARIN SODIUM 5000 UNITS: 5000 INJECTION INTRAVENOUS; SUBCUTANEOUS at 14:26

## 2025-07-24 RX ADMIN — HYDROMORPHONE HYDROCHLORIDE 0.25 MG: 1 INJECTION, SOLUTION INTRAMUSCULAR; INTRAVENOUS; SUBCUTANEOUS at 06:33

## 2025-07-24 RX ADMIN — SODIUM CHLORIDE, PRESERVATIVE FREE 10 ML: 5 INJECTION INTRAVENOUS at 22:19

## 2025-07-24 RX ADMIN — TRAMADOL HYDROCHLORIDE 50 MG: 50 TABLET, COATED ORAL at 03:13

## 2025-07-24 RX ADMIN — BUDESONIDE 500 MCG: 0.5 INHALANT RESPIRATORY (INHALATION) at 19:07

## 2025-07-24 RX ADMIN — ARFORMOTEROL TARTRATE 15 MCG: 15 SOLUTION RESPIRATORY (INHALATION) at 08:20

## 2025-07-24 RX ADMIN — GABAPENTIN 300 MG: 300 CAPSULE ORAL at 09:08

## 2025-07-24 RX ADMIN — HEPARIN SODIUM 5000 UNITS: 5000 INJECTION INTRAVENOUS; SUBCUTANEOUS at 22:19

## 2025-07-24 RX ADMIN — CARVEDILOL 6.25 MG: 3.12 TABLET, FILM COATED ORAL at 09:07

## 2025-07-24 RX ADMIN — IPRATROPIUM BROMIDE AND ALBUTEROL SULFATE 1 DOSE: .5; 3 SOLUTION RESPIRATORY (INHALATION) at 19:07

## 2025-07-24 RX ADMIN — CALCIUM ACETATE 667 MG: 667 CAPSULE ORAL at 09:08

## 2025-07-24 RX ADMIN — IPRATROPIUM BROMIDE AND ALBUTEROL SULFATE 1 DOSE: .5; 3 SOLUTION RESPIRATORY (INHALATION) at 08:20

## 2025-07-24 RX ADMIN — ARIPIPRAZOLE 5 MG: 5 TABLET ORAL at 09:07

## 2025-07-24 RX ADMIN — DEXTROSE AND SODIUM CHLORIDE: 5; .45 INJECTION, SOLUTION INTRAVENOUS at 09:06

## 2025-07-24 RX ADMIN — ASPIRIN 81 MG: 81 TABLET, COATED ORAL at 09:07

## 2025-07-24 RX ADMIN — HEPARIN SODIUM 5000 UNITS: 5000 INJECTION INTRAVENOUS; SUBCUTANEOUS at 06:30

## 2025-07-24 ASSESSMENT — PAIN DESCRIPTION - DESCRIPTORS
DESCRIPTORS: SHOOTING;ACHING
DESCRIPTORS: ACHING;SHOOTING;DISCOMFORT

## 2025-07-24 ASSESSMENT — PAIN SCALES - GENERAL
PAINLEVEL_OUTOF10: 0
PAINLEVEL_OUTOF10: 10
PAINLEVEL_OUTOF10: 10
PAINLEVEL_OUTOF10: 0

## 2025-07-24 ASSESSMENT — PAIN DESCRIPTION - LOCATION
LOCATION: FOOT
LOCATION: FOOT

## 2025-07-24 ASSESSMENT — PAIN DESCRIPTION - ORIENTATION
ORIENTATION: LEFT
ORIENTATION: LEFT

## 2025-07-25 PROBLEM — R07.9 CHEST PAIN: Status: ACTIVE | Noted: 2025-07-23

## 2025-07-25 PROBLEM — R94.39 POSITIVE CARDIAC STRESS TEST: Status: ACTIVE | Noted: 2025-07-23

## 2025-07-25 LAB
ANION GAP SERPL CALC-SCNC: 20 MMOL/L (ref 3–18)
BASOPHILS # BLD: 0.05 K/UL (ref 0–0.1)
BASOPHILS NFR BLD: 0.9 % (ref 0–2)
BUN SERPL-MCNC: 68 MG/DL (ref 6–23)
BUN/CREAT SERPL: 9 (ref 12–20)
CA-I SERPL-SCNC: 0.73 MMOL/L (ref 1.12–1.32)
CALCIUM SERPL-MCNC: 6.4 MG/DL (ref 8.5–10.1)
CHLORIDE SERPL-SCNC: 96 MMOL/L (ref 98–107)
CO2 SERPL-SCNC: 21 MMOL/L (ref 21–32)
CREAT SERPL-MCNC: 7.71 MG/DL (ref 0.6–1.3)
DIFFERENTIAL METHOD BLD: ABNORMAL
ECHO BSA: 2.21 M2
EOSINOPHIL # BLD: 0.16 K/UL (ref 0–0.4)
EOSINOPHIL NFR BLD: 2.8 % (ref 0–5)
ERYTHROCYTE [DISTWIDTH] IN BLOOD BY AUTOMATED COUNT: 14.2 % (ref 11.6–14.5)
GLUCOSE BLD STRIP.AUTO-MCNC: 107 MG/DL (ref 70–110)
GLUCOSE BLD STRIP.AUTO-MCNC: 230 MG/DL (ref 70–110)
GLUCOSE BLD STRIP.AUTO-MCNC: 231 MG/DL (ref 70–110)
GLUCOSE BLD STRIP.AUTO-MCNC: 74 MG/DL (ref 70–110)
GLUCOSE BLD STRIP.AUTO-MCNC: 83 MG/DL (ref 70–110)
GLUCOSE SERPL-MCNC: 99 MG/DL (ref 74–108)
HCT VFR BLD AUTO: 28.1 % (ref 35–45)
HCT VFR BLD AUTO: 29.7 % (ref 35–45)
HGB BLD-MCNC: 8.7 G/DL (ref 12–16)
HGB BLD-MCNC: 9.2 G/DL (ref 12–16)
IMM GRANULOCYTES # BLD AUTO: 0.04 K/UL (ref 0–0.04)
IMM GRANULOCYTES NFR BLD AUTO: 0.7 % (ref 0–0.5)
LYMPHOCYTES # BLD: 0.71 K/UL (ref 0.9–3.3)
LYMPHOCYTES NFR BLD: 12.2 % (ref 21–52)
MAGNESIUM SERPL-MCNC: 2.4 MG/DL (ref 1.6–2.6)
MCH RBC QN AUTO: 29.1 PG (ref 24–34)
MCHC RBC AUTO-ENTMCNC: 31 G/DL (ref 31–37)
MCV RBC AUTO: 94 FL (ref 78–100)
MONOCYTES # BLD: 0.71 K/UL (ref 0.05–1.2)
MONOCYTES NFR BLD: 12.2 % (ref 3–10)
NEUTS SEG # BLD: 4.14 K/UL (ref 1.8–8)
NEUTS SEG NFR BLD: 71.2 % (ref 40–73)
NRBC # BLD: 0 K/UL (ref 0–0.01)
NRBC BLD-RTO: 0 PER 100 WBC
PLATELET # BLD AUTO: 142 K/UL (ref 135–420)
PMV BLD AUTO: 10.9 FL (ref 9.2–11.8)
POTASSIUM SERPL-SCNC: 4.6 MMOL/L (ref 3.5–5.5)
RBC # BLD AUTO: 3.16 M/UL (ref 4.2–5.3)
SODIUM SERPL-SCNC: 137 MMOL/L (ref 136–145)
WBC # BLD AUTO: 5.8 K/UL (ref 4.6–13.2)

## 2025-07-25 PROCEDURE — 6360000002 HC RX W HCPCS: Performed by: HOSPITALIST

## 2025-07-25 PROCEDURE — 97535 SELF CARE MNGMENT TRAINING: CPT

## 2025-07-25 PROCEDURE — C1887 CATHETER, GUIDING: HCPCS | Performed by: INTERNAL MEDICINE

## 2025-07-25 PROCEDURE — 027034Z DILATION OF CORONARY ARTERY, ONE ARTERY WITH DRUG-ELUTING INTRALUMINAL DEVICE, PERCUTANEOUS APPROACH: ICD-10-PCS | Performed by: INTERNAL MEDICINE

## 2025-07-25 PROCEDURE — 6370000000 HC RX 637 (ALT 250 FOR IP): Performed by: HOSPITALIST

## 2025-07-25 PROCEDURE — 82962 GLUCOSE BLOOD TEST: CPT

## 2025-07-25 PROCEDURE — 83735 ASSAY OF MAGNESIUM: CPT

## 2025-07-25 PROCEDURE — 4A023N7 MEASUREMENT OF CARDIAC SAMPLING AND PRESSURE, LEFT HEART, PERCUTANEOUS APPROACH: ICD-10-PCS | Performed by: INTERNAL MEDICINE

## 2025-07-25 PROCEDURE — 1100000003 HC PRIVATE W/ TELEMETRY

## 2025-07-25 PROCEDURE — 93005 ELECTROCARDIOGRAM TRACING: CPT | Performed by: INTERNAL MEDICINE

## 2025-07-25 PROCEDURE — 93458 L HRT ARTERY/VENTRICLE ANGIO: CPT | Performed by: INTERNAL MEDICINE

## 2025-07-25 PROCEDURE — C1769 GUIDE WIRE: HCPCS | Performed by: INTERNAL MEDICINE

## 2025-07-25 PROCEDURE — 2500000003 HC RX 250 WO HCPCS: Performed by: HOSPITALIST

## 2025-07-25 PROCEDURE — 82330 ASSAY OF CALCIUM: CPT

## 2025-07-25 PROCEDURE — 6370000000 HC RX 637 (ALT 250 FOR IP): Performed by: INTERNAL MEDICINE

## 2025-07-25 PROCEDURE — 6360000004 HC RX CONTRAST MEDICATION: Performed by: INTERNAL MEDICINE

## 2025-07-25 PROCEDURE — 2709999900 HC NON-CHARGEABLE SUPPLY: Performed by: INTERNAL MEDICINE

## 2025-07-25 PROCEDURE — C9600 PERC DRUG-EL COR STENT SING: HCPCS | Performed by: INTERNAL MEDICINE

## 2025-07-25 PROCEDURE — 93572 IV DOP VEL&/PRESS C FLO EA: CPT | Performed by: INTERNAL MEDICINE

## 2025-07-25 PROCEDURE — 36415 COLL VENOUS BLD VENIPUNCTURE: CPT

## 2025-07-25 PROCEDURE — 93571 IV DOP VEL&/PRESS C FLO 1ST: CPT | Performed by: INTERNAL MEDICINE

## 2025-07-25 PROCEDURE — 94660 CPAP INITIATION&MGMT: CPT

## 2025-07-25 PROCEDURE — C1894 INTRO/SHEATH, NON-LASER: HCPCS | Performed by: INTERNAL MEDICINE

## 2025-07-25 PROCEDURE — B2111ZZ FLUOROSCOPY OF MULTIPLE CORONARY ARTERIES USING LOW OSMOLAR CONTRAST: ICD-10-PCS | Performed by: INTERNAL MEDICINE

## 2025-07-25 PROCEDURE — 85014 HEMATOCRIT: CPT

## 2025-07-25 PROCEDURE — 85025 COMPLETE CBC W/AUTO DIFF WBC: CPT

## 2025-07-25 PROCEDURE — 4A033BC MEASUREMENT OF ARTERIAL PRESSURE, CORONARY, PERCUTANEOUS APPROACH: ICD-10-PCS | Performed by: INTERNAL MEDICINE

## 2025-07-25 PROCEDURE — 5A09357 ASSISTANCE WITH RESPIRATORY VENTILATION, LESS THAN 24 CONSECUTIVE HOURS, CONTINUOUS POSITIVE AIRWAY PRESSURE: ICD-10-PCS | Performed by: INTERNAL MEDICINE

## 2025-07-25 PROCEDURE — 94640 AIRWAY INHALATION TREATMENT: CPT

## 2025-07-25 PROCEDURE — 2500000003 HC RX 250 WO HCPCS: Performed by: INTERNAL MEDICINE

## 2025-07-25 PROCEDURE — 99152 MOD SED SAME PHYS/QHP 5/>YRS: CPT | Performed by: INTERNAL MEDICINE

## 2025-07-25 PROCEDURE — 2580000003 HC RX 258: Performed by: INTERNAL MEDICINE

## 2025-07-25 PROCEDURE — C1874 STENT, COATED/COV W/DEL SYS: HCPCS | Performed by: INTERNAL MEDICINE

## 2025-07-25 PROCEDURE — 2700000000 HC OXYGEN THERAPY PER DAY

## 2025-07-25 PROCEDURE — C1725 CATH, TRANSLUMIN NON-LASER: HCPCS | Performed by: INTERNAL MEDICINE

## 2025-07-25 PROCEDURE — 6360000002 HC RX W HCPCS: Performed by: INTERNAL MEDICINE

## 2025-07-25 PROCEDURE — 85018 HEMOGLOBIN: CPT

## 2025-07-25 PROCEDURE — 99153 MOD SED SAME PHYS/QHP EA: CPT | Performed by: INTERNAL MEDICINE

## 2025-07-25 PROCEDURE — 85347 COAGULATION TIME ACTIVATED: CPT

## 2025-07-25 PROCEDURE — 80048 BASIC METABOLIC PNL TOTAL CA: CPT

## 2025-07-25 DEVICE — STENT CORONARY ONYX FRONTIER RX 2.25X12 MM ZOTAROLIMUS ELUT: Type: IMPLANTABLE DEVICE | Status: FUNCTIONAL

## 2025-07-25 DEVICE — IMPLANTABLE DEVICE: Type: IMPLANTABLE DEVICE | Status: FUNCTIONAL

## 2025-07-25 RX ORDER — MIDAZOLAM HYDROCHLORIDE 1 MG/ML
INJECTION INTRAMUSCULAR; INTRAVENOUS PRN
Status: DISCONTINUED | OUTPATIENT
Start: 2025-07-25 | End: 2025-07-25 | Stop reason: HOSPADM

## 2025-07-25 RX ORDER — SODIUM CHLORIDE 0.9 % (FLUSH) 0.9 %
5-40 SYRINGE (ML) INJECTION PRN
Status: DISCONTINUED | OUTPATIENT
Start: 2025-07-25 | End: 2025-07-31 | Stop reason: HOSPADM

## 2025-07-25 RX ORDER — CLOPIDOGREL BISULFATE 75 MG/1
TABLET ORAL PRN
Status: DISCONTINUED | OUTPATIENT
Start: 2025-07-25 | End: 2025-07-25 | Stop reason: HOSPADM

## 2025-07-25 RX ORDER — MORPHINE SULFATE 2 MG/ML
INJECTION, SOLUTION INTRAMUSCULAR; INTRAVENOUS PRN
Status: DISCONTINUED | OUTPATIENT
Start: 2025-07-25 | End: 2025-07-25 | Stop reason: HOSPADM

## 2025-07-25 RX ORDER — LIDOCAINE HYDROCHLORIDE 10 MG/ML
INJECTION, SOLUTION INFILTRATION; PERINEURAL PRN
Status: DISCONTINUED | OUTPATIENT
Start: 2025-07-25 | End: 2025-07-25 | Stop reason: HOSPADM

## 2025-07-25 RX ORDER — BIVALIRUDIN 250 MG/5ML
INJECTION, POWDER, LYOPHILIZED, FOR SOLUTION INTRAVENOUS PRN
Status: DISCONTINUED | OUTPATIENT
Start: 2025-07-25 | End: 2025-07-25 | Stop reason: HOSPADM

## 2025-07-25 RX ORDER — HEPARIN SODIUM 200 [USP'U]/100ML
INJECTION, SOLUTION INTRAVENOUS CONTINUOUS PRN
Status: COMPLETED | OUTPATIENT
Start: 2025-07-25 | End: 2025-07-25

## 2025-07-25 RX ORDER — CLOPIDOGREL BISULFATE 75 MG/1
75 TABLET ORAL DAILY
Status: DISCONTINUED | OUTPATIENT
Start: 2025-07-26 | End: 2025-07-31 | Stop reason: HOSPADM

## 2025-07-25 RX ORDER — SODIUM CHLORIDE 9 MG/ML
INJECTION, SOLUTION INTRAVENOUS PRN
Status: DISCONTINUED | OUTPATIENT
Start: 2025-07-25 | End: 2025-07-31 | Stop reason: HOSPADM

## 2025-07-25 RX ORDER — PANTOPRAZOLE SODIUM 40 MG/1
40 TABLET, DELAYED RELEASE ORAL
Status: DISCONTINUED | OUTPATIENT
Start: 2025-07-26 | End: 2025-07-31 | Stop reason: HOSPADM

## 2025-07-25 RX ORDER — ACETAMINOPHEN 325 MG/1
650 TABLET ORAL EVERY 4 HOURS PRN
Status: DISCONTINUED | OUTPATIENT
Start: 2025-07-25 | End: 2025-07-27 | Stop reason: SDUPTHER

## 2025-07-25 RX ORDER — CALCIUM GLUCONATE 20 MG/ML
2000 INJECTION, SOLUTION INTRAVENOUS ONCE
Status: COMPLETED | OUTPATIENT
Start: 2025-07-25 | End: 2025-07-25

## 2025-07-25 RX ORDER — HEPARIN SODIUM 5000 [USP'U]/ML
5000 INJECTION, SOLUTION INTRAVENOUS; SUBCUTANEOUS EVERY 8 HOURS SCHEDULED
Status: DISCONTINUED | OUTPATIENT
Start: 2025-07-26 | End: 2025-07-31 | Stop reason: HOSPADM

## 2025-07-25 RX ORDER — HEPARIN SODIUM 1000 [USP'U]/ML
INJECTION, SOLUTION INTRAVENOUS; SUBCUTANEOUS PRN
Status: DISCONTINUED | OUTPATIENT
Start: 2025-07-25 | End: 2025-07-25 | Stop reason: HOSPADM

## 2025-07-25 RX ORDER — IOPAMIDOL 612 MG/ML
INJECTION, SOLUTION INTRAVASCULAR PRN
Status: DISCONTINUED | OUTPATIENT
Start: 2025-07-25 | End: 2025-07-25 | Stop reason: HOSPADM

## 2025-07-25 RX ORDER — SODIUM CHLORIDE 0.9 % (FLUSH) 0.9 %
5-40 SYRINGE (ML) INJECTION EVERY 12 HOURS SCHEDULED
Status: DISCONTINUED | OUTPATIENT
Start: 2025-07-25 | End: 2025-07-31 | Stop reason: HOSPADM

## 2025-07-25 RX ORDER — VERAPAMIL HYDROCHLORIDE 2.5 MG/ML
INJECTION INTRAVENOUS PRN
Status: DISCONTINUED | OUTPATIENT
Start: 2025-07-25 | End: 2025-07-25 | Stop reason: HOSPADM

## 2025-07-25 RX ADMIN — BUDESONIDE 500 MCG: 0.5 INHALANT RESPIRATORY (INHALATION) at 20:52

## 2025-07-25 RX ADMIN — HEPARIN SODIUM 5000 UNITS: 5000 INJECTION INTRAVENOUS; SUBCUTANEOUS at 06:21

## 2025-07-25 RX ADMIN — SODIUM CHLORIDE, PRESERVATIVE FREE 10 ML: 5 INJECTION INTRAVENOUS at 21:39

## 2025-07-25 RX ADMIN — INSULIN LISPRO 2 UNITS: 100 INJECTION, SOLUTION INTRAVENOUS; SUBCUTANEOUS at 11:52

## 2025-07-25 RX ADMIN — ARFORMOTEROL TARTRATE 15 MCG: 15 SOLUTION RESPIRATORY (INHALATION) at 07:53

## 2025-07-25 RX ADMIN — INSULIN GLARGINE 15 UNITS: 100 INJECTION, SOLUTION SUBCUTANEOUS at 21:37

## 2025-07-25 RX ADMIN — FERROUS SULFATE TAB 325 MG (65 MG ELEMENTAL FE) 325 MG: 325 (65 FE) TAB at 09:22

## 2025-07-25 RX ADMIN — ACETAMINOPHEN 650 MG: 325 TABLET ORAL at 11:52

## 2025-07-25 RX ADMIN — ATORVASTATIN CALCIUM 40 MG: 20 TABLET, FILM COATED ORAL at 21:34

## 2025-07-25 RX ADMIN — GABAPENTIN 300 MG: 300 CAPSULE ORAL at 09:22

## 2025-07-25 RX ADMIN — BUPROPION HYDROCHLORIDE 150 MG: 150 TABLET, EXTENDED RELEASE ORAL at 09:22

## 2025-07-25 RX ADMIN — DULOXETINE 60 MG: 30 CAPSULE, DELAYED RELEASE ORAL at 09:22

## 2025-07-25 RX ADMIN — TRAMADOL HYDROCHLORIDE 50 MG: 50 TABLET, COATED ORAL at 06:20

## 2025-07-25 RX ADMIN — BUDESONIDE 500 MCG: 0.5 INHALANT RESPIRATORY (INHALATION) at 07:53

## 2025-07-25 RX ADMIN — CARVEDILOL 6.25 MG: 3.12 TABLET, FILM COATED ORAL at 09:23

## 2025-07-25 RX ADMIN — GABAPENTIN 300 MG: 300 CAPSULE ORAL at 21:34

## 2025-07-25 RX ADMIN — CALCIUM GLUCONATE 2000 MG: 20 INJECTION, SOLUTION INTRAVENOUS at 09:31

## 2025-07-25 RX ADMIN — CALCIUM ACETATE 667 MG: 667 CAPSULE ORAL at 09:22

## 2025-07-25 RX ADMIN — IPRATROPIUM BROMIDE AND ALBUTEROL SULFATE 1 DOSE: .5; 3 SOLUTION RESPIRATORY (INHALATION) at 20:52

## 2025-07-25 RX ADMIN — ASPIRIN 81 MG: 81 TABLET, COATED ORAL at 09:23

## 2025-07-25 RX ADMIN — CALCIUM ACETATE 667 MG: 667 CAPSULE ORAL at 11:52

## 2025-07-25 RX ADMIN — IPRATROPIUM BROMIDE AND ALBUTEROL SULFATE 1 DOSE: .5; 3 SOLUTION RESPIRATORY (INHALATION) at 07:53

## 2025-07-25 RX ADMIN — INSULIN LISPRO 2 UNITS: 100 INJECTION, SOLUTION INTRAVENOUS; SUBCUTANEOUS at 21:37

## 2025-07-25 RX ADMIN — SODIUM CHLORIDE, PRESERVATIVE FREE 10 ML: 5 INJECTION INTRAVENOUS at 09:24

## 2025-07-25 RX ADMIN — ARFORMOTEROL TARTRATE 15 MCG: 15 SOLUTION RESPIRATORY (INHALATION) at 20:52

## 2025-07-25 RX ADMIN — SODIUM CHLORIDE, PRESERVATIVE FREE 40 MG: 5 INJECTION INTRAVENOUS at 17:51

## 2025-07-25 RX ADMIN — AMLODIPINE BESYLATE 5 MG: 5 TABLET ORAL at 11:52

## 2025-07-25 RX ADMIN — ARIPIPRAZOLE 5 MG: 5 TABLET ORAL at 09:22

## 2025-07-25 ASSESSMENT — PAIN DESCRIPTION - ORIENTATION
ORIENTATION: LEFT

## 2025-07-25 ASSESSMENT — PAIN SCALES - GENERAL
PAINLEVEL_OUTOF10: 0
PAINLEVEL_OUTOF10: 0
PAINLEVEL_OUTOF10: 6
PAINLEVEL_OUTOF10: 8
PAINLEVEL_OUTOF10: 0
PAINLEVEL_OUTOF10: 5
PAINLEVEL_OUTOF10: 0
PAINLEVEL_OUTOF10: 7

## 2025-07-25 ASSESSMENT — PAIN DESCRIPTION - FREQUENCY: FREQUENCY: CONTINUOUS

## 2025-07-25 ASSESSMENT — PAIN DESCRIPTION - DESCRIPTORS
DESCRIPTORS: ACHING

## 2025-07-25 ASSESSMENT — PAIN - FUNCTIONAL ASSESSMENT
PAIN_FUNCTIONAL_ASSESSMENT: ACTIVITIES ARE NOT PREVENTED
PAIN_FUNCTIONAL_ASSESSMENT: PREVENTS OR INTERFERES SOME ACTIVE ACTIVITIES AND ADLS

## 2025-07-25 ASSESSMENT — PAIN DESCRIPTION - LOCATION
LOCATION: FOOT

## 2025-07-25 ASSESSMENT — PAIN DESCRIPTION - PAIN TYPE
TYPE: ACUTE PAIN

## 2025-07-25 ASSESSMENT — PAIN DESCRIPTION - ONSET: ONSET: GRADUAL

## 2025-07-26 LAB
ACT BLD: 487 SECS (ref 79–138)
ALBUMIN SERPL-MCNC: 3.1 G/DL (ref 3.4–5)
ANION GAP SERPL CALC-SCNC: 20 MMOL/L (ref 3–18)
BASOPHILS # BLD: 0.04 K/UL (ref 0–0.1)
BASOPHILS NFR BLD: 0.7 % (ref 0–2)
BUN SERPL-MCNC: 76 MG/DL (ref 6–23)
BUN/CREAT SERPL: 8 (ref 12–20)
CA-I SERPL-SCNC: 0.78 MMOL/L (ref 1.12–1.32)
CALCIUM SERPL-MCNC: 6 MG/DL (ref 8.5–10.1)
CHLORIDE SERPL-SCNC: 93 MMOL/L (ref 98–107)
CO2 SERPL-SCNC: 20 MMOL/L (ref 21–32)
CREAT SERPL-MCNC: 8.97 MG/DL (ref 0.6–1.3)
DIFFERENTIAL METHOD BLD: ABNORMAL
EOSINOPHIL # BLD: 0.16 K/UL (ref 0–0.4)
EOSINOPHIL NFR BLD: 2.7 % (ref 0–5)
ERYTHROCYTE [DISTWIDTH] IN BLOOD BY AUTOMATED COUNT: 14.5 % (ref 11.6–14.5)
GLUCOSE BLD STRIP.AUTO-MCNC: 139 MG/DL (ref 70–110)
GLUCOSE BLD STRIP.AUTO-MCNC: 155 MG/DL (ref 70–110)
GLUCOSE BLD STRIP.AUTO-MCNC: 241 MG/DL (ref 70–110)
GLUCOSE BLD STRIP.AUTO-MCNC: 250 MG/DL (ref 70–110)
GLUCOSE SERPL-MCNC: 137 MG/DL (ref 74–108)
HCT VFR BLD AUTO: 26.9 % (ref 35–45)
HGB BLD-MCNC: 8.3 G/DL (ref 12–16)
IMM GRANULOCYTES # BLD AUTO: 0.06 K/UL (ref 0–0.04)
IMM GRANULOCYTES NFR BLD AUTO: 1 % (ref 0–0.5)
LYMPHOCYTES # BLD: 0.75 K/UL (ref 0.9–3.3)
LYMPHOCYTES NFR BLD: 12.8 % (ref 21–52)
MAGNESIUM SERPL-MCNC: 2.5 MG/DL (ref 1.6–2.6)
MCH RBC QN AUTO: 28.5 PG (ref 24–34)
MCHC RBC AUTO-ENTMCNC: 30.9 G/DL (ref 31–37)
MCV RBC AUTO: 92.4 FL (ref 78–100)
MONOCYTES # BLD: 0.74 K/UL (ref 0.05–1.2)
MONOCYTES NFR BLD: 12.7 % (ref 3–10)
NEUTS SEG # BLD: 4.09 K/UL (ref 1.8–8)
NEUTS SEG NFR BLD: 70.1 % (ref 40–73)
NRBC # BLD: 0 K/UL (ref 0–0.01)
NRBC BLD-RTO: 0 PER 100 WBC
PHOSPHATE SERPL-MCNC: 6.8 MG/DL (ref 2.5–4.9)
PLATELET # BLD AUTO: 134 K/UL (ref 135–420)
PMV BLD AUTO: 10.9 FL (ref 9.2–11.8)
POTASSIUM SERPL-SCNC: 5.3 MMOL/L (ref 3.5–5.5)
RBC # BLD AUTO: 2.91 M/UL (ref 4.2–5.3)
SODIUM SERPL-SCNC: 133 MMOL/L (ref 136–145)
WBC # BLD AUTO: 5.8 K/UL (ref 4.6–13.2)

## 2025-07-26 PROCEDURE — 6370000000 HC RX 637 (ALT 250 FOR IP): Performed by: INTERNAL MEDICINE

## 2025-07-26 PROCEDURE — 82330 ASSAY OF CALCIUM: CPT

## 2025-07-26 PROCEDURE — 6370000000 HC RX 637 (ALT 250 FOR IP): Performed by: HOSPITALIST

## 2025-07-26 PROCEDURE — 80069 RENAL FUNCTION PANEL: CPT

## 2025-07-26 PROCEDURE — 6360000002 HC RX W HCPCS: Performed by: HOSPITALIST

## 2025-07-26 PROCEDURE — 82962 GLUCOSE BLOOD TEST: CPT

## 2025-07-26 PROCEDURE — 2500000003 HC RX 250 WO HCPCS: Performed by: INTERNAL MEDICINE

## 2025-07-26 PROCEDURE — 94640 AIRWAY INHALATION TREATMENT: CPT

## 2025-07-26 PROCEDURE — 2700000000 HC OXYGEN THERAPY PER DAY

## 2025-07-26 PROCEDURE — 36415 COLL VENOUS BLD VENIPUNCTURE: CPT

## 2025-07-26 PROCEDURE — 5A1D70Z PERFORMANCE OF URINARY FILTRATION, INTERMITTENT, LESS THAN 6 HOURS PER DAY: ICD-10-PCS | Performed by: INTERNAL MEDICINE

## 2025-07-26 PROCEDURE — 1100000003 HC PRIVATE W/ TELEMETRY

## 2025-07-26 PROCEDURE — 85025 COMPLETE CBC W/AUTO DIFF WBC: CPT

## 2025-07-26 PROCEDURE — 83735 ASSAY OF MAGNESIUM: CPT

## 2025-07-26 PROCEDURE — 2500000003 HC RX 250 WO HCPCS: Performed by: HOSPITALIST

## 2025-07-26 PROCEDURE — 90935 HEMODIALYSIS ONE EVALUATION: CPT

## 2025-07-26 RX ORDER — SENNOSIDES 8.6 MG/1
1 TABLET ORAL DAILY
Status: DISCONTINUED | OUTPATIENT
Start: 2025-07-26 | End: 2025-07-31 | Stop reason: HOSPADM

## 2025-07-26 RX ADMIN — GABAPENTIN 300 MG: 300 CAPSULE ORAL at 12:45

## 2025-07-26 RX ADMIN — HEPARIN SODIUM 5000 UNITS: 5000 INJECTION INTRAVENOUS; SUBCUTANEOUS at 14:23

## 2025-07-26 RX ADMIN — INSULIN LISPRO 4 UNITS: 100 INJECTION, SOLUTION INTRAVENOUS; SUBCUTANEOUS at 21:39

## 2025-07-26 RX ADMIN — IPRATROPIUM BROMIDE AND ALBUTEROL SULFATE 1 DOSE: .5; 3 SOLUTION RESPIRATORY (INHALATION) at 06:46

## 2025-07-26 RX ADMIN — SODIUM CHLORIDE, PRESERVATIVE FREE 10 ML: 5 INJECTION INTRAVENOUS at 21:33

## 2025-07-26 RX ADMIN — CALCIUM ACETATE 667 MG: 667 CAPSULE ORAL at 12:44

## 2025-07-26 RX ADMIN — TRAMADOL HYDROCHLORIDE 50 MG: 50 TABLET, COATED ORAL at 12:53

## 2025-07-26 RX ADMIN — BUDESONIDE 500 MCG: 0.5 INHALANT RESPIRATORY (INHALATION) at 06:46

## 2025-07-26 RX ADMIN — ATORVASTATIN CALCIUM 40 MG: 20 TABLET, FILM COATED ORAL at 21:40

## 2025-07-26 RX ADMIN — ARIPIPRAZOLE 5 MG: 5 TABLET ORAL at 12:45

## 2025-07-26 RX ADMIN — BUPROPION HYDROCHLORIDE 150 MG: 150 TABLET, EXTENDED RELEASE ORAL at 12:45

## 2025-07-26 RX ADMIN — IPRATROPIUM BROMIDE AND ALBUTEROL SULFATE 1 DOSE: .5; 3 SOLUTION RESPIRATORY (INHALATION) at 19:33

## 2025-07-26 RX ADMIN — CALCIUM ACETATE 667 MG: 667 CAPSULE ORAL at 18:00

## 2025-07-26 RX ADMIN — ARFORMOTEROL TARTRATE 15 MCG: 15 SOLUTION RESPIRATORY (INHALATION) at 06:46

## 2025-07-26 RX ADMIN — INSULIN LISPRO 2 UNITS: 100 INJECTION, SOLUTION INTRAVENOUS; SUBCUTANEOUS at 18:01

## 2025-07-26 RX ADMIN — SODIUM CHLORIDE, PRESERVATIVE FREE 10 ML: 5 INJECTION INTRAVENOUS at 21:43

## 2025-07-26 RX ADMIN — CLOPIDOGREL BISULFATE 75 MG: 75 TABLET, FILM COATED ORAL at 12:44

## 2025-07-26 RX ADMIN — SODIUM CHLORIDE, PRESERVATIVE FREE 10 ML: 5 INJECTION INTRAVENOUS at 21:42

## 2025-07-26 RX ADMIN — HYDROMORPHONE HYDROCHLORIDE 0.25 MG: 1 INJECTION, SOLUTION INTRAMUSCULAR; INTRAVENOUS; SUBCUTANEOUS at 14:23

## 2025-07-26 RX ADMIN — CARVEDILOL 6.25 MG: 3.12 TABLET, FILM COATED ORAL at 18:00

## 2025-07-26 RX ADMIN — DULOXETINE 60 MG: 30 CAPSULE, DELAYED RELEASE ORAL at 12:45

## 2025-07-26 RX ADMIN — FERROUS SULFATE TAB 325 MG (65 MG ELEMENTAL FE) 325 MG: 325 (65 FE) TAB at 12:45

## 2025-07-26 RX ADMIN — SENNOSIDES 8.6 MG: 8.6 TABLET, COATED ORAL at 14:23

## 2025-07-26 RX ADMIN — ASPIRIN 81 MG: 81 TABLET, COATED ORAL at 12:44

## 2025-07-26 RX ADMIN — BUDESONIDE 500 MCG: 0.5 INHALANT RESPIRATORY (INHALATION) at 19:33

## 2025-07-26 RX ADMIN — GABAPENTIN 300 MG: 300 CAPSULE ORAL at 21:40

## 2025-07-26 RX ADMIN — ARFORMOTEROL TARTRATE 15 MCG: 15 SOLUTION RESPIRATORY (INHALATION) at 19:33

## 2025-07-26 RX ADMIN — TRAMADOL HYDROCHLORIDE 50 MG: 50 TABLET, COATED ORAL at 21:43

## 2025-07-26 RX ADMIN — PANTOPRAZOLE SODIUM 40 MG: 40 TABLET, DELAYED RELEASE ORAL at 06:07

## 2025-07-26 RX ADMIN — HEPARIN SODIUM 5000 UNITS: 5000 INJECTION INTRAVENOUS; SUBCUTANEOUS at 21:40

## 2025-07-26 RX ADMIN — SODIUM CHLORIDE, PRESERVATIVE FREE 10 ML: 5 INJECTION INTRAVENOUS at 12:46

## 2025-07-26 RX ADMIN — INSULIN GLARGINE 15 UNITS: 100 INJECTION, SOLUTION SUBCUTANEOUS at 21:39

## 2025-07-26 ASSESSMENT — PAIN SCALES - GENERAL
PAINLEVEL_OUTOF10: 6
PAINLEVEL_OUTOF10: 6
PAINLEVEL_OUTOF10: 0
PAINLEVEL_OUTOF10: 0
PAINLEVEL_OUTOF10: 6
PAINLEVEL_OUTOF10: 0

## 2025-07-26 ASSESSMENT — PAIN DESCRIPTION - LOCATION
LOCATION: FOOT
LOCATION: FOOT

## 2025-07-26 ASSESSMENT — PAIN DESCRIPTION - ORIENTATION: ORIENTATION: LEFT

## 2025-07-27 ENCOUNTER — APPOINTMENT (OUTPATIENT)
Facility: HOSPITAL | Age: 60
DRG: 981 | End: 2025-07-27
Payer: MEDICARE

## 2025-07-27 PROBLEM — R47.1 DYSARTHRIA: Status: ACTIVE | Noted: 2025-07-27

## 2025-07-27 LAB
ALBUMIN SERPL-MCNC: 3.2 G/DL (ref 3.4–5)
ALBUMIN/GLOB SERPL: 0.9 (ref 0.8–1.7)
ALP SERPL-CCNC: 122 U/L (ref 45–117)
ALT SERPL-CCNC: 13 U/L (ref 10–35)
ANION GAP BLD CALC-SCNC: ABNORMAL MMOL/L (ref 10–20)
ANION GAP SERPL CALC-SCNC: 16 MMOL/L (ref 3–18)
ANION GAP SERPL CALC-SCNC: 18 MMOL/L (ref 3–18)
ARTERIAL PATENCY WRIST A: POSITIVE
ARTERIAL PATENCY WRIST A: POSITIVE
AST SERPL-CCNC: 24 U/L (ref 10–38)
BASE DEFICIT BLD-SCNC: 0.1 MMOL/L
BASE EXCESS BLD CALC-SCNC: 1.4 MMOL/L
BDY SITE: ABNORMAL
BDY SITE: ABNORMAL
BILIRUB SERPL-MCNC: 0.3 MG/DL (ref 0.2–1)
BUN SERPL-MCNC: 42 MG/DL (ref 6–23)
BUN SERPL-MCNC: 45 MG/DL (ref 6–23)
BUN/CREAT SERPL: 7 (ref 12–20)
BUN/CREAT SERPL: 7 (ref 12–20)
CA-I BLD-MCNC: 0.91 MMOL/L (ref 1.15–1.33)
CALCIUM SERPL-MCNC: 6.9 MG/DL (ref 8.5–10.1)
CALCIUM SERPL-MCNC: 7.2 MG/DL (ref 8.5–10.1)
CHLORIDE BLD-SCNC: 96 MMOL/L (ref 98–107)
CHLORIDE SERPL-SCNC: 94 MMOL/L (ref 98–107)
CHLORIDE SERPL-SCNC: 95 MMOL/L (ref 98–107)
CO2 BLD-SCNC: 25 MMOL/L (ref 22–29)
CO2 SERPL-SCNC: 23 MMOL/L (ref 21–32)
CO2 SERPL-SCNC: 24 MMOL/L (ref 21–32)
CREAT BLD-MCNC: 6.22 MG/DL (ref 0.6–1.3)
CREAT SERPL-MCNC: 6.18 MG/DL (ref 0.6–1.3)
CREAT SERPL-MCNC: 6.77 MG/DL (ref 0.6–1.3)
ERYTHROCYTE [DISTWIDTH] IN BLOOD BY AUTOMATED COUNT: 14.6 % (ref 11.6–14.5)
FIO2 ON VENT: 7 %
GAS FLOW.O2 O2 DELIVERY SYS: ABNORMAL
GAS FLOW.O2 O2 DELIVERY SYS: ABNORMAL
GLOBULIN SER CALC-MCNC: 3.4 G/DL (ref 2–4)
GLUCOSE BLD STRIP.AUTO-MCNC: 107 MG/DL (ref 70–110)
GLUCOSE BLD STRIP.AUTO-MCNC: 107 MG/DL (ref 70–110)
GLUCOSE BLD STRIP.AUTO-MCNC: 130 MG/DL (ref 70–110)
GLUCOSE BLD STRIP.AUTO-MCNC: 39 MG/DL (ref 70–110)
GLUCOSE BLD STRIP.AUTO-MCNC: 41 MG/DL (ref 70–110)
GLUCOSE BLD STRIP.AUTO-MCNC: 60 MG/DL (ref 70–110)
GLUCOSE BLD STRIP.AUTO-MCNC: 82 MG/DL (ref 70–110)
GLUCOSE BLD STRIP.AUTO-MCNC: 84 MG/DL (ref 70–110)
GLUCOSE BLD STRIP.AUTO-MCNC: 97 MG/DL (ref 70–110)
GLUCOSE BLD-MCNC: 79 MG/DL (ref 74–99)
GLUCOSE SERPL-MCNC: 88 MG/DL (ref 74–108)
GLUCOSE SERPL-MCNC: 90 MG/DL (ref 74–108)
HCO3 BLD-SCNC: 25.8 MMOL/L (ref 21–28)
HCO3 BLD-SCNC: 28.5 MMOL/L (ref 21–28)
HCT VFR BLD AUTO: 27.7 % (ref 35–45)
HCT VFR BLD AUTO: 27.9 % (ref 35–45)
HGB BLD-MCNC: 8.7 G/DL (ref 12–16)
HGB BLD-MCNC: 8.7 G/DL (ref 12–16)
LACTATE BLD-SCNC: <0.4 MMOL/L (ref 0.4–2)
MAGNESIUM SERPL-MCNC: 2.3 MG/DL (ref 1.6–2.6)
MCH RBC QN AUTO: 28.6 PG (ref 24–34)
MCHC RBC AUTO-ENTMCNC: 31.4 G/DL (ref 31–37)
MCV RBC AUTO: 91.1 FL (ref 78–100)
NRBC # BLD: 0 K/UL (ref 0–0.01)
NRBC BLD-RTO: 0 PER 100 WBC
PCO2 BLD: 47.3 MMHG (ref 35–48)
PCO2 BLD: 56.8 MMHG (ref 35–48)
PH BLD: 7.31 (ref 7.35–7.45)
PH BLD: 7.35 (ref 7.35–7.45)
PLATELET # BLD AUTO: 135 K/UL (ref 135–420)
PMV BLD AUTO: 10.5 FL (ref 9.2–11.8)
PO2 BLD: 195 MMHG (ref 83–108)
PO2 BLD: 93 MMHG (ref 83–108)
POTASSIUM BLD-SCNC: 4.4 MMOL/L (ref 3.5–5.1)
POTASSIUM SERPL-SCNC: 4.3 MMOL/L (ref 3.5–5.5)
POTASSIUM SERPL-SCNC: 4.6 MMOL/L (ref 3.5–5.5)
PROT SERPL-MCNC: 6.6 G/DL (ref 6.4–8.2)
RBC # BLD AUTO: 3.04 M/UL (ref 4.2–5.3)
SAO2 % BLD: 100 % (ref 94–98)
SAO2 % BLD: 96.1 % (ref 92–97)
SERVICE CMNT-IMP: ABNORMAL
SERVICE CMNT-IMP: ABNORMAL
SODIUM BLD-SCNC: 136 MMOL/L (ref 136–145)
SODIUM SERPL-SCNC: 134 MMOL/L (ref 136–145)
SODIUM SERPL-SCNC: 135 MMOL/L (ref 136–145)
SPECIMEN SITE: ABNORMAL
SPECIMEN TYPE: ABNORMAL
TROPONIN T SERPL HS-MCNC: 575 NG/L (ref 0–14)
TROPONIN T SERPL HS-MCNC: 588 NG/L (ref 0–14)
WBC # BLD AUTO: 6 K/UL (ref 4.6–13.2)

## 2025-07-27 PROCEDURE — 36415 COLL VENOUS BLD VENIPUNCTURE: CPT

## 2025-07-27 PROCEDURE — 2500000003 HC RX 250 WO HCPCS: Performed by: INTERNAL MEDICINE

## 2025-07-27 PROCEDURE — 82330 ASSAY OF CALCIUM: CPT

## 2025-07-27 PROCEDURE — 84132 ASSAY OF SERUM POTASSIUM: CPT

## 2025-07-27 PROCEDURE — 6370000000 HC RX 637 (ALT 250 FOR IP): Performed by: HOSPITALIST

## 2025-07-27 PROCEDURE — 2700000000 HC OXYGEN THERAPY PER DAY

## 2025-07-27 PROCEDURE — 83735 ASSAY OF MAGNESIUM: CPT

## 2025-07-27 PROCEDURE — 95819 EEG AWAKE AND ASLEEP: CPT

## 2025-07-27 PROCEDURE — 82962 GLUCOSE BLOOD TEST: CPT

## 2025-07-27 PROCEDURE — 94640 AIRWAY INHALATION TREATMENT: CPT

## 2025-07-27 PROCEDURE — 94660 CPAP INITIATION&MGMT: CPT

## 2025-07-27 PROCEDURE — 82803 BLOOD GASES ANY COMBINATION: CPT

## 2025-07-27 PROCEDURE — 6360000002 HC RX W HCPCS: Performed by: HOSPITALIST

## 2025-07-27 PROCEDURE — 84484 ASSAY OF TROPONIN QUANT: CPT

## 2025-07-27 PROCEDURE — 36600 WITHDRAWAL OF ARTERIAL BLOOD: CPT

## 2025-07-27 PROCEDURE — 80053 COMPREHEN METABOLIC PANEL: CPT

## 2025-07-27 PROCEDURE — 85018 HEMOGLOBIN: CPT

## 2025-07-27 PROCEDURE — 93005 ELECTROCARDIOGRAM TRACING: CPT | Performed by: HOSPITALIST

## 2025-07-27 PROCEDURE — 6370000000 HC RX 637 (ALT 250 FOR IP): Performed by: INTERNAL MEDICINE

## 2025-07-27 PROCEDURE — 83605 ASSAY OF LACTIC ACID: CPT

## 2025-07-27 PROCEDURE — 97110 THERAPEUTIC EXERCISES: CPT

## 2025-07-27 PROCEDURE — 2500000003 HC RX 250 WO HCPCS: Performed by: HOSPITALIST

## 2025-07-27 PROCEDURE — 70450 CT HEAD/BRAIN W/O DYE: CPT

## 2025-07-27 PROCEDURE — 73630 X-RAY EXAM OF FOOT: CPT

## 2025-07-27 PROCEDURE — 2000000000 HC ICU R&B

## 2025-07-27 PROCEDURE — 85027 COMPLETE CBC AUTOMATED: CPT

## 2025-07-27 PROCEDURE — 2580000003 HC RX 258: Performed by: HOSPITALIST

## 2025-07-27 PROCEDURE — 85014 HEMATOCRIT: CPT

## 2025-07-27 PROCEDURE — 84295 ASSAY OF SERUM SODIUM: CPT

## 2025-07-27 PROCEDURE — 97530 THERAPEUTIC ACTIVITIES: CPT

## 2025-07-27 PROCEDURE — 73620 X-RAY EXAM OF FOOT: CPT

## 2025-07-27 PROCEDURE — 82947 ASSAY GLUCOSE BLOOD QUANT: CPT

## 2025-07-27 PROCEDURE — 6360000002 HC RX W HCPCS: Performed by: INTERNAL MEDICINE

## 2025-07-27 RX ORDER — HYDROMORPHONE HYDROCHLORIDE 1 MG/ML
1 INJECTION, SOLUTION INTRAMUSCULAR; INTRAVENOUS; SUBCUTANEOUS EVERY 4 HOURS PRN
Status: DISCONTINUED | OUTPATIENT
Start: 2025-07-27 | End: 2025-07-29

## 2025-07-27 RX ORDER — HYDRALAZINE HYDROCHLORIDE 10 MG/1
10 TABLET, FILM COATED ORAL 3 TIMES DAILY
Status: DISCONTINUED | OUTPATIENT
Start: 2025-07-27 | End: 2025-07-31 | Stop reason: HOSPADM

## 2025-07-27 RX ORDER — DEXTROSE MONOHYDRATE 100 MG/ML
INJECTION, SOLUTION INTRAVENOUS CONTINUOUS
Status: DISCONTINUED | OUTPATIENT
Start: 2025-07-27 | End: 2025-07-29

## 2025-07-27 RX ORDER — CALCIUM GLUCONATE 20 MG/ML
1000 INJECTION, SOLUTION INTRAVENOUS ONCE
Status: COMPLETED | OUTPATIENT
Start: 2025-07-27 | End: 2025-07-27

## 2025-07-27 RX ORDER — LEVETIRACETAM 500 MG/5ML
500 INJECTION, SOLUTION, CONCENTRATE INTRAVENOUS EVERY 12 HOURS
Status: DISCONTINUED | OUTPATIENT
Start: 2025-07-27 | End: 2025-07-29

## 2025-07-27 RX ORDER — DIAZEPAM 10 MG/2ML
5 INJECTION, SOLUTION INTRAMUSCULAR; INTRAVENOUS EVERY 4 HOURS PRN
Status: DISCONTINUED | OUTPATIENT
Start: 2025-07-27 | End: 2025-07-31 | Stop reason: HOSPADM

## 2025-07-27 RX ORDER — INSULIN GLARGINE 100 [IU]/ML
12 INJECTION, SOLUTION SUBCUTANEOUS NIGHTLY
Status: DISCONTINUED | OUTPATIENT
Start: 2025-07-27 | End: 2025-07-29

## 2025-07-27 RX ORDER — OXYCODONE AND ACETAMINOPHEN 5; 325 MG/1; MG/1
1 TABLET ORAL EVERY 4 HOURS PRN
Refills: 0 | Status: DISCONTINUED | OUTPATIENT
Start: 2025-07-27 | End: 2025-07-29

## 2025-07-27 RX ORDER — NALOXONE HYDROCHLORIDE 0.4 MG/ML
0.4 INJECTION, SOLUTION INTRAMUSCULAR; INTRAVENOUS; SUBCUTANEOUS PRN
Status: DISCONTINUED | OUTPATIENT
Start: 2025-07-27 | End: 2025-07-31 | Stop reason: HOSPADM

## 2025-07-27 RX ORDER — ATORVASTATIN CALCIUM 20 MG/1
80 TABLET, FILM COATED ORAL NIGHTLY
Status: DISCONTINUED | OUTPATIENT
Start: 2025-07-27 | End: 2025-07-31 | Stop reason: HOSPADM

## 2025-07-27 RX ORDER — CALCIUM CARBONATE 500(1250)
500 TABLET ORAL 2 TIMES DAILY
Status: DISCONTINUED | OUTPATIENT
Start: 2025-07-28 | End: 2025-07-31 | Stop reason: HOSPADM

## 2025-07-27 RX ORDER — CALCIUM CARBONATE 500(1250)
500 TABLET ORAL 2 TIMES DAILY
Status: DISCONTINUED | OUTPATIENT
Start: 2025-07-27 | End: 2025-07-27

## 2025-07-27 RX ADMIN — FERROUS SULFATE TAB 325 MG (65 MG ELEMENTAL FE) 325 MG: 325 (65 FE) TAB at 09:23

## 2025-07-27 RX ADMIN — HYDROMORPHONE HYDROCHLORIDE 1 MG: 1 INJECTION, SOLUTION INTRAMUSCULAR; INTRAVENOUS; SUBCUTANEOUS at 21:04

## 2025-07-27 RX ADMIN — CALCIUM ACETATE 667 MG: 667 CAPSULE ORAL at 17:30

## 2025-07-27 RX ADMIN — PANTOPRAZOLE SODIUM 40 MG: 40 TABLET, DELAYED RELEASE ORAL at 07:56

## 2025-07-27 RX ADMIN — CALCIUM GLUCONATE 1000 MG: 20 INJECTION, SOLUTION INTRAVENOUS at 22:12

## 2025-07-27 RX ADMIN — CARVEDILOL 6.25 MG: 3.12 TABLET, FILM COATED ORAL at 17:30

## 2025-07-27 RX ADMIN — GABAPENTIN 300 MG: 300 CAPSULE ORAL at 09:24

## 2025-07-27 RX ADMIN — GABAPENTIN 300 MG: 300 CAPSULE ORAL at 21:25

## 2025-07-27 RX ADMIN — ASPIRIN 81 MG: 81 TABLET, COATED ORAL at 09:23

## 2025-07-27 RX ADMIN — BUDESONIDE 500 MCG: 0.5 INHALANT RESPIRATORY (INHALATION) at 08:04

## 2025-07-27 RX ADMIN — HEPARIN SODIUM 5000 UNITS: 5000 INJECTION INTRAVENOUS; SUBCUTANEOUS at 07:56

## 2025-07-27 RX ADMIN — ATORVASTATIN CALCIUM 80 MG: 20 TABLET, FILM COATED ORAL at 21:23

## 2025-07-27 RX ADMIN — SODIUM CHLORIDE, PRESERVATIVE FREE 10 ML: 5 INJECTION INTRAVENOUS at 09:24

## 2025-07-27 RX ADMIN — SENNOSIDES 8.6 MG: 8.6 TABLET, COATED ORAL at 09:24

## 2025-07-27 RX ADMIN — ARIPIPRAZOLE 5 MG: 5 TABLET ORAL at 09:23

## 2025-07-27 RX ADMIN — AMLODIPINE BESYLATE 5 MG: 5 TABLET ORAL at 09:23

## 2025-07-27 RX ADMIN — CARVEDILOL 6.25 MG: 3.12 TABLET, FILM COATED ORAL at 09:23

## 2025-07-27 RX ADMIN — Medication 4 G: at 08:20

## 2025-07-27 RX ADMIN — LEVETIRACETAM 500 MG: 100 INJECTION INTRAVENOUS at 21:54

## 2025-07-27 RX ADMIN — DEXTROSE 250 ML: 10 SOLUTION INTRAVENOUS at 08:16

## 2025-07-27 RX ADMIN — GABAPENTIN 300 MG: 300 CAPSULE ORAL at 12:44

## 2025-07-27 RX ADMIN — HEPARIN SODIUM 5000 UNITS: 5000 INJECTION INTRAVENOUS; SUBCUTANEOUS at 12:44

## 2025-07-27 RX ADMIN — CALCIUM ACETATE 667 MG: 667 CAPSULE ORAL at 09:23

## 2025-07-27 RX ADMIN — IPRATROPIUM BROMIDE AND ALBUTEROL SULFATE 1 DOSE: .5; 3 SOLUTION RESPIRATORY (INHALATION) at 08:04

## 2025-07-27 RX ADMIN — SODIUM CHLORIDE, PRESERVATIVE FREE 10 ML: 5 INJECTION INTRAVENOUS at 21:04

## 2025-07-27 RX ADMIN — INSULIN GLARGINE 12 UNITS: 100 INJECTION, SOLUTION SUBCUTANEOUS at 22:04

## 2025-07-27 RX ADMIN — ARFORMOTEROL TARTRATE 15 MCG: 15 SOLUTION RESPIRATORY (INHALATION) at 19:15

## 2025-07-27 RX ADMIN — DULOXETINE 60 MG: 30 CAPSULE, DELAYED RELEASE ORAL at 09:23

## 2025-07-27 RX ADMIN — ARFORMOTEROL TARTRATE 15 MCG: 15 SOLUTION RESPIRATORY (INHALATION) at 08:04

## 2025-07-27 RX ADMIN — BUPROPION HYDROCHLORIDE 150 MG: 150 TABLET, EXTENDED RELEASE ORAL at 09:23

## 2025-07-27 RX ADMIN — BUDESONIDE 500 MCG: 0.5 INHALANT RESPIRATORY (INHALATION) at 19:15

## 2025-07-27 RX ADMIN — HEPARIN SODIUM 5000 UNITS: 5000 INJECTION INTRAVENOUS; SUBCUTANEOUS at 22:13

## 2025-07-27 RX ADMIN — SODIUM CHLORIDE, PRESERVATIVE FREE 10 ML: 5 INJECTION INTRAVENOUS at 22:05

## 2025-07-27 RX ADMIN — CALCIUM ACETATE 667 MG: 667 CAPSULE ORAL at 12:44

## 2025-07-27 RX ADMIN — DEXTROSE: 10 SOLUTION INTRAVENOUS at 22:01

## 2025-07-27 RX ADMIN — HYDRALAZINE HYDROCHLORIDE 25 MG: 25 TABLET, FILM COATED ORAL at 09:24

## 2025-07-27 RX ADMIN — IPRATROPIUM BROMIDE AND ALBUTEROL SULFATE 1 DOSE: .5; 3 SOLUTION RESPIRATORY (INHALATION) at 19:16

## 2025-07-27 RX ADMIN — CLOPIDOGREL BISULFATE 75 MG: 75 TABLET, FILM COATED ORAL at 09:23

## 2025-07-27 ASSESSMENT — PAIN SCALES - GENERAL
PAINLEVEL_OUTOF10: 4
PAINLEVEL_OUTOF10: 10
PAINLEVEL_OUTOF10: 7
PAINLEVEL_OUTOF10: 9
PAINLEVEL_OUTOF10: 10

## 2025-07-27 ASSESSMENT — PAIN DESCRIPTION - LOCATION
LOCATION: FOOT

## 2025-07-27 ASSESSMENT — PAIN DESCRIPTION - ORIENTATION
ORIENTATION: RIGHT;LEFT
ORIENTATION: LEFT

## 2025-07-28 ENCOUNTER — APPOINTMENT (OUTPATIENT)
Facility: HOSPITAL | Age: 60
DRG: 981 | End: 2025-07-28
Attending: INTERNAL MEDICINE
Payer: MEDICARE

## 2025-07-28 LAB
ALBUMIN SERPL-MCNC: 3.2 G/DL (ref 3.4–5)
ANION GAP SERPL CALC-SCNC: 17 MMOL/L (ref 3–18)
BUN SERPL-MCNC: 52 MG/DL (ref 6–23)
BUN/CREAT SERPL: 7 (ref 12–20)
CALCIUM SERPL-MCNC: 7 MG/DL (ref 8.5–10.1)
CHLORIDE SERPL-SCNC: 92 MMOL/L (ref 98–107)
CO2 SERPL-SCNC: 22 MMOL/L (ref 21–32)
CREAT SERPL-MCNC: 7.16 MG/DL (ref 0.6–1.3)
EKG ATRIAL RATE: 69 BPM
EKG ATRIAL RATE: 73 BPM
EKG DIAGNOSIS: NORMAL
EKG DIAGNOSIS: NORMAL
EKG P AXIS: 10 DEGREES
EKG P AXIS: 46 DEGREES
EKG P-R INTERVAL: 138 MS
EKG P-R INTERVAL: 140 MS
EKG Q-T INTERVAL: 440 MS
EKG Q-T INTERVAL: 454 MS
EKG QRS DURATION: 82 MS
EKG QRS DURATION: 92 MS
EKG QTC CALCULATION (BAZETT): 484 MS
EKG QTC CALCULATION (BAZETT): 486 MS
EKG R AXIS: 60 DEGREES
EKG R AXIS: 78 DEGREES
EKG T AXIS: 30 DEGREES
EKG T AXIS: 42 DEGREES
EKG VENTRICULAR RATE: 69 BPM
EKG VENTRICULAR RATE: 73 BPM
ERYTHROCYTE [DISTWIDTH] IN BLOOD BY AUTOMATED COUNT: 14.5 % (ref 11.6–14.5)
GLUCOSE BLD STRIP.AUTO-MCNC: 155 MG/DL (ref 70–110)
GLUCOSE BLD STRIP.AUTO-MCNC: 182 MG/DL (ref 70–110)
GLUCOSE BLD STRIP.AUTO-MCNC: 215 MG/DL (ref 70–110)
GLUCOSE BLD STRIP.AUTO-MCNC: 241 MG/DL (ref 70–110)
GLUCOSE BLD STRIP.AUTO-MCNC: 348 MG/DL (ref 70–110)
GLUCOSE SERPL-MCNC: 239 MG/DL (ref 74–108)
HCT VFR BLD AUTO: 26.3 % (ref 35–45)
HGB BLD-MCNC: 8.3 G/DL (ref 12–16)
MCH RBC QN AUTO: 28.8 PG (ref 24–34)
MCHC RBC AUTO-ENTMCNC: 31.6 G/DL (ref 31–37)
MCV RBC AUTO: 91.3 FL (ref 78–100)
NRBC # BLD: 0 K/UL (ref 0–0.01)
NRBC BLD-RTO: 0 PER 100 WBC
PHOSPHATE SERPL-MCNC: 5.3 MG/DL (ref 2.5–4.9)
PLATELET # BLD AUTO: 139 K/UL (ref 135–420)
PMV BLD AUTO: 10.1 FL (ref 9.2–11.8)
POTASSIUM SERPL-SCNC: 4.5 MMOL/L (ref 3.5–5.5)
RBC # BLD AUTO: 2.88 M/UL (ref 4.2–5.3)
SODIUM SERPL-SCNC: 132 MMOL/L (ref 136–145)
TROPONIN T SERPL HS-MCNC: 541 NG/L (ref 0–14)
TROPONIN T SERPL HS-MCNC: 556 NG/L (ref 0–14)
WBC # BLD AUTO: 6.3 K/UL (ref 4.6–13.2)

## 2025-07-28 PROCEDURE — 80069 RENAL FUNCTION PANEL: CPT

## 2025-07-28 PROCEDURE — 6360000002 HC RX W HCPCS: Performed by: HOSPITALIST

## 2025-07-28 PROCEDURE — 2500000003 HC RX 250 WO HCPCS: Performed by: INTERNAL MEDICINE

## 2025-07-28 PROCEDURE — 2700000000 HC OXYGEN THERAPY PER DAY

## 2025-07-28 PROCEDURE — 6370000000 HC RX 637 (ALT 250 FOR IP): Performed by: HOSPITALIST

## 2025-07-28 PROCEDURE — 84484 ASSAY OF TROPONIN QUANT: CPT

## 2025-07-28 PROCEDURE — 1100000003 HC PRIVATE W/ TELEMETRY

## 2025-07-28 PROCEDURE — 2580000003 HC RX 258: Performed by: INTERNAL MEDICINE

## 2025-07-28 PROCEDURE — 90935 HEMODIALYSIS ONE EVALUATION: CPT

## 2025-07-28 PROCEDURE — C8924 2D TTE W OR W/O FOL W/CON,FU: HCPCS

## 2025-07-28 PROCEDURE — 82962 GLUCOSE BLOOD TEST: CPT

## 2025-07-28 PROCEDURE — 94640 AIRWAY INHALATION TREATMENT: CPT

## 2025-07-28 PROCEDURE — 6360000004 HC RX CONTRAST MEDICATION: Performed by: INTERNAL MEDICINE

## 2025-07-28 PROCEDURE — 6370000000 HC RX 637 (ALT 250 FOR IP): Performed by: INTERNAL MEDICINE

## 2025-07-28 PROCEDURE — 93010 ELECTROCARDIOGRAM REPORT: CPT | Performed by: INTERNAL MEDICINE

## 2025-07-28 PROCEDURE — 6360000002 HC RX W HCPCS: Performed by: INTERNAL MEDICINE

## 2025-07-28 PROCEDURE — 85027 COMPLETE CBC AUTOMATED: CPT

## 2025-07-28 PROCEDURE — 2500000003 HC RX 250 WO HCPCS: Performed by: HOSPITALIST

## 2025-07-28 RX ADMIN — CALCIUM ACETATE 667 MG: 667 CAPSULE ORAL at 09:58

## 2025-07-28 RX ADMIN — ARFORMOTEROL TARTRATE 15 MCG: 15 SOLUTION RESPIRATORY (INHALATION) at 09:38

## 2025-07-28 RX ADMIN — CALCIUM 500 MG: 500 TABLET ORAL at 20:13

## 2025-07-28 RX ADMIN — GABAPENTIN 300 MG: 300 CAPSULE ORAL at 14:09

## 2025-07-28 RX ADMIN — BUDESONIDE 500 MCG: 0.5 INHALANT RESPIRATORY (INHALATION) at 19:17

## 2025-07-28 RX ADMIN — LEVETIRACETAM 500 MG: 100 INJECTION INTRAVENOUS at 09:58

## 2025-07-28 RX ADMIN — INSULIN LISPRO 2 UNITS: 100 INJECTION, SOLUTION INTRAVENOUS; SUBCUTANEOUS at 11:38

## 2025-07-28 RX ADMIN — EPOETIN ALFA-EPBX 6000 UNITS: 3000 INJECTION, SOLUTION INTRAVENOUS; SUBCUTANEOUS at 16:19

## 2025-07-28 RX ADMIN — INSULIN LISPRO 2 UNITS: 100 INJECTION, SOLUTION INTRAVENOUS; SUBCUTANEOUS at 05:40

## 2025-07-28 RX ADMIN — BUDESONIDE 500 MCG: 0.5 INHALANT RESPIRATORY (INHALATION) at 09:38

## 2025-07-28 RX ADMIN — GABAPENTIN 300 MG: 300 CAPSULE ORAL at 20:13

## 2025-07-28 RX ADMIN — HYDRALAZINE HYDROCHLORIDE 10 MG: 10 TABLET ORAL at 14:09

## 2025-07-28 RX ADMIN — CARVEDILOL 6.25 MG: 3.12 TABLET, FILM COATED ORAL at 16:19

## 2025-07-28 RX ADMIN — SULFUR HEXAFLUORIDE 2 ML: 60.7; .19; .19 INJECTION, POWDER, LYOPHILIZED, FOR SUSPENSION INTRAVENOUS; INTRAVESICAL at 17:14

## 2025-07-28 RX ADMIN — ONDANSETRON 4 MG: 2 INJECTION, SOLUTION INTRAMUSCULAR; INTRAVENOUS at 16:19

## 2025-07-28 RX ADMIN — FERROUS SULFATE TAB 325 MG (65 MG ELEMENTAL FE) 325 MG: 325 (65 FE) TAB at 09:02

## 2025-07-28 RX ADMIN — HEPARIN SODIUM 5000 UNITS: 5000 INJECTION INTRAVENOUS; SUBCUTANEOUS at 14:09

## 2025-07-28 RX ADMIN — SODIUM CHLORIDE, PRESERVATIVE FREE 10 ML: 5 INJECTION INTRAVENOUS at 20:18

## 2025-07-28 RX ADMIN — CARVEDILOL 6.25 MG: 3.12 TABLET, FILM COATED ORAL at 09:01

## 2025-07-28 RX ADMIN — AMLODIPINE BESYLATE 5 MG: 5 TABLET ORAL at 09:01

## 2025-07-28 RX ADMIN — OXYCODONE AND ACETAMINOPHEN 1 TABLET: 5; 325 TABLET ORAL at 04:56

## 2025-07-28 RX ADMIN — DULOXETINE 60 MG: 30 CAPSULE, DELAYED RELEASE ORAL at 09:01

## 2025-07-28 RX ADMIN — CLOPIDOGREL BISULFATE 75 MG: 75 TABLET, FILM COATED ORAL at 09:02

## 2025-07-28 RX ADMIN — GABAPENTIN 300 MG: 300 CAPSULE ORAL at 09:01

## 2025-07-28 RX ADMIN — IPRATROPIUM BROMIDE AND ALBUTEROL SULFATE 1 DOSE: .5; 3 SOLUTION RESPIRATORY (INHALATION) at 09:38

## 2025-07-28 RX ADMIN — ARFORMOTEROL TARTRATE 15 MCG: 15 SOLUTION RESPIRATORY (INHALATION) at 19:17

## 2025-07-28 RX ADMIN — IPRATROPIUM BROMIDE AND ALBUTEROL SULFATE 1 DOSE: .5; 3 SOLUTION RESPIRATORY (INHALATION) at 19:17

## 2025-07-28 RX ADMIN — PANTOPRAZOLE SODIUM 40 MG: 40 TABLET, DELAYED RELEASE ORAL at 05:02

## 2025-07-28 RX ADMIN — INSULIN LISPRO 6 UNITS: 100 INJECTION, SOLUTION INTRAVENOUS; SUBCUTANEOUS at 21:06

## 2025-07-28 RX ADMIN — CALCIUM ACETATE 667 MG: 667 CAPSULE ORAL at 16:19

## 2025-07-28 RX ADMIN — ASPIRIN 81 MG: 81 TABLET, COATED ORAL at 09:01

## 2025-07-28 RX ADMIN — HEPARIN SODIUM 5000 UNITS: 5000 INJECTION INTRAVENOUS; SUBCUTANEOUS at 21:05

## 2025-07-28 RX ADMIN — HYDRALAZINE HYDROCHLORIDE 10 MG: 10 TABLET ORAL at 20:13

## 2025-07-28 RX ADMIN — DEXTROSE: 10 SOLUTION INTRAVENOUS at 11:41

## 2025-07-28 RX ADMIN — INSULIN GLARGINE 12 UNITS: 100 INJECTION, SOLUTION SUBCUTANEOUS at 21:06

## 2025-07-28 RX ADMIN — SODIUM CHLORIDE, PRESERVATIVE FREE 10 ML: 5 INJECTION INTRAVENOUS at 09:04

## 2025-07-28 RX ADMIN — LEVETIRACETAM 500 MG: 100 INJECTION INTRAVENOUS at 21:06

## 2025-07-28 RX ADMIN — ATORVASTATIN CALCIUM 80 MG: 20 TABLET, FILM COATED ORAL at 20:14

## 2025-07-28 RX ADMIN — HYDRALAZINE HYDROCHLORIDE 10 MG: 10 TABLET ORAL at 09:03

## 2025-07-28 RX ADMIN — HYDROMORPHONE HYDROCHLORIDE 1 MG: 1 INJECTION, SOLUTION INTRAMUSCULAR; INTRAVENOUS; SUBCUTANEOUS at 09:02

## 2025-07-28 RX ADMIN — OXYCODONE AND ACETAMINOPHEN 1 TABLET: 5; 325 TABLET ORAL at 20:13

## 2025-07-28 RX ADMIN — CALCIUM 500 MG: 500 TABLET ORAL at 09:02

## 2025-07-28 RX ADMIN — CALCIUM ACETATE 667 MG: 667 CAPSULE ORAL at 11:38

## 2025-07-28 ASSESSMENT — PAIN SCALES - WONG BAKER: WONGBAKER_NUMERICALRESPONSE: HURTS A LITTLE BIT

## 2025-07-28 ASSESSMENT — PAIN SCALES - GENERAL
PAINLEVEL_OUTOF10: 7
PAINLEVEL_OUTOF10: 8
PAINLEVEL_OUTOF10: 4
PAINLEVEL_OUTOF10: 7
PAINLEVEL_OUTOF10: 8
PAINLEVEL_OUTOF10: 6
PAINLEVEL_OUTOF10: 7
PAINLEVEL_OUTOF10: 10
PAINLEVEL_OUTOF10: 9

## 2025-07-28 ASSESSMENT — PAIN DESCRIPTION - ORIENTATION
ORIENTATION: LEFT;RIGHT
ORIENTATION: RIGHT
ORIENTATION: RIGHT;LEFT

## 2025-07-28 ASSESSMENT — PAIN DESCRIPTION - LOCATION
LOCATION: ANKLE
LOCATION: LEG

## 2025-07-28 ASSESSMENT — PAIN DESCRIPTION - DESCRIPTORS: DESCRIPTORS: THROBBING;ACHING;STABBING

## 2025-07-28 ASSESSMENT — PAIN - FUNCTIONAL ASSESSMENT: PAIN_FUNCTIONAL_ASSESSMENT: ACTIVITIES ARE NOT PREVENTED

## 2025-07-29 LAB
ANION GAP SERPL CALC-SCNC: 14 MMOL/L (ref 3–18)
APTT PPP: 40.7 SEC (ref 21.7–34.2)
BASOPHILS # BLD: 0.04 K/UL (ref 0–0.1)
BASOPHILS NFR BLD: 0.6 % (ref 0–2)
BUN SERPL-MCNC: 27 MG/DL (ref 6–23)
BUN/CREAT SERPL: 5 (ref 12–20)
CALCIUM SERPL-MCNC: 7.9 MG/DL (ref 8.5–10.1)
CHLORIDE SERPL-SCNC: 94 MMOL/L (ref 98–107)
CO2 SERPL-SCNC: 25 MMOL/L (ref 21–32)
CREAT SERPL-MCNC: 5.26 MG/DL (ref 0.6–1.3)
DIFFERENTIAL METHOD BLD: ABNORMAL
ECHO AV MEAN GRADIENT: 5 MMHG
ECHO AV MEAN VELOCITY: 1.1 M/S
ECHO AV PEAK GRADIENT: 11 MMHG
ECHO AV PEAK VELOCITY: 1.7 M/S
ECHO AV VELOCITY RATIO: 0.65
ECHO AV VTI: 34.8 CM
ECHO BSA: 2.21 M2
ECHO EST RA PRESSURE: 3 MMHG
ECHO LA VOL A-L A2C: 76 ML (ref 22–52)
ECHO LA VOL A-L A4C: 42 ML (ref 22–52)
ECHO LA VOL BP: 55 ML (ref 22–52)
ECHO LA VOL MOD A2C: 77 ML (ref 22–52)
ECHO LA VOL MOD A4C: 38 ML (ref 22–52)
ECHO LA VOL/BSA BIPLANE: 25 ML/M2 (ref 16–34)
ECHO LA VOLUME AREA LENGTH: 59 ML
ECHO LA VOLUME INDEX A-L A2C: 35 ML/M2 (ref 16–34)
ECHO LA VOLUME INDEX A-L A4C: 19 ML/M2 (ref 16–34)
ECHO LA VOLUME INDEX AREA LENGTH: 27 ML/M2 (ref 16–34)
ECHO LA VOLUME INDEX MOD A2C: 36 ML/M2 (ref 16–34)
ECHO LA VOLUME INDEX MOD A4C: 18 ML/M2 (ref 16–34)
ECHO LV E' LATERAL VELOCITY: 7.07 CM/S
ECHO LV E' SEPTAL VELOCITY: 7.12 CM/S
ECHO LV EDV A2C: 106 ML
ECHO LV EDV A4C: 136 ML
ECHO LV EDV BP: 124 ML (ref 56–104)
ECHO LV EDV INDEX A4C: 63 ML/M2
ECHO LV EDV INDEX BP: 57 ML/M2
ECHO LV EDV NDEX A2C: 49 ML/M2
ECHO LV EF PHYSICIAN: 70 %
ECHO LV EJECTION FRACTION A2C: 68 %
ECHO LV EJECTION FRACTION A4C: 77 %
ECHO LV EJECTION FRACTION BIPLANE: 73 % (ref 55–100)
ECHO LV ESV A2C: 34 ML
ECHO LV ESV A4C: 31 ML
ECHO LV ESV BP: 33 ML (ref 19–49)
ECHO LV ESV INDEX A2C: 16 ML/M2
ECHO LV ESV INDEX A4C: 14 ML/M2
ECHO LV ESV INDEX BP: 15 ML/M2
ECHO LVOT AV VTI INDEX: 0.66
ECHO LVOT MEAN GRADIENT: 3 MMHG
ECHO LVOT PEAK GRADIENT: 5 MMHG
ECHO LVOT PEAK VELOCITY: 1.1 M/S
ECHO LVOT VTI: 23.1 CM
ECHO MV A VELOCITY: 0.93 M/S
ECHO MV E DECELERATION TIME (DT): 171.3 MS
ECHO MV E VELOCITY: 1.17 M/S
ECHO MV E/A RATIO: 1.26
ECHO MV E/E' LATERAL: 16.55
ECHO MV E/E' RATIO (AVERAGED): 16.49
ECHO MV E/E' SEPTAL: 16.43
ECHO MV LVOT VTI INDEX: 1.77
ECHO MV MAX VELOCITY: 1.4 M/S
ECHO MV MEAN GRADIENT: 3 MMHG
ECHO MV MEAN VELOCITY: 0.8 M/S
ECHO MV PEAK GRADIENT: 8 MMHG
ECHO MV VTI: 41 CM
ECHO PULMONARY ARTERY SYSTOLIC PRESSURE (PASP): 34 MMHG
ECHO RIGHT VENTRICULAR SYSTOLIC PRESSURE (RVSP): 34 MMHG
ECHO RV FREE WALL PEAK S': 16.9 CM/S
ECHO RV TAPSE: 2 CM (ref 1.7–?)
ECHO TV REGURGITANT MAX VELOCITY: 2.77 M/S
ECHO TV REGURGITANT PEAK GRADIENT: 31 MMHG
EOSINOPHIL # BLD: 0.31 K/UL (ref 0–0.4)
EOSINOPHIL NFR BLD: 5 % (ref 0–5)
ERYTHROCYTE [DISTWIDTH] IN BLOOD BY AUTOMATED COUNT: 14.7 % (ref 11.6–14.5)
GLUCOSE BLD STRIP.AUTO-MCNC: 218 MG/DL (ref 70–110)
GLUCOSE BLD STRIP.AUTO-MCNC: 219 MG/DL (ref 70–110)
GLUCOSE BLD STRIP.AUTO-MCNC: 223 MG/DL (ref 70–110)
GLUCOSE BLD STRIP.AUTO-MCNC: 278 MG/DL (ref 70–110)
GLUCOSE SERPL-MCNC: 186 MG/DL (ref 74–108)
HCT VFR BLD AUTO: 26.6 % (ref 35–45)
HGB BLD-MCNC: 8.2 G/DL (ref 12–16)
IMM GRANULOCYTES # BLD AUTO: 0.06 K/UL (ref 0–0.04)
IMM GRANULOCYTES NFR BLD AUTO: 1 % (ref 0–0.5)
LYMPHOCYTES # BLD: 0.48 K/UL (ref 0.9–3.3)
LYMPHOCYTES NFR BLD: 7.7 % (ref 21–52)
MCH RBC QN AUTO: 29.1 PG (ref 24–34)
MCHC RBC AUTO-ENTMCNC: 30.8 G/DL (ref 31–37)
MCV RBC AUTO: 94.3 FL (ref 78–100)
MONOCYTES # BLD: 0.82 K/UL (ref 0.05–1.2)
MONOCYTES NFR BLD: 13.2 % (ref 3–10)
NEUTS SEG # BLD: 4.49 K/UL (ref 1.8–8)
NEUTS SEG NFR BLD: 72.5 % (ref 40–73)
NRBC # BLD: 0 K/UL (ref 0–0.01)
NRBC BLD-RTO: 0 PER 100 WBC
PLATELET # BLD AUTO: 139 K/UL (ref 135–420)
PMV BLD AUTO: 10.8 FL (ref 9.2–11.8)
POTASSIUM SERPL-SCNC: 4.4 MMOL/L (ref 3.5–5.5)
RBC # BLD AUTO: 2.82 M/UL (ref 4.2–5.3)
SODIUM SERPL-SCNC: 132 MMOL/L (ref 136–145)
WBC # BLD AUTO: 6.2 K/UL (ref 4.6–13.2)

## 2025-07-29 PROCEDURE — 6370000000 HC RX 637 (ALT 250 FOR IP): Performed by: INTERNAL MEDICINE

## 2025-07-29 PROCEDURE — 85025 COMPLETE CBC W/AUTO DIFF WBC: CPT

## 2025-07-29 PROCEDURE — 6360000002 HC RX W HCPCS: Performed by: HOSPITALIST

## 2025-07-29 PROCEDURE — 97110 THERAPEUTIC EXERCISES: CPT

## 2025-07-29 PROCEDURE — 6370000000 HC RX 637 (ALT 250 FOR IP): Performed by: HOSPITALIST

## 2025-07-29 PROCEDURE — 1100000003 HC PRIVATE W/ TELEMETRY

## 2025-07-29 PROCEDURE — 97530 THERAPEUTIC ACTIVITIES: CPT

## 2025-07-29 PROCEDURE — 94640 AIRWAY INHALATION TREATMENT: CPT

## 2025-07-29 PROCEDURE — 2500000003 HC RX 250 WO HCPCS: Performed by: INTERNAL MEDICINE

## 2025-07-29 PROCEDURE — 2700000000 HC OXYGEN THERAPY PER DAY

## 2025-07-29 PROCEDURE — 85730 THROMBOPLASTIN TIME PARTIAL: CPT

## 2025-07-29 PROCEDURE — 36415 COLL VENOUS BLD VENIPUNCTURE: CPT

## 2025-07-29 PROCEDURE — 80048 BASIC METABOLIC PNL TOTAL CA: CPT

## 2025-07-29 PROCEDURE — 2500000003 HC RX 250 WO HCPCS: Performed by: HOSPITALIST

## 2025-07-29 PROCEDURE — 82962 GLUCOSE BLOOD TEST: CPT

## 2025-07-29 RX ORDER — INSULIN GLARGINE 100 [IU]/ML
13 INJECTION, SOLUTION SUBCUTANEOUS NIGHTLY
Status: DISCONTINUED | OUTPATIENT
Start: 2025-07-30 | End: 2025-07-31 | Stop reason: HOSPADM

## 2025-07-29 RX ORDER — QUETIAPINE FUMARATE 25 MG/1
50 TABLET, FILM COATED ORAL NIGHTLY
Status: DISCONTINUED | OUTPATIENT
Start: 2025-07-29 | End: 2025-07-30

## 2025-07-29 RX ORDER — LORAZEPAM 0.5 MG/1
0.5 TABLET ORAL ONCE
Status: DISCONTINUED | OUTPATIENT
Start: 2025-07-29 | End: 2025-07-29

## 2025-07-29 RX ORDER — HYDROMORPHONE HYDROCHLORIDE 1 MG/ML
0.5 INJECTION, SOLUTION INTRAMUSCULAR; INTRAVENOUS; SUBCUTANEOUS EVERY 4 HOURS PRN
Status: DISCONTINUED | OUTPATIENT
Start: 2025-07-29 | End: 2025-07-31 | Stop reason: HOSPADM

## 2025-07-29 RX ORDER — LEVETIRACETAM 250 MG/1
250 TABLET ORAL 2 TIMES DAILY
Qty: 60 TABLET | Refills: 3 | Status: SHIPPED | OUTPATIENT
Start: 2025-07-29

## 2025-07-29 RX ORDER — OXYCODONE AND ACETAMINOPHEN 5; 325 MG/1; MG/1
1.5 TABLET ORAL EVERY 4 HOURS PRN
Refills: 0 | Status: DISCONTINUED | OUTPATIENT
Start: 2025-07-29 | End: 2025-07-31 | Stop reason: HOSPADM

## 2025-07-29 RX ORDER — LEVETIRACETAM 250 MG/1
250 TABLET ORAL 2 TIMES DAILY
Status: DISCONTINUED | OUTPATIENT
Start: 2025-07-29 | End: 2025-07-31 | Stop reason: HOSPADM

## 2025-07-29 RX ADMIN — IPRATROPIUM BROMIDE AND ALBUTEROL SULFATE 1 DOSE: .5; 3 SOLUTION RESPIRATORY (INHALATION) at 07:58

## 2025-07-29 RX ADMIN — ATORVASTATIN CALCIUM 80 MG: 20 TABLET, FILM COATED ORAL at 20:36

## 2025-07-29 RX ADMIN — IPRATROPIUM BROMIDE AND ALBUTEROL SULFATE 1 DOSE: .5; 3 SOLUTION RESPIRATORY (INHALATION) at 19:15

## 2025-07-29 RX ADMIN — HYDRALAZINE HYDROCHLORIDE 10 MG: 10 TABLET ORAL at 20:36

## 2025-07-29 RX ADMIN — HEPARIN SODIUM 5000 UNITS: 5000 INJECTION INTRAVENOUS; SUBCUTANEOUS at 06:05

## 2025-07-29 RX ADMIN — HYDROMORPHONE HYDROCHLORIDE 0.5 MG: 1 INJECTION, SOLUTION INTRAMUSCULAR; INTRAVENOUS; SUBCUTANEOUS at 20:37

## 2025-07-29 RX ADMIN — QUETIAPINE FUMARATE 50 MG: 25 TABLET ORAL at 20:36

## 2025-07-29 RX ADMIN — BUDESONIDE 500 MCG: 0.5 INHALANT RESPIRATORY (INHALATION) at 07:58

## 2025-07-29 RX ADMIN — INSULIN LISPRO 2 UNITS: 100 INJECTION, SOLUTION INTRAVENOUS; SUBCUTANEOUS at 17:15

## 2025-07-29 RX ADMIN — SODIUM CHLORIDE, PRESERVATIVE FREE 10 ML: 5 INJECTION INTRAVENOUS at 20:37

## 2025-07-29 RX ADMIN — HYDRALAZINE HYDROCHLORIDE 10 MG: 10 TABLET ORAL at 14:00

## 2025-07-29 RX ADMIN — INSULIN LISPRO 4 UNITS: 100 INJECTION, SOLUTION INTRAVENOUS; SUBCUTANEOUS at 20:38

## 2025-07-29 RX ADMIN — ARFORMOTEROL TARTRATE 15 MCG: 15 SOLUTION RESPIRATORY (INHALATION) at 07:57

## 2025-07-29 RX ADMIN — GABAPENTIN 300 MG: 300 CAPSULE ORAL at 20:36

## 2025-07-29 RX ADMIN — PANTOPRAZOLE SODIUM 40 MG: 40 TABLET, DELAYED RELEASE ORAL at 06:01

## 2025-07-29 RX ADMIN — CALCIUM ACETATE 667 MG: 667 CAPSULE ORAL at 17:16

## 2025-07-29 RX ADMIN — DIAZEPAM 5 MG: 5 INJECTION, SOLUTION INTRAMUSCULAR; INTRAVENOUS at 09:58

## 2025-07-29 RX ADMIN — CARVEDILOL 6.25 MG: 3.12 TABLET, FILM COATED ORAL at 17:15

## 2025-07-29 RX ADMIN — HYDROMORPHONE HYDROCHLORIDE 0.5 MG: 1 INJECTION, SOLUTION INTRAMUSCULAR; INTRAVENOUS; SUBCUTANEOUS at 14:04

## 2025-07-29 RX ADMIN — INSULIN GLARGINE 12 UNITS: 100 INJECTION, SOLUTION SUBCUTANEOUS at 20:37

## 2025-07-29 RX ADMIN — INSULIN LISPRO 2 UNITS: 100 INJECTION, SOLUTION INTRAVENOUS; SUBCUTANEOUS at 11:54

## 2025-07-29 RX ADMIN — ARFORMOTEROL TARTRATE 15 MCG: 15 SOLUTION RESPIRATORY (INHALATION) at 19:15

## 2025-07-29 RX ADMIN — CALCIUM 500 MG: 500 TABLET ORAL at 20:36

## 2025-07-29 RX ADMIN — HEPARIN SODIUM 5000 UNITS: 5000 INJECTION INTRAVENOUS; SUBCUTANEOUS at 20:36

## 2025-07-29 RX ADMIN — GABAPENTIN 300 MG: 300 CAPSULE ORAL at 14:00

## 2025-07-29 RX ADMIN — HEPARIN SODIUM 5000 UNITS: 5000 INJECTION INTRAVENOUS; SUBCUTANEOUS at 14:00

## 2025-07-29 RX ADMIN — CALCIUM ACETATE 667 MG: 667 CAPSULE ORAL at 11:54

## 2025-07-29 RX ADMIN — SODIUM CHLORIDE, PRESERVATIVE FREE 10 ML: 5 INJECTION INTRAVENOUS at 10:32

## 2025-07-29 RX ADMIN — LEVETIRACETAM 250 MG: 250 TABLET, FILM COATED ORAL at 20:36

## 2025-07-29 RX ADMIN — BUDESONIDE 500 MCG: 0.5 INHALANT RESPIRATORY (INHALATION) at 19:15

## 2025-07-29 ASSESSMENT — PAIN DESCRIPTION - LOCATION
LOCATION: FOOT
LOCATION: ANKLE

## 2025-07-29 ASSESSMENT — PAIN SCALES - GENERAL
PAINLEVEL_OUTOF10: 4
PAINLEVEL_OUTOF10: 0
PAINLEVEL_OUTOF10: 0
PAINLEVEL_OUTOF10: 8

## 2025-07-29 ASSESSMENT — PAIN DESCRIPTION - ORIENTATION
ORIENTATION: RIGHT;LEFT
ORIENTATION: RIGHT;LEFT

## 2025-07-29 ASSESSMENT — PAIN DESCRIPTION - DESCRIPTORS
DESCRIPTORS: ACHING
DESCRIPTORS: ACHING;THROBBING

## 2025-07-30 LAB
GLUCOSE BLD STRIP.AUTO-MCNC: 114 MG/DL (ref 70–110)
GLUCOSE BLD STRIP.AUTO-MCNC: 175 MG/DL (ref 70–110)
GLUCOSE BLD STRIP.AUTO-MCNC: 199 MG/DL (ref 70–110)
GLUCOSE BLD STRIP.AUTO-MCNC: 199 MG/DL (ref 70–110)
GLUCOSE BLD STRIP.AUTO-MCNC: 214 MG/DL (ref 70–110)

## 2025-07-30 PROCEDURE — 6370000000 HC RX 637 (ALT 250 FOR IP): Performed by: INTERNAL MEDICINE

## 2025-07-30 PROCEDURE — 6360000002 HC RX W HCPCS: Performed by: HOSPITALIST

## 2025-07-30 PROCEDURE — 1100000003 HC PRIVATE W/ TELEMETRY

## 2025-07-30 PROCEDURE — 6370000000 HC RX 637 (ALT 250 FOR IP): Performed by: HOSPITALIST

## 2025-07-30 PROCEDURE — 6360000002 HC RX W HCPCS: Performed by: INTERNAL MEDICINE

## 2025-07-30 PROCEDURE — 2500000003 HC RX 250 WO HCPCS: Performed by: HOSPITALIST

## 2025-07-30 PROCEDURE — 94640 AIRWAY INHALATION TREATMENT: CPT

## 2025-07-30 PROCEDURE — 2700000000 HC OXYGEN THERAPY PER DAY

## 2025-07-30 PROCEDURE — 90935 HEMODIALYSIS ONE EVALUATION: CPT

## 2025-07-30 PROCEDURE — 97530 THERAPEUTIC ACTIVITIES: CPT

## 2025-07-30 PROCEDURE — 82962 GLUCOSE BLOOD TEST: CPT

## 2025-07-30 RX ORDER — CALCIUM CARBONATE 500(1250)
500 TABLET ORAL 2 TIMES DAILY
Qty: 30 TABLET | Refills: 3
Start: 2025-07-31

## 2025-07-30 RX ORDER — POLYETHYLENE GLYCOL 3350 17 G/17G
17 POWDER, FOR SOLUTION ORAL DAILY PRN
Qty: 30 PACKET | Refills: 0
Start: 2025-07-30 | End: 2025-08-29

## 2025-07-30 RX ORDER — CLOPIDOGREL BISULFATE 75 MG/1
75 TABLET ORAL DAILY
Qty: 30 TABLET | Refills: 4
Start: 2025-07-31

## 2025-07-30 RX ORDER — SENNOSIDES 8.6 MG/1
1 TABLET ORAL DAILY
Qty: 30 TABLET | Refills: 0
Start: 2025-07-31 | End: 2025-08-30

## 2025-07-30 RX ORDER — INSULIN GLARGINE 100 [IU]/ML
13 INJECTION, SOLUTION SUBCUTANEOUS NIGHTLY
Qty: 10 ML | Refills: 3
Start: 2025-07-31

## 2025-07-30 RX ADMIN — IPRATROPIUM BROMIDE AND ALBUTEROL SULFATE 1 DOSE: .5; 3 SOLUTION RESPIRATORY (INHALATION) at 08:35

## 2025-07-30 RX ADMIN — HYDRALAZINE HYDROCHLORIDE 10 MG: 10 TABLET ORAL at 17:15

## 2025-07-30 RX ADMIN — LEVETIRACETAM 250 MG: 250 TABLET, FILM COATED ORAL at 20:48

## 2025-07-30 RX ADMIN — CALCIUM ACETATE 667 MG: 667 CAPSULE ORAL at 17:18

## 2025-07-30 RX ADMIN — ATORVASTATIN CALCIUM 80 MG: 20 TABLET, FILM COATED ORAL at 20:48

## 2025-07-30 RX ADMIN — GABAPENTIN 300 MG: 300 CAPSULE ORAL at 20:48

## 2025-07-30 RX ADMIN — BUDESONIDE 500 MCG: 0.5 INHALANT RESPIRATORY (INHALATION) at 08:35

## 2025-07-30 RX ADMIN — CALCIUM 500 MG: 500 TABLET ORAL at 20:48

## 2025-07-30 RX ADMIN — ARFORMOTEROL TARTRATE 15 MCG: 15 SOLUTION RESPIRATORY (INHALATION) at 21:10

## 2025-07-30 RX ADMIN — HEPARIN SODIUM 5000 UNITS: 5000 INJECTION INTRAVENOUS; SUBCUTANEOUS at 06:14

## 2025-07-30 RX ADMIN — IPRATROPIUM BROMIDE AND ALBUTEROL SULFATE 1 DOSE: .5; 3 SOLUTION RESPIRATORY (INHALATION) at 21:10

## 2025-07-30 RX ADMIN — FERROUS SULFATE TAB 325 MG (65 MG ELEMENTAL FE) 325 MG: 325 (65 FE) TAB at 17:17

## 2025-07-30 RX ADMIN — CLOPIDOGREL BISULFATE 75 MG: 75 TABLET, FILM COATED ORAL at 08:59

## 2025-07-30 RX ADMIN — PANTOPRAZOLE SODIUM 40 MG: 40 TABLET, DELAYED RELEASE ORAL at 06:14

## 2025-07-30 RX ADMIN — OXYCODONE AND ACETAMINOPHEN 1.5 TABLET: 5; 325 TABLET ORAL at 17:21

## 2025-07-30 RX ADMIN — GABAPENTIN 300 MG: 300 CAPSULE ORAL at 17:19

## 2025-07-30 RX ADMIN — HEPARIN SODIUM 5000 UNITS: 5000 INJECTION INTRAVENOUS; SUBCUTANEOUS at 20:48

## 2025-07-30 RX ADMIN — HEPARIN SODIUM 5000 UNITS: 5000 INJECTION INTRAVENOUS; SUBCUTANEOUS at 17:23

## 2025-07-30 RX ADMIN — DULOXETINE 60 MG: 30 CAPSULE, DELAYED RELEASE ORAL at 17:17

## 2025-07-30 RX ADMIN — CARVEDILOL 6.25 MG: 3.12 TABLET, FILM COATED ORAL at 17:20

## 2025-07-30 RX ADMIN — BUDESONIDE 500 MCG: 0.5 INHALANT RESPIRATORY (INHALATION) at 21:10

## 2025-07-30 RX ADMIN — INSULIN GLARGINE 13 UNITS: 100 INJECTION, SOLUTION SUBCUTANEOUS at 20:48

## 2025-07-30 RX ADMIN — INSULIN LISPRO 2 UNITS: 100 INJECTION, SOLUTION INTRAVENOUS; SUBCUTANEOUS at 08:58

## 2025-07-30 RX ADMIN — ARFORMOTEROL TARTRATE 15 MCG: 15 SOLUTION RESPIRATORY (INHALATION) at 08:35

## 2025-07-30 RX ADMIN — EPOETIN ALFA-EPBX 6000 UNITS: 3000 INJECTION, SOLUTION INTRAVENOUS; SUBCUTANEOUS at 15:00

## 2025-07-30 RX ADMIN — HYDRALAZINE HYDROCHLORIDE 10 MG: 10 TABLET ORAL at 20:48

## 2025-07-30 ASSESSMENT — PAIN DESCRIPTION - LOCATION: LOCATION: FOOT

## 2025-07-30 ASSESSMENT — PAIN SCALES - GENERAL
PAINLEVEL_OUTOF10: 0
PAINLEVEL_OUTOF10: 8
PAINLEVEL_OUTOF10: 0
PAINLEVEL_OUTOF10: 0

## 2025-07-30 ASSESSMENT — PAIN SCALES - WONG BAKER: WONGBAKER_NUMERICALRESPONSE: NO HURT

## 2025-07-30 ASSESSMENT — PAIN DESCRIPTION - ORIENTATION: ORIENTATION: RIGHT;LEFT

## 2025-07-30 ASSESSMENT — PAIN DESCRIPTION - DESCRIPTORS: DESCRIPTORS: ACHING

## 2025-07-31 VITALS
TEMPERATURE: 98.1 F | BODY MASS INDEX: 47.87 KG/M2 | SYSTOLIC BLOOD PRESSURE: 134 MMHG | WEIGHT: 260.14 LBS | OXYGEN SATURATION: 96 % | DIASTOLIC BLOOD PRESSURE: 63 MMHG | RESPIRATION RATE: 18 BRPM | HEART RATE: 81 BPM | HEIGHT: 62 IN

## 2025-07-31 LAB
ANION GAP SERPL CALC-SCNC: 14 MMOL/L (ref 3–18)
BASOPHILS # BLD: 0.06 K/UL (ref 0–0.1)
BASOPHILS NFR BLD: 0.8 % (ref 0–2)
BUN SERPL-MCNC: 24 MG/DL (ref 6–23)
BUN/CREAT SERPL: 5 (ref 12–20)
CALCIUM SERPL-MCNC: 8.2 MG/DL (ref 8.5–10.1)
CHLORIDE SERPL-SCNC: 96 MMOL/L (ref 98–107)
CO2 SERPL-SCNC: 25 MMOL/L (ref 21–32)
CREAT SERPL-MCNC: 5.09 MG/DL (ref 0.6–1.3)
DIFFERENTIAL METHOD BLD: ABNORMAL
EOSINOPHIL # BLD: 0.33 K/UL (ref 0–0.4)
EOSINOPHIL NFR BLD: 4.3 % (ref 0–5)
ERYTHROCYTE [DISTWIDTH] IN BLOOD BY AUTOMATED COUNT: 14.9 % (ref 11.6–14.5)
GLUCOSE BLD STRIP.AUTO-MCNC: 136 MG/DL (ref 70–110)
GLUCOSE BLD STRIP.AUTO-MCNC: 167 MG/DL (ref 70–110)
GLUCOSE BLD STRIP.AUTO-MCNC: 197 MG/DL (ref 70–110)
GLUCOSE SERPL-MCNC: 201 MG/DL (ref 74–108)
HCT VFR BLD AUTO: 26.3 % (ref 35–45)
HGB BLD-MCNC: 8.2 G/DL (ref 12–16)
IMM GRANULOCYTES # BLD AUTO: 0.16 K/UL (ref 0–0.04)
IMM GRANULOCYTES NFR BLD AUTO: 2.1 % (ref 0–0.5)
LYMPHOCYTES # BLD: 0.51 K/UL (ref 0.9–3.3)
LYMPHOCYTES NFR BLD: 6.6 % (ref 21–52)
MCH RBC QN AUTO: 28.9 PG (ref 24–34)
MCHC RBC AUTO-ENTMCNC: 31.2 G/DL (ref 31–37)
MCV RBC AUTO: 92.6 FL (ref 78–100)
MONOCYTES # BLD: 0.76 K/UL (ref 0.05–1.2)
MONOCYTES NFR BLD: 9.8 % (ref 3–10)
NEUTS SEG # BLD: 5.9 K/UL (ref 1.8–8)
NEUTS SEG NFR BLD: 76.4 % (ref 40–73)
NRBC # BLD: 0.06 K/UL (ref 0–0.01)
NRBC BLD-RTO: 0.8 PER 100 WBC
PLATELET # BLD AUTO: 167 K/UL (ref 135–420)
PMV BLD AUTO: 10.6 FL (ref 9.2–11.8)
POTASSIUM SERPL-SCNC: 4.5 MMOL/L (ref 3.5–5.5)
RBC # BLD AUTO: 2.84 M/UL (ref 4.2–5.3)
SODIUM SERPL-SCNC: 135 MMOL/L (ref 136–145)
WBC # BLD AUTO: 7.7 K/UL (ref 4.6–13.2)

## 2025-07-31 PROCEDURE — 94640 AIRWAY INHALATION TREATMENT: CPT

## 2025-07-31 PROCEDURE — 2500000003 HC RX 250 WO HCPCS: Performed by: INTERNAL MEDICINE

## 2025-07-31 PROCEDURE — 6370000000 HC RX 637 (ALT 250 FOR IP): Performed by: INTERNAL MEDICINE

## 2025-07-31 PROCEDURE — 6370000000 HC RX 637 (ALT 250 FOR IP): Performed by: HOSPITALIST

## 2025-07-31 PROCEDURE — 6360000002 HC RX W HCPCS: Performed by: HOSPITALIST

## 2025-07-31 PROCEDURE — 97530 THERAPEUTIC ACTIVITIES: CPT

## 2025-07-31 PROCEDURE — 80048 BASIC METABOLIC PNL TOTAL CA: CPT

## 2025-07-31 PROCEDURE — 2500000003 HC RX 250 WO HCPCS: Performed by: HOSPITALIST

## 2025-07-31 PROCEDURE — 97535 SELF CARE MNGMENT TRAINING: CPT

## 2025-07-31 PROCEDURE — 85025 COMPLETE CBC W/AUTO DIFF WBC: CPT

## 2025-07-31 PROCEDURE — 36415 COLL VENOUS BLD VENIPUNCTURE: CPT

## 2025-07-31 PROCEDURE — 82962 GLUCOSE BLOOD TEST: CPT

## 2025-07-31 PROCEDURE — 2700000000 HC OXYGEN THERAPY PER DAY

## 2025-07-31 RX ORDER — HEPARIN SODIUM 5000 [USP'U]/ML
5000 INJECTION, SOLUTION INTRAVENOUS; SUBCUTANEOUS EVERY 8 HOURS SCHEDULED
Qty: 126 ML | Refills: 0
Start: 2025-07-31 | End: 2025-09-11

## 2025-07-31 RX ORDER — HYDRALAZINE HYDROCHLORIDE 10 MG/1
10 TABLET, FILM COATED ORAL 3 TIMES DAILY
Qty: 90 TABLET | Refills: 3
Start: 2025-07-31

## 2025-07-31 RX ADMIN — OXYCODONE AND ACETAMINOPHEN 1.5 TABLET: 5; 325 TABLET ORAL at 12:43

## 2025-07-31 RX ADMIN — CALCIUM 500 MG: 500 TABLET ORAL at 08:20

## 2025-07-31 RX ADMIN — HYDRALAZINE HYDROCHLORIDE 10 MG: 10 TABLET ORAL at 09:21

## 2025-07-31 RX ADMIN — CARVEDILOL 6.25 MG: 3.12 TABLET, FILM COATED ORAL at 08:31

## 2025-07-31 RX ADMIN — ASPIRIN 81 MG: 81 TABLET, COATED ORAL at 08:17

## 2025-07-31 RX ADMIN — IPRATROPIUM BROMIDE AND ALBUTEROL SULFATE 1 DOSE: .5; 3 SOLUTION RESPIRATORY (INHALATION) at 07:14

## 2025-07-31 RX ADMIN — AMLODIPINE BESYLATE 5 MG: 5 TABLET ORAL at 09:21

## 2025-07-31 RX ADMIN — CLOPIDOGREL BISULFATE 75 MG: 75 TABLET, FILM COATED ORAL at 08:21

## 2025-07-31 RX ADMIN — SENNOSIDES 8.6 MG: 8.6 TABLET, COATED ORAL at 08:19

## 2025-07-31 RX ADMIN — FERROUS SULFATE TAB 325 MG (65 MG ELEMENTAL FE) 325 MG: 325 (65 FE) TAB at 08:20

## 2025-07-31 RX ADMIN — GABAPENTIN 300 MG: 300 CAPSULE ORAL at 08:31

## 2025-07-31 RX ADMIN — CALCIUM ACETATE 667 MG: 667 CAPSULE ORAL at 12:36

## 2025-07-31 RX ADMIN — INSULIN LISPRO 2 UNITS: 100 INJECTION, SOLUTION INTRAVENOUS; SUBCUTANEOUS at 12:37

## 2025-07-31 RX ADMIN — PANTOPRAZOLE SODIUM 40 MG: 40 TABLET, DELAYED RELEASE ORAL at 05:59

## 2025-07-31 RX ADMIN — BUDESONIDE 500 MCG: 0.5 INHALANT RESPIRATORY (INHALATION) at 07:14

## 2025-07-31 RX ADMIN — HEPARIN SODIUM 5000 UNITS: 5000 INJECTION INTRAVENOUS; SUBCUTANEOUS at 14:53

## 2025-07-31 RX ADMIN — GABAPENTIN 300 MG: 300 CAPSULE ORAL at 14:53

## 2025-07-31 RX ADMIN — CALCIUM ACETATE 667 MG: 667 CAPSULE ORAL at 08:19

## 2025-07-31 RX ADMIN — HEPARIN SODIUM 5000 UNITS: 5000 INJECTION INTRAVENOUS; SUBCUTANEOUS at 05:59

## 2025-07-31 RX ADMIN — ARFORMOTEROL TARTRATE 15 MCG: 15 SOLUTION RESPIRATORY (INHALATION) at 07:14

## 2025-07-31 RX ADMIN — HYDRALAZINE HYDROCHLORIDE 10 MG: 10 TABLET ORAL at 14:54

## 2025-07-31 RX ADMIN — SODIUM CHLORIDE, PRESERVATIVE FREE 10 ML: 5 INJECTION INTRAVENOUS at 08:21

## 2025-07-31 RX ADMIN — DULOXETINE 60 MG: 30 CAPSULE, DELAYED RELEASE ORAL at 08:17

## 2025-07-31 RX ADMIN — CALCIUM ACETATE 667 MG: 667 CAPSULE ORAL at 16:03

## 2025-07-31 RX ADMIN — LEVETIRACETAM 250 MG: 250 TABLET, FILM COATED ORAL at 08:31

## 2025-07-31 RX ADMIN — CARVEDILOL 6.25 MG: 3.12 TABLET, FILM COATED ORAL at 16:02

## 2025-07-31 ASSESSMENT — PAIN DESCRIPTION - LOCATION: LOCATION: FOOT

## 2025-07-31 ASSESSMENT — PAIN SCALES - GENERAL: PAINLEVEL_OUTOF10: 8

## 2025-08-12 ENCOUNTER — TELEPHONE (OUTPATIENT)
Facility: HOSPITAL | Age: 60
End: 2025-08-12

## (undated) DEVICE — CATHETER GUID 6FR L100CM DIA0.071IN NYL SHFT EBU3.5

## (undated) DEVICE — STOPCOCK TRNSDUC 500PSI 3 W ROT M LUER LT BLU OFF HNDL R

## (undated) DEVICE — Device

## (undated) DEVICE — CATH BLLN ANGIO 2X15MM SC EUPHORA RX

## (undated) DEVICE — GUIDEWIRE VASC STR 185 CM PRESSURE OMNIWIRE

## (undated) DEVICE — SPLINT WR VELC FOAM NEUT POS DISP FOR RAD ART ACC SFT STRP

## (undated) DEVICE — VALVE ANGIO ID0.096IN BLEEDBK CTRL COPILOT

## (undated) DEVICE — ELECTRODES PAIR QUIK COMBO CONN RTS DEFIB

## (undated) DEVICE — CATH BLLN ANGIO 2.25X8MM NC EUPHORIA RX

## (undated) DEVICE — GUIDEWIRE VASC L260CM DIA0.035IN TIP L3MM STD EXCHG PTFE J

## (undated) DEVICE — DRAPE BRACH ANGIO W38XL44IN POLYPR SMS FAB

## (undated) DEVICE — CATH BLLN ANGIO 2.50X15MM NC EUPHORIA RX

## (undated) DEVICE — DEVICE TORQ ACCOMMODATE WIRE W/ 0.010-0.038IN GLDEWIRE

## (undated) DEVICE — DRAPE EP LT SUBCLAV ENTRY SHLD SORBX

## (undated) DEVICE — SENSOR PLSE OXMTR AD CBL L36IN ADH FRM FIT SPO2 DISP

## (undated) DEVICE — DEVICE COMPR LNG 27 CM VASC BND

## (undated) DEVICE — GUIDEWIRE VASC STR 3 CM 0.014 INX180 CM 20 CM SS PROWATER

## (undated) DEVICE — KIT INTRO 6FR L10CM MINI WIRE L45CM DIA0.021IN NDL 21GA ANT

## (undated) DEVICE — DEVICE INFL W/HEM VLV TORQUE